# Patient Record
Sex: FEMALE | Race: WHITE | Employment: UNEMPLOYED | URBAN - METROPOLITAN AREA
[De-identification: names, ages, dates, MRNs, and addresses within clinical notes are randomized per-mention and may not be internally consistent; named-entity substitution may affect disease eponyms.]

---

## 2017-01-01 ENCOUNTER — TRANSFERRED RECORDS (OUTPATIENT)
Dept: HEALTH INFORMATION MANAGEMENT | Facility: CLINIC | Age: 42
End: 2017-01-01

## 2017-01-01 ENCOUNTER — APPOINTMENT (OUTPATIENT)
Dept: CT IMAGING | Facility: CLINIC | Age: 42
DRG: 853 | End: 2017-01-01
Attending: EMERGENCY MEDICINE
Payer: COMMERCIAL

## 2017-01-01 ENCOUNTER — TELEPHONE (OUTPATIENT)
Dept: PHARMACY | Facility: OTHER | Age: 42
End: 2017-01-01

## 2017-01-01 ENCOUNTER — ANESTHESIA EVENT (OUTPATIENT)
Dept: SURGERY | Facility: CLINIC | Age: 42
DRG: 166 | End: 2017-01-01
Payer: MEDICARE

## 2017-01-01 ENCOUNTER — OFFICE VISIT (OUTPATIENT)
Dept: PULMONOLOGY | Facility: CLINIC | Age: 42
End: 2017-01-01
Attending: INTERNAL MEDICINE
Payer: MEDICARE

## 2017-01-01 ENCOUNTER — APPOINTMENT (OUTPATIENT)
Dept: GENERAL RADIOLOGY | Facility: CLINIC | Age: 42
DRG: 871 | End: 2017-01-01
Attending: INTERNAL MEDICINE
Payer: MEDICARE

## 2017-01-01 ENCOUNTER — APPOINTMENT (OUTPATIENT)
Dept: OCCUPATIONAL THERAPY | Facility: CLINIC | Age: 42
DRG: 853 | End: 2017-01-01
Attending: INTERNAL MEDICINE
Payer: COMMERCIAL

## 2017-01-01 ENCOUNTER — ANESTHESIA (OUTPATIENT)
Dept: SURGERY | Facility: CLINIC | Age: 42
DRG: 166 | End: 2017-01-01
Payer: MEDICARE

## 2017-01-01 ENCOUNTER — APPOINTMENT (OUTPATIENT)
Dept: GENERAL RADIOLOGY | Facility: CLINIC | Age: 42
DRG: 166 | End: 2017-01-01
Attending: INTERNAL MEDICINE
Payer: MEDICARE

## 2017-01-01 ENCOUNTER — HOSPITAL ENCOUNTER (INPATIENT)
Facility: CLINIC | Age: 42
LOS: 7 days | Discharge: HOME | DRG: 196 | End: 2017-05-09
Attending: EMERGENCY MEDICINE | Admitting: INTERNAL MEDICINE
Payer: COMMERCIAL

## 2017-01-01 ENCOUNTER — SURGERY (OUTPATIENT)
Age: 42
End: 2017-01-01

## 2017-01-01 ENCOUNTER — APPOINTMENT (OUTPATIENT)
Dept: GENERAL RADIOLOGY | Facility: CLINIC | Age: 42
DRG: 166 | End: 2017-01-01
Attending: THORACIC SURGERY (CARDIOTHORACIC VASCULAR SURGERY)
Payer: MEDICARE

## 2017-01-01 ENCOUNTER — HOSPITAL ENCOUNTER (INPATIENT)
Facility: CLINIC | Age: 42
LOS: 6 days | Discharge: HOME | DRG: 196 | End: 2017-02-22
Attending: EMERGENCY MEDICINE | Admitting: INTERNAL MEDICINE
Payer: COMMERCIAL

## 2017-01-01 ENCOUNTER — APPOINTMENT (OUTPATIENT)
Dept: PHYSICAL THERAPY | Facility: CLINIC | Age: 42
DRG: 871 | End: 2017-01-01
Payer: MEDICARE

## 2017-01-01 ENCOUNTER — APPOINTMENT (OUTPATIENT)
Dept: GENERAL RADIOLOGY | Facility: CLINIC | Age: 42
DRG: 166 | End: 2017-01-01
Attending: PHYSICIAN ASSISTANT
Payer: MEDICARE

## 2017-01-01 ENCOUNTER — APPOINTMENT (OUTPATIENT)
Dept: GENERAL RADIOLOGY | Facility: CLINIC | Age: 42
DRG: 871 | End: 2017-01-01
Attending: EMERGENCY MEDICINE
Payer: COMMERCIAL

## 2017-01-01 ENCOUNTER — APPOINTMENT (OUTPATIENT)
Dept: GENERAL RADIOLOGY | Facility: CLINIC | Age: 42
DRG: 853 | End: 2017-01-01
Attending: INTERNAL MEDICINE
Payer: COMMERCIAL

## 2017-01-01 ENCOUNTER — HOSPITAL ENCOUNTER (INPATIENT)
Facility: CLINIC | Age: 42
LOS: 6 days | Discharge: HOME | DRG: 166 | End: 2017-09-20
Attending: HOSPITALIST | Admitting: HOSPITALIST
Payer: MEDICARE

## 2017-01-01 ENCOUNTER — HEALTH MAINTENANCE LETTER (OUTPATIENT)
Age: 42
End: 2017-01-01

## 2017-01-01 ENCOUNTER — ANESTHESIA EVENT (OUTPATIENT)
Dept: INTENSIVE CARE | Facility: CLINIC | Age: 42
DRG: 853 | End: 2017-01-01
Payer: COMMERCIAL

## 2017-01-01 ENCOUNTER — HOSPITAL ENCOUNTER (INPATIENT)
Facility: CLINIC | Age: 42
LOS: 1 days | Discharge: ANOTHER HOSPITAL | DRG: 196 | End: 2017-09-14
Attending: NURSE PRACTITIONER | Admitting: INTERNAL MEDICINE
Payer: MEDICARE

## 2017-01-01 ENCOUNTER — CARE COORDINATION (OUTPATIENT)
Dept: CARE COORDINATION | Facility: CLINIC | Age: 42
End: 2017-01-01

## 2017-01-01 ENCOUNTER — ANESTHESIA (OUTPATIENT)
Dept: INTENSIVE CARE | Facility: CLINIC | Age: 42
DRG: 853 | End: 2017-01-01
Payer: COMMERCIAL

## 2017-01-01 ENCOUNTER — APPOINTMENT (OUTPATIENT)
Dept: CARDIOLOGY | Facility: CLINIC | Age: 42
DRG: 853 | End: 2017-01-01
Attending: ANESTHESIOLOGY
Payer: COMMERCIAL

## 2017-01-01 ENCOUNTER — APPOINTMENT (OUTPATIENT)
Dept: GENERAL RADIOLOGY | Facility: CLINIC | Age: 42
DRG: 871 | End: 2017-01-01
Attending: EMERGENCY MEDICINE
Payer: MEDICARE

## 2017-01-01 ENCOUNTER — DOCUMENTATION ONLY (OUTPATIENT)
Dept: MEDSURG UNIT | Facility: CLINIC | Age: 42
End: 2017-01-01

## 2017-01-01 ENCOUNTER — APPOINTMENT (OUTPATIENT)
Dept: GENERAL RADIOLOGY | Facility: CLINIC | Age: 42
DRG: 196 | End: 2017-01-01
Attending: INTERNAL MEDICINE
Payer: MEDICARE

## 2017-01-01 ENCOUNTER — OFFICE VISIT (OUTPATIENT)
Dept: PULMONOLOGY | Facility: CLINIC | Age: 42
End: 2017-01-01
Attending: INTERNAL MEDICINE
Payer: COMMERCIAL

## 2017-01-01 ENCOUNTER — APPOINTMENT (OUTPATIENT)
Dept: PHYSICAL THERAPY | Facility: CLINIC | Age: 42
DRG: 166 | End: 2017-01-01
Attending: INTERNAL MEDICINE
Payer: MEDICARE

## 2017-01-01 ENCOUNTER — APPOINTMENT (OUTPATIENT)
Dept: PHYSICAL THERAPY | Facility: CLINIC | Age: 42
DRG: 853 | End: 2017-01-01
Attending: INTERNAL MEDICINE
Payer: COMMERCIAL

## 2017-01-01 ENCOUNTER — HOSPITAL ENCOUNTER (INPATIENT)
Facility: CLINIC | Age: 42
LOS: 10 days | Discharge: HOME | DRG: 871 | End: 2017-04-01
Attending: EMERGENCY MEDICINE | Admitting: INTERNAL MEDICINE
Payer: COMMERCIAL

## 2017-01-01 ENCOUNTER — HOSPITAL ENCOUNTER (EMERGENCY)
Facility: CLINIC | Age: 42
Discharge: HOME | End: 2017-11-28
Attending: EMERGENCY MEDICINE | Admitting: EMERGENCY MEDICINE
Payer: MEDICARE

## 2017-01-01 ENCOUNTER — ANESTHESIA EVENT (OUTPATIENT)
Dept: SURGERY | Facility: CLINIC | Age: 42
DRG: 853 | End: 2017-01-01
Payer: COMMERCIAL

## 2017-01-01 ENCOUNTER — APPOINTMENT (OUTPATIENT)
Dept: GENERAL RADIOLOGY | Facility: CLINIC | Age: 42
DRG: 853 | End: 2017-01-01
Attending: NURSE PRACTITIONER
Payer: COMMERCIAL

## 2017-01-01 ENCOUNTER — APPOINTMENT (OUTPATIENT)
Dept: GENERAL RADIOLOGY | Facility: CLINIC | Age: 42
DRG: 196 | End: 2017-01-01
Attending: NURSE PRACTITIONER
Payer: MEDICARE

## 2017-01-01 ENCOUNTER — HOSPITAL ENCOUNTER (EMERGENCY)
Facility: CLINIC | Age: 42
Discharge: OTHER INSTITUTION | End: 2017-03-18
Attending: EMERGENCY MEDICINE | Admitting: EMERGENCY MEDICINE
Payer: COMMERCIAL

## 2017-01-01 ENCOUNTER — TELEPHONE (OUTPATIENT)
Dept: PHARMACY | Facility: CLINIC | Age: 42
End: 2017-01-01

## 2017-01-01 ENCOUNTER — APPOINTMENT (OUTPATIENT)
Dept: ULTRASOUND IMAGING | Facility: CLINIC | Age: 42
DRG: 196 | End: 2017-01-01
Attending: INTERNAL MEDICINE
Payer: MEDICARE

## 2017-01-01 ENCOUNTER — APPOINTMENT (OUTPATIENT)
Dept: GENERAL RADIOLOGY | Facility: CLINIC | Age: 42
DRG: 196 | End: 2017-01-01
Attending: EMERGENCY MEDICINE
Payer: COMMERCIAL

## 2017-01-01 ENCOUNTER — HOSPITAL ENCOUNTER (EMERGENCY)
Facility: CLINIC | Age: 42
Discharge: ANOTHER HOSPITAL | End: 2017-10-18
Attending: EMERGENCY MEDICINE | Admitting: EMERGENCY MEDICINE
Payer: MEDICARE

## 2017-01-01 ENCOUNTER — APPOINTMENT (OUTPATIENT)
Dept: CT IMAGING | Facility: CLINIC | Age: 42
DRG: 871 | End: 2017-01-01
Attending: INTERNAL MEDICINE
Payer: MEDICARE

## 2017-01-01 ENCOUNTER — APPOINTMENT (OUTPATIENT)
Dept: CT IMAGING | Facility: CLINIC | Age: 42
DRG: 196 | End: 2017-01-01
Attending: INTERNAL MEDICINE
Payer: COMMERCIAL

## 2017-01-01 ENCOUNTER — ANESTHESIA (OUTPATIENT)
Dept: SURGERY | Facility: CLINIC | Age: 42
DRG: 853 | End: 2017-01-01
Payer: COMMERCIAL

## 2017-01-01 ENCOUNTER — APPOINTMENT (OUTPATIENT)
Dept: CT IMAGING | Facility: CLINIC | Age: 42
DRG: 196 | End: 2017-01-01
Attending: INTERNAL MEDICINE
Payer: MEDICARE

## 2017-01-01 ENCOUNTER — HOSPITAL ENCOUNTER (INPATIENT)
Facility: CLINIC | Age: 42
LOS: 11 days | Discharge: HOME HEALTH | DRG: 853 | End: 2017-01-18
Attending: EMERGENCY MEDICINE | Admitting: INTERNAL MEDICINE
Payer: COMMERCIAL

## 2017-01-01 ENCOUNTER — HOSPITAL ENCOUNTER (INPATIENT)
Facility: CLINIC | Age: 42
LOS: 19 days | Discharge: HOME | DRG: 871 | End: 2017-11-23
Attending: EMERGENCY MEDICINE | Admitting: INTERNAL MEDICINE
Payer: MEDICARE

## 2017-01-01 ENCOUNTER — APPOINTMENT (OUTPATIENT)
Dept: CARDIOLOGY | Facility: CLINIC | Age: 42
DRG: 166 | End: 2017-01-01
Attending: PHYSICIAN ASSISTANT
Payer: MEDICARE

## 2017-01-01 ENCOUNTER — APPOINTMENT (OUTPATIENT)
Dept: GENERAL RADIOLOGY | Facility: CLINIC | Age: 42
End: 2017-01-01
Attending: EMERGENCY MEDICINE
Payer: MEDICARE

## 2017-01-01 ENCOUNTER — HOSPITAL ENCOUNTER (INPATIENT)
Facility: CLINIC | Age: 42
LOS: 5 days | Discharge: HOME | DRG: 196 | End: 2017-10-23
Attending: INTERNAL MEDICINE | Admitting: INTERNAL MEDICINE
Payer: MEDICARE

## 2017-01-01 ENCOUNTER — TELEPHONE (OUTPATIENT)
Dept: PULMONOLOGY | Facility: CLINIC | Age: 42
End: 2017-01-01

## 2017-01-01 VITALS
OXYGEN SATURATION: 96 % | HEART RATE: 98 BPM | RESPIRATION RATE: 22 BRPM | DIASTOLIC BLOOD PRESSURE: 80 MMHG | HEIGHT: 67 IN | BODY MASS INDEX: 26.89 KG/M2 | WEIGHT: 171.3 LBS | SYSTOLIC BLOOD PRESSURE: 140 MMHG | TEMPERATURE: 100.3 F

## 2017-01-01 VITALS
OXYGEN SATURATION: 94 % | RESPIRATION RATE: 18 BRPM | HEIGHT: 67 IN | DIASTOLIC BLOOD PRESSURE: 63 MMHG | WEIGHT: 177.2 LBS | TEMPERATURE: 96.8 F | BODY MASS INDEX: 27.81 KG/M2 | SYSTOLIC BLOOD PRESSURE: 114 MMHG | HEART RATE: 84 BPM

## 2017-01-01 VITALS
HEART RATE: 85 BPM | TEMPERATURE: 98.8 F | RESPIRATION RATE: 18 BRPM | SYSTOLIC BLOOD PRESSURE: 120 MMHG | OXYGEN SATURATION: 95 % | HEIGHT: 67 IN | DIASTOLIC BLOOD PRESSURE: 82 MMHG | WEIGHT: 188 LBS | BODY MASS INDEX: 29.51 KG/M2

## 2017-01-01 VITALS
BODY MASS INDEX: 27.33 KG/M2 | HEART RATE: 64 BPM | RESPIRATION RATE: 16 BRPM | WEIGHT: 174.5 LBS | SYSTOLIC BLOOD PRESSURE: 107 MMHG | DIASTOLIC BLOOD PRESSURE: 67 MMHG | OXYGEN SATURATION: 91 % | TEMPERATURE: 97.3 F

## 2017-01-01 VITALS
TEMPERATURE: 96.5 F | HEART RATE: 74 BPM | RESPIRATION RATE: 20 BRPM | HEIGHT: 66 IN | SYSTOLIC BLOOD PRESSURE: 111 MMHG | DIASTOLIC BLOOD PRESSURE: 69 MMHG | BODY MASS INDEX: 27.76 KG/M2 | WEIGHT: 172.7 LBS | OXYGEN SATURATION: 96 %

## 2017-01-01 VITALS
BODY MASS INDEX: 29.44 KG/M2 | OXYGEN SATURATION: 88 % | RESPIRATION RATE: 16 BRPM | WEIGHT: 188 LBS | DIASTOLIC BLOOD PRESSURE: 70 MMHG | HEART RATE: 72 BPM | SYSTOLIC BLOOD PRESSURE: 106 MMHG

## 2017-01-01 VITALS
HEIGHT: 67 IN | RESPIRATION RATE: 16 BRPM | BODY MASS INDEX: 29.27 KG/M2 | OXYGEN SATURATION: 97 % | TEMPERATURE: 98.9 F | DIASTOLIC BLOOD PRESSURE: 78 MMHG | HEART RATE: 96 BPM | WEIGHT: 186.51 LBS | SYSTOLIC BLOOD PRESSURE: 116 MMHG

## 2017-01-01 VITALS
SYSTOLIC BLOOD PRESSURE: 100 MMHG | BODY MASS INDEX: 27.86 KG/M2 | HEART RATE: 91 BPM | WEIGHT: 177.5 LBS | OXYGEN SATURATION: 97 % | DIASTOLIC BLOOD PRESSURE: 57 MMHG | HEIGHT: 67 IN | RESPIRATION RATE: 18 BRPM | TEMPERATURE: 97.7 F

## 2017-01-01 VITALS
DIASTOLIC BLOOD PRESSURE: 78 MMHG | HEART RATE: 71 BPM | TEMPERATURE: 99.3 F | SYSTOLIC BLOOD PRESSURE: 107 MMHG | RESPIRATION RATE: 9 BRPM | OXYGEN SATURATION: 86 %

## 2017-01-01 VITALS
DIASTOLIC BLOOD PRESSURE: 68 MMHG | SYSTOLIC BLOOD PRESSURE: 106 MMHG | TEMPERATURE: 99 F | OXYGEN SATURATION: 94 % | BODY MASS INDEX: 26.68 KG/M2 | HEIGHT: 67 IN | WEIGHT: 170 LBS | RESPIRATION RATE: 18 BRPM | HEART RATE: 72 BPM

## 2017-01-01 VITALS
BODY MASS INDEX: 28.79 KG/M2 | TEMPERATURE: 96.3 F | RESPIRATION RATE: 18 BRPM | DIASTOLIC BLOOD PRESSURE: 66 MMHG | WEIGHT: 183.8 LBS | HEART RATE: 71 BPM | OXYGEN SATURATION: 95 % | SYSTOLIC BLOOD PRESSURE: 101 MMHG

## 2017-01-01 VITALS
HEART RATE: 78 BPM | WEIGHT: 197.97 LBS | OXYGEN SATURATION: 93 % | HEIGHT: 64 IN | DIASTOLIC BLOOD PRESSURE: 91 MMHG | BODY MASS INDEX: 33.8 KG/M2 | RESPIRATION RATE: 16 BRPM | TEMPERATURE: 98.8 F | SYSTOLIC BLOOD PRESSURE: 111 MMHG

## 2017-01-01 VITALS
HEART RATE: 55 BPM | BODY MASS INDEX: 26.68 KG/M2 | TEMPERATURE: 97.6 F | WEIGHT: 175.2 LBS | DIASTOLIC BLOOD PRESSURE: 62 MMHG | HEIGHT: 68 IN | HEART RATE: 99 BPM | OXYGEN SATURATION: 95 % | SYSTOLIC BLOOD PRESSURE: 112 MMHG | BODY MASS INDEX: 26.55 KG/M2 | HEIGHT: 67 IN | RESPIRATION RATE: 16 BRPM | DIASTOLIC BLOOD PRESSURE: 74 MMHG | SYSTOLIC BLOOD PRESSURE: 119 MMHG | OXYGEN SATURATION: 96 % | WEIGHT: 170 LBS

## 2017-01-01 VITALS
TEMPERATURE: 99.7 F | OXYGEN SATURATION: 98 % | SYSTOLIC BLOOD PRESSURE: 105 MMHG | RESPIRATION RATE: 10 BRPM | DIASTOLIC BLOOD PRESSURE: 64 MMHG | HEART RATE: 125 BPM

## 2017-01-01 DIAGNOSIS — R09.02 HYPOXIA: ICD-10-CM

## 2017-01-01 DIAGNOSIS — Z79.52 CURRENT CHRONIC USE OF SYSTEMIC STEROIDS: ICD-10-CM

## 2017-01-01 DIAGNOSIS — R33.9 URINARY RETENTION: ICD-10-CM

## 2017-01-01 DIAGNOSIS — F10.930 ALCOHOL WITHDRAWAL, UNCOMPLICATED (H): ICD-10-CM

## 2017-01-01 DIAGNOSIS — J84.89 ORGANIZING PNEUMONIA (H): ICD-10-CM

## 2017-01-01 DIAGNOSIS — A04.72 C. DIFFICILE COLITIS: ICD-10-CM

## 2017-01-01 DIAGNOSIS — G89.29 OTHER CHRONIC PAIN: ICD-10-CM

## 2017-01-01 DIAGNOSIS — E09.9 STEROID-INDUCED DIABETES MELLITUS (H): ICD-10-CM

## 2017-01-01 DIAGNOSIS — J18.9 PNEUMONIA OF BOTH LOWER LOBES DUE TO INFECTIOUS ORGANISM: ICD-10-CM

## 2017-01-01 DIAGNOSIS — J96.01 ACUTE RESPIRATORY FAILURE WITH HYPOXIA (H): Primary | ICD-10-CM

## 2017-01-01 DIAGNOSIS — T38.0X5A STEROID-INDUCED DIABETES MELLITUS (H): Primary | ICD-10-CM

## 2017-01-01 DIAGNOSIS — J84.89 ORGANIZING PNEUMONIA (H): Primary | ICD-10-CM

## 2017-01-01 DIAGNOSIS — R06.02 SOB (SHORTNESS OF BREATH): ICD-10-CM

## 2017-01-01 DIAGNOSIS — J18.9 PNEUMONIA OF BOTH LUNGS DUE TO INFECTIOUS ORGANISM, UNSPECIFIED PART OF LUNG: ICD-10-CM

## 2017-01-01 DIAGNOSIS — R22.0 FACIAL SWELLING: ICD-10-CM

## 2017-01-01 DIAGNOSIS — F17.218 CIGARETTE NICOTINE DEPENDENCE WITH OTHER NICOTINE-INDUCED DISORDER: Primary | ICD-10-CM

## 2017-01-01 DIAGNOSIS — J96.01 ACUTE RESPIRATORY FAILURE WITH HYPOXIA (H): ICD-10-CM

## 2017-01-01 DIAGNOSIS — A41.9 SEVERE SEPSIS (H): ICD-10-CM

## 2017-01-01 DIAGNOSIS — A41.9 SEPSIS, DUE TO UNSPECIFIED ORGANISM: ICD-10-CM

## 2017-01-01 DIAGNOSIS — R65.20 SEVERE SEPSIS (H): ICD-10-CM

## 2017-01-01 DIAGNOSIS — R06.02 SOB (SHORTNESS OF BREATH): Primary | ICD-10-CM

## 2017-01-01 DIAGNOSIS — J18.9 HCAP (HEALTHCARE-ASSOCIATED PNEUMONIA): ICD-10-CM

## 2017-01-01 DIAGNOSIS — I95.9 HYPOTENSION, UNSPECIFIED HYPOTENSION TYPE: ICD-10-CM

## 2017-01-01 DIAGNOSIS — R53.81 PHYSICAL DECONDITIONING: Primary | ICD-10-CM

## 2017-01-01 DIAGNOSIS — J18.9 PNEUMONIA: ICD-10-CM

## 2017-01-01 DIAGNOSIS — R73.9 HYPERGLYCEMIA: Primary | ICD-10-CM

## 2017-01-01 DIAGNOSIS — J21.9 BRONCHIOLITIS: ICD-10-CM

## 2017-01-01 DIAGNOSIS — E09.9 STEROID-INDUCED DIABETES MELLITUS (H): Primary | ICD-10-CM

## 2017-01-01 DIAGNOSIS — F17.200 TOBACCO USE DISORDER: ICD-10-CM

## 2017-01-01 DIAGNOSIS — J18.9 CAP (COMMUNITY ACQUIRED PNEUMONIA): ICD-10-CM

## 2017-01-01 DIAGNOSIS — F17.218 CIGARETTE NICOTINE DEPENDENCE WITH OTHER NICOTINE-INDUCED DISORDER: ICD-10-CM

## 2017-01-01 DIAGNOSIS — T38.0X5A STEROID-INDUCED DIABETES MELLITUS (H): ICD-10-CM

## 2017-01-01 DIAGNOSIS — R73.9 HYPERGLYCEMIA: ICD-10-CM

## 2017-01-01 DIAGNOSIS — R41.82 ALTERED MENTAL STATUS, UNSPECIFIED ALTERED MENTAL STATUS TYPE: ICD-10-CM

## 2017-01-01 LAB
1,3 BETA GLUCAN SER-MCNC: 50 PG/ML
1,3 BETA GLUCAN SER-MCNC: 52 PG/ML
1,3 BETA GLUCAN SER-MCNC: <31 PG/ML
A FLAVUS AB SER QL ID: NORMAL
A FUMIGATUS1 AB SER QL ID: NORMAL
A FUMIGATUS2 AB SER QL: NORMAL
A FUMIGATUS3 AB SER QL ID: NORMAL
A FUMIGATUS6 AB SER QL ID: NORMAL
A PULLULANS AB SER QL ID: NORMAL
ABO + RH BLD: ABNORMAL
ABO + RH BLD: ABNORMAL
ABO + RH BLD: NORMAL
ACETAMINOPHEN QUAL: NEGATIVE
ACID FAST STN SPEC QL: NORMAL
ALBUMIN SERPL-MCNC: 2.2 G/DL (ref 3.4–5)
ALBUMIN SERPL-MCNC: 2.4 G/DL (ref 3.4–5)
ALBUMIN SERPL-MCNC: 2.4 G/DL (ref 3.4–5)
ALBUMIN SERPL-MCNC: 2.5 G/DL (ref 3.4–5)
ALBUMIN SERPL-MCNC: 2.6 G/DL (ref 3.4–5)
ALBUMIN SERPL-MCNC: 2.7 G/DL (ref 3.4–5)
ALBUMIN SERPL-MCNC: 2.7 G/DL (ref 3.4–5)
ALBUMIN SERPL-MCNC: 3.3 G/DL (ref 3.4–5)
ALBUMIN SERPL-MCNC: 3.4 G/DL (ref 3.4–5)
ALBUMIN SERPL-MCNC: 3.4 G/DL (ref 3.4–5)
ALBUMIN SERPL-MCNC: 3.5 G/DL (ref 3.4–5)
ALBUMIN SERPL-MCNC: 3.6 G/DL (ref 3.4–5)
ALBUMIN SERPL-MCNC: 3.6 G/DL (ref 3.4–5)
ALBUMIN SERPL-MCNC: 3.9 G/DL (ref 3.4–5)
ALBUMIN SERPL-MCNC: 3.9 G/DL (ref 3.4–5)
ALBUMIN UR-MCNC: 30 MG/DL
ALBUMIN UR-MCNC: NEGATIVE MG/DL
ALDOLASE SERPL-CCNC: 9
ALP SERPL-CCNC: 101 U/L (ref 40–150)
ALP SERPL-CCNC: 106 U/L (ref 40–150)
ALP SERPL-CCNC: 108 U/L (ref 40–150)
ALP SERPL-CCNC: 109 U/L (ref 40–150)
ALP SERPL-CCNC: 114 U/L (ref 40–150)
ALP SERPL-CCNC: 119 U/L (ref 40–150)
ALP SERPL-CCNC: 120 U/L (ref 40–150)
ALP SERPL-CCNC: 124 U/L (ref 40–150)
ALP SERPL-CCNC: 130 U/L (ref 40–150)
ALP SERPL-CCNC: 132 U/L (ref 40–150)
ALP SERPL-CCNC: 139 U/L (ref 40–150)
ALP SERPL-CCNC: 145 U/L (ref 40–150)
ALP SERPL-CCNC: 62 U/L (ref 40–150)
ALP SERPL-CCNC: 90 U/L (ref 40–150)
ALP SERPL-CCNC: 97 U/L (ref 40–150)
ALP SERPL-CCNC: 99 U/L (ref 40–150)
ALT SERPL W P-5'-P-CCNC: 13 U/L (ref 0–50)
ALT SERPL W P-5'-P-CCNC: 17 U/L (ref 0–50)
ALT SERPL W P-5'-P-CCNC: 19 U/L (ref 0–50)
ALT SERPL W P-5'-P-CCNC: 28 U/L (ref 0–50)
ALT SERPL W P-5'-P-CCNC: 28 U/L (ref 0–50)
ALT SERPL W P-5'-P-CCNC: 33 U/L (ref 0–50)
ALT SERPL W P-5'-P-CCNC: 34 U/L (ref 0–50)
ALT SERPL W P-5'-P-CCNC: 34 U/L (ref 0–50)
ALT SERPL W P-5'-P-CCNC: 36 U/L (ref 0–50)
ALT SERPL W P-5'-P-CCNC: 42 U/L (ref 0–50)
ALT SERPL W P-5'-P-CCNC: 42 U/L (ref 0–50)
ALT SERPL W P-5'-P-CCNC: 45 U/L (ref 0–50)
ALT SERPL W P-5'-P-CCNC: 47 U/L (ref 0–50)
ALT SERPL W P-5'-P-CCNC: 48 U/L (ref 0–50)
ALT SERPL W P-5'-P-CCNC: 72 U/L (ref 0–50)
ALT SERPL W P-5'-P-CCNC: 87 U/L (ref 0–50)
AMMONIA PLAS-SCNC: 26 UMOL/L (ref 10–50)
AMMONIA PLAS-SCNC: 29 UMOL/L (ref 10–50)
AMMONIA PLAS-SCNC: 35 UMOL/L (ref 10–50)
AMMONIA PLAS-SCNC: 39 UMOL/L (ref 10–50)
AMOBARBITAL QUAL: NEGATIVE
AMPHETAMINES UR QL SCN: NEGATIVE
AMPHETAMINES UR QL SCN: NORMAL
AMPHETAMINES UR QL SCN: NORMAL
ANA PAT SER IF-IMP: ABNORMAL
ANA SER QL IA: NORMAL
ANA SER QL IF: ABNORMAL
ANA TITR SER IF: ABNORMAL {TITER}
ANCA IGG TITR SER IF: NORMAL {TITER}
ANION GAP SERPL CALCULATED.3IONS-SCNC: 10 MMOL/L (ref 3–14)
ANION GAP SERPL CALCULATED.3IONS-SCNC: 10 MMOL/L (ref 3–14)
ANION GAP SERPL CALCULATED.3IONS-SCNC: 11 MMOL/L (ref 3–14)
ANION GAP SERPL CALCULATED.3IONS-SCNC: 12 MMOL/L (ref 3–14)
ANION GAP SERPL CALCULATED.3IONS-SCNC: 13 MMOL/L (ref 3–14)
ANION GAP SERPL CALCULATED.3IONS-SCNC: 16 MMOL/L (ref 3–14)
ANION GAP SERPL CALCULATED.3IONS-SCNC: 3 MMOL/L (ref 3–14)
ANION GAP SERPL CALCULATED.3IONS-SCNC: 4 MMOL/L (ref 3–14)
ANION GAP SERPL CALCULATED.3IONS-SCNC: 5 MMOL/L (ref 3–14)
ANION GAP SERPL CALCULATED.3IONS-SCNC: 6 MMOL/L (ref 3–14)
ANION GAP SERPL CALCULATED.3IONS-SCNC: 7 MMOL/L (ref 3–14)
ANION GAP SERPL CALCULATED.3IONS-SCNC: 8 MMOL/L (ref 3–14)
ANION GAP SERPL CALCULATED.3IONS-SCNC: 9 MMOL/L (ref 3–14)
APPEARANCE FLD: NORMAL
APPEARANCE UR: CLEAR
APTT PPP: 30 SEC (ref 22–37)
APTT PPP: 36 SEC (ref 22–37)
APTT PPP: 41 SEC (ref 22–37)
ASPERGILLUS AB TITR SER CF: NORMAL {TITER}
AST SERPL W P-5'-P-CCNC: 124 U/L (ref 0–45)
AST SERPL W P-5'-P-CCNC: 15 U/L (ref 0–45)
AST SERPL W P-5'-P-CCNC: 27 U/L (ref 0–45)
AST SERPL W P-5'-P-CCNC: 33 U/L (ref 0–45)
AST SERPL W P-5'-P-CCNC: 35 U/L (ref 0–45)
AST SERPL W P-5'-P-CCNC: 47 U/L (ref 0–45)
AST SERPL W P-5'-P-CCNC: 51 U/L (ref 0–45)
AST SERPL W P-5'-P-CCNC: 54 U/L (ref 0–45)
AST SERPL W P-5'-P-CCNC: 54 U/L (ref 0–45)
AST SERPL W P-5'-P-CCNC: 55 U/L (ref 0–45)
AST SERPL W P-5'-P-CCNC: 56 U/L (ref 0–45)
AST SERPL W P-5'-P-CCNC: 58 U/L (ref 0–45)
AST SERPL W P-5'-P-CCNC: 62 U/L (ref 0–45)
AST SERPL W P-5'-P-CCNC: 64 U/L (ref 0–45)
AST SERPL W P-5'-P-CCNC: 69 U/L (ref 0–45)
AST SERPL W P-5'-P-CCNC: 98 U/L (ref 0–45)
B DERMAT AB TITR SER CF: NORMAL {TITER}
B-D GLUCAN INTERPRETATION (1,3): NEGATIVE
BACTERIA #/AREA URNS HPF: ABNORMAL /HPF
BACTERIA #/AREA URNS HPF: ABNORMAL /HPF
BACTERIA SPEC CULT: ABNORMAL
BACTERIA SPEC CULT: NO GROWTH
BACTERIA SPEC CULT: NORMAL
BARBITAL QUAL: NEGATIVE
BARBITURATES UR QL: NEGATIVE
BARBITURATES UR QL: NORMAL
BARBITURATES UR QL: NORMAL
BASE DEFICIT BLDA-SCNC: 0.5 MMOL/L
BASE DEFICIT BLDA-SCNC: 2.9 MMOL/L
BASE DEFICIT BLDA-SCNC: 4.9 MMOL/L
BASE DEFICIT BLDA-SCNC: 5.6 MMOL/L
BASE DEFICIT BLDA-SCNC: 6.3 MMOL/L
BASE DEFICIT BLDV-SCNC: 0.9 MMOL/L
BASE DEFICIT BLDV-SCNC: 1.6 MMOL/L
BASE DEFICIT BLDV-SCNC: 4.1 MMOL/L
BASE EXCESS BLDA CALC-SCNC: 11 MMOL/L
BASE EXCESS BLDA CALC-SCNC: 2.1 MMOL/L
BASE EXCESS BLDA CALC-SCNC: 2.4 MMOL/L
BASE EXCESS BLDA CALC-SCNC: 7.2 MMOL/L
BASE EXCESS BLDV CALC-SCNC: 3.1 MMOL/L
BASE EXCESS BLDV CALC-SCNC: 4.1 MMOL/L
BASE EXCESS BLDV CALC-SCNC: 4.7 MMOL/L
BASE EXCESS BLDV CALC-SCNC: 9.1 MMOL/L
BASOPHILS # BLD AUTO: 0 10E9/L (ref 0–0.2)
BASOPHILS # BLD AUTO: 0.1 10E9/L (ref 0–0.2)
BASOPHILS NFR BLD AUTO: 0 %
BASOPHILS NFR BLD AUTO: 0.1 %
BASOPHILS NFR BLD AUTO: 0.2 %
BASOPHILS NFR BLD AUTO: 0.3 %
BASOPHILS NFR BLD AUTO: 0.4 %
BASOPHILS NFR BLD AUTO: 1 %
BASOPHILS NFR BLD AUTO: 1 %
BENZODIAZ UR QL: NEGATIVE
BENZODIAZ UR QL: NORMAL
BENZODIAZ UR QL: NORMAL
BILIRUB DIRECT SERPL-MCNC: 0.2 MG/DL (ref 0–0.2)
BILIRUB SERPL-MCNC: 0.2 MG/DL (ref 0.2–1.3)
BILIRUB SERPL-MCNC: 0.4 MG/DL (ref 0.2–1.3)
BILIRUB SERPL-MCNC: 0.5 MG/DL (ref 0.2–1.3)
BILIRUB SERPL-MCNC: 0.6 MG/DL (ref 0.2–1.3)
BILIRUB SERPL-MCNC: 0.6 MG/DL (ref 0.2–1.3)
BILIRUB SERPL-MCNC: 0.7 MG/DL (ref 0.2–1.3)
BILIRUB SERPL-MCNC: 0.8 MG/DL (ref 0.2–1.3)
BILIRUB SERPL-MCNC: 0.8 MG/DL (ref 0.2–1.3)
BILIRUB SERPL-MCNC: 0.9 MG/DL (ref 0.2–1.3)
BILIRUB UR QL STRIP: NEGATIVE
BLD GP AB INVEST PLASRBC-IMP: ABNORMAL
BLD GP AB SCN SERPL QL: ABNORMAL
BLD GP AB SCN SERPL QL: NORMAL
BLD GP AB SCN SERPL QL: NORMAL
BLD PROD TYP BPU: ABNORMAL
BLD PROD TYP BPU: NORMAL
BLD UNIT ID BPU: 0
BLOOD BANK CMNT PATIENT-IMP: ABNORMAL
BLOOD BANK CMNT PATIENT-IMP: NORMAL
BLOOD PRODUCT CODE: NORMAL
BPU ID: NORMAL
BRONCHOSCOPY: NORMAL
BRONCHOSCOPY: NORMAL
BUN SERPL-MCNC: 10 MG/DL (ref 7–30)
BUN SERPL-MCNC: 11 MG/DL (ref 7–30)
BUN SERPL-MCNC: 12 MG/DL (ref 7–30)
BUN SERPL-MCNC: 13 MG/DL (ref 7–30)
BUN SERPL-MCNC: 14 MG/DL (ref 7–30)
BUN SERPL-MCNC: 15 MG/DL (ref 7–30)
BUN SERPL-MCNC: 15 MG/DL (ref 7–30)
BUN SERPL-MCNC: 16 MG/DL (ref 7–30)
BUN SERPL-MCNC: 17 MG/DL (ref 7–30)
BUN SERPL-MCNC: 18 MG/DL (ref 7–30)
BUN SERPL-MCNC: 19 MG/DL (ref 7–30)
BUN SERPL-MCNC: 20 MG/DL (ref 7–30)
BUN SERPL-MCNC: 21 MG/DL (ref 7–30)
BUN SERPL-MCNC: 22 MG/DL (ref 7–30)
BUN SERPL-MCNC: 23 MG/DL (ref 7–30)
BUN SERPL-MCNC: 24 MG/DL (ref 7–30)
BUN SERPL-MCNC: 24 MG/DL (ref 7–30)
BUN SERPL-MCNC: 25 MG/DL (ref 7–30)
BUN SERPL-MCNC: 26 MG/DL (ref 7–30)
BUN SERPL-MCNC: 28 MG/DL (ref 7–30)
BUN SERPL-MCNC: 29 MG/DL (ref 7–30)
BUN SERPL-MCNC: 30 MG/DL (ref 7–30)
BUN SERPL-MCNC: 32 MG/DL (ref 7–30)
BUN SERPL-MCNC: 39 MG/DL (ref 7–30)
BUN SERPL-MCNC: 39 MG/DL (ref 7–30)
BUN SERPL-MCNC: 41 MG/DL (ref 7–30)
BUN SERPL-MCNC: 6 MG/DL (ref 7–30)
BUN SERPL-MCNC: 7 MG/DL (ref 7–30)
BUN SERPL-MCNC: 7 MG/DL (ref 7–30)
BUN SERPL-MCNC: 8 MG/DL (ref 7–30)
BUN SERPL-MCNC: 8 MG/DL (ref 7–30)
BUTABARBITAL QUAL: NEGATIVE
BUTALBITAL QUAL: NEGATIVE
C BURNET PH1 IGG SER QL IF: NORMAL
C BURNET PH2 IGG SER QL IF: NORMAL
C DIFF TOX B STL QL: ABNORMAL
C DIFF TOX B STL QL: NEGATIVE
C PNEUM IGG TITR SER IF: ABNORMAL {TITER}
C PSITTACI IGG TITR SER IF: ABNORMAL {TITER}
C TRACH IGG TITR SER IF: ABNORMAL {TITER}
CAFFEINE QUAL: NEGATIVE
CALCIUM SERPL-MCNC: 7.2 MG/DL (ref 8.5–10.1)
CALCIUM SERPL-MCNC: 7.4 MG/DL (ref 8.5–10.1)
CALCIUM SERPL-MCNC: 7.8 MG/DL (ref 8.5–10.1)
CALCIUM SERPL-MCNC: 8 MG/DL (ref 8.5–10.1)
CALCIUM SERPL-MCNC: 8.1 MG/DL (ref 8.5–10.1)
CALCIUM SERPL-MCNC: 8.2 MG/DL (ref 8.5–10.1)
CALCIUM SERPL-MCNC: 8.3 MG/DL (ref 8.5–10.1)
CALCIUM SERPL-MCNC: 8.4 MG/DL (ref 8.5–10.1)
CALCIUM SERPL-MCNC: 8.5 MG/DL (ref 8.5–10.1)
CALCIUM SERPL-MCNC: 8.6 MG/DL (ref 8.5–10.1)
CALCIUM SERPL-MCNC: 8.7 MG/DL (ref 8.5–10.1)
CALCIUM SERPL-MCNC: 8.8 MG/DL (ref 8.5–10.1)
CALCIUM SERPL-MCNC: 8.9 MG/DL (ref 8.5–10.1)
CALCIUM SERPL-MCNC: 9 MG/DL (ref 8.5–10.1)
CALCIUM SERPL-MCNC: 9 MG/DL (ref 8.5–10.1)
CALCIUM SERPL-MCNC: 9.1 MG/DL (ref 8.5–10.1)
CALCIUM SERPL-MCNC: 9.3 MG/DL (ref 8.5–10.1)
CALCIUM SERPL-MCNC: 9.4 MG/DL (ref 8.5–10.1)
CALCIUM SERPL-MCNC: 9.5 MG/DL (ref 8.5–10.1)
CALCIUM SERPL-MCNC: 9.6 MG/DL (ref 8.5–10.1)
CALCIUM SERPL-MCNC: 9.9 MG/DL (ref 8.5–10.1)
CANNABINOIDS UR QL SCN: NEGATIVE
CANNABINOIDS UR QL SCN: NORMAL
CANNABINOIDS UR QL SCN: NORMAL
CARBAMAZEPINE QUAL: NEGATIVE
CARISOPRODOL QUAL: NEGATIVE
CCP AB SER IA-ACNC: 1 U/ML
CD19 CELLS NFR BRONCH: 1 %
CD3 CELLS NFR BRONCH: 66 %
CD3+CD4+ CELLS NFR BRONCH: 36 %
CD3+CD4+ CELLS/CD3+CD8+ CLL BRONCH: 1.57 %
CD3+CD8+ CELLS NFR BRONCH: 23 %
CD3-CD16+CD56+ CELLS NFR SPEC: 37 %
CH50 SERPL-ACNC: 152 CAE UNITS (ref 60–144)
CHLORIDE SERPL-SCNC: 100 MMOL/L (ref 94–109)
CHLORIDE SERPL-SCNC: 101 MMOL/L (ref 94–109)
CHLORIDE SERPL-SCNC: 102 MMOL/L (ref 94–109)
CHLORIDE SERPL-SCNC: 103 MMOL/L (ref 94–109)
CHLORIDE SERPL-SCNC: 104 MMOL/L (ref 94–109)
CHLORIDE SERPL-SCNC: 105 MMOL/L (ref 94–109)
CHLORIDE SERPL-SCNC: 106 MMOL/L (ref 94–109)
CHLORIDE SERPL-SCNC: 106 MMOL/L (ref 94–109)
CHLORIDE SERPL-SCNC: 108 MMOL/L (ref 94–109)
CHLORIDE SERPL-SCNC: 109 MMOL/L (ref 94–109)
CHLORIDE SERPL-SCNC: 111 MMOL/L (ref 94–109)
CHLORIDE SERPL-SCNC: 111 MMOL/L (ref 94–109)
CHLORIDE SERPL-SCNC: 112 MMOL/L (ref 94–109)
CHLORIDE SERPL-SCNC: 112 MMOL/L (ref 94–109)
CHLORIDE SERPL-SCNC: 113 MMOL/L (ref 94–109)
CHLORIDE SERPL-SCNC: 115 MMOL/L (ref 94–109)
CHLORIDE SERPL-SCNC: 90 MMOL/L (ref 94–109)
CHLORIDE SERPL-SCNC: 91 MMOL/L (ref 94–109)
CHLORIDE SERPL-SCNC: 91 MMOL/L (ref 94–109)
CHLORIDE SERPL-SCNC: 92 MMOL/L (ref 94–109)
CHLORIDE SERPL-SCNC: 93 MMOL/L (ref 94–109)
CHLORIDE SERPL-SCNC: 94 MMOL/L (ref 94–109)
CHLORIDE SERPL-SCNC: 95 MMOL/L (ref 94–109)
CHLORIDE SERPL-SCNC: 95 MMOL/L (ref 94–109)
CHLORIDE SERPL-SCNC: 97 MMOL/L (ref 94–109)
CHLORIDE SERPL-SCNC: 98 MMOL/L (ref 94–109)
CHLORPROPAMIDE UR-MCNC: NEGATIVE UG/ML
CK SERPL-CCNC: 24 U/L (ref 30–225)
CK SERPL-CCNC: 27 U/L (ref 30–225)
CK SERPL-CCNC: 30 U/L (ref 30–225)
CO2 BLDCOV-SCNC: 23 MMOL/L (ref 21–28)
CO2 BLDCOV-SCNC: 24 MMOL/L (ref 21–28)
CO2 BLDCOV-SCNC: 25 MMOL/L (ref 21–28)
CO2 BLDCOV-SCNC: 26 MMOL/L (ref 21–28)
CO2 SERPL-SCNC: 17 MMOL/L (ref 20–32)
CO2 SERPL-SCNC: 19 MMOL/L (ref 20–32)
CO2 SERPL-SCNC: 23 MMOL/L (ref 20–32)
CO2 SERPL-SCNC: 23 MMOL/L (ref 20–32)
CO2 SERPL-SCNC: 24 MMOL/L (ref 20–32)
CO2 SERPL-SCNC: 25 MMOL/L (ref 20–32)
CO2 SERPL-SCNC: 26 MMOL/L (ref 20–32)
CO2 SERPL-SCNC: 27 MMOL/L (ref 20–32)
CO2 SERPL-SCNC: 28 MMOL/L (ref 20–32)
CO2 SERPL-SCNC: 29 MMOL/L (ref 20–32)
CO2 SERPL-SCNC: 30 MMOL/L (ref 20–32)
CO2 SERPL-SCNC: 31 MMOL/L (ref 20–32)
CO2 SERPL-SCNC: 32 MMOL/L (ref 20–32)
CO2 SERPL-SCNC: 33 MMOL/L (ref 20–32)
CO2 SERPL-SCNC: 34 MMOL/L (ref 20–32)
CO2 SERPL-SCNC: 35 MMOL/L (ref 20–32)
COCAINE UR QL: NEGATIVE
COCAINE UR QL: NORMAL
COCAINE UR QL: NORMAL
COCCIDIOIDES AB TITR SER CF: NORMAL {TITER}
COLOR FLD: COLORLESS
COLOR FLD: NORMAL
COLOR FLD: NORMAL
COLOR UR AUTO: ABNORMAL
COLOR UR AUTO: YELLOW
COPATH REPORT: NORMAL
CREAT SERPL-MCNC: 0.62 MG/DL (ref 0.52–1.04)
CREAT SERPL-MCNC: 0.64 MG/DL (ref 0.52–1.04)
CREAT SERPL-MCNC: 0.66 MG/DL (ref 0.52–1.04)
CREAT SERPL-MCNC: 0.68 MG/DL (ref 0.52–1.04)
CREAT SERPL-MCNC: 0.68 MG/DL (ref 0.52–1.04)
CREAT SERPL-MCNC: 0.73 MG/DL (ref 0.52–1.04)
CREAT SERPL-MCNC: 0.78 MG/DL (ref 0.52–1.04)
CREAT SERPL-MCNC: 0.79 MG/DL (ref 0.52–1.04)
CREAT SERPL-MCNC: 0.8 MG/DL (ref 0.52–1.04)
CREAT SERPL-MCNC: 0.8 MG/DL (ref 0.52–1.04)
CREAT SERPL-MCNC: 0.81 MG/DL (ref 0.52–1.04)
CREAT SERPL-MCNC: 0.82 MG/DL (ref 0.52–1.04)
CREAT SERPL-MCNC: 0.82 MG/DL (ref 0.52–1.04)
CREAT SERPL-MCNC: 0.83 MG/DL (ref 0.52–1.04)
CREAT SERPL-MCNC: 0.83 MG/DL (ref 0.52–1.04)
CREAT SERPL-MCNC: 0.84 MG/DL (ref 0.52–1.04)
CREAT SERPL-MCNC: 0.85 MG/DL (ref 0.52–1.04)
CREAT SERPL-MCNC: 0.86 MG/DL (ref 0.52–1.04)
CREAT SERPL-MCNC: 0.87 MG/DL (ref 0.52–1.04)
CREAT SERPL-MCNC: 0.88 MG/DL (ref 0.52–1.04)
CREAT SERPL-MCNC: 0.89 MG/DL (ref 0.52–1.04)
CREAT SERPL-MCNC: 0.9 MG/DL (ref 0.52–1.04)
CREAT SERPL-MCNC: 0.92 MG/DL (ref 0.52–1.04)
CREAT SERPL-MCNC: 0.92 MG/DL (ref 0.52–1.04)
CREAT SERPL-MCNC: 0.93 MG/DL (ref 0.52–1.04)
CREAT SERPL-MCNC: 0.93 MG/DL (ref 0.52–1.04)
CREAT SERPL-MCNC: 0.94 MG/DL (ref 0.52–1.04)
CREAT SERPL-MCNC: 0.95 MG/DL (ref 0.52–1.04)
CREAT SERPL-MCNC: 0.96 MG/DL (ref 0.52–1.04)
CREAT SERPL-MCNC: 0.97 MG/DL (ref 0.52–1.04)
CREAT SERPL-MCNC: 0.97 MG/DL (ref 0.52–1.04)
CREAT SERPL-MCNC: 0.98 MG/DL (ref 0.52–1.04)
CREAT SERPL-MCNC: 0.99 MG/DL (ref 0.52–1.04)
CREAT SERPL-MCNC: 1 MG/DL (ref 0.52–1.04)
CREAT SERPL-MCNC: 1.01 MG/DL (ref 0.52–1.04)
CREAT SERPL-MCNC: 1.02 MG/DL (ref 0.52–1.04)
CREAT SERPL-MCNC: 1.03 MG/DL (ref 0.52–1.04)
CREAT SERPL-MCNC: 1.04 MG/DL (ref 0.52–1.04)
CREAT SERPL-MCNC: 1.07 MG/DL (ref 0.52–1.04)
CREAT SERPL-MCNC: 1.11 MG/DL (ref 0.52–1.04)
CREAT SERPL-MCNC: 1.12 MG/DL (ref 0.52–1.04)
CREAT SERPL-MCNC: 1.12 MG/DL (ref 0.52–1.04)
CREAT SERPL-MCNC: 1.13 MG/DL (ref 0.52–1.04)
CREAT SERPL-MCNC: 1.22 MG/DL (ref 0.52–1.04)
CREAT SERPL-MCNC: 1.23 MG/DL (ref 0.52–1.04)
CREAT SERPL-MCNC: 1.31 MG/DL (ref 0.52–1.04)
CREAT SERPL-MCNC: 1.31 MG/DL (ref 0.52–1.04)
CREAT SERPL-MCNC: 1.42 MG/DL (ref 0.52–1.04)
CREAT SERPL-MCNC: 1.47 MG/DL (ref 0.52–1.04)
CREAT UR-MCNC: 24 MG/DL
CREAT UR-MCNC: 48 MG/DL
CRP SERPL-MCNC: 110 MG/L (ref 0–8)
CRP SERPL-MCNC: 16.3 MG/L (ref 0–8)
CRP SERPL-MCNC: 217 MG/L (ref 0–8)
CRP SERPL-MCNC: 3.7 MG/L (ref 0–8)
CRP SERPL-MCNC: 69.3 MG/L (ref 0–8)
CRP SERPL-MCNC: 72 MG/L (ref 0–8)
D DIMER PPP FEU-MCNC: 0.8 UG/ML FEU (ref 0–0.5)
D DIMER PPP FEU-MCNC: 1 UG/ML FEU (ref 0–0.5)
D DIMER PPP FEU-MCNC: 1.3 UG/ML FEU (ref 0–0.5)
DAT IGG-SP REAG RBC-IMP: NORMAL
DAT IGG-SP REAG RBC-IMP: NORMAL
DIFFERENTIAL METHOD BLD: ABNORMAL
DLCOCOR-%PRED-PRE: 59 %
DLCOCOR-PRE: 14.74 ML/MIN/MMHG
DLCOUNC-%PRED-PRE: 46 %
DLCOUNC-%PRED-PRE: 57 %
DLCOUNC-PRE: 11.55 ML/MIN/MMHG
DLCOUNC-PRE: 14.31 ML/MIN/MMHG
DLCOUNC-PRED: 24.65 ML/MIN/MMHG
DLCOUNC-PRED: 24.69 ML/MIN/MMHG
DRUGS SERPL SCN: NEGATIVE
ENA JO1 IGG SER-ACNC: NORMAL AI (ref 0–0.9)
ENA SCL70 IGG SER IA-ACNC: NORMAL AI (ref 0–0.9)
ENA SS-A IGG SER IA-ACNC: NORMAL AI (ref 0–0.9)
ENA SS-B IGG SER IA-ACNC: NORMAL AI (ref 0–0.9)
EOSINOPHIL # BLD AUTO: 0 10E9/L (ref 0–0.7)
EOSINOPHIL # BLD AUTO: 0.1 10E9/L (ref 0–0.7)
EOSINOPHIL NFR BLD AUTO: 0 %
EOSINOPHIL NFR BLD AUTO: 0.1 %
EOSINOPHIL NFR BLD AUTO: 0.2 %
EOSINOPHIL NFR BLD AUTO: 0.8 %
EOSINOPHIL NFR BLD AUTO: 1 %
EOSINOPHIL NFR BLD AUTO: 1.7 %
EOSINOPHIL NFR BLD AUTO: 2 %
EOSINOPHIL NFR FLD MANUAL: 1 %
ERV-%PRED-PRE: 75 %
ERV-%PRED-PRE: 84 %
ERV-PRE: 0.69 L
ERV-PRE: 0.87 L
ERV-PRED: 0.92 L
ERV-PRED: 1.04 L
ERYTHROCYTE [DISTWIDTH] IN BLOOD BY AUTOMATED COUNT: 13.1 % (ref 10–15)
ERYTHROCYTE [DISTWIDTH] IN BLOOD BY AUTOMATED COUNT: 13.2 % (ref 10–15)
ERYTHROCYTE [DISTWIDTH] IN BLOOD BY AUTOMATED COUNT: 13.4 % (ref 10–15)
ERYTHROCYTE [DISTWIDTH] IN BLOOD BY AUTOMATED COUNT: 13.7 % (ref 10–15)
ERYTHROCYTE [DISTWIDTH] IN BLOOD BY AUTOMATED COUNT: 13.8 % (ref 10–15)
ERYTHROCYTE [DISTWIDTH] IN BLOOD BY AUTOMATED COUNT: 13.9 % (ref 10–15)
ERYTHROCYTE [DISTWIDTH] IN BLOOD BY AUTOMATED COUNT: 13.9 % (ref 10–15)
ERYTHROCYTE [DISTWIDTH] IN BLOOD BY AUTOMATED COUNT: 14 % (ref 10–15)
ERYTHROCYTE [DISTWIDTH] IN BLOOD BY AUTOMATED COUNT: 14.1 % (ref 10–15)
ERYTHROCYTE [DISTWIDTH] IN BLOOD BY AUTOMATED COUNT: 14.2 % (ref 10–15)
ERYTHROCYTE [DISTWIDTH] IN BLOOD BY AUTOMATED COUNT: 14.3 % (ref 10–15)
ERYTHROCYTE [DISTWIDTH] IN BLOOD BY AUTOMATED COUNT: 14.4 % (ref 10–15)
ERYTHROCYTE [DISTWIDTH] IN BLOOD BY AUTOMATED COUNT: 14.5 % (ref 10–15)
ERYTHROCYTE [DISTWIDTH] IN BLOOD BY AUTOMATED COUNT: 14.6 % (ref 10–15)
ERYTHROCYTE [DISTWIDTH] IN BLOOD BY AUTOMATED COUNT: 14.7 % (ref 10–15)
ERYTHROCYTE [DISTWIDTH] IN BLOOD BY AUTOMATED COUNT: 14.7 % (ref 10–15)
ERYTHROCYTE [DISTWIDTH] IN BLOOD BY AUTOMATED COUNT: 14.8 % (ref 10–15)
ERYTHROCYTE [DISTWIDTH] IN BLOOD BY AUTOMATED COUNT: 14.9 % (ref 10–15)
ERYTHROCYTE [DISTWIDTH] IN BLOOD BY AUTOMATED COUNT: 15 % (ref 10–15)
ERYTHROCYTE [DISTWIDTH] IN BLOOD BY AUTOMATED COUNT: 15 % (ref 10–15)
ERYTHROCYTE [DISTWIDTH] IN BLOOD BY AUTOMATED COUNT: 15.1 % (ref 10–15)
ERYTHROCYTE [DISTWIDTH] IN BLOOD BY AUTOMATED COUNT: 15.1 % (ref 10–15)
ERYTHROCYTE [DISTWIDTH] IN BLOOD BY AUTOMATED COUNT: 15.5 % (ref 10–15)
ERYTHROCYTE [DISTWIDTH] IN BLOOD BY AUTOMATED COUNT: 15.5 % (ref 10–15)
ERYTHROCYTE [DISTWIDTH] IN BLOOD BY AUTOMATED COUNT: 15.7 % (ref 10–15)
ERYTHROCYTE [DISTWIDTH] IN BLOOD BY AUTOMATED COUNT: 15.9 % (ref 10–15)
ERYTHROCYTE [DISTWIDTH] IN BLOOD BY AUTOMATED COUNT: 16.1 % (ref 10–15)
ERYTHROCYTE [DISTWIDTH] IN BLOOD BY AUTOMATED COUNT: 16.6 % (ref 10–15)
ERYTHROCYTE [DISTWIDTH] IN BLOOD BY AUTOMATED COUNT: 17 % (ref 10–15)
ERYTHROCYTE [DISTWIDTH] IN BLOOD BY AUTOMATED COUNT: 17.4 % (ref 10–15)
ERYTHROCYTE [DISTWIDTH] IN BLOOD BY AUTOMATED COUNT: 18.4 % (ref 10–15)
ERYTHROCYTE [DISTWIDTH] IN BLOOD BY AUTOMATED COUNT: 19.2 % (ref 10–15)
ERYTHROCYTE [DISTWIDTH] IN BLOOD BY AUTOMATED COUNT: 19.8 % (ref 10–15)
ERYTHROCYTE [DISTWIDTH] IN BLOOD BY AUTOMATED COUNT: 19.8 % (ref 10–15)
ERYTHROCYTE [SEDIMENTATION RATE] IN BLOOD BY WESTERGREN METHOD: 10 MM/H (ref 0–20)
ERYTHROCYTE [SEDIMENTATION RATE] IN BLOOD BY WESTERGREN METHOD: 16 MM/H (ref 0–20)
ERYTHROCYTE [SEDIMENTATION RATE] IN BLOOD BY WESTERGREN METHOD: 23 MM/H (ref 0–20)
ETHANOL SERPL-MCNC: 0.05 G/DL
ETHANOL SERPL-MCNC: <0.01 G/DL
ETHANOL UR QL SCN: NEGATIVE
ETHCLORVYNOL QUAL: NEGATIVE
ETHINAMATE QUAL: NEGATIVE
ETHOSUXIMIDE QUAL: NEGATIVE
ETHOTOIN QUAL: NEGATIVE
EXPTIME-PRE: 8.63 SEC
EXPTIME-PRE: 9.27 SEC
FEF2575-%PRED-POST: 89 %
FEF2575-%PRED-PRE: 72 %
FEF2575-%PRED-PRE: 81 %
FEF2575-POST: 2.9 L/SEC
FEF2575-PRE: 2.34 L/SEC
FEF2575-PRE: 2.66 L/SEC
FEF2575-PRED: 3.24 L/SEC
FEF2575-PRED: 3.26 L/SEC
FEFMAX-%PRED-PRE: 100 %
FEFMAX-%PRED-PRE: 94 %
FEFMAX-PRE: 6.99 L/SEC
FEFMAX-PRE: 7.39 L/SEC
FEFMAX-PRED: 7.36 L/SEC
FEFMAX-PRED: 7.36 L/SEC
FERRITIN SERPL-MCNC: 524 NG/ML (ref 12–150)
FEV1-%PRED-PRE: 65 %
FEV1-%PRED-PRE: 70 %
FEV1-PRE: 2.1 L
FEV1-PRE: 2.28 L
FEV1FEV6-PRE: 85 %
FEV1FEV6-PRE: 85 %
FEV1FEV6-PRED: 83 %
FEV1FEV6-PRED: 83 %
FEV1FVC-PRE: 84 %
FEV1FVC-PRE: 85 %
FEV1FVC-PRED: 81 %
FEV1FVC-PRED: 81 %
FEV1SVC-PRE: 81 %
FEV1SVC-PRE: 84 %
FEV1SVC-PRED: 81 %
FEV1SVC-PRED: 81 %
FIFMAX-PRE: 4.37 L/SEC
FIFMAX-PRE: 5.36 L/SEC
FLUAV H1 2009 PAND RNA SPEC QL NAA+PROBE: ABNORMAL
FLUAV H1 2009 PAND RNA SPEC QL NAA+PROBE: NEGATIVE
FLUAV H1 2009 PAND RNA SPEC QL NAA+PROBE: NEGATIVE
FLUAV H1 RNA SPEC QL NAA+PROBE: ABNORMAL
FLUAV H1 RNA SPEC QL NAA+PROBE: NEGATIVE
FLUAV H1 RNA SPEC QL NAA+PROBE: NEGATIVE
FLUAV H3 RNA SPEC QL NAA+PROBE: ABNORMAL
FLUAV H3 RNA SPEC QL NAA+PROBE: NEGATIVE
FLUAV H3 RNA SPEC QL NAA+PROBE: NEGATIVE
FLUAV RNA SPEC QL NAA+PROBE: ABNORMAL
FLUAV RNA SPEC QL NAA+PROBE: NEGATIVE
FLUAV RNA SPEC QL NAA+PROBE: NEGATIVE
FLUAV+FLUBV AG SPEC QL: NEGATIVE
FLUAV+FLUBV AG SPEC QL: NORMAL
FLUAV+FLUBV AG SPEC QL: NORMAL
FLUAV+FLUBV RNA SPEC QL NAA+PROBE: NORMAL
FLUAV+FLUBV RNA SPEC QL NAA+PROBE: NORMAL
FLUBV RNA SPEC QL NAA+PROBE: ABNORMAL
FLUBV RNA SPEC QL NAA+PROBE: NEGATIVE
FLUBV RNA SPEC QL NAA+PROBE: NEGATIVE
FOLATE SERPL-MCNC: 14.1 NG/ML
FRACT EXCRET NA UR+SERPL-RTO: 0.5 %
FRACT EXCRET NA UR+SERPL-RTO: 1.2 %
FRCPLETH-%PRED-PRE: 73 %
FRCPLETH-%PRED-PRE: 91 %
FRCPLETH-PRE: 2.07 L
FRCPLETH-PRE: 2.61 L
FRCPLETH-PRED: 2.84 L
FRCPLETH-PRED: 2.84 L
FUNGUS SPEC CULT: NORMAL
FUNGUS SPEC CULT: NORMAL
FVC-%PRED-PRE: 61 %
FVC-%PRED-PRE: 68 %
FVC-PRE: 2.47 L
FVC-PRE: 2.72 L
FVC-PRED: 3.99 L
FVC-PRED: 4 L
GALACTOMANNAN AG SERPL QL IA: NEGATIVE
GALACTOMANNAN AG SERPL-ACNC: 0.03
GBM IGG SER IA-ACNC: NORMAL AI (ref 0–0.9)
GFR SERPL CREATININE-BSD FRML MDRD: 39 ML/MIN/1.7M2
GFR SERPL CREATININE-BSD FRML MDRD: 40 ML/MIN/1.7M2
GFR SERPL CREATININE-BSD FRML MDRD: 44 ML/MIN/1.7M2
GFR SERPL CREATININE-BSD FRML MDRD: 44 ML/MIN/1.7M2
GFR SERPL CREATININE-BSD FRML MDRD: 48 ML/MIN/1.7M2
GFR SERPL CREATININE-BSD FRML MDRD: 48 ML/MIN/1.7M2
GFR SERPL CREATININE-BSD FRML MDRD: 53 ML/MIN/1.7M2
GFR SERPL CREATININE-BSD FRML MDRD: 54 ML/MIN/1.7M2
GFR SERPL CREATININE-BSD FRML MDRD: 56 ML/MIN/1.7M2
GFR SERPL CREATININE-BSD FRML MDRD: 58 ML/MIN/1.7M2
GFR SERPL CREATININE-BSD FRML MDRD: 59 ML/MIN/1.7M2
GFR SERPL CREATININE-BSD FRML MDRD: 59 ML/MIN/1.7M2
GFR SERPL CREATININE-BSD FRML MDRD: 60 ML/MIN/1.7M2
GFR SERPL CREATININE-BSD FRML MDRD: 61 ML/MIN/1.7M2
GFR SERPL CREATININE-BSD FRML MDRD: 62 ML/MIN/1.7M2
GFR SERPL CREATININE-BSD FRML MDRD: 63 ML/MIN/1.7M2
GFR SERPL CREATININE-BSD FRML MDRD: 64 ML/MIN/1.7M2
GFR SERPL CREATININE-BSD FRML MDRD: 66 ML/MIN/1.7M2
GFR SERPL CREATININE-BSD FRML MDRD: 67 ML/MIN/1.7M2
GFR SERPL CREATININE-BSD FRML MDRD: 67 ML/MIN/1.7M2
GFR SERPL CREATININE-BSD FRML MDRD: 68 ML/MIN/1.7M2
GFR SERPL CREATININE-BSD FRML MDRD: 69 ML/MIN/1.7M2
GFR SERPL CREATININE-BSD FRML MDRD: 70 ML/MIN/1.7M2
GFR SERPL CREATININE-BSD FRML MDRD: 70 ML/MIN/1.7M2
GFR SERPL CREATININE-BSD FRML MDRD: 71 ML/MIN/1.7M2
GFR SERPL CREATININE-BSD FRML MDRD: 71 ML/MIN/1.7M2
GFR SERPL CREATININE-BSD FRML MDRD: 72 ML/MIN/1.7M2
GFR SERPL CREATININE-BSD FRML MDRD: 73 ML/MIN/1.7M2
GFR SERPL CREATININE-BSD FRML MDRD: 74 ML/MIN/1.7M2
GFR SERPL CREATININE-BSD FRML MDRD: 75 ML/MIN/1.7M2
GFR SERPL CREATININE-BSD FRML MDRD: 75 ML/MIN/1.7M2
GFR SERPL CREATININE-BSD FRML MDRD: 76 ML/MIN/1.7M2
GFR SERPL CREATININE-BSD FRML MDRD: 76 ML/MIN/1.7M2
GFR SERPL CREATININE-BSD FRML MDRD: 78 ML/MIN/1.7M2
GFR SERPL CREATININE-BSD FRML MDRD: 79 ML/MIN/1.7M2
GFR SERPL CREATININE-BSD FRML MDRD: 79 ML/MIN/1.7M2
GFR SERPL CREATININE-BSD FRML MDRD: 80 ML/MIN/1.7M2
GFR SERPL CREATININE-BSD FRML MDRD: 81 ML/MIN/1.7M2
GFR SERPL CREATININE-BSD FRML MDRD: 81 ML/MIN/1.7M2
GFR SERPL CREATININE-BSD FRML MDRD: 87 ML/MIN/1.7M2
GFR SERPL CREATININE-BSD FRML MDRD: 87 ML/MIN/1.7M2
GFR SERPL CREATININE-BSD FRML MDRD: 88 ML/MIN/1.7M2
GFR SERPL CREATININE-BSD FRML MDRD: >90 ML/MIN/1.7M2
GFR SERPL CREATININE-BSD FRML MDRD: ABNORMAL ML/MIN/1.7M2
GFR SERPL CREATININE-BSD FRML MDRD: ABNORMAL ML/MIN/1.7M2
GFR SERPL CREATININE-BSD FRML MDRD: NORMAL ML/MIN/1.7M2
GFR SERPL CREATININE-BSD FRML MDRD: NORMAL ML/MIN/1.7M2
GLUCOSE BLDC GLUCOMTR-MCNC: 101 MG/DL (ref 70–99)
GLUCOSE BLDC GLUCOMTR-MCNC: 101 MG/DL (ref 70–99)
GLUCOSE BLDC GLUCOMTR-MCNC: 102 MG/DL (ref 70–99)
GLUCOSE BLDC GLUCOMTR-MCNC: 102 MG/DL (ref 70–99)
GLUCOSE BLDC GLUCOMTR-MCNC: 103 MG/DL (ref 70–99)
GLUCOSE BLDC GLUCOMTR-MCNC: 104 MG/DL (ref 70–99)
GLUCOSE BLDC GLUCOMTR-MCNC: 104 MG/DL (ref 70–99)
GLUCOSE BLDC GLUCOMTR-MCNC: 105 MG/DL (ref 70–99)
GLUCOSE BLDC GLUCOMTR-MCNC: 105 MG/DL (ref 70–99)
GLUCOSE BLDC GLUCOMTR-MCNC: 106 MG/DL (ref 70–99)
GLUCOSE BLDC GLUCOMTR-MCNC: 106 MG/DL (ref 70–99)
GLUCOSE BLDC GLUCOMTR-MCNC: 107 MG/DL (ref 70–99)
GLUCOSE BLDC GLUCOMTR-MCNC: 107 MG/DL (ref 70–99)
GLUCOSE BLDC GLUCOMTR-MCNC: 109 MG/DL (ref 70–99)
GLUCOSE BLDC GLUCOMTR-MCNC: 109 MG/DL (ref 70–99)
GLUCOSE BLDC GLUCOMTR-MCNC: 110 MG/DL (ref 70–99)
GLUCOSE BLDC GLUCOMTR-MCNC: 110 MG/DL (ref 70–99)
GLUCOSE BLDC GLUCOMTR-MCNC: 111 MG/DL (ref 70–99)
GLUCOSE BLDC GLUCOMTR-MCNC: 112 MG/DL (ref 70–99)
GLUCOSE BLDC GLUCOMTR-MCNC: 113 MG/DL (ref 70–99)
GLUCOSE BLDC GLUCOMTR-MCNC: 114 MG/DL (ref 70–99)
GLUCOSE BLDC GLUCOMTR-MCNC: 115 MG/DL (ref 70–99)
GLUCOSE BLDC GLUCOMTR-MCNC: 116 MG/DL (ref 70–99)
GLUCOSE BLDC GLUCOMTR-MCNC: 117 MG/DL (ref 70–99)
GLUCOSE BLDC GLUCOMTR-MCNC: 118 MG/DL (ref 70–99)
GLUCOSE BLDC GLUCOMTR-MCNC: 119 MG/DL (ref 70–99)
GLUCOSE BLDC GLUCOMTR-MCNC: 120 MG/DL (ref 70–99)
GLUCOSE BLDC GLUCOMTR-MCNC: 121 MG/DL (ref 70–99)
GLUCOSE BLDC GLUCOMTR-MCNC: 121 MG/DL (ref 70–99)
GLUCOSE BLDC GLUCOMTR-MCNC: 122 MG/DL (ref 70–99)
GLUCOSE BLDC GLUCOMTR-MCNC: 123 MG/DL (ref 70–99)
GLUCOSE BLDC GLUCOMTR-MCNC: 124 MG/DL (ref 70–99)
GLUCOSE BLDC GLUCOMTR-MCNC: 124 MG/DL (ref 70–99)
GLUCOSE BLDC GLUCOMTR-MCNC: 125 MG/DL (ref 70–99)
GLUCOSE BLDC GLUCOMTR-MCNC: 126 MG/DL (ref 70–99)
GLUCOSE BLDC GLUCOMTR-MCNC: 127 MG/DL (ref 70–99)
GLUCOSE BLDC GLUCOMTR-MCNC: 128 MG/DL (ref 70–99)
GLUCOSE BLDC GLUCOMTR-MCNC: 128 MG/DL (ref 70–99)
GLUCOSE BLDC GLUCOMTR-MCNC: 129 MG/DL (ref 70–99)
GLUCOSE BLDC GLUCOMTR-MCNC: 130 MG/DL (ref 70–99)
GLUCOSE BLDC GLUCOMTR-MCNC: 131 MG/DL (ref 70–99)
GLUCOSE BLDC GLUCOMTR-MCNC: 131 MG/DL (ref 70–99)
GLUCOSE BLDC GLUCOMTR-MCNC: 132 MG/DL (ref 70–99)
GLUCOSE BLDC GLUCOMTR-MCNC: 132 MG/DL (ref 70–99)
GLUCOSE BLDC GLUCOMTR-MCNC: 133 MG/DL (ref 70–99)
GLUCOSE BLDC GLUCOMTR-MCNC: 134 MG/DL (ref 70–99)
GLUCOSE BLDC GLUCOMTR-MCNC: 134 MG/DL (ref 70–99)
GLUCOSE BLDC GLUCOMTR-MCNC: 135 MG/DL (ref 70–99)
GLUCOSE BLDC GLUCOMTR-MCNC: 135 MG/DL (ref 70–99)
GLUCOSE BLDC GLUCOMTR-MCNC: 136 MG/DL (ref 70–99)
GLUCOSE BLDC GLUCOMTR-MCNC: 137 MG/DL (ref 70–99)
GLUCOSE BLDC GLUCOMTR-MCNC: 137 MG/DL (ref 70–99)
GLUCOSE BLDC GLUCOMTR-MCNC: 138 MG/DL (ref 70–99)
GLUCOSE BLDC GLUCOMTR-MCNC: 139 MG/DL (ref 70–99)
GLUCOSE BLDC GLUCOMTR-MCNC: 140 MG/DL (ref 70–99)
GLUCOSE BLDC GLUCOMTR-MCNC: 141 MG/DL (ref 70–99)
GLUCOSE BLDC GLUCOMTR-MCNC: 142 MG/DL (ref 70–99)
GLUCOSE BLDC GLUCOMTR-MCNC: 143 MG/DL (ref 70–99)
GLUCOSE BLDC GLUCOMTR-MCNC: 145 MG/DL (ref 70–99)
GLUCOSE BLDC GLUCOMTR-MCNC: 146 MG/DL (ref 70–99)
GLUCOSE BLDC GLUCOMTR-MCNC: 147 MG/DL (ref 70–99)
GLUCOSE BLDC GLUCOMTR-MCNC: 148 MG/DL (ref 70–99)
GLUCOSE BLDC GLUCOMTR-MCNC: 149 MG/DL (ref 70–99)
GLUCOSE BLDC GLUCOMTR-MCNC: 152 MG/DL (ref 70–99)
GLUCOSE BLDC GLUCOMTR-MCNC: 153 MG/DL (ref 70–99)
GLUCOSE BLDC GLUCOMTR-MCNC: 153 MG/DL (ref 70–99)
GLUCOSE BLDC GLUCOMTR-MCNC: 154 MG/DL (ref 70–99)
GLUCOSE BLDC GLUCOMTR-MCNC: 154 MG/DL (ref 70–99)
GLUCOSE BLDC GLUCOMTR-MCNC: 155 MG/DL (ref 70–99)
GLUCOSE BLDC GLUCOMTR-MCNC: 156 MG/DL (ref 70–99)
GLUCOSE BLDC GLUCOMTR-MCNC: 158 MG/DL (ref 70–99)
GLUCOSE BLDC GLUCOMTR-MCNC: 159 MG/DL (ref 70–99)
GLUCOSE BLDC GLUCOMTR-MCNC: 160 MG/DL (ref 70–99)
GLUCOSE BLDC GLUCOMTR-MCNC: 162 MG/DL (ref 70–99)
GLUCOSE BLDC GLUCOMTR-MCNC: 162 MG/DL (ref 70–99)
GLUCOSE BLDC GLUCOMTR-MCNC: 163 MG/DL (ref 70–99)
GLUCOSE BLDC GLUCOMTR-MCNC: 163 MG/DL (ref 70–99)
GLUCOSE BLDC GLUCOMTR-MCNC: 164 MG/DL (ref 70–99)
GLUCOSE BLDC GLUCOMTR-MCNC: 165 MG/DL (ref 70–99)
GLUCOSE BLDC GLUCOMTR-MCNC: 166 MG/DL (ref 70–99)
GLUCOSE BLDC GLUCOMTR-MCNC: 167 MG/DL (ref 70–99)
GLUCOSE BLDC GLUCOMTR-MCNC: 168 MG/DL (ref 70–99)
GLUCOSE BLDC GLUCOMTR-MCNC: 169 MG/DL (ref 70–99)
GLUCOSE BLDC GLUCOMTR-MCNC: 170 MG/DL (ref 70–99)
GLUCOSE BLDC GLUCOMTR-MCNC: 171 MG/DL (ref 70–99)
GLUCOSE BLDC GLUCOMTR-MCNC: 172 MG/DL (ref 70–99)
GLUCOSE BLDC GLUCOMTR-MCNC: 173 MG/DL (ref 70–99)
GLUCOSE BLDC GLUCOMTR-MCNC: 174 MG/DL (ref 70–99)
GLUCOSE BLDC GLUCOMTR-MCNC: 174 MG/DL (ref 70–99)
GLUCOSE BLDC GLUCOMTR-MCNC: 175 MG/DL (ref 70–99)
GLUCOSE BLDC GLUCOMTR-MCNC: 176 MG/DL (ref 70–99)
GLUCOSE BLDC GLUCOMTR-MCNC: 177 MG/DL (ref 70–99)
GLUCOSE BLDC GLUCOMTR-MCNC: 177 MG/DL (ref 70–99)
GLUCOSE BLDC GLUCOMTR-MCNC: 178 MG/DL (ref 70–99)
GLUCOSE BLDC GLUCOMTR-MCNC: 179 MG/DL (ref 70–99)
GLUCOSE BLDC GLUCOMTR-MCNC: 180 MG/DL (ref 70–99)
GLUCOSE BLDC GLUCOMTR-MCNC: 181 MG/DL (ref 70–99)
GLUCOSE BLDC GLUCOMTR-MCNC: 183 MG/DL (ref 70–99)
GLUCOSE BLDC GLUCOMTR-MCNC: 184 MG/DL (ref 70–99)
GLUCOSE BLDC GLUCOMTR-MCNC: 185 MG/DL (ref 70–99)
GLUCOSE BLDC GLUCOMTR-MCNC: 186 MG/DL (ref 70–99)
GLUCOSE BLDC GLUCOMTR-MCNC: 187 MG/DL (ref 70–99)
GLUCOSE BLDC GLUCOMTR-MCNC: 188 MG/DL (ref 70–99)
GLUCOSE BLDC GLUCOMTR-MCNC: 189 MG/DL (ref 70–99)
GLUCOSE BLDC GLUCOMTR-MCNC: 190 MG/DL (ref 70–99)
GLUCOSE BLDC GLUCOMTR-MCNC: 191 MG/DL (ref 70–99)
GLUCOSE BLDC GLUCOMTR-MCNC: 192 MG/DL (ref 70–99)
GLUCOSE BLDC GLUCOMTR-MCNC: 194 MG/DL (ref 70–99)
GLUCOSE BLDC GLUCOMTR-MCNC: 194 MG/DL (ref 70–99)
GLUCOSE BLDC GLUCOMTR-MCNC: 195 MG/DL (ref 70–99)
GLUCOSE BLDC GLUCOMTR-MCNC: 196 MG/DL (ref 70–99)
GLUCOSE BLDC GLUCOMTR-MCNC: 196 MG/DL (ref 70–99)
GLUCOSE BLDC GLUCOMTR-MCNC: 197 MG/DL (ref 70–99)
GLUCOSE BLDC GLUCOMTR-MCNC: 198 MG/DL (ref 70–99)
GLUCOSE BLDC GLUCOMTR-MCNC: 200 MG/DL (ref 70–99)
GLUCOSE BLDC GLUCOMTR-MCNC: 200 MG/DL (ref 70–99)
GLUCOSE BLDC GLUCOMTR-MCNC: 201 MG/DL (ref 70–99)
GLUCOSE BLDC GLUCOMTR-MCNC: 202 MG/DL (ref 70–99)
GLUCOSE BLDC GLUCOMTR-MCNC: 204 MG/DL (ref 70–99)
GLUCOSE BLDC GLUCOMTR-MCNC: 207 MG/DL (ref 70–99)
GLUCOSE BLDC GLUCOMTR-MCNC: 207 MG/DL (ref 70–99)
GLUCOSE BLDC GLUCOMTR-MCNC: 208 MG/DL (ref 70–99)
GLUCOSE BLDC GLUCOMTR-MCNC: 209 MG/DL (ref 70–99)
GLUCOSE BLDC GLUCOMTR-MCNC: 210 MG/DL (ref 70–99)
GLUCOSE BLDC GLUCOMTR-MCNC: 212 MG/DL (ref 70–99)
GLUCOSE BLDC GLUCOMTR-MCNC: 213 MG/DL (ref 70–99)
GLUCOSE BLDC GLUCOMTR-MCNC: 213 MG/DL (ref 70–99)
GLUCOSE BLDC GLUCOMTR-MCNC: 214 MG/DL (ref 70–99)
GLUCOSE BLDC GLUCOMTR-MCNC: 215 MG/DL (ref 70–99)
GLUCOSE BLDC GLUCOMTR-MCNC: 216 MG/DL (ref 70–99)
GLUCOSE BLDC GLUCOMTR-MCNC: 216 MG/DL (ref 70–99)
GLUCOSE BLDC GLUCOMTR-MCNC: 217 MG/DL (ref 70–99)
GLUCOSE BLDC GLUCOMTR-MCNC: 218 MG/DL (ref 70–99)
GLUCOSE BLDC GLUCOMTR-MCNC: 218 MG/DL (ref 70–99)
GLUCOSE BLDC GLUCOMTR-MCNC: 219 MG/DL (ref 70–99)
GLUCOSE BLDC GLUCOMTR-MCNC: 221 MG/DL (ref 70–99)
GLUCOSE BLDC GLUCOMTR-MCNC: 221 MG/DL (ref 70–99)
GLUCOSE BLDC GLUCOMTR-MCNC: 222 MG/DL (ref 70–99)
GLUCOSE BLDC GLUCOMTR-MCNC: 223 MG/DL (ref 70–99)
GLUCOSE BLDC GLUCOMTR-MCNC: 223 MG/DL (ref 70–99)
GLUCOSE BLDC GLUCOMTR-MCNC: 225 MG/DL (ref 70–99)
GLUCOSE BLDC GLUCOMTR-MCNC: 226 MG/DL (ref 70–99)
GLUCOSE BLDC GLUCOMTR-MCNC: 227 MG/DL (ref 70–99)
GLUCOSE BLDC GLUCOMTR-MCNC: 227 MG/DL (ref 70–99)
GLUCOSE BLDC GLUCOMTR-MCNC: 229 MG/DL (ref 70–99)
GLUCOSE BLDC GLUCOMTR-MCNC: 230 MG/DL (ref 70–99)
GLUCOSE BLDC GLUCOMTR-MCNC: 231 MG/DL (ref 70–99)
GLUCOSE BLDC GLUCOMTR-MCNC: 231 MG/DL (ref 70–99)
GLUCOSE BLDC GLUCOMTR-MCNC: 232 MG/DL (ref 70–99)
GLUCOSE BLDC GLUCOMTR-MCNC: 232 MG/DL (ref 70–99)
GLUCOSE BLDC GLUCOMTR-MCNC: 234 MG/DL (ref 70–99)
GLUCOSE BLDC GLUCOMTR-MCNC: 234 MG/DL (ref 70–99)
GLUCOSE BLDC GLUCOMTR-MCNC: 238 MG/DL (ref 70–99)
GLUCOSE BLDC GLUCOMTR-MCNC: 238 MG/DL (ref 70–99)
GLUCOSE BLDC GLUCOMTR-MCNC: 240 MG/DL (ref 70–99)
GLUCOSE BLDC GLUCOMTR-MCNC: 240 MG/DL (ref 70–99)
GLUCOSE BLDC GLUCOMTR-MCNC: 241 MG/DL (ref 70–99)
GLUCOSE BLDC GLUCOMTR-MCNC: 242 MG/DL (ref 70–99)
GLUCOSE BLDC GLUCOMTR-MCNC: 244 MG/DL (ref 70–99)
GLUCOSE BLDC GLUCOMTR-MCNC: 246 MG/DL (ref 70–99)
GLUCOSE BLDC GLUCOMTR-MCNC: 248 MG/DL (ref 70–99)
GLUCOSE BLDC GLUCOMTR-MCNC: 248 MG/DL (ref 70–99)
GLUCOSE BLDC GLUCOMTR-MCNC: 249 MG/DL (ref 70–99)
GLUCOSE BLDC GLUCOMTR-MCNC: 252 MG/DL (ref 70–99)
GLUCOSE BLDC GLUCOMTR-MCNC: 253 MG/DL (ref 70–99)
GLUCOSE BLDC GLUCOMTR-MCNC: 254 MG/DL (ref 70–99)
GLUCOSE BLDC GLUCOMTR-MCNC: 254 MG/DL (ref 70–99)
GLUCOSE BLDC GLUCOMTR-MCNC: 260 MG/DL (ref 70–99)
GLUCOSE BLDC GLUCOMTR-MCNC: 260 MG/DL (ref 70–99)
GLUCOSE BLDC GLUCOMTR-MCNC: 261 MG/DL (ref 70–99)
GLUCOSE BLDC GLUCOMTR-MCNC: 261 MG/DL (ref 70–99)
GLUCOSE BLDC GLUCOMTR-MCNC: 263 MG/DL (ref 70–99)
GLUCOSE BLDC GLUCOMTR-MCNC: 264 MG/DL (ref 70–99)
GLUCOSE BLDC GLUCOMTR-MCNC: 264 MG/DL (ref 70–99)
GLUCOSE BLDC GLUCOMTR-MCNC: 265 MG/DL (ref 70–99)
GLUCOSE BLDC GLUCOMTR-MCNC: 266 MG/DL (ref 70–99)
GLUCOSE BLDC GLUCOMTR-MCNC: 269 MG/DL (ref 70–99)
GLUCOSE BLDC GLUCOMTR-MCNC: 270 MG/DL (ref 70–99)
GLUCOSE BLDC GLUCOMTR-MCNC: 273 MG/DL (ref 70–99)
GLUCOSE BLDC GLUCOMTR-MCNC: 274 MG/DL (ref 70–99)
GLUCOSE BLDC GLUCOMTR-MCNC: 275 MG/DL (ref 70–99)
GLUCOSE BLDC GLUCOMTR-MCNC: 277 MG/DL (ref 70–99)
GLUCOSE BLDC GLUCOMTR-MCNC: 278 MG/DL (ref 70–99)
GLUCOSE BLDC GLUCOMTR-MCNC: 278 MG/DL (ref 70–99)
GLUCOSE BLDC GLUCOMTR-MCNC: 280 MG/DL (ref 70–99)
GLUCOSE BLDC GLUCOMTR-MCNC: 282 MG/DL (ref 70–99)
GLUCOSE BLDC GLUCOMTR-MCNC: 282 MG/DL (ref 70–99)
GLUCOSE BLDC GLUCOMTR-MCNC: 283 MG/DL (ref 70–99)
GLUCOSE BLDC GLUCOMTR-MCNC: 284 MG/DL (ref 70–99)
GLUCOSE BLDC GLUCOMTR-MCNC: 286 MG/DL (ref 70–99)
GLUCOSE BLDC GLUCOMTR-MCNC: 286 MG/DL (ref 70–99)
GLUCOSE BLDC GLUCOMTR-MCNC: 287 MG/DL (ref 70–99)
GLUCOSE BLDC GLUCOMTR-MCNC: 288 MG/DL (ref 70–99)
GLUCOSE BLDC GLUCOMTR-MCNC: 288 MG/DL (ref 70–99)
GLUCOSE BLDC GLUCOMTR-MCNC: 290 MG/DL (ref 70–99)
GLUCOSE BLDC GLUCOMTR-MCNC: 292 MG/DL (ref 70–99)
GLUCOSE BLDC GLUCOMTR-MCNC: 293 MG/DL (ref 70–99)
GLUCOSE BLDC GLUCOMTR-MCNC: 294 MG/DL (ref 70–99)
GLUCOSE BLDC GLUCOMTR-MCNC: 297 MG/DL (ref 70–99)
GLUCOSE BLDC GLUCOMTR-MCNC: 301 MG/DL (ref 70–99)
GLUCOSE BLDC GLUCOMTR-MCNC: 301 MG/DL (ref 70–99)
GLUCOSE BLDC GLUCOMTR-MCNC: 302 MG/DL (ref 70–99)
GLUCOSE BLDC GLUCOMTR-MCNC: 304 MG/DL (ref 70–99)
GLUCOSE BLDC GLUCOMTR-MCNC: 304 MG/DL (ref 70–99)
GLUCOSE BLDC GLUCOMTR-MCNC: 305 MG/DL (ref 70–99)
GLUCOSE BLDC GLUCOMTR-MCNC: 307 MG/DL (ref 70–99)
GLUCOSE BLDC GLUCOMTR-MCNC: 309 MG/DL (ref 70–99)
GLUCOSE BLDC GLUCOMTR-MCNC: 309 MG/DL (ref 70–99)
GLUCOSE BLDC GLUCOMTR-MCNC: 311 MG/DL (ref 70–99)
GLUCOSE BLDC GLUCOMTR-MCNC: 311 MG/DL (ref 70–99)
GLUCOSE BLDC GLUCOMTR-MCNC: 314 MG/DL (ref 70–99)
GLUCOSE BLDC GLUCOMTR-MCNC: 314 MG/DL (ref 70–99)
GLUCOSE BLDC GLUCOMTR-MCNC: 318 MG/DL (ref 70–99)
GLUCOSE BLDC GLUCOMTR-MCNC: 319 MG/DL (ref 70–99)
GLUCOSE BLDC GLUCOMTR-MCNC: 323 MG/DL (ref 70–99)
GLUCOSE BLDC GLUCOMTR-MCNC: 327 MG/DL (ref 70–99)
GLUCOSE BLDC GLUCOMTR-MCNC: 327 MG/DL (ref 70–99)
GLUCOSE BLDC GLUCOMTR-MCNC: 328 MG/DL (ref 70–99)
GLUCOSE BLDC GLUCOMTR-MCNC: 328 MG/DL (ref 70–99)
GLUCOSE BLDC GLUCOMTR-MCNC: 331 MG/DL (ref 70–99)
GLUCOSE BLDC GLUCOMTR-MCNC: 331 MG/DL (ref 70–99)
GLUCOSE BLDC GLUCOMTR-MCNC: 332 MG/DL (ref 70–99)
GLUCOSE BLDC GLUCOMTR-MCNC: 335 MG/DL (ref 70–99)
GLUCOSE BLDC GLUCOMTR-MCNC: 337 MG/DL (ref 70–99)
GLUCOSE BLDC GLUCOMTR-MCNC: 344 MG/DL (ref 70–99)
GLUCOSE BLDC GLUCOMTR-MCNC: 346 MG/DL (ref 70–99)
GLUCOSE BLDC GLUCOMTR-MCNC: 349 MG/DL (ref 70–99)
GLUCOSE BLDC GLUCOMTR-MCNC: 351 MG/DL (ref 70–99)
GLUCOSE BLDC GLUCOMTR-MCNC: 352 MG/DL (ref 70–99)
GLUCOSE BLDC GLUCOMTR-MCNC: 353 MG/DL (ref 70–99)
GLUCOSE BLDC GLUCOMTR-MCNC: 355 MG/DL (ref 70–99)
GLUCOSE BLDC GLUCOMTR-MCNC: 359 MG/DL (ref 70–99)
GLUCOSE BLDC GLUCOMTR-MCNC: 366 MG/DL (ref 70–99)
GLUCOSE BLDC GLUCOMTR-MCNC: 367 MG/DL (ref 70–99)
GLUCOSE BLDC GLUCOMTR-MCNC: 367 MG/DL (ref 70–99)
GLUCOSE BLDC GLUCOMTR-MCNC: 368 MG/DL (ref 70–99)
GLUCOSE BLDC GLUCOMTR-MCNC: 371 MG/DL (ref 70–99)
GLUCOSE BLDC GLUCOMTR-MCNC: 373 MG/DL (ref 70–99)
GLUCOSE BLDC GLUCOMTR-MCNC: 377 MG/DL (ref 70–99)
GLUCOSE BLDC GLUCOMTR-MCNC: 389 MG/DL (ref 70–99)
GLUCOSE BLDC GLUCOMTR-MCNC: 396 MG/DL (ref 70–99)
GLUCOSE BLDC GLUCOMTR-MCNC: 398 MG/DL (ref 70–99)
GLUCOSE BLDC GLUCOMTR-MCNC: 425 MG/DL (ref 70–99)
GLUCOSE BLDC GLUCOMTR-MCNC: 436 MG/DL (ref 70–99)
GLUCOSE BLDC GLUCOMTR-MCNC: 438 MG/DL (ref 70–99)
GLUCOSE BLDC GLUCOMTR-MCNC: 444 MG/DL (ref 70–99)
GLUCOSE BLDC GLUCOMTR-MCNC: 455 MG/DL (ref 70–99)
GLUCOSE BLDC GLUCOMTR-MCNC: 457 MG/DL (ref 70–99)
GLUCOSE BLDC GLUCOMTR-MCNC: 458 MG/DL (ref 70–99)
GLUCOSE BLDC GLUCOMTR-MCNC: 471 MG/DL (ref 70–99)
GLUCOSE BLDC GLUCOMTR-MCNC: 492 MG/DL (ref 70–99)
GLUCOSE BLDC GLUCOMTR-MCNC: 497 MG/DL (ref 70–99)
GLUCOSE BLDC GLUCOMTR-MCNC: 501 MG/DL (ref 70–99)
GLUCOSE BLDC GLUCOMTR-MCNC: 505 MG/DL (ref 70–99)
GLUCOSE BLDC GLUCOMTR-MCNC: 510 MG/DL (ref 70–99)
GLUCOSE BLDC GLUCOMTR-MCNC: 522 MG/DL (ref 70–99)
GLUCOSE BLDC GLUCOMTR-MCNC: 524 MG/DL (ref 70–99)
GLUCOSE BLDC GLUCOMTR-MCNC: 61 MG/DL (ref 70–99)
GLUCOSE BLDC GLUCOMTR-MCNC: 69 MG/DL (ref 70–99)
GLUCOSE BLDC GLUCOMTR-MCNC: 70 MG/DL (ref 70–99)
GLUCOSE BLDC GLUCOMTR-MCNC: 71 MG/DL (ref 70–99)
GLUCOSE BLDC GLUCOMTR-MCNC: 76 MG/DL (ref 70–99)
GLUCOSE BLDC GLUCOMTR-MCNC: 76 MG/DL (ref 70–99)
GLUCOSE BLDC GLUCOMTR-MCNC: 77 MG/DL (ref 70–99)
GLUCOSE BLDC GLUCOMTR-MCNC: 79 MG/DL (ref 70–99)
GLUCOSE BLDC GLUCOMTR-MCNC: 81 MG/DL (ref 70–99)
GLUCOSE BLDC GLUCOMTR-MCNC: 84 MG/DL (ref 70–99)
GLUCOSE BLDC GLUCOMTR-MCNC: 85 MG/DL (ref 70–99)
GLUCOSE BLDC GLUCOMTR-MCNC: 86 MG/DL (ref 70–99)
GLUCOSE BLDC GLUCOMTR-MCNC: 88 MG/DL (ref 70–99)
GLUCOSE BLDC GLUCOMTR-MCNC: 88 MG/DL (ref 70–99)
GLUCOSE BLDC GLUCOMTR-MCNC: 90 MG/DL (ref 70–99)
GLUCOSE BLDC GLUCOMTR-MCNC: 92 MG/DL (ref 70–99)
GLUCOSE BLDC GLUCOMTR-MCNC: 93 MG/DL (ref 70–99)
GLUCOSE BLDC GLUCOMTR-MCNC: 93 MG/DL (ref 70–99)
GLUCOSE BLDC GLUCOMTR-MCNC: 94 MG/DL (ref 70–99)
GLUCOSE BLDC GLUCOMTR-MCNC: 95 MG/DL (ref 70–99)
GLUCOSE BLDC GLUCOMTR-MCNC: 96 MG/DL (ref 70–99)
GLUCOSE BLDC GLUCOMTR-MCNC: 97 MG/DL (ref 70–99)
GLUCOSE BLDC GLUCOMTR-MCNC: 98 MG/DL (ref 70–99)
GLUCOSE BLDC GLUCOMTR-MCNC: 98 MG/DL (ref 70–99)
GLUCOSE SERPL-MCNC: 105 MG/DL (ref 70–99)
GLUCOSE SERPL-MCNC: 106 MG/DL (ref 70–99)
GLUCOSE SERPL-MCNC: 115 MG/DL (ref 70–99)
GLUCOSE SERPL-MCNC: 115 MG/DL (ref 70–99)
GLUCOSE SERPL-MCNC: 116 MG/DL (ref 70–99)
GLUCOSE SERPL-MCNC: 116 MG/DL (ref 70–99)
GLUCOSE SERPL-MCNC: 122 MG/DL (ref 70–99)
GLUCOSE SERPL-MCNC: 124 MG/DL (ref 70–99)
GLUCOSE SERPL-MCNC: 125 MG/DL (ref 70–99)
GLUCOSE SERPL-MCNC: 126 MG/DL (ref 70–99)
GLUCOSE SERPL-MCNC: 128 MG/DL (ref 70–99)
GLUCOSE SERPL-MCNC: 132 MG/DL (ref 70–99)
GLUCOSE SERPL-MCNC: 133 MG/DL (ref 70–99)
GLUCOSE SERPL-MCNC: 134 MG/DL (ref 70–99)
GLUCOSE SERPL-MCNC: 134 MG/DL (ref 70–99)
GLUCOSE SERPL-MCNC: 140 MG/DL (ref 70–99)
GLUCOSE SERPL-MCNC: 143 MG/DL (ref 70–99)
GLUCOSE SERPL-MCNC: 150 MG/DL (ref 70–99)
GLUCOSE SERPL-MCNC: 151 MG/DL (ref 70–99)
GLUCOSE SERPL-MCNC: 153 MG/DL (ref 70–99)
GLUCOSE SERPL-MCNC: 153 MG/DL (ref 70–99)
GLUCOSE SERPL-MCNC: 154 MG/DL (ref 70–99)
GLUCOSE SERPL-MCNC: 157 MG/DL (ref 70–99)
GLUCOSE SERPL-MCNC: 158 MG/DL (ref 70–99)
GLUCOSE SERPL-MCNC: 160 MG/DL (ref 70–99)
GLUCOSE SERPL-MCNC: 161 MG/DL (ref 70–99)
GLUCOSE SERPL-MCNC: 164 MG/DL (ref 70–99)
GLUCOSE SERPL-MCNC: 166 MG/DL (ref 70–99)
GLUCOSE SERPL-MCNC: 171 MG/DL (ref 70–99)
GLUCOSE SERPL-MCNC: 171 MG/DL (ref 70–99)
GLUCOSE SERPL-MCNC: 172 MG/DL (ref 70–99)
GLUCOSE SERPL-MCNC: 174 MG/DL (ref 70–99)
GLUCOSE SERPL-MCNC: 176 MG/DL (ref 70–99)
GLUCOSE SERPL-MCNC: 176 MG/DL (ref 70–99)
GLUCOSE SERPL-MCNC: 177 MG/DL (ref 70–99)
GLUCOSE SERPL-MCNC: 179 MG/DL (ref 70–99)
GLUCOSE SERPL-MCNC: 179 MG/DL (ref 70–99)
GLUCOSE SERPL-MCNC: 181 MG/DL (ref 70–99)
GLUCOSE SERPL-MCNC: 188 MG/DL (ref 70–99)
GLUCOSE SERPL-MCNC: 196 MG/DL (ref 70–99)
GLUCOSE SERPL-MCNC: 197 MG/DL (ref 70–99)
GLUCOSE SERPL-MCNC: 200 MG/DL (ref 70–99)
GLUCOSE SERPL-MCNC: 216 MG/DL (ref 70–99)
GLUCOSE SERPL-MCNC: 218 MG/DL (ref 70–99)
GLUCOSE SERPL-MCNC: 218 MG/DL (ref 70–99)
GLUCOSE SERPL-MCNC: 220 MG/DL (ref 70–99)
GLUCOSE SERPL-MCNC: 221 MG/DL (ref 70–99)
GLUCOSE SERPL-MCNC: 224 MG/DL (ref 70–99)
GLUCOSE SERPL-MCNC: 231 MG/DL (ref 70–99)
GLUCOSE SERPL-MCNC: 232 MG/DL (ref 70–99)
GLUCOSE SERPL-MCNC: 248 MG/DL (ref 70–99)
GLUCOSE SERPL-MCNC: 260 MG/DL (ref 70–99)
GLUCOSE SERPL-MCNC: 288 MG/DL (ref 70–99)
GLUCOSE SERPL-MCNC: 293 MG/DL (ref 70–99)
GLUCOSE SERPL-MCNC: 311 MG/DL (ref 70–99)
GLUCOSE SERPL-MCNC: 435 MG/DL (ref 70–99)
GLUCOSE SERPL-MCNC: 70 MG/DL (ref 70–99)
GLUCOSE SERPL-MCNC: 71 MG/DL (ref 70–99)
GLUCOSE SERPL-MCNC: 74 MG/DL (ref 70–99)
GLUCOSE SERPL-MCNC: 94 MG/DL (ref 70–99)
GLUCOSE SERPL-MCNC: 95 MG/DL (ref 70–99)
GLUCOSE SERPL-MCNC: 98 MG/DL (ref 70–99)
GLUCOSE UR STRIP-MCNC: NEGATIVE MG/DL
GLUTETHIMIDE QUAL: NEGATIVE
GRAM STN SPEC: ABNORMAL
GRAM STN SPEC: NORMAL
H CAPSUL MYC AB TITR SER CF: NORMAL {TITER}
H CAPSUL YST AB TITR SER CF: NORMAL {TITER}
HADV DNA SPEC QL NAA+PROBE: ABNORMAL
HADV DNA SPEC QL NAA+PROBE: ABNORMAL
HADV DNA SPEC QL NAA+PROBE: NEGATIVE
HAPTOGLOB SERPL-MCNC: 35 MG/DL (ref 15–200)
HAPTOGLOB SERPL-MCNC: 6 MG/DL (ref 15–200)
HBA1C MFR BLD: 5.6 % (ref 4.3–6)
HBA1C MFR BLD: 6.1 % (ref 4.3–6)
HBA1C MFR BLD: 6.2 % (ref 4.3–6)
HBA1C MFR BLD: 7 % (ref 4.3–6)
HCG UR QL: NEGATIVE
HCO3 BLD-SCNC: 21 MMOL/L (ref 21–28)
HCO3 BLD-SCNC: 21 MMOL/L (ref 21–28)
HCO3 BLD-SCNC: 22 MMOL/L (ref 21–28)
HCO3 BLD-SCNC: 23 MMOL/L (ref 21–28)
HCO3 BLD-SCNC: 25 MMOL/L (ref 21–28)
HCO3 BLD-SCNC: 29 MMOL/L (ref 21–28)
HCO3 BLD-SCNC: 29 MMOL/L (ref 21–28)
HCO3 BLD-SCNC: 35 MMOL/L (ref 21–28)
HCO3 BLD-SCNC: 37 MMOL/L (ref 21–28)
HCO3 BLDV-SCNC: 22 MMOL/L (ref 21–28)
HCO3 BLDV-SCNC: 24 MMOL/L (ref 21–28)
HCO3 BLDV-SCNC: 25 MMOL/L (ref 21–28)
HCO3 BLDV-SCNC: 28 MMOL/L (ref 21–28)
HCO3 BLDV-SCNC: 29 MMOL/L (ref 21–28)
HCO3 BLDV-SCNC: 31 MMOL/L (ref 21–28)
HCO3 BLDV-SCNC: 35 MMOL/L (ref 21–28)
HCT VFR BLD AUTO: 17.5 % (ref 35–47)
HCT VFR BLD AUTO: 18.6 % (ref 35–47)
HCT VFR BLD AUTO: 19.2 % (ref 35–47)
HCT VFR BLD AUTO: 20.9 % (ref 35–47)
HCT VFR BLD AUTO: 21.6 % (ref 35–47)
HCT VFR BLD AUTO: 24.2 % (ref 35–47)
HCT VFR BLD AUTO: 27 % (ref 35–47)
HCT VFR BLD AUTO: 27.2 % (ref 35–47)
HCT VFR BLD AUTO: 28.3 % (ref 35–47)
HCT VFR BLD AUTO: 28.6 % (ref 35–47)
HCT VFR BLD AUTO: 28.8 % (ref 35–47)
HCT VFR BLD AUTO: 29.5 % (ref 35–47)
HCT VFR BLD AUTO: 30.8 % (ref 35–47)
HCT VFR BLD AUTO: 30.8 % (ref 35–47)
HCT VFR BLD AUTO: 32.7 % (ref 35–47)
HCT VFR BLD AUTO: 33.3 % (ref 35–47)
HCT VFR BLD AUTO: 33.5 % (ref 35–47)
HCT VFR BLD AUTO: 33.7 % (ref 35–47)
HCT VFR BLD AUTO: 34.2 % (ref 35–47)
HCT VFR BLD AUTO: 34.4 % (ref 35–47)
HCT VFR BLD AUTO: 34.5 % (ref 35–47)
HCT VFR BLD AUTO: 35.2 % (ref 35–47)
HCT VFR BLD AUTO: 35.8 % (ref 35–47)
HCT VFR BLD AUTO: 35.9 % (ref 35–47)
HCT VFR BLD AUTO: 36.1 % (ref 35–47)
HCT VFR BLD AUTO: 36.3 % (ref 35–47)
HCT VFR BLD AUTO: 36.4 % (ref 35–47)
HCT VFR BLD AUTO: 36.4 % (ref 35–47)
HCT VFR BLD AUTO: 36.8 % (ref 35–47)
HCT VFR BLD AUTO: 37.2 % (ref 35–47)
HCT VFR BLD AUTO: 37.5 % (ref 35–47)
HCT VFR BLD AUTO: 37.5 % (ref 35–47)
HCT VFR BLD AUTO: 37.6 % (ref 35–47)
HCT VFR BLD AUTO: 37.9 % (ref 35–47)
HCT VFR BLD AUTO: 37.9 % (ref 35–47)
HCT VFR BLD AUTO: 38.1 % (ref 35–47)
HCT VFR BLD AUTO: 38.4 % (ref 35–47)
HCT VFR BLD AUTO: 38.5 % (ref 35–47)
HCT VFR BLD AUTO: 38.9 % (ref 35–47)
HCT VFR BLD AUTO: 39.1 % (ref 35–47)
HCT VFR BLD AUTO: 39.4 % (ref 35–47)
HCT VFR BLD AUTO: 40.1 % (ref 35–47)
HCT VFR BLD AUTO: 40.6 % (ref 35–47)
HCT VFR BLD AUTO: 41 % (ref 35–47)
HCT VFR BLD AUTO: 41.1 % (ref 35–47)
HCT VFR BLD AUTO: 41.2 % (ref 35–47)
HCT VFR BLD AUTO: 41.4 % (ref 35–47)
HCT VFR BLD AUTO: 41.5 % (ref 35–47)
HCT VFR BLD AUTO: 42.5 % (ref 35–47)
HCT VFR BLD AUTO: 43.1 % (ref 35–47)
HCT VFR BLD AUTO: 43.3 % (ref 35–47)
HCT VFR BLD AUTO: 43.7 % (ref 35–47)
HCT VFR BLD AUTO: 43.7 % (ref 35–47)
HCT VFR BLD AUTO: 45.8 % (ref 35–47)
HCT VFR BLD AUTO: 46.5 % (ref 35–47)
HCT VFR BLD AUTO: 46.8 % (ref 35–47)
HGB BLD-MCNC: 10.3 G/DL (ref 11.7–15.7)
HGB BLD-MCNC: 10.4 G/DL (ref 11.7–15.7)
HGB BLD-MCNC: 10.4 G/DL (ref 11.7–15.7)
HGB BLD-MCNC: 10.9 G/DL (ref 11.7–15.7)
HGB BLD-MCNC: 11.2 G/DL (ref 11.7–15.7)
HGB BLD-MCNC: 11.3 G/DL (ref 11.7–15.7)
HGB BLD-MCNC: 11.5 G/DL (ref 11.7–15.7)
HGB BLD-MCNC: 11.6 G/DL (ref 11.7–15.7)
HGB BLD-MCNC: 11.7 G/DL (ref 11.7–15.7)
HGB BLD-MCNC: 11.8 G/DL (ref 11.7–15.7)
HGB BLD-MCNC: 11.9 G/DL (ref 11.7–15.7)
HGB BLD-MCNC: 11.9 G/DL (ref 11.7–15.7)
HGB BLD-MCNC: 12 G/DL (ref 11.7–15.7)
HGB BLD-MCNC: 12.2 G/DL (ref 11.7–15.7)
HGB BLD-MCNC: 12.2 G/DL (ref 11.7–15.7)
HGB BLD-MCNC: 12.3 G/DL (ref 11.7–15.7)
HGB BLD-MCNC: 12.4 G/DL (ref 11.7–15.7)
HGB BLD-MCNC: 12.5 G/DL (ref 11.7–15.7)
HGB BLD-MCNC: 12.5 G/DL (ref 11.7–15.7)
HGB BLD-MCNC: 12.6 G/DL (ref 11.7–15.7)
HGB BLD-MCNC: 12.6 G/DL (ref 11.7–15.7)
HGB BLD-MCNC: 12.7 G/DL (ref 11.7–15.7)
HGB BLD-MCNC: 12.9 G/DL (ref 11.7–15.7)
HGB BLD-MCNC: 13.1 G/DL (ref 11.7–15.7)
HGB BLD-MCNC: 13.3 G/DL (ref 11.7–15.7)
HGB BLD-MCNC: 13.7 G/DL (ref 11.7–15.7)
HGB BLD-MCNC: 13.7 G/DL (ref 11.7–15.7)
HGB BLD-MCNC: 13.8 G/DL (ref 11.7–15.7)
HGB BLD-MCNC: 13.9 G/DL (ref 11.7–15.7)
HGB BLD-MCNC: 14 G/DL (ref 11.7–15.7)
HGB BLD-MCNC: 14.7 G/DL (ref 11.7–15.7)
HGB BLD-MCNC: 15 G/DL (ref 11.7–15.7)
HGB BLD-MCNC: 15.3 G/DL (ref 11.7–15.7)
HGB BLD-MCNC: 15.7 G/DL (ref 11.7–15.7)
HGB BLD-MCNC: 15.7 G/DL (ref 11.7–15.7)
HGB BLD-MCNC: 5.8 G/DL (ref 11.7–15.7)
HGB BLD-MCNC: 6.2 G/DL (ref 11.7–15.7)
HGB BLD-MCNC: 6.3 G/DL (ref 11.7–15.7)
HGB BLD-MCNC: 6.7 G/DL (ref 11.7–15.7)
HGB BLD-MCNC: 6.7 G/DL (ref 11.7–15.7)
HGB BLD-MCNC: 7.2 G/DL (ref 11.7–15.7)
HGB BLD-MCNC: 7.3 G/DL (ref 11.7–15.7)
HGB BLD-MCNC: 7.7 G/DL (ref 11.7–15.7)
HGB BLD-MCNC: 8 G/DL (ref 11.7–15.7)
HGB BLD-MCNC: 8.6 G/DL (ref 11.7–15.7)
HGB BLD-MCNC: 8.7 G/DL (ref 11.7–15.7)
HGB BLD-MCNC: 8.9 G/DL (ref 11.7–15.7)
HGB BLD-MCNC: 9 G/DL (ref 11.7–15.7)
HGB BLD-MCNC: 9.1 G/DL (ref 11.7–15.7)
HGB BLD-MCNC: 9.4 G/DL (ref 11.7–15.7)
HGB BLD-MCNC: 9.4 G/DL (ref 11.7–15.7)
HGB BLD-MCNC: 9.5 G/DL (ref 11.7–15.7)
HGB BLD-MCNC: 9.9 G/DL (ref 11.7–15.7)
HGB UR QL STRIP: ABNORMAL
HGB UR QL STRIP: NEGATIVE
HMPV RNA SPEC QL NAA+PROBE: ABNORMAL
HMPV RNA SPEC QL NAA+PROBE: NEGATIVE
HMPV RNA SPEC QL NAA+PROBE: NEGATIVE
HPIV1 RNA SPEC QL NAA+PROBE: ABNORMAL
HPIV1 RNA SPEC QL NAA+PROBE: NEGATIVE
HPIV1 RNA SPEC QL NAA+PROBE: NEGATIVE
HPIV2 RNA SPEC QL NAA+PROBE: ABNORMAL
HPIV2 RNA SPEC QL NAA+PROBE: NEGATIVE
HPIV2 RNA SPEC QL NAA+PROBE: NEGATIVE
HPIV3 RNA SPEC QL NAA+PROBE: ABNORMAL
HPIV3 RNA SPEC QL NAA+PROBE: NEGATIVE
HPIV3 RNA SPEC QL NAA+PROBE: NEGATIVE
HYPOCHROMIA BLD QL: PRESENT
IBUPROFEN QUAL: NEGATIVE
IC-%PRED-PRE: 59 %
IC-%PRED-PRE: 66 %
IC-PRE: 1.82 L
IC-PRE: 1.95 L
IC-PRED: 2.95 L
IC-PRED: 3.06 L
IFC SPECIMEN: NORMAL
IGG SERPL-MCNC: 955 MG/DL (ref 695–1620)
IGG1 SER-MCNC: NORMAL MG/DL (ref 300–856)
IGG2 SER-MCNC: NORMAL MG/DL (ref 158–761)
IGG3 SER-MCNC: NORMAL MG/DL (ref 24–192)
IGG4 SER-MCNC: NORMAL MG/DL (ref 11–86)
IMM GRANULOCYTES # BLD: 0 10E9/L (ref 0–0.4)
IMM GRANULOCYTES # BLD: 0.1 10E9/L (ref 0–0.4)
IMM GRANULOCYTES # BLD: 0.2 10E9/L (ref 0–0.4)
IMM GRANULOCYTES # BLD: 0.3 10E9/L (ref 0–0.4)
IMM GRANULOCYTES # BLD: 0.4 10E9/L (ref 0–0.4)
IMM GRANULOCYTES # BLD: 0.4 10E9/L (ref 0–0.4)
IMM GRANULOCYTES NFR BLD: 0.4 %
IMM GRANULOCYTES NFR BLD: 0.5 %
IMM GRANULOCYTES NFR BLD: 0.6 %
IMM GRANULOCYTES NFR BLD: 0.6 %
IMM GRANULOCYTES NFR BLD: 0.7 %
IMM GRANULOCYTES NFR BLD: 0.7 %
IMM GRANULOCYTES NFR BLD: 0.8 %
IMM GRANULOCYTES NFR BLD: 0.9 %
IMM GRANULOCYTES NFR BLD: 1 %
IMM GRANULOCYTES NFR BLD: 1.1 %
IMM GRANULOCYTES NFR BLD: 1.1 %
IMM GRANULOCYTES NFR BLD: 1.8 %
IMM GRANULOCYTES NFR BLD: 1.9 %
IMM GRANULOCYTES NFR BLD: 2 %
IMM GRANULOCYTES NFR BLD: 2.1 %
IMM GRANULOCYTES NFR BLD: 2.2 %
IMM GRANULOCYTES NFR BLD: 2.4 %
IMM GRANULOCYTES NFR BLD: 2.8 %
IMM GRANULOCYTES NFR BLD: 3.2 %
IMM GRANULOCYTES NFR BLD: 4 %
IMM GRANULOCYTES NFR BLD: 4.3 %
INR PPP: 1.02 (ref 0.86–1.14)
INR PPP: 1.11 (ref 0.86–1.14)
INR PPP: 1.14 (ref 0.86–1.14)
INR PPP: 1.15 (ref 0.86–1.14)
INR PPP: 1.19 (ref 0.86–1.14)
INR PPP: 1.37 (ref 0.86–1.14)
INTERPRETATION ECG - MUSE: NORMAL
IRON SATN MFR SERPL: 45 % (ref 15–46)
IRON SERPL-MCNC: 139 UG/DL (ref 35–180)
KETONES BLD-SCNC: 0.6 MMOL/L (ref 0–0.6)
KETONES UR STRIP-MCNC: NEGATIVE MG/DL
KOH PREP SPEC: NORMAL
KOH PREP SPEC: NORMAL
L PNEUMO IGG TITR SER IF: NORMAL {TITER}
L PNEUMO1 AG UR QL IA: NORMAL
LAB SCANNED RESULT: NORMAL
LAB SCANNED RESULT: NORMAL
LACEYELLA SACCHARI AB SER QL: NORMAL
LACTATE BLD-SCNC: 0.9 MMOL/L (ref 0.7–2.1)
LACTATE BLD-SCNC: 1.1 MMOL/L (ref 0.7–2)
LACTATE BLD-SCNC: 1.3 MMOL/L (ref 0.7–2.1)
LACTATE BLD-SCNC: 1.4 MMOL/L (ref 0.7–2)
LACTATE BLD-SCNC: 1.4 MMOL/L (ref 0.7–2.1)
LACTATE BLD-SCNC: 1.4 MMOL/L (ref 0.7–2.1)
LACTATE BLD-SCNC: 1.5 MMOL/L (ref 0.7–2)
LACTATE BLD-SCNC: 1.8 MMOL/L (ref 0.7–2.1)
LACTATE BLD-SCNC: 1.9 MMOL/L (ref 0.7–2.1)
LACTATE BLD-SCNC: 2 MMOL/L (ref 0.7–2.1)
LACTATE BLD-SCNC: 2.5 MMOL/L (ref 0.7–2)
LACTATE BLD-SCNC: 2.5 MMOL/L (ref 0.7–2.1)
LACTATE BLD-SCNC: 2.8 MMOL/L (ref 0.7–2.1)
LACTATE BLD-SCNC: 2.9 MMOL/L (ref 0.7–2.1)
LACTATE BLD-SCNC: 3 MMOL/L (ref 0.7–2.1)
LACTATE BLD-SCNC: 3 MMOL/L (ref 0.7–2.1)
LACTATE BLD-SCNC: 6.3 MMOL/L (ref 0.7–2)
LACTATE SERPL-SCNC: 0.9 MMOL/L (ref 0.4–2)
LACTATE SERPL-SCNC: 2.1 MMOL/L (ref 0.4–2)
LACTATE SERPL-SCNC: 2.1 MMOL/L (ref 0.4–2)
LDH SERPL L TO P-CCNC: 1042 U/L (ref 81–234)
LDH SERPL L TO P-CCNC: 398 U/L (ref 81–234)
LDH SERPL L TO P-CCNC: 508 U/L (ref 81–234)
LDH SERPL L TO P-CCNC: 579 U/L (ref 81–234)
LEUKOCYTE ESTERASE UR QL STRIP: NEGATIVE
LIPASE SERPL-CCNC: 104 U/L (ref 73–393)
LYMPHOCYTES # BLD AUTO: 0.3 10E9/L (ref 0.8–5.3)
LYMPHOCYTES # BLD AUTO: 0.4 10E9/L (ref 0.8–5.3)
LYMPHOCYTES # BLD AUTO: 0.5 10E9/L (ref 0.8–5.3)
LYMPHOCYTES # BLD AUTO: 0.6 10E9/L (ref 0.8–5.3)
LYMPHOCYTES # BLD AUTO: 0.7 10E9/L (ref 0.8–5.3)
LYMPHOCYTES # BLD AUTO: 1 10E9/L (ref 0.8–5.3)
LYMPHOCYTES # BLD AUTO: 1.4 10E9/L (ref 0.8–5.3)
LYMPHOCYTES # BLD AUTO: 1.4 10E9/L (ref 0.8–5.3)
LYMPHOCYTES # BLD AUTO: 1.6 10E9/L (ref 0.8–5.3)
LYMPHOCYTES # BLD AUTO: 1.7 10E9/L (ref 0.8–5.3)
LYMPHOCYTES # BLD AUTO: 1.8 10E9/L (ref 0.8–5.3)
LYMPHOCYTES # BLD AUTO: 2 10E9/L (ref 0.8–5.3)
LYMPHOCYTES # BLD AUTO: 2.2 10E9/L (ref 0.8–5.3)
LYMPHOCYTES NFR BLD AUTO: 10.1 %
LYMPHOCYTES NFR BLD AUTO: 10.5 %
LYMPHOCYTES NFR BLD AUTO: 10.8 %
LYMPHOCYTES NFR BLD AUTO: 10.9 %
LYMPHOCYTES NFR BLD AUTO: 11 %
LYMPHOCYTES NFR BLD AUTO: 11.2 %
LYMPHOCYTES NFR BLD AUTO: 11.7 %
LYMPHOCYTES NFR BLD AUTO: 12 %
LYMPHOCYTES NFR BLD AUTO: 13 %
LYMPHOCYTES NFR BLD AUTO: 14.4 %
LYMPHOCYTES NFR BLD AUTO: 15 %
LYMPHOCYTES NFR BLD AUTO: 21 %
LYMPHOCYTES NFR BLD AUTO: 22.2 %
LYMPHOCYTES NFR BLD AUTO: 26 %
LYMPHOCYTES NFR BLD AUTO: 3.1 %
LYMPHOCYTES NFR BLD AUTO: 3.9 %
LYMPHOCYTES NFR BLD AUTO: 34 %
LYMPHOCYTES NFR BLD AUTO: 4.3 %
LYMPHOCYTES NFR BLD AUTO: 4.8 %
LYMPHOCYTES NFR BLD AUTO: 5.2 %
LYMPHOCYTES NFR BLD AUTO: 6 %
LYMPHOCYTES NFR BLD AUTO: 6.2 %
LYMPHOCYTES NFR BLD AUTO: 7.1 %
LYMPHOCYTES NFR BLD AUTO: 7.1 %
LYMPHOCYTES NFR BLD AUTO: 7.7 %
LYMPHOCYTES NFR BLD AUTO: 8.3 %
LYMPHOCYTES NFR BLD AUTO: 8.6 %
LYMPHOCYTES NFR BLD AUTO: 8.8 %
LYMPHOCYTES NFR BLD AUTO: 9 %
LYMPHOCYTES NFR BLD AUTO: 9.3 %
LYMPHOCYTES NFR BLD AUTO: 9.6 %
LYMPHOCYTES NFR FLD MANUAL: 1 %
LYMPHOCYTES NFR FLD MANUAL: 28 %
LYMPHOCYTES NFR FLD MANUAL: 35 %
Lab: ABNORMAL
Lab: NORMAL
M PNEUMO IGG SER IA-ACNC: 0.93
M TB TUBERC IFN-G BLD QL: ABNORMAL
M TB TUBERC IFN-G/MITOGEN IGNF BLD: 0 IU/ML
MAGNESIUM SERPL-MCNC: 1.7 MG/DL (ref 1.6–2.3)
MAGNESIUM SERPL-MCNC: 1.8 MG/DL (ref 1.6–2.3)
MAGNESIUM SERPL-MCNC: 1.9 MG/DL (ref 1.6–2.3)
MAGNESIUM SERPL-MCNC: 2 MG/DL (ref 1.6–2.3)
MAGNESIUM SERPL-MCNC: 2.1 MG/DL (ref 1.6–2.3)
MAGNESIUM SERPL-MCNC: 2.2 MG/DL (ref 1.6–2.3)
MAGNESIUM SERPL-MCNC: 2.2 MG/DL (ref 1.6–2.3)
MAGNESIUM SERPL-MCNC: 2.3 MG/DL (ref 1.6–2.3)
MCH RBC QN AUTO: 29.3 PG (ref 26.5–33)
MCH RBC QN AUTO: 29.4 PG (ref 26.5–33)
MCH RBC QN AUTO: 29.5 PG (ref 26.5–33)
MCH RBC QN AUTO: 29.6 PG (ref 26.5–33)
MCH RBC QN AUTO: 29.6 PG (ref 26.5–33)
MCH RBC QN AUTO: 29.7 PG (ref 26.5–33)
MCH RBC QN AUTO: 29.9 PG (ref 26.5–33)
MCH RBC QN AUTO: 30 PG (ref 26.5–33)
MCH RBC QN AUTO: 30.1 PG (ref 26.5–33)
MCH RBC QN AUTO: 30.2 PG (ref 26.5–33)
MCH RBC QN AUTO: 30.2 PG (ref 26.5–33)
MCH RBC QN AUTO: 30.3 PG (ref 26.5–33)
MCH RBC QN AUTO: 30.4 PG (ref 26.5–33)
MCH RBC QN AUTO: 30.4 PG (ref 26.5–33)
MCH RBC QN AUTO: 30.5 PG (ref 26.5–33)
MCH RBC QN AUTO: 30.5 PG (ref 26.5–33)
MCH RBC QN AUTO: 30.6 PG (ref 26.5–33)
MCH RBC QN AUTO: 30.7 PG (ref 26.5–33)
MCH RBC QN AUTO: 30.9 PG (ref 26.5–33)
MCH RBC QN AUTO: 31 PG (ref 26.5–33)
MCH RBC QN AUTO: 31.1 PG (ref 26.5–33)
MCH RBC QN AUTO: 31.2 PG (ref 26.5–33)
MCH RBC QN AUTO: 31.4 PG (ref 26.5–33)
MCH RBC QN AUTO: 31.4 PG (ref 26.5–33)
MCH RBC QN AUTO: 31.7 PG (ref 26.5–33)
MCH RBC QN AUTO: 31.8 PG (ref 26.5–33)
MCH RBC QN AUTO: 32.1 PG (ref 26.5–33)
MCH RBC QN AUTO: 32.1 PG (ref 26.5–33)
MCH RBC QN AUTO: 32.2 PG (ref 26.5–33)
MCH RBC QN AUTO: 32.2 PG (ref 26.5–33)
MCH RBC QN AUTO: 32.6 PG (ref 26.5–33)
MCH RBC QN AUTO: 32.6 PG (ref 26.5–33)
MCHC RBC AUTO-ENTMCNC: 30.5 G/DL (ref 31.5–36.5)
MCHC RBC AUTO-ENTMCNC: 30.9 G/DL (ref 31.5–36.5)
MCHC RBC AUTO-ENTMCNC: 31 G/DL (ref 31.5–36.5)
MCHC RBC AUTO-ENTMCNC: 31 G/DL (ref 31.5–36.5)
MCHC RBC AUTO-ENTMCNC: 31.2 G/DL (ref 31.5–36.5)
MCHC RBC AUTO-ENTMCNC: 31.4 G/DL (ref 31.5–36.5)
MCHC RBC AUTO-ENTMCNC: 31.5 G/DL (ref 31.5–36.5)
MCHC RBC AUTO-ENTMCNC: 31.5 G/DL (ref 31.5–36.5)
MCHC RBC AUTO-ENTMCNC: 31.6 G/DL (ref 31.5–36.5)
MCHC RBC AUTO-ENTMCNC: 31.7 G/DL (ref 31.5–36.5)
MCHC RBC AUTO-ENTMCNC: 31.8 G/DL (ref 31.5–36.5)
MCHC RBC AUTO-ENTMCNC: 31.8 G/DL (ref 31.5–36.5)
MCHC RBC AUTO-ENTMCNC: 31.9 G/DL (ref 31.5–36.5)
MCHC RBC AUTO-ENTMCNC: 32 G/DL (ref 31.5–36.5)
MCHC RBC AUTO-ENTMCNC: 32.1 G/DL (ref 31.5–36.5)
MCHC RBC AUTO-ENTMCNC: 32.1 G/DL (ref 31.5–36.5)
MCHC RBC AUTO-ENTMCNC: 32.2 G/DL (ref 31.5–36.5)
MCHC RBC AUTO-ENTMCNC: 32.3 G/DL (ref 31.5–36.5)
MCHC RBC AUTO-ENTMCNC: 32.3 G/DL (ref 31.5–36.5)
MCHC RBC AUTO-ENTMCNC: 32.4 G/DL (ref 31.5–36.5)
MCHC RBC AUTO-ENTMCNC: 32.5 G/DL (ref 31.5–36.5)
MCHC RBC AUTO-ENTMCNC: 32.6 G/DL (ref 31.5–36.5)
MCHC RBC AUTO-ENTMCNC: 32.6 G/DL (ref 31.5–36.5)
MCHC RBC AUTO-ENTMCNC: 32.7 G/DL (ref 31.5–36.5)
MCHC RBC AUTO-ENTMCNC: 32.7 G/DL (ref 31.5–36.5)
MCHC RBC AUTO-ENTMCNC: 32.8 G/DL (ref 31.5–36.5)
MCHC RBC AUTO-ENTMCNC: 32.8 G/DL (ref 31.5–36.5)
MCHC RBC AUTO-ENTMCNC: 33 G/DL (ref 31.5–36.5)
MCHC RBC AUTO-ENTMCNC: 33.1 G/DL (ref 31.5–36.5)
MCHC RBC AUTO-ENTMCNC: 33.2 G/DL (ref 31.5–36.5)
MCHC RBC AUTO-ENTMCNC: 33.3 G/DL (ref 31.5–36.5)
MCHC RBC AUTO-ENTMCNC: 33.5 G/DL (ref 31.5–36.5)
MCHC RBC AUTO-ENTMCNC: 33.6 G/DL (ref 31.5–36.5)
MCHC RBC AUTO-ENTMCNC: 33.6 G/DL (ref 31.5–36.5)
MCHC RBC AUTO-ENTMCNC: 33.7 G/DL (ref 31.5–36.5)
MCHC RBC AUTO-ENTMCNC: 33.8 G/DL (ref 31.5–36.5)
MCHC RBC AUTO-ENTMCNC: 34.1 G/DL (ref 31.5–36.5)
MCHC RBC AUTO-ENTMCNC: 34.3 G/DL (ref 31.5–36.5)
MCHC RBC AUTO-ENTMCNC: 34.6 G/DL (ref 31.5–36.5)
MCV RBC AUTO: 101 FL (ref 78–100)
MCV RBC AUTO: 101 FL (ref 78–100)
MCV RBC AUTO: 102 FL (ref 78–100)
MCV RBC AUTO: 104 FL (ref 78–100)
MCV RBC AUTO: 106 FL (ref 78–100)
MCV RBC AUTO: 89 FL (ref 78–100)
MCV RBC AUTO: 90 FL (ref 78–100)
MCV RBC AUTO: 91 FL (ref 78–100)
MCV RBC AUTO: 91 FL (ref 78–100)
MCV RBC AUTO: 92 FL (ref 78–100)
MCV RBC AUTO: 93 FL (ref 78–100)
MCV RBC AUTO: 94 FL (ref 78–100)
MCV RBC AUTO: 95 FL (ref 78–100)
MCV RBC AUTO: 96 FL (ref 78–100)
MCV RBC AUTO: 97 FL (ref 78–100)
MCV RBC AUTO: 98 FL (ref 78–100)
MCV RBC AUTO: 98 FL (ref 78–100)
MCV RBC AUTO: 99 FL (ref 78–100)
MEPHENYTOIN QUAL: NEGATIVE
MEPHOBARBITAL QUAL: NEGATIVE
MEPROBAMATE QUAL: NEGATIVE
METAMYELOCYTES # BLD: 0.1 10E9/L
METAMYELOCYTES # BLD: 0.2 10E9/L
METAMYELOCYTES # BLD: 0.3 10E9/L
METAMYELOCYTES # BLD: 0.3 10E9/L
METAMYELOCYTES # BLD: 0.4 10E9/L
METAMYELOCYTES # BLD: 0.4 10E9/L
METAMYELOCYTES NFR BLD MANUAL: 2 %
METAMYELOCYTES NFR BLD MANUAL: 3 %
METAMYELOCYTES NFR BLD MANUAL: 3 %
METAMYELOCYTES NFR BLD MANUAL: 4 %
METAMYELOCYTES NFR BLD MANUAL: 4 %
METAMYELOCYTES NFR BLD MANUAL: 5 %
METAMYELOCYTES NFR BLD MANUAL: 6 %
METAMYELOCYTES NFR BLD MANUAL: 7 %
METHAQUALONE QUAL: NEGATIVE
METHARBITAL QUAL: NEGATIVE
METHSUXIMIDE QUAL: NEGATIVE
METHYPRYLON QUAL: NEGATIVE
MICRO REPORT STATUS: ABNORMAL
MICRO REPORT STATUS: NORMAL
MICROBIOLOGIST REVIEW: ABNORMAL
MICROBIOLOGIST REVIEW: NORMAL
MICROBIOLOGIST REVIEW: NORMAL
MICROORGANISM SPEC CULT: ABNORMAL
MISCELLANEOUS TEST: NORMAL
MONOCYTES # BLD AUTO: 0 10E9/L (ref 0–1.3)
MONOCYTES # BLD AUTO: 0.1 10E9/L (ref 0–1.3)
MONOCYTES # BLD AUTO: 0.2 10E9/L (ref 0–1.3)
MONOCYTES # BLD AUTO: 0.3 10E9/L (ref 0–1.3)
MONOCYTES # BLD AUTO: 0.4 10E9/L (ref 0–1.3)
MONOCYTES # BLD AUTO: 0.4 10E9/L (ref 0–1.3)
MONOCYTES # BLD AUTO: 0.5 10E9/L (ref 0–1.3)
MONOCYTES # BLD AUTO: 0.5 10E9/L (ref 0–1.3)
MONOCYTES # BLD AUTO: 0.6 10E9/L (ref 0–1.3)
MONOCYTES # BLD AUTO: 0.6 10E9/L (ref 0–1.3)
MONOCYTES # BLD AUTO: 0.7 10E9/L (ref 0–1.3)
MONOCYTES # BLD AUTO: 0.8 10E9/L (ref 0–1.3)
MONOCYTES # BLD AUTO: 0.9 10E9/L (ref 0–1.3)
MONOCYTES NFR BLD AUTO: 0 %
MONOCYTES NFR BLD AUTO: 1 %
MONOCYTES NFR BLD AUTO: 2.1 %
MONOCYTES NFR BLD AUTO: 2.2 %
MONOCYTES NFR BLD AUTO: 2.2 %
MONOCYTES NFR BLD AUTO: 2.3 %
MONOCYTES NFR BLD AUTO: 2.3 %
MONOCYTES NFR BLD AUTO: 2.4 %
MONOCYTES NFR BLD AUTO: 2.4 %
MONOCYTES NFR BLD AUTO: 2.5 %
MONOCYTES NFR BLD AUTO: 2.5 %
MONOCYTES NFR BLD AUTO: 2.8 %
MONOCYTES NFR BLD AUTO: 3.2 %
MONOCYTES NFR BLD AUTO: 3.3 %
MONOCYTES NFR BLD AUTO: 3.4 %
MONOCYTES NFR BLD AUTO: 3.6 %
MONOCYTES NFR BLD AUTO: 3.8 %
MONOCYTES NFR BLD AUTO: 3.9 %
MONOCYTES NFR BLD AUTO: 4 %
MONOCYTES NFR BLD AUTO: 4.2 %
MONOCYTES NFR BLD AUTO: 4.3 %
MONOCYTES NFR BLD AUTO: 4.6 %
MONOCYTES NFR BLD AUTO: 4.6 %
MONOCYTES NFR BLD AUTO: 4.7 %
MONOCYTES NFR BLD AUTO: 5 %
MONOCYTES NFR BLD AUTO: 5.2 %
MONOCYTES NFR BLD AUTO: 6.6 %
MONOCYTES NFR BLD AUTO: 7 %
MONOCYTES NFR BLD AUTO: 9 %
MONOS+MACROS NFR FLD MANUAL: 24 %
MONOS+MACROS NFR FLD MANUAL: 52 %
MONOS+MACROS NFR FLD MANUAL: 8 %
MRSA DNA SPEC QL NAA+PROBE: NEGATIVE
MRSA DNA SPEC QL NAA+PROBE: NORMAL
MUCOUS THREADS #/AREA URNS LPF: PRESENT /LPF
MYCOBACTERIUM SPEC CULT: NORMAL
MYELOCYTES # BLD: 0.1 10E9/L
MYELOCYTES # BLD: 0.1 10E9/L
MYELOCYTES # BLD: 0.2 10E9/L
MYELOCYTES NFR BLD MANUAL: 1 %
MYELOCYTES NFR BLD MANUAL: 2 %
MYELOCYTES NFR BLD MANUAL: 3 %
MYELOPEROXIDASE AB SER-ACNC: <0.2 AI (ref 0–0.9)
MYELOPEROXIDASE AB SER-ACNC: NORMAL AI (ref 0–0.9)
NEUTROPHILS # BLD AUTO: 10 10E9/L (ref 1.6–8.3)
NEUTROPHILS # BLD AUTO: 12.8 10E9/L (ref 1.6–8.3)
NEUTROPHILS # BLD AUTO: 13 10E9/L (ref 1.6–8.3)
NEUTROPHILS # BLD AUTO: 13.2 10E9/L (ref 1.6–8.3)
NEUTROPHILS # BLD AUTO: 14.8 10E9/L (ref 1.6–8.3)
NEUTROPHILS # BLD AUTO: 14.8 10E9/L (ref 1.6–8.3)
NEUTROPHILS # BLD AUTO: 2.9 10E9/L (ref 1.6–8.3)
NEUTROPHILS # BLD AUTO: 3 10E9/L (ref 1.6–8.3)
NEUTROPHILS # BLD AUTO: 3.1 10E9/L (ref 1.6–8.3)
NEUTROPHILS # BLD AUTO: 3.3 10E9/L (ref 1.6–8.3)
NEUTROPHILS # BLD AUTO: 3.6 10E9/L (ref 1.6–8.3)
NEUTROPHILS # BLD AUTO: 3.8 10E9/L (ref 1.6–8.3)
NEUTROPHILS # BLD AUTO: 3.9 10E9/L (ref 1.6–8.3)
NEUTROPHILS # BLD AUTO: 4 10E9/L (ref 1.6–8.3)
NEUTROPHILS # BLD AUTO: 4.3 10E9/L (ref 1.6–8.3)
NEUTROPHILS # BLD AUTO: 4.4 10E9/L (ref 1.6–8.3)
NEUTROPHILS # BLD AUTO: 4.5 10E9/L (ref 1.6–8.3)
NEUTROPHILS # BLD AUTO: 4.6 10E9/L (ref 1.6–8.3)
NEUTROPHILS # BLD AUTO: 4.9 10E9/L (ref 1.6–8.3)
NEUTROPHILS # BLD AUTO: 4.9 10E9/L (ref 1.6–8.3)
NEUTROPHILS # BLD AUTO: 5 10E9/L (ref 1.6–8.3)
NEUTROPHILS # BLD AUTO: 5.4 10E9/L (ref 1.6–8.3)
NEUTROPHILS # BLD AUTO: 5.6 10E9/L (ref 1.6–8.3)
NEUTROPHILS # BLD AUTO: 6.3 10E9/L (ref 1.6–8.3)
NEUTROPHILS # BLD AUTO: 6.4 10E9/L (ref 1.6–8.3)
NEUTROPHILS # BLD AUTO: 6.5 10E9/L (ref 1.6–8.3)
NEUTROPHILS # BLD AUTO: 7.3 10E9/L (ref 1.6–8.3)
NEUTROPHILS # BLD AUTO: 7.4 10E9/L (ref 1.6–8.3)
NEUTROPHILS # BLD AUTO: 8.7 10E9/L (ref 1.6–8.3)
NEUTROPHILS # BLD AUTO: 8.8 10E9/L (ref 1.6–8.3)
NEUTROPHILS # BLD AUTO: 9.3 10E9/L (ref 1.6–8.3)
NEUTROPHILS NFR BLD AUTO: 54 %
NEUTROPHILS NFR BLD AUTO: 59 %
NEUTROPHILS NFR BLD AUTO: 65 %
NEUTROPHILS NFR BLD AUTO: 70.8 %
NEUTROPHILS NFR BLD AUTO: 75 %
NEUTROPHILS NFR BLD AUTO: 76 %
NEUTROPHILS NFR BLD AUTO: 77 %
NEUTROPHILS NFR BLD AUTO: 79.6 %
NEUTROPHILS NFR BLD AUTO: 80 %
NEUTROPHILS NFR BLD AUTO: 81.5 %
NEUTROPHILS NFR BLD AUTO: 82.4 %
NEUTROPHILS NFR BLD AUTO: 82.8 %
NEUTROPHILS NFR BLD AUTO: 83 %
NEUTROPHILS NFR BLD AUTO: 83.1 %
NEUTROPHILS NFR BLD AUTO: 84 %
NEUTROPHILS NFR BLD AUTO: 84.5 %
NEUTROPHILS NFR BLD AUTO: 84.7 %
NEUTROPHILS NFR BLD AUTO: 85.1 %
NEUTROPHILS NFR BLD AUTO: 85.3 %
NEUTROPHILS NFR BLD AUTO: 86.1 %
NEUTROPHILS NFR BLD AUTO: 86.6 %
NEUTROPHILS NFR BLD AUTO: 87.5 %
NEUTROPHILS NFR BLD AUTO: 87.7 %
NEUTROPHILS NFR BLD AUTO: 88.1 %
NEUTROPHILS NFR BLD AUTO: 88.8 %
NEUTROPHILS NFR BLD AUTO: 89.5 %
NEUTROPHILS NFR BLD AUTO: 90.3 %
NEUTROPHILS NFR BLD AUTO: 90.5 %
NEUTROPHILS NFR BLD AUTO: 90.7 %
NEUTROPHILS NFR BLD AUTO: 92.9 %
NEUTROPHILS NFR BLD AUTO: 93.4 %
NEUTS BAND NFR FLD MANUAL: 15 %
NEUTS BAND NFR FLD MANUAL: 32 %
NEUTS BAND NFR FLD MANUAL: 91 %
NITRATE UR QL: NEGATIVE
NRBC # BLD AUTO: 0 10*3/UL
NRBC # BLD AUTO: 0.1 10*3/UL
NRBC BLD AUTO-RTO: 0 /100
NRBC BLD AUTO-RTO: 1 /100
NRBC BLD AUTO-RTO: 1 /100
NRBC BLD AUTO-RTO: 2 /100
NRBC BLD AUTO-RTO: 2 /100
NT-PROBNP SERPL-MCNC: 152 PG/ML (ref 0–450)
NT-PROBNP SERPL-MCNC: 2649 PG/ML (ref 0–450)
NT-PROBNP SERPL-MCNC: 294 PG/ML (ref 0–450)
NT-PROBNP SERPL-MCNC: 674 PG/ML (ref 0–450)
NT-PROBNP SERPL-MCNC: 88 PG/ML (ref 0–450)
NUM BPU REQUESTED: 3
NUM BPU REQUESTED: 4
O2/TOTAL GAS SETTING VFR VENT: 10 %
O2/TOTAL GAS SETTING VFR VENT: 16 %
O2/TOTAL GAS SETTING VFR VENT: 40 %
O2/TOTAL GAS SETTING VFR VENT: 60 %
O2/TOTAL GAS SETTING VFR VENT: ABNORMAL %
O2/TOTAL GAS SETTING VFR VENT: NORMAL %
OPIATES UR QL SCN: NEGATIVE
OPIATES UR QL SCN: NORMAL
OPIATES UR QL SCN: NORMAL
OSMOLALITY UR: 360 MMOL/KG (ref 100–1200)
OTHER CELLS FLD MANUAL: 4 %
OTHER CELLS FLD MANUAL: 9 %
OXYHGB MFR BLD: 88 % (ref 92–100)
OXYHGB MFR BLD: 88 % (ref 92–100)
OXYHGB MFR BLD: 90 % (ref 92–100)
OXYHGB MFR BLD: 91 % (ref 92–100)
OXYHGB MFR BLD: 92 % (ref 92–100)
OXYHGB MFR BLD: 93 % (ref 92–100)
OXYHGB MFR BLD: 95 % (ref 92–100)
OXYHGB MFR BLD: 98 % (ref 92–100)
OXYHGB MFR BLDV: 60 %
OXYHGB MFR BLDV: 82 %
OXYHGB MFR BLDV: 84 %
OXYHGB MFR BLDV: 86 %
OXYHGB MFR BLDV: 90 %
P JIROVECII DNA SPEC QL NAA+PROBE: NORMAL
PCO2 BLD: 44 MM HG (ref 35–45)
PCO2 BLD: 45 MM HG (ref 35–45)
PCO2 BLD: 46 MM HG (ref 35–45)
PCO2 BLD: 46 MM HG (ref 35–45)
PCO2 BLD: 47 MM HG (ref 35–45)
PCO2 BLD: 53 MM HG (ref 35–45)
PCO2 BLD: 54 MM HG (ref 35–45)
PCO2 BLD: 60 MM HG (ref 35–45)
PCO2 BLD: 62 MM HG (ref 35–45)
PCO2 BLDV: 36 MM HG (ref 40–50)
PCO2 BLDV: 39 MM HG (ref 40–50)
PCO2 BLDV: 42 MM HG (ref 40–50)
PCO2 BLDV: 45 MM HG (ref 40–50)
PCO2 BLDV: 45 MM HG (ref 40–50)
PCO2 BLDV: 46 MM HG (ref 40–50)
PCO2 BLDV: 53 MM HG (ref 40–50)
PCO2 BLDV: 53 MM HG (ref 40–50)
PCP UR QL SCN: NORMAL
PCP UR QL SCN: NORMAL
PENTOBARBITAL QUAL: NEGATIVE
PH BLD: 7.26 PH (ref 7.35–7.45)
PH BLD: 7.27 PH (ref 7.35–7.45)
PH BLD: 7.28 PH (ref 7.35–7.45)
PH BLD: 7.33 PH (ref 7.35–7.45)
PH BLD: 7.34 PH (ref 7.35–7.45)
PH BLD: 7.34 PH (ref 7.35–7.45)
PH BLD: 7.35 PH (ref 7.35–7.45)
PH BLD: 7.36 PH (ref 7.35–7.45)
PH BLD: 7.4 PH (ref 7.35–7.45)
PH BLDV: 7.32 PH (ref 7.32–7.43)
PH BLDV: 7.34 PH (ref 7.32–7.43)
PH BLDV: 7.37 PH (ref 7.32–7.43)
PH BLDV: 7.38 PH (ref 7.32–7.43)
PH BLDV: 7.42 PH (ref 7.32–7.43)
PH BLDV: 7.43 PH (ref 7.32–7.43)
PH BLDV: 7.46 PH (ref 7.32–7.43)
PH UR STRIP: 6 PH (ref 5–7)
PH UR STRIP: 6.5 PH (ref 5–7)
PHENACETIN QUAL: NEGATIVE
PHENOBARBITAL QUAL: NEGATIVE
PHENSUXIMIDE QUAL: NEGATIVE
PHENYTOIN QUAL: NEGATIVE
PHOSPHATE SERPL-MCNC: 1.8 MG/DL (ref 2.5–4.5)
PHOSPHATE SERPL-MCNC: 2.5 MG/DL (ref 2.5–4.5)
PHOSPHATE SERPL-MCNC: 2.6 MG/DL (ref 2.5–4.5)
PHOSPHATE SERPL-MCNC: 2.7 MG/DL (ref 2.5–4.5)
PHOSPHATE SERPL-MCNC: 2.8 MG/DL (ref 2.5–4.5)
PHOSPHATE SERPL-MCNC: 3.2 MG/DL (ref 2.5–4.5)
PHOSPHATE SERPL-MCNC: 3.5 MG/DL (ref 2.5–4.5)
PHOSPHATE SERPL-MCNC: 3.6 MG/DL (ref 2.5–4.5)
PHOSPHATE SERPL-MCNC: 3.8 MG/DL (ref 2.5–4.5)
PHOSPHATE SERPL-MCNC: 4 MG/DL (ref 2.5–4.5)
PHOSPHATE SERPL-MCNC: 4 MG/DL (ref 2.5–4.5)
PHOSPHATE SERPL-MCNC: 4.6 MG/DL (ref 2.5–4.5)
PHOSPHATE SERPL-MCNC: 4.7 MG/DL (ref 2.5–4.5)
PHOSPHATE SERPL-MCNC: 5 MG/DL (ref 2.5–4.5)
PIGEON DROP IGE QN: NORMAL
PLATELET # BLD AUTO: 100 10E9/L (ref 150–450)
PLATELET # BLD AUTO: 101 10E9/L (ref 150–450)
PLATELET # BLD AUTO: 102 10E9/L (ref 150–450)
PLATELET # BLD AUTO: 103 10E9/L (ref 150–450)
PLATELET # BLD AUTO: 104 10E9/L (ref 150–450)
PLATELET # BLD AUTO: 106 10E9/L (ref 150–450)
PLATELET # BLD AUTO: 107 10E9/L (ref 150–450)
PLATELET # BLD AUTO: 107 10E9/L (ref 150–450)
PLATELET # BLD AUTO: 109 10E9/L (ref 150–450)
PLATELET # BLD AUTO: 110 10E9/L (ref 150–450)
PLATELET # BLD AUTO: 111 10E9/L (ref 150–450)
PLATELET # BLD AUTO: 111 10E9/L (ref 150–450)
PLATELET # BLD AUTO: 113 10E9/L (ref 150–450)
PLATELET # BLD AUTO: 116 10E9/L (ref 150–450)
PLATELET # BLD AUTO: 118 10E9/L (ref 150–450)
PLATELET # BLD AUTO: 122 10E9/L (ref 150–450)
PLATELET # BLD AUTO: 123 10E9/L (ref 150–450)
PLATELET # BLD AUTO: 125 10E9/L (ref 150–450)
PLATELET # BLD AUTO: 125 10E9/L (ref 150–450)
PLATELET # BLD AUTO: 126 10E9/L (ref 150–450)
PLATELET # BLD AUTO: 126 10E9/L (ref 150–450)
PLATELET # BLD AUTO: 128 10E9/L (ref 150–450)
PLATELET # BLD AUTO: 129 10E9/L (ref 150–450)
PLATELET # BLD AUTO: 130 10E9/L (ref 150–450)
PLATELET # BLD AUTO: 132 10E9/L (ref 150–450)
PLATELET # BLD AUTO: 132 10E9/L (ref 150–450)
PLATELET # BLD AUTO: 134 10E9/L (ref 150–450)
PLATELET # BLD AUTO: 134 10E9/L (ref 150–450)
PLATELET # BLD AUTO: 135 10E9/L (ref 150–450)
PLATELET # BLD AUTO: 136 10E9/L (ref 150–450)
PLATELET # BLD AUTO: 139 10E9/L (ref 150–450)
PLATELET # BLD AUTO: 143 10E9/L (ref 150–450)
PLATELET # BLD AUTO: 146 10E9/L (ref 150–450)
PLATELET # BLD AUTO: 152 10E9/L (ref 150–450)
PLATELET # BLD AUTO: 158 10E9/L (ref 150–450)
PLATELET # BLD AUTO: 177 10E9/L (ref 150–450)
PLATELET # BLD AUTO: 194 10E9/L (ref 150–450)
PLATELET # BLD AUTO: 196 10E9/L (ref 150–450)
PLATELET # BLD AUTO: 81 10E9/L (ref 150–450)
PLATELET # BLD AUTO: 82 10E9/L (ref 150–450)
PLATELET # BLD AUTO: 83 10E9/L (ref 150–450)
PLATELET # BLD AUTO: 85 10E9/L (ref 150–450)
PLATELET # BLD AUTO: 87 10E9/L (ref 150–450)
PLATELET # BLD AUTO: 91 10E9/L (ref 150–450)
PLATELET # BLD AUTO: 92 10E9/L (ref 150–450)
PLATELET # BLD AUTO: 92 10E9/L (ref 150–450)
PLATELET # BLD AUTO: 93 10E9/L (ref 150–450)
PLATELET # BLD AUTO: 94 10E9/L (ref 150–450)
PLATELET # BLD AUTO: 94 10E9/L (ref 150–450)
PLATELET # BLD AUTO: 99 10E9/L (ref 150–450)
PLATELET # BLD AUTO: 99 10E9/L (ref 150–450)
PLATELET # BLD EST: ABNORMAL 10*3/UL
PLATELET # BLD EST: NORMAL 10*3/UL
PLATELET # BLD EST: NORMAL 10*3/UL
PO2 BLD: 102 MM HG (ref 80–105)
PO2 BLD: 105 MM HG (ref 80–105)
PO2 BLD: 110 MM HG (ref 80–105)
PO2 BLD: 128 MM HG (ref 80–105)
PO2 BLD: 62 MM HG (ref 80–105)
PO2 BLD: 66 MM HG (ref 80–105)
PO2 BLD: 72 MM HG (ref 80–105)
PO2 BLD: 81 MM HG (ref 80–105)
PO2 BLD: 95 MM HG (ref 80–105)
PO2 BLDV: 23 MM HG (ref 25–47)
PO2 BLDV: 25 MM HG (ref 25–47)
PO2 BLDV: 30 MM HG (ref 25–47)
PO2 BLDV: 31 MM HG (ref 25–47)
PO2 BLDV: 33 MM HG (ref 25–47)
PO2 BLDV: 45 MM HG (ref 25–47)
PO2 BLDV: 53 MM HG (ref 25–47)
PO2 BLDV: 54 MM HG (ref 25–47)
PO2 BLDV: 60 MM HG (ref 25–47)
PO2 BLDV: 63 MM HG (ref 25–47)
PO2 BLDV: 98 MM HG (ref 25–47)
POLYCHROMASIA BLD QL SMEAR: SLIGHT
POTASSIUM SERPL-SCNC: 3 MMOL/L (ref 3.4–5.3)
POTASSIUM SERPL-SCNC: 3.1 MMOL/L (ref 3.4–5.3)
POTASSIUM SERPL-SCNC: 3.1 MMOL/L (ref 3.4–5.3)
POTASSIUM SERPL-SCNC: 3.2 MMOL/L (ref 3.4–5.3)
POTASSIUM SERPL-SCNC: 3.3 MMOL/L (ref 3.4–5.3)
POTASSIUM SERPL-SCNC: 3.3 MMOL/L (ref 3.4–5.3)
POTASSIUM SERPL-SCNC: 3.4 MMOL/L (ref 3.4–5.3)
POTASSIUM SERPL-SCNC: 3.5 MMOL/L (ref 3.4–5.3)
POTASSIUM SERPL-SCNC: 3.6 MMOL/L (ref 3.4–5.3)
POTASSIUM SERPL-SCNC: 3.7 MMOL/L (ref 3.4–5.3)
POTASSIUM SERPL-SCNC: 3.7 MMOL/L (ref 3.4–5.3)
POTASSIUM SERPL-SCNC: 3.8 MMOL/L (ref 3.4–5.3)
POTASSIUM SERPL-SCNC: 3.9 MMOL/L (ref 3.4–5.3)
POTASSIUM SERPL-SCNC: 4 MMOL/L (ref 3.4–5.3)
POTASSIUM SERPL-SCNC: 4.1 MMOL/L (ref 3.4–5.3)
POTASSIUM SERPL-SCNC: 4.2 MMOL/L (ref 3.4–5.3)
POTASSIUM SERPL-SCNC: 4.3 MMOL/L (ref 3.4–5.3)
POTASSIUM SERPL-SCNC: 4.4 MMOL/L (ref 3.4–5.3)
POTASSIUM SERPL-SCNC: 4.5 MMOL/L (ref 3.4–5.3)
POTASSIUM SERPL-SCNC: 4.6 MMOL/L (ref 3.4–5.3)
POTASSIUM SERPL-SCNC: 4.6 MMOL/L (ref 3.4–5.3)
POTASSIUM SERPL-SCNC: 4.7 MMOL/L (ref 3.4–5.3)
POTASSIUM SERPL-SCNC: 4.8 MMOL/L (ref 3.4–5.3)
POTASSIUM SERPL-SCNC: 4.9 MMOL/L (ref 3.4–5.3)
POTASSIUM SERPL-SCNC: 4.9 MMOL/L (ref 3.4–5.3)
POTASSIUM SERPL-SCNC: 5 MMOL/L (ref 3.4–5.3)
POTASSIUM SERPL-SCNC: 5.1 MMOL/L (ref 3.4–5.3)
POTASSIUM SERPL-SCNC: 5.1 MMOL/L (ref 3.4–5.3)
POTASSIUM SERPL-SCNC: 5.4 MMOL/L (ref 3.4–5.3)
POTASSIUM SERPL-SCNC: 5.4 MMOL/L (ref 3.4–5.3)
PRIMIDONE QUAL: NEGATIVE
PROCALCITONIN SERPL-MCNC: 0.11 NG/ML
PROCALCITONIN SERPL-MCNC: 0.12 NG/ML
PROCALCITONIN SERPL-MCNC: 0.13 NG/ML
PROCALCITONIN SERPL-MCNC: 0.39 NG/ML
PROCALCITONIN SERPL-MCNC: 0.82 NG/ML
PROCALCITONIN SERPL-MCNC: 0.88 NG/ML
PROCALCITONIN SERPL-MCNC: 1.01 NG/ML
PROCALCITONIN SERPL-MCNC: 1.1 NG/ML
PROCALCITONIN SERPL-MCNC: 1.21 NG/ML
PROCALCITONIN SERPL-MCNC: 1.65 NG/ML
PROCALCITONIN SERPL-MCNC: 8.82 NG/ML
PROT SERPL-MCNC: 5.6 G/DL (ref 6.8–8.8)
PROT SERPL-MCNC: 5.8 G/DL (ref 6.8–8.8)
PROT SERPL-MCNC: 6 G/DL (ref 6.8–8.8)
PROT SERPL-MCNC: 6.1 G/DL (ref 6.8–8.8)
PROT SERPL-MCNC: 6.2 G/DL (ref 6.8–8.8)
PROT SERPL-MCNC: 6.2 G/DL (ref 6.8–8.8)
PROT SERPL-MCNC: 6.3 G/DL (ref 6.8–8.8)
PROT SERPL-MCNC: 6.6 G/DL (ref 6.8–8.8)
PROT SERPL-MCNC: 7 G/DL (ref 6.8–8.8)
PROT SERPL-MCNC: 7.1 G/DL (ref 6.8–8.8)
PROT SERPL-MCNC: 7.5 G/DL (ref 6.8–8.8)
PROT SERPL-MCNC: 7.5 G/DL (ref 6.8–8.8)
PROT SERPL-MCNC: 7.6 G/DL (ref 6.8–8.8)
PROT SERPL-MCNC: 7.7 G/DL (ref 6.8–8.8)
PROT SERPL-MCNC: 7.9 G/DL (ref 6.8–8.8)
PROT SERPL-MCNC: 8.1 G/DL (ref 6.8–8.8)
PROTEINASE3 IGG SER-ACNC: <0.2 AI (ref 0–0.9)
PROTEINASE3 IGG SER-ACNC: NORMAL AI (ref 0–0.9)
RBC # BLD AUTO: 1.9 10E12/L (ref 3.8–5.2)
RBC # BLD AUTO: 2.1 10E12/L (ref 3.8–5.2)
RBC # BLD AUTO: 2.1 10E12/L (ref 3.8–5.2)
RBC # BLD AUTO: 2.24 10E12/L (ref 3.8–5.2)
RBC # BLD AUTO: 2.43 10E12/L (ref 3.8–5.2)
RBC # BLD AUTO: 2.63 10E12/L (ref 3.8–5.2)
RBC # BLD AUTO: 2.84 10E12/L (ref 3.8–5.2)
RBC # BLD AUTO: 2.91 10E12/L (ref 3.8–5.2)
RBC # BLD AUTO: 2.93 10E12/L (ref 3.8–5.2)
RBC # BLD AUTO: 3.02 10E12/L (ref 3.8–5.2)
RBC # BLD AUTO: 3.07 10E12/L (ref 3.8–5.2)
RBC # BLD AUTO: 3.14 10E12/L (ref 3.8–5.2)
RBC # BLD AUTO: 3.17 10E12/L (ref 3.8–5.2)
RBC # BLD AUTO: 3.18 10E12/L (ref 3.8–5.2)
RBC # BLD AUTO: 3.25 10E12/L (ref 3.8–5.2)
RBC # BLD AUTO: 3.31 10E12/L (ref 3.8–5.2)
RBC # BLD AUTO: 3.4 10E12/L (ref 3.8–5.2)
RBC # BLD AUTO: 3.5 10E12/L (ref 3.8–5.2)
RBC # BLD AUTO: 3.51 10E12/L (ref 3.8–5.2)
RBC # BLD AUTO: 3.52 10E12/L (ref 3.8–5.2)
RBC # BLD AUTO: 3.68 10E12/L (ref 3.8–5.2)
RBC # BLD AUTO: 3.69 10E12/L (ref 3.8–5.2)
RBC # BLD AUTO: 3.74 10E12/L (ref 3.8–5.2)
RBC # BLD AUTO: 3.8 10E12/L (ref 3.8–5.2)
RBC # BLD AUTO: 3.8 10E12/L (ref 3.8–5.2)
RBC # BLD AUTO: 3.83 10E12/L (ref 3.8–5.2)
RBC # BLD AUTO: 3.86 10E12/L (ref 3.8–5.2)
RBC # BLD AUTO: 3.86 10E12/L (ref 3.8–5.2)
RBC # BLD AUTO: 3.9 10E12/L (ref 3.8–5.2)
RBC # BLD AUTO: 3.95 10E12/L (ref 3.8–5.2)
RBC # BLD AUTO: 3.96 10E12/L (ref 3.8–5.2)
RBC # BLD AUTO: 3.97 10E12/L (ref 3.8–5.2)
RBC # BLD AUTO: 4.02 10E12/L (ref 3.8–5.2)
RBC # BLD AUTO: 4.04 10E12/L (ref 3.8–5.2)
RBC # BLD AUTO: 4.05 10E12/L (ref 3.8–5.2)
RBC # BLD AUTO: 4.06 10E12/L (ref 3.8–5.2)
RBC # BLD AUTO: 4.08 10E12/L (ref 3.8–5.2)
RBC # BLD AUTO: 4.09 10E12/L (ref 3.8–5.2)
RBC # BLD AUTO: 4.17 10E12/L (ref 3.8–5.2)
RBC # BLD AUTO: 4.21 10E12/L (ref 3.8–5.2)
RBC # BLD AUTO: 4.22 10E12/L (ref 3.8–5.2)
RBC # BLD AUTO: 4.24 10E12/L (ref 3.8–5.2)
RBC # BLD AUTO: 4.28 10E12/L (ref 3.8–5.2)
RBC # BLD AUTO: 4.34 10E12/L (ref 3.8–5.2)
RBC # BLD AUTO: 4.4 10E12/L (ref 3.8–5.2)
RBC # BLD AUTO: 4.41 10E12/L (ref 3.8–5.2)
RBC # BLD AUTO: 4.43 10E12/L (ref 3.8–5.2)
RBC # BLD AUTO: 4.5 10E12/L (ref 3.8–5.2)
RBC # BLD AUTO: 4.56 10E12/L (ref 3.8–5.2)
RBC # BLD AUTO: 4.6 10E12/L (ref 3.8–5.2)
RBC # BLD AUTO: 4.6 10E12/L (ref 3.8–5.2)
RBC # BLD AUTO: 4.61 10E12/L (ref 3.8–5.2)
RBC # BLD AUTO: 4.64 10E12/L (ref 3.8–5.2)
RBC # BLD AUTO: 5.07 10E12/L (ref 3.8–5.2)
RBC # BLD AUTO: 5.07 10E12/L (ref 3.8–5.2)
RBC # BLD AUTO: 5.15 10E12/L (ref 3.8–5.2)
RBC # FLD: 3534 /UL
RBC #/AREA URNS AUTO: 0 /HPF (ref 0–2)
RBC #/AREA URNS AUTO: 1 /HPF (ref 0–2)
RBC #/AREA URNS AUTO: 1 /HPF (ref 0–2)
RBC #/AREA URNS AUTO: 2 /HPF (ref 0–2)
RBC #/AREA URNS AUTO: <1 /HPF (ref 0–2)
RBC #/AREA URNS AUTO: <1 /HPF (ref 0–2)
RBC MORPH BLD: ABNORMAL
RESULT: NORMAL
RETICS # AUTO: 19.6 10E9/L (ref 25–95)
RETICS/RBC NFR AUTO: 0.9 % (ref 0.5–2)
RHEUMATOID FACT SER NEPH-ACNC: <20 IU/ML (ref 0–20)
RHINOVIRUS RNA SPEC QL NAA+PROBE: ABNORMAL
RHINOVIRUS RNA SPEC QL NAA+PROBE: NEGATIVE
RHINOVIRUS RNA SPEC QL NAA+PROBE: NEGATIVE
RSV RNA SPEC NAA+PROBE: NORMAL
RSV RNA SPEC QL NAA+PROBE: ABNORMAL
RSV RNA SPEC QL NAA+PROBE: ABNORMAL
RSV RNA SPEC QL NAA+PROBE: NEGATIVE
RVPLETH-%PRED-PRE: 79 %
RVPLETH-%PRED-PRE: 98 %
RVPLETH-PRE: 1.38 L
RVPLETH-PRE: 1.73 L
RVPLETH-PRED: 1.75 L
RVPLETH-PRED: 1.75 L
S PNEUM AG SPEC QL: NORMAL
S RECTIVIRGULA AB SER QL ID: NORMAL
S VIRIDIS AB SER QL: NORMAL
SALICYLATE QUAL: NEGATIVE
SAO2 % BLDV FROM PO2: 44 %
SAO2 % BLDV FROM PO2: 56 %
SAO2 % BLDV FROM PO2: 62 %
SAO2 % BLDV FROM PO2: 82 %
SECOBARBITAL QUAL: NEGATIVE
SEND OUTS MISC TEST CODE: NORMAL
SEND OUTS MISC TEST SPECIMEN: NORMAL
SODIUM SERPL-SCNC: 129 MMOL/L (ref 133–144)
SODIUM SERPL-SCNC: 130 MMOL/L (ref 133–144)
SODIUM SERPL-SCNC: 130 MMOL/L (ref 133–144)
SODIUM SERPL-SCNC: 131 MMOL/L (ref 133–144)
SODIUM SERPL-SCNC: 132 MMOL/L (ref 133–144)
SODIUM SERPL-SCNC: 133 MMOL/L (ref 133–144)
SODIUM SERPL-SCNC: 134 MMOL/L (ref 133–144)
SODIUM SERPL-SCNC: 135 MMOL/L (ref 133–144)
SODIUM SERPL-SCNC: 135 MMOL/L (ref 133–144)
SODIUM SERPL-SCNC: 136 MMOL/L (ref 133–144)
SODIUM SERPL-SCNC: 136 MMOL/L (ref 133–144)
SODIUM SERPL-SCNC: 137 MMOL/L (ref 133–144)
SODIUM SERPL-SCNC: 138 MMOL/L (ref 133–144)
SODIUM SERPL-SCNC: 139 MMOL/L (ref 133–144)
SODIUM SERPL-SCNC: 140 MMOL/L (ref 133–144)
SODIUM SERPL-SCNC: 141 MMOL/L (ref 133–144)
SODIUM SERPL-SCNC: 142 MMOL/L (ref 133–144)
SODIUM SERPL-SCNC: 143 MMOL/L (ref 133–144)
SODIUM SERPL-SCNC: 144 MMOL/L (ref 133–144)
SODIUM SERPL-SCNC: 152 MMOL/L (ref 133–144)
SODIUM UR-SCNC: 34 MMOL/L
SODIUM UR-SCNC: 39 MMOL/L
SOURCE: ABNORMAL
SP GR UR STRIP: 1 (ref 1–1.03)
SP GR UR STRIP: 1.01 (ref 1–1.03)
SPECIMEN EXP DATE BLD: ABNORMAL
SPECIMEN EXP DATE BLD: NORMAL
SPECIMEN EXP DATE BLD: NORMAL
SPECIMEN SOURCE FLD: NORMAL
SPECIMEN SOURCE: ABNORMAL
SPECIMEN SOURCE: NORMAL
SQUAMOUS #/AREA URNS AUTO: 1 /HPF (ref 0–1)
SQUAMOUS #/AREA URNS AUTO: 1 /HPF (ref 0–1)
SQUAMOUS #/AREA URNS AUTO: 2 /HPF (ref 0–1)
SQUAMOUS #/AREA URNS AUTO: <1 /HPF (ref 0–1)
SQUAMOUS #/AREA URNS AUTO: <1 /HPF (ref 0–1)
T CANDIDUS AB SER QL: NORMAL
T VULGARIS AB SER QL ID: NORMAL
T-CELL SUBSET INTERPRETATION: NORMAL
TALBUTAL QUAL: NEGATIVE
TEST NAME: NORMAL
THEOPHYLLINE QUAL: NEGATIVE
THIOPENTAL QUAL: NEGATIVE
TIBC SERPL-MCNC: 307 UG/DL (ref 240–430)
TLCPLETH-%PRED-PRE: 72 %
TLCPLETH-%PRED-PRE: 84 %
TLCPLETH-PRE: 3.89 L
TLCPLETH-PRE: 4.56 L
TLCPLETH-PRED: 5.4 L
TLCPLETH-PRED: 5.4 L
TOXIC GRANULES BLD QL SMEAR: PRESENT
TRANSFUSION STATUS PATIENT QL: NORMAL
TRIGL SERPL-MCNC: 160 MG/DL
TROPONIN I BLD-MCNC: 0.01 UG/L (ref 0–0.1)
TROPONIN I SERPL-MCNC: 0.02 UG/L (ref 0–0.04)
TROPONIN I SERPL-MCNC: <0.015 UG/L (ref 0–0.04)
TROPONIN I SERPL-MCNC: NORMAL UG/L (ref 0–0.04)
TROPONIN I SERPL-MCNC: NORMAL UG/L (ref 0–0.04)
TSH SERPL DL<=0.005 MIU/L-ACNC: 0.5 MU/L (ref 0.4–4)
TSH SERPL DL<=0.005 MIU/L-ACNC: 1.04 MU/L (ref 0.4–4)
TYBAMATE QUAL: NEGATIVE
URN SPEC COLLECT METH UR: ABNORMAL
UROBILINOGEN UR STRIP-MCNC: 0 MG/DL (ref 0–2)
UROBILINOGEN UR STRIP-MCNC: NORMAL MG/DL (ref 0–2)
UROBILINOGEN UR STRIP-MCNC: NORMAL MG/DL (ref 0–2)
VA-%PRED-PRE: 62 %
VA-%PRED-PRE: 70 %
VA-PRE: 3.44 L
VA-PRE: 3.9 L
VALPROIC ACID QUAL: NEGATIVE
VANCOMYCIN SERPL-MCNC: 13.9 MG/L
VANCOMYCIN SERPL-MCNC: 14 MG/L
VANCOMYCIN SERPL-MCNC: 17.6 MG/L
VANCOMYCIN SERPL-MCNC: 18.1 MG/L
VANCOMYCIN SERPL-MCNC: 18.8 MG/L
VANCOMYCIN SERPL-MCNC: 18.9 MG/L
VANCOMYCIN SERPL-MCNC: 20.8 MG/L
VANCOMYCIN SERPL-MCNC: 21.6 MG/L
VANCOMYCIN SERPL-MCNC: 24.1 MG/L
VANCOMYCIN SERPL-MCNC: 27.5 MG/L
VANCOMYCIN SERPL-MCNC: 27.8 MG/L
VARIANT LYMPHS BLD QL SMEAR: PRESENT
VC-%PRED-PRE: 62 %
VC-%PRED-PRE: 70 %
VC-PRE: 2.51 L
VC-PRE: 2.83 L
VC-PRED: 3.98 L
VC-PRED: 3.99 L
VIT B12 SERPL-MCNC: 560 PG/ML (ref 193–986)
WBC # BLD AUTO: 10.6 10E9/L (ref 4–11)
WBC # BLD AUTO: 10.7 10E9/L (ref 4–11)
WBC # BLD AUTO: 11.3 10E9/L (ref 4–11)
WBC # BLD AUTO: 11.5 10E9/L (ref 4–11)
WBC # BLD AUTO: 12.8 10E9/L (ref 4–11)
WBC # BLD AUTO: 15.4 10E9/L (ref 4–11)
WBC # BLD AUTO: 15.5 10E9/L (ref 4–11)
WBC # BLD AUTO: 16.2 10E9/L (ref 4–11)
WBC # BLD AUTO: 17.2 10E9/L (ref 4–11)
WBC # BLD AUTO: 17.8 10E9/L (ref 4–11)
WBC # BLD AUTO: 3.5 10E9/L (ref 4–11)
WBC # BLD AUTO: 3.6 10E9/L (ref 4–11)
WBC # BLD AUTO: 3.7 10E9/L (ref 4–11)
WBC # BLD AUTO: 3.8 10E9/L (ref 4–11)
WBC # BLD AUTO: 3.9 10E9/L (ref 4–11)
WBC # BLD AUTO: 4 10E9/L (ref 4–11)
WBC # BLD AUTO: 4.2 10E9/L (ref 4–11)
WBC # BLD AUTO: 4.3 10E9/L (ref 4–11)
WBC # BLD AUTO: 4.3 10E9/L (ref 4–11)
WBC # BLD AUTO: 4.4 10E9/L (ref 4–11)
WBC # BLD AUTO: 4.5 10E9/L (ref 4–11)
WBC # BLD AUTO: 4.7 10E9/L (ref 4–11)
WBC # BLD AUTO: 4.7 10E9/L (ref 4–11)
WBC # BLD AUTO: 4.9 10E9/L (ref 4–11)
WBC # BLD AUTO: 5 10E9/L (ref 4–11)
WBC # BLD AUTO: 5.2 10E9/L (ref 4–11)
WBC # BLD AUTO: 5.3 10E9/L (ref 4–11)
WBC # BLD AUTO: 5.4 10E9/L (ref 4–11)
WBC # BLD AUTO: 5.5 10E9/L (ref 4–11)
WBC # BLD AUTO: 5.6 10E9/L (ref 4–11)
WBC # BLD AUTO: 5.7 10E9/L (ref 4–11)
WBC # BLD AUTO: 5.8 10E9/L (ref 4–11)
WBC # BLD AUTO: 5.9 10E9/L (ref 4–11)
WBC # BLD AUTO: 6.4 10E9/L (ref 4–11)
WBC # BLD AUTO: 6.4 10E9/L (ref 4–11)
WBC # BLD AUTO: 6.5 10E9/L (ref 4–11)
WBC # BLD AUTO: 6.5 10E9/L (ref 4–11)
WBC # BLD AUTO: 6.6 10E9/L (ref 4–11)
WBC # BLD AUTO: 6.6 10E9/L (ref 4–11)
WBC # BLD AUTO: 6.7 10E9/L (ref 4–11)
WBC # BLD AUTO: 6.8 10E9/L (ref 4–11)
WBC # BLD AUTO: 6.8 10E9/L (ref 4–11)
WBC # BLD AUTO: 6.9 10E9/L (ref 4–11)
WBC # BLD AUTO: 7.1 10E9/L (ref 4–11)
WBC # BLD AUTO: 7.2 10E9/L (ref 4–11)
WBC # BLD AUTO: 7.3 10E9/L (ref 4–11)
WBC # BLD AUTO: 7.4 10E9/L (ref 4–11)
WBC # BLD AUTO: 7.7 10E9/L (ref 4–11)
WBC # BLD AUTO: 8.2 10E9/L (ref 4–11)
WBC # BLD AUTO: 8.4 10E9/L (ref 4–11)
WBC # BLD AUTO: 8.5 10E9/L (ref 4–11)
WBC # BLD AUTO: 8.5 10E9/L (ref 4–11)
WBC # BLD AUTO: 9.4 10E9/L (ref 4–11)
WBC # BLD AUTO: 9.8 10E9/L (ref 4–11)
WBC # FLD AUTO: 1055 /UL
WBC # FLD AUTO: NORMAL /UL
WBC # FLD AUTO: NORMAL /UL
WBC #/AREA URNS AUTO: 0 /HPF (ref 0–2)
WBC #/AREA URNS AUTO: 0 /HPF (ref 0–2)
WBC #/AREA URNS AUTO: 1 /HPF (ref 0–2)
WBC #/AREA URNS AUTO: 1 /HPF (ref 0–2)
WBC #/AREA URNS AUTO: <1 /HPF (ref 0–2)
WBC #/AREA URNS AUTO: <1 /HPF (ref 0–2)

## 2017-01-01 PROCEDURE — 20000003 ZZH R&B ICU

## 2017-01-01 PROCEDURE — 85027 COMPLETE CBC AUTOMATED: CPT | Performed by: EMERGENCY MEDICINE

## 2017-01-01 PROCEDURE — 00000146 ZZHCL STATISTIC GLUCOSE BY METER IP

## 2017-01-01 PROCEDURE — 86901 BLOOD TYPING SEROLOGIC RH(D): CPT | Performed by: INTERNAL MEDICINE

## 2017-01-01 PROCEDURE — 25000128 H RX IP 250 OP 636: Performed by: THORACIC SURGERY (CARDIOTHORACIC VASCULAR SURGERY)

## 2017-01-01 PROCEDURE — 20600001 ZZH R&B ICU INTERMEDIATE UMMC

## 2017-01-01 PROCEDURE — 25000132 ZZH RX MED GY IP 250 OP 250 PS 637: Mod: GY | Performed by: INTERNAL MEDICINE

## 2017-01-01 PROCEDURE — 25000132 ZZH RX MED GY IP 250 OP 250 PS 637: Performed by: SURGERY

## 2017-01-01 PROCEDURE — 99233 SBSQ HOSP IP/OBS HIGH 50: CPT | Performed by: INTERNAL MEDICINE

## 2017-01-01 PROCEDURE — 25000132 ZZH RX MED GY IP 250 OP 250 PS 637: Performed by: INTERNAL MEDICINE

## 2017-01-01 PROCEDURE — 12000000 ZZH R&B MED SURG/OB

## 2017-01-01 PROCEDURE — 80202 ASSAY OF VANCOMYCIN: CPT | Performed by: INTERNAL MEDICINE

## 2017-01-01 PROCEDURE — 40000894 ZZH STATISTIC OT IP EVAL DEFER

## 2017-01-01 PROCEDURE — A9270 NON-COVERED ITEM OR SERVICE: HCPCS | Mod: GY | Performed by: INTERNAL MEDICINE

## 2017-01-01 PROCEDURE — 83605 ASSAY OF LACTIC ACID: CPT | Performed by: NURSE PRACTITIONER

## 2017-01-01 PROCEDURE — 36415 COLL VENOUS BLD VENIPUNCTURE: CPT | Performed by: INTERNAL MEDICINE

## 2017-01-01 PROCEDURE — 40000275 ZZH STATISTIC RCP TIME EA 10 MIN

## 2017-01-01 PROCEDURE — A9270 NON-COVERED ITEM OR SERVICE: HCPCS | Mod: GY | Performed by: NURSE PRACTITIONER

## 2017-01-01 PROCEDURE — 25000128 H RX IP 250 OP 636: Performed by: INTERNAL MEDICINE

## 2017-01-01 PROCEDURE — 36415 COLL VENOUS BLD VENIPUNCTURE: CPT | Performed by: THORACIC SURGERY (CARDIOTHORACIC VASCULAR SURGERY)

## 2017-01-01 PROCEDURE — 27211040 ZZH CONTINUOUS NEBULIZER MICRO PUMP

## 2017-01-01 PROCEDURE — 25000131 ZZH RX MED GY IP 250 OP 636 PS 637: Mod: GY | Performed by: INTERNAL MEDICINE

## 2017-01-01 PROCEDURE — 94640 AIRWAY INHALATION TREATMENT: CPT

## 2017-01-01 PROCEDURE — 25000125 ZZHC RX 250

## 2017-01-01 PROCEDURE — 71010 XR CHEST PORT 1 VW: CPT

## 2017-01-01 PROCEDURE — 82570 ASSAY OF URINE CREATININE: CPT | Performed by: STUDENT IN AN ORGANIZED HEALTH CARE EDUCATION/TRAINING PROGRAM

## 2017-01-01 PROCEDURE — 85027 COMPLETE CBC AUTOMATED: CPT | Performed by: PHYSICIAN ASSISTANT

## 2017-01-01 PROCEDURE — 80048 BASIC METABOLIC PNL TOTAL CA: CPT | Performed by: HOSPITALIST

## 2017-01-01 PROCEDURE — 25000128 H RX IP 250 OP 636: Performed by: EMERGENCY MEDICINE

## 2017-01-01 PROCEDURE — 25000125 ZZHC RX 250: Performed by: INTERNAL MEDICINE

## 2017-01-01 PROCEDURE — 94660 CPAP INITIATION&MGMT: CPT

## 2017-01-01 PROCEDURE — 85027 COMPLETE CBC AUTOMATED: CPT | Performed by: INTERNAL MEDICINE

## 2017-01-01 PROCEDURE — 87205 SMEAR GRAM STAIN: CPT | Performed by: INTERNAL MEDICINE

## 2017-01-01 PROCEDURE — 25000132 ZZH RX MED GY IP 250 OP 250 PS 637: Performed by: CLINICAL NURSE SPECIALIST

## 2017-01-01 PROCEDURE — 25000128 H RX IP 250 OP 636: Performed by: PHYSICIAN ASSISTANT

## 2017-01-01 PROCEDURE — 86200 CCP ANTIBODY: CPT | Performed by: INTERNAL MEDICINE

## 2017-01-01 PROCEDURE — 25000128 H RX IP 250 OP 636: Performed by: NURSE ANESTHETIST, CERTIFIED REGISTERED

## 2017-01-01 PROCEDURE — 81001 URINALYSIS AUTO W/SCOPE: CPT | Performed by: EMERGENCY MEDICINE

## 2017-01-01 PROCEDURE — 82805 BLOOD GASES W/O2 SATURATION: CPT | Performed by: INTERNAL MEDICINE

## 2017-01-01 PROCEDURE — 96366 THER/PROPH/DIAG IV INF ADDON: CPT

## 2017-01-01 PROCEDURE — 88108 CYTOPATH CONCENTRATE TECH: CPT | Performed by: INTERNAL MEDICINE

## 2017-01-01 PROCEDURE — 40000893 ZZH STATISTIC PT IP EVAL DEFER

## 2017-01-01 PROCEDURE — 25000131 ZZH RX MED GY IP 250 OP 636 PS 637: Performed by: INTERNAL MEDICINE

## 2017-01-01 PROCEDURE — 87106 FUNGI IDENTIFICATION YEAST: CPT | Performed by: INTERNAL MEDICINE

## 2017-01-01 PROCEDURE — A9270 NON-COVERED ITEM OR SERVICE: HCPCS | Mod: GY | Performed by: STUDENT IN AN ORGANIZED HEALTH CARE EDUCATION/TRAINING PROGRAM

## 2017-01-01 PROCEDURE — 87086 URINE CULTURE/COLONY COUNT: CPT | Performed by: EMERGENCY MEDICINE

## 2017-01-01 PROCEDURE — 25000128 H RX IP 250 OP 636

## 2017-01-01 PROCEDURE — 25000125 ZZHC RX 250: Performed by: EMERGENCY MEDICINE

## 2017-01-01 PROCEDURE — 82550 ASSAY OF CK (CPK): CPT | Performed by: INTERNAL MEDICINE

## 2017-01-01 PROCEDURE — 94002 VENT MGMT INPAT INIT DAY: CPT

## 2017-01-01 PROCEDURE — 88312 SPECIAL STAINS GROUP 1: CPT | Mod: 26 | Performed by: INTERNAL MEDICINE

## 2017-01-01 PROCEDURE — 84100 ASSAY OF PHOSPHORUS: CPT | Performed by: INTERNAL MEDICINE

## 2017-01-01 PROCEDURE — 36415 COLL VENOUS BLD VENIPUNCTURE: CPT | Performed by: HOSPITALIST

## 2017-01-01 PROCEDURE — 83735 ASSAY OF MAGNESIUM: CPT | Performed by: INTERNAL MEDICINE

## 2017-01-01 PROCEDURE — 94640 AIRWAY INHALATION TREATMENT: CPT | Mod: 76

## 2017-01-01 PROCEDURE — 12000011 ZZH R&B MS OVERFLOW

## 2017-01-01 PROCEDURE — 27210339 ZZH CIRCUIT HUMIDITY W/CPAP BIP

## 2017-01-01 PROCEDURE — 83880 ASSAY OF NATRIURETIC PEPTIDE: CPT | Performed by: EMERGENCY MEDICINE

## 2017-01-01 PROCEDURE — 80076 HEPATIC FUNCTION PANEL: CPT | Performed by: EMERGENCY MEDICINE

## 2017-01-01 PROCEDURE — 25000132 ZZH RX MED GY IP 250 OP 250 PS 637: Mod: GY | Performed by: PHYSICIAN ASSISTANT

## 2017-01-01 PROCEDURE — 99207 ZZC NON-BILLABLE SERV PER CHARTING: CPT | Performed by: INTERNAL MEDICINE

## 2017-01-01 PROCEDURE — 25800025 ZZH RX 258: Performed by: EMERGENCY MEDICINE

## 2017-01-01 PROCEDURE — 25000125 ZZHC RX 250: Performed by: SURGERY

## 2017-01-01 PROCEDURE — 97116 GAIT TRAINING THERAPY: CPT | Mod: GP | Performed by: PHYSICAL THERAPY ASSISTANT

## 2017-01-01 PROCEDURE — 80048 BASIC METABOLIC PNL TOTAL CA: CPT | Performed by: INTERNAL MEDICINE

## 2017-01-01 PROCEDURE — 88331 PATH CONSLTJ SURG 1 BLK 1SPC: CPT | Performed by: THORACIC SURGERY (CARDIOTHORACIC VASCULAR SURGERY)

## 2017-01-01 PROCEDURE — 36000063 ZZH SURGERY LEVEL 4 EA 15 ADDTL MIN: Performed by: THORACIC SURGERY (CARDIOTHORACIC VASCULAR SURGERY)

## 2017-01-01 PROCEDURE — P9041 ALBUMIN (HUMAN),5%, 50ML: HCPCS

## 2017-01-01 PROCEDURE — 25000132 ZZH RX MED GY IP 250 OP 250 PS 637: Performed by: NURSE PRACTITIONER

## 2017-01-01 PROCEDURE — 27210429 ZZH NUTRITION PRODUCT INTERMEDIATE LITER

## 2017-01-01 PROCEDURE — 82565 ASSAY OF CREATININE: CPT | Performed by: INTERNAL MEDICINE

## 2017-01-01 PROCEDURE — 99232 SBSQ HOSP IP/OBS MODERATE 35: CPT | Performed by: INTERNAL MEDICINE

## 2017-01-01 PROCEDURE — 25000125 ZZHC RX 250: Performed by: STUDENT IN AN ORGANIZED HEALTH CARE EDUCATION/TRAINING PROGRAM

## 2017-01-01 PROCEDURE — 93321 DOPPLER ECHO F-UP/LMTD STD: CPT | Mod: 26 | Performed by: INTERNAL MEDICINE

## 2017-01-01 PROCEDURE — 85025 COMPLETE CBC W/AUTO DIFF WBC: CPT | Performed by: EMERGENCY MEDICINE

## 2017-01-01 PROCEDURE — 87633 RESP VIRUS 12-25 TARGETS: CPT | Performed by: INTERNAL MEDICINE

## 2017-01-01 PROCEDURE — 36600 WITHDRAWAL OF ARTERIAL BLOOD: CPT

## 2017-01-01 PROCEDURE — 25000125 ZZHC RX 250: Performed by: ANESTHESIOLOGY

## 2017-01-01 PROCEDURE — 85045 AUTOMATED RETICULOCYTE COUNT: CPT | Performed by: HOSPITALIST

## 2017-01-01 PROCEDURE — 85379 FIBRIN DEGRADATION QUANT: CPT | Performed by: EMERGENCY MEDICINE

## 2017-01-01 PROCEDURE — 25000132 ZZH RX MED GY IP 250 OP 250 PS 637: Mod: GY | Performed by: HOSPITALIST

## 2017-01-01 PROCEDURE — 84145 PROCALCITONIN (PCT): CPT | Performed by: INTERNAL MEDICINE

## 2017-01-01 PROCEDURE — 40000193 ZZH STATISTIC PT WARD VISIT: Performed by: PHYSICAL THERAPY ASSISTANT

## 2017-01-01 PROCEDURE — 87206 SMEAR FLUORESCENT/ACID STAI: CPT | Performed by: THORACIC SURGERY (CARDIOTHORACIC VASCULAR SURGERY)

## 2017-01-01 PROCEDURE — 25000131 ZZH RX MED GY IP 250 OP 636 PS 637: Mod: GY | Performed by: STUDENT IN AN ORGANIZED HEALTH CARE EDUCATION/TRAINING PROGRAM

## 2017-01-01 PROCEDURE — 80320 DRUG SCREEN QUANTALCOHOLS: CPT | Performed by: STUDENT IN AN ORGANIZED HEALTH CARE EDUCATION/TRAINING PROGRAM

## 2017-01-01 PROCEDURE — 25000132 ZZH RX MED GY IP 250 OP 250 PS 637: Performed by: EMERGENCY MEDICINE

## 2017-01-01 PROCEDURE — 25900017 H RX MED GY IP 259 OP 259 PS 637: Performed by: INTERNAL MEDICINE

## 2017-01-01 PROCEDURE — 84145 PROCALCITONIN (PCT): CPT | Performed by: STUDENT IN AN ORGANIZED HEALTH CARE EDUCATION/TRAINING PROGRAM

## 2017-01-01 PROCEDURE — 87186 SC STD MICRODIL/AGAR DIL: CPT | Performed by: INTERNAL MEDICINE

## 2017-01-01 PROCEDURE — 99233 SBSQ HOSP IP/OBS HIGH 50: CPT | Performed by: HOSPITALIST

## 2017-01-01 PROCEDURE — 83605 ASSAY OF LACTIC ACID: CPT

## 2017-01-01 PROCEDURE — 99152 MOD SED SAME PHYS/QHP 5/>YRS: CPT | Performed by: INTERNAL MEDICINE

## 2017-01-01 PROCEDURE — 82805 BLOOD GASES W/O2 SATURATION: CPT | Performed by: EMERGENCY MEDICINE

## 2017-01-01 PROCEDURE — 40000885 ZZH STATISTIC STEP DOWN HRS EVENING

## 2017-01-01 PROCEDURE — 96376 TX/PRO/DX INJ SAME DRUG ADON: CPT

## 2017-01-01 PROCEDURE — 96361 HYDRATE IV INFUSION ADD-ON: CPT

## 2017-01-01 PROCEDURE — 86255 FLUORESCENT ANTIBODY SCREEN: CPT | Performed by: INTERNAL MEDICINE

## 2017-01-01 PROCEDURE — 86880 COOMBS TEST DIRECT: CPT | Performed by: INTERNAL MEDICINE

## 2017-01-01 PROCEDURE — 86140 C-REACTIVE PROTEIN: CPT | Performed by: HOSPITALIST

## 2017-01-01 PROCEDURE — 25000132 ZZH RX MED GY IP 250 OP 250 PS 637: Mod: GY | Performed by: STUDENT IN AN ORGANIZED HEALTH CARE EDUCATION/TRAINING PROGRAM

## 2017-01-01 PROCEDURE — 80320 DRUG SCREEN QUANTALCOHOLS: CPT | Performed by: INTERNAL MEDICINE

## 2017-01-01 PROCEDURE — 36415 COLL VENOUS BLD VENIPUNCTURE: CPT | Performed by: EMERGENCY MEDICINE

## 2017-01-01 PROCEDURE — 80320 DRUG SCREEN QUANTALCOHOLS: CPT | Performed by: EMERGENCY MEDICINE

## 2017-01-01 PROCEDURE — A9270 NON-COVERED ITEM OR SERVICE: HCPCS | Mod: GY | Performed by: HOSPITALIST

## 2017-01-01 PROCEDURE — 40000133 ZZH STATISTIC OT WARD VISIT

## 2017-01-01 PROCEDURE — 99231 SBSQ HOSP IP/OBS SF/LOW 25: CPT | Performed by: NURSE PRACTITIONER

## 2017-01-01 PROCEDURE — 37000008 ZZH ANESTHESIA TECHNICAL FEE, 1ST 30 MIN: Performed by: THORACIC SURGERY (CARDIOTHORACIC VASCULAR SURGERY)

## 2017-01-01 PROCEDURE — 85025 COMPLETE CBC W/AUTO DIFF WBC: CPT | Performed by: INTERNAL MEDICINE

## 2017-01-01 PROCEDURE — 87804 INFLUENZA ASSAY W/OPTIC: CPT | Performed by: NURSE PRACTITIONER

## 2017-01-01 PROCEDURE — 80053 COMPREHEN METABOLIC PANEL: CPT | Performed by: INTERNAL MEDICINE

## 2017-01-01 PROCEDURE — 25000132 ZZH RX MED GY IP 250 OP 250 PS 637: Mod: GY | Performed by: NURSE PRACTITIONER

## 2017-01-01 PROCEDURE — 87899 AGENT NOS ASSAY W/OPTIC: CPT | Performed by: INTERNAL MEDICINE

## 2017-01-01 PROCEDURE — 87040 BLOOD CULTURE FOR BACTERIA: CPT | Performed by: EMERGENCY MEDICINE

## 2017-01-01 PROCEDURE — 86850 RBC ANTIBODY SCREEN: CPT | Performed by: THORACIC SURGERY (CARDIOTHORACIC VASCULAR SURGERY)

## 2017-01-01 PROCEDURE — 93010 ELECTROCARDIOGRAM REPORT: CPT | Performed by: INTERNAL MEDICINE

## 2017-01-01 PROCEDURE — 96360 HYDRATION IV INFUSION INIT: CPT

## 2017-01-01 PROCEDURE — 25000125 ZZHC RX 250: Performed by: NURSE PRACTITIONER

## 2017-01-01 PROCEDURE — 27210995 ZZH RX 272: Performed by: INTERNAL MEDICINE

## 2017-01-01 PROCEDURE — 86900 BLOOD TYPING SEROLOGIC ABO: CPT | Performed by: INTERNAL MEDICINE

## 2017-01-01 PROCEDURE — 40000281 ZZH STATISTIC TRANSPORT TIME EA 15 MIN

## 2017-01-01 PROCEDURE — 82803 BLOOD GASES ANY COMBINATION: CPT

## 2017-01-01 PROCEDURE — 85025 COMPLETE CBC W/AUTO DIFF WBC: CPT | Performed by: HOSPITALIST

## 2017-01-01 PROCEDURE — 99291 CRITICAL CARE FIRST HOUR: CPT | Performed by: INTERNAL MEDICINE

## 2017-01-01 PROCEDURE — 86356 MONONUCLEAR CELL ANTIGEN: CPT | Performed by: INTERNAL MEDICINE

## 2017-01-01 PROCEDURE — 0BBF4ZX EXCISION OF RIGHT LOWER LUNG LOBE, PERCUTANEOUS ENDOSCOPIC APPROACH, DIAGNOSTIC: ICD-10-PCS | Performed by: THORACIC SURGERY (CARDIOTHORACIC VASCULAR SURGERY)

## 2017-01-01 PROCEDURE — 88341 IMHCHEM/IMCYTCHM EA ADD ANTB: CPT | Mod: 26 | Performed by: INTERNAL MEDICINE

## 2017-01-01 PROCEDURE — A9270 NON-COVERED ITEM OR SERVICE: HCPCS | Mod: GY | Performed by: PHYSICIAN ASSISTANT

## 2017-01-01 PROCEDURE — 89051 BODY FLUID CELL COUNT: CPT | Performed by: INTERNAL MEDICINE

## 2017-01-01 PROCEDURE — 86870 RBC ANTIBODY IDENTIFICATION: CPT | Performed by: INTERNAL MEDICINE

## 2017-01-01 PROCEDURE — 85018 HEMOGLOBIN: CPT | Performed by: INTERNAL MEDICINE

## 2017-01-01 PROCEDURE — 40000809 ZZH STATISTIC NO DOCUMENTATION TO SUPPORT CHARGE

## 2017-01-01 PROCEDURE — 86900 BLOOD TYPING SEROLOGIC ABO: CPT | Performed by: THORACIC SURGERY (CARDIOTHORACIC VASCULAR SURGERY)

## 2017-01-01 PROCEDURE — 96365 THER/PROPH/DIAG IV INF INIT: CPT

## 2017-01-01 PROCEDURE — 85049 AUTOMATED PLATELET COUNT: CPT | Performed by: HOSPITALIST

## 2017-01-01 PROCEDURE — 97530 THERAPEUTIC ACTIVITIES: CPT | Mod: GP

## 2017-01-01 PROCEDURE — 94762 N-INVAS EAR/PLS OXIMTRY CONT: CPT

## 2017-01-01 PROCEDURE — 40000983 ZZH STATISTIC HFNC ADULT NON-CPAP

## 2017-01-01 PROCEDURE — 25800025 ZZH RX 258: Performed by: SURGERY

## 2017-01-01 PROCEDURE — 97165 OT EVAL LOW COMPLEX 30 MIN: CPT | Mod: GO

## 2017-01-01 PROCEDURE — 87641 MR-STAPH DNA AMP PROBE: CPT | Performed by: INTERNAL MEDICINE

## 2017-01-01 PROCEDURE — 87210 SMEAR WET MOUNT SALINE/INK: CPT | Performed by: THORACIC SURGERY (CARDIOTHORACIC VASCULAR SURGERY)

## 2017-01-01 PROCEDURE — 25000131 ZZH RX MED GY IP 250 OP 636 PS 637: Mod: GY | Performed by: HOSPITALIST

## 2017-01-01 PROCEDURE — A9270 NON-COVERED ITEM OR SERVICE: HCPCS | Mod: GY | Performed by: THORACIC SURGERY (CARDIOTHORACIC VASCULAR SURGERY)

## 2017-01-01 PROCEDURE — 25000132 ZZH RX MED GY IP 250 OP 250 PS 637: Performed by: HOSPITALIST

## 2017-01-01 PROCEDURE — 80053 COMPREHEN METABOLIC PANEL: CPT | Performed by: NURSE PRACTITIONER

## 2017-01-01 PROCEDURE — 85004 AUTOMATED DIFF WBC COUNT: CPT | Performed by: HOSPITALIST

## 2017-01-01 PROCEDURE — 93005 ELECTROCARDIOGRAM TRACING: CPT

## 2017-01-01 PROCEDURE — 40000193 ZZH STATISTIC PT WARD VISIT: Performed by: PHYSICAL THERAPIST

## 2017-01-01 PROCEDURE — 86860 RBC ANTIBODY ELUTION: CPT | Performed by: INTERNAL MEDICINE

## 2017-01-01 PROCEDURE — 87631 RESP VIRUS 3-5 TARGETS: CPT | Performed by: INTERNAL MEDICINE

## 2017-01-01 PROCEDURE — 80048 BASIC METABOLIC PNL TOTAL CA: CPT | Performed by: PHYSICIAN ASSISTANT

## 2017-01-01 PROCEDURE — 85652 RBC SED RATE AUTOMATED: CPT | Performed by: INTERNAL MEDICINE

## 2017-01-01 PROCEDURE — 97530 THERAPEUTIC ACTIVITIES: CPT | Mod: GP | Performed by: PHYSICAL THERAPIST

## 2017-01-01 PROCEDURE — 96367 TX/PROPH/DG ADDL SEQ IV INF: CPT

## 2017-01-01 PROCEDURE — 96375 TX/PRO/DX INJ NEW DRUG ADDON: CPT

## 2017-01-01 PROCEDURE — 25000128 H RX IP 250 OP 636: Performed by: NURSE PRACTITIONER

## 2017-01-01 PROCEDURE — 96375 TX/PRO/DX INJ NEW DRUG ADDON: CPT | Mod: 59

## 2017-01-01 PROCEDURE — 87305 ASPERGILLUS AG IA: CPT | Performed by: INTERNAL MEDICINE

## 2017-01-01 PROCEDURE — 86635 COCCIDIOIDES ANTIBODY: CPT | Performed by: INTERNAL MEDICINE

## 2017-01-01 PROCEDURE — 97110 THERAPEUTIC EXERCISES: CPT | Mod: GP | Performed by: PHYSICAL THERAPIST

## 2017-01-01 PROCEDURE — 25000131 ZZH RX MED GY IP 250 OP 636 PS 637: Performed by: HOSPITALIST

## 2017-01-01 PROCEDURE — 99207 ZZC NO CHARGE LOS: CPT | Performed by: NURSE PRACTITIONER

## 2017-01-01 PROCEDURE — 87493 C DIFF AMPLIFIED PROBE: CPT | Performed by: INTERNAL MEDICINE

## 2017-01-01 PROCEDURE — 71260 CT THORAX DX C+: CPT

## 2017-01-01 PROCEDURE — 86038 ANTINUCLEAR ANTIBODIES: CPT | Performed by: INTERNAL MEDICINE

## 2017-01-01 PROCEDURE — 99207 ZZC APP CREDIT; MD BILLING SHARED VISIT: CPT | Performed by: INTERNAL MEDICINE

## 2017-01-01 PROCEDURE — 87640 STAPH A DNA AMP PROBE: CPT | Performed by: INTERNAL MEDICINE

## 2017-01-01 PROCEDURE — 82140 ASSAY OF AMMONIA: CPT | Performed by: NURSE PRACTITIONER

## 2017-01-01 PROCEDURE — 27210338 ZZH CIRCUIT HUMID FACE/TRACH MSK

## 2017-01-01 PROCEDURE — 83010 ASSAY OF HAPTOGLOBIN QUANT: CPT | Performed by: HOSPITALIST

## 2017-01-01 PROCEDURE — 86970 RBC PRETX INCUBATJ W/CHEMICL: CPT | Performed by: INTERNAL MEDICINE

## 2017-01-01 PROCEDURE — 86738 MYCOPLASMA ANTIBODY: CPT | Performed by: HOSPITALIST

## 2017-01-01 PROCEDURE — 87070 CULTURE OTHR SPECIMN AEROBIC: CPT | Performed by: INTERNAL MEDICINE

## 2017-01-01 PROCEDURE — 99285 EMERGENCY DEPT VISIT HI MDM: CPT | Mod: 25

## 2017-01-01 PROCEDURE — 86905 BLOOD TYPING RBC ANTIGENS: CPT | Performed by: INTERNAL MEDICINE

## 2017-01-01 PROCEDURE — 25000132 ZZH RX MED GY IP 250 OP 250 PS 637: Mod: GY | Performed by: THORACIC SURGERY (CARDIOTHORACIC VASCULAR SURGERY)

## 2017-01-01 PROCEDURE — 93325 DOPPLER ECHO COLOR FLOW MAPG: CPT | Mod: 26 | Performed by: INTERNAL MEDICINE

## 2017-01-01 PROCEDURE — 81001 URINALYSIS AUTO W/SCOPE: CPT | Performed by: NURSE PRACTITIONER

## 2017-01-01 PROCEDURE — 84484 ASSAY OF TROPONIN QUANT: CPT | Performed by: EMERGENCY MEDICINE

## 2017-01-01 PROCEDURE — P9016 RBC LEUKOCYTES REDUCED: HCPCS | Performed by: INTERNAL MEDICINE

## 2017-01-01 PROCEDURE — 36415 COLL VENOUS BLD VENIPUNCTURE: CPT | Performed by: PHYSICIAN ASSISTANT

## 2017-01-01 PROCEDURE — 0B988ZX DRAINAGE OF LEFT UPPER LOBE BRONCHUS, VIA NATURAL OR ARTIFICIAL OPENING ENDOSCOPIC, DIAGNOSTIC: ICD-10-PCS | Performed by: INTERNAL MEDICINE

## 2017-01-01 PROCEDURE — 85610 PROTHROMBIN TIME: CPT | Performed by: THORACIC SURGERY (CARDIOTHORACIC VASCULAR SURGERY)

## 2017-01-01 PROCEDURE — 87075 CULTR BACTERIA EXCEPT BLOOD: CPT | Performed by: THORACIC SURGERY (CARDIOTHORACIC VASCULAR SURGERY)

## 2017-01-01 PROCEDURE — 80320 DRUG SCREEN QUANTALCOHOLS: CPT | Performed by: NURSE PRACTITIONER

## 2017-01-01 PROCEDURE — 87040 BLOOD CULTURE FOR BACTERIA: CPT | Performed by: NURSE PRACTITIONER

## 2017-01-01 PROCEDURE — 25000125 ZZHC RX 250: Performed by: PHYSICIAN ASSISTANT

## 2017-01-01 PROCEDURE — 83605 ASSAY OF LACTIC ACID: CPT | Performed by: INTERNAL MEDICINE

## 2017-01-01 PROCEDURE — 27210794 ZZH OR GENERAL SUPPLY STERILE: Performed by: INTERNAL MEDICINE

## 2017-01-01 PROCEDURE — 36415 COLL VENOUS BLD VENIPUNCTURE: CPT

## 2017-01-01 PROCEDURE — 36415 COLL VENOUS BLD VENIPUNCTURE: CPT | Performed by: STUDENT IN AN ORGANIZED HEALTH CARE EDUCATION/TRAINING PROGRAM

## 2017-01-01 PROCEDURE — 99238 HOSP IP/OBS DSCHRG MGMT 30/<: CPT | Mod: GC | Performed by: INTERNAL MEDICINE

## 2017-01-01 PROCEDURE — 40000986 XR ABDOMEN PORT F1 VW

## 2017-01-01 PROCEDURE — 83605 ASSAY OF LACTIC ACID: CPT | Mod: 91 | Performed by: EMERGENCY MEDICINE

## 2017-01-01 PROCEDURE — 12000006 ZZH R&B IMCU INTERMEDIATE UMMC

## 2017-01-01 PROCEDURE — 96365 THER/PROPH/DIAG IV INF INIT: CPT | Mod: 59

## 2017-01-01 PROCEDURE — 99233 SBSQ HOSP IP/OBS HIGH 50: CPT | Performed by: NURSE PRACTITIONER

## 2017-01-01 PROCEDURE — 99233 SBSQ HOSP IP/OBS HIGH 50: CPT | Mod: GC | Performed by: INTERNAL MEDICINE

## 2017-01-01 PROCEDURE — 99223 1ST HOSP IP/OBS HIGH 75: CPT | Performed by: NURSE PRACTITIONER

## 2017-01-01 PROCEDURE — 87385 HISTOPLASMA CAPSUL AG IA: CPT | Performed by: INTERNAL MEDICINE

## 2017-01-01 PROCEDURE — 85610 PROTHROMBIN TIME: CPT | Performed by: EMERGENCY MEDICINE

## 2017-01-01 PROCEDURE — 87449 NOS EACH ORGANISM AG IA: CPT | Performed by: INTERNAL MEDICINE

## 2017-01-01 PROCEDURE — 82607 VITAMIN B-12: CPT | Performed by: HOSPITALIST

## 2017-01-01 PROCEDURE — 84484 ASSAY OF TROPONIN QUANT: CPT

## 2017-01-01 PROCEDURE — 00000159 ZZHCL STATISTIC H-SEND OUTS PREP: Performed by: THORACIC SURGERY (CARDIOTHORACIC VASCULAR SURGERY)

## 2017-01-01 PROCEDURE — 83036 HEMOGLOBIN GLYCOSYLATED A1C: CPT | Performed by: INTERNAL MEDICINE

## 2017-01-01 PROCEDURE — 87205 SMEAR GRAM STAIN: CPT | Performed by: STUDENT IN AN ORGANIZED HEALTH CARE EDUCATION/TRAINING PROGRAM

## 2017-01-01 PROCEDURE — 82010 KETONE BODYS QUAN: CPT | Performed by: EMERGENCY MEDICINE

## 2017-01-01 PROCEDURE — 31500 INSERT EMERGENCY AIRWAY: CPT

## 2017-01-01 PROCEDURE — 86850 RBC ANTIBODY SCREEN: CPT | Performed by: INTERNAL MEDICINE

## 2017-01-01 PROCEDURE — 40000342 ZZHCL STATISTIC ABO: Performed by: INTERNAL MEDICINE

## 2017-01-01 PROCEDURE — 93308 TTE F-UP OR LMTD: CPT | Mod: 26 | Performed by: INTERNAL MEDICINE

## 2017-01-01 PROCEDURE — 25000132 ZZH RX MED GY IP 250 OP 250 PS 637

## 2017-01-01 PROCEDURE — 87086 URINE CULTURE/COLONY COUNT: CPT | Performed by: NURSE PRACTITIONER

## 2017-01-01 PROCEDURE — 40000264 ECHO COMPLETE WITH LUMASON

## 2017-01-01 PROCEDURE — 85730 THROMBOPLASTIN TIME PARTIAL: CPT | Performed by: STUDENT IN AN ORGANIZED HEALTH CARE EDUCATION/TRAINING PROGRAM

## 2017-01-01 PROCEDURE — 40000278 XR SURGERY CARM FLUORO LESS THAN 5 MIN: Mod: TC

## 2017-01-01 PROCEDURE — 84132 ASSAY OF SERUM POTASSIUM: CPT | Performed by: SURGERY

## 2017-01-01 PROCEDURE — 85652 RBC SED RATE AUTOMATED: CPT | Performed by: EMERGENCY MEDICINE

## 2017-01-01 PROCEDURE — 84132 ASSAY OF SERUM POTASSIUM: CPT | Performed by: INTERNAL MEDICINE

## 2017-01-01 PROCEDURE — 82085 ASSAY OF ALDOLASE: CPT | Performed by: INTERNAL MEDICINE

## 2017-01-01 PROCEDURE — 0BBD8ZX EXCISION OF RIGHT MIDDLE LUNG LOBE, VIA NATURAL OR ARTIFICIAL OPENING ENDOSCOPIC, DIAGNOSTIC: ICD-10-PCS | Performed by: INTERNAL MEDICINE

## 2017-01-01 PROCEDURE — 88313 SPECIAL STAINS GROUP 2: CPT | Mod: 26 | Performed by: INTERNAL MEDICINE

## 2017-01-01 PROCEDURE — 83690 ASSAY OF LIPASE: CPT | Performed by: EMERGENCY MEDICINE

## 2017-01-01 PROCEDURE — 82805 BLOOD GASES W/O2 SATURATION: CPT | Performed by: PHYSICIAN ASSISTANT

## 2017-01-01 PROCEDURE — 12000001 ZZH R&B MED SURG/OB UMMC

## 2017-01-01 PROCEDURE — 80053 COMPREHEN METABOLIC PANEL: CPT | Performed by: EMERGENCY MEDICINE

## 2017-01-01 PROCEDURE — S0039 INJECTION, SULFAMETHOXAZOLE: HCPCS | Performed by: STUDENT IN AN ORGANIZED HEALTH CARE EDUCATION/TRAINING PROGRAM

## 2017-01-01 PROCEDURE — 80307 DRUG TEST PRSMV CHEM ANLYZR: CPT | Performed by: INTERNAL MEDICINE

## 2017-01-01 PROCEDURE — 02HV33Z INSERTION OF INFUSION DEVICE INTO SUPERIOR VENA CAVA, PERCUTANEOUS APPROACH: ICD-10-PCS | Performed by: EMERGENCY MEDICINE

## 2017-01-01 PROCEDURE — 25000566 ZZH SEVOFLURANE, EA 15 MIN: Performed by: THORACIC SURGERY (CARDIOTHORACIC VASCULAR SURGERY)

## 2017-01-01 PROCEDURE — 36000056 ZZH SURGERY LEVEL 3 1ST 30 MIN: Performed by: INTERNAL MEDICINE

## 2017-01-01 PROCEDURE — 88331 PATH CONSLTJ SURG 1 BLK 1SPC: CPT | Mod: 26 | Performed by: THORACIC SURGERY (CARDIOTHORACIC VASCULAR SURGERY)

## 2017-01-01 PROCEDURE — 88341 IMHCHEM/IMCYTCHM EA ADD ANTB: CPT | Performed by: INTERNAL MEDICINE

## 2017-01-01 PROCEDURE — 86606 ASPERGILLUS ANTIBODY: CPT | Performed by: INTERNAL MEDICINE

## 2017-01-01 PROCEDURE — 86140 C-REACTIVE PROTEIN: CPT | Performed by: INTERNAL MEDICINE

## 2017-01-01 PROCEDURE — 99239 HOSP IP/OBS DSCHRG MGMT >30: CPT | Performed by: INTERNAL MEDICINE

## 2017-01-01 PROCEDURE — 84295 ASSAY OF SERUM SODIUM: CPT | Performed by: INTERNAL MEDICINE

## 2017-01-01 PROCEDURE — 40000343 ZZHCL STATISTIC RH: Performed by: INTERNAL MEDICINE

## 2017-01-01 PROCEDURE — 99223 1ST HOSP IP/OBS HIGH 75: CPT | Mod: AI | Performed by: INTERNAL MEDICINE

## 2017-01-01 PROCEDURE — 80053 COMPREHEN METABOLIC PANEL: CPT | Performed by: HOSPITALIST

## 2017-01-01 PROCEDURE — 88108 CYTOPATH CONCENTRATE TECH: CPT | Mod: 26 | Performed by: INTERNAL MEDICINE

## 2017-01-01 PROCEDURE — 87205 SMEAR GRAM STAIN: CPT | Performed by: HOSPITALIST

## 2017-01-01 PROCEDURE — 84145 PROCALCITONIN (PCT): CPT | Performed by: EMERGENCY MEDICINE

## 2017-01-01 PROCEDURE — 40000847 ZZHCL STATISTIC MORPHOLOGY W/INTERP HISTOLOGY TC 85060: Performed by: HOSPITALIST

## 2017-01-01 PROCEDURE — 87102 FUNGUS ISOLATION CULTURE: CPT | Performed by: INTERNAL MEDICINE

## 2017-01-01 PROCEDURE — 85025 COMPLETE CBC W/AUTO DIFF WBC: CPT | Performed by: NURSE PRACTITIONER

## 2017-01-01 PROCEDURE — 86162 COMPLEMENT TOTAL (CH50): CPT | Performed by: INTERNAL MEDICINE

## 2017-01-01 PROCEDURE — 99212 OFFICE O/P EST SF 10 MIN: CPT | Mod: ZF

## 2017-01-01 PROCEDURE — 86638 Q FEVER ANTIBODY: CPT | Performed by: HOSPITALIST

## 2017-01-01 PROCEDURE — 87081 CULTURE SCREEN ONLY: CPT | Performed by: THORACIC SURGERY (CARDIOTHORACIC VASCULAR SURGERY)

## 2017-01-01 PROCEDURE — 36000093 ZZH SURGERY LEVEL 4 1ST 30 MIN: Performed by: THORACIC SURGERY (CARDIOTHORACIC VASCULAR SURGERY)

## 2017-01-01 PROCEDURE — 88312 SPECIAL STAINS GROUP 1: CPT | Performed by: INTERNAL MEDICINE

## 2017-01-01 PROCEDURE — 87102 FUNGUS ISOLATION CULTURE: CPT | Performed by: THORACIC SURGERY (CARDIOTHORACIC VASCULAR SURGERY)

## 2017-01-01 PROCEDURE — 88307 TISSUE EXAM BY PATHOLOGIST: CPT | Performed by: THORACIC SURGERY (CARDIOTHORACIC VASCULAR SURGERY)

## 2017-01-01 PROCEDURE — 51702 INSERT TEMP BLADDER CATH: CPT

## 2017-01-01 PROCEDURE — 87804 INFLUENZA ASSAY W/OPTIC: CPT | Performed by: EMERGENCY MEDICINE

## 2017-01-01 PROCEDURE — 40000915 ZZH STATISTIC SITTER, EVENING HOURS

## 2017-01-01 PROCEDURE — 85027 COMPLETE CBC AUTOMATED: CPT | Performed by: HOSPITALIST

## 2017-01-01 PROCEDURE — 99223 1ST HOSP IP/OBS HIGH 75: CPT | Mod: AI | Performed by: PHYSICIAN ASSISTANT

## 2017-01-01 PROCEDURE — 25000134 H RX MED IP 250 OP 636 PS 250: Performed by: EMERGENCY MEDICINE

## 2017-01-01 PROCEDURE — 86922 COMPATIBILITY TEST ANTIGLOB: CPT | Performed by: INTERNAL MEDICINE

## 2017-01-01 PROCEDURE — 99291 CRITICAL CARE FIRST HOUR: CPT | Performed by: ANESTHESIOLOGY

## 2017-01-01 PROCEDURE — 88342 IMHCHEM/IMCYTCHM 1ST ANTB: CPT | Performed by: INTERNAL MEDICINE

## 2017-01-01 PROCEDURE — 82805 BLOOD GASES W/O2 SATURATION: CPT | Performed by: NURSE PRACTITIONER

## 2017-01-01 PROCEDURE — 94003 VENT MGMT INPAT SUBQ DAY: CPT

## 2017-01-01 PROCEDURE — 80202 ASSAY OF VANCOMYCIN: CPT

## 2017-01-01 PROCEDURE — 25000128 H RX IP 250 OP 636: Performed by: STUDENT IN AN ORGANIZED HEALTH CARE EDUCATION/TRAINING PROGRAM

## 2017-01-01 PROCEDURE — 82803 BLOOD GASES ANY COMBINATION: CPT | Performed by: EMERGENCY MEDICINE

## 2017-01-01 PROCEDURE — 27210794 ZZH OR GENERAL SUPPLY STERILE: Performed by: THORACIC SURGERY (CARDIOTHORACIC VASCULAR SURGERY)

## 2017-01-01 PROCEDURE — 71000027 ZZH RECOVERY PHASE 2 EACH 15 MINS: Performed by: INTERNAL MEDICINE

## 2017-01-01 PROCEDURE — 83540 ASSAY OF IRON: CPT | Performed by: HOSPITALIST

## 2017-01-01 PROCEDURE — 88312 SPECIAL STAINS GROUP 1: CPT | Mod: 26,76 | Performed by: INTERNAL MEDICINE

## 2017-01-01 PROCEDURE — 40000940 XR ABDOMEN PORT F1 VW

## 2017-01-01 PROCEDURE — 36620 INSERTION CATHETER ARTERY: CPT

## 2017-01-01 PROCEDURE — 25000131 ZZH RX MED GY IP 250 OP 636 PS 637: Mod: GY | Performed by: PHYSICIAN ASSISTANT

## 2017-01-01 PROCEDURE — 0BBD4ZX EXCISION OF RIGHT MIDDLE LUNG LOBE, PERCUTANEOUS ENDOSCOPIC APPROACH, DIAGNOSTIC: ICD-10-PCS | Performed by: THORACIC SURGERY (CARDIOTHORACIC VASCULAR SURGERY)

## 2017-01-01 PROCEDURE — 84443 ASSAY THYROID STIM HORMONE: CPT | Performed by: INTERNAL MEDICINE

## 2017-01-01 PROCEDURE — 80307 DRUG TEST PRSMV CHEM ANLYZR: CPT | Performed by: STUDENT IN AN ORGANIZED HEALTH CARE EDUCATION/TRAINING PROGRAM

## 2017-01-01 PROCEDURE — 88321 CONSLTJ&REPRT SLD PREP ELSWR: CPT | Performed by: INTERNAL MEDICINE

## 2017-01-01 PROCEDURE — 83876 ASSAY MYELOPEROXIDASE: CPT | Performed by: INTERNAL MEDICINE

## 2017-01-01 PROCEDURE — 83605 ASSAY OF LACTIC ACID: CPT | Performed by: EMERGENCY MEDICINE

## 2017-01-01 PROCEDURE — 97161 PT EVAL LOW COMPLEX 20 MIN: CPT | Mod: GP | Performed by: PHYSICAL THERAPIST

## 2017-01-01 PROCEDURE — 96368 THER/DIAG CONCURRENT INF: CPT

## 2017-01-01 PROCEDURE — 83735 ASSAY OF MAGNESIUM: CPT | Performed by: HOSPITALIST

## 2017-01-01 PROCEDURE — 88307 TISSUE EXAM BY PATHOLOGIST: CPT | Mod: 26 | Performed by: THORACIC SURGERY (CARDIOTHORACIC VASCULAR SURGERY)

## 2017-01-01 PROCEDURE — 86235 NUCLEAR ANTIGEN ANTIBODY: CPT | Performed by: INTERNAL MEDICINE

## 2017-01-01 PROCEDURE — 40000193 ZZH STATISTIC PT WARD VISIT

## 2017-01-01 PROCEDURE — 00000346 ZZHCL STATISTIC REVIEW OUTSIDE SLIDES TC 88321: Performed by: INTERNAL MEDICINE

## 2017-01-01 PROCEDURE — 83615 LACTATE (LD) (LDH) ENZYME: CPT | Performed by: HOSPITALIST

## 2017-01-01 PROCEDURE — 82728 ASSAY OF FERRITIN: CPT | Performed by: HOSPITALIST

## 2017-01-01 PROCEDURE — 25000125 ZZHC RX 250: Performed by: HOSPITALIST

## 2017-01-01 PROCEDURE — 82787 IGG 1 2 3 OR 4 EACH: CPT | Performed by: INTERNAL MEDICINE

## 2017-01-01 PROCEDURE — 85049 AUTOMATED PLATELET COUNT: CPT | Performed by: INTERNAL MEDICINE

## 2017-01-01 PROCEDURE — 97530 THERAPEUTIC ACTIVITIES: CPT | Mod: GO

## 2017-01-01 PROCEDURE — 93308 TTE F-UP OR LMTD: CPT

## 2017-01-01 PROCEDURE — 86039 ANTINUCLEAR ANTIBODIES (ANA): CPT | Performed by: INTERNAL MEDICINE

## 2017-01-01 PROCEDURE — 85730 THROMBOPLASTIN TIME PARTIAL: CPT | Performed by: NURSE PRACTITIONER

## 2017-01-01 PROCEDURE — 80053 COMPREHEN METABOLIC PANEL: CPT | Performed by: PHYSICIAN ASSISTANT

## 2017-01-01 PROCEDURE — 25800025 ZZH RX 258: Performed by: ANESTHESIOLOGY

## 2017-01-01 PROCEDURE — 40000916 ZZH STATISTIC SITTER, NIGHT HOURS

## 2017-01-01 PROCEDURE — 87116 MYCOBACTERIA CULTURE: CPT | Performed by: THORACIC SURGERY (CARDIOTHORACIC VASCULAR SURGERY)

## 2017-01-01 PROCEDURE — 40000274 ZZH STATISTIC RCP CONSULT EA 30 MIN

## 2017-01-01 PROCEDURE — 85610 PROTHROMBIN TIME: CPT | Performed by: INTERNAL MEDICINE

## 2017-01-01 PROCEDURE — 88313 SPECIAL STAINS GROUP 2: CPT | Performed by: INTERNAL MEDICINE

## 2017-01-01 PROCEDURE — 40000671 ZZH STATISTIC ANESTHESIA CASE

## 2017-01-01 PROCEDURE — 25000131 ZZH RX MED GY IP 250 OP 636 PS 637: Mod: GY

## 2017-01-01 PROCEDURE — 86480 TB TEST CELL IMMUN MEASURE: CPT | Performed by: INTERNAL MEDICINE

## 2017-01-01 PROCEDURE — 70450 CT HEAD/BRAIN W/O DYE: CPT

## 2017-01-01 PROCEDURE — 90791 PSYCH DIAGNOSTIC EVALUATION: CPT

## 2017-01-01 PROCEDURE — 80202 ASSAY OF VANCOMYCIN: CPT | Performed by: HOSPITALIST

## 2017-01-01 PROCEDURE — 86140 C-REACTIVE PROTEIN: CPT | Performed by: EMERGENCY MEDICINE

## 2017-01-01 PROCEDURE — 71020 XR CHEST 2 VW: CPT

## 2017-01-01 PROCEDURE — 97116 GAIT TRAINING THERAPY: CPT | Mod: GP

## 2017-01-01 PROCEDURE — 12000007 ZZH R&B INTERMEDIATE

## 2017-01-01 PROCEDURE — 99292 CRITICAL CARE ADDL 30 MIN: CPT

## 2017-01-01 PROCEDURE — 97535 SELF CARE MNGMENT TRAINING: CPT | Mod: GO

## 2017-01-01 PROCEDURE — 82140 ASSAY OF AMMONIA: CPT | Performed by: INTERNAL MEDICINE

## 2017-01-01 PROCEDURE — 37000008 ZZH ANESTHESIA TECHNICAL FEE, 1ST 30 MIN: Performed by: INTERNAL MEDICINE

## 2017-01-01 PROCEDURE — 40000884 ZZH STATISTIC STEP DOWN HRS NIGHT

## 2017-01-01 PROCEDURE — 97110 THERAPEUTIC EXERCISES: CPT | Mod: GP | Performed by: PHYSICAL THERAPY ASSISTANT

## 2017-01-01 PROCEDURE — 84300 ASSAY OF URINE SODIUM: CPT | Performed by: STUDENT IN AN ORGANIZED HEALTH CARE EDUCATION/TRAINING PROGRAM

## 2017-01-01 PROCEDURE — 99232 SBSQ HOSP IP/OBS MODERATE 35: CPT | Performed by: NURSE PRACTITIONER

## 2017-01-01 PROCEDURE — 83516 IMMUNOASSAY NONANTIBODY: CPT | Performed by: INTERNAL MEDICINE

## 2017-01-01 PROCEDURE — 99233 SBSQ HOSP IP/OBS HIGH 50: CPT | Mod: 25 | Performed by: INTERNAL MEDICINE

## 2017-01-01 PROCEDURE — 85610 PROTHROMBIN TIME: CPT | Performed by: STUDENT IN AN ORGANIZED HEALTH CARE EDUCATION/TRAINING PROGRAM

## 2017-01-01 PROCEDURE — 87210 SMEAR WET MOUNT SALINE/INK: CPT | Performed by: INTERNAL MEDICINE

## 2017-01-01 PROCEDURE — 84295 ASSAY OF SERUM SODIUM: CPT | Performed by: STUDENT IN AN ORGANIZED HEALTH CARE EDUCATION/TRAINING PROGRAM

## 2017-01-01 PROCEDURE — 88305 TISSUE EXAM BY PATHOLOGIST: CPT | Mod: 26 | Performed by: INTERNAL MEDICINE

## 2017-01-01 PROCEDURE — 84132 ASSAY OF SERUM POTASSIUM: CPT | Performed by: HOSPITALIST

## 2017-01-01 PROCEDURE — 86612 BLASTOMYCES ANTIBODY: CPT | Performed by: INTERNAL MEDICINE

## 2017-01-01 PROCEDURE — 99221 1ST HOSP IP/OBS SF/LOW 40: CPT | Performed by: CLINICAL NURSE SPECIALIST

## 2017-01-01 PROCEDURE — 25500064 ZZH RX 255 OP 636: Performed by: HOSPITALIST

## 2017-01-01 PROCEDURE — HZ2ZZZZ DETOXIFICATION SERVICES FOR SUBSTANCE ABUSE TREATMENT: ICD-10-PCS | Performed by: INTERNAL MEDICINE

## 2017-01-01 PROCEDURE — 97110 THERAPEUTIC EXERCISES: CPT | Mod: GP

## 2017-01-01 PROCEDURE — 5A1945Z RESPIRATORY VENTILATION, 24-96 CONSECUTIVE HOURS: ICD-10-PCS | Performed by: INTERNAL MEDICINE

## 2017-01-01 PROCEDURE — 84300 ASSAY OF URINE SODIUM: CPT | Performed by: INTERNAL MEDICINE

## 2017-01-01 PROCEDURE — 83880 ASSAY OF NATRIURETIC PEPTIDE: CPT | Performed by: NURSE PRACTITIONER

## 2017-01-01 PROCEDURE — 87070 CULTURE OTHR SPECIMN AEROBIC: CPT | Performed by: PHYSICIAN ASSISTANT

## 2017-01-01 PROCEDURE — 25500064 ZZH RX 255 OP 636: Performed by: INTERNAL MEDICINE

## 2017-01-01 PROCEDURE — 80307 DRUG TEST PRSMV CHEM ANLYZR: CPT | Performed by: EMERGENCY MEDICINE

## 2017-01-01 PROCEDURE — 87205 SMEAR GRAM STAIN: CPT | Performed by: PHYSICIAN ASSISTANT

## 2017-01-01 PROCEDURE — 36415 COLL VENOUS BLD VENIPUNCTURE: CPT | Performed by: NURSE PRACTITIONER

## 2017-01-01 PROCEDURE — 40000169 ZZH STATISTIC PRE-PROCEDURE ASSESSMENT I: Performed by: THORACIC SURGERY (CARDIOTHORACIC VASCULAR SURGERY)

## 2017-01-01 PROCEDURE — 84484 ASSAY OF TROPONIN QUANT: CPT | Performed by: NURSE PRACTITIONER

## 2017-01-01 PROCEDURE — 93010 ELECTROCARDIOGRAM REPORT: CPT | Mod: 77 | Performed by: INTERNAL MEDICINE

## 2017-01-01 PROCEDURE — 25000125 ZZHC RX 250: Performed by: NURSE ANESTHETIST, CERTIFIED REGISTERED

## 2017-01-01 PROCEDURE — 99291 CRITICAL CARE FIRST HOUR: CPT | Mod: 25

## 2017-01-01 PROCEDURE — 99239 HOSP IP/OBS DSCHRG MGMT >30: CPT | Performed by: HOSPITALIST

## 2017-01-01 PROCEDURE — 40000886 ZZH STATISTIC STEP DOWN HRS DAY

## 2017-01-01 PROCEDURE — 25000132 ZZH RX MED GY IP 250 OP 250 PS 637: Mod: GY | Performed by: EMERGENCY MEDICINE

## 2017-01-01 PROCEDURE — 25000128 H RX IP 250 OP 636: Performed by: HOSPITALIST

## 2017-01-01 PROCEDURE — 87070 CULTURE OTHR SPECIMN AEROBIC: CPT | Performed by: STUDENT IN AN ORGANIZED HEALTH CARE EDUCATION/TRAINING PROGRAM

## 2017-01-01 PROCEDURE — 85060 BLOOD SMEAR INTERPRETATION: CPT | Performed by: HOSPITALIST

## 2017-01-01 PROCEDURE — 84443 ASSAY THYROID STIM HORMONE: CPT | Performed by: NURSE PRACTITIONER

## 2017-01-01 PROCEDURE — 87804 INFLUENZA ASSAY W/OPTIC: CPT | Mod: 91 | Performed by: EMERGENCY MEDICINE

## 2017-01-01 PROCEDURE — 86906 BLD TYPING SEROLOGIC RH PHNT: CPT | Performed by: INTERNAL MEDICINE

## 2017-01-01 PROCEDURE — 87116 MYCOBACTERIA CULTURE: CPT | Performed by: INTERNAL MEDICINE

## 2017-01-01 PROCEDURE — 87077 CULTURE AEROBIC IDENTIFY: CPT | Performed by: INTERNAL MEDICINE

## 2017-01-01 PROCEDURE — 86631 CHLAMYDIA ANTIBODY: CPT | Performed by: HOSPITALIST

## 2017-01-01 PROCEDURE — 87077 CULTURE AEROBIC IDENTIFY: CPT | Performed by: STUDENT IN AN ORGANIZED HEALTH CARE EDUCATION/TRAINING PROGRAM

## 2017-01-01 PROCEDURE — 37000009 ZZH ANESTHESIA TECHNICAL FEE, EACH ADDTL 15 MIN: Performed by: INTERNAL MEDICINE

## 2017-01-01 PROCEDURE — 83615 LACTATE (LD) (LDH) ENZYME: CPT | Performed by: INTERNAL MEDICINE

## 2017-01-01 PROCEDURE — 82784 ASSAY IGA/IGD/IGG/IGM EACH: CPT | Performed by: INTERNAL MEDICINE

## 2017-01-01 PROCEDURE — 27210995 ZZH RX 272: Performed by: THORACIC SURGERY (CARDIOTHORACIC VASCULAR SURGERY)

## 2017-01-01 PROCEDURE — 86331 IMMUNODIFFUSION OUCHTERLONY: CPT | Performed by: INTERNAL MEDICINE

## 2017-01-01 PROCEDURE — 99284 EMERGENCY DEPT VISIT MOD MDM: CPT | Mod: 25

## 2017-01-01 PROCEDURE — 86713 LEGIONELLA ANTIBODY: CPT | Performed by: HOSPITALIST

## 2017-01-01 PROCEDURE — 83550 IRON BINDING TEST: CPT | Performed by: HOSPITALIST

## 2017-01-01 PROCEDURE — 82565 ASSAY OF CREATININE: CPT | Performed by: HOSPITALIST

## 2017-01-01 PROCEDURE — 99232 SBSQ HOSP IP/OBS MODERATE 35: CPT | Performed by: CLINICAL NURSE SPECIALIST

## 2017-01-01 PROCEDURE — 97162 PT EVAL MOD COMPLEX 30 MIN: CPT | Mod: GP

## 2017-01-01 PROCEDURE — 86901 BLOOD TYPING SEROLOGIC RH(D): CPT | Performed by: THORACIC SURGERY (CARDIOTHORACIC VASCULAR SURGERY)

## 2017-01-01 PROCEDURE — 86904 BLOOD TYPING PATIENT SERUM: CPT | Performed by: INTERNAL MEDICINE

## 2017-01-01 PROCEDURE — 85730 THROMBOPLASTIN TIME PARTIAL: CPT | Performed by: INTERNAL MEDICINE

## 2017-01-01 PROCEDURE — 0B958ZX DRAINAGE OF RIGHT MIDDLE LOBE BRONCHUS, VIA NATURAL OR ARTIFICIAL OPENING ENDOSCOPIC, DIAGNOSTIC: ICD-10-PCS | Performed by: INTERNAL MEDICINE

## 2017-01-01 PROCEDURE — 82570 ASSAY OF URINE CREATININE: CPT | Performed by: INTERNAL MEDICINE

## 2017-01-01 PROCEDURE — 99291 CRITICAL CARE FIRST HOUR: CPT | Performed by: PHYSICIAN ASSISTANT

## 2017-01-01 PROCEDURE — 82803 BLOOD GASES ANY COMBINATION: CPT | Performed by: INTERNAL MEDICINE

## 2017-01-01 PROCEDURE — 87798 DETECT AGENT NOS DNA AMP: CPT | Performed by: INTERNAL MEDICINE

## 2017-01-01 PROCEDURE — 86431 RHEUMATOID FACTOR QUANT: CPT | Performed by: INTERNAL MEDICINE

## 2017-01-01 PROCEDURE — 85025 COMPLETE CBC W/AUTO DIFF WBC: CPT | Performed by: PHYSICIAN ASSISTANT

## 2017-01-01 PROCEDURE — 87206 SMEAR FLUORESCENT/ACID STAI: CPT | Performed by: INTERNAL MEDICINE

## 2017-01-01 PROCEDURE — 87205 SMEAR GRAM STAIN: CPT | Performed by: THORACIC SURGERY (CARDIOTHORACIC VASCULAR SURGERY)

## 2017-01-01 PROCEDURE — 93306 TTE W/DOPPLER COMPLETE: CPT | Mod: 26 | Performed by: INTERNAL MEDICINE

## 2017-01-01 PROCEDURE — 36000058 ZZH SURGERY LEVEL 3 EA 15 ADDTL MIN: Performed by: INTERNAL MEDICINE

## 2017-01-01 PROCEDURE — 96374 THER/PROPH/DIAG INJ IV PUSH: CPT

## 2017-01-01 PROCEDURE — 25500064 ZZH RX 255 OP 636: Performed by: EMERGENCY MEDICINE

## 2017-01-01 PROCEDURE — 36556 INSERT NON-TUNNEL CV CATH: CPT

## 2017-01-01 PROCEDURE — 82746 ASSAY OF FOLIC ACID SERUM: CPT | Performed by: HOSPITALIST

## 2017-01-01 PROCEDURE — 0BBC4ZX EXCISION OF RIGHT UPPER LUNG LOBE, PERCUTANEOUS ENDOSCOPIC APPROACH, DIAGNOSTIC: ICD-10-PCS | Performed by: THORACIC SURGERY (CARDIOTHORACIC VASCULAR SURGERY)

## 2017-01-01 PROCEDURE — 93970 EXTREMITY STUDY: CPT

## 2017-01-01 PROCEDURE — 37000009 ZZH ANESTHESIA TECHNICAL FEE, EACH ADDTL 15 MIN: Performed by: THORACIC SURGERY (CARDIOTHORACIC VASCULAR SURGERY)

## 2017-01-01 PROCEDURE — 80069 RENAL FUNCTION PANEL: CPT | Performed by: HOSPITALIST

## 2017-01-01 PROCEDURE — A9270 NON-COVERED ITEM OR SERVICE: HCPCS | Mod: GY | Performed by: EMERGENCY MEDICINE

## 2017-01-01 PROCEDURE — 84145 PROCALCITONIN (PCT): CPT | Performed by: HOSPITALIST

## 2017-01-01 PROCEDURE — 40000306 ZZH STATISTIC PRE PROC ASSESS II: Performed by: INTERNAL MEDICINE

## 2017-01-01 PROCEDURE — 83935 ASSAY OF URINE OSMOLALITY: CPT | Performed by: INTERNAL MEDICINE

## 2017-01-01 PROCEDURE — 85379 FIBRIN DEGRADATION QUANT: CPT | Performed by: NURSE PRACTITIONER

## 2017-01-01 PROCEDURE — 87070 CULTURE OTHR SPECIMN AEROBIC: CPT | Performed by: THORACIC SURGERY (CARDIOTHORACIC VASCULAR SURGERY)

## 2017-01-01 PROCEDURE — 27210300 ZZH CANNULA HIGH FLOW, ADULT

## 2017-01-01 PROCEDURE — 88342 IMHCHEM/IMCYTCHM 1ST ANTB: CPT | Mod: 26 | Performed by: INTERNAL MEDICINE

## 2017-01-01 PROCEDURE — 81025 URINE PREGNANCY TEST: CPT | Performed by: ANESTHESIOLOGY

## 2017-01-01 PROCEDURE — 82140 ASSAY OF AMMONIA: CPT | Performed by: STUDENT IN AN ORGANIZED HEALTH CARE EDUCATION/TRAINING PROGRAM

## 2017-01-01 PROCEDURE — 88305 TISSUE EXAM BY PATHOLOGIST: CPT | Performed by: INTERNAL MEDICINE

## 2017-01-01 PROCEDURE — 87106 FUNGI IDENTIFICATION YEAST: CPT | Performed by: STUDENT IN AN ORGANIZED HEALTH CARE EDUCATION/TRAINING PROGRAM

## 2017-01-01 PROCEDURE — 84145 PROCALCITONIN (PCT): CPT | Performed by: NURSE PRACTITIONER

## 2017-01-01 PROCEDURE — 84478 ASSAY OF TRIGLYCERIDES: CPT | Performed by: INTERNAL MEDICINE

## 2017-01-01 PROCEDURE — 86698 HISTOPLASMA ANTIBODY: CPT | Performed by: INTERNAL MEDICINE

## 2017-01-01 PROCEDURE — 3E043XZ INTRODUCTION OF VASOPRESSOR INTO CENTRAL VEIN, PERCUTANEOUS APPROACH: ICD-10-PCS | Performed by: EMERGENCY MEDICINE

## 2017-01-01 PROCEDURE — 82565 ASSAY OF CREATININE: CPT | Performed by: STUDENT IN AN ORGANIZED HEALTH CARE EDUCATION/TRAINING PROGRAM

## 2017-01-01 PROCEDURE — 80048 BASIC METABOLIC PNL TOTAL CA: CPT | Performed by: EMERGENCY MEDICINE

## 2017-01-01 PROCEDURE — 31622 DX BRONCHOSCOPE/WASH: CPT | Performed by: INTERNAL MEDICINE

## 2017-01-01 PROCEDURE — 83615 LACTATE (LD) (LDH) ENZYME: CPT | Performed by: EMERGENCY MEDICINE

## 2017-01-01 PROCEDURE — 82140 ASSAY OF AMMONIA: CPT | Performed by: EMERGENCY MEDICINE

## 2017-01-01 PROCEDURE — 85610 PROTHROMBIN TIME: CPT | Performed by: NURSE PRACTITIONER

## 2017-01-01 RX ORDER — ASPIRIN 81 MG/1
81 TABLET, CHEWABLE ORAL DAILY
Status: DISCONTINUED | OUTPATIENT
Start: 2017-01-01 | End: 2017-01-01

## 2017-01-01 RX ORDER — ALBUTEROL SULFATE 0.83 MG/ML
SOLUTION RESPIRATORY (INHALATION)
Status: COMPLETED
Start: 2017-01-01 | End: 2017-01-01

## 2017-01-01 RX ORDER — PREDNISONE 20 MG/1
40 TABLET ORAL DAILY
Qty: 8 TABLET | Refills: 0 | Status: ON HOLD | OUTPATIENT
Start: 2017-01-01 | End: 2017-01-01

## 2017-01-01 RX ORDER — IPRATROPIUM BROMIDE AND ALBUTEROL SULFATE 2.5; .5 MG/3ML; MG/3ML
SOLUTION RESPIRATORY (INHALATION)
Status: COMPLETED
Start: 2017-01-01 | End: 2017-01-01

## 2017-01-01 RX ORDER — METHOCARBAMOL 500 MG/1
1000 TABLET, FILM COATED ORAL 4 TIMES DAILY
Status: DISCONTINUED | OUTPATIENT
Start: 2017-01-01 | End: 2017-01-01

## 2017-01-01 RX ORDER — TRAZODONE HYDROCHLORIDE 100 MG/1
100 TABLET ORAL AT BEDTIME
Status: DISCONTINUED | OUTPATIENT
Start: 2017-01-01 | End: 2017-01-01 | Stop reason: HOSPADM

## 2017-01-01 RX ORDER — FERROUS SULFATE 325(65) MG
325 TABLET ORAL
Status: DISCONTINUED | OUTPATIENT
Start: 2017-01-01 | End: 2017-01-01 | Stop reason: HOSPADM

## 2017-01-01 RX ORDER — ATORVASTATIN CALCIUM 20 MG/1
20 TABLET, FILM COATED ORAL DAILY
Status: DISCONTINUED | OUTPATIENT
Start: 2017-01-01 | End: 2017-01-01 | Stop reason: HOSPADM

## 2017-01-01 RX ORDER — CHLORHEXIDINE GLUCONATE ORAL RINSE 1.2 MG/ML
15 SOLUTION DENTAL EVERY 12 HOURS
Status: DISCONTINUED | OUTPATIENT
Start: 2017-01-01 | End: 2017-01-01

## 2017-01-01 RX ORDER — IOPAMIDOL 755 MG/ML
500 INJECTION, SOLUTION INTRAVASCULAR ONCE
Status: COMPLETED | OUTPATIENT
Start: 2017-01-01 | End: 2017-01-01

## 2017-01-01 RX ORDER — NALOXONE HYDROCHLORIDE 0.4 MG/ML
.1-.4 INJECTION, SOLUTION INTRAMUSCULAR; INTRAVENOUS; SUBCUTANEOUS
Status: DISCONTINUED | OUTPATIENT
Start: 2017-01-01 | End: 2017-01-01

## 2017-01-01 RX ORDER — SODIUM CHLORIDE, SODIUM LACTATE, POTASSIUM CHLORIDE, CALCIUM CHLORIDE 600; 310; 30; 20 MG/100ML; MG/100ML; MG/100ML; MG/100ML
500 INJECTION, SOLUTION INTRAVENOUS CONTINUOUS
Status: DISCONTINUED | OUTPATIENT
Start: 2017-01-01 | End: 2017-01-01 | Stop reason: HOSPADM

## 2017-01-01 RX ORDER — KETOROLAC TROMETHAMINE 15 MG/ML
15 INJECTION, SOLUTION INTRAMUSCULAR; INTRAVENOUS EVERY 8 HOURS PRN
Status: DISCONTINUED | OUTPATIENT
Start: 2017-01-01 | End: 2017-01-01

## 2017-01-01 RX ORDER — FUROSEMIDE 20 MG
20 TABLET ORAL DAILY
Status: DISCONTINUED | OUTPATIENT
Start: 2017-01-01 | End: 2017-01-01 | Stop reason: HOSPADM

## 2017-01-01 RX ORDER — BUPRENORPHINE AND NALOXONE 8; 2 MG/1; MG/1
2 FILM, SOLUBLE BUCCAL; SUBLINGUAL DAILY
Status: DISCONTINUED | OUTPATIENT
Start: 2017-01-01 | End: 2017-01-01

## 2017-01-01 RX ORDER — METHYLPREDNISOLONE SODIUM SUCCINATE 125 MG/2ML
125 INJECTION, POWDER, LYOPHILIZED, FOR SOLUTION INTRAMUSCULAR; INTRAVENOUS ONCE
Status: COMPLETED | OUTPATIENT
Start: 2017-01-01 | End: 2017-01-01

## 2017-01-01 RX ORDER — ACETAMINOPHEN 325 MG/1
650 TABLET ORAL 3 TIMES DAILY PRN
Status: DISCONTINUED | OUTPATIENT
Start: 2017-01-01 | End: 2017-01-01 | Stop reason: HOSPADM

## 2017-01-01 RX ORDER — LACTULOSE 10 G/15ML
20 SOLUTION ORAL 3 TIMES DAILY
Status: ON HOLD | COMMUNITY
End: 2017-01-01

## 2017-01-01 RX ORDER — NICOTINE POLACRILEX 4 MG
15-30 LOZENGE BUCCAL
Status: DISCONTINUED | OUTPATIENT
Start: 2017-01-01 | End: 2017-01-01 | Stop reason: HOSPADM

## 2017-01-01 RX ORDER — METHOCARBAMOL 500 MG/1
1000 TABLET, FILM COATED ORAL 4 TIMES DAILY
COMMUNITY

## 2017-01-01 RX ORDER — BUPRENORPHINE AND NALOXONE 8; 2 MG/1; MG/1
3 FILM, SOLUBLE BUCCAL; SUBLINGUAL DAILY
Status: DISCONTINUED | OUTPATIENT
Start: 2017-01-01 | End: 2017-01-01 | Stop reason: HOSPADM

## 2017-01-01 RX ORDER — PREDNISONE 1 MG/1
60 TABLET ORAL DAILY
Qty: 30 TABLET | Refills: 0 | Status: ON HOLD | OUTPATIENT
Start: 2017-01-01 | End: 2017-01-01

## 2017-01-01 RX ORDER — NICOTINE 21 MG/24HR
1 PATCH, TRANSDERMAL 24 HOURS TRANSDERMAL DAILY
Status: DISCONTINUED | OUTPATIENT
Start: 2017-01-01 | End: 2017-01-01 | Stop reason: HOSPADM

## 2017-01-01 RX ORDER — LIDOCAINE 50 MG/G
1 PATCH TOPICAL
Status: DISCONTINUED | OUTPATIENT
Start: 2017-01-01 | End: 2017-01-01 | Stop reason: HOSPADM

## 2017-01-01 RX ORDER — ALBUTEROL SULFATE 90 UG/1
2 AEROSOL, METERED RESPIRATORY (INHALATION) EVERY 4 HOURS PRN
Status: DISCONTINUED | OUTPATIENT
Start: 2017-01-01 | End: 2017-01-01 | Stop reason: HOSPADM

## 2017-01-01 RX ORDER — PROPOFOL 10 MG/ML
INJECTION, EMULSION INTRAVENOUS PRN
Status: DISCONTINUED | OUTPATIENT
Start: 2017-01-01 | End: 2017-01-01

## 2017-01-01 RX ORDER — MIRTAZAPINE 15 MG/1
30 TABLET, FILM COATED ORAL AT BEDTIME
Status: DISCONTINUED | OUTPATIENT
Start: 2017-01-01 | End: 2017-01-01

## 2017-01-01 RX ORDER — FUROSEMIDE 10 MG/ML
20 INJECTION INTRAMUSCULAR; INTRAVENOUS EVERY 8 HOURS
Status: COMPLETED | OUTPATIENT
Start: 2017-01-01 | End: 2017-01-01

## 2017-01-01 RX ORDER — ANALGESIC BALM 1.74; 4.06 G/29G; G/29G
OINTMENT TOPICAL EVERY 6 HOURS PRN
Status: DISCONTINUED | OUTPATIENT
Start: 2017-01-01 | End: 2017-01-01 | Stop reason: HOSPADM

## 2017-01-01 RX ORDER — IPRATROPIUM BROMIDE AND ALBUTEROL SULFATE 2.5; .5 MG/3ML; MG/3ML
3 SOLUTION RESPIRATORY (INHALATION) EVERY 4 HOURS
Status: DISCONTINUED | OUTPATIENT
Start: 2017-01-01 | End: 2017-01-01

## 2017-01-01 RX ORDER — SODIUM CHLORIDE 9 MG/ML
INJECTION, SOLUTION INTRAVENOUS CONTINUOUS
Status: DISCONTINUED | OUTPATIENT
Start: 2017-01-01 | End: 2017-01-01 | Stop reason: HOSPADM

## 2017-01-01 RX ORDER — LIDOCAINE 40 MG/G
CREAM TOPICAL
Status: DISCONTINUED | OUTPATIENT
Start: 2017-01-01 | End: 2017-01-01 | Stop reason: HOSPADM

## 2017-01-01 RX ORDER — GABAPENTIN 600 MG/1
600 TABLET ORAL 3 TIMES DAILY
Status: DISCONTINUED | OUTPATIENT
Start: 2017-01-01 | End: 2017-01-01 | Stop reason: HOSPADM

## 2017-01-01 RX ORDER — PANTOPRAZOLE SODIUM 40 MG/1
40 TABLET, DELAYED RELEASE ORAL EVERY MORNING
Status: DISCONTINUED | OUTPATIENT
Start: 2017-01-01 | End: 2017-01-01

## 2017-01-01 RX ORDER — SODIUM CHLORIDE, SODIUM LACTATE, POTASSIUM CHLORIDE, CALCIUM CHLORIDE 600; 310; 30; 20 MG/100ML; MG/100ML; MG/100ML; MG/100ML
INJECTION, SOLUTION INTRAVENOUS CONTINUOUS PRN
Status: DISCONTINUED | OUTPATIENT
Start: 2017-01-01 | End: 2017-01-01

## 2017-01-01 RX ORDER — PROPOFOL 10 MG/ML
5-75 INJECTION, EMULSION INTRAVENOUS CONTINUOUS
Status: DISCONTINUED | OUTPATIENT
Start: 2017-01-01 | End: 2017-01-01

## 2017-01-01 RX ORDER — NICOTINE 21 MG/24HR
1 PATCH, TRANSDERMAL 24 HOURS TRANSDERMAL DAILY
Status: DISCONTINUED | OUTPATIENT
Start: 2017-01-01 | End: 2017-01-01

## 2017-01-01 RX ORDER — POTASSIUM CHLORIDE 1.5 G/1.58G
20-40 POWDER, FOR SOLUTION ORAL
Status: DISCONTINUED | OUTPATIENT
Start: 2017-01-01 | End: 2017-01-01 | Stop reason: HOSPADM

## 2017-01-01 RX ORDER — FLUOXETINE 40 MG/1
40 CAPSULE ORAL DAILY
COMMUNITY

## 2017-01-01 RX ORDER — MORPHINE SULFATE 2 MG/ML
2-4 INJECTION, SOLUTION INTRAMUSCULAR; INTRAVENOUS
Status: DISCONTINUED | OUTPATIENT
Start: 2017-01-01 | End: 2017-01-01

## 2017-01-01 RX ORDER — HEPARIN SODIUM 5000 [USP'U]/.5ML
5000 INJECTION, SOLUTION INTRAVENOUS; SUBCUTANEOUS EVERY 8 HOURS
Status: DISCONTINUED | OUTPATIENT
Start: 2017-01-01 | End: 2017-01-01 | Stop reason: HOSPADM

## 2017-01-01 RX ORDER — MIRTAZAPINE 15 MG/1
30 TABLET, FILM COATED ORAL AT BEDTIME
Status: DISCONTINUED | OUTPATIENT
Start: 2017-01-01 | End: 2017-01-01 | Stop reason: HOSPADM

## 2017-01-01 RX ORDER — TRAZODONE HYDROCHLORIDE 50 MG/1
50 TABLET, FILM COATED ORAL ONCE
Status: COMPLETED | OUTPATIENT
Start: 2017-01-01 | End: 2017-01-01

## 2017-01-01 RX ORDER — MAGNESIUM HYDROXIDE 1200 MG/15ML
LIQUID ORAL PRN
Status: DISCONTINUED | OUTPATIENT
Start: 2017-01-01 | End: 2017-01-01 | Stop reason: HOSPADM

## 2017-01-01 RX ORDER — IBUPROFEN 400 MG/1
400 TABLET, FILM COATED ORAL EVERY 6 HOURS PRN
Status: DISCONTINUED | OUTPATIENT
Start: 2017-01-01 | End: 2017-01-01 | Stop reason: HOSPADM

## 2017-01-01 RX ORDER — OXYCODONE HYDROCHLORIDE 5 MG/1
10 TABLET ORAL
Status: DISCONTINUED | OUTPATIENT
Start: 2017-01-01 | End: 2017-01-01 | Stop reason: HOSPADM

## 2017-01-01 RX ORDER — SULFAMETHOXAZOLE/TRIMETHOPRIM 800-160 MG
2 TABLET ORAL 3 TIMES DAILY
Qty: 108 TABLET | Refills: 0 | Status: ON HOLD | OUTPATIENT
Start: 2017-01-01 | End: 2017-01-01

## 2017-01-01 RX ORDER — HYDROMORPHONE HYDROCHLORIDE 1 MG/ML
.3-.5 INJECTION, SOLUTION INTRAMUSCULAR; INTRAVENOUS; SUBCUTANEOUS
Status: DISCONTINUED | OUTPATIENT
Start: 2017-01-01 | End: 2017-01-01 | Stop reason: CLARIF

## 2017-01-01 RX ORDER — SULFAMETHOXAZOLE/TRIMETHOPRIM 800-160 MG
1 TABLET ORAL
Status: DISCONTINUED | OUTPATIENT
Start: 2017-01-01 | End: 2017-01-01 | Stop reason: HOSPADM

## 2017-01-01 RX ORDER — ACETAMINOPHEN 325 MG/1
975 TABLET ORAL ONCE
Status: COMPLETED | OUTPATIENT
Start: 2017-01-01 | End: 2017-01-01

## 2017-01-01 RX ORDER — AZITHROMYCIN 250 MG/1
250 TABLET, FILM COATED ORAL EVERY 24 HOURS
Status: DISCONTINUED | OUTPATIENT
Start: 2017-01-01 | End: 2017-01-01 | Stop reason: HOSPADM

## 2017-01-01 RX ORDER — ATORVASTATIN CALCIUM 20 MG/1
20 TABLET, FILM COATED ORAL AT BEDTIME
Status: DISCONTINUED | OUTPATIENT
Start: 2017-01-01 | End: 2017-01-01 | Stop reason: HOSPADM

## 2017-01-01 RX ORDER — LIDOCAINE HYDROCHLORIDE AND EPINEPHRINE 10; 10 MG/ML; UG/ML
INJECTION, SOLUTION INFILTRATION; PERINEURAL PRN
Status: DISCONTINUED | OUTPATIENT
Start: 2017-01-01 | End: 2017-01-01 | Stop reason: HOSPADM

## 2017-01-01 RX ORDER — NICOTINE 21 MG/24HR
1 PATCH, TRANSDERMAL 24 HOURS TRANSDERMAL EVERY 24 HOURS
Status: DISCONTINUED | OUTPATIENT
Start: 2017-01-01 | End: 2017-01-01 | Stop reason: HOSPADM

## 2017-01-01 RX ORDER — ACETAMINOPHEN 325 MG/1
TABLET ORAL
Status: DISCONTINUED
Start: 2017-01-01 | End: 2017-01-01 | Stop reason: HOSPADM

## 2017-01-01 RX ORDER — PANTOPRAZOLE SODIUM 40 MG/1
40 TABLET, DELAYED RELEASE ORAL
Status: DISCONTINUED | OUTPATIENT
Start: 2017-01-01 | End: 2017-01-01

## 2017-01-01 RX ORDER — FENTANYL CITRATE 50 UG/ML
25-50 INJECTION, SOLUTION INTRAMUSCULAR; INTRAVENOUS
Status: DISCONTINUED | OUTPATIENT
Start: 2017-01-01 | End: 2017-01-01 | Stop reason: HOSPADM

## 2017-01-01 RX ORDER — FUROSEMIDE 10 MG/ML
20 INJECTION INTRAMUSCULAR; INTRAVENOUS EVERY 8 HOURS
Status: DISCONTINUED | OUTPATIENT
Start: 2017-01-01 | End: 2017-01-01

## 2017-01-01 RX ORDER — LIDOCAINE HYDROCHLORIDE 20 MG/ML
INJECTION, SOLUTION INFILTRATION; PERINEURAL PRN
Status: DISCONTINUED | OUTPATIENT
Start: 2017-01-01 | End: 2017-01-01

## 2017-01-01 RX ORDER — BUPRENORPHINE HYDROCHLORIDE AND NALOXONE HYDROCHLORIDE DIHYDRATE 8; 2 MG/1; MG/1
3 TABLET SUBLINGUAL DAILY
Qty: 60 EACH | Refills: 0 | Status: SHIPPED | OUTPATIENT
Start: 2017-01-01

## 2017-01-01 RX ORDER — PANTOPRAZOLE SODIUM 40 MG/1
40 TABLET, DELAYED RELEASE ORAL
Status: DISCONTINUED | OUTPATIENT
Start: 2017-01-01 | End: 2017-01-01 | Stop reason: HOSPADM

## 2017-01-01 RX ORDER — LACTULOSE 10 G/15ML
20 SOLUTION ORAL 3 TIMES DAILY
Status: DISCONTINUED | OUTPATIENT
Start: 2017-01-01 | End: 2017-01-01 | Stop reason: HOSPADM

## 2017-01-01 RX ORDER — PANTOPRAZOLE SODIUM 20 MG/1
40 TABLET, DELAYED RELEASE ORAL DAILY
Status: DISCONTINUED | OUTPATIENT
Start: 2017-01-01 | End: 2017-01-01 | Stop reason: HOSPADM

## 2017-01-01 RX ORDER — KETOROLAC TROMETHAMINE 30 MG/ML
30 INJECTION, SOLUTION INTRAMUSCULAR; INTRAVENOUS EVERY 6 HOURS PRN
Status: DISCONTINUED | OUTPATIENT
Start: 2017-01-01 | End: 2017-01-01

## 2017-01-01 RX ORDER — GINSENG 100 MG
CAPSULE ORAL
Status: COMPLETED
Start: 2017-01-01 | End: 2017-01-01

## 2017-01-01 RX ORDER — FENTANYL CITRATE 50 UG/ML
50 INJECTION, SOLUTION INTRAMUSCULAR; INTRAVENOUS
Status: DISPENSED | OUTPATIENT
Start: 2017-01-01 | End: 2017-01-01

## 2017-01-01 RX ORDER — PREDNISONE 20 MG/1
40 TABLET ORAL DAILY
Status: ON HOLD | COMMUNITY
End: 2017-01-01

## 2017-01-01 RX ORDER — HYDROXYZINE HYDROCHLORIDE 10 MG/1
10 TABLET, FILM COATED ORAL EVERY 8 HOURS PRN
Status: DISCONTINUED | OUTPATIENT
Start: 2017-01-01 | End: 2017-01-01 | Stop reason: HOSPADM

## 2017-01-01 RX ORDER — SODIUM CHLORIDE 9 MG/ML
INJECTION, SOLUTION INTRAVENOUS CONTINUOUS
Status: DISCONTINUED | OUTPATIENT
Start: 2017-01-01 | End: 2017-01-01

## 2017-01-01 RX ORDER — LACTULOSE 10 G/15ML
20 SOLUTION ORAL 3 TIMES DAILY
Qty: 270 ML | Refills: 0 | Status: SHIPPED | OUTPATIENT
Start: 2017-01-01 | End: 2017-01-01

## 2017-01-01 RX ORDER — GABAPENTIN 250 MG/5ML
600 SOLUTION ORAL EVERY 8 HOURS SCHEDULED
Status: DISCONTINUED | OUTPATIENT
Start: 2017-01-01 | End: 2017-01-01

## 2017-01-01 RX ORDER — IPRATROPIUM BROMIDE AND ALBUTEROL SULFATE 2.5; .5 MG/3ML; MG/3ML
3 SOLUTION RESPIRATORY (INHALATION)
Status: DISCONTINUED | OUTPATIENT
Start: 2017-01-01 | End: 2017-01-01

## 2017-01-01 RX ORDER — CEFAZOLIN SODIUM 1 G/50ML
1250 SOLUTION INTRAVENOUS
Status: DISCONTINUED | OUTPATIENT
Start: 2017-01-01 | End: 2017-01-01 | Stop reason: DRUGHIGH

## 2017-01-01 RX ORDER — GUAIFENESIN 600 MG/1
600 TABLET, EXTENDED RELEASE ORAL 2 TIMES DAILY
Status: DISCONTINUED | OUTPATIENT
Start: 2017-01-01 | End: 2017-01-01

## 2017-01-01 RX ORDER — METRONIDAZOLE 500 MG/1
500 TABLET ORAL 3 TIMES DAILY
Qty: 21 TABLET | Refills: 0 | Status: SHIPPED | OUTPATIENT
Start: 2017-01-01 | End: 2017-01-01

## 2017-01-01 RX ORDER — BLOOD-GLUCOSE METER
EACH MISCELLANEOUS
Qty: 1 KIT | Refills: 0 | Status: SHIPPED | OUTPATIENT
Start: 2017-01-01 | End: 2017-01-01

## 2017-01-01 RX ORDER — PREDNISONE 20 MG/1
40 TABLET ORAL DAILY
Qty: 8 TABLET | Refills: 0 | Status: SHIPPED | OUTPATIENT
Start: 2017-01-01 | End: 2017-01-01

## 2017-01-01 RX ORDER — METHYLPREDNISOLONE SODIUM SUCCINATE 40 MG/ML
20 INJECTION, POWDER, LYOPHILIZED, FOR SOLUTION INTRAMUSCULAR; INTRAVENOUS EVERY 6 HOURS
Status: COMPLETED | OUTPATIENT
Start: 2017-01-01 | End: 2017-01-01

## 2017-01-01 RX ORDER — GINSENG 100 MG
CAPSULE ORAL 3 TIMES DAILY
Status: DISCONTINUED | OUTPATIENT
Start: 2017-01-01 | End: 2017-01-01 | Stop reason: HOSPADM

## 2017-01-01 RX ORDER — IPRATROPIUM BROMIDE AND ALBUTEROL SULFATE 2.5; .5 MG/3ML; MG/3ML
1 SOLUTION RESPIRATORY (INHALATION) EVERY 6 HOURS PRN
COMMUNITY

## 2017-01-01 RX ORDER — MULTIPLE VITAMINS W/ MINERALS TAB 9MG-400MCG
1 TAB ORAL DAILY
Status: DISCONTINUED | OUTPATIENT
Start: 2017-01-01 | End: 2017-01-01 | Stop reason: HOSPADM

## 2017-01-01 RX ORDER — METHYLPREDNISOLONE SODIUM SUCCINATE 125 MG/2ML
60 INJECTION, POWDER, LYOPHILIZED, FOR SOLUTION INTRAMUSCULAR; INTRAVENOUS EVERY 24 HOURS
Status: DISCONTINUED | OUTPATIENT
Start: 2017-01-01 | End: 2017-01-01

## 2017-01-01 RX ORDER — LIDOCAINE 40 MG/G
CREAM TOPICAL
Status: DISCONTINUED | OUTPATIENT
Start: 2017-01-01 | End: 2017-01-01

## 2017-01-01 RX ORDER — LEVOFLOXACIN 5 MG/ML
750 INJECTION, SOLUTION INTRAVENOUS ONCE
Status: COMPLETED | OUTPATIENT
Start: 2017-01-01 | End: 2017-01-01

## 2017-01-01 RX ORDER — ASPIRIN 81 MG/1
81 TABLET, CHEWABLE ORAL DAILY
Status: DISCONTINUED | OUTPATIENT
Start: 2017-01-01 | End: 2017-01-01 | Stop reason: HOSPADM

## 2017-01-01 RX ORDER — ONDANSETRON 4 MG/1
4 TABLET, ORALLY DISINTEGRATING ORAL EVERY 6 HOURS PRN
Status: DISCONTINUED | OUTPATIENT
Start: 2017-01-01 | End: 2017-01-01 | Stop reason: HOSPADM

## 2017-01-01 RX ORDER — ALUMINA, MAGNESIA, AND SIMETHICONE 2400; 2400; 240 MG/30ML; MG/30ML; MG/30ML
30 SUSPENSION ORAL EVERY 4 HOURS PRN
Status: DISCONTINUED | OUTPATIENT
Start: 2017-01-01 | End: 2017-01-01 | Stop reason: HOSPADM

## 2017-01-01 RX ORDER — SODIUM CHLORIDE, SODIUM LACTATE, POTASSIUM CHLORIDE, CALCIUM CHLORIDE 600; 310; 30; 20 MG/100ML; MG/100ML; MG/100ML; MG/100ML
INJECTION, SOLUTION INTRAVENOUS CONTINUOUS
Status: DISCONTINUED | OUTPATIENT
Start: 2017-01-01 | End: 2017-01-01

## 2017-01-01 RX ORDER — CEFAZOLIN SODIUM 1 G/50ML
1250 SOLUTION INTRAVENOUS EVERY 8 HOURS
Status: DISCONTINUED | OUTPATIENT
Start: 2017-01-01 | End: 2017-01-01

## 2017-01-01 RX ORDER — NICOTINE POLACRILEX 4 MG
15-30 LOZENGE BUCCAL
Status: DISCONTINUED | OUTPATIENT
Start: 2017-01-01 | End: 2017-01-01

## 2017-01-01 RX ORDER — OXYCODONE HYDROCHLORIDE 5 MG/1
10 TABLET ORAL EVERY 4 HOURS PRN
Status: DISCONTINUED | OUTPATIENT
Start: 2017-01-01 | End: 2017-01-01

## 2017-01-01 RX ORDER — POTASSIUM CHLORIDE 1500 MG/1
20-40 TABLET, EXTENDED RELEASE ORAL
Status: DISCONTINUED | OUTPATIENT
Start: 2017-01-01 | End: 2017-01-01 | Stop reason: HOSPADM

## 2017-01-01 RX ORDER — METHYLPREDNISOLONE SODIUM SUCCINATE 40 MG/ML
40 INJECTION, POWDER, LYOPHILIZED, FOR SOLUTION INTRAMUSCULAR; INTRAVENOUS EVERY 6 HOURS
Status: DISCONTINUED | OUTPATIENT
Start: 2017-01-01 | End: 2017-01-01

## 2017-01-01 RX ORDER — METHOCARBAMOL 500 MG/1
500 TABLET, FILM COATED ORAL 3 TIMES DAILY
Status: DISCONTINUED | OUTPATIENT
Start: 2017-01-01 | End: 2017-01-01

## 2017-01-01 RX ORDER — TIOTROPIUM BROMIDE 18 UG/1
18 CAPSULE ORAL; RESPIRATORY (INHALATION) DAILY
Status: DISCONTINUED | OUTPATIENT
Start: 2017-01-01 | End: 2017-01-01

## 2017-01-01 RX ORDER — ATORVASTATIN CALCIUM 20 MG/1
20 TABLET, FILM COATED ORAL AT BEDTIME
Status: DISCONTINUED | OUTPATIENT
Start: 2017-01-01 | End: 2017-01-01

## 2017-01-01 RX ORDER — SPIRONOLACTONE 25 MG/1
50 TABLET ORAL DAILY
Status: DISCONTINUED | OUTPATIENT
Start: 2017-01-01 | End: 2017-01-01

## 2017-01-01 RX ORDER — IPRATROPIUM BROMIDE AND ALBUTEROL SULFATE 2.5; .5 MG/3ML; MG/3ML
3 SOLUTION RESPIRATORY (INHALATION)
Status: DISCONTINUED | OUTPATIENT
Start: 2017-01-01 | End: 2017-01-01 | Stop reason: HOSPADM

## 2017-01-01 RX ORDER — SODIUM CHLORIDE 9 MG/ML
1000 INJECTION, SOLUTION INTRAVENOUS CONTINUOUS
Status: DISCONTINUED | OUTPATIENT
Start: 2017-01-01 | End: 2017-01-01 | Stop reason: HOSPADM

## 2017-01-01 RX ORDER — FLUOXETINE 20 MG/5ML
40 SOLUTION ORAL DAILY
Status: DISCONTINUED | OUTPATIENT
Start: 2017-01-01 | End: 2017-01-01

## 2017-01-01 RX ORDER — FENTANYL CITRATE 50 UG/ML
25-50 INJECTION, SOLUTION INTRAMUSCULAR; INTRAVENOUS EVERY 5 MIN PRN
Status: DISCONTINUED | OUTPATIENT
Start: 2017-01-01 | End: 2017-01-01

## 2017-01-01 RX ORDER — CEFAZOLIN SODIUM 2 G/100ML
2 INJECTION, SOLUTION INTRAVENOUS
Status: COMPLETED | OUTPATIENT
Start: 2017-01-01 | End: 2017-01-01

## 2017-01-01 RX ORDER — ALBUTEROL SULFATE 0.83 MG/ML
1 SOLUTION RESPIRATORY (INHALATION) EVERY 6 HOURS PRN
Status: DISCONTINUED | OUTPATIENT
Start: 2017-01-01 | End: 2017-01-01

## 2017-01-01 RX ORDER — TIOTROPIUM BROMIDE 18 UG/1
18 CAPSULE ORAL; RESPIRATORY (INHALATION)
Status: DISCONTINUED | OUTPATIENT
Start: 2017-01-01 | End: 2017-01-01 | Stop reason: HOSPADM

## 2017-01-01 RX ORDER — TRAZODONE HYDROCHLORIDE 100 MG/1
100 TABLET ORAL AT BEDTIME
COMMUNITY

## 2017-01-01 RX ORDER — MICONAZOLE NITRATE 20 MG/G
CREAM TOPICAL 2 TIMES DAILY
Status: DISCONTINUED | OUTPATIENT
Start: 2017-01-01 | End: 2017-01-01 | Stop reason: HOSPADM

## 2017-01-01 RX ORDER — FOLIC ACID 1 MG/1
1 TABLET ORAL DAILY
Status: DISCONTINUED | OUTPATIENT
Start: 2017-01-01 | End: 2017-01-01 | Stop reason: HOSPADM

## 2017-01-01 RX ORDER — DEXTROSE MONOHYDRATE 25 G/50ML
25-50 INJECTION, SOLUTION INTRAVENOUS
Status: DISCONTINUED | OUTPATIENT
Start: 2017-01-01 | End: 2017-01-01

## 2017-01-01 RX ORDER — LEVOFLOXACIN 5 MG/ML
750 INJECTION, SOLUTION INTRAVENOUS EVERY 24 HOURS
Status: DISCONTINUED | OUTPATIENT
Start: 2017-01-01 | End: 2017-01-01

## 2017-01-01 RX ORDER — ONDANSETRON 2 MG/ML
4 INJECTION INTRAMUSCULAR; INTRAVENOUS
Status: COMPLETED | OUTPATIENT
Start: 2017-01-01 | End: 2017-01-01

## 2017-01-01 RX ORDER — ASCORBIC ACID 500 MG
500 TABLET ORAL DAILY
Status: DISCONTINUED | OUTPATIENT
Start: 2017-01-01 | End: 2017-01-01 | Stop reason: HOSPADM

## 2017-01-01 RX ORDER — LANOLIN ALCOHOL/MO/W.PET/CERES
100 CREAM (GRAM) TOPICAL DAILY
COMMUNITY

## 2017-01-01 RX ORDER — ACETAMINOPHEN 325 MG/1
650 TABLET ORAL EVERY 6 HOURS PRN
Qty: 100 TABLET | Refills: 0 | Status: ON HOLD | COMMUNITY
Start: 2017-01-01 | End: 2017-01-01

## 2017-01-01 RX ORDER — PREDNISONE 10 MG/1
TABLET ORAL
Qty: 15 TABLET | Refills: 0
Start: 2017-01-01

## 2017-01-01 RX ORDER — BUPRENORPHINE HYDROCHLORIDE AND NALOXONE HYDROCHLORIDE DIHYDRATE 8; 2 MG/1; MG/1
3 TABLET SUBLINGUAL DAILY
Qty: 9 TABLET | Refills: 0 | Status: SHIPPED | OUTPATIENT
Start: 2017-01-01 | End: 2017-01-01

## 2017-01-01 RX ORDER — NALOXONE HYDROCHLORIDE 0.4 MG/ML
INJECTION, SOLUTION INTRAMUSCULAR; INTRAVENOUS; SUBCUTANEOUS
Status: DISCONTINUED
Start: 2017-01-01 | End: 2017-01-01 | Stop reason: HOSPADM

## 2017-01-01 RX ORDER — ALBUTEROL SULFATE 0.83 MG/ML
3 SOLUTION RESPIRATORY (INHALATION)
Status: DISCONTINUED | OUTPATIENT
Start: 2017-01-01 | End: 2017-01-01 | Stop reason: HOSPADM

## 2017-01-01 RX ORDER — LIDOCAINE 50 MG/G
1 PATCH TOPICAL
Status: DISCONTINUED | OUTPATIENT
Start: 2017-01-01 | End: 2017-01-01

## 2017-01-01 RX ORDER — SPIRONOLACTONE 50 MG/1
50 TABLET, FILM COATED ORAL DAILY
Qty: 3 TABLET | Refills: 0 | Status: SHIPPED | OUTPATIENT
Start: 2017-01-01

## 2017-01-01 RX ORDER — ALBUTEROL SULFATE 0.83 MG/ML
2.5 SOLUTION RESPIRATORY (INHALATION)
Status: DISCONTINUED | OUTPATIENT
Start: 2017-01-01 | End: 2017-01-01 | Stop reason: HOSPADM

## 2017-01-01 RX ORDER — METHOCARBAMOL 500 MG/1
500 TABLET, FILM COATED ORAL 4 TIMES DAILY
Status: DISCONTINUED | OUTPATIENT
Start: 2017-01-01 | End: 2017-01-01 | Stop reason: HOSPADM

## 2017-01-01 RX ORDER — MULTIVITAMIN,THERAPEUTIC
1 TABLET ORAL DAILY
Status: DISCONTINUED | OUTPATIENT
Start: 2017-01-01 | End: 2017-01-01 | Stop reason: HOSPADM

## 2017-01-01 RX ORDER — DEXTROSE MONOHYDRATE 25 G/50ML
25-50 INJECTION, SOLUTION INTRAVENOUS
Status: DISCONTINUED | OUTPATIENT
Start: 2017-01-01 | End: 2017-01-01 | Stop reason: HOSPADM

## 2017-01-01 RX ORDER — METHYLPREDNISOLONE SODIUM SUCCINATE 125 MG/2ML
60 INJECTION, POWDER, LYOPHILIZED, FOR SOLUTION INTRAMUSCULAR; INTRAVENOUS EVERY 8 HOURS
Status: DISCONTINUED | OUTPATIENT
Start: 2017-01-01 | End: 2017-01-01

## 2017-01-01 RX ORDER — NALOXONE HYDROCHLORIDE 1 MG/ML
0.4 INJECTION INTRAMUSCULAR; INTRAVENOUS; SUBCUTANEOUS ONCE
Status: COMPLETED | OUTPATIENT
Start: 2017-01-01 | End: 2017-01-01

## 2017-01-01 RX ORDER — ONDANSETRON 2 MG/ML
4 INJECTION INTRAMUSCULAR; INTRAVENOUS EVERY 6 HOURS PRN
Status: DISCONTINUED | OUTPATIENT
Start: 2017-01-01 | End: 2017-01-01 | Stop reason: HOSPADM

## 2017-01-01 RX ORDER — AMINO ACIDS/PROTEIN HYDROLYS 11G-40/45
1 LIQUID IN PACKET (ML) ORAL DAILY
Status: DISCONTINUED | OUTPATIENT
Start: 2017-01-01 | End: 2017-01-01 | Stop reason: HOSPADM

## 2017-01-01 RX ORDER — VANCOMYCIN HYDROCHLORIDE 1 G/200ML
1000 INJECTION, SOLUTION INTRAVENOUS EVERY 8 HOURS
Status: DISCONTINUED | OUTPATIENT
Start: 2017-01-01 | End: 2017-01-01

## 2017-01-01 RX ORDER — ALBUTEROL SULFATE 0.83 MG/ML
1 SOLUTION RESPIRATORY (INHALATION) EVERY 6 HOURS PRN
Status: DISCONTINUED | OUTPATIENT
Start: 2017-01-01 | End: 2017-01-01 | Stop reason: HOSPADM

## 2017-01-01 RX ORDER — CHLORAL HYDRATE 500 MG
1 CAPSULE ORAL DAILY
Status: DISCONTINUED | OUTPATIENT
Start: 2017-01-01 | End: 2017-01-01 | Stop reason: HOSPADM

## 2017-01-01 RX ORDER — ONDANSETRON 2 MG/ML
4 INJECTION INTRAMUSCULAR; INTRAVENOUS ONCE
Status: COMPLETED | OUTPATIENT
Start: 2017-01-01 | End: 2017-01-01

## 2017-01-01 RX ORDER — GABAPENTIN 250 MG/5ML
400 SOLUTION ORAL EVERY 8 HOURS SCHEDULED
Status: DISCONTINUED | OUTPATIENT
Start: 2017-01-01 | End: 2017-01-01

## 2017-01-01 RX ORDER — BISACODYL 10 MG
10 SUPPOSITORY, RECTAL RECTAL DAILY PRN
Status: DISCONTINUED | OUTPATIENT
Start: 2017-01-01 | End: 2017-01-01 | Stop reason: HOSPADM

## 2017-01-01 RX ORDER — CEFAZOLIN SODIUM 1 G/50ML
1250 SOLUTION INTRAVENOUS EVERY 12 HOURS
Status: DISCONTINUED | OUTPATIENT
Start: 2017-01-01 | End: 2017-01-01

## 2017-01-01 RX ORDER — NITROGLYCERIN 0.4 MG/1
0.4 TABLET SUBLINGUAL EVERY 5 MIN PRN
Status: DISCONTINUED | OUTPATIENT
Start: 2017-01-01 | End: 2017-01-01 | Stop reason: HOSPADM

## 2017-01-01 RX ORDER — PREDNISONE 20 MG/1
TABLET ORAL
Qty: 100 TABLET | Refills: 0 | Status: SHIPPED | OUTPATIENT
Start: 2017-01-01 | End: 2017-01-01

## 2017-01-01 RX ORDER — LORAZEPAM 2 MG/ML
1 INJECTION INTRAMUSCULAR ONCE
Status: COMPLETED | OUTPATIENT
Start: 2017-01-01 | End: 2017-01-01

## 2017-01-01 RX ORDER — METRONIDAZOLE 500 MG/1
500 TABLET ORAL EVERY 8 HOURS SCHEDULED
Status: DISCONTINUED | OUTPATIENT
Start: 2017-01-01 | End: 2017-01-01 | Stop reason: HOSPADM

## 2017-01-01 RX ORDER — GABAPENTIN 300 MG/1
300 CAPSULE ORAL 3 TIMES DAILY
Status: DISCONTINUED | OUTPATIENT
Start: 2017-01-01 | End: 2017-01-01

## 2017-01-01 RX ORDER — HYDROMORPHONE HYDROCHLORIDE 1 MG/ML
.3-.5 INJECTION, SOLUTION INTRAMUSCULAR; INTRAVENOUS; SUBCUTANEOUS
Status: DISCONTINUED | OUTPATIENT
Start: 2017-01-01 | End: 2017-01-01

## 2017-01-01 RX ORDER — GABAPENTIN 600 MG/1
600 TABLET ORAL 3 TIMES DAILY
COMMUNITY
End: 2017-01-01

## 2017-01-01 RX ORDER — IPRATROPIUM BROMIDE AND ALBUTEROL SULFATE 2.5; .5 MG/3ML; MG/3ML
3 SOLUTION RESPIRATORY (INHALATION) 4 TIMES DAILY
Status: DISCONTINUED | OUTPATIENT
Start: 2017-01-01 | End: 2017-01-01 | Stop reason: HOSPADM

## 2017-01-01 RX ORDER — LIDOCAINE HYDROCHLORIDE 40 MG/ML
SOLUTION TOPICAL PRN
Status: DISCONTINUED | OUTPATIENT
Start: 2017-01-01 | End: 2017-01-01 | Stop reason: HOSPADM

## 2017-01-01 RX ORDER — IBUPROFEN 200 MG
200 TABLET ORAL EVERY 8 HOURS PRN
Status: DISCONTINUED | OUTPATIENT
Start: 2017-01-01 | End: 2017-01-01

## 2017-01-01 RX ORDER — METHYLPREDNISOLONE SODIUM SUCCINATE 125 MG/2ML
125 INJECTION, POWDER, LYOPHILIZED, FOR SOLUTION INTRAMUSCULAR; INTRAVENOUS EVERY 8 HOURS
Status: DISCONTINUED | OUTPATIENT
Start: 2017-01-01 | End: 2017-01-01

## 2017-01-01 RX ORDER — NYSTATIN 100000/ML
500000 SUSPENSION, ORAL (FINAL DOSE FORM) ORAL 4 TIMES DAILY
Status: DISCONTINUED | OUTPATIENT
Start: 2017-01-01 | End: 2017-01-01 | Stop reason: HOSPADM

## 2017-01-01 RX ORDER — HYDROMORPHONE HYDROCHLORIDE 1 MG/ML
1 SOLUTION ORAL EVERY 4 HOURS PRN
Status: DISCONTINUED | OUTPATIENT
Start: 2017-01-01 | End: 2017-01-01

## 2017-01-01 RX ORDER — HYDROMORPHONE HYDROCHLORIDE 1 MG/ML
1 SOLUTION ORAL
Status: DISCONTINUED | OUTPATIENT
Start: 2017-01-01 | End: 2017-01-01

## 2017-01-01 RX ORDER — LACTULOSE 10 G/15ML
20 SOLUTION ORAL 3 TIMES DAILY
Status: DISCONTINUED | OUTPATIENT
Start: 2017-01-01 | End: 2017-01-01

## 2017-01-01 RX ORDER — AMOXICILLIN 250 MG
1-2 CAPSULE ORAL 2 TIMES DAILY PRN
Status: DISCONTINUED | OUTPATIENT
Start: 2017-01-01 | End: 2017-01-01 | Stop reason: HOSPADM

## 2017-01-01 RX ORDER — LACTULOSE 10 G/15ML
30 SOLUTION ORAL 3 TIMES DAILY
Status: DISCONTINUED | OUTPATIENT
Start: 2017-01-01 | End: 2017-01-01 | Stop reason: HOSPADM

## 2017-01-01 RX ORDER — FUROSEMIDE 20 MG
20 TABLET ORAL DAILY
COMMUNITY
End: 2017-01-01

## 2017-01-01 RX ORDER — NALOXONE HYDROCHLORIDE 0.4 MG/ML
0.4 INJECTION, SOLUTION INTRAMUSCULAR; INTRAVENOUS; SUBCUTANEOUS ONCE
Status: DISCONTINUED | OUTPATIENT
Start: 2017-01-01 | End: 2017-01-01

## 2017-01-01 RX ORDER — CEFAZOLIN SODIUM 1 G/3ML
1 INJECTION, POWDER, FOR SOLUTION INTRAMUSCULAR; INTRAVENOUS SEE ADMIN INSTRUCTIONS
Status: DISCONTINUED | OUTPATIENT
Start: 2017-01-01 | End: 2017-01-01 | Stop reason: HOSPADM

## 2017-01-01 RX ORDER — ACETAMINOPHEN 325 MG/1
650 TABLET ORAL EVERY 4 HOURS PRN
Status: DISCONTINUED | OUTPATIENT
Start: 2017-01-01 | End: 2017-01-01 | Stop reason: HOSPADM

## 2017-01-01 RX ORDER — METHOCARBAMOL 500 MG/1
1000 TABLET, FILM COATED ORAL EVERY 6 HOURS PRN
Status: DISCONTINUED | OUTPATIENT
Start: 2017-01-01 | End: 2017-01-01 | Stop reason: HOSPADM

## 2017-01-01 RX ORDER — HYDROMORPHONE HYDROCHLORIDE 1 MG/ML
0.2 INJECTION, SOLUTION INTRAMUSCULAR; INTRAVENOUS; SUBCUTANEOUS EVERY 4 HOURS PRN
Status: DISCONTINUED | OUTPATIENT
Start: 2017-01-01 | End: 2017-01-01

## 2017-01-01 RX ORDER — METHOCARBAMOL 500 MG/1
1000 TABLET, FILM COATED ORAL 4 TIMES DAILY
Status: DISCONTINUED | OUTPATIENT
Start: 2017-01-01 | End: 2017-01-01 | Stop reason: HOSPADM

## 2017-01-01 RX ORDER — MORPHINE SULFATE 2 MG/ML
2-4 INJECTION, SOLUTION INTRAMUSCULAR; INTRAVENOUS EVERY 4 HOURS PRN
Status: DISCONTINUED | OUTPATIENT
Start: 2017-01-01 | End: 2017-01-01 | Stop reason: HOSPADM

## 2017-01-01 RX ORDER — NICOTINE 21 MG/24HR
1 PATCH, TRANSDERMAL 24 HOURS TRANSDERMAL DAILY
Qty: 30 PATCH | Refills: 1 | Status: SHIPPED | OUTPATIENT
Start: 2017-01-01 | End: 2017-01-01

## 2017-01-01 RX ORDER — INSULIN GLARGINE 100 [IU]/ML
20 INJECTION, SOLUTION SUBCUTANEOUS EVERY MORNING
Status: DISCONTINUED | OUTPATIENT
Start: 2017-01-01 | End: 2017-01-01

## 2017-01-01 RX ORDER — IPRATROPIUM BROMIDE AND ALBUTEROL SULFATE 2.5; .5 MG/3ML; MG/3ML
3 SOLUTION RESPIRATORY (INHALATION) ONCE
Status: COMPLETED | OUTPATIENT
Start: 2017-01-01 | End: 2017-01-01

## 2017-01-01 RX ORDER — BUPRENORPHINE HYDROCHLORIDE AND NALOXONE HYDROCHLORIDE DIHYDRATE 8; 2 MG/1; MG/1
3 TABLET SUBLINGUAL DAILY
Status: DISCONTINUED | OUTPATIENT
Start: 2017-01-01 | End: 2017-01-01 | Stop reason: RX

## 2017-01-01 RX ORDER — FUROSEMIDE 10 MG/ML
20 INJECTION INTRAMUSCULAR; INTRAVENOUS ONCE
Status: DISCONTINUED | OUTPATIENT
Start: 2017-01-01 | End: 2017-01-01

## 2017-01-01 RX ORDER — SULFAMETHOXAZOLE/TRIMETHOPRIM 800-160 MG
2 TABLET ORAL 3 TIMES DAILY
Status: DISCONTINUED | OUTPATIENT
Start: 2017-01-01 | End: 2017-01-01 | Stop reason: DRUGHIGH

## 2017-01-01 RX ORDER — LORATADINE 10 MG/1
10 TABLET ORAL DAILY
COMMUNITY

## 2017-01-01 RX ORDER — PROCHLORPERAZINE MALEATE 5 MG
5-10 TABLET ORAL EVERY 6 HOURS PRN
Status: DISCONTINUED | OUTPATIENT
Start: 2017-01-01 | End: 2017-01-01 | Stop reason: HOSPADM

## 2017-01-01 RX ORDER — POLYETHYLENE GLYCOL 3350 17 G/17G
17 POWDER, FOR SOLUTION ORAL DAILY PRN
Status: DISCONTINUED | OUTPATIENT
Start: 2017-01-01 | End: 2017-01-01 | Stop reason: HOSPADM

## 2017-01-01 RX ORDER — AMINO ACIDS/PROTEIN HYDROLYS 15G-100/30
1 LIQUID (ML) ORAL 2 TIMES DAILY
Status: DISCONTINUED | OUTPATIENT
Start: 2017-01-01 | End: 2017-01-01

## 2017-01-01 RX ORDER — HYDROXYZINE HYDROCHLORIDE 10 MG/1
10 TABLET, FILM COATED ORAL 3 TIMES DAILY PRN
Status: DISCONTINUED | OUTPATIENT
Start: 2017-01-01 | End: 2017-01-01

## 2017-01-01 RX ORDER — ALBUMIN, HUMAN INJ 5% 5 %
250 SOLUTION INTRAVENOUS ONCE
Status: COMPLETED | OUTPATIENT
Start: 2017-01-01 | End: 2017-01-01

## 2017-01-01 RX ORDER — HEPARIN SODIUM,PORCINE 10 UNIT/ML
5-10 VIAL (ML) INTRAVENOUS EVERY 24 HOURS
Status: DISCONTINUED | OUTPATIENT
Start: 2017-01-01 | End: 2017-01-01 | Stop reason: CLARIF

## 2017-01-01 RX ORDER — GABAPENTIN 300 MG/1
600 CAPSULE ORAL EVERY 8 HOURS SCHEDULED
Status: DISCONTINUED | OUTPATIENT
Start: 2017-01-01 | End: 2017-01-01 | Stop reason: HOSPADM

## 2017-01-01 RX ORDER — SPIRONOLACTONE 25 MG/1
50 TABLET ORAL DAILY
Status: DISCONTINUED | OUTPATIENT
Start: 2017-01-01 | End: 2017-01-01 | Stop reason: HOSPADM

## 2017-01-01 RX ORDER — METHOCARBAMOL 750 MG/1
750 TABLET, FILM COATED ORAL 3 TIMES DAILY PRN
Qty: 9 TABLET | Refills: 0 | Status: SHIPPED | OUTPATIENT
Start: 2017-01-01 | End: 2017-01-01

## 2017-01-01 RX ORDER — LEVOFLOXACIN 750 MG/1
750 TABLET, FILM COATED ORAL EVERY 24 HOURS
Status: DISCONTINUED | OUTPATIENT
Start: 2017-01-01 | End: 2017-01-01

## 2017-01-01 RX ORDER — CEFTRIAXONE 2 G/1
2 INJECTION, POWDER, FOR SOLUTION INTRAMUSCULAR; INTRAVENOUS EVERY 24 HOURS
Status: DISCONTINUED | OUTPATIENT
Start: 2017-01-01 | End: 2017-01-01

## 2017-01-01 RX ORDER — ASPIRIN 81 MG/1
81 TABLET ORAL DAILY
Status: DISCONTINUED | OUTPATIENT
Start: 2017-01-01 | End: 2017-01-01 | Stop reason: HOSPADM

## 2017-01-01 RX ORDER — SULFAMETHOXAZOLE AND TRIMETHOPRIM 400; 80 MG/1; MG/1
1 TABLET ORAL 4 TIMES DAILY
Status: DISCONTINUED | OUTPATIENT
Start: 2017-01-01 | End: 2017-01-01

## 2017-01-01 RX ORDER — PREDNISONE 20 MG/1
40 TABLET ORAL DAILY
Status: DISCONTINUED | OUTPATIENT
Start: 2017-01-01 | End: 2017-01-01

## 2017-01-01 RX ORDER — SODIUM CHLORIDE 9 MG/ML
1000 INJECTION, SOLUTION INTRAVENOUS CONTINUOUS
Status: DISCONTINUED | OUTPATIENT
Start: 2017-01-01 | End: 2017-01-01

## 2017-01-01 RX ORDER — LORAZEPAM 2 MG/ML
1-4 INJECTION INTRAMUSCULAR
Status: DISCONTINUED | OUTPATIENT
Start: 2017-01-01 | End: 2017-01-01

## 2017-01-01 RX ORDER — PREDNISONE 10 MG/1
30 TABLET ORAL DAILY
Qty: 15 TABLET | Refills: 0 | Status: SHIPPED | OUTPATIENT
Start: 2017-01-01 | End: 2017-01-01

## 2017-01-01 RX ORDER — KETOROLAC TROMETHAMINE 10 MG/1
10 TABLET, FILM COATED ORAL
Status: COMPLETED | OUTPATIENT
Start: 2017-01-01 | End: 2017-01-01

## 2017-01-01 RX ORDER — TRAZODONE HYDROCHLORIDE 100 MG/1
100 TABLET ORAL
Status: DISCONTINUED | OUTPATIENT
Start: 2017-01-01 | End: 2017-01-01 | Stop reason: HOSPADM

## 2017-01-01 RX ORDER — ATORVASTATIN CALCIUM 20 MG/1
20 TABLET, FILM COATED ORAL DAILY
Status: DISCONTINUED | OUTPATIENT
Start: 2017-01-01 | End: 2017-01-01

## 2017-01-01 RX ORDER — METHOCARBAMOL 500 MG/1
500 TABLET, FILM COATED ORAL 3 TIMES DAILY
Status: DISCONTINUED | OUTPATIENT
Start: 2017-01-01 | End: 2017-01-01 | Stop reason: HOSPADM

## 2017-01-01 RX ORDER — ONDANSETRON 2 MG/ML
4 INJECTION INTRAMUSCULAR; INTRAVENOUS EVERY 30 MIN PRN
Status: DISCONTINUED | OUTPATIENT
Start: 2017-01-01 | End: 2017-01-01 | Stop reason: HOSPADM

## 2017-01-01 RX ORDER — GABAPENTIN 600 MG/1
600 TABLET ORAL 3 TIMES DAILY
Status: DISCONTINUED | OUTPATIENT
Start: 2017-01-01 | End: 2017-01-01

## 2017-01-01 RX ORDER — ALUMINA, MAGNESIA, AND SIMETHICONE 2400; 2400; 240 MG/30ML; MG/30ML; MG/30ML
30 SUSPENSION ORAL EVERY 4 HOURS PRN
Status: DISCONTINUED | OUTPATIENT
Start: 2017-01-01 | End: 2017-01-01

## 2017-01-01 RX ORDER — MIRTAZAPINE 15 MG/1
15 TABLET, FILM COATED ORAL AT BEDTIME
Status: DISCONTINUED | OUTPATIENT
Start: 2017-01-01 | End: 2017-01-01

## 2017-01-01 RX ORDER — ASPIRIN 81 MG/1
81 TABLET ORAL DAILY
Status: DISCONTINUED | OUTPATIENT
Start: 2017-01-01 | End: 2017-01-01

## 2017-01-01 RX ORDER — METHYLPREDNISOLONE SODIUM SUCCINATE 40 MG/ML
20 INJECTION, POWDER, LYOPHILIZED, FOR SOLUTION INTRAMUSCULAR; INTRAVENOUS EVERY 6 HOURS
Status: DISCONTINUED | OUTPATIENT
Start: 2017-01-01 | End: 2017-01-01

## 2017-01-01 RX ORDER — POTASSIUM CHLORIDE 7.45 MG/ML
10 INJECTION INTRAVENOUS
Status: DISCONTINUED | OUTPATIENT
Start: 2017-01-01 | End: 2017-01-01 | Stop reason: HOSPADM

## 2017-01-01 RX ORDER — NALOXONE HYDROCHLORIDE 0.4 MG/ML
.1-.4 INJECTION, SOLUTION INTRAMUSCULAR; INTRAVENOUS; SUBCUTANEOUS
Status: DISCONTINUED | OUTPATIENT
Start: 2017-01-01 | End: 2017-01-01 | Stop reason: HOSPADM

## 2017-01-01 RX ORDER — PROCHLORPERAZINE MALEATE 5 MG
10 TABLET ORAL EVERY 6 HOURS PRN
Status: DISCONTINUED | OUTPATIENT
Start: 2017-01-01 | End: 2017-01-01 | Stop reason: HOSPADM

## 2017-01-01 RX ORDER — LANOLIN ALCOHOL/MO/W.PET/CERES
100 CREAM (GRAM) TOPICAL DAILY
Status: DISCONTINUED | OUTPATIENT
Start: 2017-01-01 | End: 2017-01-01 | Stop reason: CLARIF

## 2017-01-01 RX ORDER — LANOLIN ALCOHOL/MO/W.PET/CERES
100 CREAM (GRAM) TOPICAL DAILY
Status: DISCONTINUED | OUTPATIENT
Start: 2017-01-01 | End: 2017-01-01 | Stop reason: HOSPADM

## 2017-01-01 RX ORDER — NALOXONE HYDROCHLORIDE 0.4 MG/ML
0.2 INJECTION, SOLUTION INTRAMUSCULAR; INTRAVENOUS; SUBCUTANEOUS ONCE
Status: DISCONTINUED | OUTPATIENT
Start: 2017-01-01 | End: 2017-01-01

## 2017-01-01 RX ORDER — PREDNISONE 10 MG/1
30 TABLET ORAL DAILY
Qty: 15 TABLET | Refills: 0 | Status: ON HOLD | OUTPATIENT
Start: 2017-01-01 | End: 2017-01-01

## 2017-01-01 RX ORDER — POTASSIUM CHLORIDE 29.8 MG/ML
20 INJECTION INTRAVENOUS
Status: DISCONTINUED | OUTPATIENT
Start: 2017-01-01 | End: 2017-01-01 | Stop reason: HOSPADM

## 2017-01-01 RX ORDER — DIPHENHYDRAMINE HCL 25 MG
25 CAPSULE ORAL EVERY 6 HOURS PRN
Status: DISCONTINUED | OUTPATIENT
Start: 2017-01-01 | End: 2017-01-01 | Stop reason: HOSPADM

## 2017-01-01 RX ORDER — MORPHINE SULFATE 2 MG/ML
2 INJECTION, SOLUTION INTRAMUSCULAR; INTRAVENOUS
Status: DISCONTINUED | OUTPATIENT
Start: 2017-01-01 | End: 2017-01-01

## 2017-01-01 RX ORDER — HYDROMORPHONE HYDROCHLORIDE 1 MG/ML
.3-.5 INJECTION, SOLUTION INTRAMUSCULAR; INTRAVENOUS; SUBCUTANEOUS EVERY 5 MIN PRN
Status: DISCONTINUED | OUTPATIENT
Start: 2017-01-01 | End: 2017-01-01 | Stop reason: HOSPADM

## 2017-01-01 RX ORDER — POTASSIUM CL/LIDO/0.9 % NACL 10MEQ/0.1L
10 INTRAVENOUS SOLUTION, PIGGYBACK (ML) INTRAVENOUS
Status: DISCONTINUED | OUTPATIENT
Start: 2017-01-01 | End: 2017-01-01 | Stop reason: HOSPADM

## 2017-01-01 RX ORDER — SODIUM CHLORIDE, SODIUM LACTATE, POTASSIUM CHLORIDE, CALCIUM CHLORIDE 600; 310; 30; 20 MG/100ML; MG/100ML; MG/100ML; MG/100ML
INJECTION, SOLUTION INTRAVENOUS CONTINUOUS
Status: DISCONTINUED | OUTPATIENT
Start: 2017-01-01 | End: 2017-01-01 | Stop reason: HOSPADM

## 2017-01-01 RX ORDER — FENTANYL CITRATE 50 UG/ML
INJECTION, SOLUTION INTRAMUSCULAR; INTRAVENOUS PRN
Status: DISCONTINUED | OUTPATIENT
Start: 2017-01-01 | End: 2017-01-01

## 2017-01-01 RX ORDER — SULFAMETHOXAZOLE AND TRIMETHOPRIM 400; 80 MG/1; MG/1
TABLET ORAL
Qty: 4 TABLET | Refills: 0
Start: 2017-01-01 | End: 2017-01-01

## 2017-01-01 RX ORDER — LORAZEPAM 2 MG/ML
.5-1 INJECTION INTRAMUSCULAR EVERY 4 HOURS PRN
Status: DISCONTINUED | OUTPATIENT
Start: 2017-01-01 | End: 2017-01-01 | Stop reason: HOSPADM

## 2017-01-01 RX ORDER — FOLIC ACID 1 MG/1
1 TABLET ORAL DAILY
Status: DISCONTINUED | OUTPATIENT
Start: 2017-01-01 | End: 2017-01-01

## 2017-01-01 RX ORDER — SULFAMETHOXAZOLE/TRIMETHOPRIM 800-160 MG
1 TABLET ORAL 2 TIMES DAILY
Status: DISCONTINUED | OUTPATIENT
Start: 2017-01-01 | End: 2017-01-01 | Stop reason: HOSPADM

## 2017-01-01 RX ORDER — TRAZODONE HYDROCHLORIDE 50 MG/1
50 TABLET, FILM COATED ORAL
Status: DISCONTINUED | OUTPATIENT
Start: 2017-01-01 | End: 2017-01-01 | Stop reason: HOSPADM

## 2017-01-01 RX ORDER — LORATADINE 10 MG/1
10 TABLET ORAL DAILY
Status: DISCONTINUED | OUTPATIENT
Start: 2017-01-01 | End: 2017-01-01

## 2017-01-01 RX ORDER — GABAPENTIN 250 MG/5ML
600 SOLUTION ORAL 3 TIMES DAILY
Status: DISCONTINUED | OUTPATIENT
Start: 2017-01-01 | End: 2017-01-01

## 2017-01-01 RX ORDER — PROCHLORPERAZINE 25 MG
25 SUPPOSITORY, RECTAL RECTAL EVERY 12 HOURS PRN
Status: DISCONTINUED | OUTPATIENT
Start: 2017-01-01 | End: 2017-01-01 | Stop reason: HOSPADM

## 2017-01-01 RX ORDER — SULFAMETHOXAZOLE/TRIMETHOPRIM 800-160 MG
1 TABLET ORAL
Qty: 30 TABLET | Refills: 0 | Status: SHIPPED | OUTPATIENT
Start: 2017-01-01 | End: 2017-01-01

## 2017-01-01 RX ORDER — FLUOXETINE 20 MG/5ML
40 SOLUTION ORAL EVERY MORNING
Status: DISCONTINUED | OUTPATIENT
Start: 2017-01-01 | End: 2017-01-01

## 2017-01-01 RX ORDER — HEPARIN SODIUM,PORCINE 10 UNIT/ML
5-10 VIAL (ML) INTRAVENOUS
Status: DISCONTINUED | OUTPATIENT
Start: 2017-01-01 | End: 2017-01-01 | Stop reason: HOSPADM

## 2017-01-01 RX ORDER — PREDNISONE 50 MG/1
50 TABLET ORAL DAILY
Status: DISCONTINUED | OUTPATIENT
Start: 2017-01-01 | End: 2017-01-01 | Stop reason: HOSPADM

## 2017-01-01 RX ORDER — KETOROLAC TROMETHAMINE 15 MG/ML
15 INJECTION, SOLUTION INTRAMUSCULAR; INTRAVENOUS EVERY 6 HOURS PRN
Status: DISPENSED | OUTPATIENT
Start: 2017-01-01 | End: 2017-01-01

## 2017-01-01 RX ORDER — PANTOPRAZOLE SODIUM 40 MG/1
40 TABLET, DELAYED RELEASE ORAL DAILY
Status: DISCONTINUED | OUTPATIENT
Start: 2017-01-01 | End: 2017-01-01 | Stop reason: HOSPADM

## 2017-01-01 RX ORDER — PREDNISONE 5 MG/1
5 TABLET ORAL DAILY
Qty: 90 TABLET | Refills: 3 | Status: SHIPPED | OUTPATIENT
Start: 2017-01-01 | End: 2017-01-01

## 2017-01-01 RX ORDER — SULFAMETHOXAZOLE/TRIMETHOPRIM 800-160 MG
2 TABLET ORAL 3 TIMES DAILY
Qty: 108 TABLET | Refills: 0 | Status: SHIPPED | OUTPATIENT
Start: 2017-01-01 | End: 2017-01-01

## 2017-01-01 RX ORDER — IOPAMIDOL 755 MG/ML
64 INJECTION, SOLUTION INTRAVASCULAR ONCE
Status: COMPLETED | OUTPATIENT
Start: 2017-01-01 | End: 2017-01-01

## 2017-01-01 RX ORDER — EPHEDRINE SULFATE 50 MG/ML
INJECTION, SOLUTION INTRAMUSCULAR; INTRAVENOUS; SUBCUTANEOUS PRN
Status: DISCONTINUED | OUTPATIENT
Start: 2017-01-01 | End: 2017-01-01

## 2017-01-01 RX ORDER — PIPERACILLIN SODIUM, TAZOBACTAM SODIUM 4; .5 G/20ML; G/20ML
4.5 INJECTION, POWDER, LYOPHILIZED, FOR SOLUTION INTRAVENOUS ONCE
Status: COMPLETED | OUTPATIENT
Start: 2017-01-01 | End: 2017-01-01

## 2017-01-01 RX ORDER — IBUPROFEN 600 MG/1
600 TABLET, FILM COATED ORAL
Status: COMPLETED | OUTPATIENT
Start: 2017-01-01 | End: 2017-01-01

## 2017-01-01 RX ORDER — LACTULOSE 10 G/15ML
20 SOLUTION ORAL DAILY
Status: DISCONTINUED | OUTPATIENT
Start: 2017-01-01 | End: 2017-01-01 | Stop reason: HOSPADM

## 2017-01-01 RX ORDER — ACETAMINOPHEN 10 MG/ML
1000 INJECTION, SOLUTION INTRAVENOUS EVERY 8 HOURS PRN
Status: DISCONTINUED | OUTPATIENT
Start: 2017-01-01 | End: 2017-01-01

## 2017-01-01 RX ORDER — INSULIN GLARGINE 100 [IU]/ML
20 INJECTION, SOLUTION SUBCUTANEOUS EVERY MORNING
Status: ON HOLD | COMMUNITY
End: 2017-01-01

## 2017-01-01 RX ORDER — ONDANSETRON 2 MG/ML
INJECTION INTRAMUSCULAR; INTRAVENOUS
Status: DISCONTINUED
Start: 2017-01-01 | End: 2017-01-01 | Stop reason: HOSPADM

## 2017-01-01 RX ORDER — IPRATROPIUM BROMIDE AND ALBUTEROL SULFATE 2.5; .5 MG/3ML; MG/3ML
3 SOLUTION RESPIRATORY (INHALATION) EVERY 4 HOURS PRN
Status: DISCONTINUED | OUTPATIENT
Start: 2017-01-01 | End: 2017-01-01 | Stop reason: HOSPADM

## 2017-01-01 RX ORDER — FENTANYL CITRATE 50 UG/ML
50-100 INJECTION, SOLUTION INTRAMUSCULAR; INTRAVENOUS
Status: DISCONTINUED | OUTPATIENT
Start: 2017-01-01 | End: 2017-01-01

## 2017-01-01 RX ORDER — ONDANSETRON 4 MG/1
4 TABLET, ORALLY DISINTEGRATING ORAL EVERY 30 MIN PRN
Status: DISCONTINUED | OUTPATIENT
Start: 2017-01-01 | End: 2017-01-01 | Stop reason: HOSPADM

## 2017-01-01 RX ORDER — IPRATROPIUM BROMIDE AND ALBUTEROL SULFATE 2.5; .5 MG/3ML; MG/3ML
6 SOLUTION RESPIRATORY (INHALATION) ONCE
Status: COMPLETED | OUTPATIENT
Start: 2017-01-01 | End: 2017-01-01

## 2017-01-01 RX ORDER — SPIRONOLACTONE 25 MG/1
50 TABLET ORAL DAILY
Status: CANCELLED | OUTPATIENT
Start: 2017-01-01

## 2017-01-01 RX ORDER — HEPARIN SODIUM 5000 [USP'U]/.5ML
5000 INJECTION, SOLUTION INTRAVENOUS; SUBCUTANEOUS EVERY 12 HOURS
Status: DISCONTINUED | OUTPATIENT
Start: 2017-01-01 | End: 2017-01-01 | Stop reason: HOSPADM

## 2017-01-01 RX ORDER — SULFAMETHOXAZOLE/TRIMETHOPRIM 800-160 MG
1 TABLET ORAL
Status: ON HOLD | COMMUNITY
End: 2017-01-01

## 2017-01-01 RX ORDER — NICOTINE 21 MG/24HR
1 PATCH, TRANSDERMAL 24 HOURS TRANSDERMAL DAILY
Qty: 30 PATCH | Refills: 1 | Status: SHIPPED | OUTPATIENT
Start: 2017-01-01

## 2017-01-01 RX ORDER — GABAPENTIN 600 MG/1
600 TABLET ORAL 3 TIMES DAILY
Qty: 9 TABLET | Refills: 0 | Status: SHIPPED | OUTPATIENT
Start: 2017-01-01

## 2017-01-01 RX ORDER — SODIUM CHLORIDE 9 MG/ML
INJECTION, SOLUTION INTRAVENOUS CONTINUOUS
Status: ACTIVE | OUTPATIENT
Start: 2017-01-01 | End: 2017-01-01

## 2017-01-01 RX ORDER — LORATADINE 10 MG/1
10 TABLET ORAL DAILY
Status: DISCONTINUED | OUTPATIENT
Start: 2017-01-01 | End: 2017-01-01 | Stop reason: HOSPADM

## 2017-01-01 RX ORDER — IPRATROPIUM BROMIDE AND ALBUTEROL SULFATE 2.5; .5 MG/3ML; MG/3ML
3 SOLUTION RESPIRATORY (INHALATION) EVERY 4 HOURS
Status: DISCONTINUED | OUTPATIENT
Start: 2017-01-01 | End: 2017-01-01 | Stop reason: HOSPADM

## 2017-01-01 RX ORDER — METHYLPREDNISOLONE SODIUM SUCCINATE 125 MG/2ML
85 INJECTION, POWDER, LYOPHILIZED, FOR SOLUTION INTRAMUSCULAR; INTRAVENOUS EVERY 24 HOURS
Status: DISCONTINUED | OUTPATIENT
Start: 2017-01-01 | End: 2017-01-01

## 2017-01-01 RX ORDER — FUROSEMIDE 10 MG/ML
20 INJECTION INTRAMUSCULAR; INTRAVENOUS DAILY
Status: DISCONTINUED | OUTPATIENT
Start: 2017-01-01 | End: 2017-01-01 | Stop reason: HOSPADM

## 2017-01-01 RX ORDER — METHYLPREDNISOLONE SODIUM SUCCINATE 40 MG/ML
40 INJECTION, POWDER, LYOPHILIZED, FOR SOLUTION INTRAMUSCULAR; INTRAVENOUS EVERY 12 HOURS
Status: DISCONTINUED | OUTPATIENT
Start: 2017-01-01 | End: 2017-01-01

## 2017-01-01 RX ORDER — PREDNISONE 50 MG/1
50 TABLET ORAL DAILY
Qty: 30 TABLET | Refills: 0 | Status: ON HOLD | OUTPATIENT
Start: 2017-01-01 | End: 2017-01-01

## 2017-01-01 RX ORDER — TRAZODONE HYDROCHLORIDE 100 MG/1
100 TABLET ORAL
Status: DISCONTINUED | OUTPATIENT
Start: 2017-01-01 | End: 2017-01-01

## 2017-01-01 RX ORDER — ATORVASTATIN CALCIUM 10 MG/1
20 TABLET, FILM COATED ORAL DAILY
Status: DISCONTINUED | OUTPATIENT
Start: 2017-01-01 | End: 2017-01-01 | Stop reason: HOSPADM

## 2017-01-01 RX ORDER — LACTULOSE 10 G/15ML
30 SOLUTION ORAL 3 TIMES DAILY
Status: DISCONTINUED | OUTPATIENT
Start: 2017-01-01 | End: 2017-01-01

## 2017-01-01 RX ORDER — PREDNISONE 5 MG/1
5 TABLET ORAL DAILY
Qty: 90 TABLET | Refills: 3 | Status: ON HOLD | OUTPATIENT
Start: 2017-01-01 | End: 2017-01-01

## 2017-01-01 RX ORDER — HYDROMORPHONE HYDROCHLORIDE 1 MG/ML
0.5 INJECTION, SOLUTION INTRAMUSCULAR; INTRAVENOUS; SUBCUTANEOUS
Status: COMPLETED | OUTPATIENT
Start: 2017-01-01 | End: 2017-01-01

## 2017-01-01 RX ORDER — FUROSEMIDE 10 MG/ML
40 INJECTION INTRAMUSCULAR; INTRAVENOUS
Status: DISCONTINUED | OUTPATIENT
Start: 2017-01-01 | End: 2017-01-01

## 2017-01-01 RX ORDER — LEVOFLOXACIN 5 MG/ML
750 INJECTION, SOLUTION INTRAVENOUS EVERY 24 HOURS
Status: COMPLETED | OUTPATIENT
Start: 2017-01-01 | End: 2017-01-01

## 2017-01-01 RX ORDER — FUROSEMIDE 10 MG/ML
40 INJECTION INTRAMUSCULAR; INTRAVENOUS ONCE
Status: COMPLETED | OUTPATIENT
Start: 2017-01-01 | End: 2017-01-01

## 2017-01-01 RX ORDER — PIPERACILLIN SODIUM, TAZOBACTAM SODIUM 4; .5 G/20ML; G/20ML
4.5 INJECTION, POWDER, LYOPHILIZED, FOR SOLUTION INTRAVENOUS EVERY 6 HOURS
Status: DISCONTINUED | OUTPATIENT
Start: 2017-01-01 | End: 2017-01-01

## 2017-01-01 RX ORDER — PROPOFOL 10 MG/ML
INJECTION, EMULSION INTRAVENOUS
Status: DISCONTINUED
Start: 2017-01-01 | End: 2017-01-01 | Stop reason: HOSPADM

## 2017-01-01 RX ORDER — AMOXICILLIN 500 MG
1 CAPSULE ORAL DAILY
Status: ON HOLD | COMMUNITY
End: 2017-01-01

## 2017-01-01 RX ORDER — MIRTAZAPINE 30 MG/1
30 TABLET, FILM COATED ORAL AT BEDTIME
Status: DISCONTINUED | OUTPATIENT
Start: 2017-01-01 | End: 2017-01-01 | Stop reason: HOSPADM

## 2017-01-01 RX ORDER — GUAIFENESIN 600 MG/1
1200 TABLET, EXTENDED RELEASE ORAL 2 TIMES DAILY
Status: DISCONTINUED | OUTPATIENT
Start: 2017-01-01 | End: 2017-01-01 | Stop reason: HOSPADM

## 2017-01-01 RX ORDER — SULFAMETHOXAZOLE AND TRIMETHOPRIM 200; 40 MG/5ML; MG/5ML
50 SUSPENSION ORAL 4 TIMES DAILY
Status: DISCONTINUED | OUTPATIENT
Start: 2017-01-01 | End: 2017-01-01

## 2017-01-01 RX ORDER — METHYLPREDNISOLONE SODIUM SUCCINATE 40 MG/ML
40 INJECTION, POWDER, LYOPHILIZED, FOR SOLUTION INTRAMUSCULAR; INTRAVENOUS EVERY 8 HOURS
Status: DISCONTINUED | OUTPATIENT
Start: 2017-01-01 | End: 2017-01-01

## 2017-01-01 RX ORDER — SPIRONOLACTONE 50 MG/1
50 TABLET, FILM COATED ORAL DAILY
COMMUNITY
End: 2017-01-01

## 2017-01-01 RX ORDER — SPIRONOLACTONE 50 MG/1
50 TABLET, FILM COATED ORAL DAILY
Status: DISCONTINUED | OUTPATIENT
Start: 2017-01-01 | End: 2017-01-01 | Stop reason: HOSPADM

## 2017-01-01 RX ORDER — PREDNISONE 20 MG/1
40 TABLET ORAL DAILY
Status: DISCONTINUED | OUTPATIENT
Start: 2017-01-01 | End: 2017-01-01 | Stop reason: HOSPADM

## 2017-01-01 RX ORDER — LACTULOSE 10 G/15ML
20 SOLUTION ORAL DAILY
Status: DISCONTINUED | OUTPATIENT
Start: 2017-01-01 | End: 2017-01-01

## 2017-01-01 RX ORDER — FUROSEMIDE 20 MG
20 TABLET ORAL DAILY
Qty: 3 TABLET | Refills: 0 | Status: SHIPPED | OUTPATIENT
Start: 2017-01-01 | End: 2017-01-01

## 2017-01-01 RX ORDER — TRAZODONE HYDROCHLORIDE 50 MG/1
50 TABLET, FILM COATED ORAL
Status: DISCONTINUED | OUTPATIENT
Start: 2017-01-01 | End: 2017-01-01

## 2017-01-01 RX ORDER — DIPHENHYDRAMINE HYDROCHLORIDE 50 MG/ML
25 INJECTION INTRAMUSCULAR; INTRAVENOUS EVERY 6 HOURS PRN
Status: DISCONTINUED | OUTPATIENT
Start: 2017-01-01 | End: 2017-01-01 | Stop reason: HOSPADM

## 2017-01-01 RX ORDER — PREDNISONE 20 MG/1
20 TABLET ORAL DAILY
Qty: 30 TABLET | Refills: 1 | Status: SHIPPED | OUTPATIENT
Start: 2017-01-01 | End: 2017-01-01

## 2017-01-01 RX ORDER — PREDNISONE 20 MG/1
20 TABLET ORAL DAILY
Status: DISCONTINUED | OUTPATIENT
Start: 2017-01-01 | End: 2017-01-01 | Stop reason: HOSPADM

## 2017-01-01 RX ORDER — ACETAMINOPHEN 325 MG/1
975 TABLET ORAL 2 TIMES DAILY
Status: COMPLETED | OUTPATIENT
Start: 2017-01-01 | End: 2017-01-01

## 2017-01-01 RX ORDER — FUROSEMIDE 20 MG
20 TABLET ORAL DAILY
Status: DISCONTINUED | OUTPATIENT
Start: 2017-01-01 | End: 2017-01-01

## 2017-01-01 RX ORDER — FLUTICASONE PROPIONATE 50 MCG
2 SPRAY, SUSPENSION (ML) NASAL DAILY PRN
COMMUNITY

## 2017-01-01 RX ORDER — AMOXICILLIN 250 MG
1-2 CAPSULE ORAL 2 TIMES DAILY
Status: DISCONTINUED | OUTPATIENT
Start: 2017-01-01 | End: 2017-01-01 | Stop reason: HOSPADM

## 2017-01-01 RX ORDER — OXYCODONE HYDROCHLORIDE 10 MG/1
10 TABLET ORAL EVERY 4 HOURS PRN
Qty: 30 TABLET | Refills: 0 | Status: SHIPPED | OUTPATIENT
Start: 2017-01-01 | End: 2017-01-01

## 2017-01-01 RX ORDER — FUROSEMIDE 20 MG
20 TABLET ORAL DAILY
Qty: 30 TABLET | Refills: 3 | Status: SHIPPED | OUTPATIENT
Start: 2017-01-01

## 2017-01-01 RX ORDER — MAGNESIUM SULFATE HEPTAHYDRATE 40 MG/ML
4 INJECTION, SOLUTION INTRAVENOUS EVERY 4 HOURS PRN
Status: DISCONTINUED | OUTPATIENT
Start: 2017-01-01 | End: 2017-01-01 | Stop reason: HOSPADM

## 2017-01-01 RX ORDER — HYDROMORPHONE HYDROCHLORIDE 1 MG/ML
0.2 INJECTION, SOLUTION INTRAMUSCULAR; INTRAVENOUS; SUBCUTANEOUS
Status: DISCONTINUED | OUTPATIENT
Start: 2017-01-01 | End: 2017-01-01

## 2017-01-01 RX ORDER — HYDROCODONE BITARTRATE AND ACETAMINOPHEN 5; 325 MG/1; MG/1
1-2 TABLET ORAL EVERY 4 HOURS PRN
Status: DISCONTINUED | OUTPATIENT
Start: 2017-01-01 | End: 2017-01-01 | Stop reason: HOSPADM

## 2017-01-01 RX ORDER — TIOTROPIUM BROMIDE 18 UG/1
18 CAPSULE ORAL; RESPIRATORY (INHALATION) DAILY
COMMUNITY
End: 2017-01-01

## 2017-01-01 RX ORDER — PROPOFOL 10 MG/ML
INJECTION, EMULSION INTRAVENOUS
Status: COMPLETED
Start: 2017-01-01 | End: 2017-01-01

## 2017-01-01 RX ORDER — HYDROXYZINE HYDROCHLORIDE 10 MG/1
10 TABLET, FILM COATED ORAL EVERY 8 HOURS PRN
COMMUNITY

## 2017-01-01 RX ORDER — ACETAMINOPHEN 10 MG/ML
1000 INJECTION, SOLUTION INTRAVENOUS EVERY 6 HOURS PRN
Status: DISCONTINUED | OUTPATIENT
Start: 2017-01-01 | End: 2017-01-01

## 2017-01-01 RX ORDER — LANOLIN ALCOHOL/MO/W.PET/CERES
100 CREAM (GRAM) TOPICAL DAILY
Status: ON HOLD | COMMUNITY
End: 2017-01-01

## 2017-01-01 RX ORDER — SULFAMETHOXAZOLE/TRIMETHOPRIM 800-160 MG
2 TABLET ORAL 4 TIMES DAILY
Status: DISCONTINUED | OUTPATIENT
Start: 2017-01-01 | End: 2017-01-01

## 2017-01-01 RX ORDER — NICOTINE 21 MG/24HR
1 PATCH, TRANSDERMAL 24 HOURS TRANSDERMAL DAILY
Qty: 30 PATCH | Refills: 0 | Status: SHIPPED | OUTPATIENT
Start: 2017-01-01 | End: 2017-01-01

## 2017-01-01 RX ORDER — INSULIN GLARGINE 100 [IU]/ML
20 INJECTION, SOLUTION SUBCUTANEOUS AT BEDTIME
Start: 2017-01-01

## 2017-01-01 RX ORDER — FUROSEMIDE 10 MG/ML
40 INJECTION INTRAMUSCULAR; INTRAVENOUS ONCE
Status: DISCONTINUED | OUTPATIENT
Start: 2017-01-01 | End: 2017-01-01

## 2017-01-01 RX ORDER — PREDNISONE 20 MG/1
20 TABLET ORAL DAILY
Qty: 30 TABLET | Refills: 1 | Status: ON HOLD | OUTPATIENT
Start: 2017-01-01 | End: 2017-01-01

## 2017-01-01 RX ORDER — LANOLIN ALCOHOL/MO/W.PET/CERES
100 CREAM (GRAM) TOPICAL DAILY
Status: DISCONTINUED | OUTPATIENT
Start: 2017-01-01 | End: 2017-01-01

## 2017-01-01 RX ORDER — ALBUTEROL SULFATE 0.83 MG/ML
2.5 SOLUTION RESPIRATORY (INHALATION)
Status: DISCONTINUED | OUTPATIENT
Start: 2017-01-01 | End: 2017-01-01

## 2017-01-01 RX ORDER — BUPRENORPHINE AND NALOXONE 8; 2 MG/1; MG/1
3 FILM, SOLUBLE BUCCAL; SUBLINGUAL DAILY
Status: DISCONTINUED | OUTPATIENT
Start: 2017-01-01 | End: 2017-01-01

## 2017-01-01 RX ORDER — SULFAMETHOXAZOLE/TRIMETHOPRIM 800-160 MG
1 TABLET ORAL 2 TIMES DAILY
Qty: 28 TABLET | Refills: 0 | Status: SHIPPED | OUTPATIENT
Start: 2017-01-01 | End: 2017-01-01

## 2017-01-01 RX ORDER — PREDNISONE 20 MG/1
40 TABLET ORAL DAILY
Qty: 6 TABLET | Refills: 0 | Status: SHIPPED | OUTPATIENT
Start: 2017-01-01 | End: 2017-01-01

## 2017-01-01 RX ORDER — KETOROLAC TROMETHAMINE 15 MG/ML
30 INJECTION, SOLUTION INTRAMUSCULAR; INTRAVENOUS EVERY 6 HOURS PRN
Status: DISPENSED | OUTPATIENT
Start: 2017-01-01 | End: 2017-01-01

## 2017-01-01 RX ORDER — METHOCARBAMOL 750 MG/1
750 TABLET, FILM COATED ORAL ONCE
Status: COMPLETED | OUTPATIENT
Start: 2017-01-01 | End: 2017-01-01

## 2017-01-01 RX ORDER — TRAZODONE HYDROCHLORIDE 150 MG/1
150 TABLET ORAL AT BEDTIME
Status: DISCONTINUED | OUTPATIENT
Start: 2017-01-01 | End: 2017-01-01 | Stop reason: HOSPADM

## 2017-01-01 RX ORDER — HYDROXYZINE HYDROCHLORIDE 10 MG/1
10 TABLET, FILM COATED ORAL 3 TIMES DAILY PRN
Status: DISCONTINUED | OUTPATIENT
Start: 2017-01-01 | End: 2017-01-01 | Stop reason: HOSPADM

## 2017-01-01 RX ORDER — HYDROMORPHONE HYDROCHLORIDE 1 MG/ML
0.5 INJECTION, SOLUTION INTRAMUSCULAR; INTRAVENOUS; SUBCUTANEOUS ONCE
Status: COMPLETED | OUTPATIENT
Start: 2017-01-01 | End: 2017-01-01

## 2017-01-01 RX ORDER — NALOXONE HYDROCHLORIDE 0.4 MG/ML
0.4 INJECTION, SOLUTION INTRAMUSCULAR; INTRAVENOUS; SUBCUTANEOUS ONCE
Status: COMPLETED | OUTPATIENT
Start: 2017-01-01 | End: 2017-01-01

## 2017-01-01 RX ORDER — CEFAZOLIN SODIUM 1 G/50ML
1250 SOLUTION INTRAVENOUS EVERY 8 HOURS
Status: DISCONTINUED | OUTPATIENT
Start: 2017-01-01 | End: 2017-01-01 | Stop reason: DRUGHIGH

## 2017-01-01 RX ORDER — IBUPROFEN 100 MG/5ML
200-400 SUSPENSION, ORAL (FINAL DOSE FORM) ORAL EVERY 6 HOURS PRN
Status: DISCONTINUED | OUTPATIENT
Start: 2017-01-01 | End: 2017-01-01

## 2017-01-01 RX ORDER — MIRTAZAPINE 15 MG/1
15 TABLET, FILM COATED ORAL AT BEDTIME
Status: DISCONTINUED | OUTPATIENT
Start: 2017-01-01 | End: 2017-01-01 | Stop reason: HOSPADM

## 2017-01-01 RX ORDER — FENTANYL CITRATE 50 UG/ML
25-50 INJECTION, SOLUTION INTRAMUSCULAR; INTRAVENOUS EVERY 5 MIN PRN
Status: DISPENSED | OUTPATIENT
Start: 2017-01-01 | End: 2017-01-01

## 2017-01-01 RX ORDER — SULFAMETHOXAZOLE/TRIMETHOPRIM 800-160 MG
2 TABLET ORAL 4 TIMES DAILY
Status: DISCONTINUED | OUTPATIENT
Start: 2017-01-01 | End: 2017-01-01 | Stop reason: HOSPADM

## 2017-01-01 RX ORDER — METHYLPREDNISOLONE SODIUM SUCCINATE 125 MG/2ML
60 INJECTION, POWDER, LYOPHILIZED, FOR SOLUTION INTRAMUSCULAR; INTRAVENOUS EVERY 6 HOURS
Status: DISCONTINUED | OUTPATIENT
Start: 2017-01-01 | End: 2017-01-01

## 2017-01-01 RX ORDER — HYDROXYZINE HYDROCHLORIDE 10 MG/1
10 TABLET, FILM COATED ORAL EVERY 8 HOURS PRN
Status: DISCONTINUED | OUTPATIENT
Start: 2017-01-01 | End: 2017-01-01

## 2017-01-01 RX ORDER — MORPHINE SULFATE 2 MG/ML
2-4 INJECTION, SOLUTION INTRAMUSCULAR; INTRAVENOUS EVERY 4 HOURS PRN
Status: DISCONTINUED | OUTPATIENT
Start: 2017-01-01 | End: 2017-01-01

## 2017-01-01 RX ORDER — FENTANYL CITRATE 50 UG/ML
50 INJECTION, SOLUTION INTRAMUSCULAR; INTRAVENOUS
Status: DISCONTINUED | OUTPATIENT
Start: 2017-01-01 | End: 2017-01-01

## 2017-01-01 RX ORDER — FLUOXETINE 40 MG/1
40 CAPSULE ORAL DAILY
Qty: 3 CAPSULE | Refills: 0 | Status: SHIPPED | OUTPATIENT
Start: 2017-01-01 | End: 2017-01-01

## 2017-01-01 RX ORDER — PREDNISONE 50 MG/1
50 TABLET ORAL DAILY
Status: COMPLETED | OUTPATIENT
Start: 2017-01-01 | End: 2017-01-01

## 2017-01-01 RX ORDER — SALIVA STIMULANT COMB. NO.3
1-2 SPRAY, NON-AEROSOL (ML) MUCOUS MEMBRANE
Status: DISCONTINUED | OUTPATIENT
Start: 2017-01-01 | End: 2017-01-01 | Stop reason: HOSPADM

## 2017-01-01 RX ORDER — PREDNISONE 50 MG/1
50 TABLET ORAL DAILY
Status: DISCONTINUED | OUTPATIENT
Start: 2017-01-01 | End: 2017-01-01

## 2017-01-01 RX ORDER — IPRATROPIUM BROMIDE AND ALBUTEROL SULFATE 2.5; .5 MG/3ML; MG/3ML
SOLUTION RESPIRATORY (INHALATION)
Status: DISCONTINUED
Start: 2017-01-01 | End: 2017-01-01 | Stop reason: HOSPADM

## 2017-01-01 RX ORDER — FENTANYL CITRATE 50 UG/ML
INJECTION, SOLUTION INTRAMUSCULAR; INTRAVENOUS
Status: COMPLETED
Start: 2017-01-01 | End: 2017-01-01

## 2017-01-01 RX ORDER — SULFAMETHOXAZOLE/TRIMETHOPRIM 800-160 MG
2 TABLET ORAL 3 TIMES DAILY
Status: DISCONTINUED | OUTPATIENT
Start: 2017-01-01 | End: 2017-01-01 | Stop reason: HOSPADM

## 2017-01-01 RX ORDER — FUROSEMIDE 20 MG
20 TABLET ORAL ONCE
Status: DISCONTINUED | OUTPATIENT
Start: 2017-01-01 | End: 2017-01-01

## 2017-01-01 RX ORDER — BUPRENORPHINE HYDROCHLORIDE AND NALOXONE HYDROCHLORIDE DIHYDRATE 8; 2 MG/1; MG/1
3 TABLET SUBLINGUAL DAILY
Status: DISCONTINUED | OUTPATIENT
Start: 2017-01-01 | End: 2017-01-01

## 2017-01-01 RX ORDER — SULFAMETHOXAZOLE AND TRIMETHOPRIM 400; 80 MG/1; MG/1
TABLET ORAL
Qty: 60 TABLET | Refills: 0 | Status: SHIPPED | OUTPATIENT
Start: 2017-01-01

## 2017-01-01 RX ORDER — MIRTAZAPINE 30 MG/1
30 TABLET, FILM COATED ORAL AT BEDTIME
Qty: 3 TABLET | Refills: 0 | Status: SHIPPED | OUTPATIENT
Start: 2017-01-01 | End: 2017-01-01

## 2017-01-01 RX ORDER — LACTULOSE 20 G/30ML
20 SOLUTION ORAL DAILY
COMMUNITY

## 2017-01-01 RX ORDER — SULFAMETHOXAZOLE AND TRIMETHOPRIM 400; 80 MG/1; MG/1
1 TABLET ORAL 4 TIMES DAILY
Status: DISCONTINUED | OUTPATIENT
Start: 2017-01-01 | End: 2017-01-01 | Stop reason: HOSPADM

## 2017-01-01 RX ORDER — TRAZODONE HYDROCHLORIDE 100 MG/1
100 TABLET ORAL
Qty: 3 TABLET | Refills: 0 | Status: SHIPPED | OUTPATIENT
Start: 2017-01-01 | End: 2017-01-01

## 2017-01-01 RX ORDER — FUROSEMIDE 20 MG
20 TABLET ORAL DAILY
Status: CANCELLED | OUTPATIENT
Start: 2017-01-01

## 2017-01-01 RX ORDER — METHOCARBAMOL 500 MG/1
1000 TABLET, FILM COATED ORAL 4 TIMES DAILY PRN
Status: DISCONTINUED | OUTPATIENT
Start: 2017-01-01 | End: 2017-01-01

## 2017-01-01 RX ORDER — CEFTRIAXONE 2 G/1
2 INJECTION, POWDER, FOR SOLUTION INTRAMUSCULAR; INTRAVENOUS EVERY 24 HOURS
Status: DISCONTINUED | OUTPATIENT
Start: 2017-01-01 | End: 2017-01-01 | Stop reason: HOSPADM

## 2017-01-01 RX ORDER — ASCORBIC ACID 500 MG
500 TABLET ORAL DAILY
COMMUNITY

## 2017-01-01 RX ORDER — TRAZODONE HYDROCHLORIDE 100 MG/1
100 TABLET ORAL AT BEDTIME
Status: DISCONTINUED | OUTPATIENT
Start: 2017-01-01 | End: 2017-01-01

## 2017-01-01 RX ORDER — ACETAMINOPHEN 500 MG
1000 TABLET ORAL ONCE
Status: COMPLETED | OUTPATIENT
Start: 2017-01-01 | End: 2017-01-01

## 2017-01-01 RX ADMIN — RIFAXIMIN 550 MG: 550 TABLET ORAL at 08:15

## 2017-01-01 RX ADMIN — PROPOFOL 20 MG: 10 INJECTION, EMULSION INTRAVENOUS at 13:12

## 2017-01-01 RX ADMIN — INSULIN ASPART 4 UNITS: 100 INJECTION, SOLUTION INTRAVENOUS; SUBCUTANEOUS at 11:45

## 2017-01-01 RX ADMIN — METHOCARBAMOL 1000 MG: 500 TABLET ORAL at 12:50

## 2017-01-01 RX ADMIN — POTASSIUM CHLORIDE 20 MEQ: 1.5 POWDER, FOR SOLUTION ORAL at 19:31

## 2017-01-01 RX ADMIN — MIDAZOLAM HYDROCHLORIDE 2 MG: 1 INJECTION, SOLUTION INTRAMUSCULAR; INTRAVENOUS at 08:30

## 2017-01-01 RX ADMIN — INSULIN GLARGINE 20 UNITS: 100 INJECTION, SOLUTION SUBCUTANEOUS at 08:05

## 2017-01-01 RX ADMIN — SULFAMETHOXAZOLE AND TRIMETHOPRIM 400 MG: 80; 16 INJECTION, SOLUTION, CONCENTRATE INTRAVENOUS at 15:03

## 2017-01-01 RX ADMIN — RIFAXIMIN 550 MG: 550 TABLET ORAL at 21:13

## 2017-01-01 RX ADMIN — BUPRENORPHINE HYDROCHLORIDE, NALOXONE HYDROCHLORIDE 3 FILM: 8; 2 FILM, SOLUBLE BUCCAL; SUBLINGUAL at 08:17

## 2017-01-01 RX ADMIN — RIFAXIMIN 550 MG: 550 TABLET ORAL at 20:33

## 2017-01-01 RX ADMIN — TAZOBACTAM SODIUM AND PIPERACILLIN SODIUM 4.5 G: 500; 4 INJECTION, SOLUTION INTRAVENOUS at 22:28

## 2017-01-01 RX ADMIN — VANCOMYCIN HYDROCHLORIDE 1500 MG: 5 INJECTION, POWDER, LYOPHILIZED, FOR SOLUTION INTRAVENOUS at 02:46

## 2017-01-01 RX ADMIN — MIRTAZAPINE 30 MG: 15 TABLET, FILM COATED ORAL at 20:54

## 2017-01-01 RX ADMIN — METHOCARBAMOL 500 MG: 500 TABLET ORAL at 08:21

## 2017-01-01 RX ADMIN — LORATADINE 10 MG: 10 TABLET ORAL at 09:06

## 2017-01-01 RX ADMIN — OMEPRAZOLE 20 MG: 20 CAPSULE, DELAYED RELEASE ORAL at 08:19

## 2017-01-01 RX ADMIN — SODIUM CHLORIDE: 9 INJECTION, SOLUTION INTRAVENOUS at 00:43

## 2017-01-01 RX ADMIN — METHYLPREDNISOLONE SODIUM SUCCINATE 20 MG: 40 INJECTION, POWDER, LYOPHILIZED, FOR SOLUTION INTRAMUSCULAR; INTRAVENOUS at 00:12

## 2017-01-01 RX ADMIN — LACTULOSE 20 G: 20 SOLUTION ORAL at 15:52

## 2017-01-01 RX ADMIN — KETOROLAC TROMETHAMINE 15 MG: 15 INJECTION, SOLUTION INTRAMUSCULAR; INTRAVENOUS at 18:41

## 2017-01-01 RX ADMIN — SULFAMETHOXAZOLE AND TRIMETHOPRIM 400 MG: 200; 40 SUSPENSION ORAL at 09:19

## 2017-01-01 RX ADMIN — GABAPENTIN 600 MG: 600 TABLET, FILM COATED ORAL at 10:40

## 2017-01-01 RX ADMIN — HEPARIN SODIUM 5000 UNITS: 10000 INJECTION, SOLUTION INTRAVENOUS; SUBCUTANEOUS at 09:39

## 2017-01-01 RX ADMIN — Medication: at 16:57

## 2017-01-01 RX ADMIN — INSULIN HUMAN 10 UNITS: 100 INJECTION, SUSPENSION SUBCUTANEOUS at 08:56

## 2017-01-01 RX ADMIN — MORPHINE SULFATE 4 MG: 2 INJECTION, SOLUTION INTRAMUSCULAR; INTRAVENOUS at 14:36

## 2017-01-01 RX ADMIN — FERROUS SULFATE TAB 325 MG (65 MG ELEMENTAL FE) 325 MG: 325 (65 FE) TAB at 07:48

## 2017-01-01 RX ADMIN — GABAPENTIN 600 MG: 600 TABLET, FILM COATED ORAL at 10:41

## 2017-01-01 RX ADMIN — Medication 50 MG: at 09:26

## 2017-01-01 RX ADMIN — ASPIRIN 81 MG: 81 TABLET, COATED ORAL at 09:33

## 2017-01-01 RX ADMIN — BUPRENORPHINE HYDROCHLORIDE, NALOXONE HYDROCHLORIDE 3 FILM: 8; 2 FILM, SOLUBLE BUCCAL; SUBLINGUAL at 08:21

## 2017-01-01 RX ADMIN — INSULIN GLARGINE 10 UNITS: 100 INJECTION, SOLUTION SUBCUTANEOUS at 21:53

## 2017-01-01 RX ADMIN — METHYLPREDNISOLONE SODIUM SUCCINATE 40 MG: 40 INJECTION, POWDER, FOR SOLUTION INTRAMUSCULAR; INTRAVENOUS at 02:43

## 2017-01-01 RX ADMIN — PANTOPRAZOLE SODIUM 40 MG: 40 TABLET, DELAYED RELEASE ORAL at 17:17

## 2017-01-01 RX ADMIN — MIRTAZAPINE 30 MG: 30 TABLET, FILM COATED ORAL at 22:08

## 2017-01-01 RX ADMIN — SULFAMETHOXAZOLE AND TRIMETHOPRIM 1 TABLET: 800; 160 TABLET ORAL at 08:00

## 2017-01-01 RX ADMIN — Medication 2 PACKET: at 08:43

## 2017-01-01 RX ADMIN — POTASSIUM CHLORIDE 40 MEQ: 1.5 POWDER, FOR SOLUTION ORAL at 08:14

## 2017-01-01 RX ADMIN — FOLIC ACID 1 MG: 1 TABLET ORAL at 09:50

## 2017-01-01 RX ADMIN — LACTULOSE 20 G: 10 SOLUTION ORAL at 17:20

## 2017-01-01 RX ADMIN — HEPARIN SODIUM 5000 UNITS: 10000 INJECTION, SOLUTION INTRAVENOUS; SUBCUTANEOUS at 13:21

## 2017-01-01 RX ADMIN — RIFAXIMIN 550 MG: 550 TABLET ORAL at 20:58

## 2017-01-01 RX ADMIN — PANTOPRAZOLE SODIUM 40 MG: 40 TABLET, DELAYED RELEASE ORAL at 08:41

## 2017-01-01 RX ADMIN — IPRATROPIUM BROMIDE AND ALBUTEROL SULFATE 3 ML: .5; 3 SOLUTION RESPIRATORY (INHALATION) at 07:14

## 2017-01-01 RX ADMIN — METHYLPREDNISOLONE SODIUM SUCCINATE 20 MG: 40 INJECTION, POWDER, LYOPHILIZED, FOR SOLUTION INTRAMUSCULAR; INTRAVENOUS at 05:29

## 2017-01-01 RX ADMIN — IPRATROPIUM BROMIDE AND ALBUTEROL SULFATE 3 ML: .5; 3 SOLUTION RESPIRATORY (INHALATION) at 03:18

## 2017-01-01 RX ADMIN — IPRATROPIUM BROMIDE AND ALBUTEROL SULFATE 3 ML: .5; 3 SOLUTION RESPIRATORY (INHALATION) at 11:21

## 2017-01-01 RX ADMIN — IPRATROPIUM BROMIDE AND ALBUTEROL SULFATE 3 ML: .5; 3 SOLUTION RESPIRATORY (INHALATION) at 15:48

## 2017-01-01 RX ADMIN — IPRATROPIUM BROMIDE AND ALBUTEROL SULFATE 3 ML: .5; 3 SOLUTION RESPIRATORY (INHALATION) at 19:46

## 2017-01-01 RX ADMIN — PREDNISONE 40 MG: 20 TABLET ORAL at 11:49

## 2017-01-01 RX ADMIN — NICOTINE POLACRILEX 2 MG: 2 GUM, CHEWING ORAL at 21:52

## 2017-01-01 RX ADMIN — VANCOMYCIN HYDROCHLORIDE 1500 MG: 10 INJECTION, POWDER, LYOPHILIZED, FOR SOLUTION INTRAVENOUS at 21:19

## 2017-01-01 RX ADMIN — RIFAXIMIN 550 MG: 550 TABLET ORAL at 09:11

## 2017-01-01 RX ADMIN — INSULIN ASPART 9 UNITS: 100 INJECTION, SOLUTION INTRAVENOUS; SUBCUTANEOUS at 23:47

## 2017-01-01 RX ADMIN — INSULIN ASPART 4 UNITS: 100 INJECTION, SOLUTION INTRAVENOUS; SUBCUTANEOUS at 13:41

## 2017-01-01 RX ADMIN — MORPHINE SULFATE 2 MG: 2 INJECTION, SOLUTION INTRAMUSCULAR; INTRAVENOUS at 17:35

## 2017-01-01 RX ADMIN — GUAIFENESIN 600 MG: 600 TABLET, EXTENDED RELEASE ORAL at 20:44

## 2017-01-01 RX ADMIN — IPRATROPIUM BROMIDE AND ALBUTEROL SULFATE 3 ML: .5; 3 SOLUTION RESPIRATORY (INHALATION) at 20:24

## 2017-01-01 RX ADMIN — INSULIN ASPART 6 UNITS: 100 INJECTION, SOLUTION INTRAVENOUS; SUBCUTANEOUS at 18:32

## 2017-01-01 RX ADMIN — LACTULOSE 20 G: 20 SOLUTION ORAL at 08:12

## 2017-01-01 RX ADMIN — PANTOPRAZOLE SODIUM 40 MG: 40 TABLET, DELAYED RELEASE ORAL at 09:04

## 2017-01-01 RX ADMIN — RIFAXIMIN 550 MG: 550 TABLET ORAL at 20:48

## 2017-01-01 RX ADMIN — IPRATROPIUM BROMIDE AND ALBUTEROL SULFATE 3 ML: .5; 3 SOLUTION RESPIRATORY (INHALATION) at 11:44

## 2017-01-01 RX ADMIN — NICOTINE 1 PATCH: 14 PATCH, EXTENDED RELEASE TRANSDERMAL at 08:29

## 2017-01-01 RX ADMIN — IPRATROPIUM BROMIDE AND ALBUTEROL SULFATE 6 ML: 2.5; .5 SOLUTION RESPIRATORY (INHALATION) at 18:53

## 2017-01-01 RX ADMIN — SODIUM CHLORIDE, POTASSIUM CHLORIDE, SODIUM LACTATE AND CALCIUM CHLORIDE 2559 ML: 600; 310; 30; 20 INJECTION, SOLUTION INTRAVENOUS at 01:04

## 2017-01-01 RX ADMIN — PREDNISONE 60 MG: 50 TABLET ORAL at 09:59

## 2017-01-01 RX ADMIN — FOLIC ACID 1 MG: 1 TABLET ORAL at 08:35

## 2017-01-01 RX ADMIN — RIFAXIMIN 550 MG: 550 TABLET ORAL at 20:51

## 2017-01-01 RX ADMIN — SULFUR HEXAFLUORIDE 5 ML: KIT at 12:58

## 2017-01-01 RX ADMIN — FUROSEMIDE 20 MG: 10 INJECTION, SOLUTION INTRAVENOUS at 23:40

## 2017-01-01 RX ADMIN — ATORVASTATIN CALCIUM 20 MG: 20 TABLET, FILM COATED ORAL at 20:40

## 2017-01-01 RX ADMIN — GUAIFENESIN 1200 MG: 600 TABLET, EXTENDED RELEASE ORAL at 12:50

## 2017-01-01 RX ADMIN — FLUOXETINE 40 MG: 20 CAPSULE ORAL at 08:40

## 2017-01-01 RX ADMIN — DEXTROSE MONOHYDRATE: 100 INJECTION, SOLUTION INTRAVENOUS at 05:56

## 2017-01-01 RX ADMIN — FUROSEMIDE 20 MG: 20 TABLET ORAL at 10:40

## 2017-01-01 RX ADMIN — SODIUM CHLORIDE: 9 INJECTION, SOLUTION INTRAVENOUS at 01:07

## 2017-01-01 RX ADMIN — IRON 325 MG: 65 TABLET ORAL at 09:58

## 2017-01-01 RX ADMIN — MIRTAZAPINE 15 MG: 15 TABLET, FILM COATED ORAL at 21:47

## 2017-01-01 RX ADMIN — METHYLPREDNISOLONE SODIUM SUCCINATE 125 MG: 125 INJECTION, POWDER, FOR SOLUTION INTRAMUSCULAR; INTRAVENOUS at 09:57

## 2017-01-01 RX ADMIN — GABAPENTIN 600 MG: 600 TABLET, FILM COATED ORAL at 08:32

## 2017-01-01 RX ADMIN — MIRTAZAPINE 30 MG: 15 TABLET, FILM COATED ORAL at 21:43

## 2017-01-01 RX ADMIN — OMEPRAZOLE 20 MG: 20 CAPSULE, DELAYED RELEASE ORAL at 09:33

## 2017-01-01 RX ADMIN — IPRATROPIUM BROMIDE AND ALBUTEROL SULFATE 3 ML: .5; 3 SOLUTION RESPIRATORY (INHALATION) at 08:29

## 2017-01-01 RX ADMIN — FUROSEMIDE 20 MG: 20 TABLET ORAL at 08:04

## 2017-01-01 RX ADMIN — GABAPENTIN 600 MG: 250 SUSPENSION ORAL at 21:50

## 2017-01-01 RX ADMIN — MIDAZOLAM 1 MG: 1 INJECTION INTRAMUSCULAR; INTRAVENOUS at 09:45

## 2017-01-01 RX ADMIN — ENOXAPARIN SODIUM 40 MG: 40 INJECTION SUBCUTANEOUS at 13:30

## 2017-01-01 RX ADMIN — TAZOBACTAM SODIUM AND PIPERACILLIN SODIUM 4.5 G: 500; 4 INJECTION, SOLUTION INTRAVENOUS at 10:31

## 2017-01-01 RX ADMIN — ATORVASTATIN CALCIUM 20 MG: 20 TABLET, FILM COATED ORAL at 09:46

## 2017-01-01 RX ADMIN — LACTULOSE 20 G: 10 SOLUTION ORAL at 22:10

## 2017-01-01 RX ADMIN — IPRATROPIUM BROMIDE AND ALBUTEROL SULFATE 3 ML: .5; 3 SOLUTION RESPIRATORY (INHALATION) at 19:27

## 2017-01-01 RX ADMIN — METHYLPREDNISOLONE SODIUM SUCCINATE 20 MG: 40 INJECTION, POWDER, LYOPHILIZED, FOR SOLUTION INTRAMUSCULAR; INTRAVENOUS at 11:27

## 2017-01-01 RX ADMIN — GABAPENTIN 600 MG: 600 TABLET, FILM COATED ORAL at 08:12

## 2017-01-01 RX ADMIN — PANTOPRAZOLE SODIUM 40 MG: 40 INJECTION, POWDER, FOR SOLUTION INTRAVENOUS at 06:58

## 2017-01-01 RX ADMIN — GABAPENTIN 600 MG: 600 TABLET, FILM COATED ORAL at 16:26

## 2017-01-01 RX ADMIN — SODIUM CHLORIDE 1000 ML: 9 INJECTION, SOLUTION INTRAVENOUS at 00:42

## 2017-01-01 RX ADMIN — MORPHINE SULFATE 2 MG: 2 INJECTION, SOLUTION INTRAMUSCULAR; INTRAVENOUS at 09:19

## 2017-01-01 RX ADMIN — LACTULOSE 30 ML: 10 SOLUTION ORAL at 11:53

## 2017-01-01 RX ADMIN — NICOTINE 1 PATCH: 21 PATCH, EXTENDED RELEASE TRANSDERMAL at 10:46

## 2017-01-01 RX ADMIN — IPRATROPIUM BROMIDE AND ALBUTEROL SULFATE 3 ML: .5; 3 SOLUTION RESPIRATORY (INHALATION) at 11:05

## 2017-01-01 RX ADMIN — METHOCARBAMOL 1000 MG: 500 TABLET ORAL at 19:49

## 2017-01-01 RX ADMIN — HYDROXYZINE HYDROCHLORIDE 10 MG: 10 TABLET ORAL at 03:10

## 2017-01-01 RX ADMIN — ATORVASTATIN CALCIUM 20 MG: 20 TABLET, FILM COATED ORAL at 21:02

## 2017-01-01 RX ADMIN — TAZOBACTAM SODIUM AND PIPERACILLIN SODIUM 4.5 G: 500; 4 INJECTION, SOLUTION INTRAVENOUS at 11:22

## 2017-01-01 RX ADMIN — NICOTINE POLACRILEX 2 MG: 2 GUM, CHEWING ORAL at 13:21

## 2017-01-01 RX ADMIN — TAZOBACTAM SODIUM AND PIPERACILLIN SODIUM 4.5 G: 500; 4 INJECTION, SOLUTION INTRAVENOUS at 11:09

## 2017-01-01 RX ADMIN — INSULIN ASPART 5 UNITS: 100 INJECTION, SOLUTION INTRAVENOUS; SUBCUTANEOUS at 03:49

## 2017-01-01 RX ADMIN — FOLIC ACID 1 MG: 1 TABLET ORAL at 08:25

## 2017-01-01 RX ADMIN — LORAZEPAM 1 MG: 2 INJECTION, SOLUTION INTRAMUSCULAR; INTRAVENOUS at 21:37

## 2017-01-01 RX ADMIN — SPIRONOLACTONE 50 MG: 25 TABLET ORAL at 10:39

## 2017-01-01 RX ADMIN — GABAPENTIN 600 MG: 300 CAPSULE ORAL at 14:44

## 2017-01-01 RX ADMIN — KETOROLAC TROMETHAMINE 30 MG: 15 INJECTION, SOLUTION INTRAMUSCULAR; INTRAVENOUS at 03:12

## 2017-01-01 RX ADMIN — TAZOBACTAM SODIUM AND PIPERACILLIN SODIUM 4.5 G: 500; 4 INJECTION, SOLUTION INTRAVENOUS at 22:03

## 2017-01-01 RX ADMIN — TAZOBACTAM SODIUM AND PIPERACILLIN SODIUM 4.5 G: 500; 4 INJECTION, SOLUTION INTRAVENOUS at 16:07

## 2017-01-01 RX ADMIN — TAZOBACTAM SODIUM AND PIPERACILLIN SODIUM 4.5 G: 500; 4 INJECTION, SOLUTION INTRAVENOUS at 23:11

## 2017-01-01 RX ADMIN — ALBUTEROL SULFATE 2.5 MG: 2.5 SOLUTION RESPIRATORY (INHALATION) at 17:23

## 2017-01-01 RX ADMIN — NICOTINE POLACRILEX 2 MG: 2 GUM, CHEWING ORAL at 13:07

## 2017-01-01 RX ADMIN — PROPOFOL 40 MCG/KG/MIN: 10 INJECTION, EMULSION INTRAVENOUS at 05:33

## 2017-01-01 RX ADMIN — HYDROCODONE BITARTRATE AND ACETAMINOPHEN 1 TABLET: 5; 325 TABLET ORAL at 16:20

## 2017-01-01 RX ADMIN — LACTULOSE 20 G: 20 SOLUTION ORAL at 22:12

## 2017-01-01 RX ADMIN — TAZOBACTAM SODIUM AND PIPERACILLIN SODIUM 4.5 G: 500; 4 INJECTION, SOLUTION INTRAVENOUS at 09:42

## 2017-01-01 RX ADMIN — RIFAXIMIN 550 MG: 550 TABLET ORAL at 21:37

## 2017-01-01 RX ADMIN — HYDROMORPHONE HYDROCHLORIDE 0.2 MG: 1 INJECTION, SOLUTION INTRAMUSCULAR; INTRAVENOUS; SUBCUTANEOUS at 20:52

## 2017-01-01 RX ADMIN — LORAZEPAM 1 MG: 2 INJECTION INTRAMUSCULAR; INTRAVENOUS at 10:54

## 2017-01-01 RX ADMIN — FOLIC ACID 1 MG: 1 TABLET ORAL at 11:50

## 2017-01-01 RX ADMIN — GUAIFENESIN 1200 MG: 600 TABLET, EXTENDED RELEASE ORAL at 09:28

## 2017-01-01 RX ADMIN — GABAPENTIN 600 MG: 600 TABLET, FILM COATED ORAL at 21:47

## 2017-01-01 RX ADMIN — PROPOFOL 40 MCG/KG/MIN: 10 INJECTION, EMULSION INTRAVENOUS at 10:28

## 2017-01-01 RX ADMIN — METHYLPREDNISOLONE SODIUM SUCCINATE 40 MG: 40 INJECTION, POWDER, FOR SOLUTION INTRAMUSCULAR; INTRAVENOUS at 17:55

## 2017-01-01 RX ADMIN — THIAMINE HCL (VITAMIN B1) 50 MG TABLET 50 MG: at 09:05

## 2017-01-01 RX ADMIN — VANCOMYCIN HYDROCHLORIDE 1750 MG: 10 INJECTION, POWDER, LYOPHILIZED, FOR SOLUTION INTRAVENOUS at 21:22

## 2017-01-01 RX ADMIN — LACTULOSE 30 ML: 10 SOLUTION ORAL at 16:12

## 2017-01-01 RX ADMIN — LORAZEPAM 2 MG: 2 INJECTION INTRAMUSCULAR; INTRAVENOUS at 23:55

## 2017-01-01 RX ADMIN — IPRATROPIUM BROMIDE AND ALBUTEROL SULFATE 3 ML: .5; 3 SOLUTION RESPIRATORY (INHALATION) at 07:29

## 2017-01-01 RX ADMIN — GABAPENTIN 600 MG: 600 TABLET, FILM COATED ORAL at 16:12

## 2017-01-01 RX ADMIN — LACTULOSE 30 ML: 10 SOLUTION ORAL at 08:22

## 2017-01-01 RX ADMIN — PANTOPRAZOLE SODIUM 40 MG: 40 TABLET, DELAYED RELEASE ORAL at 08:32

## 2017-01-01 RX ADMIN — FLUOXETINE 40 MG: 20 CAPSULE ORAL at 08:52

## 2017-01-01 RX ADMIN — LACTULOSE 30 ML: 10 SOLUTION ORAL at 16:47

## 2017-01-01 RX ADMIN — HYDROCODONE BITARTRATE AND ACETAMINOPHEN 1 TABLET: 5; 325 TABLET ORAL at 08:07

## 2017-01-01 RX ADMIN — GABAPENTIN 600 MG: 600 TABLET, FILM COATED ORAL at 09:32

## 2017-01-01 RX ADMIN — INSULIN GLARGINE 20 UNITS: 100 INJECTION, SOLUTION SUBCUTANEOUS at 13:14

## 2017-01-01 RX ADMIN — PROPOFOL 40 MCG/KG/MIN: 10 INJECTION, EMULSION INTRAVENOUS at 23:55

## 2017-01-01 RX ADMIN — KETOROLAC TROMETHAMINE 30 MG: 30 INJECTION, SOLUTION INTRAMUSCULAR at 23:49

## 2017-01-01 RX ADMIN — GABAPENTIN 600 MG: 600 TABLET, FILM COATED ORAL at 19:38

## 2017-01-01 RX ADMIN — PREDNISONE 60 MG: 10 TABLET ORAL at 08:50

## 2017-01-01 RX ADMIN — METHOCARBAMOL 500 MG: 500 TABLET ORAL at 15:40

## 2017-01-01 RX ADMIN — SODIUM CHLORIDE 500 ML: 9 INJECTION, SOLUTION INTRAVENOUS at 22:04

## 2017-01-01 RX ADMIN — VANCOMYCIN HYDROCHLORIDE 1000 MG: 1 INJECTION, SOLUTION INTRAVENOUS at 09:36

## 2017-01-01 RX ADMIN — GABAPENTIN 600 MG: 600 TABLET, FILM COATED ORAL at 16:27

## 2017-01-01 RX ADMIN — GABAPENTIN 400 MG: 250 SUSPENSION ORAL at 16:12

## 2017-01-01 RX ADMIN — GABAPENTIN 600 MG: 600 TABLET, FILM COATED ORAL at 08:40

## 2017-01-01 RX ADMIN — IPRATROPIUM BROMIDE AND ALBUTEROL SULFATE 6 ML: .5; 3 SOLUTION RESPIRATORY (INHALATION) at 21:54

## 2017-01-01 RX ADMIN — OMEPRAZOLE 20 MG: 20 CAPSULE, DELAYED RELEASE ORAL at 08:42

## 2017-01-01 RX ADMIN — GABAPENTIN 600 MG: 250 SUSPENSION ORAL at 20:41

## 2017-01-01 RX ADMIN — Medication: at 08:17

## 2017-01-01 RX ADMIN — IPRATROPIUM BROMIDE AND ALBUTEROL SULFATE 3 ML: .5; 3 SOLUTION RESPIRATORY (INHALATION) at 16:04

## 2017-01-01 RX ADMIN — LACTULOSE 20 G: 20 SOLUTION ORAL at 20:59

## 2017-01-01 RX ADMIN — OMEPRAZOLE 20 MG: 20 CAPSULE, DELAYED RELEASE ORAL at 13:44

## 2017-01-01 RX ADMIN — GABAPENTIN 600 MG: 300 CAPSULE ORAL at 20:48

## 2017-01-01 RX ADMIN — LACTULOSE 20 G: 10 SOLUTION ORAL at 21:39

## 2017-01-01 RX ADMIN — INSULIN GLARGINE 20 UNITS: 100 INJECTION, SOLUTION SUBCUTANEOUS at 11:54

## 2017-01-01 RX ADMIN — VANCOMYCIN HYDROCHLORIDE 1250 MG: 10 INJECTION, POWDER, LYOPHILIZED, FOR SOLUTION INTRAVENOUS at 13:37

## 2017-01-01 RX ADMIN — RIFAXIMIN 550 MG: 550 TABLET ORAL at 08:35

## 2017-01-01 RX ADMIN — HYDROCODONE BITARTRATE AND ACETAMINOPHEN 1 TABLET: 5; 325 TABLET ORAL at 15:47

## 2017-01-01 RX ADMIN — Medication 40 MG: at 08:04

## 2017-01-01 RX ADMIN — VANCOMYCIN HYDROCHLORIDE 1250 MG: 10 INJECTION, POWDER, LYOPHILIZED, FOR SOLUTION INTRAVENOUS at 02:01

## 2017-01-01 RX ADMIN — IPRATROPIUM BROMIDE AND ALBUTEROL SULFATE 3 ML: .5; 3 SOLUTION RESPIRATORY (INHALATION) at 08:33

## 2017-01-01 RX ADMIN — PROPOFOL 40 MG: 10 INJECTION, EMULSION INTRAVENOUS at 12:58

## 2017-01-01 RX ADMIN — DEXMEDETOMIDINE 0.4 MCG/KG/HR: 100 INJECTION, SOLUTION, CONCENTRATE INTRAVENOUS at 02:34

## 2017-01-01 RX ADMIN — METHOCARBAMOL 500 MG: 500 TABLET ORAL at 08:15

## 2017-01-01 RX ADMIN — FUROSEMIDE 20 MG: 10 INJECTION, SOLUTION INTRAVENOUS at 15:40

## 2017-01-01 RX ADMIN — IPRATROPIUM BROMIDE AND ALBUTEROL SULFATE 3 ML: .5; 3 SOLUTION RESPIRATORY (INHALATION) at 11:27

## 2017-01-01 RX ADMIN — MIRTAZAPINE 30 MG: 15 TABLET, FILM COATED ORAL at 21:16

## 2017-01-01 RX ADMIN — FLUOXETINE HYDROCHLORIDE 40 MG: 20 LIQUID ORAL at 08:24

## 2017-01-01 RX ADMIN — LEVOFLOXACIN 750 MG: 5 INJECTION, SOLUTION INTRAVENOUS at 16:04

## 2017-01-01 RX ADMIN — METHYLPREDNISOLONE SODIUM SUCCINATE 40 MG: 40 INJECTION, POWDER, FOR SOLUTION INTRAMUSCULAR; INTRAVENOUS at 11:27

## 2017-01-01 RX ADMIN — PANTOPRAZOLE SODIUM 40 MG: 40 TABLET, DELAYED RELEASE ORAL at 08:25

## 2017-01-01 RX ADMIN — LACTULOSE 20 G: 20 SOLUTION ORAL at 08:04

## 2017-01-01 RX ADMIN — Medication 40 MG: at 08:43

## 2017-01-01 RX ADMIN — IPRATROPIUM BROMIDE AND ALBUTEROL SULFATE 3 ML: .5; 3 SOLUTION RESPIRATORY (INHALATION) at 00:56

## 2017-01-01 RX ADMIN — SULFAMETHOXAZOLE AND TRIMETHOPRIM 1 TABLET: 400; 80 TABLET ORAL at 21:47

## 2017-01-01 RX ADMIN — FLUOXETINE HYDROCHLORIDE 40 MG: 20 LIQUID ORAL at 09:08

## 2017-01-01 RX ADMIN — PANTOPRAZOLE SODIUM 40 MG: 20 TABLET, DELAYED RELEASE ORAL at 08:31

## 2017-01-01 RX ADMIN — INSULIN GLARGINE 20 UNITS: 100 INJECTION, SOLUTION SUBCUTANEOUS at 08:15

## 2017-01-01 RX ADMIN — MORPHINE SULFATE 4 MG: 2 INJECTION, SOLUTION INTRAMUSCULAR; INTRAVENOUS at 12:25

## 2017-01-01 RX ADMIN — MORPHINE SULFATE 2 MG: 2 INJECTION, SOLUTION INTRAMUSCULAR; INTRAVENOUS at 12:44

## 2017-01-01 RX ADMIN — IPRATROPIUM BROMIDE AND ALBUTEROL SULFATE 3 ML: .5; 3 SOLUTION RESPIRATORY (INHALATION) at 00:22

## 2017-01-01 RX ADMIN — GABAPENTIN 600 MG: 600 TABLET, FILM COATED ORAL at 16:03

## 2017-01-01 RX ADMIN — SULFAMETHOXAZOLE AND TRIMETHOPRIM 400 MG: 200; 40 SUSPENSION ORAL at 08:18

## 2017-01-01 RX ADMIN — IPRATROPIUM BROMIDE AND ALBUTEROL SULFATE 3 ML: .5; 3 SOLUTION RESPIRATORY (INHALATION) at 08:34

## 2017-01-01 RX ADMIN — RIFAXIMIN 550 MG: 550 TABLET ORAL at 22:12

## 2017-01-01 RX ADMIN — SODIUM CHLORIDE: 9 INJECTION, SOLUTION INTRAVENOUS at 22:27

## 2017-01-01 RX ADMIN — MICONAZOLE NITRATE: 20 CREAM TOPICAL at 20:54

## 2017-01-01 RX ADMIN — IPRATROPIUM BROMIDE AND ALBUTEROL SULFATE 3 ML: .5; 3 SOLUTION RESPIRATORY (INHALATION) at 19:32

## 2017-01-01 RX ADMIN — Medication 5 MG: at 21:02

## 2017-01-01 RX ADMIN — HYDROMORPHONE HYDROCHLORIDE 1 MG: 1 SOLUTION ORAL at 23:44

## 2017-01-01 RX ADMIN — PROPOFOL 20 MG: 10 INJECTION, EMULSION INTRAVENOUS at 13:03

## 2017-01-01 RX ADMIN — ASPIRIN 81 MG 81 MG: 81 TABLET ORAL at 08:53

## 2017-01-01 RX ADMIN — PROPOFOL 10 MCG/KG/MIN: 10 INJECTION, EMULSION INTRAVENOUS at 06:07

## 2017-01-01 RX ADMIN — ENOXAPARIN SODIUM 40 MG: 40 INJECTION SUBCUTANEOUS at 16:26

## 2017-01-01 RX ADMIN — GABAPENTIN 600 MG: 600 TABLET, FILM COATED ORAL at 21:02

## 2017-01-01 RX ADMIN — FOLIC ACID 1 MG: 1 TABLET ORAL at 10:41

## 2017-01-01 RX ADMIN — FLUOXETINE 40 MG: 20 CAPSULE ORAL at 10:00

## 2017-01-01 RX ADMIN — TAZOBACTAM SODIUM AND PIPERACILLIN SODIUM 4.5 G: 500; 4 INJECTION, SOLUTION INTRAVENOUS at 10:37

## 2017-01-01 RX ADMIN — LACTULOSE 20 G: 10 SOLUTION ORAL at 08:24

## 2017-01-01 RX ADMIN — LORATADINE 10 MG: 10 TABLET ORAL at 10:41

## 2017-01-01 RX ADMIN — FUROSEMIDE 20 MG: 20 TABLET ORAL at 08:52

## 2017-01-01 RX ADMIN — ONDANSETRON 4 MG: 2 INJECTION INTRAMUSCULAR; INTRAVENOUS at 16:47

## 2017-01-01 RX ADMIN — LACTULOSE 20 G: 10 SOLUTION ORAL at 17:00

## 2017-01-01 RX ADMIN — PREDNISONE 40 MG: 20 TABLET ORAL at 07:47

## 2017-01-01 RX ADMIN — METHOCARBAMOL 1000 MG: 500 TABLET ORAL at 18:39

## 2017-01-01 RX ADMIN — NICOTINE POLACRILEX 2 MG: 2 GUM, CHEWING ORAL at 12:22

## 2017-01-01 RX ADMIN — IPRATROPIUM BROMIDE AND ALBUTEROL SULFATE 3 ML: .5; 3 SOLUTION RESPIRATORY (INHALATION) at 23:34

## 2017-01-01 RX ADMIN — LEVOFLOXACIN 750 MG: 750 TABLET, FILM COATED ORAL at 20:36

## 2017-01-01 RX ADMIN — FOLIC ACID 1 MG: 1 TABLET ORAL at 08:16

## 2017-01-01 RX ADMIN — INSULIN ASPART 3 UNITS: 100 INJECTION, SOLUTION INTRAVENOUS; SUBCUTANEOUS at 03:57

## 2017-01-01 RX ADMIN — SODIUM CHLORIDE: 9 INJECTION, SOLUTION INTRAVENOUS at 12:07

## 2017-01-01 RX ADMIN — FLUOXETINE 40 MG: 20 CAPSULE ORAL at 09:07

## 2017-01-01 RX ADMIN — ATORVASTATIN CALCIUM 20 MG: 20 TABLET, FILM COATED ORAL at 08:25

## 2017-01-01 RX ADMIN — METHOCARBAMOL 1000 MG: 500 TABLET ORAL at 21:40

## 2017-01-01 RX ADMIN — GABAPENTIN 600 MG: 600 TABLET, FILM COATED ORAL at 17:43

## 2017-01-01 RX ADMIN — FLUOXETINE HYDROCHLORIDE 40 MG: 20 LIQUID ORAL at 12:17

## 2017-01-01 RX ADMIN — ENOXAPARIN SODIUM 40 MG: 40 INJECTION SUBCUTANEOUS at 12:27

## 2017-01-01 RX ADMIN — LACTULOSE 20 G: 10 SOLUTION ORAL at 16:45

## 2017-01-01 RX ADMIN — VANCOMYCIN HYDROCHLORIDE 1750 MG: 1 INJECTION, POWDER, LYOPHILIZED, FOR SOLUTION INTRAVENOUS at 00:03

## 2017-01-01 RX ADMIN — SULFAMETHOXAZOLE AND TRIMETHOPRIM 2 TABLET: 800; 160 TABLET ORAL at 13:56

## 2017-01-01 RX ADMIN — METHOCARBAMOL 1000 MG: 500 TABLET ORAL at 13:52

## 2017-01-01 RX ADMIN — PANTOPRAZOLE SODIUM 40 MG: 40 TABLET, DELAYED RELEASE ORAL at 07:05

## 2017-01-01 RX ADMIN — HYDROMORPHONE HYDROCHLORIDE 1 MG: 1 SOLUTION ORAL at 16:05

## 2017-01-01 RX ADMIN — METHOCARBAMOL 500 MG: 500 TABLET ORAL at 22:03

## 2017-01-01 RX ADMIN — Medication: at 16:58

## 2017-01-01 RX ADMIN — IPRATROPIUM BROMIDE AND ALBUTEROL SULFATE 3 ML: .5; 3 SOLUTION RESPIRATORY (INHALATION) at 11:48

## 2017-01-01 RX ADMIN — NICOTINE POLACRILEX 2 MG: 2 GUM, CHEWING ORAL at 14:45

## 2017-01-01 RX ADMIN — LACTULOSE 20 G: 10 SOLUTION ORAL at 10:38

## 2017-01-01 RX ADMIN — MORPHINE SULFATE 2 MG: 2 INJECTION, SOLUTION INTRAMUSCULAR; INTRAVENOUS at 08:26

## 2017-01-01 RX ADMIN — SULFAMETHOXAZOLE AND TRIMETHOPRIM 2 TABLET: 800; 160 TABLET ORAL at 21:41

## 2017-01-01 RX ADMIN — SODIUM CHLORIDE: 9 INJECTION, SOLUTION INTRAVENOUS at 12:37

## 2017-01-01 RX ADMIN — VANCOMYCIN HYDROCHLORIDE 1250 MG: 5 INJECTION, POWDER, LYOPHILIZED, FOR SOLUTION INTRAVENOUS at 05:12

## 2017-01-01 RX ADMIN — Medication: at 05:15

## 2017-01-01 RX ADMIN — SPIRONOLACTONE 50 MG: 25 TABLET ORAL at 13:44

## 2017-01-01 RX ADMIN — METHYLPREDNISOLONE SODIUM SUCCINATE 20 MG: 40 INJECTION, POWDER, LYOPHILIZED, FOR SOLUTION INTRAMUSCULAR; INTRAVENOUS at 18:19

## 2017-01-01 RX ADMIN — LORATADINE 10 MG: 10 TABLET ORAL at 08:25

## 2017-01-01 RX ADMIN — TRAZODONE HYDROCHLORIDE 100 MG: 100 TABLET ORAL at 21:40

## 2017-01-01 RX ADMIN — METHYLPREDNISOLONE SODIUM SUCCINATE 40 MG: 40 INJECTION, POWDER, FOR SOLUTION INTRAMUSCULAR; INTRAVENOUS at 12:41

## 2017-01-01 RX ADMIN — INSULIN ASPART 4 UNITS: 100 INJECTION, SOLUTION INTRAVENOUS; SUBCUTANEOUS at 13:00

## 2017-01-01 RX ADMIN — MORPHINE SULFATE 2 MG: 2 INJECTION, SOLUTION INTRAMUSCULAR; INTRAVENOUS at 21:51

## 2017-01-01 RX ADMIN — LACTULOSE 20 G: 20 SOLUTION ORAL at 09:09

## 2017-01-01 RX ADMIN — LACTULOSE 20 G: 20 SOLUTION ORAL at 16:10

## 2017-01-01 RX ADMIN — IPRATROPIUM BROMIDE AND ALBUTEROL SULFATE 3 ML: .5; 3 SOLUTION RESPIRATORY (INHALATION) at 11:35

## 2017-01-01 RX ADMIN — OXYCODONE HYDROCHLORIDE AND ACETAMINOPHEN 500 MG: 500 TABLET ORAL at 08:58

## 2017-01-01 RX ADMIN — MICONAZOLE NITRATE: 20 CREAM TOPICAL at 09:05

## 2017-01-01 RX ADMIN — HEPARIN SODIUM 5000 UNITS: 10000 INJECTION, SOLUTION INTRAVENOUS; SUBCUTANEOUS at 22:17

## 2017-01-01 RX ADMIN — ONDANSETRON 4 MG: 2 INJECTION INTRAMUSCULAR; INTRAVENOUS at 19:16

## 2017-01-01 RX ADMIN — NICOTINE POLACRILEX 2 MG: 2 GUM, CHEWING ORAL at 10:49

## 2017-01-01 RX ADMIN — METHOCARBAMOL 1000 MG: 500 TABLET ORAL at 12:40

## 2017-01-01 RX ADMIN — MIRTAZAPINE 30 MG: 15 TABLET, FILM COATED ORAL at 21:29

## 2017-01-01 RX ADMIN — IPRATROPIUM BROMIDE AND ALBUTEROL SULFATE 3 ML: .5; 3 SOLUTION RESPIRATORY (INHALATION) at 16:03

## 2017-01-01 RX ADMIN — RIFAXIMIN 550 MG: 550 TABLET ORAL at 08:33

## 2017-01-01 RX ADMIN — IPRATROPIUM BROMIDE AND ALBUTEROL SULFATE 3 ML: .5; 3 SOLUTION RESPIRATORY (INHALATION) at 07:44

## 2017-01-01 RX ADMIN — LACTULOSE 20 G: 10 SOLUTION ORAL at 16:04

## 2017-01-01 RX ADMIN — INSULIN GLARGINE 20 UNITS: 100 INJECTION, SOLUTION SUBCUTANEOUS at 21:44

## 2017-01-01 RX ADMIN — IPRATROPIUM BROMIDE AND ALBUTEROL SULFATE 3 ML: .5; 3 SOLUTION RESPIRATORY (INHALATION) at 15:34

## 2017-01-01 RX ADMIN — RIFAXIMIN 550 MG: 550 TABLET ORAL at 20:39

## 2017-01-01 RX ADMIN — GABAPENTIN 600 MG: 600 TABLET, FILM COATED ORAL at 15:41

## 2017-01-01 RX ADMIN — Medication 5 MG: at 22:11

## 2017-01-01 RX ADMIN — HYDROMORPHONE HYDROCHLORIDE 1 MG: 1 SOLUTION ORAL at 07:16

## 2017-01-01 RX ADMIN — LACTULOSE 20 G: 20 SOLUTION ORAL at 13:55

## 2017-01-01 RX ADMIN — KETOROLAC TROMETHAMINE 15 MG: 15 INJECTION, SOLUTION INTRAMUSCULAR; INTRAVENOUS at 19:42

## 2017-01-01 RX ADMIN — IPRATROPIUM BROMIDE AND ALBUTEROL SULFATE 3 ML: .5; 3 SOLUTION RESPIRATORY (INHALATION) at 12:32

## 2017-01-01 RX ADMIN — SODIUM CHLORIDE 1000 ML: 9 INJECTION, SOLUTION INTRAVENOUS at 21:13

## 2017-01-01 RX ADMIN — SODIUM CHLORIDE, PRESERVATIVE FREE 5 ML: 5 INJECTION INTRAVENOUS at 18:20

## 2017-01-01 RX ADMIN — IPRATROPIUM BROMIDE AND ALBUTEROL SULFATE 3 ML: .5; 3 SOLUTION RESPIRATORY (INHALATION) at 07:21

## 2017-01-01 RX ADMIN — HYDROXYZINE HYDROCHLORIDE 10 MG: 10 TABLET ORAL at 16:24

## 2017-01-01 RX ADMIN — PANTOPRAZOLE SODIUM 40 MG: 40 TABLET, DELAYED RELEASE ORAL at 07:48

## 2017-01-01 RX ADMIN — METHYLPREDNISOLONE SODIUM SUCCINATE 40 MG: 40 INJECTION, POWDER, FOR SOLUTION INTRAMUSCULAR; INTRAVENOUS at 04:11

## 2017-01-01 RX ADMIN — PANTOPRAZOLE SODIUM 40 MG: 40 TABLET, DELAYED RELEASE ORAL at 10:15

## 2017-01-01 RX ADMIN — PROPOFOL 25 MCG/KG/MIN: 10 INJECTION, EMULSION INTRAVENOUS at 04:17

## 2017-01-01 RX ADMIN — SULFAMETHOXAZOLE AND TRIMETHOPRIM 400 MG: 200; 40 SUSPENSION ORAL at 16:13

## 2017-01-01 RX ADMIN — MICONAZOLE NITRATE: 20 CREAM TOPICAL at 17:11

## 2017-01-01 RX ADMIN — MORPHINE SULFATE 2 MG: 2 INJECTION, SOLUTION INTRAMUSCULAR; INTRAVENOUS at 20:31

## 2017-01-01 RX ADMIN — RIFAXIMIN 550 MG: 550 TABLET ORAL at 22:14

## 2017-01-01 RX ADMIN — RIFAXIMIN 550 MG: 550 TABLET ORAL at 10:13

## 2017-01-01 RX ADMIN — TAZOBACTAM SODIUM AND PIPERACILLIN SODIUM 4.5 G: 500; 4 INJECTION, SOLUTION INTRAVENOUS at 11:26

## 2017-01-01 RX ADMIN — PREDNISONE 40 MG: 20 TABLET ORAL at 08:09

## 2017-01-01 RX ADMIN — METHYLPREDNISOLONE SODIUM SUCCINATE 40 MG: 40 INJECTION, POWDER, FOR SOLUTION INTRAMUSCULAR; INTRAVENOUS at 05:57

## 2017-01-01 RX ADMIN — METHOCARBAMOL 1000 MG: 500 TABLET ORAL at 21:30

## 2017-01-01 RX ADMIN — SULFAMETHOXAZOLE AND TRIMETHOPRIM 400 MG: 200; 40 SUSPENSION ORAL at 21:01

## 2017-01-01 RX ADMIN — GABAPENTIN 600 MG: 600 TABLET, FILM COATED ORAL at 21:48

## 2017-01-01 RX ADMIN — LORAZEPAM 1 MG: 2 INJECTION INTRAMUSCULAR; INTRAVENOUS at 04:16

## 2017-01-01 RX ADMIN — GABAPENTIN 600 MG: 300 CAPSULE ORAL at 06:42

## 2017-01-01 RX ADMIN — GABAPENTIN 600 MG: 300 CAPSULE ORAL at 21:02

## 2017-01-01 RX ADMIN — METHOCARBAMOL 500 MG: 500 TABLET ORAL at 21:49

## 2017-01-01 RX ADMIN — IPRATROPIUM BROMIDE AND ALBUTEROL SULFATE 3 ML: .5; 3 SOLUTION RESPIRATORY (INHALATION) at 20:21

## 2017-01-01 RX ADMIN — INSULIN GLARGINE 20 UNITS: 100 INJECTION, SOLUTION SUBCUTANEOUS at 08:44

## 2017-01-01 RX ADMIN — FUROSEMIDE 20 MG: 20 TABLET ORAL at 08:32

## 2017-01-01 RX ADMIN — IPRATROPIUM BROMIDE AND ALBUTEROL SULFATE 3 ML: .5; 3 SOLUTION RESPIRATORY (INHALATION) at 07:43

## 2017-01-01 RX ADMIN — RIFAXIMIN 550 MG: 550 TABLET ORAL at 08:32

## 2017-01-01 RX ADMIN — TAZOBACTAM SODIUM AND PIPERACILLIN SODIUM 4.5 G: 500; 4 INJECTION, SOLUTION INTRAVENOUS at 04:36

## 2017-01-01 RX ADMIN — OXYCODONE HYDROCHLORIDE 10 MG: 5 TABLET ORAL at 13:52

## 2017-01-01 RX ADMIN — METHOCARBAMOL 1000 MG: 500 TABLET ORAL at 08:23

## 2017-01-01 RX ADMIN — TAZOBACTAM SODIUM AND PIPERACILLIN SODIUM 4.5 G: 500; 4 INJECTION, SOLUTION INTRAVENOUS at 04:59

## 2017-01-01 RX ADMIN — GABAPENTIN 600 MG: 600 TABLET, FILM COATED ORAL at 17:00

## 2017-01-01 RX ADMIN — FOLIC ACID 1 MG: 1 TABLET ORAL at 08:21

## 2017-01-01 RX ADMIN — GABAPENTIN 300 MG: 300 CAPSULE ORAL at 08:10

## 2017-01-01 RX ADMIN — Medication 1 UNITS/HR: at 02:36

## 2017-01-01 RX ADMIN — FLUOXETINE HYDROCHLORIDE 40 MG: 40 CAPSULE ORAL at 08:23

## 2017-01-01 RX ADMIN — Medication 2 PACKET: at 20:52

## 2017-01-01 RX ADMIN — HYDROMORPHONE HYDROCHLORIDE 0.2 MG: 1 INJECTION, SOLUTION INTRAMUSCULAR; INTRAVENOUS; SUBCUTANEOUS at 18:32

## 2017-01-01 RX ADMIN — IPRATROPIUM BROMIDE AND ALBUTEROL SULFATE 3 ML: .5; 3 SOLUTION RESPIRATORY (INHALATION) at 12:25

## 2017-01-01 RX ADMIN — AZITHROMYCIN MONOHYDRATE 500 MG: 500 INJECTION, POWDER, LYOPHILIZED, FOR SOLUTION INTRAVENOUS at 08:15

## 2017-01-01 RX ADMIN — SULFAMETHOXAZOLE AND TRIMETHOPRIM 400 MG: 200; 40 SUSPENSION ORAL at 17:55

## 2017-01-01 RX ADMIN — RIFAXIMIN 550 MG: 550 TABLET ORAL at 08:20

## 2017-01-01 RX ADMIN — IPRATROPIUM BROMIDE AND ALBUTEROL SULFATE 3 ML: .5; 3 SOLUTION RESPIRATORY (INHALATION) at 19:42

## 2017-01-01 RX ADMIN — FERROUS SULFATE TAB 325 MG (65 MG ELEMENTAL FE) 325 MG: 325 (65 FE) TAB at 08:28

## 2017-01-01 RX ADMIN — BUPRENORPHINE HYDROCHLORIDE, NALOXONE HYDROCHLORIDE 3 FILM: 8; 2 FILM, SOLUBLE BUCCAL; SUBLINGUAL at 08:12

## 2017-01-01 RX ADMIN — INSULIN ASPART 6 UNITS: 100 INJECTION, SOLUTION INTRAVENOUS; SUBCUTANEOUS at 19:55

## 2017-01-01 RX ADMIN — AZITHROMYCIN MONOHYDRATE 500 MG: 500 INJECTION, POWDER, LYOPHILIZED, FOR SOLUTION INTRAVENOUS at 14:26

## 2017-01-01 RX ADMIN — METHOCARBAMOL 500 MG: 500 TABLET ORAL at 16:33

## 2017-01-01 RX ADMIN — NICOTINE 1 PATCH: 21 PATCH, EXTENDED RELEASE TRANSDERMAL at 08:21

## 2017-01-01 RX ADMIN — PANTOPRAZOLE SODIUM 40 MG: 40 INJECTION, POWDER, FOR SOLUTION INTRAVENOUS at 06:53

## 2017-01-01 RX ADMIN — TRAZODONE HYDROCHLORIDE 100 MG: 100 TABLET ORAL at 20:52

## 2017-01-01 RX ADMIN — LACTULOSE 20 G: 10 SOLUTION ORAL at 10:13

## 2017-01-01 RX ADMIN — TAZOBACTAM SODIUM AND PIPERACILLIN SODIUM 4.5 G: 500; 4 INJECTION, SOLUTION INTRAVENOUS at 16:01

## 2017-01-01 RX ADMIN — CISATRACURIUM BESYLATE 2 MG: 2 INJECTION INTRAVENOUS at 09:30

## 2017-01-01 RX ADMIN — LORATADINE 10 MG: 5 SOLUTION ORAL at 08:07

## 2017-01-01 RX ADMIN — RIFAXIMIN 550 MG: 550 TABLET ORAL at 08:00

## 2017-01-01 RX ADMIN — RIFAXIMIN 550 MG: 550 TABLET ORAL at 08:44

## 2017-01-01 RX ADMIN — ASPIRIN 81 MG: 81 TABLET, COATED ORAL at 08:43

## 2017-01-01 RX ADMIN — SULFAMETHOXAZOLE AND TRIMETHOPRIM 400 MG: 200; 40 SUSPENSION ORAL at 09:10

## 2017-01-01 RX ADMIN — PREDNISONE 60 MG: 10 TABLET ORAL at 08:33

## 2017-01-01 RX ADMIN — NICOTINE 1 PATCH: 21 PATCH, EXTENDED RELEASE TRANSDERMAL at 11:49

## 2017-01-01 RX ADMIN — INSULIN GLARGINE 20 UNITS: 100 INJECTION, SOLUTION SUBCUTANEOUS at 07:51

## 2017-01-01 RX ADMIN — Medication 40 MG: at 08:15

## 2017-01-01 RX ADMIN — NICOTINE POLACRILEX 2 MG: 2 GUM, CHEWING ORAL at 08:13

## 2017-01-01 RX ADMIN — RIFAXIMIN 550 MG: 550 TABLET ORAL at 21:51

## 2017-01-01 RX ADMIN — FLUOXETINE 40 MG: 20 CAPSULE ORAL at 10:41

## 2017-01-01 RX ADMIN — INSULIN GLARGINE 20 UNITS: 100 INJECTION, SOLUTION SUBCUTANEOUS at 14:51

## 2017-01-01 RX ADMIN — RIFAXIMIN 550 MG: 550 TABLET ORAL at 20:10

## 2017-01-01 RX ADMIN — NICOTINE 1 PATCH: 21 PATCH, EXTENDED RELEASE TRANSDERMAL at 08:22

## 2017-01-01 RX ADMIN — FOLIC ACID 1 MG: 1 TABLET ORAL at 09:05

## 2017-01-01 RX ADMIN — OXYCODONE HYDROCHLORIDE AND ACETAMINOPHEN 500 MG: 500 TABLET ORAL at 07:53

## 2017-01-01 RX ADMIN — GABAPENTIN 600 MG: 600 TABLET, FILM COATED ORAL at 15:52

## 2017-01-01 RX ADMIN — OXYCODONE HYDROCHLORIDE AND ACETAMINOPHEN 500 MG: 500 TABLET ORAL at 08:28

## 2017-01-01 RX ADMIN — NICOTINE POLACRILEX 2 MG: 2 GUM, CHEWING ORAL at 21:13

## 2017-01-01 RX ADMIN — HYDROCODONE BITARTRATE AND ACETAMINOPHEN 2 TABLET: 5; 325 TABLET ORAL at 16:55

## 2017-01-01 RX ADMIN — INSULIN ASPART 4 UNITS: 100 INJECTION, SOLUTION INTRAVENOUS; SUBCUTANEOUS at 14:26

## 2017-01-01 RX ADMIN — CISATRACURIUM BESYLATE 2 MG: 2 INJECTION INTRAVENOUS at 10:00

## 2017-01-01 RX ADMIN — FOLIC ACID 1 MG: 1 TABLET ORAL at 08:51

## 2017-01-01 RX ADMIN — IPRATROPIUM BROMIDE AND ALBUTEROL SULFATE 3 ML: .5; 3 SOLUTION RESPIRATORY (INHALATION) at 15:59

## 2017-01-01 RX ADMIN — LORAZEPAM 2 MG: 2 INJECTION INTRAMUSCULAR; INTRAVENOUS at 19:50

## 2017-01-01 RX ADMIN — GABAPENTIN 600 MG: 600 TABLET, FILM COATED ORAL at 20:08

## 2017-01-01 RX ADMIN — TRAZODONE HYDROCHLORIDE 50 MG: 50 TABLET ORAL at 21:24

## 2017-01-01 RX ADMIN — SODIUM CHLORIDE 1000 ML: 9 INJECTION, SOLUTION INTRAVENOUS at 16:05

## 2017-01-01 RX ADMIN — INSULIN ASPART 7 UNITS: 100 INJECTION, SOLUTION INTRAVENOUS; SUBCUTANEOUS at 17:14

## 2017-01-01 RX ADMIN — METHOCARBAMOL 500 MG: 500 TABLET ORAL at 08:00

## 2017-01-01 RX ADMIN — IPRATROPIUM BROMIDE AND ALBUTEROL SULFATE 3 ML: .5; 3 SOLUTION RESPIRATORY (INHALATION) at 11:30

## 2017-01-01 RX ADMIN — HYDROCODONE BITARTRATE AND ACETAMINOPHEN 2 TABLET: 5; 325 TABLET ORAL at 09:11

## 2017-01-01 RX ADMIN — IPRATROPIUM BROMIDE AND ALBUTEROL SULFATE 3 ML: .5; 3 SOLUTION RESPIRATORY (INHALATION) at 19:38

## 2017-01-01 RX ADMIN — TAZOBACTAM SODIUM AND PIPERACILLIN SODIUM 4.5 G: 500; 4 INJECTION, SOLUTION INTRAVENOUS at 16:26

## 2017-01-01 RX ADMIN — METHYLPREDNISOLONE SODIUM SUCCINATE 20 MG: 40 INJECTION, POWDER, LYOPHILIZED, FOR SOLUTION INTRAMUSCULAR; INTRAVENOUS at 06:25

## 2017-01-01 RX ADMIN — INSULIN GLARGINE 20 UNITS: 100 INJECTION, SOLUTION SUBCUTANEOUS at 21:48

## 2017-01-01 RX ADMIN — TRAZODONE HYDROCHLORIDE 100 MG: 100 TABLET ORAL at 21:02

## 2017-01-01 RX ADMIN — NICOTINE POLACRILEX 2 MG: 2 GUM, CHEWING ORAL at 12:35

## 2017-01-01 RX ADMIN — SULFAMETHOXAZOLE AND TRIMETHOPRIM 400 MG: 80; 16 INJECTION, SOLUTION, CONCENTRATE INTRAVENOUS at 03:09

## 2017-01-01 RX ADMIN — METHYLPREDNISOLONE SODIUM SUCCINATE 40 MG: 40 INJECTION, POWDER, FOR SOLUTION INTRAMUSCULAR; INTRAVENOUS at 05:30

## 2017-01-01 RX ADMIN — TRAZODONE HYDROCHLORIDE 50 MG: 50 TABLET ORAL at 21:51

## 2017-01-01 RX ADMIN — LACTULOSE 20 G: 20 SOLUTION ORAL at 19:38

## 2017-01-01 RX ADMIN — FOLIC ACID: 5 INJECTION, SOLUTION INTRAMUSCULAR; INTRAVENOUS; SUBCUTANEOUS at 00:57

## 2017-01-01 RX ADMIN — LACTULOSE 30 ML: 10 SOLUTION ORAL at 21:37

## 2017-01-01 RX ADMIN — LACTULOSE 20 G: 10 SOLUTION ORAL at 21:30

## 2017-01-01 RX ADMIN — MIRTAZAPINE 15 MG: 15 TABLET, FILM COATED ORAL at 21:51

## 2017-01-01 RX ADMIN — RIFAXIMIN 550 MG: 550 TABLET ORAL at 08:22

## 2017-01-01 RX ADMIN — METHYLPREDNISOLONE SODIUM SUCCINATE 20 MG: 40 INJECTION, POWDER, LYOPHILIZED, FOR SOLUTION INTRAMUSCULAR; INTRAVENOUS at 05:56

## 2017-01-01 RX ADMIN — BUPRENORPHINE HYDROCHLORIDE, NALOXONE HYDROCHLORIDE 3 FILM: 8; 2 FILM, SOLUBLE BUCCAL; SUBLINGUAL at 08:42

## 2017-01-01 RX ADMIN — INSULIN ASPART 3 UNITS: 100 INJECTION, SOLUTION INTRAVENOUS; SUBCUTANEOUS at 04:16

## 2017-01-01 RX ADMIN — IPRATROPIUM BROMIDE AND ALBUTEROL SULFATE 3 ML: .5; 3 SOLUTION RESPIRATORY (INHALATION) at 15:42

## 2017-01-01 RX ADMIN — RIFAXIMIN 550 MG: 550 TABLET ORAL at 20:28

## 2017-01-01 RX ADMIN — SODIUM CHLORIDE 1000 ML: 9 INJECTION, SOLUTION INTRAVENOUS at 20:30

## 2017-01-01 RX ADMIN — NICOTINE 1 PATCH: 21 PATCH, EXTENDED RELEASE TRANSDERMAL at 08:14

## 2017-01-01 RX ADMIN — METHYLPREDNISOLONE SODIUM SUCCINATE 20 MG: 40 INJECTION, POWDER, LYOPHILIZED, FOR SOLUTION INTRAMUSCULAR; INTRAVENOUS at 23:44

## 2017-01-01 RX ADMIN — FLUOXETINE 40 MG: 20 CAPSULE ORAL at 08:13

## 2017-01-01 RX ADMIN — SODIUM CHLORIDE, POTASSIUM CHLORIDE, SODIUM LACTATE AND CALCIUM CHLORIDE 2559 ML: 600; 310; 30; 20 INJECTION, SOLUTION INTRAVENOUS at 23:06

## 2017-01-01 RX ADMIN — GABAPENTIN 600 MG: 600 TABLET, FILM COATED ORAL at 21:01

## 2017-01-01 RX ADMIN — HEPARIN SODIUM 5000 UNITS: 10000 INJECTION, SOLUTION INTRAVENOUS; SUBCUTANEOUS at 09:04

## 2017-01-01 RX ADMIN — SULFAMETHOXAZOLE AND TRIMETHOPRIM 2 TABLET: 800; 160 TABLET ORAL at 08:31

## 2017-01-01 RX ADMIN — BUPRENORPHINE HYDROCHLORIDE, NALOXONE HYDROCHLORIDE 3 FILM: 8; 2 FILM, SOLUBLE BUCCAL; SUBLINGUAL at 09:06

## 2017-01-01 RX ADMIN — KETOROLAC TROMETHAMINE 15 MG: 15 INJECTION, SOLUTION INTRAMUSCULAR; INTRAVENOUS at 00:17

## 2017-01-01 RX ADMIN — SODIUM CHLORIDE 250 MG: 9 INJECTION, SOLUTION INTRAVENOUS at 06:55

## 2017-01-01 RX ADMIN — TAZOBACTAM SODIUM AND PIPERACILLIN SODIUM 4.5 G: 500; 4 INJECTION, SOLUTION INTRAVENOUS at 22:10

## 2017-01-01 RX ADMIN — ACETAMINOPHEN 1000 MG: 500 TABLET, FILM COATED ORAL at 18:58

## 2017-01-01 RX ADMIN — RIFAXIMIN 550 MG: 550 TABLET ORAL at 08:31

## 2017-01-01 RX ADMIN — VANCOMYCIN HYDROCHLORIDE 1750 MG: 10 INJECTION, POWDER, LYOPHILIZED, FOR SOLUTION INTRAVENOUS at 09:46

## 2017-01-01 RX ADMIN — PROPOFOL 40 MG: 10 INJECTION, EMULSION INTRAVENOUS at 13:00

## 2017-01-01 RX ADMIN — FENTANYL CITRATE 100 MCG: 50 INJECTION INTRAMUSCULAR; INTRAVENOUS at 10:15

## 2017-01-01 RX ADMIN — POTASSIUM & SODIUM PHOSPHATES POWDER PACK 280-160-250 MG 1 PACKET: 280-160-250 PACK at 12:26

## 2017-01-01 RX ADMIN — METHYLPREDNISOLONE SODIUM SUCCINATE 40 MG: 40 INJECTION, POWDER, FOR SOLUTION INTRAMUSCULAR; INTRAVENOUS at 19:49

## 2017-01-01 RX ADMIN — MULTIPLE VITAMINS W/ MINERALS TAB 1 TABLET: TAB at 09:46

## 2017-01-01 RX ADMIN — METHYLPREDNISOLONE SODIUM SUCCINATE 20 MG: 40 INJECTION, POWDER, LYOPHILIZED, FOR SOLUTION INTRAMUSCULAR; INTRAVENOUS at 05:26

## 2017-01-01 RX ADMIN — GABAPENTIN 600 MG: 250 SUSPENSION ORAL at 16:19

## 2017-01-01 RX ADMIN — TAZOBACTAM SODIUM AND PIPERACILLIN SODIUM 4.5 G: 500; 4 INJECTION, SOLUTION INTRAVENOUS at 10:22

## 2017-01-01 RX ADMIN — NICOTINE POLACRILEX 2 MG: 2 GUM, CHEWING ORAL at 16:12

## 2017-01-01 RX ADMIN — LORATADINE 10 MG: 5 SOLUTION ORAL at 09:10

## 2017-01-01 RX ADMIN — GUAIFENESIN 1200 MG: 600 TABLET, EXTENDED RELEASE ORAL at 08:13

## 2017-01-01 RX ADMIN — GABAPENTIN 600 MG: 600 TABLET, FILM COATED ORAL at 16:14

## 2017-01-01 RX ADMIN — MIRTAZAPINE 30 MG: 15 TABLET, FILM COATED ORAL at 21:41

## 2017-01-01 RX ADMIN — TAZOBACTAM SODIUM AND PIPERACILLIN SODIUM 4.5 G: 500; 4 INJECTION, SOLUTION INTRAVENOUS at 06:53

## 2017-01-01 RX ADMIN — TAZOBACTAM SODIUM AND PIPERACILLIN SODIUM 4.5 G: 500; 4 INJECTION, SOLUTION INTRAVENOUS at 16:16

## 2017-01-01 RX ADMIN — PROPOFOL 20 MG: 10 INJECTION, EMULSION INTRAVENOUS at 13:10

## 2017-01-01 RX ADMIN — PROPOFOL 45 MCG/KG/MIN: 10 INJECTION, EMULSION INTRAVENOUS at 12:51

## 2017-01-01 RX ADMIN — FLUOXETINE 40 MG: 20 CAPSULE ORAL at 16:59

## 2017-01-01 RX ADMIN — METHYLPREDNISOLONE SODIUM SUCCINATE 87.5 MG: 125 INJECTION, POWDER, FOR SOLUTION INTRAMUSCULAR; INTRAVENOUS at 08:01

## 2017-01-01 RX ADMIN — POTASSIUM & SODIUM PHOSPHATES POWDER PACK 280-160-250 MG 1 PACKET: 280-160-250 PACK at 22:19

## 2017-01-01 RX ADMIN — NICOTINE POLACRILEX 2 MG: 2 GUM, CHEWING ORAL at 18:30

## 2017-01-01 RX ADMIN — OMEPRAZOLE 20 MG: 20 CAPSULE, DELAYED RELEASE ORAL at 08:51

## 2017-01-01 RX ADMIN — IPRATROPIUM BROMIDE AND ALBUTEROL SULFATE 3 ML: .5; 3 SOLUTION RESPIRATORY (INHALATION) at 07:35

## 2017-01-01 RX ADMIN — NICOTINE POLACRILEX 2 MG: 2 GUM, CHEWING ORAL at 19:27

## 2017-01-01 RX ADMIN — GABAPENTIN 600 MG: 250 SUSPENSION ORAL at 08:04

## 2017-01-01 RX ADMIN — VANCOMYCIN HYDROCHLORIDE 1250 MG: 10 INJECTION, POWDER, LYOPHILIZED, FOR SOLUTION INTRAVENOUS at 13:24

## 2017-01-01 RX ADMIN — Medication 4 UNITS/HR: at 13:20

## 2017-01-01 RX ADMIN — LORAZEPAM 2 MG: 2 INJECTION INTRAMUSCULAR; INTRAVENOUS at 20:41

## 2017-01-01 RX ADMIN — GABAPENTIN 600 MG: 600 TABLET, FILM COATED ORAL at 08:20

## 2017-01-01 RX ADMIN — HYDROCODONE BITARTRATE AND ACETAMINOPHEN 2 TABLET: 5; 325 TABLET ORAL at 05:45

## 2017-01-01 RX ADMIN — ASPIRIN 81 MG 81 MG: 81 TABLET ORAL at 08:23

## 2017-01-01 RX ADMIN — IPRATROPIUM BROMIDE AND ALBUTEROL SULFATE 3 ML: .5; 3 SOLUTION RESPIRATORY (INHALATION) at 11:31

## 2017-01-01 RX ADMIN — TAZOBACTAM SODIUM AND PIPERACILLIN SODIUM 4.5 G: 500; 4 INJECTION, SOLUTION INTRAVENOUS at 04:40

## 2017-01-01 RX ADMIN — Medication 40 MG: at 09:15

## 2017-01-01 RX ADMIN — TRAZODONE HYDROCHLORIDE 50 MG: 50 TABLET ORAL at 20:44

## 2017-01-01 RX ADMIN — RIFAXIMIN 550 MG: 550 TABLET ORAL at 08:13

## 2017-01-01 RX ADMIN — NICOTINE POLACRILEX 2 MG: 2 GUM, CHEWING ORAL at 00:39

## 2017-01-01 RX ADMIN — INSULIN ASPART 8 UNITS: 100 INJECTION, SOLUTION INTRAVENOUS; SUBCUTANEOUS at 18:01

## 2017-01-01 RX ADMIN — IPRATROPIUM BROMIDE AND ALBUTEROL SULFATE 3 ML: .5; 3 SOLUTION RESPIRATORY (INHALATION) at 07:52

## 2017-01-01 RX ADMIN — MORPHINE SULFATE 4 MG: 2 INJECTION, SOLUTION INTRAMUSCULAR; INTRAVENOUS at 08:08

## 2017-01-01 RX ADMIN — TRAZODONE HYDROCHLORIDE 150 MG: 150 TABLET ORAL at 21:10

## 2017-01-01 RX ADMIN — BUPRENORPHINE HYDROCHLORIDE, NALOXONE HYDROCHLORIDE 3 FILM: 8; 2 FILM, SOLUBLE BUCCAL; SUBLINGUAL at 09:10

## 2017-01-01 RX ADMIN — BUPRENORPHINE HYDROCHLORIDE, NALOXONE HYDROCHLORIDE 3 FILM: 8; 2 FILM, SOLUBLE BUCCAL; SUBLINGUAL at 07:56

## 2017-01-01 RX ADMIN — NICOTINE 1 PATCH: 21 PATCH, EXTENDED RELEASE TRANSDERMAL at 21:30

## 2017-01-01 RX ADMIN — NICOTINE 1 PATCH: 21 PATCH, EXTENDED RELEASE TRANSDERMAL at 09:12

## 2017-01-01 RX ADMIN — NICOTINE POLACRILEX 2 MG: 2 GUM, CHEWING ORAL at 22:12

## 2017-01-01 RX ADMIN — MIRTAZAPINE 30 MG: 30 TABLET, FILM COATED ORAL at 21:02

## 2017-01-01 RX ADMIN — MORPHINE SULFATE 2 MG: 2 INJECTION, SOLUTION INTRAMUSCULAR; INTRAVENOUS at 00:19

## 2017-01-01 RX ADMIN — GABAPENTIN 300 MG: 300 CAPSULE ORAL at 08:52

## 2017-01-01 RX ADMIN — IPRATROPIUM BROMIDE AND ALBUTEROL SULFATE 3 ML: .5; 3 SOLUTION RESPIRATORY (INHALATION) at 15:03

## 2017-01-01 RX ADMIN — OMEPRAZOLE 20 MG: 20 CAPSULE, DELAYED RELEASE ORAL at 07:54

## 2017-01-01 RX ADMIN — GABAPENTIN 400 MG: 250 SUSPENSION ORAL at 21:27

## 2017-01-01 RX ADMIN — VANCOMYCIN HYDROCHLORIDE 1750 MG: 10 INJECTION, POWDER, LYOPHILIZED, FOR SOLUTION INTRAVENOUS at 21:55

## 2017-01-01 RX ADMIN — SULFAMETHOXAZOLE AND TRIMETHOPRIM 1 TABLET: 400; 80 TABLET ORAL at 09:08

## 2017-01-01 RX ADMIN — IPRATROPIUM BROMIDE AND ALBUTEROL SULFATE 3 ML: .5; 3 SOLUTION RESPIRATORY (INHALATION) at 09:22

## 2017-01-01 RX ADMIN — BUPRENORPHINE HYDROCHLORIDE, NALOXONE HYDROCHLORIDE 3 FILM: 8; 2 FILM, SOLUBLE BUCCAL; SUBLINGUAL at 09:09

## 2017-01-01 RX ADMIN — TRAZODONE HYDROCHLORIDE 100 MG: 100 TABLET ORAL at 22:07

## 2017-01-01 RX ADMIN — LACTULOSE 20 G: 10 SOLUTION ORAL at 21:16

## 2017-01-01 RX ADMIN — SULFAMETHOXAZOLE AND TRIMETHOPRIM 400 MG: 200; 40 SUSPENSION ORAL at 10:00

## 2017-01-01 RX ADMIN — VANCOMYCIN HYDROCHLORIDE 1250 MG: 10 INJECTION, POWDER, LYOPHILIZED, FOR SOLUTION INTRAVENOUS at 13:44

## 2017-01-01 RX ADMIN — GABAPENTIN 600 MG: 600 TABLET, FILM COATED ORAL at 13:56

## 2017-01-01 RX ADMIN — ATORVASTATIN CALCIUM 20 MG: 20 TABLET, FILM COATED ORAL at 08:51

## 2017-01-01 RX ADMIN — IPRATROPIUM BROMIDE AND ALBUTEROL SULFATE 3 ML: .5; 3 SOLUTION RESPIRATORY (INHALATION) at 15:49

## 2017-01-01 RX ADMIN — GABAPENTIN 600 MG: 250 SUSPENSION ORAL at 09:08

## 2017-01-01 RX ADMIN — MORPHINE SULFATE 2 MG: 2 INJECTION, SOLUTION INTRAMUSCULAR; INTRAVENOUS at 08:25

## 2017-01-01 RX ADMIN — RIFAXIMIN 550 MG: 550 TABLET ORAL at 21:27

## 2017-01-01 RX ADMIN — GABAPENTIN 600 MG: 600 TABLET, FILM COATED ORAL at 17:37

## 2017-01-01 RX ADMIN — IOPAMIDOL 100 ML: 755 INJECTION, SOLUTION INTRAVENOUS at 19:36

## 2017-01-01 RX ADMIN — ATORVASTATIN CALCIUM 20 MG: 20 TABLET, FILM COATED ORAL at 09:32

## 2017-01-01 RX ADMIN — HEPARIN SODIUM 5000 UNITS: 10000 INJECTION, SOLUTION INTRAVENOUS; SUBCUTANEOUS at 12:55

## 2017-01-01 RX ADMIN — IPRATROPIUM BROMIDE AND ALBUTEROL SULFATE 3 ML: .5; 3 SOLUTION RESPIRATORY (INHALATION) at 11:17

## 2017-01-01 RX ADMIN — GABAPENTIN 600 MG: 300 CAPSULE ORAL at 06:31

## 2017-01-01 RX ADMIN — MORPHINE SULFATE 2 MG: 2 INJECTION, SOLUTION INTRAMUSCULAR; INTRAVENOUS at 04:30

## 2017-01-01 RX ADMIN — FOLIC ACID 1 MG: 1 TABLET ORAL at 08:00

## 2017-01-01 RX ADMIN — OMEPRAZOLE 20 MG: 20 CAPSULE, DELAYED RELEASE ORAL at 08:32

## 2017-01-01 RX ADMIN — METHOCARBAMOL 500 MG: 500 TABLET ORAL at 16:00

## 2017-01-01 RX ADMIN — LEVOFLOXACIN 750 MG: 5 INJECTION, SOLUTION INTRAVENOUS at 16:29

## 2017-01-01 RX ADMIN — HYDROMORPHONE HYDROCHLORIDE 1 MG: 1 SOLUTION ORAL at 04:05

## 2017-01-01 RX ADMIN — FLUOXETINE 40 MG: 20 CAPSULE ORAL at 11:38

## 2017-01-01 RX ADMIN — TAZOBACTAM SODIUM AND PIPERACILLIN SODIUM 4.5 G: 500; 4 INJECTION, SOLUTION INTRAVENOUS at 10:46

## 2017-01-01 RX ADMIN — MICONAZOLE NITRATE: 20 CREAM TOPICAL at 20:51

## 2017-01-01 RX ADMIN — SULFAMETHOXAZOLE AND TRIMETHOPRIM 2 TABLET: 800; 160 TABLET ORAL at 08:25

## 2017-01-01 RX ADMIN — HEPARIN SODIUM 5000 UNITS: 10000 INJECTION, SOLUTION INTRAVENOUS; SUBCUTANEOUS at 12:06

## 2017-01-01 RX ADMIN — FUROSEMIDE 20 MG: 10 INJECTION, SOLUTION INTRAVENOUS at 16:28

## 2017-01-01 RX ADMIN — GABAPENTIN 600 MG: 600 TABLET, FILM COATED ORAL at 15:18

## 2017-01-01 RX ADMIN — VANCOMYCIN HYDROCHLORIDE 1750 MG: 1 INJECTION, POWDER, LYOPHILIZED, FOR SOLUTION INTRAVENOUS at 11:20

## 2017-01-01 RX ADMIN — RIFAXIMIN 550 MG: 550 TABLET ORAL at 09:08

## 2017-01-01 RX ADMIN — OXYCODONE HYDROCHLORIDE AND ACETAMINOPHEN 500 MG: 500 TABLET ORAL at 09:51

## 2017-01-01 RX ADMIN — OXYCODONE HYDROCHLORIDE 10 MG: 5 TABLET ORAL at 17:57

## 2017-01-01 RX ADMIN — PANTOPRAZOLE SODIUM 40 MG: 40 INJECTION, POWDER, FOR SOLUTION INTRAVENOUS at 15:02

## 2017-01-01 RX ADMIN — Medication 100 MG: at 17:43

## 2017-01-01 RX ADMIN — POTASSIUM & SODIUM PHOSPHATES POWDER PACK 280-160-250 MG 1 PACKET: 280-160-250 PACK at 15:19

## 2017-01-01 RX ADMIN — METHYLPREDNISOLONE SODIUM SUCCINATE 40 MG: 40 INJECTION, POWDER, FOR SOLUTION INTRAMUSCULAR; INTRAVENOUS at 12:19

## 2017-01-01 RX ADMIN — TAZOBACTAM SODIUM AND PIPERACILLIN SODIUM 4.5 G: 500; 4 INJECTION, SOLUTION INTRAVENOUS at 11:32

## 2017-01-01 RX ADMIN — ACETAMINOPHEN 975 MG: 325 TABLET, FILM COATED ORAL at 19:39

## 2017-01-01 RX ADMIN — BUPRENORPHINE HYDROCHLORIDE, NALOXONE HYDROCHLORIDE 3 FILM: 8; 2 FILM, SOLUBLE BUCCAL; SUBLINGUAL at 09:12

## 2017-01-01 RX ADMIN — VANCOMYCIN HYDROCHLORIDE 1250 MG: 10 INJECTION, POWDER, LYOPHILIZED, FOR SOLUTION INTRAVENOUS at 13:39

## 2017-01-01 RX ADMIN — METHOCARBAMOL 1000 MG: 500 TABLET ORAL at 11:37

## 2017-01-01 RX ADMIN — METHOCARBAMOL 500 MG: 500 TABLET ORAL at 08:32

## 2017-01-01 RX ADMIN — OXYCODONE HYDROCHLORIDE AND ACETAMINOPHEN 500 MG: 500 TABLET ORAL at 09:59

## 2017-01-01 RX ADMIN — GABAPENTIN 600 MG: 600 TABLET, FILM COATED ORAL at 15:14

## 2017-01-01 RX ADMIN — INSULIN ASPART 4 UNITS: 100 INJECTION, SOLUTION INTRAVENOUS; SUBCUTANEOUS at 04:18

## 2017-01-01 RX ADMIN — LEVOFLOXACIN 750 MG: 5 INJECTION, SOLUTION INTRAVENOUS at 00:20

## 2017-01-01 RX ADMIN — INSULIN ASPART 6 UNITS: 100 INJECTION, SOLUTION INTRAVENOUS; SUBCUTANEOUS at 08:42

## 2017-01-01 RX ADMIN — IPRATROPIUM BROMIDE AND ALBUTEROL SULFATE 3 ML: .5; 3 SOLUTION RESPIRATORY (INHALATION) at 19:53

## 2017-01-01 RX ADMIN — IPRATROPIUM BROMIDE AND ALBUTEROL SULFATE 3 ML: .5; 3 SOLUTION RESPIRATORY (INHALATION) at 20:22

## 2017-01-01 RX ADMIN — FLUOXETINE HYDROCHLORIDE 40 MG: 20 LIQUID ORAL at 08:21

## 2017-01-01 RX ADMIN — NICOTINE POLACRILEX 2 MG: 2 GUM, CHEWING ORAL at 16:34

## 2017-01-01 RX ADMIN — IPRATROPIUM BROMIDE AND ALBUTEROL SULFATE 3 ML: .5; 3 SOLUTION RESPIRATORY (INHALATION) at 16:10

## 2017-01-01 RX ADMIN — GABAPENTIN 600 MG: 600 TABLET, FILM COATED ORAL at 16:08

## 2017-01-01 RX ADMIN — BUPRENORPHINE HYDROCHLORIDE, NALOXONE HYDROCHLORIDE 3 FILM: 8; 2 FILM, SOLUBLE BUCCAL; SUBLINGUAL at 08:31

## 2017-01-01 RX ADMIN — HYDROMORPHONE HYDROCHLORIDE 1 MG: 1 SOLUTION ORAL at 11:09

## 2017-01-01 RX ADMIN — SODIUM CHLORIDE: 9 INJECTION, SOLUTION INTRAVENOUS at 14:47

## 2017-01-01 RX ADMIN — ENOXAPARIN SODIUM 40 MG: 40 INJECTION SUBCUTANEOUS at 13:34

## 2017-01-01 RX ADMIN — TAZOBACTAM SODIUM AND PIPERACILLIN SODIUM 4.5 G: 500; 4 INJECTION, SOLUTION INTRAVENOUS at 04:21

## 2017-01-01 RX ADMIN — MORPHINE SULFATE 2 MG: 2 INJECTION, SOLUTION INTRAMUSCULAR; INTRAVENOUS at 22:10

## 2017-01-01 RX ADMIN — NICOTINE 1 PATCH: 7 PATCH, EXTENDED RELEASE TRANSDERMAL at 23:52

## 2017-01-01 RX ADMIN — NICOTINE POLACRILEX 2 MG: 2 GUM, CHEWING ORAL at 21:48

## 2017-01-01 RX ADMIN — ACETAMINOPHEN 650 MG: 325 TABLET, FILM COATED ORAL at 10:36

## 2017-01-01 RX ADMIN — METHYLPREDNISOLONE SODIUM SUCCINATE 20 MG: 40 INJECTION, POWDER, LYOPHILIZED, FOR SOLUTION INTRAMUSCULAR; INTRAVENOUS at 12:48

## 2017-01-01 RX ADMIN — BUPRENORPHINE HYDROCHLORIDE, NALOXONE HYDROCHLORIDE 3 FILM: 8; 2 FILM, SOLUBLE BUCCAL; SUBLINGUAL at 08:35

## 2017-01-01 RX ADMIN — TAZOBACTAM SODIUM AND PIPERACILLIN SODIUM 4.5 G: 500; 4 INJECTION, SOLUTION INTRAVENOUS at 04:49

## 2017-01-01 RX ADMIN — MIRTAZAPINE 30 MG: 30 TABLET, FILM COATED ORAL at 21:48

## 2017-01-01 RX ADMIN — METHOCARBAMOL 1000 MG: 500 TABLET ORAL at 13:28

## 2017-01-01 RX ADMIN — INSULIN GLARGINE 20 UNITS: 100 INJECTION, SOLUTION SUBCUTANEOUS at 20:13

## 2017-01-01 RX ADMIN — FENTANYL CITRATE 50 MCG: 50 INJECTION INTRAMUSCULAR; INTRAVENOUS at 17:37

## 2017-01-01 RX ADMIN — NICOTINE 1 PATCH: 21 PATCH, EXTENDED RELEASE TRANSDERMAL at 09:08

## 2017-01-01 RX ADMIN — HYDROMORPHONE HYDROCHLORIDE 1 MG: 1 SOLUTION ORAL at 13:09

## 2017-01-01 RX ADMIN — INSULIN ASPART 8 UNITS: 100 INJECTION, SOLUTION INTRAVENOUS; SUBCUTANEOUS at 12:10

## 2017-01-01 RX ADMIN — LACTULOSE 20 G: 10 SOLUTION ORAL at 16:11

## 2017-01-01 RX ADMIN — Medication 100 MG: at 08:19

## 2017-01-01 RX ADMIN — PREDNISONE 50 MG: 10 TABLET ORAL at 08:58

## 2017-01-01 RX ADMIN — ATORVASTATIN CALCIUM 20 MG: 20 TABLET, FILM COATED ORAL at 21:48

## 2017-01-01 RX ADMIN — FENTANYL CITRATE 50 MCG: 50 INJECTION INTRAMUSCULAR; INTRAVENOUS at 20:20

## 2017-01-01 RX ADMIN — OXYCODONE HYDROCHLORIDE 10 MG: 5 TABLET ORAL at 09:13

## 2017-01-01 RX ADMIN — ATORVASTATIN CALCIUM 20 MG: 20 TABLET, FILM COATED ORAL at 13:44

## 2017-01-01 RX ADMIN — ASPIRIN 81 MG: 81 TABLET, COATED ORAL at 08:22

## 2017-01-01 RX ADMIN — SPIRONOLACTONE 50 MG: 25 TABLET ORAL at 07:58

## 2017-01-01 RX ADMIN — SULFAMETHOXAZOLE AND TRIMETHOPRIM 400 MG: 200; 40 SUSPENSION ORAL at 21:38

## 2017-01-01 RX ADMIN — METHYLPREDNISOLONE SODIUM SUCCINATE 20 MG: 40 INJECTION, POWDER, LYOPHILIZED, FOR SOLUTION INTRAMUSCULAR; INTRAVENOUS at 13:45

## 2017-01-01 RX ADMIN — SODIUM CHLORIDE 1000 ML: 9 INJECTION, SOLUTION INTRAVENOUS at 18:26

## 2017-01-01 RX ADMIN — GABAPENTIN 600 MG: 300 CAPSULE ORAL at 14:41

## 2017-01-01 RX ADMIN — FENTANYL CITRATE 50 MCG: 50 INJECTION INTRAMUSCULAR; INTRAVENOUS at 09:55

## 2017-01-01 RX ADMIN — Medication 100 MG: at 08:41

## 2017-01-01 RX ADMIN — LIDOCAINE HYDROCHLORIDE 6 ML: 10 INJECTION, SOLUTION EPIDURAL; INFILTRATION; INTRACAUDAL; PERINEURAL at 09:53

## 2017-01-01 RX ADMIN — GABAPENTIN 600 MG: 600 TABLET, FILM COATED ORAL at 20:36

## 2017-01-01 RX ADMIN — FLUOXETINE HYDROCHLORIDE 40 MG: 40 CAPSULE ORAL at 08:58

## 2017-01-01 RX ADMIN — SULFAMETHOXAZOLE AND TRIMETHOPRIM 1 TABLET: 800; 160 TABLET ORAL at 12:39

## 2017-01-01 RX ADMIN — HYDROMORPHONE HYDROCHLORIDE 1 MG: 1 SOLUTION ORAL at 08:33

## 2017-01-01 RX ADMIN — FOLIC ACID 1 MG: 1 TABLET ORAL at 16:03

## 2017-01-01 RX ADMIN — METHOCARBAMOL 1000 MG: 500 TABLET ORAL at 08:00

## 2017-01-01 RX ADMIN — RIFAXIMIN 550 MG: 550 TABLET ORAL at 22:11

## 2017-01-01 RX ADMIN — METHYLPREDNISOLONE SODIUM SUCCINATE 20 MG: 40 INJECTION, POWDER, LYOPHILIZED, FOR SOLUTION INTRAMUSCULAR; INTRAVENOUS at 05:59

## 2017-01-01 RX ADMIN — MIDAZOLAM 1 MG: 1 INJECTION INTRAMUSCULAR; INTRAVENOUS at 09:55

## 2017-01-01 RX ADMIN — IBUPROFEN 200 MG: 200 TABLET, FILM COATED ORAL at 16:35

## 2017-01-01 RX ADMIN — MORPHINE SULFATE 2 MG: 2 INJECTION, SOLUTION INTRAMUSCULAR; INTRAVENOUS at 20:04

## 2017-01-01 RX ADMIN — LORATADINE 10 MG: 10 TABLET ORAL at 08:32

## 2017-01-01 RX ADMIN — FOLIC ACID 1 MG: 1 TABLET ORAL at 08:07

## 2017-01-01 RX ADMIN — PREDNISONE 60 MG: 50 TABLET ORAL at 07:59

## 2017-01-01 RX ADMIN — Medication 1 PACKET: at 10:11

## 2017-01-01 RX ADMIN — FLUOXETINE 40 MG: 20 CAPSULE ORAL at 08:10

## 2017-01-01 RX ADMIN — INSULIN ASPART 4 UNITS: 100 INJECTION, SOLUTION INTRAVENOUS; SUBCUTANEOUS at 08:57

## 2017-01-01 RX ADMIN — ACETAMINOPHEN 975 MG: 325 TABLET, FILM COATED ORAL at 11:47

## 2017-01-01 RX ADMIN — GABAPENTIN 600 MG: 600 TABLET, FILM COATED ORAL at 21:29

## 2017-01-01 RX ADMIN — GABAPENTIN 600 MG: 600 TABLET, FILM COATED ORAL at 21:45

## 2017-01-01 RX ADMIN — LACTULOSE 20 G: 10 SOLUTION ORAL at 16:29

## 2017-01-01 RX ADMIN — SULFAMETHOXAZOLE AND TRIMETHOPRIM 2 TABLET: 800; 160 TABLET ORAL at 12:12

## 2017-01-01 RX ADMIN — SULFAMETHOXAZOLE AND TRIMETHOPRIM 400 MG: 80; 16 INJECTION, SOLUTION, CONCENTRATE INTRAVENOUS at 21:00

## 2017-01-01 RX ADMIN — INSULIN GLARGINE 20 UNITS: 100 INJECTION, SOLUTION SUBCUTANEOUS at 10:13

## 2017-01-01 RX ADMIN — SODIUM CHLORIDE, POTASSIUM CHLORIDE, SODIUM LACTATE AND CALCIUM CHLORIDE 150 ML/HR: 600; 310; 30; 20 INJECTION, SOLUTION INTRAVENOUS at 01:36

## 2017-01-01 RX ADMIN — HEPARIN SODIUM 5000 UNITS: 10000 INJECTION, SOLUTION INTRAVENOUS; SUBCUTANEOUS at 04:04

## 2017-01-01 RX ADMIN — Medication: at 22:23

## 2017-01-01 RX ADMIN — HEPARIN SODIUM 5000 UNITS: 10000 INJECTION, SOLUTION INTRAVENOUS; SUBCUTANEOUS at 11:24

## 2017-01-01 RX ADMIN — LACTULOSE 20 G: 20 SOLUTION ORAL at 08:22

## 2017-01-01 RX ADMIN — Medication 100 MG: at 10:28

## 2017-01-01 RX ADMIN — RIFAXIMIN 550 MG: 550 TABLET ORAL at 20:05

## 2017-01-01 RX ADMIN — TAZOBACTAM SODIUM AND PIPERACILLIN SODIUM 4.5 G: 500; 4 INJECTION, SOLUTION INTRAVENOUS at 01:09

## 2017-01-01 RX ADMIN — FUROSEMIDE 20 MG: 10 INJECTION, SOLUTION INTRAVENOUS at 08:00

## 2017-01-01 RX ADMIN — METHOCARBAMOL 1000 MG: 500 TABLET ORAL at 07:56

## 2017-01-01 RX ADMIN — SODIUM CHLORIDE 250 MG: 9 INJECTION, SOLUTION INTRAVENOUS at 05:23

## 2017-01-01 RX ADMIN — FUROSEMIDE 20 MG: 10 INJECTION, SOLUTION INTRAVENOUS at 23:41

## 2017-01-01 RX ADMIN — SULFAMETHOXAZOLE AND TRIMETHOPRIM 400 MG: 200; 40 SUSPENSION ORAL at 04:01

## 2017-01-01 RX ADMIN — ANALGESIC BALM: 1.74; 4.06 OINTMENT TOPICAL at 08:32

## 2017-01-01 RX ADMIN — BUPRENORPHINE HYDROCHLORIDE, NALOXONE HYDROCHLORIDE 3 FILM: 8; 2 FILM, SOLUBLE BUCCAL; SUBLINGUAL at 09:23

## 2017-01-01 RX ADMIN — GABAPENTIN 600 MG: 600 TABLET, FILM COATED ORAL at 08:02

## 2017-01-01 RX ADMIN — BUPRENORPHINE HYDROCHLORIDE, NALOXONE HYDROCHLORIDE 3 FILM: 8; 2 FILM, SOLUBLE BUCCAL; SUBLINGUAL at 08:23

## 2017-01-01 RX ADMIN — METHOCARBAMOL 500 MG: 500 TABLET ORAL at 12:00

## 2017-01-01 RX ADMIN — PROPOFOL 40 MCG/KG/MIN: 10 INJECTION, EMULSION INTRAVENOUS at 19:12

## 2017-01-01 RX ADMIN — MULTIPLE VITAMINS W/ MINERALS TAB 1 TABLET: TAB at 13:48

## 2017-01-01 RX ADMIN — RIFAXIMIN 550 MG: 550 TABLET ORAL at 08:41

## 2017-01-01 RX ADMIN — BUPRENORPHINE HYDROCHLORIDE, NALOXONE HYDROCHLORIDE 3 FILM: 8; 2 FILM, SOLUBLE BUCCAL; SUBLINGUAL at 08:49

## 2017-01-01 RX ADMIN — INSULIN ASPART 4 UNITS: 100 INJECTION, SOLUTION INTRAVENOUS; SUBCUTANEOUS at 03:57

## 2017-01-01 RX ADMIN — KETOROLAC TROMETHAMINE 15 MG: 15 INJECTION, SOLUTION INTRAMUSCULAR; INTRAVENOUS at 17:50

## 2017-01-01 RX ADMIN — SPIRONOLACTONE 50 MG: 25 TABLET ORAL at 14:36

## 2017-01-01 RX ADMIN — IPRATROPIUM BROMIDE AND ALBUTEROL SULFATE 3 ML: .5; 3 SOLUTION RESPIRATORY (INHALATION) at 12:53

## 2017-01-01 RX ADMIN — ATORVASTATIN CALCIUM 20 MG: 20 TABLET, FILM COATED ORAL at 21:47

## 2017-01-01 RX ADMIN — ASPIRIN 81 MG 81 MG: 81 TABLET ORAL at 08:42

## 2017-01-01 RX ADMIN — LACTULOSE 30 ML: 10 SOLUTION ORAL at 15:19

## 2017-01-01 RX ADMIN — TAZOBACTAM SODIUM AND PIPERACILLIN SODIUM 4.5 G: 500; 4 INJECTION, SOLUTION INTRAVENOUS at 16:44

## 2017-01-01 RX ADMIN — METHOCARBAMOL 1000 MG: 500 TABLET ORAL at 09:58

## 2017-01-01 RX ADMIN — Medication 15 ML: at 21:09

## 2017-01-01 RX ADMIN — BACITRACIN: 500 OINTMENT TOPICAL at 10:42

## 2017-01-01 RX ADMIN — OMEPRAZOLE 20 MG: 20 CAPSULE, DELAYED RELEASE ORAL at 08:36

## 2017-01-01 RX ADMIN — METHOCARBAMOL 500 MG: 500 TABLET ORAL at 20:50

## 2017-01-01 RX ADMIN — RIFAXIMIN 550 MG: 550 TABLET ORAL at 20:36

## 2017-01-01 RX ADMIN — IPRATROPIUM BROMIDE AND ALBUTEROL SULFATE 3 ML: .5; 3 SOLUTION RESPIRATORY (INHALATION) at 12:28

## 2017-01-01 RX ADMIN — LORAZEPAM 2 MG: 2 INJECTION INTRAMUSCULAR; INTRAVENOUS at 22:15

## 2017-01-01 RX ADMIN — LEVOFLOXACIN 750 MG: 5 INJECTION, SOLUTION INTRAVENOUS at 01:50

## 2017-01-01 RX ADMIN — PANTOPRAZOLE SODIUM 40 MG: 40 INJECTION, POWDER, FOR SOLUTION INTRAVENOUS at 08:25

## 2017-01-01 RX ADMIN — METHYLPREDNISOLONE SODIUM SUCCINATE 40 MG: 40 INJECTION, POWDER, FOR SOLUTION INTRAMUSCULAR; INTRAVENOUS at 02:09

## 2017-01-01 RX ADMIN — LORAZEPAM 2 MG: 2 INJECTION INTRAMUSCULAR; INTRAVENOUS at 12:16

## 2017-01-01 RX ADMIN — PANTOPRAZOLE SODIUM 40 MG: 40 TABLET, DELAYED RELEASE ORAL at 08:21

## 2017-01-01 RX ADMIN — ATORVASTATIN CALCIUM 20 MG: 20 TABLET, FILM COATED ORAL at 07:58

## 2017-01-01 RX ADMIN — MORPHINE SULFATE 2 MG: 2 INJECTION, SOLUTION INTRAMUSCULAR; INTRAVENOUS at 15:59

## 2017-01-01 RX ADMIN — SULFAMETHOXAZOLE AND TRIMETHOPRIM 400 MG: 200; 40 SUSPENSION ORAL at 04:12

## 2017-01-01 RX ADMIN — IPRATROPIUM BROMIDE AND ALBUTEROL SULFATE 3 ML: .5; 3 SOLUTION RESPIRATORY (INHALATION) at 11:54

## 2017-01-01 RX ADMIN — IPRATROPIUM BROMIDE AND ALBUTEROL SULFATE 3 ML: 2.5; .5 SOLUTION RESPIRATORY (INHALATION) at 00:56

## 2017-01-01 RX ADMIN — SODIUM CHLORIDE 500 MG: 9 INJECTION, SOLUTION INTRAVENOUS at 21:47

## 2017-01-01 RX ADMIN — IPRATROPIUM BROMIDE AND ALBUTEROL SULFATE 6 ML: .5; 3 SOLUTION RESPIRATORY (INHALATION) at 21:03

## 2017-01-01 RX ADMIN — FOLIC ACID 1 MG: 1 TABLET ORAL at 09:33

## 2017-01-01 RX ADMIN — HYDROCODONE BITARTRATE AND ACETAMINOPHEN 1 TABLET: 5; 325 TABLET ORAL at 22:37

## 2017-01-01 RX ADMIN — ATORVASTATIN CALCIUM 20 MG: 10 TABLET, FILM COATED ORAL at 20:51

## 2017-01-01 RX ADMIN — METHOCARBAMOL 1000 MG: 500 TABLET ORAL at 13:30

## 2017-01-01 RX ADMIN — METHOCARBAMOL 500 MG: 500 TABLET ORAL at 08:25

## 2017-01-01 RX ADMIN — IPRATROPIUM BROMIDE AND ALBUTEROL SULFATE 3 ML: .5; 3 SOLUTION RESPIRATORY (INHALATION) at 11:40

## 2017-01-01 RX ADMIN — GABAPENTIN 600 MG: 250 SUSPENSION ORAL at 22:03

## 2017-01-01 RX ADMIN — IPRATROPIUM BROMIDE AND ALBUTEROL SULFATE 3 ML: .5; 3 SOLUTION RESPIRATORY (INHALATION) at 15:43

## 2017-01-01 RX ADMIN — IPRATROPIUM BROMIDE AND ALBUTEROL SULFATE 3 ML: .5; 3 SOLUTION RESPIRATORY (INHALATION) at 07:50

## 2017-01-01 RX ADMIN — INSULIN GLARGINE 30 UNITS: 100 INJECTION, SOLUTION SUBCUTANEOUS at 19:08

## 2017-01-01 RX ADMIN — SULFAMETHOXAZOLE AND TRIMETHOPRIM 2 TABLET: 800; 160 TABLET ORAL at 17:27

## 2017-01-01 RX ADMIN — LACTULOSE 20 G: 20 SOLUTION ORAL at 09:24

## 2017-01-01 RX ADMIN — SULFAMETHOXAZOLE AND TRIMETHOPRIM 1 TABLET: 400; 80 TABLET ORAL at 08:21

## 2017-01-01 RX ADMIN — Medication: at 16:45

## 2017-01-01 RX ADMIN — METHOCARBAMOL 1000 MG: 500 TABLET ORAL at 18:03

## 2017-01-01 RX ADMIN — TAZOBACTAM SODIUM AND PIPERACILLIN SODIUM 4.5 G: 500; 4 INJECTION, SOLUTION INTRAVENOUS at 15:10

## 2017-01-01 RX ADMIN — GABAPENTIN 600 MG: 250 SUSPENSION ORAL at 08:11

## 2017-01-01 RX ADMIN — MORPHINE SULFATE 2 MG: 2 INJECTION, SOLUTION INTRAMUSCULAR; INTRAVENOUS at 14:18

## 2017-01-01 RX ADMIN — VANCOMYCIN HYDROCHLORIDE 1000 MG: 1 INJECTION, SOLUTION INTRAVENOUS at 11:51

## 2017-01-01 RX ADMIN — INSULIN GLARGINE 10 UNITS: 100 INJECTION, SOLUTION SUBCUTANEOUS at 09:47

## 2017-01-01 RX ADMIN — LACTULOSE 20 G: 10 SOLUTION ORAL at 10:00

## 2017-01-01 RX ADMIN — METHOCARBAMOL 1000 MG: 500 TABLET ORAL at 18:24

## 2017-01-01 RX ADMIN — ASPIRIN 81 MG: 81 TABLET, COATED ORAL at 08:20

## 2017-01-01 RX ADMIN — TAZOBACTAM SODIUM AND PIPERACILLIN SODIUM 4.5 G: 500; 4 INJECTION, SOLUTION INTRAVENOUS at 22:38

## 2017-01-01 RX ADMIN — METHYLPREDNISOLONE SODIUM SUCCINATE 20 MG: 40 INJECTION, POWDER, FOR SOLUTION INTRAMUSCULAR; INTRAVENOUS at 18:04

## 2017-01-01 RX ADMIN — Medication 100 MG: at 09:33

## 2017-01-01 RX ADMIN — FLUOXETINE 40 MG: 20 CAPSULE ORAL at 08:24

## 2017-01-01 RX ADMIN — NICOTINE 1 PATCH: 21 PATCH, EXTENDED RELEASE TRANSDERMAL at 09:48

## 2017-01-01 RX ADMIN — FENTANYL CITRATE 50 MCG: 50 INJECTION INTRAMUSCULAR; INTRAVENOUS at 09:45

## 2017-01-01 RX ADMIN — IPRATROPIUM BROMIDE AND ALBUTEROL SULFATE 3 ML: .5; 3 SOLUTION RESPIRATORY (INHALATION) at 07:25

## 2017-01-01 RX ADMIN — MIRTAZAPINE 15 MG: 15 TABLET, FILM COATED ORAL at 21:41

## 2017-01-01 RX ADMIN — MORPHINE SULFATE 2 MG: 2 INJECTION, SOLUTION INTRAMUSCULAR; INTRAVENOUS at 09:40

## 2017-01-01 RX ADMIN — IPRATROPIUM BROMIDE AND ALBUTEROL SULFATE 3 ML: .5; 3 SOLUTION RESPIRATORY (INHALATION) at 12:50

## 2017-01-01 RX ADMIN — RIFAXIMIN 550 MG: 550 TABLET ORAL at 09:07

## 2017-01-01 RX ADMIN — MIRTAZAPINE 15 MG: 15 TABLET, FILM COATED ORAL at 20:40

## 2017-01-01 RX ADMIN — INSULIN ASPART 6 UNITS: 100 INJECTION, SOLUTION INTRAVENOUS; SUBCUTANEOUS at 11:19

## 2017-01-01 RX ADMIN — Medication 1.5 UNITS/HR: at 03:03

## 2017-01-01 RX ADMIN — TAZOBACTAM SODIUM AND PIPERACILLIN SODIUM 4.5 G: 500; 4 INJECTION, SOLUTION INTRAVENOUS at 10:34

## 2017-01-01 RX ADMIN — METHOCARBAMOL 500 MG: 500 TABLET ORAL at 16:32

## 2017-01-01 RX ADMIN — METHOCARBAMOL 1000 MG: 500 TABLET ORAL at 12:25

## 2017-01-01 RX ADMIN — NICOTINE 1 PATCH: 21 PATCH, EXTENDED RELEASE TRANSDERMAL at 08:35

## 2017-01-01 RX ADMIN — IPRATROPIUM BROMIDE AND ALBUTEROL SULFATE 3 ML: .5; 3 SOLUTION RESPIRATORY (INHALATION) at 07:30

## 2017-01-01 RX ADMIN — RIFAXIMIN 550 MG: 550 TABLET ORAL at 20:59

## 2017-01-01 RX ADMIN — IPRATROPIUM BROMIDE AND ALBUTEROL SULFATE 3 ML: .5; 3 SOLUTION RESPIRATORY (INHALATION) at 11:42

## 2017-01-01 RX ADMIN — BUPRENORPHINE HYDROCHLORIDE, NALOXONE HYDROCHLORIDE 3 FILM: 8; 2 FILM, SOLUBLE BUCCAL; SUBLINGUAL at 09:47

## 2017-01-01 RX ADMIN — NICOTINE 1 PATCH: 21 PATCH, EXTENDED RELEASE TRANSDERMAL at 17:12

## 2017-01-01 RX ADMIN — FLUOXETINE 40 MG: 20 CAPSULE ORAL at 08:37

## 2017-01-01 RX ADMIN — RIFAXIMIN 550 MG: 550 TABLET ORAL at 08:28

## 2017-01-01 RX ADMIN — RIFAXIMIN 550 MG: 550 TABLET ORAL at 21:22

## 2017-01-01 RX ADMIN — SULFAMETHOXAZOLE AND TRIMETHOPRIM 2 TABLET: 800; 160 TABLET ORAL at 11:53

## 2017-01-01 RX ADMIN — SULFAMETHOXAZOLE AND TRIMETHOPRIM 1 TABLET: 800; 160 TABLET ORAL at 10:39

## 2017-01-01 RX ADMIN — GABAPENTIN 600 MG: 600 TABLET, FILM COATED ORAL at 16:59

## 2017-01-01 RX ADMIN — BUPRENORPHINE HYDROCHLORIDE, NALOXONE HYDROCHLORIDE 3 FILM: 8; 2 FILM, SOLUBLE BUCCAL; SUBLINGUAL at 08:41

## 2017-01-01 RX ADMIN — INSULIN ASPART 4 UNITS: 100 INJECTION, SOLUTION INTRAVENOUS; SUBCUTANEOUS at 04:07

## 2017-01-01 RX ADMIN — HYDROMORPHONE HYDROCHLORIDE 1 MG: 1 SOLUTION ORAL at 23:14

## 2017-01-01 RX ADMIN — Medication 1 CAPSULE: at 13:48

## 2017-01-01 RX ADMIN — GABAPENTIN 300 MG: 300 CAPSULE ORAL at 16:44

## 2017-01-01 RX ADMIN — SULFAMETHOXAZOLE AND TRIMETHOPRIM 2 TABLET: 800; 160 TABLET ORAL at 14:03

## 2017-01-01 RX ADMIN — IPRATROPIUM BROMIDE AND ALBUTEROL SULFATE 3 ML: .5; 3 SOLUTION RESPIRATORY (INHALATION) at 19:49

## 2017-01-01 RX ADMIN — NICOTINE POLACRILEX 2 MG: 2 GUM, CHEWING ORAL at 08:01

## 2017-01-01 RX ADMIN — SODIUM CHLORIDE: 9 INJECTION, SOLUTION INTRAVENOUS at 00:00

## 2017-01-01 RX ADMIN — SODIUM CHLORIDE 250 MG: 9 INJECTION, SOLUTION INTRAVENOUS at 06:07

## 2017-01-01 RX ADMIN — LIDOCAINE HYDROCHLORIDE 100 MG: 20 INJECTION, SOLUTION INFILTRATION; PERINEURAL at 08:45

## 2017-01-01 RX ADMIN — LACTULOSE 20 G: 20 SOLUTION ORAL at 13:34

## 2017-01-01 RX ADMIN — HYDROCODONE BITARTRATE AND ACETAMINOPHEN 2 TABLET: 5; 325 TABLET ORAL at 09:53

## 2017-01-01 RX ADMIN — METHOCARBAMOL 500 MG: 500 TABLET ORAL at 09:10

## 2017-01-01 RX ADMIN — GABAPENTIN 600 MG: 250 SUSPENSION ORAL at 08:20

## 2017-01-01 RX ADMIN — KETOROLAC TROMETHAMINE 15 MG: 15 INJECTION, SOLUTION INTRAMUSCULAR; INTRAVENOUS at 10:22

## 2017-01-01 RX ADMIN — HYDROCODONE BITARTRATE AND ACETAMINOPHEN 2 TABLET: 5; 325 TABLET ORAL at 14:10

## 2017-01-01 RX ADMIN — LACTULOSE 20 G: 10 SOLUTION ORAL at 21:47

## 2017-01-01 RX ADMIN — NICOTINE 1 CARTRIDGE: 4 INHALANT RESPIRATORY (INHALATION) at 17:06

## 2017-01-01 RX ADMIN — NICOTINE POLACRILEX 2 MG: 2 GUM, CHEWING ORAL at 16:19

## 2017-01-01 RX ADMIN — ASPIRIN 81 MG 81 MG: 81 TABLET ORAL at 08:11

## 2017-01-01 RX ADMIN — NICOTINE POLACRILEX 2 MG: 2 GUM, CHEWING ORAL at 14:18

## 2017-01-01 RX ADMIN — METHOCARBAMOL 500 MG: 500 TABLET ORAL at 17:39

## 2017-01-01 RX ADMIN — RIFAXIMIN 550 MG: 550 TABLET ORAL at 08:21

## 2017-01-01 RX ADMIN — MORPHINE SULFATE 2 MG: 2 INJECTION, SOLUTION INTRAMUSCULAR; INTRAVENOUS at 08:24

## 2017-01-01 RX ADMIN — IPRATROPIUM BROMIDE AND ALBUTEROL SULFATE 3 ML: .5; 3 SOLUTION RESPIRATORY (INHALATION) at 20:15

## 2017-01-01 RX ADMIN — Medication 1 CAPSULE: at 08:25

## 2017-01-01 RX ADMIN — MIRTAZAPINE 30 MG: 30 TABLET, FILM COATED ORAL at 20:53

## 2017-01-01 RX ADMIN — ATORVASTATIN CALCIUM 20 MG: 20 TABLET, FILM COATED ORAL at 08:11

## 2017-01-01 RX ADMIN — INSULIN ASPART 5 UNITS: 100 INJECTION, SOLUTION INTRAVENOUS; SUBCUTANEOUS at 18:26

## 2017-01-01 RX ADMIN — LACTULOSE 20 G: 10 SOLUTION ORAL at 09:28

## 2017-01-01 RX ADMIN — ATORVASTATIN CALCIUM 20 MG: 20 TABLET, FILM COATED ORAL at 10:15

## 2017-01-01 RX ADMIN — IPRATROPIUM BROMIDE AND ALBUTEROL SULFATE 3 ML: .5; 3 SOLUTION RESPIRATORY (INHALATION) at 03:37

## 2017-01-01 RX ADMIN — MORPHINE SULFATE 2 MG: 2 INJECTION, SOLUTION INTRAMUSCULAR; INTRAVENOUS at 18:53

## 2017-01-01 RX ADMIN — NICOTINE POLACRILEX 2 MG: 2 GUM, CHEWING ORAL at 21:47

## 2017-01-01 RX ADMIN — LACTULOSE 30 ML: 10 SOLUTION ORAL at 08:39

## 2017-01-01 RX ADMIN — PROPOFOL 60 MCG/KG/MIN: 10 INJECTION, EMULSION INTRAVENOUS at 13:12

## 2017-01-01 RX ADMIN — METHYLPREDNISOLONE SODIUM SUCCINATE 62.5 MG: 125 INJECTION, POWDER, FOR SOLUTION INTRAMUSCULAR; INTRAVENOUS at 07:33

## 2017-01-01 RX ADMIN — THIAMINE HCL (VITAMIN B1) 50 MG TABLET 50 MG: at 08:39

## 2017-01-01 RX ADMIN — UMECLIDINIUM 1 PUFF: 62.5 AEROSOL, POWDER ORAL at 08:37

## 2017-01-01 RX ADMIN — FOLIC ACID 1 MG: 1 TABLET ORAL at 10:28

## 2017-01-01 RX ADMIN — TAZOBACTAM SODIUM AND PIPERACILLIN SODIUM 4.5 G: 500; 4 INJECTION, SOLUTION INTRAVENOUS at 10:15

## 2017-01-01 RX ADMIN — ATORVASTATIN CALCIUM 20 MG: 20 TABLET, FILM COATED ORAL at 21:29

## 2017-01-01 RX ADMIN — Medication 50 MG: at 08:41

## 2017-01-01 RX ADMIN — NICOTINE 1 PATCH: 21 PATCH, EXTENDED RELEASE TRANSDERMAL at 08:30

## 2017-01-01 RX ADMIN — MULTIPLE VITAMINS W/ MINERALS TAB 1 TABLET: TAB at 08:25

## 2017-01-01 RX ADMIN — TAZOBACTAM SODIUM AND PIPERACILLIN SODIUM 4.5 G: 500; 4 INJECTION, SOLUTION INTRAVENOUS at 21:53

## 2017-01-01 RX ADMIN — SULFAMETHOXAZOLE AND TRIMETHOPRIM 400 MG: 200; 40 SUSPENSION ORAL at 03:44

## 2017-01-01 RX ADMIN — HYDROMORPHONE HYDROCHLORIDE 0.2 MG: 1 INJECTION, SOLUTION INTRAMUSCULAR; INTRAVENOUS; SUBCUTANEOUS at 21:30

## 2017-01-01 RX ADMIN — PIPERACILLIN AND TAZOBACTAM 4.5 G: 4; .5 INJECTION, POWDER, FOR SOLUTION INTRAVENOUS at 23:29

## 2017-01-01 RX ADMIN — Medication 50 MG: at 12:24

## 2017-01-01 RX ADMIN — NICOTINE 1 PATCH: 14 PATCH, EXTENDED RELEASE TRANSDERMAL at 09:34

## 2017-01-01 RX ADMIN — NICOTINE POLACRILEX 2 MG: 2 GUM, CHEWING ORAL at 14:32

## 2017-01-01 RX ADMIN — IPRATROPIUM BROMIDE AND ALBUTEROL SULFATE 3 ML: .5; 3 SOLUTION RESPIRATORY (INHALATION) at 04:05

## 2017-01-01 RX ADMIN — BUPRENORPHINE HYDROCHLORIDE, NALOXONE HYDROCHLORIDE 3 FILM: 8; 2 FILM, SOLUBLE BUCCAL; SUBLINGUAL at 08:01

## 2017-01-01 RX ADMIN — RIFAXIMIN 550 MG: 550 TABLET ORAL at 20:23

## 2017-01-01 RX ADMIN — RIFAXIMIN 550 MG: 550 TABLET ORAL at 09:51

## 2017-01-01 RX ADMIN — IBUPROFEN 200 MG: 200 TABLET, FILM COATED ORAL at 11:56

## 2017-01-01 RX ADMIN — NICOTINE 1 PATCH: 14 PATCH, EXTENDED RELEASE TRANSDERMAL at 20:04

## 2017-01-01 RX ADMIN — FOLIC ACID 1 MG: 1 TABLET ORAL at 17:08

## 2017-01-01 RX ADMIN — OXYCODONE HYDROCHLORIDE AND ACETAMINOPHEN 500 MG: 500 TABLET ORAL at 08:22

## 2017-01-01 RX ADMIN — HYDROMORPHONE HYDROCHLORIDE 1 MG: 1 SOLUTION ORAL at 08:48

## 2017-01-01 RX ADMIN — VANCOMYCIN HYDROCHLORIDE 1750 MG: 10 INJECTION, POWDER, LYOPHILIZED, FOR SOLUTION INTRAVENOUS at 22:07

## 2017-01-01 RX ADMIN — SENNOSIDES AND DOCUSATE SODIUM 1 TABLET: 8.6; 5 TABLET ORAL at 20:51

## 2017-01-01 RX ADMIN — IPRATROPIUM BROMIDE AND ALBUTEROL SULFATE 3 ML: .5; 3 SOLUTION RESPIRATORY (INHALATION) at 12:39

## 2017-01-01 RX ADMIN — VANCOMYCIN HYDROCHLORIDE 1750 MG: 1 INJECTION, POWDER, LYOPHILIZED, FOR SOLUTION INTRAVENOUS at 23:59

## 2017-01-01 RX ADMIN — HEPARIN SODIUM 5000 UNITS: 10000 INJECTION, SOLUTION INTRAVENOUS; SUBCUTANEOUS at 11:47

## 2017-01-01 RX ADMIN — IPRATROPIUM BROMIDE AND ALBUTEROL SULFATE 3 ML: .5; 3 SOLUTION RESPIRATORY (INHALATION) at 11:52

## 2017-01-01 RX ADMIN — FOLIC ACID 1 MG: 1 TABLET ORAL at 09:46

## 2017-01-01 RX ADMIN — LACTULOSE 20 G: 20 SOLUTION ORAL at 21:51

## 2017-01-01 RX ADMIN — IPRATROPIUM BROMIDE AND ALBUTEROL SULFATE 3 ML: .5; 3 SOLUTION RESPIRATORY (INHALATION) at 20:09

## 2017-01-01 RX ADMIN — PREDNISONE 60 MG: 10 TABLET ORAL at 09:46

## 2017-01-01 RX ADMIN — ALBUMIN HUMAN 250 ML: 0.05 INJECTION, SOLUTION INTRAVENOUS at 04:38

## 2017-01-01 RX ADMIN — ENOXAPARIN SODIUM 40 MG: 40 INJECTION SUBCUTANEOUS at 16:28

## 2017-01-01 RX ADMIN — LACTULOSE 20 G: 20 SOLUTION ORAL at 14:16

## 2017-01-01 RX ADMIN — FLUOXETINE 40 MG: 20 CAPSULE ORAL at 17:43

## 2017-01-01 RX ADMIN — SULFAMETHOXAZOLE AND TRIMETHOPRIM 400 MG: 200; 40 SUSPENSION ORAL at 08:17

## 2017-01-01 RX ADMIN — HYDROMORPHONE HYDROCHLORIDE 1 MG: 1 SOLUTION ORAL at 00:27

## 2017-01-01 RX ADMIN — NICOTINE POLACRILEX 2 MG: 2 GUM, CHEWING ORAL at 18:27

## 2017-01-01 RX ADMIN — METHYLPREDNISOLONE SODIUM SUCCINATE 40 MG: 40 INJECTION, POWDER, FOR SOLUTION INTRAMUSCULAR; INTRAVENOUS at 06:25

## 2017-01-01 RX ADMIN — ONDANSETRON 4 MG: 2 INJECTION INTRAMUSCULAR; INTRAVENOUS at 00:57

## 2017-01-01 RX ADMIN — PIPERACILLIN AND TAZOBACTAM 4.5 G: 4; .5 INJECTION, POWDER, LYOPHILIZED, FOR SOLUTION INTRAVENOUS; PARENTERAL at 18:17

## 2017-01-01 RX ADMIN — IPRATROPIUM BROMIDE AND ALBUTEROL SULFATE 3 ML: .5; 3 SOLUTION RESPIRATORY (INHALATION) at 07:41

## 2017-01-01 RX ADMIN — NICOTINE POLACRILEX 2 MG: 2 GUM, CHEWING ORAL at 12:00

## 2017-01-01 RX ADMIN — MIRTAZAPINE 30 MG: 30 TABLET, FILM COATED ORAL at 21:30

## 2017-01-01 RX ADMIN — RIFAXIMIN 550 MG: 550 TABLET ORAL at 21:38

## 2017-01-01 RX ADMIN — FLUOXETINE 40 MG: 20 CAPSULE ORAL at 10:40

## 2017-01-01 RX ADMIN — LEVOFLOXACIN 750 MG: 500 TABLET, FILM COATED ORAL at 20:09

## 2017-01-01 RX ADMIN — GABAPENTIN 600 MG: 600 TABLET, FILM COATED ORAL at 09:11

## 2017-01-01 RX ADMIN — METHYLPREDNISOLONE SODIUM SUCCINATE 20 MG: 40 INJECTION, POWDER, LYOPHILIZED, FOR SOLUTION INTRAMUSCULAR; INTRAVENOUS at 11:46

## 2017-01-01 RX ADMIN — LACTULOSE 20 G: 10 SOLUTION ORAL at 15:41

## 2017-01-01 RX ADMIN — LACTULOSE 20 G: 10 SOLUTION ORAL at 21:07

## 2017-01-01 RX ADMIN — SODIUM CHLORIDE 500 MG: 9 INJECTION, SOLUTION INTRAVENOUS at 20:29

## 2017-01-01 RX ADMIN — IPRATROPIUM BROMIDE AND ALBUTEROL SULFATE 3 ML: .5; 3 SOLUTION RESPIRATORY (INHALATION) at 15:38

## 2017-01-01 RX ADMIN — IPRATROPIUM BROMIDE AND ALBUTEROL SULFATE 3 ML: .5; 3 SOLUTION RESPIRATORY (INHALATION) at 15:37

## 2017-01-01 RX ADMIN — FOLIC ACID 1 MG: 1 TABLET ORAL at 08:42

## 2017-01-01 RX ADMIN — OXYCODONE HYDROCHLORIDE 10 MG: 5 TABLET ORAL at 01:29

## 2017-01-01 RX ADMIN — INSULIN ASPART 8 UNITS: 100 INJECTION, SOLUTION INTRAVENOUS; SUBCUTANEOUS at 13:20

## 2017-01-01 RX ADMIN — INSULIN GLARGINE 15 UNITS: 100 INJECTION, SOLUTION SUBCUTANEOUS at 09:18

## 2017-01-01 RX ADMIN — FLUOXETINE HYDROCHLORIDE 40 MG: 20 LIQUID ORAL at 08:16

## 2017-01-01 RX ADMIN — RIFAXIMIN 550 MG: 550 TABLET ORAL at 21:48

## 2017-01-01 RX ADMIN — IPRATROPIUM BROMIDE AND ALBUTEROL SULFATE 3 ML: .5; 3 SOLUTION RESPIRATORY (INHALATION) at 19:10

## 2017-01-01 RX ADMIN — ENOXAPARIN SODIUM 40 MG: 40 INJECTION SUBCUTANEOUS at 17:44

## 2017-01-01 RX ADMIN — NICOTINE POLACRILEX 2 MG: 2 GUM, CHEWING ORAL at 09:04

## 2017-01-01 RX ADMIN — VANCOMYCIN HYDROCHLORIDE 1750 MG: 1 INJECTION, POWDER, LYOPHILIZED, FOR SOLUTION INTRAVENOUS at 00:31

## 2017-01-01 RX ADMIN — TRAZODONE HYDROCHLORIDE 100 MG: 100 TABLET ORAL at 22:56

## 2017-01-01 RX ADMIN — GABAPENTIN 600 MG: 600 TABLET, FILM COATED ORAL at 22:12

## 2017-01-01 RX ADMIN — ASPIRIN 81 MG: 81 TABLET, COATED ORAL at 07:52

## 2017-01-01 RX ADMIN — GABAPENTIN 600 MG: 600 TABLET, FILM COATED ORAL at 22:25

## 2017-01-01 RX ADMIN — METHYLPREDNISOLONE SODIUM SUCCINATE 125 MG: 125 INJECTION, POWDER, FOR SOLUTION INTRAMUSCULAR; INTRAVENOUS at 00:59

## 2017-01-01 RX ADMIN — ASPIRIN 81 MG 81 MG: 81 TABLET ORAL at 08:21

## 2017-01-01 RX ADMIN — METHYLPREDNISOLONE SODIUM SUCCINATE 40 MG: 40 INJECTION, POWDER, FOR SOLUTION INTRAMUSCULAR; INTRAVENOUS at 13:48

## 2017-01-01 RX ADMIN — MORPHINE SULFATE 4 MG: 2 INJECTION, SOLUTION INTRAMUSCULAR; INTRAVENOUS at 10:33

## 2017-01-01 RX ADMIN — LACTULOSE 20 G: 20 SOLUTION ORAL at 08:17

## 2017-01-01 RX ADMIN — SULFAMETHOXAZOLE AND TRIMETHOPRIM 1 TABLET: 400; 80 TABLET ORAL at 08:32

## 2017-01-01 RX ADMIN — FLUOXETINE 40 MG: 20 CAPSULE ORAL at 08:31

## 2017-01-01 RX ADMIN — LACTULOSE 30 ML: 10 SOLUTION ORAL at 08:13

## 2017-01-01 RX ADMIN — TRAZODONE HYDROCHLORIDE 100 MG: 100 TABLET ORAL at 21:16

## 2017-01-01 RX ADMIN — METHOCARBAMOL 1000 MG: 500 TABLET ORAL at 09:05

## 2017-01-01 RX ADMIN — BUPRENORPHINE HYDROCHLORIDE, NALOXONE HYDROCHLORIDE 3 FILM: 8; 2 FILM, SOLUBLE BUCCAL; SUBLINGUAL at 09:34

## 2017-01-01 RX ADMIN — RIFAXIMIN 550 MG: 550 TABLET ORAL at 07:57

## 2017-01-01 RX ADMIN — ATORVASTATIN CALCIUM 20 MG: 20 TABLET, FILM COATED ORAL at 21:45

## 2017-01-01 RX ADMIN — TRAZODONE HYDROCHLORIDE 100 MG: 100 TABLET ORAL at 21:11

## 2017-01-01 RX ADMIN — ACETAMINOPHEN 650 MG: 325 TABLET, FILM COATED ORAL at 13:46

## 2017-01-01 RX ADMIN — FOLIC ACID 1 MG: 1 TABLET ORAL at 08:12

## 2017-01-01 RX ADMIN — NICOTINE 1 PATCH: 14 PATCH, EXTENDED RELEASE TRANSDERMAL at 08:08

## 2017-01-01 RX ADMIN — HUMAN INSULIN 5.5 UNITS/HR: 100 INJECTION, SOLUTION SUBCUTANEOUS at 14:41

## 2017-01-01 RX ADMIN — GABAPENTIN 600 MG: 600 TABLET, FILM COATED ORAL at 16:41

## 2017-01-01 RX ADMIN — INSULIN ASPART 6 UNITS: 100 INJECTION, SOLUTION INTRAVENOUS; SUBCUTANEOUS at 16:28

## 2017-01-01 RX ADMIN — LACTULOSE 20 G: 20 SOLUTION ORAL at 08:24

## 2017-01-01 RX ADMIN — METRONIDAZOLE 500 MG: 500 TABLET ORAL at 14:25

## 2017-01-01 RX ADMIN — Medication 1250 MG: at 04:10

## 2017-01-01 RX ADMIN — TAZOBACTAM SODIUM AND PIPERACILLIN SODIUM 3.38 G: 375; 3 INJECTION, SOLUTION INTRAVENOUS at 11:08

## 2017-01-01 RX ADMIN — LORATADINE 10 MG: 5 SOLUTION ORAL at 08:12

## 2017-01-01 RX ADMIN — SODIUM CHLORIDE: 9 INJECTION, SOLUTION INTRAVENOUS at 07:55

## 2017-01-01 RX ADMIN — SULFAMETHOXAZOLE AND TRIMETHOPRIM 1 TABLET: 400; 80 TABLET ORAL at 12:12

## 2017-01-01 RX ADMIN — NICOTINE POLACRILEX 2 MG: 2 GUM, CHEWING ORAL at 12:32

## 2017-01-01 RX ADMIN — GABAPENTIN 600 MG: 300 CAPSULE ORAL at 21:22

## 2017-01-01 RX ADMIN — MORPHINE SULFATE 2 MG: 2 INJECTION, SOLUTION INTRAMUSCULAR; INTRAVENOUS at 23:53

## 2017-01-01 RX ADMIN — METHYLPREDNISOLONE SODIUM SUCCINATE 125 MG: 125 INJECTION, POWDER, FOR SOLUTION INTRAMUSCULAR; INTRAVENOUS at 04:56

## 2017-01-01 RX ADMIN — GABAPENTIN 600 MG: 600 TABLET, FILM COATED ORAL at 08:37

## 2017-01-01 RX ADMIN — IPRATROPIUM BROMIDE AND ALBUTEROL SULFATE 3 ML: .5; 3 SOLUTION RESPIRATORY (INHALATION) at 03:29

## 2017-01-01 RX ADMIN — VANCOMYCIN HYDROCHLORIDE 1750 MG: 10 INJECTION, POWDER, LYOPHILIZED, FOR SOLUTION INTRAVENOUS at 07:49

## 2017-01-01 RX ADMIN — ACETAMINOPHEN 650 MG: 325 TABLET, FILM COATED ORAL at 04:04

## 2017-01-01 RX ADMIN — HYDROCODONE BITARTRATE AND ACETAMINOPHEN 1 TABLET: 5; 325 TABLET ORAL at 16:28

## 2017-01-01 RX ADMIN — INSULIN HUMAN 15 UNITS: 100 INJECTION, SUSPENSION SUBCUTANEOUS at 16:44

## 2017-01-01 RX ADMIN — UMECLIDINIUM 1 PUFF: 62.5 AEROSOL, POWDER ORAL at 07:49

## 2017-01-01 RX ADMIN — PREDNISONE 60 MG: 50 TABLET ORAL at 08:24

## 2017-01-01 RX ADMIN — TAZOBACTAM SODIUM AND PIPERACILLIN SODIUM 4.5 G: 500; 4 INJECTION, SOLUTION INTRAVENOUS at 10:10

## 2017-01-01 RX ADMIN — HYDROMORPHONE HYDROCHLORIDE 1 MG: 1 SOLUTION ORAL at 00:49

## 2017-01-01 RX ADMIN — CISATRACURIUM BESYLATE 2 MG: 2 INJECTION INTRAVENOUS at 09:15

## 2017-01-01 RX ADMIN — ASPIRIN 81 MG 81 MG: 81 TABLET ORAL at 08:26

## 2017-01-01 RX ADMIN — GABAPENTIN 600 MG: 250 SUSPENSION ORAL at 09:15

## 2017-01-01 RX ADMIN — LACTULOSE 20 G: 20 SOLUTION ORAL at 08:26

## 2017-01-01 RX ADMIN — PROPOFOL 40 MCG/KG/MIN: 10 INJECTION, EMULSION INTRAVENOUS at 03:54

## 2017-01-01 RX ADMIN — IPRATROPIUM BROMIDE AND ALBUTEROL SULFATE 3 ML: .5; 3 SOLUTION RESPIRATORY (INHALATION) at 03:33

## 2017-01-01 RX ADMIN — METHOCARBAMOL 1000 MG: 500 TABLET ORAL at 13:51

## 2017-01-01 RX ADMIN — METHYLPREDNISOLONE SODIUM SUCCINATE 20 MG: 40 INJECTION, POWDER, LYOPHILIZED, FOR SOLUTION INTRAMUSCULAR; INTRAVENOUS at 17:37

## 2017-01-01 RX ADMIN — LACTULOSE 20 G: 20 SOLUTION ORAL at 09:47

## 2017-01-01 RX ADMIN — SULFAMETHOXAZOLE AND TRIMETHOPRIM 400 MG: 200; 40 SUSPENSION ORAL at 10:46

## 2017-01-01 RX ADMIN — LACTULOSE 30 ML: 10 SOLUTION ORAL at 10:34

## 2017-01-01 RX ADMIN — IPRATROPIUM BROMIDE AND ALBUTEROL SULFATE 6 ML: .5; 3 SOLUTION RESPIRATORY (INHALATION) at 02:57

## 2017-01-01 RX ADMIN — PROPOFOL 45 MCG/KG/MIN: 10 INJECTION, EMULSION INTRAVENOUS at 01:34

## 2017-01-01 RX ADMIN — NICOTINE POLACRILEX 2 MG: 2 GUM, CHEWING ORAL at 15:07

## 2017-01-01 RX ADMIN — LACTULOSE 20 G: 10 SOLUTION ORAL at 22:36

## 2017-01-01 RX ADMIN — GABAPENTIN 600 MG: 250 SUSPENSION ORAL at 15:23

## 2017-01-01 RX ADMIN — NICOTINE 1 PATCH: 21 PATCH, EXTENDED RELEASE TRANSDERMAL at 07:58

## 2017-01-01 RX ADMIN — SODIUM CHLORIDE, POTASSIUM CHLORIDE, SODIUM LACTATE AND CALCIUM CHLORIDE: 600; 310; 30; 20 INJECTION, SOLUTION INTRAVENOUS at 08:32

## 2017-01-01 RX ADMIN — Medication 10 ML: at 13:36

## 2017-01-01 RX ADMIN — VANCOMYCIN HYDROCHLORIDE 1000 MG: 1 INJECTION, SOLUTION INTRAVENOUS at 04:45

## 2017-01-01 RX ADMIN — TRAZODONE HYDROCHLORIDE 100 MG: 100 TABLET ORAL at 20:56

## 2017-01-01 RX ADMIN — PIPERACILLIN AND TAZOBACTAM 4.5 G: 4; .5 INJECTION, POWDER, FOR SOLUTION INTRAVENOUS at 21:48

## 2017-01-01 RX ADMIN — IPRATROPIUM BROMIDE AND ALBUTEROL SULFATE 3 ML: .5; 3 SOLUTION RESPIRATORY (INHALATION) at 15:46

## 2017-01-01 RX ADMIN — METHYLPREDNISOLONE SODIUM SUCCINATE 40 MG: 40 INJECTION, POWDER, FOR SOLUTION INTRAMUSCULAR; INTRAVENOUS at 11:26

## 2017-01-01 RX ADMIN — METHYLPREDNISOLONE SODIUM SUCCINATE 125 MG: 125 INJECTION, POWDER, FOR SOLUTION INTRAMUSCULAR; INTRAVENOUS at 20:28

## 2017-01-01 RX ADMIN — MIRTAZAPINE 15 MG: 15 TABLET, FILM COATED ORAL at 21:37

## 2017-01-01 RX ADMIN — IPRATROPIUM BROMIDE AND ALBUTEROL SULFATE 3 ML: .5; 3 SOLUTION RESPIRATORY (INHALATION) at 19:41

## 2017-01-01 RX ADMIN — PROPOFOL 40 MCG/KG/MIN: 10 INJECTION, EMULSION INTRAVENOUS at 08:37

## 2017-01-01 RX ADMIN — INSULIN ASPART 3 UNITS: 100 INJECTION, SOLUTION INTRAVENOUS; SUBCUTANEOUS at 16:25

## 2017-01-01 RX ADMIN — KETOROLAC TROMETHAMINE 15 MG: 15 INJECTION, SOLUTION INTRAMUSCULAR; INTRAVENOUS at 12:22

## 2017-01-01 RX ADMIN — FOLIC ACID 1 MG: 1 TABLET ORAL at 08:20

## 2017-01-01 RX ADMIN — GABAPENTIN 600 MG: 600 TABLET, FILM COATED ORAL at 08:26

## 2017-01-01 RX ADMIN — MULTIPLE VITAMINS W/ MINERALS TAB 1 TABLET: TAB at 09:04

## 2017-01-01 RX ADMIN — FLUOXETINE 40 MG: 20 CAPSULE ORAL at 09:24

## 2017-01-01 RX ADMIN — RIFAXIMIN 550 MG: 550 TABLET ORAL at 08:43

## 2017-01-01 RX ADMIN — METHOCARBAMOL 1000 MG: 500 TABLET ORAL at 08:51

## 2017-01-01 RX ADMIN — ASPIRIN 81 MG: 81 TABLET, COATED ORAL at 09:07

## 2017-01-01 RX ADMIN — SULFAMETHOXAZOLE AND TRIMETHOPRIM 1 TABLET: 400; 80 TABLET ORAL at 14:56

## 2017-01-01 RX ADMIN — NICOTINE POLACRILEX 2 MG: 2 GUM, CHEWING ORAL at 13:15

## 2017-01-01 RX ADMIN — METHYLPREDNISOLONE SODIUM SUCCINATE 20 MG: 40 INJECTION, POWDER, LYOPHILIZED, FOR SOLUTION INTRAMUSCULAR; INTRAVENOUS at 23:49

## 2017-01-01 RX ADMIN — TAZOBACTAM SODIUM AND PIPERACILLIN SODIUM 4.5 G: 500; 4 INJECTION, SOLUTION INTRAVENOUS at 16:37

## 2017-01-01 RX ADMIN — INSULIN GLARGINE 15 UNITS: 100 INJECTION, SOLUTION SUBCUTANEOUS at 09:06

## 2017-01-01 RX ADMIN — IPRATROPIUM BROMIDE AND ALBUTEROL SULFATE 3 ML: .5; 3 SOLUTION RESPIRATORY (INHALATION) at 09:14

## 2017-01-01 RX ADMIN — HYDROMORPHONE HYDROCHLORIDE 0.2 MG: 1 INJECTION, SOLUTION INTRAMUSCULAR; INTRAVENOUS; SUBCUTANEOUS at 00:50

## 2017-01-01 RX ADMIN — ENOXAPARIN SODIUM 40 MG: 40 INJECTION SUBCUTANEOUS at 13:02

## 2017-01-01 RX ADMIN — INSULIN ASPART 6 UNITS: 100 INJECTION, SOLUTION INTRAVENOUS; SUBCUTANEOUS at 23:54

## 2017-01-01 RX ADMIN — NICOTINE POLACRILEX 2 MG: 2 GUM, CHEWING ORAL at 12:39

## 2017-01-01 RX ADMIN — CEFTRIAXONE 2 G: 2 INJECTION, POWDER, FOR SOLUTION INTRAMUSCULAR; INTRAVENOUS at 05:01

## 2017-01-01 RX ADMIN — Medication 100 MG: at 17:08

## 2017-01-01 RX ADMIN — INSULIN ASPART 1 UNITS: 100 INJECTION, SOLUTION INTRAVENOUS; SUBCUTANEOUS at 11:49

## 2017-01-01 RX ADMIN — NICOTINE 1 PATCH: 21 PATCH, EXTENDED RELEASE TRANSDERMAL at 11:50

## 2017-01-01 RX ADMIN — METHOCARBAMOL 500 MG: 500 TABLET ORAL at 19:44

## 2017-01-01 RX ADMIN — Medication 100 MG: at 08:45

## 2017-01-01 RX ADMIN — SODIUM CHLORIDE: 9 INJECTION, SOLUTION INTRAVENOUS at 17:58

## 2017-01-01 RX ADMIN — MIRTAZAPINE 30 MG: 30 TABLET, FILM COATED ORAL at 21:27

## 2017-01-01 RX ADMIN — VANCOMYCIN HYDROCHLORIDE 1500 MG: 10 INJECTION, POWDER, LYOPHILIZED, FOR SOLUTION INTRAVENOUS at 22:15

## 2017-01-01 RX ADMIN — MIRTAZAPINE 15 MG: 15 TABLET, FILM COATED ORAL at 21:02

## 2017-01-01 RX ADMIN — OXYCODONE HYDROCHLORIDE AND ACETAMINOPHEN 500 MG: 500 TABLET ORAL at 08:43

## 2017-01-01 RX ADMIN — METHOCARBAMOL 1000 MG: 500 TABLET ORAL at 08:02

## 2017-01-01 RX ADMIN — MORPHINE SULFATE 2 MG: 2 INJECTION, SOLUTION INTRAMUSCULAR; INTRAVENOUS at 05:36

## 2017-01-01 RX ADMIN — FENTANYL CITRATE 100 MCG: 50 INJECTION, SOLUTION INTRAMUSCULAR; INTRAVENOUS at 08:45

## 2017-01-01 RX ADMIN — IPRATROPIUM BROMIDE AND ALBUTEROL SULFATE 3 ML: .5; 3 SOLUTION RESPIRATORY (INHALATION) at 07:15

## 2017-01-01 RX ADMIN — ENOXAPARIN SODIUM 40 MG: 40 INJECTION SUBCUTANEOUS at 12:26

## 2017-01-01 RX ADMIN — IPRATROPIUM BROMIDE AND ALBUTEROL SULFATE 3 ML: .5; 3 SOLUTION RESPIRATORY (INHALATION) at 11:28

## 2017-01-01 RX ADMIN — SULFAMETHOXAZOLE AND TRIMETHOPRIM 400 MG: 200; 40 SUSPENSION ORAL at 21:40

## 2017-01-01 RX ADMIN — METHOCARBAMOL 1000 MG: 500 TABLET ORAL at 10:42

## 2017-01-01 RX ADMIN — GABAPENTIN 600 MG: 250 SUSPENSION ORAL at 21:40

## 2017-01-01 RX ADMIN — ONDANSETRON 4 MG: 2 INJECTION INTRAMUSCULAR; INTRAVENOUS at 23:09

## 2017-01-01 RX ADMIN — HUMAN INSULIN 5.5 UNITS/HR: 100 INJECTION, SOLUTION SUBCUTANEOUS at 02:01

## 2017-01-01 RX ADMIN — VANCOMYCIN HYDROCHLORIDE 1250 MG: 5 INJECTION, POWDER, LYOPHILIZED, FOR SOLUTION INTRAVENOUS at 20:14

## 2017-01-01 RX ADMIN — TAZOBACTAM SODIUM AND PIPERACILLIN SODIUM 4.5 G: 500; 4 INJECTION, SOLUTION INTRAVENOUS at 21:37

## 2017-01-01 RX ADMIN — LORAZEPAM 2 MG: 2 INJECTION INTRAMUSCULAR; INTRAVENOUS at 12:57

## 2017-01-01 RX ADMIN — TAZOBACTAM SODIUM AND PIPERACILLIN SODIUM 4.5 G: 500; 4 INJECTION, SOLUTION INTRAVENOUS at 17:49

## 2017-01-01 RX ADMIN — LORAZEPAM 1 MG: 2 INJECTION INTRAMUSCULAR; INTRAVENOUS at 00:25

## 2017-01-01 RX ADMIN — THIAMINE HCL (VITAMIN B1) 50 MG TABLET 50 MG: at 10:42

## 2017-01-01 RX ADMIN — GABAPENTIN 600 MG: 600 TABLET, FILM COATED ORAL at 08:52

## 2017-01-01 RX ADMIN — NICOTINE POLACRILEX 2 MG: 2 GUM, CHEWING ORAL at 17:39

## 2017-01-01 RX ADMIN — RIFAXIMIN 550 MG: 550 TABLET ORAL at 08:07

## 2017-01-01 RX ADMIN — FLUOXETINE 40 MG: 20 CAPSULE ORAL at 08:19

## 2017-01-01 RX ADMIN — METHOCARBAMOL 500 MG: 500 TABLET ORAL at 21:47

## 2017-01-01 RX ADMIN — NICOTINE 1 PATCH: 21 PATCH, EXTENDED RELEASE TRANSDERMAL at 08:44

## 2017-01-01 RX ADMIN — SPIRONOLACTONE 50 MG: 25 TABLET ORAL at 08:21

## 2017-01-01 RX ADMIN — PIPERACILLIN AND TAZOBACTAM 4.5 G: 4; .5 INJECTION, POWDER, FOR SOLUTION INTRAVENOUS at 17:47

## 2017-01-01 RX ADMIN — INSULIN ASPART 9 UNITS: 100 INJECTION, SOLUTION INTRAVENOUS; SUBCUTANEOUS at 12:27

## 2017-01-01 RX ADMIN — POTASSIUM & SODIUM PHOSPHATES POWDER PACK 280-160-250 MG 1 PACKET: 280-160-250 PACK at 16:12

## 2017-01-01 RX ADMIN — LEVOFLOXACIN 750 MG: 5 INJECTION, SOLUTION INTRAVENOUS at 00:26

## 2017-01-01 RX ADMIN — MENTHOL 1 PATCH: 205.5 PATCH TOPICAL at 11:57

## 2017-01-01 RX ADMIN — MORPHINE SULFATE 2 MG: 2 INJECTION, SOLUTION INTRAMUSCULAR; INTRAVENOUS at 15:41

## 2017-01-01 RX ADMIN — RIFAXIMIN 550 MG: 550 TABLET ORAL at 09:27

## 2017-01-01 RX ADMIN — RIFAXIMIN 550 MG: 550 TABLET ORAL at 20:32

## 2017-01-01 RX ADMIN — INSULIN ASPART 3 UNITS: 100 INJECTION, SOLUTION INTRAVENOUS; SUBCUTANEOUS at 20:10

## 2017-01-01 RX ADMIN — PANTOPRAZOLE SODIUM 40 MG: 40 TABLET, DELAYED RELEASE ORAL at 08:10

## 2017-01-01 RX ADMIN — IPRATROPIUM BROMIDE AND ALBUTEROL SULFATE 3 ML: .5; 3 SOLUTION RESPIRATORY (INHALATION) at 19:25

## 2017-01-01 RX ADMIN — INSULIN ASPART 4 UNITS: 100 INJECTION, SOLUTION INTRAVENOUS; SUBCUTANEOUS at 08:16

## 2017-01-01 RX ADMIN — Medication 1 PACKET: at 16:32

## 2017-01-01 RX ADMIN — SULFAMETHOXAZOLE AND TRIMETHOPRIM 2 TABLET: 800; 160 TABLET ORAL at 08:36

## 2017-01-01 RX ADMIN — TAZOBACTAM SODIUM AND PIPERACILLIN SODIUM 4.5 G: 500; 4 INJECTION, SOLUTION INTRAVENOUS at 10:42

## 2017-01-01 RX ADMIN — Medication: at 16:10

## 2017-01-01 RX ADMIN — NICOTINE POLACRILEX 2 MG: 2 GUM, CHEWING ORAL at 22:35

## 2017-01-01 RX ADMIN — ACETAMINOPHEN 650 MG: 325 TABLET, FILM COATED ORAL at 01:29

## 2017-01-01 RX ADMIN — INSULIN ASPART 1 UNITS: 100 INJECTION, SOLUTION INTRAVENOUS; SUBCUTANEOUS at 07:46

## 2017-01-01 RX ADMIN — METHOCARBAMOL 500 MG: 500 TABLET ORAL at 21:13

## 2017-01-01 RX ADMIN — GABAPENTIN 600 MG: 600 TABLET, FILM COATED ORAL at 08:21

## 2017-01-01 RX ADMIN — LACTULOSE 20 G: 20 SOLUTION ORAL at 08:35

## 2017-01-01 RX ADMIN — TRAZODONE HYDROCHLORIDE 50 MG: 50 TABLET ORAL at 22:03

## 2017-01-01 RX ADMIN — SPIRONOLACTONE 50 MG: 25 TABLET ORAL at 09:58

## 2017-01-01 RX ADMIN — LACTULOSE 20 G: 20 SOLUTION ORAL at 08:53

## 2017-01-01 RX ADMIN — PREDNISONE 50 MG: 10 TABLET ORAL at 08:28

## 2017-01-01 RX ADMIN — METHYLPREDNISOLONE SODIUM SUCCINATE 20 MG: 40 INJECTION, POWDER, LYOPHILIZED, FOR SOLUTION INTRAMUSCULAR; INTRAVENOUS at 23:48

## 2017-01-01 RX ADMIN — PANTOPRAZOLE SODIUM 40 MG: 40 TABLET, DELAYED RELEASE ORAL at 08:43

## 2017-01-01 RX ADMIN — Medication: at 22:12

## 2017-01-01 RX ADMIN — METHOCARBAMOL 500 MG: 500 TABLET ORAL at 16:12

## 2017-01-01 RX ADMIN — TAZOBACTAM SODIUM AND PIPERACILLIN SODIUM 4.5 G: 500; 4 INJECTION, SOLUTION INTRAVENOUS at 15:40

## 2017-01-01 RX ADMIN — INSULIN GLARGINE 30 UNITS: 100 INJECTION, SOLUTION SUBCUTANEOUS at 20:08

## 2017-01-01 RX ADMIN — HYDROMORPHONE HYDROCHLORIDE 1 MG: 1 SOLUTION ORAL at 11:41

## 2017-01-01 RX ADMIN — SPIRONOLACTONE 50 MG: 25 TABLET ORAL at 08:07

## 2017-01-01 RX ADMIN — ATORVASTATIN CALCIUM 20 MG: 20 TABLET, FILM COATED ORAL at 22:20

## 2017-01-01 RX ADMIN — NICOTINE 1 PATCH: 21 PATCH, EXTENDED RELEASE TRANSDERMAL at 08:49

## 2017-01-01 RX ADMIN — LACTULOSE 20 G: 20 SOLUTION ORAL at 11:26

## 2017-01-01 RX ADMIN — SULFAMETHOXAZOLE AND TRIMETHOPRIM 400 MG: 200; 40 SUSPENSION ORAL at 08:24

## 2017-01-01 RX ADMIN — INSULIN GLARGINE 30 UNITS: 100 INJECTION, SOLUTION SUBCUTANEOUS at 19:58

## 2017-01-01 RX ADMIN — SODIUM CHLORIDE: 9 INJECTION, SOLUTION INTRAVENOUS at 07:11

## 2017-01-01 RX ADMIN — ASPIRIN 81 MG: 81 TABLET, COATED ORAL at 08:06

## 2017-01-01 RX ADMIN — TAZOBACTAM SODIUM AND PIPERACILLIN SODIUM 4.5 G: 500; 4 INJECTION, SOLUTION INTRAVENOUS at 21:51

## 2017-01-01 RX ADMIN — INSULIN GLARGINE 15 UNITS: 100 INJECTION, SOLUTION SUBCUTANEOUS at 08:44

## 2017-01-01 RX ADMIN — ALBUTEROL SULFATE 2.5 MG: 2.5 SOLUTION RESPIRATORY (INHALATION) at 19:24

## 2017-01-01 RX ADMIN — METHYLPREDNISOLONE SODIUM SUCCINATE 40 MG: 40 INJECTION, POWDER, FOR SOLUTION INTRAMUSCULAR; INTRAVENOUS at 23:15

## 2017-01-01 RX ADMIN — HYDROCODONE BITARTRATE AND ACETAMINOPHEN 1 TABLET: 5; 325 TABLET ORAL at 04:45

## 2017-01-01 RX ADMIN — METHOCARBAMOL 1000 MG: 500 TABLET ORAL at 08:25

## 2017-01-01 RX ADMIN — FLUOXETINE 40 MG: 20 CAPSULE ORAL at 12:20

## 2017-01-01 RX ADMIN — SODIUM CHLORIDE 250 MG: 9 INJECTION, SOLUTION INTRAVENOUS at 11:50

## 2017-01-01 RX ADMIN — ENOXAPARIN SODIUM 40 MG: 40 INJECTION SUBCUTANEOUS at 16:27

## 2017-01-01 RX ADMIN — GABAPENTIN 600 MG: 600 TABLET, FILM COATED ORAL at 21:09

## 2017-01-01 RX ADMIN — LACTULOSE 20 G: 20 SOLUTION ORAL at 08:44

## 2017-01-01 RX ADMIN — NICOTINE 1 PATCH: 21 PATCH, EXTENDED RELEASE TRANSDERMAL at 11:26

## 2017-01-01 RX ADMIN — FOLIC ACID 1 MG: 1 TABLET ORAL at 09:01

## 2017-01-01 RX ADMIN — METHYLPREDNISOLONE SODIUM SUCCINATE 62.5 MG: 125 INJECTION, POWDER, FOR SOLUTION INTRAMUSCULAR; INTRAVENOUS at 17:39

## 2017-01-01 RX ADMIN — OXYCODONE HYDROCHLORIDE 10 MG: 5 TABLET ORAL at 12:25

## 2017-01-01 RX ADMIN — ALUMINUM HYDROXIDE, MAGNESIUM HYDROXIDE, AND DIMETHICONE 30 ML: 400; 400; 40 SUSPENSION ORAL at 11:08

## 2017-01-01 RX ADMIN — GABAPENTIN 400 MG: 250 SUSPENSION ORAL at 05:22

## 2017-01-01 RX ADMIN — METHOCARBAMOL 500 MG: 500 TABLET ORAL at 16:05

## 2017-01-01 RX ADMIN — METHYLPREDNISOLONE SODIUM SUCCINATE 40 MG: 40 INJECTION, POWDER, FOR SOLUTION INTRAMUSCULAR; INTRAVENOUS at 06:08

## 2017-01-01 RX ADMIN — SULFAMETHOXAZOLE AND TRIMETHOPRIM 2 TABLET: 800; 160 TABLET ORAL at 22:15

## 2017-01-01 RX ADMIN — METHOCARBAMOL 500 MG: 500 TABLET ORAL at 21:40

## 2017-01-01 RX ADMIN — LEVOFLOXACIN 750 MG: 5 INJECTION, SOLUTION INTRAVENOUS at 19:34

## 2017-01-01 RX ADMIN — KETOROLAC TROMETHAMINE 15 MG: 15 INJECTION, SOLUTION INTRAMUSCULAR; INTRAVENOUS at 23:59

## 2017-01-01 RX ADMIN — SPIRONOLACTONE 50 MG: 25 TABLET ORAL at 11:38

## 2017-01-01 RX ADMIN — FLUOXETINE 40 MG: 20 CAPSULE ORAL at 08:16

## 2017-01-01 RX ADMIN — SULFAMETHOXAZOLE AND TRIMETHOPRIM 2 TABLET: 800; 160 TABLET ORAL at 12:39

## 2017-01-01 RX ADMIN — RIFAXIMIN 550 MG: 550 TABLET ORAL at 21:30

## 2017-01-01 RX ADMIN — Medication: at 16:38

## 2017-01-01 RX ADMIN — POTASSIUM & SODIUM PHOSPHATES POWDER PACK 280-160-250 MG 1 PACKET: 280-160-250 PACK at 08:42

## 2017-01-01 RX ADMIN — SODIUM CHLORIDE 500 MG: 9 INJECTION, SOLUTION INTRAVENOUS at 10:07

## 2017-01-01 RX ADMIN — GABAPENTIN 600 MG: 250 SUSPENSION ORAL at 08:33

## 2017-01-01 RX ADMIN — METHYLPREDNISOLONE SODIUM SUCCINATE 40 MG: 40 INJECTION, POWDER, FOR SOLUTION INTRAMUSCULAR; INTRAVENOUS at 11:32

## 2017-01-01 RX ADMIN — IPRATROPIUM BROMIDE AND ALBUTEROL SULFATE 3 ML: .5; 3 SOLUTION RESPIRATORY (INHALATION) at 11:13

## 2017-01-01 RX ADMIN — SODIUM CHLORIDE: 9 INJECTION, SOLUTION INTRAVENOUS at 03:31

## 2017-01-01 RX ADMIN — FOLIC ACID: 5 INJECTION, SOLUTION INTRAMUSCULAR; INTRAVENOUS; SUBCUTANEOUS at 23:41

## 2017-01-01 RX ADMIN — IPRATROPIUM BROMIDE AND ALBUTEROL SULFATE 3 ML: .5; 3 SOLUTION RESPIRATORY (INHALATION) at 07:46

## 2017-01-01 RX ADMIN — MIRTAZAPINE 30 MG: 15 TABLET, FILM COATED ORAL at 22:37

## 2017-01-01 RX ADMIN — NICOTINE POLACRILEX 2 MG: 2 GUM, CHEWING ORAL at 12:03

## 2017-01-01 RX ADMIN — SULFAMETHOXAZOLE AND TRIMETHOPRIM 2 TABLET: 800; 160 TABLET ORAL at 12:26

## 2017-01-01 RX ADMIN — METHOCARBAMOL 500 MG: 500 TABLET ORAL at 15:59

## 2017-01-01 RX ADMIN — INSULIN HUMAN 15 UNITS: 100 INJECTION, SUSPENSION SUBCUTANEOUS at 09:01

## 2017-01-01 RX ADMIN — FUROSEMIDE 20 MG: 20 TABLET ORAL at 08:24

## 2017-01-01 RX ADMIN — INSULIN ASPART 6 UNITS: 100 INJECTION, SOLUTION INTRAVENOUS; SUBCUTANEOUS at 23:55

## 2017-01-01 RX ADMIN — INSULIN ASPART 6 UNITS: 100 INJECTION, SOLUTION INTRAVENOUS; SUBCUTANEOUS at 08:31

## 2017-01-01 RX ADMIN — IRON 325 MG: 65 TABLET ORAL at 08:51

## 2017-01-01 RX ADMIN — INSULIN GLARGINE 10 UNITS: 100 INJECTION, SOLUTION SUBCUTANEOUS at 08:29

## 2017-01-01 RX ADMIN — INSULIN GLARGINE 8 UNITS: 100 INJECTION, SOLUTION SUBCUTANEOUS at 02:05

## 2017-01-01 RX ADMIN — TAZOBACTAM SODIUM AND PIPERACILLIN SODIUM 4.5 G: 500; 4 INJECTION, SOLUTION INTRAVENOUS at 04:55

## 2017-01-01 RX ADMIN — IBUPROFEN 200 MG: 200 TABLET, FILM COATED ORAL at 08:02

## 2017-01-01 RX ADMIN — KETOROLAC TROMETHAMINE 15 MG: 15 INJECTION, SOLUTION INTRAMUSCULAR; INTRAVENOUS at 12:36

## 2017-01-01 RX ADMIN — FOLIC ACID 1 MG: 1 TABLET ORAL at 08:32

## 2017-01-01 RX ADMIN — ASPIRIN 81 MG: 81 TABLET, COATED ORAL at 08:39

## 2017-01-01 RX ADMIN — FLUOXETINE HYDROCHLORIDE 40 MG: 40 CAPSULE ORAL at 07:48

## 2017-01-01 RX ADMIN — ATORVASTATIN CALCIUM 20 MG: 20 TABLET, FILM COATED ORAL at 10:39

## 2017-01-01 RX ADMIN — ASPIRIN 81 MG 81 MG: 81 TABLET ORAL at 10:15

## 2017-01-01 RX ADMIN — LACTULOSE 20 G: 20 SOLUTION ORAL at 08:21

## 2017-01-01 RX ADMIN — HYDROMORPHONE HYDROCHLORIDE 0.2 MG: 1 INJECTION, SOLUTION INTRAMUSCULAR; INTRAVENOUS; SUBCUTANEOUS at 13:08

## 2017-01-01 RX ADMIN — LEVOFLOXACIN 750 MG: 5 INJECTION, SOLUTION INTRAVENOUS at 21:00

## 2017-01-01 RX ADMIN — IPRATROPIUM BROMIDE AND ALBUTEROL SULFATE 3 ML: .5; 3 SOLUTION RESPIRATORY (INHALATION) at 15:27

## 2017-01-01 RX ADMIN — LACTULOSE 20 G: 10 SOLUTION ORAL at 08:09

## 2017-01-01 RX ADMIN — LACTULOSE 30 ML: 10 SOLUTION ORAL at 09:03

## 2017-01-01 RX ADMIN — ASPIRIN 81 MG: 81 TABLET, COATED ORAL at 10:40

## 2017-01-01 RX ADMIN — SODIUM CHLORIDE, POTASSIUM CHLORIDE, SODIUM LACTATE AND CALCIUM CHLORIDE: 600; 310; 30; 20 INJECTION, SOLUTION INTRAVENOUS at 23:24

## 2017-01-01 RX ADMIN — METHYLPREDNISOLONE SODIUM SUCCINATE 20 MG: 40 INJECTION, POWDER, LYOPHILIZED, FOR SOLUTION INTRAMUSCULAR; INTRAVENOUS at 23:43

## 2017-01-01 RX ADMIN — IPRATROPIUM BROMIDE AND ALBUTEROL SULFATE 3 ML: .5; 3 SOLUTION RESPIRATORY (INHALATION) at 20:16

## 2017-01-01 RX ADMIN — IPRATROPIUM BROMIDE AND ALBUTEROL SULFATE 3 ML: .5; 3 SOLUTION RESPIRATORY (INHALATION) at 20:26

## 2017-01-01 RX ADMIN — THIAMINE HCL (VITAMIN B1) 50 MG TABLET 50 MG: at 08:25

## 2017-01-01 RX ADMIN — TAZOBACTAM SODIUM AND PIPERACILLIN SODIUM 4.5 G: 500; 4 INJECTION, SOLUTION INTRAVENOUS at 20:09

## 2017-01-01 RX ADMIN — GABAPENTIN 600 MG: 600 TABLET, FILM COATED ORAL at 21:40

## 2017-01-01 RX ADMIN — ASPIRIN 81 MG 81 MG: 81 TABLET ORAL at 09:27

## 2017-01-01 RX ADMIN — INSULIN ASPART 7 UNITS: 100 INJECTION, SOLUTION INTRAVENOUS; SUBCUTANEOUS at 17:01

## 2017-01-01 RX ADMIN — IPRATROPIUM BROMIDE AND ALBUTEROL SULFATE 3 ML: .5; 3 SOLUTION RESPIRATORY (INHALATION) at 00:11

## 2017-01-01 RX ADMIN — GABAPENTIN 600 MG: 600 TABLET, FILM COATED ORAL at 21:08

## 2017-01-01 RX ADMIN — HEPARIN SODIUM 5000 UNITS: 10000 INJECTION, SOLUTION INTRAVENOUS; SUBCUTANEOUS at 04:34

## 2017-01-01 RX ADMIN — NICOTINE POLACRILEX 2 MG: 2 GUM, CHEWING ORAL at 18:04

## 2017-01-01 RX ADMIN — IPRATROPIUM BROMIDE AND ALBUTEROL SULFATE 3 ML: .5; 3 SOLUTION RESPIRATORY (INHALATION) at 16:30

## 2017-01-01 RX ADMIN — LORAZEPAM 2 MG: 2 INJECTION INTRAMUSCULAR; INTRAVENOUS at 03:20

## 2017-01-01 RX ADMIN — HYDROMORPHONE HYDROCHLORIDE 1 MG: 1 SOLUTION ORAL at 18:57

## 2017-01-01 RX ADMIN — PIPERACILLIN AND TAZOBACTAM 4.5 G: 4; .5 INJECTION, POWDER, LYOPHILIZED, FOR SOLUTION INTRAVENOUS; PARENTERAL at 06:24

## 2017-01-01 RX ADMIN — LACTULOSE 20 G: 20 SOLUTION ORAL at 08:31

## 2017-01-01 RX ADMIN — LACTULOSE 20 G: 10 SOLUTION ORAL at 09:32

## 2017-01-01 RX ADMIN — GABAPENTIN 300 MG: 300 CAPSULE ORAL at 22:41

## 2017-01-01 RX ADMIN — METHOCARBAMOL 500 MG: 500 TABLET ORAL at 21:42

## 2017-01-01 RX ADMIN — TAZOBACTAM SODIUM AND PIPERACILLIN SODIUM 4.5 G: 500; 4 INJECTION, SOLUTION INTRAVENOUS at 16:58

## 2017-01-01 RX ADMIN — LORATADINE 10 MG: 10 TABLET ORAL at 08:20

## 2017-01-01 RX ADMIN — METHOCARBAMOL 500 MG: 500 TABLET ORAL at 15:24

## 2017-01-01 RX ADMIN — GABAPENTIN 600 MG: 600 TABLET, FILM COATED ORAL at 16:33

## 2017-01-01 RX ADMIN — GABAPENTIN 600 MG: 300 CAPSULE ORAL at 21:36

## 2017-01-01 RX ADMIN — Medication 100 MG: at 08:07

## 2017-01-01 RX ADMIN — Medication 50 MG: at 10:36

## 2017-01-01 RX ADMIN — LORATADINE 10 MG: 10 TABLET ORAL at 08:13

## 2017-01-01 RX ADMIN — Medication 100 MG: at 08:22

## 2017-01-01 RX ADMIN — INSULIN ASPART 3 UNITS: 100 INJECTION, SOLUTION INTRAVENOUS; SUBCUTANEOUS at 09:22

## 2017-01-01 RX ADMIN — FUROSEMIDE 20 MG: 20 TABLET ORAL at 13:44

## 2017-01-01 RX ADMIN — IPRATROPIUM BROMIDE AND ALBUTEROL SULFATE 3 ML: .5; 3 SOLUTION RESPIRATORY (INHALATION) at 11:57

## 2017-01-01 RX ADMIN — FLUOXETINE 40 MG: 20 CAPSULE ORAL at 08:02

## 2017-01-01 RX ADMIN — PROPOFOL 20 MCG/KG/MIN: 10 INJECTION, EMULSION INTRAVENOUS at 09:53

## 2017-01-01 RX ADMIN — LACTULOSE 20 G: 10 SOLUTION ORAL at 08:02

## 2017-01-01 RX ADMIN — GABAPENTIN 300 MG: 300 CAPSULE ORAL at 08:44

## 2017-01-01 RX ADMIN — SODIUM CHLORIDE 250 MG: 9 INJECTION, SOLUTION INTRAVENOUS at 00:19

## 2017-01-01 RX ADMIN — HEPARIN SODIUM 5000 UNITS: 10000 INJECTION, SOLUTION INTRAVENOUS; SUBCUTANEOUS at 00:09

## 2017-01-01 RX ADMIN — SULFAMETHOXAZOLE AND TRIMETHOPRIM 2 TABLET: 800; 160 TABLET ORAL at 20:16

## 2017-01-01 RX ADMIN — BUPRENORPHINE HYDROCHLORIDE, NALOXONE HYDROCHLORIDE 3 FILM: 8; 2 FILM, SOLUBLE BUCCAL; SUBLINGUAL at 08:36

## 2017-01-01 RX ADMIN — INSULIN ASPART 9 UNITS: 100 INJECTION, SOLUTION INTRAVENOUS; SUBCUTANEOUS at 11:48

## 2017-01-01 RX ADMIN — OXYCODONE HYDROCHLORIDE AND ACETAMINOPHEN 500 MG: 500 TABLET ORAL at 09:11

## 2017-01-01 RX ADMIN — ACETAMINOPHEN 975 MG: 325 TABLET, FILM COATED ORAL at 22:20

## 2017-01-01 RX ADMIN — IRON 325 MG: 65 TABLET ORAL at 08:02

## 2017-01-01 RX ADMIN — FENTANYL CITRATE 50 MCG: 50 INJECTION INTRAMUSCULAR; INTRAVENOUS at 22:21

## 2017-01-01 RX ADMIN — INSULIN GLARGINE 15 UNITS: 100 INJECTION, SOLUTION SUBCUTANEOUS at 09:22

## 2017-01-01 RX ADMIN — MORPHINE SULFATE 2 MG: 2 INJECTION, SOLUTION INTRAMUSCULAR; INTRAVENOUS at 19:35

## 2017-01-01 RX ADMIN — SPIRONOLACTONE 50 MG: 25 TABLET ORAL at 08:53

## 2017-01-01 RX ADMIN — TAZOBACTAM SODIUM AND PIPERACILLIN SODIUM 4.5 G: 500; 4 INJECTION, SOLUTION INTRAVENOUS at 03:58

## 2017-01-01 RX ADMIN — IPRATROPIUM BROMIDE AND ALBUTEROL SULFATE 3 ML: .5; 3 SOLUTION RESPIRATORY (INHALATION) at 20:14

## 2017-01-01 RX ADMIN — FENTANYL CITRATE 50 MCG: 50 INJECTION INTRAMUSCULAR; INTRAVENOUS at 10:10

## 2017-01-01 RX ADMIN — FOLIC ACID 1 MG: 1 TABLET ORAL at 08:40

## 2017-01-01 RX ADMIN — LEVOFLOXACIN 750 MG: 5 INJECTION, SOLUTION INTRAVENOUS at 01:07

## 2017-01-01 RX ADMIN — METHOCARBAMOL 1000 MG: 500 TABLET ORAL at 21:12

## 2017-01-01 RX ADMIN — RIFAXIMIN 550 MG: 550 TABLET ORAL at 16:02

## 2017-01-01 RX ADMIN — METHOCARBAMOL 750 MG: 750 TABLET ORAL at 18:11

## 2017-01-01 RX ADMIN — SODIUM CHLORIDE 250 MG: 9 INJECTION, SOLUTION INTRAVENOUS at 12:17

## 2017-01-01 RX ADMIN — AZITHROMYCIN MONOHYDRATE 500 MG: 500 INJECTION, POWDER, LYOPHILIZED, FOR SOLUTION INTRAVENOUS at 12:59

## 2017-01-01 RX ADMIN — PIPERACILLIN AND TAZOBACTAM 4.5 G: 4; .5 INJECTION, POWDER, LYOPHILIZED, FOR SOLUTION INTRAVENOUS; PARENTERAL at 12:10

## 2017-01-01 RX ADMIN — FUROSEMIDE 40 MG: 10 INJECTION, SOLUTION INTRAVENOUS at 15:17

## 2017-01-01 RX ADMIN — SULFAMETHOXAZOLE AND TRIMETHOPRIM 1 TABLET: 400; 80 TABLET ORAL at 21:07

## 2017-01-01 RX ADMIN — IPRATROPIUM BROMIDE AND ALBUTEROL SULFATE 3 ML: .5; 3 SOLUTION RESPIRATORY (INHALATION) at 16:09

## 2017-01-01 RX ADMIN — SULFAMETHOXAZOLE AND TRIMETHOPRIM 400 MG: 200; 40 SUSPENSION ORAL at 11:26

## 2017-01-01 RX ADMIN — ENOXAPARIN SODIUM 40 MG: 40 INJECTION SUBCUTANEOUS at 08:23

## 2017-01-01 RX ADMIN — RIFAXIMIN 550 MG: 550 TABLET ORAL at 21:16

## 2017-01-01 RX ADMIN — METHOCARBAMOL 1000 MG: 500 TABLET ORAL at 11:35

## 2017-01-01 RX ADMIN — METHYLPREDNISOLONE SODIUM SUCCINATE 40 MG: 40 INJECTION, POWDER, FOR SOLUTION INTRAMUSCULAR; INTRAVENOUS at 12:03

## 2017-01-01 RX ADMIN — METHOCARBAMOL 1000 MG: 500 TABLET ORAL at 20:54

## 2017-01-01 RX ADMIN — METHOCARBAMOL 500 MG: 500 TABLET ORAL at 08:44

## 2017-01-01 RX ADMIN — SULFAMETHOXAZOLE AND TRIMETHOPRIM 2 TABLET: 800; 160 TABLET ORAL at 13:07

## 2017-01-01 RX ADMIN — MORPHINE SULFATE 2 MG: 2 INJECTION, SOLUTION INTRAMUSCULAR; INTRAVENOUS at 04:41

## 2017-01-01 RX ADMIN — DEXMEDETOMIDINE 0.2 MCG/KG/HR: 100 INJECTION, SOLUTION, CONCENTRATE INTRAVENOUS at 13:28

## 2017-01-01 RX ADMIN — INSULIN ASPART 1 UNITS: 100 INJECTION, SOLUTION INTRAVENOUS; SUBCUTANEOUS at 08:48

## 2017-01-01 RX ADMIN — IOPAMIDOL 76 ML: 755 INJECTION, SOLUTION INTRAVENOUS at 11:04

## 2017-01-01 RX ADMIN — IPRATROPIUM BROMIDE AND ALBUTEROL SULFATE 3 ML: .5; 3 SOLUTION RESPIRATORY (INHALATION) at 11:33

## 2017-01-01 RX ADMIN — METHYLPREDNISOLONE SODIUM SUCCINATE 125 MG: 125 INJECTION, POWDER, FOR SOLUTION INTRAMUSCULAR; INTRAVENOUS at 19:11

## 2017-01-01 RX ADMIN — SODIUM CHLORIDE: 9 INJECTION, SOLUTION INTRAVENOUS at 04:17

## 2017-01-01 RX ADMIN — THIAMINE HCL (VITAMIN B1) 50 MG TABLET 50 MG: at 09:26

## 2017-01-01 RX ADMIN — ATORVASTATIN CALCIUM 20 MG: 20 TABLET, FILM COATED ORAL at 21:42

## 2017-01-01 RX ADMIN — CEFTRIAXONE 2 G: 2 INJECTION, POWDER, FOR SOLUTION INTRAMUSCULAR; INTRAVENOUS at 05:46

## 2017-01-01 RX ADMIN — BUPRENORPHINE HYDROCHLORIDE, NALOXONE HYDROCHLORIDE 3 FILM: 8; 2 FILM, SOLUBLE BUCCAL; SUBLINGUAL at 08:43

## 2017-01-01 RX ADMIN — Medication 15 ML: at 12:41

## 2017-01-01 RX ADMIN — MIRTAZAPINE 30 MG: 30 TABLET, FILM COATED ORAL at 21:09

## 2017-01-01 RX ADMIN — OXYCODONE HYDROCHLORIDE 10 MG: 5 TABLET ORAL at 21:28

## 2017-01-01 RX ADMIN — TAZOBACTAM SODIUM AND PIPERACILLIN SODIUM 4.5 G: 500; 4 INJECTION, SOLUTION INTRAVENOUS at 05:50

## 2017-01-01 RX ADMIN — SULFAMETHOXAZOLE AND TRIMETHOPRIM 1 TABLET: 400; 80 TABLET ORAL at 21:02

## 2017-01-01 RX ADMIN — GABAPENTIN 600 MG: 600 TABLET, FILM COATED ORAL at 21:42

## 2017-01-01 RX ADMIN — IPRATROPIUM BROMIDE AND ALBUTEROL SULFATE 3 ML: .5; 3 SOLUTION RESPIRATORY (INHALATION) at 12:19

## 2017-01-01 RX ADMIN — HYDROCODONE BITARTRATE AND ACETAMINOPHEN 1 TABLET: 5; 325 TABLET ORAL at 11:33

## 2017-01-01 RX ADMIN — FENTANYL CITRATE 50 MCG: 50 INJECTION INTRAMUSCULAR; INTRAVENOUS at 06:19

## 2017-01-01 RX ADMIN — FLUOXETINE HYDROCHLORIDE 40 MG: 20 LIQUID ORAL at 08:34

## 2017-01-01 RX ADMIN — IPRATROPIUM BROMIDE AND ALBUTEROL SULFATE 3 ML: .5; 3 SOLUTION RESPIRATORY (INHALATION) at 21:09

## 2017-01-01 RX ADMIN — SODIUM CHLORIDE 500 MG: 9 INJECTION, SOLUTION INTRAVENOUS at 08:18

## 2017-01-01 RX ADMIN — ENOXAPARIN SODIUM 40 MG: 40 INJECTION SUBCUTANEOUS at 09:33

## 2017-01-01 RX ADMIN — Medication 100 MG: at 08:11

## 2017-01-01 RX ADMIN — GABAPENTIN 600 MG: 600 TABLET, FILM COATED ORAL at 09:59

## 2017-01-01 RX ADMIN — INSULIN ASPART 3 UNITS: 100 INJECTION, SOLUTION INTRAVENOUS; SUBCUTANEOUS at 11:10

## 2017-01-01 RX ADMIN — FUROSEMIDE 20 MG: 10 INJECTION, SOLUTION INTRAVENOUS at 08:45

## 2017-01-01 RX ADMIN — ASPIRIN 81 MG: 81 TABLET, COATED ORAL at 08:37

## 2017-01-01 RX ADMIN — TAZOBACTAM SODIUM AND PIPERACILLIN SODIUM 4.5 G: 500; 4 INJECTION, SOLUTION INTRAVENOUS at 20:30

## 2017-01-01 RX ADMIN — RIFAXIMIN 550 MG: 550 TABLET ORAL at 20:02

## 2017-01-01 RX ADMIN — HYDROMORPHONE HYDROCHLORIDE 1 MG: 1 SOLUTION ORAL at 11:25

## 2017-01-01 RX ADMIN — FUROSEMIDE 20 MG: 20 TABLET ORAL at 09:05

## 2017-01-01 RX ADMIN — FOLIC ACID 1 MG: 1 TABLET ORAL at 09:07

## 2017-01-01 RX ADMIN — SODIUM CHLORIDE: 9 INJECTION, SOLUTION INTRAVENOUS at 11:31

## 2017-01-01 RX ADMIN — NICOTINE 1 PATCH: 21 PATCH, EXTENDED RELEASE TRANSDERMAL at 08:08

## 2017-01-01 RX ADMIN — CEFTRIAXONE 2 G: 2 INJECTION, POWDER, FOR SOLUTION INTRAMUSCULAR; INTRAVENOUS at 05:06

## 2017-01-01 RX ADMIN — METHYLPREDNISOLONE SODIUM SUCCINATE 20 MG: 40 INJECTION, POWDER, LYOPHILIZED, FOR SOLUTION INTRAMUSCULAR; INTRAVENOUS at 19:35

## 2017-01-01 RX ADMIN — NICOTINE POLACRILEX 2 MG: 2 GUM, CHEWING ORAL at 15:11

## 2017-01-01 RX ADMIN — IPRATROPIUM BROMIDE AND ALBUTEROL SULFATE 3 ML: .5; 3 SOLUTION RESPIRATORY (INHALATION) at 20:07

## 2017-01-01 RX ADMIN — ASPIRIN 81 MG: 81 TABLET, COATED ORAL at 08:32

## 2017-01-01 RX ADMIN — TAZOBACTAM SODIUM AND PIPERACILLIN SODIUM 4.5 G: 500; 4 INJECTION, SOLUTION INTRAVENOUS at 18:40

## 2017-01-01 RX ADMIN — MIRTAZAPINE 15 MG: 15 TABLET, FILM COATED ORAL at 20:52

## 2017-01-01 RX ADMIN — SODIUM CHLORIDE 500 MG: 9 INJECTION, SOLUTION INTRAVENOUS at 20:32

## 2017-01-01 RX ADMIN — LEVOFLOXACIN 750 MG: 5 INJECTION, SOLUTION INTRAVENOUS at 20:33

## 2017-01-01 RX ADMIN — HEPARIN SODIUM 5000 UNITS: 10000 INJECTION, SOLUTION INTRAVENOUS; SUBCUTANEOUS at 20:10

## 2017-01-01 RX ADMIN — Medication 1 PACKET: at 08:36

## 2017-01-01 RX ADMIN — FOLIC ACID: 5 INJECTION, SOLUTION INTRAMUSCULAR; INTRAVENOUS; SUBCUTANEOUS at 01:31

## 2017-01-01 RX ADMIN — METHOCARBAMOL 500 MG: 500 TABLET ORAL at 21:36

## 2017-01-01 RX ADMIN — IPRATROPIUM BROMIDE AND ALBUTEROL SULFATE 3 ML: .5; 3 SOLUTION RESPIRATORY (INHALATION) at 07:49

## 2017-01-01 RX ADMIN — METHOCARBAMOL 500 MG: 500 TABLET ORAL at 21:02

## 2017-01-01 RX ADMIN — MIRTAZAPINE 15 MG: 15 TABLET, FILM COATED ORAL at 21:40

## 2017-01-01 RX ADMIN — TAZOBACTAM SODIUM AND PIPERACILLIN SODIUM 4.5 G: 500; 4 INJECTION, SOLUTION INTRAVENOUS at 17:06

## 2017-01-01 RX ADMIN — LACTULOSE 20 G: 20 SOLUTION ORAL at 21:01

## 2017-01-01 RX ADMIN — FOLIC ACID 1 MG: 1 TABLET ORAL at 08:46

## 2017-01-01 RX ADMIN — GABAPENTIN 300 MG: 300 CAPSULE ORAL at 22:12

## 2017-01-01 RX ADMIN — HYDROCODONE BITARTRATE AND ACETAMINOPHEN 1 TABLET: 5; 325 TABLET ORAL at 04:48

## 2017-01-01 RX ADMIN — ATORVASTATIN CALCIUM 20 MG: 20 TABLET, FILM COATED ORAL at 22:37

## 2017-01-01 RX ADMIN — NICOTINE POLACRILEX 2 MG: 2 GUM, CHEWING ORAL at 14:00

## 2017-01-01 RX ADMIN — LACTULOSE 30 ML: 10 SOLUTION ORAL at 21:33

## 2017-01-01 RX ADMIN — IPRATROPIUM BROMIDE AND ALBUTEROL SULFATE 3 ML: .5; 3 SOLUTION RESPIRATORY (INHALATION) at 23:41

## 2017-01-01 RX ADMIN — METHOCARBAMOL 1000 MG: 500 TABLET ORAL at 20:45

## 2017-01-01 RX ADMIN — NICOTINE POLACRILEX 2 MG: 2 GUM, CHEWING ORAL at 16:03

## 2017-01-01 RX ADMIN — SODIUM CHLORIDE 500 MG: 9 INJECTION, SOLUTION INTRAVENOUS at 20:52

## 2017-01-01 RX ADMIN — LACTULOSE 20 G: 10 SOLUTION ORAL at 22:42

## 2017-01-01 RX ADMIN — INSULIN ASPART 4 UNITS: 100 INJECTION, SOLUTION INTRAVENOUS; SUBCUTANEOUS at 12:40

## 2017-01-01 RX ADMIN — SENNOSIDES AND DOCUSATE SODIUM 1 TABLET: 8.6; 5 TABLET ORAL at 21:27

## 2017-01-01 RX ADMIN — ENOXAPARIN SODIUM 40 MG: 40 INJECTION SUBCUTANEOUS at 16:59

## 2017-01-01 RX ADMIN — ASPIRIN 81 MG 81 MG: 81 TABLET ORAL at 08:32

## 2017-01-01 RX ADMIN — IPRATROPIUM BROMIDE AND ALBUTEROL SULFATE 3 ML: .5; 3 SOLUTION RESPIRATORY (INHALATION) at 16:13

## 2017-01-01 RX ADMIN — GABAPENTIN 600 MG: 600 TABLET, FILM COATED ORAL at 20:34

## 2017-01-01 RX ADMIN — ASPIRIN 81 MG: 81 TABLET, COATED ORAL at 09:17

## 2017-01-01 RX ADMIN — ATORVASTATIN CALCIUM 20 MG: 20 TABLET, FILM COATED ORAL at 21:40

## 2017-01-01 RX ADMIN — KETOROLAC TROMETHAMINE 15 MG: 15 INJECTION, SOLUTION INTRAMUSCULAR; INTRAVENOUS at 03:20

## 2017-01-01 RX ADMIN — NICOTINE 1 CARTRIDGE: 4 INHALANT RESPIRATORY (INHALATION) at 11:50

## 2017-01-01 RX ADMIN — SULFAMETHOXAZOLE AND TRIMETHOPRIM 400 MG: 200; 40 SUSPENSION ORAL at 10:11

## 2017-01-01 RX ADMIN — SULFAMETHOXAZOLE AND TRIMETHOPRIM 1 TABLET: 400; 80 TABLET ORAL at 08:13

## 2017-01-01 RX ADMIN — BUPRENORPHINE HYDROCHLORIDE, NALOXONE HYDROCHLORIDE 3 FILM: 8; 2 FILM, SOLUBLE BUCCAL; SUBLINGUAL at 10:14

## 2017-01-01 RX ADMIN — HYDROMORPHONE HYDROCHLORIDE 1 MG: 1 SOLUTION ORAL at 21:29

## 2017-01-01 RX ADMIN — INSULIN GLARGINE 5 UNITS: 100 INJECTION, SOLUTION SUBCUTANEOUS at 11:32

## 2017-01-01 RX ADMIN — IPRATROPIUM BROMIDE AND ALBUTEROL SULFATE 3 ML: .5; 3 SOLUTION RESPIRATORY (INHALATION) at 08:44

## 2017-01-01 RX ADMIN — HYDROMORPHONE HYDROCHLORIDE 1 MG: 1 SOLUTION ORAL at 10:11

## 2017-01-01 RX ADMIN — METHYLPREDNISOLONE SODIUM SUCCINATE 20 MG: 40 INJECTION, POWDER, LYOPHILIZED, FOR SOLUTION INTRAMUSCULAR; INTRAVENOUS at 17:38

## 2017-01-01 RX ADMIN — LACTULOSE 20 G: 10 SOLUTION ORAL at 08:20

## 2017-01-01 RX ADMIN — METHYLPREDNISOLONE SODIUM SUCCINATE 40 MG: 40 INJECTION, POWDER, FOR SOLUTION INTRAMUSCULAR; INTRAVENOUS at 02:49

## 2017-01-01 RX ADMIN — Medication 5 MG: at 21:03

## 2017-01-01 RX ADMIN — OMEPRAZOLE 20 MG: 20 CAPSULE, DELAYED RELEASE ORAL at 08:20

## 2017-01-01 RX ADMIN — FENTANYL CITRATE 50 MCG: 50 INJECTION INTRAMUSCULAR; INTRAVENOUS at 10:05

## 2017-01-01 RX ADMIN — SULFAMETHOXAZOLE AND TRIMETHOPRIM 2 TABLET: 800; 160 TABLET ORAL at 12:10

## 2017-01-01 RX ADMIN — IPRATROPIUM BROMIDE AND ALBUTEROL SULFATE 3 ML: .5; 3 SOLUTION RESPIRATORY (INHALATION) at 11:03

## 2017-01-01 RX ADMIN — TAZOBACTAM SODIUM AND PIPERACILLIN SODIUM 4.5 G: 500; 4 INJECTION, SOLUTION INTRAVENOUS at 12:45

## 2017-01-01 RX ADMIN — INSULIN ASPART 8 UNITS: 100 INJECTION, SOLUTION INTRAVENOUS; SUBCUTANEOUS at 08:33

## 2017-01-01 RX ADMIN — NICOTINE POLACRILEX 2 MG: 2 GUM, CHEWING ORAL at 08:31

## 2017-01-01 RX ADMIN — NICOTINE 1 PATCH: 14 PATCH, EXTENDED RELEASE TRANSDERMAL at 23:18

## 2017-01-01 RX ADMIN — SULFAMETHOXAZOLE AND TRIMETHOPRIM 2 TABLET: 800; 160 TABLET ORAL at 15:25

## 2017-01-01 RX ADMIN — GABAPENTIN 600 MG: 250 SUSPENSION ORAL at 16:32

## 2017-01-01 RX ADMIN — HEPARIN SODIUM 5000 UNITS: 10000 INJECTION, SOLUTION INTRAVENOUS; SUBCUTANEOUS at 21:02

## 2017-01-01 RX ADMIN — SODIUM CHLORIDE, SODIUM LACTATE, POTASSIUM CHLORIDE, CALCIUM CHLORIDE: 600; 310; 30; 20 INJECTION, SOLUTION INTRAVENOUS at 12:56

## 2017-01-01 RX ADMIN — INSULIN GLARGINE 35 UNITS: 100 INJECTION, SOLUTION SUBCUTANEOUS at 08:37

## 2017-01-01 RX ADMIN — NICOTINE POLACRILEX 2 MG: 2 GUM, CHEWING ORAL at 11:03

## 2017-01-01 RX ADMIN — IBUPROFEN 400 MG: 400 TABLET ORAL at 10:39

## 2017-01-01 RX ADMIN — RIFAXIMIN 550 MG: 550 TABLET ORAL at 20:18

## 2017-01-01 RX ADMIN — HYDROMORPHONE HYDROCHLORIDE 0.2 MG: 1 INJECTION, SOLUTION INTRAMUSCULAR; INTRAVENOUS; SUBCUTANEOUS at 03:39

## 2017-01-01 RX ADMIN — GABAPENTIN 400 MG: 250 SUSPENSION ORAL at 22:32

## 2017-01-01 RX ADMIN — RIFAXIMIN 550 MG: 550 TABLET ORAL at 21:28

## 2017-01-01 RX ADMIN — METHYLPREDNISOLONE SODIUM SUCCINATE 87.5 MG: 125 INJECTION, POWDER, FOR SOLUTION INTRAMUSCULAR; INTRAVENOUS at 09:18

## 2017-01-01 RX ADMIN — METHYLPREDNISOLONE SODIUM SUCCINATE 20 MG: 40 INJECTION, POWDER, LYOPHILIZED, FOR SOLUTION INTRAMUSCULAR; INTRAVENOUS at 23:25

## 2017-01-01 RX ADMIN — SODIUM CHLORIDE: 9 INJECTION, SOLUTION INTRAVENOUS at 13:24

## 2017-01-01 RX ADMIN — GABAPENTIN 600 MG: 600 TABLET, FILM COATED ORAL at 09:34

## 2017-01-01 RX ADMIN — HEPARIN SODIUM 5000 UNITS: 10000 INJECTION, SOLUTION INTRAVENOUS; SUBCUTANEOUS at 08:04

## 2017-01-01 RX ADMIN — SPIRONOLACTONE 50 MG: 25 TABLET ORAL at 07:51

## 2017-01-01 RX ADMIN — HEPARIN SODIUM 5000 UNITS: 10000 INJECTION, SOLUTION INTRAVENOUS; SUBCUTANEOUS at 04:40

## 2017-01-01 RX ADMIN — VANCOMYCIN HYDROCHLORIDE 1250 MG: 100 INJECTION, POWDER, LYOPHILIZED, FOR SOLUTION INTRAVENOUS at 17:35

## 2017-01-01 RX ADMIN — LORAZEPAM 2 MG: 2 INJECTION INTRAMUSCULAR; INTRAVENOUS at 00:18

## 2017-01-01 RX ADMIN — INSULIN GLARGINE 20 UNITS: 100 INJECTION, SOLUTION SUBCUTANEOUS at 10:22

## 2017-01-01 RX ADMIN — PIPERACILLIN AND TAZOBACTAM 4.5 G: 4; .5 INJECTION, POWDER, LYOPHILIZED, FOR SOLUTION INTRAVENOUS; PARENTERAL at 12:08

## 2017-01-01 RX ADMIN — FOLIC ACID 1 MG: 1 TABLET ORAL at 10:13

## 2017-01-01 RX ADMIN — FUROSEMIDE 20 MG: 10 INJECTION, SOLUTION INTRAVENOUS at 09:59

## 2017-01-01 RX ADMIN — FUROSEMIDE 20 MG: 20 TABLET ORAL at 09:46

## 2017-01-01 RX ADMIN — Medication 4.5 G: at 23:43

## 2017-01-01 RX ADMIN — TAZOBACTAM SODIUM AND PIPERACILLIN SODIUM 4.5 G: 500; 4 INJECTION, SOLUTION INTRAVENOUS at 15:37

## 2017-01-01 RX ADMIN — TRAZODONE HYDROCHLORIDE 100 MG: 100 TABLET ORAL at 21:27

## 2017-01-01 RX ADMIN — INSULIN ASPART 2 UNITS: 100 INJECTION, SOLUTION INTRAVENOUS; SUBCUTANEOUS at 12:13

## 2017-01-01 RX ADMIN — METHYLPREDNISOLONE SODIUM SUCCINATE 20 MG: 40 INJECTION, POWDER, LYOPHILIZED, FOR SOLUTION INTRAMUSCULAR; INTRAVENOUS at 06:17

## 2017-01-01 RX ADMIN — RIFAXIMIN 550 MG: 550 TABLET ORAL at 08:25

## 2017-01-01 RX ADMIN — NICOTINE 1 PATCH: 21 PATCH, EXTENDED RELEASE TRANSDERMAL at 10:13

## 2017-01-01 RX ADMIN — RIFAXIMIN 550 MG: 550 TABLET ORAL at 08:40

## 2017-01-01 RX ADMIN — OXYCODONE HYDROCHLORIDE 10 MG: 5 TABLET ORAL at 13:30

## 2017-01-01 RX ADMIN — TRAZODONE HYDROCHLORIDE 50 MG: 50 TABLET ORAL at 21:08

## 2017-01-01 RX ADMIN — RIFAXIMIN 550 MG: 550 TABLET ORAL at 08:42

## 2017-01-01 RX ADMIN — SULFAMETHOXAZOLE AND TRIMETHOPRIM 400 MG: 200; 40 SUSPENSION ORAL at 04:05

## 2017-01-01 RX ADMIN — GABAPENTIN 600 MG: 600 TABLET, FILM COATED ORAL at 15:15

## 2017-01-01 RX ADMIN — RIFAXIMIN 550 MG: 550 TABLET ORAL at 07:48

## 2017-01-01 RX ADMIN — BUPRENORPHINE HYDROCHLORIDE, NALOXONE HYDROCHLORIDE 3 FILM: 8; 2 FILM, SOLUBLE BUCCAL; SUBLINGUAL at 11:42

## 2017-01-01 RX ADMIN — LORATADINE 10 MG: 5 SOLUTION ORAL at 08:47

## 2017-01-01 RX ADMIN — FLUOXETINE 40 MG: 20 CAPSULE ORAL at 08:20

## 2017-01-01 RX ADMIN — ASPIRIN 81 MG 81 MG: 81 TABLET ORAL at 08:45

## 2017-01-01 RX ADMIN — RIFAXIMIN 550 MG: 550 TABLET ORAL at 20:12

## 2017-01-01 RX ADMIN — ENOXAPARIN SODIUM 40 MG: 40 INJECTION SUBCUTANEOUS at 16:11

## 2017-01-01 RX ADMIN — Medication 40 MG: at 12:17

## 2017-01-01 RX ADMIN — SULFAMETHOXAZOLE AND TRIMETHOPRIM 1 TABLET: 400; 80 TABLET ORAL at 12:30

## 2017-01-01 RX ADMIN — ASPIRIN 81 MG: 81 TABLET, COATED ORAL at 08:21

## 2017-01-01 RX ADMIN — FOLIC ACID 1 MG: 1 TABLET ORAL at 10:15

## 2017-01-01 RX ADMIN — GABAPENTIN 600 MG: 250 SUSPENSION ORAL at 21:14

## 2017-01-01 RX ADMIN — MIRTAZAPINE 30 MG: 15 TABLET, FILM COATED ORAL at 21:07

## 2017-01-01 RX ADMIN — SULFAMETHOXAZOLE AND TRIMETHOPRIM 400 MG: 200; 40 SUSPENSION ORAL at 16:01

## 2017-01-01 RX ADMIN — LACTULOSE 20 G: 10 SOLUTION ORAL at 09:12

## 2017-01-01 RX ADMIN — METHOCARBAMOL 500 MG: 500 TABLET ORAL at 21:07

## 2017-01-01 RX ADMIN — ENOXAPARIN SODIUM 40 MG: 40 INJECTION SUBCUTANEOUS at 08:44

## 2017-01-01 RX ADMIN — Medication 100 MG: at 09:04

## 2017-01-01 RX ADMIN — ENOXAPARIN SODIUM 40 MG: 40 INJECTION SUBCUTANEOUS at 10:12

## 2017-01-01 RX ADMIN — FLUOXETINE 40 MG: 20 CAPSULE ORAL at 09:11

## 2017-01-01 RX ADMIN — RIFAXIMIN 550 MG: 550 TABLET ORAL at 20:46

## 2017-01-01 RX ADMIN — SULFAMETHOXAZOLE AND TRIMETHOPRIM 400 MG: 200; 40 SUSPENSION ORAL at 21:51

## 2017-01-01 RX ADMIN — KETOROLAC TROMETHAMINE 15 MG: 15 INJECTION, SOLUTION INTRAMUSCULAR; INTRAVENOUS at 12:02

## 2017-01-01 RX ADMIN — METHOCARBAMOL 500 MG: 500 TABLET ORAL at 20:41

## 2017-01-01 RX ADMIN — INSULIN GLARGINE 20 UNITS: 100 INJECTION, SOLUTION SUBCUTANEOUS at 08:45

## 2017-01-01 RX ADMIN — THIAMINE HCL (VITAMIN B1) 50 MG TABLET 50 MG: at 08:12

## 2017-01-01 RX ADMIN — TRAZODONE HYDROCHLORIDE 50 MG: 50 TABLET ORAL at 21:40

## 2017-01-01 RX ADMIN — GABAPENTIN 600 MG: 300 CAPSULE ORAL at 20:53

## 2017-01-01 RX ADMIN — FLUOXETINE 40 MG: 20 CAPSULE ORAL at 08:21

## 2017-01-01 RX ADMIN — MORPHINE SULFATE 2 MG: 2 INJECTION, SOLUTION INTRAMUSCULAR; INTRAVENOUS at 02:24

## 2017-01-01 RX ADMIN — INSULIN ASPART 1 UNITS: 100 INJECTION, SOLUTION INTRAVENOUS; SUBCUTANEOUS at 14:25

## 2017-01-01 RX ADMIN — IPRATROPIUM BROMIDE AND ALBUTEROL SULFATE 3 ML: .5; 3 SOLUTION RESPIRATORY (INHALATION) at 19:59

## 2017-01-01 RX ADMIN — RIFAXIMIN 550 MG: 550 TABLET ORAL at 10:40

## 2017-01-01 RX ADMIN — INSULIN GLARGINE 10 UNITS: 100 INJECTION, SOLUTION SUBCUTANEOUS at 08:26

## 2017-01-01 RX ADMIN — TAZOBACTAM SODIUM AND PIPERACILLIN SODIUM 4.5 G: 500; 4 INJECTION, SOLUTION INTRAVENOUS at 21:42

## 2017-01-01 RX ADMIN — SULFAMETHOXAZOLE AND TRIMETHOPRIM 1 TABLET: 800; 160 TABLET ORAL at 09:27

## 2017-01-01 RX ADMIN — METHOCARBAMOL 1000 MG: 500 TABLET ORAL at 22:07

## 2017-01-01 RX ADMIN — LEVOFLOXACIN 750 MG: 500 TABLET, FILM COATED ORAL at 21:16

## 2017-01-01 RX ADMIN — METHOCARBAMOL 500 MG: 500 TABLET ORAL at 08:53

## 2017-01-01 RX ADMIN — METHOCARBAMOL 1000 MG: 500 TABLET ORAL at 22:11

## 2017-01-01 RX ADMIN — ATORVASTATIN CALCIUM 20 MG: 20 TABLET, FILM COATED ORAL at 10:28

## 2017-01-01 RX ADMIN — HYDROCODONE BITARTRATE AND ACETAMINOPHEN 1 TABLET: 5; 325 TABLET ORAL at 18:40

## 2017-01-01 RX ADMIN — INSULIN HUMAN 10 UNITS: 100 INJECTION, SUSPENSION SUBCUTANEOUS at 07:46

## 2017-01-01 RX ADMIN — ALBUTEROL SULFATE 2.5 MG: 2.5 SOLUTION RESPIRATORY (INHALATION) at 19:31

## 2017-01-01 RX ADMIN — MORPHINE SULFATE 4 MG: 2 INJECTION, SOLUTION INTRAMUSCULAR; INTRAVENOUS at 13:30

## 2017-01-01 RX ADMIN — SULFAMETHOXAZOLE AND TRIMETHOPRIM 2 TABLET: 800; 160 TABLET ORAL at 21:43

## 2017-01-01 RX ADMIN — IPRATROPIUM BROMIDE AND ALBUTEROL SULFATE 3 ML: .5; 3 SOLUTION RESPIRATORY (INHALATION) at 19:31

## 2017-01-01 RX ADMIN — HYDROCODONE BITARTRATE AND ACETAMINOPHEN 1 TABLET: 5; 325 TABLET ORAL at 20:45

## 2017-01-01 RX ADMIN — IBUPROFEN 400 MG: 400 TABLET ORAL at 10:38

## 2017-01-01 RX ADMIN — FUROSEMIDE 20 MG: 20 TABLET ORAL at 08:20

## 2017-01-01 RX ADMIN — NICOTINE POLACRILEX 2 MG: 2 GUM, CHEWING ORAL at 08:00

## 2017-01-01 RX ADMIN — LORAZEPAM 2 MG: 2 INJECTION INTRAMUSCULAR; INTRAVENOUS at 22:55

## 2017-01-01 RX ADMIN — METHYLPREDNISOLONE SODIUM SUCCINATE 87.5 MG: 125 INJECTION, POWDER, FOR SOLUTION INTRAMUSCULAR; INTRAVENOUS at 09:29

## 2017-01-01 RX ADMIN — MORPHINE SULFATE 4 MG: 2 INJECTION, SOLUTION INTRAMUSCULAR; INTRAVENOUS at 22:17

## 2017-01-01 RX ADMIN — HUMAN INSULIN 5.5 UNITS/HR: 100 INJECTION, SOLUTION SUBCUTANEOUS at 19:18

## 2017-01-01 RX ADMIN — INSULIN ASPART 2 UNITS: 100 INJECTION, SOLUTION INTRAVENOUS; SUBCUTANEOUS at 00:04

## 2017-01-01 RX ADMIN — SULFAMETHOXAZOLE AND TRIMETHOPRIM 2 TABLET: 800; 160 TABLET ORAL at 18:04

## 2017-01-01 RX ADMIN — FUROSEMIDE 20 MG: 20 TABLET ORAL at 08:21

## 2017-01-01 RX ADMIN — Medication 100 MG: at 10:15

## 2017-01-01 RX ADMIN — SULFAMETHOXAZOLE AND TRIMETHOPRIM 2 TABLET: 800; 160 TABLET ORAL at 21:02

## 2017-01-01 RX ADMIN — ASPIRIN 81 MG 81 MG: 81 TABLET ORAL at 12:18

## 2017-01-01 RX ADMIN — OMEPRAZOLE 20 MG: 20 CAPSULE, DELAYED RELEASE ORAL at 08:09

## 2017-01-01 RX ADMIN — RIFAXIMIN 550 MG: 550 TABLET ORAL at 20:08

## 2017-01-01 RX ADMIN — GABAPENTIN 600 MG: 250 SUSPENSION ORAL at 15:41

## 2017-01-01 RX ADMIN — FERROUS SULFATE TAB 325 MG (65 MG ELEMENTAL FE) 325 MG: 325 (65 FE) TAB at 10:40

## 2017-01-01 RX ADMIN — HYDROMORPHONE HYDROCHLORIDE 1 MG: 1 SOLUTION ORAL at 08:20

## 2017-01-01 RX ADMIN — MULTIPLE VITAMINS W/ MINERALS TAB 1 TABLET: TAB at 08:21

## 2017-01-01 RX ADMIN — IPRATROPIUM BROMIDE AND ALBUTEROL SULFATE 3 ML: .5; 3 SOLUTION RESPIRATORY (INHALATION) at 08:01

## 2017-01-01 RX ADMIN — METHOCARBAMOL 1000 MG: 500 TABLET ORAL at 07:50

## 2017-01-01 RX ADMIN — VANCOMYCIN HYDROCHLORIDE 1250 MG: 10 INJECTION, POWDER, LYOPHILIZED, FOR SOLUTION INTRAVENOUS at 01:57

## 2017-01-01 RX ADMIN — VANCOMYCIN HYDROCHLORIDE 1500 MG: 100 INJECTION, POWDER, LYOPHILIZED, FOR SOLUTION INTRAVENOUS at 07:03

## 2017-01-01 RX ADMIN — FLUOXETINE 40 MG: 20 CAPSULE ORAL at 07:57

## 2017-01-01 RX ADMIN — METHYLPREDNISOLONE SODIUM SUCCINATE 20 MG: 40 INJECTION, POWDER, LYOPHILIZED, FOR SOLUTION INTRAMUSCULAR; INTRAVENOUS at 11:44

## 2017-01-01 RX ADMIN — SULFAMETHOXAZOLE AND TRIMETHOPRIM 1 TABLET: 400; 80 TABLET ORAL at 13:07

## 2017-01-01 RX ADMIN — ASPIRIN 81 MG: 81 TABLET, COATED ORAL at 09:59

## 2017-01-01 RX ADMIN — GABAPENTIN 600 MG: 600 TABLET, FILM COATED ORAL at 15:51

## 2017-01-01 RX ADMIN — IPRATROPIUM BROMIDE AND ALBUTEROL SULFATE 3 ML: .5; 3 SOLUTION RESPIRATORY (INHALATION) at 07:10

## 2017-01-01 RX ADMIN — ENOXAPARIN SODIUM 40 MG: 40 INJECTION SUBCUTANEOUS at 13:44

## 2017-01-01 RX ADMIN — TAZOBACTAM SODIUM AND PIPERACILLIN SODIUM 4.5 G: 500; 4 INJECTION, SOLUTION INTRAVENOUS at 16:47

## 2017-01-01 RX ADMIN — SULFAMETHOXAZOLE AND TRIMETHOPRIM 2 TABLET: 800; 160 TABLET ORAL at 22:19

## 2017-01-01 RX ADMIN — INSULIN ASPART 9 UNITS: 100 INJECTION, SOLUTION INTRAVENOUS; SUBCUTANEOUS at 20:02

## 2017-01-01 RX ADMIN — LACTULOSE 20 G: 20 SOLUTION ORAL at 20:34

## 2017-01-01 RX ADMIN — SODIUM CHLORIDE: 9 INJECTION, SOLUTION INTRAVENOUS at 03:33

## 2017-01-01 RX ADMIN — RIFAXIMIN 550 MG: 550 TABLET ORAL at 20:44

## 2017-01-01 RX ADMIN — MORPHINE SULFATE 2 MG: 2 INJECTION, SOLUTION INTRAMUSCULAR; INTRAVENOUS at 01:02

## 2017-01-01 RX ADMIN — SODIUM CHLORIDE, POTASSIUM CHLORIDE, SODIUM LACTATE AND CALCIUM CHLORIDE: 600; 310; 30; 20 INJECTION, SOLUTION INTRAVENOUS at 09:06

## 2017-01-01 RX ADMIN — MIRTAZAPINE 30 MG: 15 TABLET, FILM COATED ORAL at 21:53

## 2017-01-01 RX ADMIN — HYDROXYZINE HYDROCHLORIDE 10 MG: 10 TABLET ORAL at 21:30

## 2017-01-01 RX ADMIN — GABAPENTIN 600 MG: 600 TABLET, FILM COATED ORAL at 08:07

## 2017-01-01 RX ADMIN — NICOTINE 1 PATCH: 21 PATCH, EXTENDED RELEASE TRANSDERMAL at 08:40

## 2017-01-01 RX ADMIN — HEPARIN SODIUM 5000 UNITS: 10000 INJECTION, SOLUTION INTRAVENOUS; SUBCUTANEOUS at 21:07

## 2017-01-01 RX ADMIN — IPRATROPIUM BROMIDE AND ALBUTEROL SULFATE 3 ML: .5; 3 SOLUTION RESPIRATORY (INHALATION) at 19:29

## 2017-01-01 RX ADMIN — FUROSEMIDE 40 MG: 10 INJECTION, SOLUTION INTRAVENOUS at 10:47

## 2017-01-01 RX ADMIN — METHOCARBAMOL 1000 MG: 500 TABLET ORAL at 20:40

## 2017-01-01 RX ADMIN — MICONAZOLE NITRATE: 20 CREAM TOPICAL at 21:39

## 2017-01-01 RX ADMIN — NOREPINEPHRINE BITARTRATE 0.03 MCG/KG/MIN: 1 INJECTION INTRAVENOUS at 22:32

## 2017-01-01 RX ADMIN — INSULIN ASPART 10 UNITS: 100 INJECTION, SOLUTION INTRAVENOUS; SUBCUTANEOUS at 20:43

## 2017-01-01 RX ADMIN — SUCCINYLCHOLINE CHLORIDE 120 MG: 20 INJECTION, SOLUTION INTRAMUSCULAR; INTRAVENOUS at 08:45

## 2017-01-01 RX ADMIN — PIPERACILLIN AND TAZOBACTAM 4.5 G: 4; .5 INJECTION, POWDER, LYOPHILIZED, FOR SOLUTION INTRAVENOUS; PARENTERAL at 05:21

## 2017-01-01 RX ADMIN — GABAPENTIN 600 MG: 600 TABLET, FILM COATED ORAL at 21:38

## 2017-01-01 RX ADMIN — METHOCARBAMOL 500 MG: 500 TABLET ORAL at 21:39

## 2017-01-01 RX ADMIN — HYDROMORPHONE HYDROCHLORIDE 1 MG: 1 SOLUTION ORAL at 20:32

## 2017-01-01 RX ADMIN — GABAPENTIN 600 MG: 600 TABLET, FILM COATED ORAL at 21:30

## 2017-01-01 RX ADMIN — VANCOMYCIN HYDROCHLORIDE 1250 MG: 10 INJECTION, POWDER, LYOPHILIZED, FOR SOLUTION INTRAVENOUS at 14:05

## 2017-01-01 RX ADMIN — GABAPENTIN 600 MG: 300 CAPSULE ORAL at 13:17

## 2017-01-01 RX ADMIN — NICOTINE 1 PATCH: 21 PATCH, EXTENDED RELEASE TRANSDERMAL at 09:28

## 2017-01-01 RX ADMIN — TAZOBACTAM SODIUM AND PIPERACILLIN SODIUM 4.5 G: 500; 4 INJECTION, SOLUTION INTRAVENOUS at 04:34

## 2017-01-01 RX ADMIN — LACTULOSE 20 G: 20 SOLUTION ORAL at 16:38

## 2017-01-01 RX ADMIN — TAZOBACTAM SODIUM AND PIPERACILLIN SODIUM 4.5 G: 500; 4 INJECTION, SOLUTION INTRAVENOUS at 10:33

## 2017-01-01 RX ADMIN — RIFAXIMIN 550 MG: 550 TABLET ORAL at 08:11

## 2017-01-01 RX ADMIN — HYDROMORPHONE HYDROCHLORIDE 0.2 MG: 1 INJECTION, SOLUTION INTRAMUSCULAR; INTRAVENOUS; SUBCUTANEOUS at 08:33

## 2017-01-01 RX ADMIN — METHOCARBAMOL 1000 MG: 500 TABLET ORAL at 21:41

## 2017-01-01 RX ADMIN — SULFAMETHOXAZOLE AND TRIMETHOPRIM 400 MG: 80; 16 INJECTION, SOLUTION, CONCENTRATE INTRAVENOUS at 09:00

## 2017-01-01 RX ADMIN — INSULIN GLARGINE 20 UNITS: 100 INJECTION, SOLUTION SUBCUTANEOUS at 08:43

## 2017-01-01 RX ADMIN — SULFAMETHOXAZOLE AND TRIMETHOPRIM 2 TABLET: 800; 160 TABLET ORAL at 07:48

## 2017-01-01 RX ADMIN — CEFTRIAXONE 2 G: 2 INJECTION, POWDER, FOR SOLUTION INTRAMUSCULAR; INTRAVENOUS at 05:36

## 2017-01-01 RX ADMIN — SULFAMETHOXAZOLE AND TRIMETHOPRIM 2 TABLET: 800; 160 TABLET ORAL at 08:22

## 2017-01-01 RX ADMIN — TRAZODONE HYDROCHLORIDE 100 MG: 100 TABLET ORAL at 21:38

## 2017-01-01 RX ADMIN — IPRATROPIUM BROMIDE AND ALBUTEROL SULFATE 3 ML: .5; 3 SOLUTION RESPIRATORY (INHALATION) at 11:43

## 2017-01-01 RX ADMIN — HEPARIN SODIUM 5000 UNITS: 10000 INJECTION, SOLUTION INTRAVENOUS; SUBCUTANEOUS at 16:26

## 2017-01-01 RX ADMIN — HYDROMORPHONE HYDROCHLORIDE 1 MG: 1 SOLUTION ORAL at 07:39

## 2017-01-01 RX ADMIN — SULFAMETHOXAZOLE AND TRIMETHOPRIM 400 MG: 200; 40 SUSPENSION ORAL at 04:20

## 2017-01-01 RX ADMIN — POTASSIUM & SODIUM PHOSPHATES POWDER PACK 280-160-250 MG 1 PACKET: 280-160-250 PACK at 21:20

## 2017-01-01 RX ADMIN — GABAPENTIN 600 MG: 300 CAPSULE ORAL at 06:39

## 2017-01-01 RX ADMIN — ATORVASTATIN CALCIUM 20 MG: 20 TABLET, FILM COATED ORAL at 16:02

## 2017-01-01 RX ADMIN — LACTULOSE 30 ML: 10 SOLUTION ORAL at 17:09

## 2017-01-01 RX ADMIN — GABAPENTIN 600 MG: 600 TABLET, FILM COATED ORAL at 08:43

## 2017-01-01 RX ADMIN — IPRATROPIUM BROMIDE AND ALBUTEROL SULFATE 3 ML: .5; 3 SOLUTION RESPIRATORY (INHALATION) at 08:48

## 2017-01-01 RX ADMIN — GABAPENTIN 600 MG: 600 TABLET, FILM COATED ORAL at 21:11

## 2017-01-01 RX ADMIN — NICOTINE 1 PATCH: 21 PATCH, EXTENDED RELEASE TRANSDERMAL at 08:37

## 2017-01-01 RX ADMIN — TAZOBACTAM SODIUM AND PIPERACILLIN SODIUM 4.5 G: 500; 4 INJECTION, SOLUTION INTRAVENOUS at 03:48

## 2017-01-01 RX ADMIN — MIRTAZAPINE 30 MG: 15 TABLET, FILM COATED ORAL at 21:12

## 2017-01-01 RX ADMIN — OMEPRAZOLE 20 MG: 20 CAPSULE, DELAYED RELEASE ORAL at 09:58

## 2017-01-01 RX ADMIN — LIDOCAINE 1 PATCH: 50 PATCH CUTANEOUS at 14:00

## 2017-01-01 RX ADMIN — IPRATROPIUM BROMIDE AND ALBUTEROL SULFATE 3 ML: .5; 3 SOLUTION RESPIRATORY (INHALATION) at 19:55

## 2017-01-01 RX ADMIN — IPRATROPIUM BROMIDE AND ALBUTEROL SULFATE 3 ML: .5; 3 SOLUTION RESPIRATORY (INHALATION) at 12:06

## 2017-01-01 RX ADMIN — PREDNISONE 50 MG: 50 TABLET ORAL at 08:22

## 2017-01-01 RX ADMIN — NICOTINE 1 PATCH: 7 PATCH, EXTENDED RELEASE TRANSDERMAL at 23:55

## 2017-01-01 RX ADMIN — HUMAN INSULIN 3 UNITS/HR: 100 INJECTION, SOLUTION SUBCUTANEOUS at 09:04

## 2017-01-01 RX ADMIN — Medication: at 21:17

## 2017-01-01 RX ADMIN — NICOTINE 1 CARTRIDGE: 4 INHALANT RESPIRATORY (INHALATION) at 15:27

## 2017-01-01 RX ADMIN — SODIUM CHLORIDE 500 MG: 9 INJECTION, SOLUTION INTRAVENOUS at 11:07

## 2017-01-01 RX ADMIN — PROPOFOL 20 MG: 10 INJECTION, EMULSION INTRAVENOUS at 13:02

## 2017-01-01 RX ADMIN — HYDROMORPHONE HYDROCHLORIDE 1 MG: 1 SOLUTION ORAL at 03:58

## 2017-01-01 RX ADMIN — PREDNISONE 80 MG: 10 TABLET ORAL at 08:20

## 2017-01-01 RX ADMIN — SODIUM CHLORIDE 1 UNITS/HR: 9 INJECTION, SOLUTION INTRAVENOUS at 12:43

## 2017-01-01 RX ADMIN — IPRATROPIUM BROMIDE AND ALBUTEROL SULFATE 3 ML: .5; 3 SOLUTION RESPIRATORY (INHALATION) at 23:53

## 2017-01-01 RX ADMIN — PANTOPRAZOLE SODIUM 40 MG: 40 TABLET, DELAYED RELEASE ORAL at 08:13

## 2017-01-01 RX ADMIN — PROPOFOL 50 MCG/KG/MIN: 10 INJECTION, EMULSION INTRAVENOUS at 16:29

## 2017-01-01 RX ADMIN — IPRATROPIUM BROMIDE AND ALBUTEROL SULFATE 3 ML: .5; 3 SOLUTION RESPIRATORY (INHALATION) at 07:59

## 2017-01-01 RX ADMIN — RIFAXIMIN 550 MG: 550 TABLET ORAL at 09:10

## 2017-01-01 RX ADMIN — POTASSIUM CHLORIDE 20 MEQ: 1500 TABLET, EXTENDED RELEASE ORAL at 21:23

## 2017-01-01 RX ADMIN — IPRATROPIUM BROMIDE AND ALBUTEROL SULFATE 3 ML: .5; 3 SOLUTION RESPIRATORY (INHALATION) at 19:22

## 2017-01-01 RX ADMIN — HYDROCODONE BITARTRATE AND ACETAMINOPHEN 1 TABLET: 5; 325 TABLET ORAL at 13:49

## 2017-01-01 RX ADMIN — HEPARIN SODIUM 5000 UNITS: 10000 INJECTION, SOLUTION INTRAVENOUS; SUBCUTANEOUS at 12:00

## 2017-01-01 RX ADMIN — SULFAMETHOXAZOLE AND TRIMETHOPRIM 2 TABLET: 800; 160 TABLET ORAL at 08:44

## 2017-01-01 RX ADMIN — IPRATROPIUM BROMIDE AND ALBUTEROL SULFATE 3 ML: .5; 3 SOLUTION RESPIRATORY (INHALATION) at 19:34

## 2017-01-01 RX ADMIN — FUROSEMIDE 40 MG: 10 INJECTION, SOLUTION INTRAVENOUS at 17:38

## 2017-01-01 RX ADMIN — FOLIC ACID: 5 INJECTION, SOLUTION INTRAMUSCULAR; INTRAVENOUS; SUBCUTANEOUS at 01:38

## 2017-01-01 RX ADMIN — HYDROMORPHONE HYDROCHLORIDE 1 MG: 1 SOLUTION ORAL at 17:50

## 2017-01-01 RX ADMIN — PIPERACILLIN AND TAZOBACTAM 4.5 G: 4; .5 INJECTION, POWDER, FOR SOLUTION INTRAVENOUS at 11:31

## 2017-01-01 RX ADMIN — GUAIFENESIN 1200 MG: 600 TABLET, EXTENDED RELEASE ORAL at 21:27

## 2017-01-01 RX ADMIN — TAZOBACTAM SODIUM AND PIPERACILLIN SODIUM 4.5 G: 500; 4 INJECTION, SOLUTION INTRAVENOUS at 16:57

## 2017-01-01 RX ADMIN — NICOTINE 1 PATCH: 21 PATCH, EXTENDED RELEASE TRANSDERMAL at 10:00

## 2017-01-01 RX ADMIN — NICOTINE 1 PATCH: 7 PATCH, EXTENDED RELEASE TRANSDERMAL at 01:30

## 2017-01-01 RX ADMIN — SULFAMETHOXAZOLE AND TRIMETHOPRIM 400 MG: 80; 16 INJECTION, SOLUTION, CONCENTRATE INTRAVENOUS at 08:56

## 2017-01-01 RX ADMIN — MORPHINE SULFATE 2 MG: 2 INJECTION, SOLUTION INTRAMUSCULAR; INTRAVENOUS at 22:28

## 2017-01-01 RX ADMIN — TRAZODONE HYDROCHLORIDE 50 MG: 50 TABLET ORAL at 23:42

## 2017-01-01 RX ADMIN — HYDROMORPHONE HYDROCHLORIDE 1 MG: 1 SOLUTION ORAL at 10:38

## 2017-01-01 RX ADMIN — NICOTINE POLACRILEX 2 MG: 2 GUM, CHEWING ORAL at 08:30

## 2017-01-01 RX ADMIN — ATORVASTATIN CALCIUM 20 MG: 20 TABLET, FILM COATED ORAL at 21:49

## 2017-01-01 RX ADMIN — GABAPENTIN 600 MG: 600 TABLET, FILM COATED ORAL at 14:16

## 2017-01-01 RX ADMIN — HEPARIN SODIUM 5000 UNITS: 10000 INJECTION, SOLUTION INTRAVENOUS; SUBCUTANEOUS at 04:33

## 2017-01-01 RX ADMIN — FUROSEMIDE 20 MG: 10 INJECTION, SOLUTION INTRAVENOUS at 00:50

## 2017-01-01 RX ADMIN — GABAPENTIN 600 MG: 600 TABLET, FILM COATED ORAL at 08:19

## 2017-01-01 RX ADMIN — POTASSIUM & SODIUM PHOSPHATES POWDER PACK 280-160-250 MG 1 PACKET: 280-160-250 PACK at 22:33

## 2017-01-01 RX ADMIN — BUPRENORPHINE HYDROCHLORIDE, NALOXONE HYDROCHLORIDE 3 FILM: 8; 2 FILM, SOLUBLE BUCCAL; SUBLINGUAL at 12:01

## 2017-01-01 RX ADMIN — METHOCARBAMOL 1000 MG: 500 TABLET ORAL at 08:39

## 2017-01-01 RX ADMIN — RIFAXIMIN 550 MG: 550 TABLET ORAL at 11:10

## 2017-01-01 RX ADMIN — GABAPENTIN 600 MG: 250 SUSPENSION ORAL at 08:25

## 2017-01-01 RX ADMIN — VANCOMYCIN HYDROCHLORIDE 1000 MG: 1 INJECTION, SOLUTION INTRAVENOUS at 17:45

## 2017-01-01 RX ADMIN — FLUOXETINE 40 MG: 20 CAPSULE ORAL at 08:22

## 2017-01-01 RX ADMIN — Medication 100 MG: at 08:32

## 2017-01-01 RX ADMIN — POTASSIUM CHLORIDE 20 MEQ: 29.8 INJECTION, SOLUTION INTRAVENOUS at 06:54

## 2017-01-01 RX ADMIN — INSULIN ASPART 2 UNITS: 100 INJECTION, SOLUTION INTRAVENOUS; SUBCUTANEOUS at 09:41

## 2017-01-01 RX ADMIN — ACETAMINOPHEN 650 MG: 325 TABLET, FILM COATED ORAL at 09:29

## 2017-01-01 RX ADMIN — PROPOFOL 45 MCG/KG/MIN: 10 INJECTION, EMULSION INTRAVENOUS at 16:50

## 2017-01-01 RX ADMIN — HYDROXYZINE HYDROCHLORIDE 10 MG: 10 TABLET ORAL at 13:34

## 2017-01-01 RX ADMIN — METHYLPREDNISOLONE SODIUM SUCCINATE 40 MG: 40 INJECTION, POWDER, FOR SOLUTION INTRAMUSCULAR; INTRAVENOUS at 18:28

## 2017-01-01 RX ADMIN — BUPRENORPHINE HYDROCHLORIDE, NALOXONE HYDROCHLORIDE 3 FILM: 8; 2 FILM, SOLUBLE BUCCAL; SUBLINGUAL at 08:08

## 2017-01-01 RX ADMIN — HUMAN INSULIN 3 UNITS/HR: 100 INJECTION, SOLUTION SUBCUTANEOUS at 23:02

## 2017-01-01 RX ADMIN — TRAZODONE HYDROCHLORIDE 100 MG: 100 TABLET ORAL at 21:45

## 2017-01-01 RX ADMIN — NICOTINE POLACRILEX 2 MG: 2 GUM, CHEWING ORAL at 11:46

## 2017-01-01 RX ADMIN — IPRATROPIUM BROMIDE AND ALBUTEROL SULFATE 6 ML: .5; 3 SOLUTION RESPIRATORY (INHALATION) at 19:20

## 2017-01-01 RX ADMIN — INSULIN GLARGINE 15 UNITS: 100 INJECTION, SOLUTION SUBCUTANEOUS at 20:47

## 2017-01-01 RX ADMIN — Medication 2 G: at 01:11

## 2017-01-01 RX ADMIN — GABAPENTIN 600 MG: 600 TABLET, FILM COATED ORAL at 08:22

## 2017-01-01 RX ADMIN — METHYLPREDNISOLONE SODIUM SUCCINATE 20 MG: 40 INJECTION, POWDER, LYOPHILIZED, FOR SOLUTION INTRAMUSCULAR; INTRAVENOUS at 17:51

## 2017-01-01 RX ADMIN — IPRATROPIUM BROMIDE AND ALBUTEROL SULFATE 3 ML: .5; 3 SOLUTION RESPIRATORY (INHALATION) at 15:31

## 2017-01-01 RX ADMIN — GABAPENTIN 600 MG: 600 TABLET, FILM COATED ORAL at 21:17

## 2017-01-01 RX ADMIN — HYDROMORPHONE HYDROCHLORIDE 0.5 MG: 1 INJECTION, SOLUTION INTRAMUSCULAR; INTRAVENOUS; SUBCUTANEOUS at 03:25

## 2017-01-01 RX ADMIN — HYDROMORPHONE HYDROCHLORIDE 1 MG: 1 SOLUTION ORAL at 13:59

## 2017-01-01 RX ADMIN — SULFAMETHOXAZOLE AND TRIMETHOPRIM 2 TABLET: 800; 160 TABLET ORAL at 20:34

## 2017-01-01 RX ADMIN — PROPOFOL 30 MCG/KG/MIN: 10 INJECTION, EMULSION INTRAVENOUS at 21:56

## 2017-01-01 RX ADMIN — FOLIC ACID 1 MG: 1 TABLET ORAL at 08:02

## 2017-01-01 RX ADMIN — BUPRENORPHINE HYDROCHLORIDE, NALOXONE HYDROCHLORIDE 3 FILM: 8; 2 FILM, SOLUBLE BUCCAL; SUBLINGUAL at 08:51

## 2017-01-01 RX ADMIN — HEPARIN SODIUM 5000 UNITS: 10000 INJECTION, SOLUTION INTRAVENOUS; SUBCUTANEOUS at 21:42

## 2017-01-01 RX ADMIN — FENTANYL CITRATE 50 MCG: 50 INJECTION INTRAMUSCULAR; INTRAVENOUS at 09:50

## 2017-01-01 RX ADMIN — METHYLPREDNISOLONE SODIUM SUCCINATE 40 MG: 40 INJECTION, POWDER, FOR SOLUTION INTRAMUSCULAR; INTRAVENOUS at 11:21

## 2017-01-01 RX ADMIN — LACTULOSE 20 G: 10 SOLUTION ORAL at 08:18

## 2017-01-01 RX ADMIN — FENTANYL CITRATE 100 MCG: 50 INJECTION INTRAMUSCULAR; INTRAVENOUS at 13:21

## 2017-01-01 RX ADMIN — SODIUM CHLORIDE 1000 ML: 0.9 IRRIGANT IRRIGATION at 09:00

## 2017-01-01 RX ADMIN — IPRATROPIUM BROMIDE AND ALBUTEROL SULFATE 3 ML: .5; 3 SOLUTION RESPIRATORY (INHALATION) at 23:31

## 2017-01-01 RX ADMIN — METHOCARBAMOL 500 MG: 500 TABLET ORAL at 16:13

## 2017-01-01 RX ADMIN — ASPIRIN 81 MG: 81 TABLET, COATED ORAL at 08:13

## 2017-01-01 RX ADMIN — IPRATROPIUM BROMIDE AND ALBUTEROL SULFATE 3 ML: .5; 3 SOLUTION RESPIRATORY (INHALATION) at 07:45

## 2017-01-01 RX ADMIN — Medication: at 08:57

## 2017-01-01 RX ADMIN — HEPARIN SODIUM 5000 UNITS: 10000 INJECTION, SOLUTION INTRAVENOUS; SUBCUTANEOUS at 14:40

## 2017-01-01 RX ADMIN — PROPOFOL 30 MG: 10 INJECTION, EMULSION INTRAVENOUS at 13:04

## 2017-01-01 RX ADMIN — SULFAMETHOXAZOLE AND TRIMETHOPRIM 1 TABLET: 400; 80 TABLET ORAL at 17:32

## 2017-01-01 RX ADMIN — PANTOPRAZOLE SODIUM 40 MG: 40 TABLET, DELAYED RELEASE ORAL at 08:28

## 2017-01-01 RX ADMIN — GABAPENTIN 600 MG: 250 SUSPENSION ORAL at 21:52

## 2017-01-01 RX ADMIN — TRAZODONE HYDROCHLORIDE 50 MG: 50 TABLET ORAL at 21:37

## 2017-01-01 RX ADMIN — TAZOBACTAM SODIUM AND PIPERACILLIN SODIUM 4.5 G: 500; 4 INJECTION, SOLUTION INTRAVENOUS at 08:46

## 2017-01-01 RX ADMIN — SULFAMETHOXAZOLE AND TRIMETHOPRIM 2 TABLET: 800; 160 TABLET ORAL at 13:42

## 2017-01-01 RX ADMIN — SODIUM CHLORIDE: 9 INJECTION, SOLUTION INTRAVENOUS at 04:14

## 2017-01-01 RX ADMIN — SULFAMETHOXAZOLE AND TRIMETHOPRIM 1 TABLET: 400; 80 TABLET ORAL at 22:19

## 2017-01-01 RX ADMIN — MIDAZOLAM HYDROCHLORIDE 2 MG: 1 INJECTION, SOLUTION INTRAMUSCULAR; INTRAVENOUS at 10:15

## 2017-01-01 RX ADMIN — SPIRONOLACTONE 50 MG: 25 TABLET ORAL at 08:32

## 2017-01-01 RX ADMIN — ATORVASTATIN CALCIUM 20 MG: 20 TABLET, FILM COATED ORAL at 22:26

## 2017-01-01 RX ADMIN — SODIUM CHLORIDE 1000 ML: 9 INJECTION, SOLUTION INTRAVENOUS at 17:44

## 2017-01-01 RX ADMIN — Medication 5 MG: at 20:36

## 2017-01-01 RX ADMIN — LACTULOSE 20 G: 20 SOLUTION ORAL at 16:14

## 2017-01-01 RX ADMIN — LIDOCAINE 1 PATCH: 50 PATCH CUTANEOUS at 09:28

## 2017-01-01 RX ADMIN — PANTOPRAZOLE SODIUM 40 MG: 40 TABLET, DELAYED RELEASE ORAL at 10:39

## 2017-01-01 RX ADMIN — SODIUM CHLORIDE 1000 ML: 9 INJECTION, SOLUTION INTRAVENOUS at 19:16

## 2017-01-01 RX ADMIN — RIFAXIMIN 550 MG: 550 TABLET ORAL at 10:28

## 2017-01-01 RX ADMIN — FUROSEMIDE 20 MG: 20 TABLET ORAL at 08:07

## 2017-01-01 RX ADMIN — IPRATROPIUM BROMIDE AND ALBUTEROL SULFATE 3 ML: .5; 3 SOLUTION RESPIRATORY (INHALATION) at 19:23

## 2017-01-01 RX ADMIN — FUROSEMIDE 20 MG: 10 INJECTION, SOLUTION INTRAVENOUS at 08:16

## 2017-01-01 RX ADMIN — RIFAXIMIN 550 MG: 550 TABLET ORAL at 21:45

## 2017-01-01 RX ADMIN — GABAPENTIN 600 MG: 250 SUSPENSION ORAL at 08:07

## 2017-01-01 RX ADMIN — IPRATROPIUM BROMIDE AND ALBUTEROL SULFATE 3 ML: .5; 3 SOLUTION RESPIRATORY (INHALATION) at 03:14

## 2017-01-01 RX ADMIN — SPIRONOLACTONE 50 MG: 25 TABLET ORAL at 08:25

## 2017-01-01 RX ADMIN — IPRATROPIUM BROMIDE AND ALBUTEROL SULFATE 3 ML: .5; 3 SOLUTION RESPIRATORY (INHALATION) at 15:56

## 2017-01-01 RX ADMIN — HYDROCODONE BITARTRATE AND ACETAMINOPHEN 1 TABLET: 5; 325 TABLET ORAL at 11:39

## 2017-01-01 RX ADMIN — GABAPENTIN 600 MG: 250 SUSPENSION ORAL at 16:00

## 2017-01-01 RX ADMIN — SULFAMETHOXAZOLE AND TRIMETHOPRIM 400 MG: 200; 40 SUSPENSION ORAL at 21:50

## 2017-01-01 RX ADMIN — LACTULOSE 20 G: 20 SOLUTION ORAL at 08:15

## 2017-01-01 RX ADMIN — INSULIN ASPART 9 UNITS: 100 INJECTION, SOLUTION INTRAVENOUS; SUBCUTANEOUS at 18:39

## 2017-01-01 RX ADMIN — NICOTINE POLACRILEX 2 MG: 2 GUM, CHEWING ORAL at 10:45

## 2017-01-01 RX ADMIN — NICOTINE POLACRILEX 2 MG: 2 GUM, CHEWING ORAL at 09:35

## 2017-01-01 RX ADMIN — IPRATROPIUM BROMIDE AND ALBUTEROL SULFATE 3 ML: .5; 3 SOLUTION RESPIRATORY (INHALATION) at 21:28

## 2017-01-01 RX ADMIN — MORPHINE SULFATE 4 MG: 2 INJECTION, SOLUTION INTRAMUSCULAR; INTRAVENOUS at 08:15

## 2017-01-01 RX ADMIN — IPRATROPIUM BROMIDE AND ALBUTEROL SULFATE 3 ML: .5; 3 SOLUTION RESPIRATORY (INHALATION) at 01:30

## 2017-01-01 RX ADMIN — OXYCODONE HYDROCHLORIDE AND ACETAMINOPHEN 500 MG: 500 TABLET ORAL at 08:37

## 2017-01-01 RX ADMIN — MORPHINE SULFATE 2 MG: 2 INJECTION, SOLUTION INTRAMUSCULAR; INTRAVENOUS at 03:52

## 2017-01-01 RX ADMIN — CISATRACURIUM BESYLATE 8 MG: 2 INJECTION INTRAVENOUS at 08:47

## 2017-01-01 RX ADMIN — MIRTAZAPINE 15 MG: 15 TABLET, FILM COATED ORAL at 21:49

## 2017-01-01 RX ADMIN — KETOROLAC TROMETHAMINE 30 MG: 15 INJECTION, SOLUTION INTRAMUSCULAR; INTRAVENOUS at 18:56

## 2017-01-01 RX ADMIN — GABAPENTIN 300 MG: 300 CAPSULE ORAL at 08:16

## 2017-01-01 RX ADMIN — MORPHINE SULFATE 2 MG: 2 INJECTION, SOLUTION INTRAMUSCULAR; INTRAVENOUS at 17:28

## 2017-01-01 RX ADMIN — SULFAMETHOXAZOLE AND TRIMETHOPRIM 400 MG: 200; 40 SUSPENSION ORAL at 16:27

## 2017-01-01 RX ADMIN — TRAZODONE HYDROCHLORIDE 50 MG: 50 TABLET ORAL at 20:10

## 2017-01-01 RX ADMIN — VANCOMYCIN HYDROCHLORIDE 1250 MG: 10 INJECTION, POWDER, LYOPHILIZED, FOR SOLUTION INTRAVENOUS at 04:15

## 2017-01-01 RX ADMIN — ACETAMINOPHEN 650 MG: 325 TABLET, FILM COATED ORAL at 17:57

## 2017-01-01 RX ADMIN — RIFAXIMIN 550 MG: 550 TABLET ORAL at 19:38

## 2017-01-01 RX ADMIN — MORPHINE SULFATE 2 MG: 2 INJECTION, SOLUTION INTRAMUSCULAR; INTRAVENOUS at 02:36

## 2017-01-01 RX ADMIN — TAZOBACTAM SODIUM AND PIPERACILLIN SODIUM 4.5 G: 500; 4 INJECTION, SOLUTION INTRAVENOUS at 23:17

## 2017-01-01 RX ADMIN — SODIUM CHLORIDE: 9 INJECTION, SOLUTION INTRAVENOUS at 16:54

## 2017-01-01 RX ADMIN — TAZOBACTAM SODIUM AND PIPERACILLIN SODIUM 4.5 G: 500; 4 INJECTION, SOLUTION INTRAVENOUS at 05:58

## 2017-01-01 RX ADMIN — ASPIRIN 81 MG: 81 TABLET, COATED ORAL at 09:47

## 2017-01-01 RX ADMIN — FOLIC ACID: 5 INJECTION, SOLUTION INTRAMUSCULAR; INTRAVENOUS; SUBCUTANEOUS at 00:54

## 2017-01-01 RX ADMIN — METHOCARBAMOL 1000 MG: 500 TABLET ORAL at 13:53

## 2017-01-01 RX ADMIN — OXYCODONE HYDROCHLORIDE 10 MG: 5 TABLET ORAL at 17:24

## 2017-01-01 RX ADMIN — FLUOXETINE 40 MG: 20 CAPSULE ORAL at 09:27

## 2017-01-01 RX ADMIN — NICOTINE POLACRILEX 2 MG: 2 GUM, CHEWING ORAL at 08:27

## 2017-01-01 RX ADMIN — METHOCARBAMOL 1000 MG: 500 TABLET ORAL at 21:27

## 2017-01-01 RX ADMIN — GABAPENTIN 600 MG: 250 SUSPENSION ORAL at 16:27

## 2017-01-01 RX ADMIN — RIFAXIMIN 550 MG: 550 TABLET ORAL at 09:32

## 2017-01-01 RX ADMIN — IPRATROPIUM BROMIDE AND ALBUTEROL SULFATE 3 ML: .5; 3 SOLUTION RESPIRATORY (INHALATION) at 16:00

## 2017-01-01 RX ADMIN — METHOCARBAMOL 1000 MG: 500 TABLET ORAL at 18:28

## 2017-01-01 RX ADMIN — ATORVASTATIN CALCIUM 20 MG: 20 TABLET, FILM COATED ORAL at 22:42

## 2017-01-01 RX ADMIN — PREDNISONE 60 MG: 10 TABLET ORAL at 08:32

## 2017-01-01 RX ADMIN — TAZOBACTAM SODIUM AND PIPERACILLIN SODIUM 4.5 G: 500; 4 INJECTION, SOLUTION INTRAVENOUS at 21:00

## 2017-01-01 RX ADMIN — METHYLPREDNISOLONE SODIUM SUCCINATE 62.5 MG: 125 INJECTION, POWDER, FOR SOLUTION INTRAMUSCULAR; INTRAVENOUS at 12:03

## 2017-01-01 RX ADMIN — FOLIC ACID 1 MG: 1 TABLET ORAL at 11:38

## 2017-01-01 RX ADMIN — GABAPENTIN 600 MG: 250 SUSPENSION ORAL at 21:37

## 2017-01-01 RX ADMIN — VANCOMYCIN HYDROCHLORIDE 1750 MG: 1 INJECTION, POWDER, LYOPHILIZED, FOR SOLUTION INTRAVENOUS at 13:41

## 2017-01-01 RX ADMIN — Medication 100 MG: at 08:20

## 2017-01-01 RX ADMIN — GABAPENTIN 400 MG: 250 SUSPENSION ORAL at 15:19

## 2017-01-01 RX ADMIN — FENTANYL CITRATE 50 MCG: 50 INJECTION INTRAMUSCULAR; INTRAVENOUS at 10:00

## 2017-01-01 RX ADMIN — SULFAMETHOXAZOLE AND TRIMETHOPRIM 1 TABLET: 400; 80 TABLET ORAL at 12:26

## 2017-01-01 RX ADMIN — GABAPENTIN 600 MG: 250 SUSPENSION ORAL at 21:42

## 2017-01-01 RX ADMIN — VANCOMYCIN HYDROCHLORIDE 1250 MG: 10 INJECTION, POWDER, LYOPHILIZED, FOR SOLUTION INTRAVENOUS at 02:44

## 2017-01-01 RX ADMIN — INSULIN GLARGINE 10 UNITS: 100 INJECTION, SOLUTION SUBCUTANEOUS at 08:41

## 2017-01-01 RX ADMIN — LORAZEPAM 1 MG: 2 INJECTION INTRAMUSCULAR; INTRAVENOUS at 22:16

## 2017-01-01 RX ADMIN — NICOTINE POLACRILEX 2 MG: 2 GUM, CHEWING ORAL at 13:36

## 2017-01-01 RX ADMIN — TRAZODONE HYDROCHLORIDE 50 MG: 50 TABLET ORAL at 20:32

## 2017-01-01 RX ADMIN — SODIUM CHLORIDE 250 MG: 9 INJECTION, SOLUTION INTRAVENOUS at 18:09

## 2017-01-01 RX ADMIN — HYDROMORPHONE HYDROCHLORIDE 1 MG: 1 SOLUTION ORAL at 04:15

## 2017-01-01 RX ADMIN — ENOXAPARIN SODIUM 40 MG: 40 INJECTION SUBCUTANEOUS at 13:53

## 2017-01-01 RX ADMIN — FOLIC ACID 1 MG: 1 TABLET ORAL at 08:44

## 2017-01-01 RX ADMIN — FUROSEMIDE 20 MG: 20 TABLET ORAL at 10:22

## 2017-01-01 RX ADMIN — METHOCARBAMOL 500 MG: 500 TABLET ORAL at 08:04

## 2017-01-01 RX ADMIN — Medication 2 PACKET: at 13:16

## 2017-01-01 RX ADMIN — TRAZODONE HYDROCHLORIDE 50 MG: 50 TABLET ORAL at 22:35

## 2017-01-01 RX ADMIN — INSULIN ASPART 1 UNITS: 100 INJECTION, SOLUTION INTRAVENOUS; SUBCUTANEOUS at 03:55

## 2017-01-01 RX ADMIN — RIFAXIMIN 550 MG: 550 TABLET ORAL at 20:57

## 2017-01-01 RX ADMIN — RIFAXIMIN 550 MG: 550 TABLET ORAL at 22:19

## 2017-01-01 RX ADMIN — FLUOXETINE 40 MG: 20 CAPSULE ORAL at 08:44

## 2017-01-01 RX ADMIN — IPRATROPIUM BROMIDE AND ALBUTEROL SULFATE 3 ML: .5; 3 SOLUTION RESPIRATORY (INHALATION) at 16:19

## 2017-01-01 RX ADMIN — FLUOXETINE HYDROCHLORIDE 40 MG: 20 LIQUID ORAL at 08:07

## 2017-01-01 RX ADMIN — FERROUS SULFATE TAB 325 MG (65 MG ELEMENTAL FE) 325 MG: 325 (65 FE) TAB at 08:58

## 2017-01-01 RX ADMIN — ASPIRIN 81 MG 81 MG: 81 TABLET ORAL at 08:12

## 2017-01-01 RX ADMIN — ATORVASTATIN CALCIUM 20 MG: 10 TABLET, FILM COATED ORAL at 21:23

## 2017-01-01 RX ADMIN — VANCOMYCIN HYDROCHLORIDE 1000 MG: 1 INJECTION, SOLUTION INTRAVENOUS at 04:11

## 2017-01-01 RX ADMIN — SODIUM CHLORIDE: 9 INJECTION, SOLUTION INTRAVENOUS at 21:51

## 2017-01-01 RX ADMIN — SULFAMETHOXAZOLE AND TRIMETHOPRIM 2 TABLET: 800; 160 TABLET ORAL at 08:21

## 2017-01-01 RX ADMIN — SODIUM CHLORIDE: 9 INJECTION, SOLUTION INTRAVENOUS at 04:00

## 2017-01-01 RX ADMIN — VANCOMYCIN HYDROCHLORIDE 1250 MG: 10 INJECTION, POWDER, LYOPHILIZED, FOR SOLUTION INTRAVENOUS at 04:55

## 2017-01-01 RX ADMIN — TAZOBACTAM SODIUM AND PIPERACILLIN SODIUM 4.5 G: 500; 4 INJECTION, SOLUTION INTRAVENOUS at 04:48

## 2017-01-01 RX ADMIN — MORPHINE SULFATE 2 MG: 2 INJECTION, SOLUTION INTRAMUSCULAR; INTRAVENOUS at 01:18

## 2017-01-01 RX ADMIN — BUPRENORPHINE HYDROCHLORIDE, NALOXONE HYDROCHLORIDE 3 FILM: 8; 2 FILM, SOLUBLE BUCCAL; SUBLINGUAL at 09:32

## 2017-01-01 RX ADMIN — FOLIC ACID 1 MG: 1 TABLET ORAL at 08:10

## 2017-01-01 RX ADMIN — IPRATROPIUM BROMIDE AND ALBUTEROL SULFATE 3 ML: .5; 3 SOLUTION RESPIRATORY (INHALATION) at 07:36

## 2017-01-01 RX ADMIN — SODIUM CHLORIDE 1.5 UNITS/HR: 9 INJECTION, SOLUTION INTRAVENOUS at 04:17

## 2017-01-01 RX ADMIN — SODIUM CHLORIDE 1000 ML: 9 INJECTION, SOLUTION INTRAVENOUS at 21:12

## 2017-01-01 RX ADMIN — LEVOFLOXACIN 750 MG: 5 INJECTION, SOLUTION INTRAVENOUS at 17:46

## 2017-01-01 RX ADMIN — NICOTINE 1 PATCH: 7 PATCH, EXTENDED RELEASE TRANSDERMAL at 10:57

## 2017-01-01 RX ADMIN — METHYLPREDNISOLONE SODIUM SUCCINATE 87.5 MG: 125 INJECTION, POWDER, FOR SOLUTION INTRAMUSCULAR; INTRAVENOUS at 08:36

## 2017-01-01 RX ADMIN — HUMAN INSULIN 3 UNITS/HR: 100 INJECTION, SOLUTION SUBCUTANEOUS at 05:06

## 2017-01-01 RX ADMIN — HYDROMORPHONE HYDROCHLORIDE 1 MG: 1 SOLUTION ORAL at 07:43

## 2017-01-01 RX ADMIN — SODIUM CHLORIDE 1000 ML: 9 INJECTION, SOLUTION INTRAVENOUS at 21:09

## 2017-01-01 RX ADMIN — ATORVASTATIN CALCIUM 20 MG: 20 TABLET, FILM COATED ORAL at 20:36

## 2017-01-01 RX ADMIN — UMECLIDINIUM 1 PUFF: 62.5 AEROSOL, POWDER ORAL at 07:46

## 2017-01-01 RX ADMIN — OMEPRAZOLE 20 MG: 20 CAPSULE, DELAYED RELEASE ORAL at 11:37

## 2017-01-01 RX ADMIN — FOLIC ACID: 5 INJECTION, SOLUTION INTRAMUSCULAR; INTRAVENOUS; SUBCUTANEOUS at 23:57

## 2017-01-01 RX ADMIN — SODIUM CHLORIDE, POTASSIUM CHLORIDE, SODIUM LACTATE AND CALCIUM CHLORIDE 2559 ML: 600; 310; 30; 20 INJECTION, SOLUTION INTRAVENOUS at 20:28

## 2017-01-01 RX ADMIN — IPRATROPIUM BROMIDE AND ALBUTEROL SULFATE 3 ML: .5; 3 SOLUTION RESPIRATORY (INHALATION) at 03:43

## 2017-01-01 RX ADMIN — VANCOMYCIN HYDROCHLORIDE 1000 MG: 1 INJECTION, SOLUTION INTRAVENOUS at 12:05

## 2017-01-01 RX ADMIN — METHOCARBAMOL 1000 MG: 500 TABLET ORAL at 09:26

## 2017-01-01 RX ADMIN — NICOTINE 1 PATCH: 14 PATCH, EXTENDED RELEASE TRANSDERMAL at 10:46

## 2017-01-01 RX ADMIN — GUAIFENESIN 600 MG: 600 TABLET, EXTENDED RELEASE ORAL at 08:45

## 2017-01-01 RX ADMIN — IPRATROPIUM BROMIDE AND ALBUTEROL SULFATE 3 ML: .5; 3 SOLUTION RESPIRATORY (INHALATION) at 20:03

## 2017-01-01 RX ADMIN — MIDAZOLAM 1 MG: 1 INJECTION INTRAMUSCULAR; INTRAVENOUS at 09:50

## 2017-01-01 RX ADMIN — ASPIRIN 81 MG: 81 TABLET, COATED ORAL at 07:47

## 2017-01-01 RX ADMIN — NICOTINE POLACRILEX 2 MG: 2 GUM, CHEWING ORAL at 12:12

## 2017-01-01 RX ADMIN — TAZOBACTAM SODIUM AND PIPERACILLIN SODIUM 4.5 G: 500; 4 INJECTION, SOLUTION INTRAVENOUS at 02:34

## 2017-01-01 RX ADMIN — Medication 2 PACKET: at 22:18

## 2017-01-01 RX ADMIN — HYDROMORPHONE HYDROCHLORIDE 1 MG: 1 SOLUTION ORAL at 04:01

## 2017-01-01 RX ADMIN — GABAPENTIN 600 MG: 600 TABLET, FILM COATED ORAL at 20:59

## 2017-01-01 RX ADMIN — ALUMINUM HYDROXIDE, MAGNESIUM HYDROXIDE, AND DIMETHICONE 30 ML: 400; 400; 40 SUSPENSION ORAL at 01:46

## 2017-01-01 RX ADMIN — GABAPENTIN 600 MG: 600 TABLET, FILM COATED ORAL at 20:58

## 2017-01-01 RX ADMIN — SULFAMETHOXAZOLE AND TRIMETHOPRIM 400 MG: 200; 40 SUSPENSION ORAL at 17:40

## 2017-01-01 RX ADMIN — NICOTINE 1 PATCH: 21 PATCH, EXTENDED RELEASE TRANSDERMAL at 11:47

## 2017-01-01 RX ADMIN — ATORVASTATIN CALCIUM 20 MG: 10 TABLET, FILM COATED ORAL at 21:09

## 2017-01-01 RX ADMIN — GABAPENTIN 600 MG: 600 TABLET, FILM COATED ORAL at 09:28

## 2017-01-01 RX ADMIN — GABAPENTIN 300 MG: 300 CAPSULE ORAL at 16:38

## 2017-01-01 RX ADMIN — FLUOXETINE 40 MG: 20 CAPSULE ORAL at 10:15

## 2017-01-01 RX ADMIN — RIFAXIMIN 550 MG: 550 TABLET ORAL at 20:09

## 2017-01-01 RX ADMIN — INSULIN ASPART 5 UNITS: 100 INJECTION, SOLUTION INTRAVENOUS; SUBCUTANEOUS at 04:33

## 2017-01-01 RX ADMIN — BUPRENORPHINE HYDROCHLORIDE, NALOXONE HYDROCHLORIDE 3 FILM: 8; 2 FILM, SOLUBLE BUCCAL; SUBLINGUAL at 12:09

## 2017-01-01 RX ADMIN — BUPRENORPHINE HYDROCHLORIDE, NALOXONE HYDROCHLORIDE 3 FILM: 8; 2 FILM, SOLUBLE BUCCAL; SUBLINGUAL at 08:28

## 2017-01-01 RX ADMIN — NICOTINE 1 PATCH: 14 PATCH, EXTENDED RELEASE TRANSDERMAL at 08:20

## 2017-01-01 RX ADMIN — NICOTINE 1 PATCH: 21 PATCH, EXTENDED RELEASE TRANSDERMAL at 08:52

## 2017-01-01 RX ADMIN — INSULIN ASPART 2 UNITS: 100 INJECTION, SOLUTION INTRAVENOUS; SUBCUTANEOUS at 13:56

## 2017-01-01 RX ADMIN — INSULIN GLARGINE 25 UNITS: 100 INJECTION, SOLUTION SUBCUTANEOUS at 21:37

## 2017-01-01 RX ADMIN — GABAPENTIN 300 MG: 300 CAPSULE ORAL at 21:51

## 2017-01-01 RX ADMIN — FOLIC ACID 1 MG: 1 TABLET ORAL at 08:43

## 2017-01-01 RX ADMIN — BUPRENORPHINE HYDROCHLORIDE, NALOXONE HYDROCHLORIDE 3 FILM: 8; 2 FILM, SOLUBLE BUCCAL; SUBLINGUAL at 13:34

## 2017-01-01 RX ADMIN — LACTULOSE 20 G: 20 SOLUTION ORAL at 08:52

## 2017-01-01 RX ADMIN — TRAZODONE HYDROCHLORIDE 100 MG: 100 TABLET ORAL at 21:07

## 2017-01-01 RX ADMIN — ASPIRIN 81 MG: 81 TABLET, COATED ORAL at 08:44

## 2017-01-01 RX ADMIN — SULFAMETHOXAZOLE AND TRIMETHOPRIM 1 TABLET: 400; 80 TABLET ORAL at 22:15

## 2017-01-01 RX ADMIN — LACTULOSE 20 G: 10 SOLUTION ORAL at 21:15

## 2017-01-01 RX ADMIN — INSULIN ASPART 5 UNITS: 100 INJECTION, SOLUTION INTRAVENOUS; SUBCUTANEOUS at 23:27

## 2017-01-01 RX ADMIN — SODIUM CHLORIDE 1000 ML: 9 INJECTION, SOLUTION INTRAVENOUS at 15:01

## 2017-01-01 RX ADMIN — ATORVASTATIN CALCIUM 20 MG: 20 TABLET, FILM COATED ORAL at 08:37

## 2017-01-01 RX ADMIN — VANCOMYCIN HYDROCHLORIDE 1000 MG: 1 INJECTION, SOLUTION INTRAVENOUS at 01:03

## 2017-01-01 RX ADMIN — RIFAXIMIN 550 MG: 550 TABLET ORAL at 00:57

## 2017-01-01 RX ADMIN — PREDNISONE 50 MG: 10 TABLET ORAL at 09:50

## 2017-01-01 RX ADMIN — LEVOFLOXACIN 750 MG: 5 INJECTION, SOLUTION INTRAVENOUS at 00:54

## 2017-01-01 RX ADMIN — IPRATROPIUM BROMIDE AND ALBUTEROL SULFATE 3 ML: .5; 3 SOLUTION RESPIRATORY (INHALATION) at 11:56

## 2017-01-01 RX ADMIN — LACTULOSE 20 G: 10 SOLUTION ORAL at 08:35

## 2017-01-01 RX ADMIN — PANTOPRAZOLE SODIUM 40 MG: 20 TABLET, DELAYED RELEASE ORAL at 08:32

## 2017-01-01 RX ADMIN — SUCCINYLCHOLINE CHLORIDE 80 MG: 20 INJECTION, SOLUTION INTRAMUSCULAR; INTRAVENOUS at 10:21

## 2017-01-01 RX ADMIN — TAZOBACTAM SODIUM AND PIPERACILLIN SODIUM 4.5 G: 500; 4 INJECTION, SOLUTION INTRAVENOUS at 01:33

## 2017-01-01 RX ADMIN — RIFAXIMIN 550 MG: 550 TABLET ORAL at 20:52

## 2017-01-01 RX ADMIN — RIFAXIMIN 550 MG: 550 TABLET ORAL at 21:47

## 2017-01-01 RX ADMIN — GABAPENTIN 600 MG: 600 TABLET, FILM COATED ORAL at 22:37

## 2017-01-01 RX ADMIN — IPRATROPIUM BROMIDE AND ALBUTEROL SULFATE 3 ML: .5; 3 SOLUTION RESPIRATORY (INHALATION) at 08:06

## 2017-01-01 RX ADMIN — BUPRENORPHINE HYDROCHLORIDE, NALOXONE HYDROCHLORIDE 3 FILM: 8; 2 FILM, SOLUBLE BUCCAL; SUBLINGUAL at 10:38

## 2017-01-01 RX ADMIN — NICOTINE POLACRILEX 2 MG: 2 GUM, CHEWING ORAL at 13:44

## 2017-01-01 RX ADMIN — IPRATROPIUM BROMIDE AND ALBUTEROL SULFATE 3 ML: .5; 3 SOLUTION RESPIRATORY (INHALATION) at 03:48

## 2017-01-01 RX ADMIN — IPRATROPIUM BROMIDE AND ALBUTEROL SULFATE 3 ML: .5; 3 SOLUTION RESPIRATORY (INHALATION) at 00:39

## 2017-01-01 RX ADMIN — TAZOBACTAM SODIUM AND PIPERACILLIN SODIUM 4.5 G: 500; 4 INJECTION, SOLUTION INTRAVENOUS at 20:39

## 2017-01-01 RX ADMIN — METHOCARBAMOL 1000 MG: 500 TABLET ORAL at 12:49

## 2017-01-01 RX ADMIN — MORPHINE SULFATE 4 MG: 2 INJECTION, SOLUTION INTRAMUSCULAR; INTRAVENOUS at 22:03

## 2017-01-01 RX ADMIN — METHYLPREDNISOLONE SODIUM SUCCINATE 40 MG: 40 INJECTION, POWDER, FOR SOLUTION INTRAMUSCULAR; INTRAVENOUS at 00:36

## 2017-01-01 RX ADMIN — TAZOBACTAM SODIUM AND PIPERACILLIN SODIUM 4.5 G: 500; 4 INJECTION, SOLUTION INTRAVENOUS at 03:54

## 2017-01-01 RX ADMIN — ASPIRIN 81 MG: 81 TABLET, COATED ORAL at 11:39

## 2017-01-01 RX ADMIN — ASPIRIN 81 MG: 81 TABLET, COATED ORAL at 10:13

## 2017-01-01 RX ADMIN — PREDNISONE 50 MG: 50 TABLET ORAL at 10:09

## 2017-01-01 RX ADMIN — METHYLPREDNISOLONE SODIUM SUCCINATE 20 MG: 40 INJECTION, POWDER, LYOPHILIZED, FOR SOLUTION INTRAMUSCULAR; INTRAVENOUS at 18:01

## 2017-01-01 RX ADMIN — OXYCODONE HYDROCHLORIDE 10 MG: 5 TABLET ORAL at 04:04

## 2017-01-01 RX ADMIN — NICOTINE POLACRILEX 2 MG: 2 GUM, CHEWING ORAL at 18:46

## 2017-01-01 RX ADMIN — SULFAMETHOXAZOLE AND TRIMETHOPRIM 2 TABLET: 800; 160 TABLET ORAL at 17:31

## 2017-01-01 RX ADMIN — TRAZODONE HYDROCHLORIDE 150 MG: 150 TABLET ORAL at 21:32

## 2017-01-01 RX ADMIN — TRAZODONE HYDROCHLORIDE 100 MG: 100 TABLET ORAL at 21:54

## 2017-01-01 RX ADMIN — FOLIC ACID 1 MG: 1 TABLET ORAL at 08:23

## 2017-01-01 RX ADMIN — IRON 325 MG: 65 TABLET ORAL at 09:11

## 2017-01-01 RX ADMIN — IPRATROPIUM BROMIDE AND ALBUTEROL SULFATE 3 ML: .5; 3 SOLUTION RESPIRATORY (INHALATION) at 08:11

## 2017-01-01 RX ADMIN — IPRATROPIUM BROMIDE AND ALBUTEROL SULFATE 3 ML: .5; 3 SOLUTION RESPIRATORY (INHALATION) at 16:21

## 2017-01-01 RX ADMIN — INSULIN GLARGINE 20 UNITS: 100 INJECTION, SOLUTION SUBCUTANEOUS at 21:41

## 2017-01-01 RX ADMIN — MORPHINE SULFATE 4 MG: 2 INJECTION, SOLUTION INTRAMUSCULAR; INTRAVENOUS at 10:50

## 2017-01-01 RX ADMIN — GABAPENTIN 600 MG: 600 TABLET, FILM COATED ORAL at 21:27

## 2017-01-01 RX ADMIN — CEFAZOLIN SODIUM 2 G: 2 INJECTION, SOLUTION INTRAVENOUS at 09:01

## 2017-01-01 RX ADMIN — SODIUM CHLORIDE: 9 INJECTION, SOLUTION INTRAVENOUS at 19:01

## 2017-01-01 RX ADMIN — GUAIFENESIN 1200 MG: 600 TABLET, EXTENDED RELEASE ORAL at 08:40

## 2017-01-01 RX ADMIN — NICOTINE 1 PATCH: 21 PATCH, EXTENDED RELEASE TRANSDERMAL at 09:05

## 2017-01-01 RX ADMIN — TRAZODONE HYDROCHLORIDE 100 MG: 100 TABLET ORAL at 22:42

## 2017-01-01 RX ADMIN — PROPOFOL 5 MCG/KG/MIN: 10 INJECTION, EMULSION INTRAVENOUS at 10:46

## 2017-01-01 RX ADMIN — ASPIRIN 81 MG: 81 TABLET, COATED ORAL at 08:02

## 2017-01-01 RX ADMIN — LEVOFLOXACIN 750 MG: 5 INJECTION, SOLUTION INTRAVENOUS at 20:16

## 2017-01-01 RX ADMIN — FOLIC ACID: 5 INJECTION, SOLUTION INTRAMUSCULAR; INTRAVENOUS; SUBCUTANEOUS at 23:43

## 2017-01-01 RX ADMIN — IPRATROPIUM BROMIDE AND ALBUTEROL SULFATE 3 ML: .5; 3 SOLUTION RESPIRATORY (INHALATION) at 15:32

## 2017-01-01 RX ADMIN — SULFAMETHOXAZOLE AND TRIMETHOPRIM 1 TABLET: 800; 160 TABLET ORAL at 16:59

## 2017-01-01 RX ADMIN — VANCOMYCIN HYDROCHLORIDE 1000 MG: 1 INJECTION, SOLUTION INTRAVENOUS at 21:28

## 2017-01-01 RX ADMIN — SULFAMETHOXAZOLE AND TRIMETHOPRIM 400 MG: 80; 16 INJECTION, SOLUTION, CONCENTRATE INTRAVENOUS at 11:00

## 2017-01-01 RX ADMIN — RIFAXIMIN 550 MG: 550 TABLET ORAL at 08:16

## 2017-01-01 RX ADMIN — NICOTINE 1 PATCH: 21 PATCH, EXTENDED RELEASE TRANSDERMAL at 09:36

## 2017-01-01 RX ADMIN — INSULIN ASPART 1 UNITS: 100 INJECTION, SOLUTION INTRAVENOUS; SUBCUTANEOUS at 08:59

## 2017-01-01 RX ADMIN — IPRATROPIUM BROMIDE AND ALBUTEROL SULFATE 3 ML: .5; 3 SOLUTION RESPIRATORY (INHALATION) at 08:30

## 2017-01-01 RX ADMIN — TAZOBACTAM SODIUM AND PIPERACILLIN SODIUM 4.5 G: 500; 4 INJECTION, SOLUTION INTRAVENOUS at 04:05

## 2017-01-01 RX ADMIN — NICOTINE 1 PATCH: 21 PATCH, EXTENDED RELEASE TRANSDERMAL at 08:59

## 2017-01-01 RX ADMIN — NICOTINE POLACRILEX 2 MG: 2 GUM, CHEWING ORAL at 18:17

## 2017-01-01 RX ADMIN — GABAPENTIN 600 MG: 600 TABLET, FILM COATED ORAL at 21:16

## 2017-01-01 RX ADMIN — LEVOFLOXACIN 750 MG: 5 INJECTION, SOLUTION INTRAVENOUS at 19:06

## 2017-01-01 RX ADMIN — PROPOFOL 45 MCG/KG/MIN: 10 INJECTION, EMULSION INTRAVENOUS at 21:42

## 2017-01-01 RX ADMIN — NICOTINE POLACRILEX 2 MG: 2 GUM, CHEWING ORAL at 17:42

## 2017-01-01 RX ADMIN — AZITHROMYCIN MONOHYDRATE 500 MG: 500 INJECTION, POWDER, LYOPHILIZED, FOR SOLUTION INTRAVENOUS at 14:06

## 2017-01-01 RX ADMIN — SPIRONOLACTONE 50 MG: 25 TABLET ORAL at 08:50

## 2017-01-01 RX ADMIN — SULFAMETHOXAZOLE AND TRIMETHOPRIM 2 TABLET: 800; 160 TABLET ORAL at 14:56

## 2017-01-01 RX ADMIN — GABAPENTIN 600 MG: 600 TABLET, FILM COATED ORAL at 08:50

## 2017-01-01 RX ADMIN — LACTULOSE 20 G: 20 SOLUTION ORAL at 08:58

## 2017-01-01 RX ADMIN — ATORVASTATIN CALCIUM 20 MG: 20 TABLET, FILM COATED ORAL at 21:41

## 2017-01-01 RX ADMIN — METHOCARBAMOL 500 MG: 500 TABLET ORAL at 21:41

## 2017-01-01 RX ADMIN — ASPIRIN 81 MG 81 MG: 81 TABLET ORAL at 08:04

## 2017-01-01 RX ADMIN — SODIUM CHLORIDE 83 ML: 9 INJECTION, SOLUTION INTRAVENOUS at 23:09

## 2017-01-01 RX ADMIN — IRON 325 MG: 65 TABLET ORAL at 09:32

## 2017-01-01 RX ADMIN — HYDROMORPHONE HYDROCHLORIDE 0.2 MG: 1 INJECTION, SOLUTION INTRAMUSCULAR; INTRAVENOUS; SUBCUTANEOUS at 01:17

## 2017-01-01 RX ADMIN — MORPHINE SULFATE 2 MG: 2 INJECTION, SOLUTION INTRAMUSCULAR; INTRAVENOUS at 14:27

## 2017-01-01 RX ADMIN — RIFAXIMIN 550 MG: 550 TABLET ORAL at 08:58

## 2017-01-01 RX ADMIN — NICOTINE 1 PATCH: 21 PATCH, EXTENDED RELEASE TRANSDERMAL at 08:32

## 2017-01-01 RX ADMIN — LEVOFLOXACIN 750 MG: 5 INJECTION, SOLUTION INTRAVENOUS at 00:42

## 2017-01-01 RX ADMIN — POTASSIUM CHLORIDE 20 MEQ: 1500 TABLET, EXTENDED RELEASE ORAL at 10:57

## 2017-01-01 RX ADMIN — LORAZEPAM 2 MG: 2 INJECTION INTRAMUSCULAR; INTRAVENOUS at 08:56

## 2017-01-01 RX ADMIN — FLUOXETINE HYDROCHLORIDE 40 MG: 40 CAPSULE ORAL at 08:43

## 2017-01-01 RX ADMIN — VANCOMYCIN HYDROCHLORIDE 1750 MG: 10 INJECTION, POWDER, LYOPHILIZED, FOR SOLUTION INTRAVENOUS at 09:24

## 2017-01-01 RX ADMIN — INSULIN GLARGINE 20 UNITS: 100 INJECTION, SOLUTION SUBCUTANEOUS at 11:52

## 2017-01-01 RX ADMIN — INSULIN GLARGINE 45 UNITS: 100 INJECTION, SOLUTION SUBCUTANEOUS at 08:12

## 2017-01-01 RX ADMIN — SULFAMETHOXAZOLE AND TRIMETHOPRIM 1 TABLET: 800; 160 TABLET ORAL at 11:38

## 2017-01-01 RX ADMIN — INSULIN GLARGINE 20 UNITS: 100 INJECTION, SOLUTION SUBCUTANEOUS at 09:50

## 2017-01-01 RX ADMIN — SODIUM CHLORIDE 1000 ML: 9 INJECTION, SOLUTION INTRAVENOUS at 13:38

## 2017-01-01 RX ADMIN — INSULIN ASPART 5 UNITS: 100 INJECTION, SOLUTION INTRAVENOUS; SUBCUTANEOUS at 08:44

## 2017-01-01 RX ADMIN — NICOTINE POLACRILEX 2 MG: 2 GUM, CHEWING ORAL at 14:47

## 2017-01-01 RX ADMIN — ACETAMINOPHEN 650 MG: 325 TABLET, FILM COATED ORAL at 21:28

## 2017-01-01 RX ADMIN — HYDROMORPHONE HYDROCHLORIDE 0.5 MG: 1 INJECTION, SOLUTION INTRAMUSCULAR; INTRAVENOUS; SUBCUTANEOUS at 01:36

## 2017-01-01 RX ADMIN — FLUOXETINE 40 MG: 20 CAPSULE ORAL at 08:12

## 2017-01-01 RX ADMIN — PREDNISONE 40 MG: 20 TABLET ORAL at 12:01

## 2017-01-01 RX ADMIN — IPRATROPIUM BROMIDE AND ALBUTEROL SULFATE 3 ML: .5; 3 SOLUTION RESPIRATORY (INHALATION) at 08:15

## 2017-01-01 RX ADMIN — MORPHINE SULFATE 4 MG: 2 INJECTION, SOLUTION INTRAMUSCULAR; INTRAVENOUS at 03:23

## 2017-01-01 RX ADMIN — IPRATROPIUM BROMIDE AND ALBUTEROL SULFATE 3 ML: .5; 3 SOLUTION RESPIRATORY (INHALATION) at 18:37

## 2017-01-01 RX ADMIN — RIFAXIMIN 550 MG: 550 TABLET ORAL at 11:25

## 2017-01-01 RX ADMIN — ASPIRIN 81 MG: 81 TABLET, COATED ORAL at 08:28

## 2017-01-01 RX ADMIN — NICOTINE 1 PATCH: 7 PATCH, EXTENDED RELEASE TRANSDERMAL at 01:47

## 2017-01-01 RX ADMIN — ENOXAPARIN SODIUM 40 MG: 40 INJECTION SUBCUTANEOUS at 13:08

## 2017-01-01 RX ADMIN — TRAZODONE HYDROCHLORIDE 50 MG: 50 TABLET ORAL at 23:35

## 2017-01-01 RX ADMIN — HUMAN INSULIN 2 UNITS/HR: 100 INJECTION, SOLUTION SUBCUTANEOUS at 21:20

## 2017-01-01 RX ADMIN — LACTULOSE 20 G: 20 SOLUTION ORAL at 22:16

## 2017-01-01 RX ADMIN — AZITHROMYCIN MONOHYDRATE 500 MG: 500 INJECTION, POWDER, LYOPHILIZED, FOR SOLUTION INTRAVENOUS at 06:34

## 2017-01-01 RX ADMIN — IPRATROPIUM BROMIDE AND ALBUTEROL SULFATE 3 ML: .5; 3 SOLUTION RESPIRATORY (INHALATION) at 12:00

## 2017-01-01 RX ADMIN — PREDNISONE 40 MG: 20 TABLET ORAL at 16:47

## 2017-01-01 RX ADMIN — METHOCARBAMOL 1000 MG: 500 TABLET ORAL at 20:32

## 2017-01-01 RX ADMIN — LORAZEPAM 2 MG: 2 INJECTION INTRAMUSCULAR; INTRAVENOUS at 06:07

## 2017-01-01 RX ADMIN — HYDROMORPHONE HYDROCHLORIDE 1 MG: 1 SOLUTION ORAL at 14:37

## 2017-01-01 RX ADMIN — METHYLPREDNISOLONE SODIUM SUCCINATE 40 MG: 40 INJECTION, POWDER, FOR SOLUTION INTRAMUSCULAR; INTRAVENOUS at 04:35

## 2017-01-01 RX ADMIN — GABAPENTIN 600 MG: 600 TABLET, FILM COATED ORAL at 09:51

## 2017-01-01 RX ADMIN — SODIUM CHLORIDE 1000 ML: 9 INJECTION, SOLUTION INTRAVENOUS at 20:50

## 2017-01-01 RX ADMIN — GABAPENTIN 600 MG: 600 TABLET, FILM COATED ORAL at 17:20

## 2017-01-01 RX ADMIN — IPRATROPIUM BROMIDE AND ALBUTEROL SULFATE 6 ML: .5; 3 SOLUTION RESPIRATORY (INHALATION) at 09:51

## 2017-01-01 RX ADMIN — SULFAMETHOXAZOLE AND TRIMETHOPRIM 2 TABLET: 800; 160 TABLET ORAL at 17:18

## 2017-01-01 RX ADMIN — MORPHINE SULFATE 2 MG: 2 INJECTION, SOLUTION INTRAMUSCULAR; INTRAVENOUS at 05:57

## 2017-01-01 RX ADMIN — OXYCODONE HYDROCHLORIDE 10 MG: 5 TABLET ORAL at 22:05

## 2017-01-01 RX ADMIN — LACTULOSE 20 G: 10 SOLUTION ORAL at 16:59

## 2017-01-01 RX ADMIN — LACTULOSE 20 G: 20 SOLUTION ORAL at 08:00

## 2017-01-01 RX ADMIN — PROPOFOL 20 MG: 10 INJECTION, EMULSION INTRAVENOUS at 13:14

## 2017-01-01 RX ADMIN — ATORVASTATIN CALCIUM 20 MG: 20 TABLET, FILM COATED ORAL at 08:45

## 2017-01-01 RX ADMIN — NYSTATIN 500000 UNITS: 100000 SUSPENSION ORAL at 17:11

## 2017-01-01 RX ADMIN — FOLIC ACID 1 MG: 1 TABLET ORAL at 08:41

## 2017-01-01 RX ADMIN — MORPHINE SULFATE 2 MG: 2 INJECTION, SOLUTION INTRAMUSCULAR; INTRAVENOUS at 02:53

## 2017-01-01 RX ADMIN — SULFAMETHOXAZOLE AND TRIMETHOPRIM 1 TABLET: 400; 80 TABLET ORAL at 21:42

## 2017-01-01 RX ADMIN — TRAZODONE HYDROCHLORIDE 50 MG: 50 TABLET ORAL at 20:50

## 2017-01-01 RX ADMIN — INSULIN ASPART 5 UNITS: 100 INJECTION, SOLUTION INTRAVENOUS; SUBCUTANEOUS at 17:22

## 2017-01-01 RX ADMIN — LACTULOSE 20 G: 10 SOLUTION ORAL at 20:52

## 2017-01-01 RX ADMIN — Medication 40 MG: at 08:07

## 2017-01-01 RX ADMIN — NICOTINE POLACRILEX 2 MG: 2 GUM, CHEWING ORAL at 13:31

## 2017-01-01 RX ADMIN — FENTANYL CITRATE 100 MCG: 50 INJECTION INTRAMUSCULAR; INTRAVENOUS at 10:04

## 2017-01-01 RX ADMIN — BUPRENORPHINE HYDROCHLORIDE, NALOXONE HYDROCHLORIDE 3 FILM: 8; 2 FILM, SOLUBLE BUCCAL; SUBLINGUAL at 11:33

## 2017-01-01 RX ADMIN — BUPRENORPHINE HYDROCHLORIDE, NALOXONE HYDROCHLORIDE 3 FILM: 8; 2 FILM, SOLUBLE BUCCAL; SUBLINGUAL at 08:44

## 2017-01-01 RX ADMIN — VANCOMYCIN HYDROCHLORIDE 1750 MG: 1 INJECTION, POWDER, LYOPHILIZED, FOR SOLUTION INTRAVENOUS at 00:36

## 2017-01-01 RX ADMIN — HYDROCODONE BITARTRATE AND ACETAMINOPHEN 1 TABLET: 5; 325 TABLET ORAL at 09:46

## 2017-01-01 RX ADMIN — BUPRENORPHINE HYDROCHLORIDE, NALOXONE HYDROCHLORIDE 3 FILM: 8; 2 FILM, SOLUBLE BUCCAL; SUBLINGUAL at 10:02

## 2017-01-01 RX ADMIN — Medication 5 MG: at 21:48

## 2017-01-01 RX ADMIN — THERA TABS 1 TABLET: TAB at 08:57

## 2017-01-01 RX ADMIN — PREDNISONE 80 MG: 10 TABLET ORAL at 08:06

## 2017-01-01 RX ADMIN — NICOTINE 1 PATCH: 14 PATCH, EXTENDED RELEASE TRANSDERMAL at 08:19

## 2017-01-01 RX ADMIN — METHYLPREDNISOLONE SODIUM SUCCINATE 40 MG: 40 INJECTION, POWDER, FOR SOLUTION INTRAMUSCULAR; INTRAVENOUS at 13:02

## 2017-01-01 RX ADMIN — HUMAN INSULIN 4 UNITS/HR: 100 INJECTION, SOLUTION SUBCUTANEOUS at 13:12

## 2017-01-01 RX ADMIN — PANTOPRAZOLE SODIUM 40 MG: 20 TABLET, DELAYED RELEASE ORAL at 08:51

## 2017-01-01 RX ADMIN — RIFAXIMIN 550 MG: 550 TABLET ORAL at 08:36

## 2017-01-01 RX ADMIN — TRAZODONE HYDROCHLORIDE 50 MG: 50 TABLET ORAL at 21:42

## 2017-01-01 RX ADMIN — PIPERACILLIN AND TAZOBACTAM 4.5 G: 4; .5 INJECTION, POWDER, FOR SOLUTION INTRAVENOUS at 04:41

## 2017-01-01 RX ADMIN — GABAPENTIN 300 MG: 300 CAPSULE ORAL at 09:24

## 2017-01-01 RX ADMIN — MORPHINE SULFATE 2 MG: 2 INJECTION, SOLUTION INTRAMUSCULAR; INTRAVENOUS at 22:26

## 2017-01-01 RX ADMIN — KETOROLAC TROMETHAMINE 30 MG: 30 INJECTION, SOLUTION INTRAMUSCULAR at 18:44

## 2017-01-01 RX ADMIN — Medication 40 MG: at 08:24

## 2017-01-01 RX ADMIN — METRONIDAZOLE 500 MG: 500 TABLET ORAL at 05:45

## 2017-01-01 RX ADMIN — NICOTINE POLACRILEX 2 MG: 2 GUM, CHEWING ORAL at 20:34

## 2017-01-01 RX ADMIN — INSULIN ASPART 12 UNITS: 100 INJECTION, SOLUTION INTRAVENOUS; SUBCUTANEOUS at 19:06

## 2017-01-01 RX ADMIN — FLUOXETINE HYDROCHLORIDE 40 MG: 20 LIQUID ORAL at 08:55

## 2017-01-01 RX ADMIN — LACTULOSE 20 G: 10 SOLUTION ORAL at 16:13

## 2017-01-01 RX ADMIN — NICOTINE POLACRILEX 2 MG: 2 GUM, CHEWING ORAL at 07:07

## 2017-01-01 RX ADMIN — INSULIN ASPART 9 UNITS: 100 INJECTION, SOLUTION INTRAVENOUS; SUBCUTANEOUS at 12:17

## 2017-01-01 RX ADMIN — LORATADINE 10 MG: 5 SOLUTION ORAL at 08:15

## 2017-01-01 RX ADMIN — IPRATROPIUM BROMIDE AND ALBUTEROL SULFATE 3 ML: .5; 3 SOLUTION RESPIRATORY (INHALATION) at 23:09

## 2017-01-01 RX ADMIN — GABAPENTIN 600 MG: 250 SUSPENSION ORAL at 16:10

## 2017-01-01 RX ADMIN — TRAZODONE HYDROCHLORIDE 100 MG: 100 TABLET ORAL at 22:12

## 2017-01-01 RX ADMIN — FUROSEMIDE 20 MG: 10 INJECTION, SOLUTION INTRAVENOUS at 23:59

## 2017-01-01 RX ADMIN — TRAZODONE HYDROCHLORIDE 100 MG: 100 TABLET ORAL at 22:37

## 2017-01-01 RX ADMIN — FLUOXETINE HYDROCHLORIDE 40 MG: 20 LIQUID ORAL at 08:15

## 2017-01-01 RX ADMIN — TAZOBACTAM SODIUM AND PIPERACILLIN SODIUM 4.5 G: 500; 4 INJECTION, SOLUTION INTRAVENOUS at 11:28

## 2017-01-01 RX ADMIN — HYDROCODONE BITARTRATE AND ACETAMINOPHEN 1 TABLET: 5; 325 TABLET ORAL at 20:28

## 2017-01-01 RX ADMIN — GABAPENTIN 600 MG: 600 TABLET, FILM COATED ORAL at 09:46

## 2017-01-01 RX ADMIN — FOLIC ACID 1 MG: 1 TABLET ORAL at 09:58

## 2017-01-01 RX ADMIN — ENOXAPARIN SODIUM 40 MG: 40 INJECTION SUBCUTANEOUS at 16:37

## 2017-01-01 RX ADMIN — NICOTINE POLACRILEX 2 MG: 2 GUM, CHEWING ORAL at 18:57

## 2017-01-01 RX ADMIN — MIDAZOLAM 2 MG: 1 INJECTION INTRAMUSCULAR; INTRAVENOUS at 10:10

## 2017-01-01 RX ADMIN — TIOTROPIUM BROMIDE 18 MCG: 18 CAPSULE ORAL; RESPIRATORY (INHALATION) at 07:16

## 2017-01-01 RX ADMIN — METHOCARBAMOL 1000 MG: 500 TABLET ORAL at 21:43

## 2017-01-01 RX ADMIN — GABAPENTIN 600 MG: 250 SUSPENSION ORAL at 08:43

## 2017-01-01 RX ADMIN — METHYLPREDNISOLONE SODIUM SUCCINATE 20 MG: 40 INJECTION, POWDER, FOR SOLUTION INTRAMUSCULAR; INTRAVENOUS at 23:47

## 2017-01-01 RX ADMIN — SULFAMETHOXAZOLE AND TRIMETHOPRIM 1 TABLET: 400; 80 TABLET ORAL at 12:10

## 2017-01-01 RX ADMIN — RIFAXIMIN 550 MG: 550 TABLET ORAL at 20:07

## 2017-01-01 RX ADMIN — Medication 10 MG: at 08:45

## 2017-01-01 RX ADMIN — INSULIN HUMAN 15 UNITS: 100 INJECTION, SUSPENSION SUBCUTANEOUS at 17:41

## 2017-01-01 RX ADMIN — SPIRONOLACTONE 50 MG: 25 TABLET ORAL at 08:26

## 2017-01-01 RX ADMIN — FUROSEMIDE 20 MG: 10 INJECTION, SOLUTION INTRAVENOUS at 09:00

## 2017-01-01 RX ADMIN — METHYLPREDNISOLONE SODIUM SUCCINATE 20 MG: 40 INJECTION, POWDER, LYOPHILIZED, FOR SOLUTION INTRAMUSCULAR; INTRAVENOUS at 00:26

## 2017-01-01 RX ADMIN — IPRATROPIUM BROMIDE AND ALBUTEROL SULFATE 3 ML: .5; 3 SOLUTION RESPIRATORY (INHALATION) at 11:50

## 2017-01-01 RX ADMIN — ASPIRIN 81 MG 81 MG: 81 TABLET ORAL at 08:08

## 2017-01-01 RX ADMIN — TAZOBACTAM SODIUM AND PIPERACILLIN SODIUM 4.5 G: 500; 4 INJECTION, SOLUTION INTRAVENOUS at 16:17

## 2017-01-01 RX ADMIN — HYDROXYZINE HYDROCHLORIDE 10 MG: 10 TABLET ORAL at 17:00

## 2017-01-01 RX ADMIN — FLUOXETINE 40 MG: 20 CAPSULE ORAL at 10:28

## 2017-01-01 RX ADMIN — LIDOCAINE HYDROCHLORIDE 10 ML: 40 SOLUTION TOPICAL at 13:35

## 2017-01-01 RX ADMIN — GABAPENTIN 600 MG: 250 SUSPENSION ORAL at 08:15

## 2017-01-01 RX ADMIN — TAZOBACTAM SODIUM AND PIPERACILLIN SODIUM 4.5 G: 500; 4 INJECTION, SOLUTION INTRAVENOUS at 03:40

## 2017-01-01 RX ADMIN — BUPRENORPHINE HYDROCHLORIDE, NALOXONE HYDROCHLORIDE 3 FILM: 8; 2 FILM, SOLUBLE BUCCAL; SUBLINGUAL at 08:13

## 2017-01-01 RX ADMIN — ASPIRIN 81 MG: 81 TABLET, COATED ORAL at 08:25

## 2017-01-01 RX ADMIN — METHYLPREDNISOLONE SODIUM SUCCINATE 20 MG: 40 INJECTION, POWDER, LYOPHILIZED, FOR SOLUTION INTRAMUSCULAR; INTRAVENOUS at 23:40

## 2017-01-01 RX ADMIN — FLUOXETINE 40 MG: 20 CAPSULE ORAL at 10:14

## 2017-01-01 RX ADMIN — LEVOFLOXACIN 750 MG: 5 INJECTION, SOLUTION INTRAVENOUS at 20:26

## 2017-01-01 RX ADMIN — FOLIC ACID 1 MG: 1 TABLET ORAL at 07:47

## 2017-01-01 RX ADMIN — METHOCARBAMOL 500 MG: 500 TABLET ORAL at 16:19

## 2017-01-01 RX ADMIN — BUPRENORPHINE HYDROCHLORIDE, NALOXONE HYDROCHLORIDE 3 FILM: 8; 2 FILM, SOLUBLE BUCCAL; SUBLINGUAL at 08:19

## 2017-01-01 RX ADMIN — ASPIRIN 81 MG: 81 TABLET, COATED ORAL at 09:50

## 2017-01-01 RX ADMIN — FUROSEMIDE 20 MG: 20 TABLET ORAL at 10:06

## 2017-01-01 RX ADMIN — RIFAXIMIN 550 MG: 550 TABLET ORAL at 09:58

## 2017-01-01 RX ADMIN — SULFAMETHOXAZOLE AND TRIMETHOPRIM 1 TABLET: 800; 160 TABLET ORAL at 09:09

## 2017-01-01 RX ADMIN — NICOTINE POLACRILEX 2 MG: 2 GUM, CHEWING ORAL at 12:46

## 2017-01-01 RX ADMIN — MORPHINE SULFATE 2 MG: 2 INJECTION, SOLUTION INTRAMUSCULAR; INTRAVENOUS at 11:26

## 2017-01-01 RX ADMIN — ATORVASTATIN CALCIUM 20 MG: 10 TABLET, FILM COATED ORAL at 21:29

## 2017-01-01 RX ADMIN — LACTULOSE 30 ML: 10 SOLUTION ORAL at 16:01

## 2017-01-01 RX ADMIN — ASPIRIN 81 MG 81 MG: 81 TABLET ORAL at 08:25

## 2017-01-01 RX ADMIN — IPRATROPIUM BROMIDE AND ALBUTEROL SULFATE 3 ML: .5; 3 SOLUTION RESPIRATORY (INHALATION) at 15:08

## 2017-01-01 RX ADMIN — RIFAXIMIN 550 MG: 550 TABLET ORAL at 10:15

## 2017-01-01 RX ADMIN — ATORVASTATIN CALCIUM 20 MG: 10 TABLET, FILM COATED ORAL at 22:08

## 2017-01-01 RX ADMIN — GABAPENTIN 600 MG: 600 TABLET, FILM COATED ORAL at 16:05

## 2017-01-01 RX ADMIN — NICOTINE 1 PATCH: 21 PATCH, EXTENDED RELEASE TRANSDERMAL at 10:14

## 2017-01-01 RX ADMIN — LACTULOSE 20 G: 10 SOLUTION ORAL at 21:12

## 2017-01-01 RX ADMIN — GABAPENTIN 600 MG: 600 TABLET, FILM COATED ORAL at 16:11

## 2017-01-01 RX ADMIN — VANCOMYCIN HYDROCHLORIDE 1750 MG: 1 INJECTION, POWDER, LYOPHILIZED, FOR SOLUTION INTRAVENOUS at 11:39

## 2017-01-01 RX ADMIN — MORPHINE SULFATE 2 MG: 2 INJECTION, SOLUTION INTRAMUSCULAR; INTRAVENOUS at 18:00

## 2017-01-01 RX ADMIN — INSULIN GLARGINE 15 UNITS: 100 INJECTION, SOLUTION SUBCUTANEOUS at 22:09

## 2017-01-01 RX ADMIN — METHYLPREDNISOLONE SODIUM SUCCINATE 87.5 MG: 125 INJECTION, POWDER, FOR SOLUTION INTRAMUSCULAR; INTRAVENOUS at 11:44

## 2017-01-01 RX ADMIN — FOLIC ACID 1 MG: 1 TABLET ORAL at 09:32

## 2017-01-01 RX ADMIN — TAZOBACTAM SODIUM AND PIPERACILLIN SODIUM 4.5 G: 500; 4 INJECTION, SOLUTION INTRAVENOUS at 21:38

## 2017-01-01 RX ADMIN — IPRATROPIUM BROMIDE AND ALBUTEROL SULFATE 3 ML: .5; 3 SOLUTION RESPIRATORY (INHALATION) at 15:54

## 2017-01-01 RX ADMIN — SULFAMETHOXAZOLE AND TRIMETHOPRIM 2 TABLET: 800; 160 TABLET ORAL at 18:24

## 2017-01-01 RX ADMIN — Medication 40 MG: at 15:59

## 2017-01-01 RX ADMIN — IPRATROPIUM BROMIDE AND ALBUTEROL SULFATE 3 ML: .5; 3 SOLUTION RESPIRATORY (INHALATION) at 11:41

## 2017-01-01 RX ADMIN — INSULIN ASPART 4 UNITS: 100 INJECTION, SOLUTION INTRAVENOUS; SUBCUTANEOUS at 11:51

## 2017-01-01 RX ADMIN — NICOTINE POLACRILEX 2 MG: 2 GUM, CHEWING ORAL at 17:35

## 2017-01-01 RX ADMIN — HUMAN INSULIN 12 UNITS/HR: 100 INJECTION, SOLUTION SUBCUTANEOUS at 12:11

## 2017-01-01 RX ADMIN — METHOCARBAMOL 500 MG: 500 TABLET ORAL at 20:17

## 2017-01-01 RX ADMIN — INSULIN GLARGINE 20 UNITS: 100 INJECTION, SOLUTION SUBCUTANEOUS at 08:17

## 2017-01-01 RX ADMIN — IRON 325 MG: 65 TABLET ORAL at 11:37

## 2017-01-01 RX ADMIN — FOLIC ACID 1 MG: 1 TABLET ORAL at 08:19

## 2017-01-01 RX ADMIN — OXYCODONE HYDROCHLORIDE AND ACETAMINOPHEN 500 MG: 500 TABLET ORAL at 09:32

## 2017-01-01 RX ADMIN — SULFAMETHOXAZOLE AND TRIMETHOPRIM 2 TABLET: 800; 160 TABLET ORAL at 21:47

## 2017-01-01 RX ADMIN — NICOTINE 1 CARTRIDGE: 4 INHALANT RESPIRATORY (INHALATION) at 14:29

## 2017-01-01 RX ADMIN — SULFAMETHOXAZOLE AND TRIMETHOPRIM 2 TABLET: 800; 160 TABLET ORAL at 09:07

## 2017-01-01 RX ADMIN — PREDNISONE 50 MG: 50 TABLET ORAL at 09:27

## 2017-01-01 RX ADMIN — FOLIC ACID: 5 INJECTION, SOLUTION INTRAMUSCULAR; INTRAVENOUS; SUBCUTANEOUS at 00:28

## 2017-01-01 RX ADMIN — ATORVASTATIN CALCIUM 20 MG: 20 TABLET, FILM COATED ORAL at 21:08

## 2017-01-01 RX ADMIN — METHOCARBAMOL 500 MG: 500 TABLET ORAL at 08:35

## 2017-01-01 RX ADMIN — POTASSIUM CHLORIDE 20 MEQ: 1.5 POWDER, FOR SOLUTION ORAL at 16:03

## 2017-01-01 RX ADMIN — LEVOFLOXACIN 750 MG: 5 INJECTION, SOLUTION INTRAVENOUS at 01:34

## 2017-01-01 RX ADMIN — RIFAXIMIN 550 MG: 550 TABLET ORAL at 09:17

## 2017-01-01 RX ADMIN — ACETAMINOPHEN 650 MG: 325 TABLET, FILM COATED ORAL at 19:50

## 2017-01-01 RX ADMIN — BUPRENORPHINE HYDROCHLORIDE, NALOXONE HYDROCHLORIDE 3 FILM: 8; 2 FILM, SOLUBLE BUCCAL; SUBLINGUAL at 17:41

## 2017-01-01 RX ADMIN — NICOTINE POLACRILEX 2 MG: 2 GUM, CHEWING ORAL at 09:18

## 2017-01-01 RX ADMIN — IPRATROPIUM BROMIDE AND ALBUTEROL SULFATE 3 ML: .5; 3 SOLUTION RESPIRATORY (INHALATION) at 15:07

## 2017-01-01 RX ADMIN — INSULIN ASPART 7 UNITS: 100 INJECTION, SOLUTION INTRAVENOUS; SUBCUTANEOUS at 03:58

## 2017-01-01 RX ADMIN — IPRATROPIUM BROMIDE AND ALBUTEROL SULFATE 3 ML: .5; 3 SOLUTION RESPIRATORY (INHALATION) at 11:23

## 2017-01-01 RX ADMIN — LORATADINE 10 MG: 5 SOLUTION ORAL at 08:16

## 2017-01-01 RX ADMIN — TAZOBACTAM SODIUM AND PIPERACILLIN SODIUM 4.5 G: 500; 4 INJECTION, SOLUTION INTRAVENOUS at 12:01

## 2017-01-01 RX ADMIN — Medication 40 MG: at 08:17

## 2017-01-01 RX ADMIN — RIFAXIMIN 550 MG: 550 TABLET ORAL at 09:05

## 2017-01-01 RX ADMIN — HYDROMORPHONE HYDROCHLORIDE 0.2 MG: 1 INJECTION, SOLUTION INTRAMUSCULAR; INTRAVENOUS; SUBCUTANEOUS at 10:20

## 2017-01-01 RX ADMIN — NICOTINE 1 PATCH: 21 PATCH, EXTENDED RELEASE TRANSDERMAL at 10:39

## 2017-01-01 RX ADMIN — SPIRONOLACTONE 50 MG: 25 TABLET ORAL at 09:16

## 2017-01-01 RX ADMIN — METRONIDAZOLE 500 MG: 500 TABLET ORAL at 14:07

## 2017-01-01 RX ADMIN — SULFAMETHOXAZOLE AND TRIMETHOPRIM 400 MG: 200; 40 SUSPENSION ORAL at 22:03

## 2017-01-01 RX ADMIN — MIRTAZAPINE 30 MG: 15 TABLET, FILM COATED ORAL at 22:26

## 2017-01-01 RX ADMIN — POTASSIUM CHLORIDE 40 MEQ: 1.5 POWDER, FOR SOLUTION ORAL at 16:12

## 2017-01-01 RX ADMIN — ATORVASTATIN CALCIUM 20 MG: 20 TABLET, FILM COATED ORAL at 08:12

## 2017-01-01 RX ADMIN — LACTULOSE 20 G: 20 SOLUTION ORAL at 08:08

## 2017-01-01 RX ADMIN — PANTOPRAZOLE SODIUM 40 MG: 40 INJECTION, POWDER, FOR SOLUTION INTRAVENOUS at 08:44

## 2017-01-01 RX ADMIN — METHOCARBAMOL 500 MG: 500 TABLET ORAL at 08:26

## 2017-01-01 RX ADMIN — HYDROMORPHONE HYDROCHLORIDE 1 MG: 1 SOLUTION ORAL at 13:41

## 2017-01-01 RX ADMIN — MORPHINE SULFATE 2 MG: 2 INJECTION, SOLUTION INTRAMUSCULAR; INTRAVENOUS at 22:11

## 2017-01-01 RX ADMIN — BUPRENORPHINE HYDROCHLORIDE, NALOXONE HYDROCHLORIDE 3 FILM: 8; 2 FILM, SOLUBLE BUCCAL; SUBLINGUAL at 08:27

## 2017-01-01 RX ADMIN — INSULIN HUMAN 20 UNITS: 100 INJECTION, SUSPENSION SUBCUTANEOUS at 08:35

## 2017-01-01 RX ADMIN — RIFAXIMIN 550 MG: 550 TABLET ORAL at 08:52

## 2017-01-01 RX ADMIN — GABAPENTIN 300 MG: 300 CAPSULE ORAL at 16:55

## 2017-01-01 RX ADMIN — FUROSEMIDE 40 MG: 10 INJECTION, SOLUTION INTRAVENOUS at 08:41

## 2017-01-01 RX ADMIN — RIFAXIMIN 550 MG: 550 TABLET ORAL at 21:00

## 2017-01-01 RX ADMIN — LORAZEPAM 2 MG: 2 INJECTION INTRAMUSCULAR; INTRAVENOUS at 06:49

## 2017-01-01 RX ADMIN — IPRATROPIUM BROMIDE AND ALBUTEROL SULFATE 3 ML: .5; 3 SOLUTION RESPIRATORY (INHALATION) at 15:52

## 2017-01-01 RX ADMIN — HYDROCODONE BITARTRATE AND ACETAMINOPHEN 2 TABLET: 5; 325 TABLET ORAL at 12:49

## 2017-01-01 RX ADMIN — IPRATROPIUM BROMIDE AND ALBUTEROL SULFATE 3 ML: .5; 3 SOLUTION RESPIRATORY (INHALATION) at 16:29

## 2017-01-01 RX ADMIN — MIRTAZAPINE 15 MG: 15 TABLET, FILM COATED ORAL at 22:03

## 2017-01-01 RX ADMIN — SODIUM CHLORIDE: 9 INJECTION, SOLUTION INTRAVENOUS at 18:21

## 2017-01-01 RX ADMIN — INSULIN GLARGINE 30 UNITS: 100 INJECTION, SOLUTION SUBCUTANEOUS at 21:48

## 2017-01-01 RX ADMIN — SULFAMETHOXAZOLE AND TRIMETHOPRIM 1 TABLET: 400; 80 TABLET ORAL at 21:41

## 2017-01-01 RX ADMIN — ALBUTEROL SULFATE: 2.5 SOLUTION RESPIRATORY (INHALATION) at 19:35

## 2017-01-01 RX ADMIN — RIFAXIMIN 550 MG: 550 TABLET ORAL at 21:39

## 2017-01-01 RX ADMIN — HEPARIN SODIUM 5000 UNITS: 10000 INJECTION, SOLUTION INTRAVENOUS; SUBCUTANEOUS at 11:26

## 2017-01-01 RX ADMIN — GABAPENTIN 600 MG: 600 TABLET, FILM COATED ORAL at 22:06

## 2017-01-01 RX ADMIN — GABAPENTIN 600 MG: 250 SUSPENSION ORAL at 16:05

## 2017-01-01 RX ADMIN — FOLIC ACID: 5 INJECTION, SOLUTION INTRAMUSCULAR; INTRAVENOUS; SUBCUTANEOUS at 00:24

## 2017-01-01 RX ADMIN — SULFAMETHOXAZOLE AND TRIMETHOPRIM 400 MG: 200; 40 SUSPENSION ORAL at 15:42

## 2017-01-01 RX ADMIN — FOLIC ACID 1 MG: 1 TABLET ORAL at 08:31

## 2017-01-01 RX ADMIN — IPRATROPIUM BROMIDE AND ALBUTEROL SULFATE 3 ML: .5; 3 SOLUTION RESPIRATORY (INHALATION) at 12:13

## 2017-01-01 RX ADMIN — HYDROCODONE BITARTRATE AND ACETAMINOPHEN 1 TABLET: 5; 325 TABLET ORAL at 00:35

## 2017-01-01 RX ADMIN — INSULIN ASPART 7 UNITS: 100 INJECTION, SOLUTION INTRAVENOUS; SUBCUTANEOUS at 20:13

## 2017-01-01 RX ADMIN — METHYLPREDNISOLONE SODIUM SUCCINATE 20 MG: 40 INJECTION, POWDER, LYOPHILIZED, FOR SOLUTION INTRAMUSCULAR; INTRAVENOUS at 23:41

## 2017-01-01 RX ADMIN — VANCOMYCIN HYDROCHLORIDE 1250 MG: 5 INJECTION, POWDER, LYOPHILIZED, FOR SOLUTION INTRAVENOUS at 12:26

## 2017-01-01 RX ADMIN — IPRATROPIUM BROMIDE AND ALBUTEROL SULFATE 3 ML: .5; 3 SOLUTION RESPIRATORY (INHALATION) at 15:30

## 2017-01-01 RX ADMIN — METHOCARBAMOL 500 MG: 500 TABLET ORAL at 08:31

## 2017-01-01 RX ADMIN — TAZOBACTAM SODIUM AND PIPERACILLIN SODIUM 4.5 G: 500; 4 INJECTION, SOLUTION INTRAVENOUS at 09:17

## 2017-01-01 RX ADMIN — PANTOPRAZOLE SODIUM 40 MG: 40 TABLET, DELAYED RELEASE ORAL at 09:01

## 2017-01-01 RX ADMIN — TAZOBACTAM SODIUM AND PIPERACILLIN SODIUM 3.38 G: 375; 3 INJECTION, SOLUTION INTRAVENOUS at 03:25

## 2017-01-01 RX ADMIN — IPRATROPIUM BROMIDE AND ALBUTEROL SULFATE 3 ML: .5; 3 SOLUTION RESPIRATORY (INHALATION) at 12:02

## 2017-01-01 RX ADMIN — IPRATROPIUM BROMIDE AND ALBUTEROL SULFATE 3 ML: .5; 3 SOLUTION RESPIRATORY (INHALATION) at 23:50

## 2017-01-01 RX ADMIN — ACETAMINOPHEN 975 MG: 325 TABLET, FILM COATED ORAL at 21:09

## 2017-01-01 RX ADMIN — METHOCARBAMOL 500 MG: 500 TABLET ORAL at 09:07

## 2017-01-01 RX ADMIN — NICOTINE POLACRILEX 2 MG: 2 GUM, CHEWING ORAL at 19:44

## 2017-01-01 RX ADMIN — NICOTINE 1 PATCH: 21 PATCH, EXTENDED RELEASE TRANSDERMAL at 20:35

## 2017-01-01 RX ADMIN — RIFAXIMIN 550 MG: 550 TABLET ORAL at 21:41

## 2017-01-01 RX ADMIN — KETOROLAC TROMETHAMINE 15 MG: 15 INJECTION, SOLUTION INTRAMUSCULAR; INTRAVENOUS at 05:15

## 2017-01-01 RX ADMIN — LORATADINE 10 MG: 5 SOLUTION ORAL at 08:24

## 2017-01-01 RX ADMIN — NICOTINE POLACRILEX 2 MG: 2 GUM, CHEWING ORAL at 10:17

## 2017-01-01 RX ADMIN — METRONIDAZOLE 500 MG: 500 TABLET ORAL at 17:18

## 2017-01-01 RX ADMIN — FLUOXETINE 40 MG: 20 CAPSULE ORAL at 08:41

## 2017-01-01 RX ADMIN — FUROSEMIDE 20 MG: 10 INJECTION, SOLUTION INTRAVENOUS at 08:24

## 2017-01-01 RX ADMIN — HYDROMORPHONE HYDROCHLORIDE 1 MG: 1 SOLUTION ORAL at 04:50

## 2017-01-01 RX ADMIN — TAZOBACTAM SODIUM AND PIPERACILLIN SODIUM 4.5 G: 500; 4 INJECTION, SOLUTION INTRAVENOUS at 21:40

## 2017-01-01 RX ADMIN — THERA TABS 1 TABLET: TAB at 08:22

## 2017-01-01 RX ADMIN — INSULIN ASPART 5 UNITS: 100 INJECTION, SOLUTION INTRAVENOUS; SUBCUTANEOUS at 14:18

## 2017-01-01 RX ADMIN — PROPOFOL 25 MCG/KG/MIN: 10 INJECTION, EMULSION INTRAVENOUS at 18:18

## 2017-01-01 RX ADMIN — ACETAMINOPHEN 650 MG: 325 TABLET, FILM COATED ORAL at 18:24

## 2017-01-01 RX ADMIN — BUPRENORPHINE HYDROCHLORIDE, NALOXONE HYDROCHLORIDE 3 FILM: 8; 2 FILM, SOLUBLE BUCCAL; SUBLINGUAL at 08:52

## 2017-01-01 RX ADMIN — FLUOXETINE HYDROCHLORIDE 40 MG: 20 LIQUID ORAL at 08:47

## 2017-01-01 RX ADMIN — SODIUM CHLORIDE, POTASSIUM CHLORIDE, SODIUM LACTATE AND CALCIUM CHLORIDE: 600; 310; 30; 20 INJECTION, SOLUTION INTRAVENOUS at 03:48

## 2017-01-01 RX ADMIN — SULFAMETHOXAZOLE AND TRIMETHOPRIM 1 TABLET: 400; 80 TABLET ORAL at 18:42

## 2017-01-01 RX ADMIN — BUPRENORPHINE HYDROCHLORIDE, NALOXONE HYDROCHLORIDE 3 FILM: 8; 2 FILM, SOLUBLE BUCCAL; SUBLINGUAL at 11:37

## 2017-01-01 RX ADMIN — HYDROMORPHONE HYDROCHLORIDE 1 MG: 1 SOLUTION ORAL at 17:30

## 2017-01-01 RX ADMIN — PREDNISONE 50 MG: 50 TABLET ORAL at 08:21

## 2017-01-01 RX ADMIN — LORATADINE 10 MG: 5 SOLUTION ORAL at 08:34

## 2017-01-01 RX ADMIN — SULFAMETHOXAZOLE AND TRIMETHOPRIM 2 TABLET: 800; 160 TABLET ORAL at 16:14

## 2017-01-01 RX ADMIN — LEVOFLOXACIN 750 MG: 5 INJECTION, SOLUTION INTRAVENOUS at 16:11

## 2017-01-01 RX ADMIN — INSULIN ASPART 2 UNITS: 100 INJECTION, SOLUTION INTRAVENOUS; SUBCUTANEOUS at 16:46

## 2017-01-01 RX ADMIN — VANCOMYCIN HYDROCHLORIDE 1750 MG: 1 INJECTION, POWDER, LYOPHILIZED, FOR SOLUTION INTRAVENOUS at 11:27

## 2017-01-01 RX ADMIN — MIRTAZAPINE 15 MG: 15 TABLET, FILM COATED ORAL at 21:08

## 2017-01-01 RX ADMIN — METHOCARBAMOL 1000 MG: 500 TABLET ORAL at 18:33

## 2017-01-01 RX ADMIN — NICOTINE POLACRILEX 2 MG: 2 GUM, CHEWING ORAL at 11:11

## 2017-01-01 RX ADMIN — PROPOFOL 100 MG: 10 INJECTION, EMULSION INTRAVENOUS at 10:20

## 2017-01-01 RX ADMIN — FOLIC ACID 1 MG: 1 TABLET ORAL at 09:04

## 2017-01-01 RX ADMIN — NICOTINE POLACRILEX 2 MG: 2 GUM, CHEWING ORAL at 13:54

## 2017-01-01 RX ADMIN — IPRATROPIUM BROMIDE AND ALBUTEROL SULFATE 3 ML: .5; 3 SOLUTION RESPIRATORY (INHALATION) at 19:30

## 2017-01-01 RX ADMIN — RIFAXIMIN 550 MG: 550 TABLET ORAL at 20:34

## 2017-01-01 RX ADMIN — Medication: at 21:13

## 2017-01-01 RX ADMIN — INSULIN GLARGINE 10 UNITS: 100 INJECTION, SOLUTION SUBCUTANEOUS at 23:29

## 2017-01-01 RX ADMIN — RIFAXIMIN 550 MG: 550 TABLET ORAL at 08:06

## 2017-01-01 RX ADMIN — SODIUM CHLORIDE 250 MG: 9 INJECTION, SOLUTION INTRAVENOUS at 11:43

## 2017-01-01 RX ADMIN — IPRATROPIUM BROMIDE AND ALBUTEROL SULFATE 3 ML: .5; 3 SOLUTION RESPIRATORY (INHALATION) at 17:27

## 2017-01-01 RX ADMIN — LACTULOSE 20 G: 10 SOLUTION ORAL at 22:07

## 2017-01-01 RX ADMIN — METHOCARBAMOL 1000 MG: 500 TABLET ORAL at 10:40

## 2017-01-01 RX ADMIN — MIDAZOLAM 2 MG: 1 INJECTION INTRAMUSCULAR; INTRAVENOUS at 10:05

## 2017-01-01 RX ADMIN — MORPHINE SULFATE 2 MG: 2 INJECTION, SOLUTION INTRAMUSCULAR; INTRAVENOUS at 11:33

## 2017-01-01 RX ADMIN — PREDNISONE 40 MG: 20 TABLET ORAL at 08:43

## 2017-01-01 RX ADMIN — SPIRONOLACTONE 50 MG: 25 TABLET ORAL at 08:31

## 2017-01-01 RX ADMIN — GABAPENTIN 600 MG: 600 TABLET, FILM COATED ORAL at 16:38

## 2017-01-01 RX ADMIN — INSULIN GLARGINE 15 UNITS: 100 INJECTION, SOLUTION SUBCUTANEOUS at 09:45

## 2017-01-01 RX ADMIN — PIPERACILLIN SODIUM,TAZOBACTAM SODIUM 4.5 G: 4; .5 INJECTION, POWDER, FOR SOLUTION INTRAVENOUS at 19:45

## 2017-01-01 RX ADMIN — SULFAMETHOXAZOLE AND TRIMETHOPRIM 400 MG: 200; 40 SUSPENSION ORAL at 03:17

## 2017-01-01 RX ADMIN — ATORVASTATIN CALCIUM 20 MG: 20 TABLET, FILM COATED ORAL at 21:01

## 2017-01-01 RX ADMIN — HEPARIN SODIUM 5000 UNITS: 10000 INJECTION, SOLUTION INTRAVENOUS; SUBCUTANEOUS at 20:52

## 2017-01-01 RX ADMIN — LACTULOSE 20 G: 10 SOLUTION ORAL at 07:56

## 2017-01-01 RX ADMIN — SODIUM CHLORIDE 250 MG: 9 INJECTION, SOLUTION INTRAVENOUS at 17:54

## 2017-01-01 RX ADMIN — SULFAMETHOXAZOLE AND TRIMETHOPRIM 2 TABLET: 800; 160 TABLET ORAL at 17:32

## 2017-01-01 RX ADMIN — SULFAMETHOXAZOLE AND TRIMETHOPRIM 1 TABLET: 800; 160 TABLET ORAL at 08:16

## 2017-01-01 RX ADMIN — LEVOFLOXACIN 750 MG: 5 INJECTION, SOLUTION INTRAVENOUS at 17:05

## 2017-01-01 RX ADMIN — GABAPENTIN 600 MG: 600 TABLET, FILM COATED ORAL at 16:37

## 2017-01-01 RX ADMIN — IPRATROPIUM BROMIDE AND ALBUTEROL SULFATE 3 ML: .5; 3 SOLUTION RESPIRATORY (INHALATION) at 03:26

## 2017-01-01 RX ADMIN — LACTULOSE 20 G: 20 SOLUTION ORAL at 08:18

## 2017-01-01 RX ADMIN — IPRATROPIUM BROMIDE AND ALBUTEROL SULFATE 6 ML: .5; 3 SOLUTION RESPIRATORY (INHALATION) at 20:32

## 2017-01-01 RX ADMIN — POTASSIUM & SODIUM PHOSPHATES POWDER PACK 280-160-250 MG 1 PACKET: 280-160-250 PACK at 16:07

## 2017-01-01 RX ADMIN — ACETAMINOPHEN 650 MG: 325 TABLET, FILM COATED ORAL at 17:25

## 2017-01-01 RX ADMIN — VANCOMYCIN HYDROCHLORIDE 1750 MG: 1 INJECTION, POWDER, LYOPHILIZED, FOR SOLUTION INTRAVENOUS at 23:47

## 2017-01-01 RX ADMIN — PREDNISONE 40 MG: 20 TABLET ORAL at 10:40

## 2017-01-01 RX ADMIN — LEVOFLOXACIN 750 MG: 5 INJECTION, SOLUTION INTRAVENOUS at 20:02

## 2017-01-01 RX ADMIN — ASPIRIN 81 MG: 81 TABLET, COATED ORAL at 09:32

## 2017-01-01 RX ADMIN — BUPRENORPHINE HYDROCHLORIDE, NALOXONE HYDROCHLORIDE 3 FILM: 8; 2 FILM, SOLUBLE BUCCAL; SUBLINGUAL at 08:20

## 2017-01-01 RX ADMIN — ATORVASTATIN CALCIUM 20 MG: 20 TABLET, FILM COATED ORAL at 09:11

## 2017-01-01 RX ADMIN — LEVOFLOXACIN 750 MG: 5 INJECTION, SOLUTION INTRAVENOUS at 00:09

## 2017-01-01 RX ADMIN — ATORVASTATIN CALCIUM 20 MG: 20 TABLET, FILM COATED ORAL at 22:03

## 2017-01-01 RX ADMIN — SULFAMETHOXAZOLE AND TRIMETHOPRIM 400 MG: 200; 40 SUSPENSION ORAL at 20:52

## 2017-01-01 RX ADMIN — MORPHINE SULFATE 2 MG: 2 INJECTION, SOLUTION INTRAMUSCULAR; INTRAVENOUS at 13:21

## 2017-01-01 RX ADMIN — LORATADINE 10 MG: 10 TABLET ORAL at 08:31

## 2017-01-01 RX ADMIN — ATORVASTATIN CALCIUM 20 MG: 20 TABLET, FILM COATED ORAL at 20:50

## 2017-01-01 RX ADMIN — METHOCARBAMOL 1000 MG: 500 TABLET ORAL at 21:16

## 2017-01-01 RX ADMIN — METHOCARBAMOL 1000 MG: 500 TABLET ORAL at 01:26

## 2017-01-01 RX ADMIN — IPRATROPIUM BROMIDE AND ALBUTEROL SULFATE 3 ML: .5; 3 SOLUTION RESPIRATORY (INHALATION) at 07:54

## 2017-01-01 RX ADMIN — HEPARIN SODIUM 5000 UNITS: 10000 INJECTION, SOLUTION INTRAVENOUS; SUBCUTANEOUS at 20:45

## 2017-01-01 RX ADMIN — MORPHINE SULFATE 2 MG: 2 INJECTION, SOLUTION INTRAMUSCULAR; INTRAVENOUS at 17:50

## 2017-01-01 RX ADMIN — SULFAMETHOXAZOLE AND TRIMETHOPRIM 400 MG: 200; 40 SUSPENSION ORAL at 20:41

## 2017-01-01 RX ADMIN — ASPIRIN 81 MG 81 MG: 81 TABLET ORAL at 09:04

## 2017-01-01 RX ADMIN — TAZOBACTAM SODIUM AND PIPERACILLIN SODIUM 4.5 G: 500; 4 INJECTION, SOLUTION INTRAVENOUS at 15:59

## 2017-01-01 RX ADMIN — LACTULOSE 20 G: 10 SOLUTION ORAL at 08:41

## 2017-01-01 RX ADMIN — TRAZODONE HYDROCHLORIDE 100 MG: 100 TABLET ORAL at 20:48

## 2017-01-01 RX ADMIN — TRAZODONE HYDROCHLORIDE 50 MG: 50 TABLET ORAL at 21:02

## 2017-01-01 RX ADMIN — FLUOXETINE 40 MG: 20 CAPSULE ORAL at 08:06

## 2017-01-01 RX ADMIN — IPRATROPIUM BROMIDE AND ALBUTEROL SULFATE 3 ML: .5; 3 SOLUTION RESPIRATORY (INHALATION) at 23:25

## 2017-01-01 RX ADMIN — Medication: at 08:45

## 2017-01-01 RX ADMIN — LACTULOSE 20 G: 10 SOLUTION ORAL at 15:14

## 2017-01-01 RX ADMIN — IPRATROPIUM BROMIDE AND ALBUTEROL SULFATE 3 ML: .5; 3 SOLUTION RESPIRATORY (INHALATION) at 15:21

## 2017-01-01 RX ADMIN — Medication 500 MG: at 08:08

## 2017-01-01 RX ADMIN — FERROUS SULFATE TAB 325 MG (65 MG ELEMENTAL FE) 325 MG: 325 (65 FE) TAB at 08:43

## 2017-01-01 RX ADMIN — RIFAXIMIN 550 MG: 550 TABLET ORAL at 08:04

## 2017-01-01 RX ADMIN — UMECLIDINIUM 1 PUFF: 62.5 AEROSOL, POWDER ORAL at 08:15

## 2017-01-01 RX ADMIN — PREDNISONE 40 MG: 20 TABLET ORAL at 08:21

## 2017-01-01 RX ADMIN — FLUOXETINE 40 MG: 20 CAPSULE ORAL at 09:32

## 2017-01-01 RX ADMIN — METHYLPREDNISOLONE SODIUM SUCCINATE 62.5 MG: 125 INJECTION, POWDER, FOR SOLUTION INTRAMUSCULAR; INTRAVENOUS at 01:09

## 2017-01-01 RX ADMIN — Medication 1 MG: at 22:29

## 2017-01-01 RX ADMIN — SPIRONOLACTONE 50 MG: 25 TABLET ORAL at 08:19

## 2017-01-01 RX ADMIN — LACTULOSE 20 G: 10 SOLUTION ORAL at 09:21

## 2017-01-01 RX ADMIN — BUPRENORPHINE HYDROCHLORIDE, NALOXONE HYDROCHLORIDE 3 FILM: 8; 2 FILM, SOLUBLE BUCCAL; SUBLINGUAL at 10:29

## 2017-01-01 RX ADMIN — NICOTINE POLACRILEX 2 MG: 2 GUM, CHEWING ORAL at 17:58

## 2017-01-01 RX ADMIN — MIRTAZAPINE 30 MG: 30 TABLET, FILM COATED ORAL at 21:03

## 2017-01-01 RX ADMIN — NICOTINE 1 PATCH: 21 PATCH, EXTENDED RELEASE TRANSDERMAL at 08:17

## 2017-01-01 RX ADMIN — FLUOXETINE HYDROCHLORIDE 40 MG: 40 CAPSULE ORAL at 09:50

## 2017-01-01 RX ADMIN — IPRATROPIUM BROMIDE AND ALBUTEROL SULFATE 3 ML: .5; 3 SOLUTION RESPIRATORY (INHALATION) at 20:25

## 2017-01-01 RX ADMIN — ATORVASTATIN CALCIUM 20 MG: 20 TABLET, FILM COATED ORAL at 21:13

## 2017-01-01 RX ADMIN — OMEPRAZOLE 20 MG: 20 CAPSULE, DELAYED RELEASE ORAL at 08:07

## 2017-01-01 RX ADMIN — MIDAZOLAM 1 MG: 1 INJECTION INTRAMUSCULAR; INTRAVENOUS at 10:00

## 2017-01-01 RX ADMIN — FOLIC ACID: 5 INJECTION, SOLUTION INTRAMUSCULAR; INTRAVENOUS; SUBCUTANEOUS at 00:19

## 2017-01-01 RX ADMIN — TAZOBACTAM SODIUM AND PIPERACILLIN SODIUM 4.5 G: 500; 4 INJECTION, SOLUTION INTRAVENOUS at 18:38

## 2017-01-01 RX ADMIN — SULFAMETHOXAZOLE AND TRIMETHOPRIM 1 TABLET: 400; 80 TABLET ORAL at 20:16

## 2017-01-01 RX ADMIN — SODIUM CHLORIDE: 9 INJECTION, SOLUTION INTRAVENOUS at 17:44

## 2017-01-01 RX ADMIN — HEPARIN SODIUM 5000 UNITS: 10000 INJECTION, SOLUTION INTRAVENOUS; SUBCUTANEOUS at 13:48

## 2017-01-01 RX ADMIN — HUMAN ALBUMIN MICROSPHERES AND PERFLUTREN 3 ML: 10; .22 INJECTION, SOLUTION INTRAVENOUS at 10:30

## 2017-01-01 RX ADMIN — MIRTAZAPINE 30 MG: 15 TABLET, FILM COATED ORAL at 22:07

## 2017-01-01 RX ADMIN — ATORVASTATIN CALCIUM 20 MG: 20 TABLET, FILM COATED ORAL at 21:16

## 2017-01-01 RX ADMIN — MORPHINE SULFATE 4 MG: 2 INJECTION, SOLUTION INTRAMUSCULAR; INTRAVENOUS at 18:07

## 2017-01-01 RX ADMIN — SULFAMETHOXAZOLE AND TRIMETHOPRIM 1 TABLET: 400; 80 TABLET ORAL at 12:39

## 2017-01-01 RX ADMIN — HYDROCODONE BITARTRATE AND ACETAMINOPHEN 1 TABLET: 5; 325 TABLET ORAL at 20:33

## 2017-01-01 RX ADMIN — ASPIRIN 81 MG 81 MG: 81 TABLET ORAL at 08:35

## 2017-01-01 RX ADMIN — LORATADINE 10 MG: 5 SOLUTION ORAL at 09:08

## 2017-01-01 RX ADMIN — ENOXAPARIN SODIUM 40 MG: 40 INJECTION SUBCUTANEOUS at 08:46

## 2017-01-01 RX ADMIN — FOLIC ACID 1 MG: 1 TABLET ORAL at 08:11

## 2017-01-01 RX ADMIN — IPRATROPIUM BROMIDE AND ALBUTEROL SULFATE 3 ML: .5; 3 SOLUTION RESPIRATORY (INHALATION) at 07:27

## 2017-01-01 RX ADMIN — HEPARIN SODIUM 5000 UNITS: 10000 INJECTION, SOLUTION INTRAVENOUS; SUBCUTANEOUS at 04:35

## 2017-01-01 RX ADMIN — IPRATROPIUM BROMIDE AND ALBUTEROL SULFATE 3 ML: .5; 3 SOLUTION RESPIRATORY (INHALATION) at 16:08

## 2017-01-01 RX ADMIN — FLUOXETINE HYDROCHLORIDE 40 MG: 20 LIQUID ORAL at 08:12

## 2017-01-01 RX ADMIN — SODIUM CHLORIDE 1000 ML: 9 INJECTION, SOLUTION INTRAVENOUS at 22:15

## 2017-01-01 RX ADMIN — GABAPENTIN 600 MG: 600 TABLET, FILM COATED ORAL at 21:39

## 2017-01-01 RX ADMIN — IRON SUPPLEMENT 325 MG: 325 TABLET ORAL at 08:21

## 2017-01-01 RX ADMIN — LACTULOSE 20 G: 10 SOLUTION ORAL at 11:41

## 2017-01-01 RX ADMIN — ENOXAPARIN SODIUM 40 MG: 40 INJECTION SUBCUTANEOUS at 09:03

## 2017-01-01 RX ADMIN — HYDROCODONE BITARTRATE AND ACETAMINOPHEN 2 TABLET: 5; 325 TABLET ORAL at 01:12

## 2017-01-01 RX ADMIN — MIRTAZAPINE 15 MG: 15 TABLET, FILM COATED ORAL at 21:13

## 2017-01-01 RX ADMIN — HEPARIN SODIUM 5000 UNITS: 10000 INJECTION, SOLUTION INTRAVENOUS; SUBCUTANEOUS at 10:18

## 2017-01-01 RX ADMIN — ASPIRIN 81 MG: 81 TABLET, COATED ORAL at 09:11

## 2017-01-01 RX ADMIN — RIFAXIMIN 550 MG: 550 TABLET ORAL at 09:46

## 2017-01-01 RX ADMIN — OXYCODONE HYDROCHLORIDE AND ACETAMINOPHEN 500 MG: 500 TABLET ORAL at 08:51

## 2017-01-01 RX ADMIN — GABAPENTIN 600 MG: 600 TABLET, FILM COATED ORAL at 13:34

## 2017-01-01 RX ADMIN — SULFAMETHOXAZOLE AND TRIMETHOPRIM 400 MG: 80; 16 INJECTION, SOLUTION, CONCENTRATE INTRAVENOUS at 20:37

## 2017-01-01 RX ADMIN — SULFAMETHOXAZOLE AND TRIMETHOPRIM 400 MG: 200; 40 SUSPENSION ORAL at 03:58

## 2017-01-01 RX ADMIN — GABAPENTIN 300 MG: 300 CAPSULE ORAL at 16:10

## 2017-01-01 RX ADMIN — INSULIN ASPART 2 UNITS: 100 INJECTION, SOLUTION INTRAVENOUS; SUBCUTANEOUS at 07:45

## 2017-01-01 RX ADMIN — HYDROMORPHONE HYDROCHLORIDE 0.2 MG: 1 INJECTION, SOLUTION INTRAMUSCULAR; INTRAVENOUS; SUBCUTANEOUS at 05:54

## 2017-01-01 RX ADMIN — THERA TABS 1 TABLET: TAB at 09:50

## 2017-01-01 RX ADMIN — METHYLPREDNISOLONE SODIUM SUCCINATE 40 MG: 40 INJECTION, POWDER, FOR SOLUTION INTRAMUSCULAR; INTRAVENOUS at 19:52

## 2017-01-01 RX ADMIN — METHYLPREDNISOLONE SODIUM SUCCINATE 62.5 MG: 125 INJECTION, POWDER, FOR SOLUTION INTRAMUSCULAR; INTRAVENOUS at 12:51

## 2017-01-01 RX ADMIN — GABAPENTIN 600 MG: 600 TABLET, FILM COATED ORAL at 21:31

## 2017-01-01 RX ADMIN — INSULIN ASPART 10 UNITS: 100 INJECTION, SOLUTION INTRAVENOUS; SUBCUTANEOUS at 17:56

## 2017-01-01 RX ADMIN — TRAZODONE HYDROCHLORIDE 100 MG: 100 TABLET ORAL at 21:29

## 2017-01-01 RX ADMIN — MIRTAZAPINE 30 MG: 15 TABLET, FILM COATED ORAL at 21:47

## 2017-01-01 RX ADMIN — TRAZODONE HYDROCHLORIDE 100 MG: 100 TABLET ORAL at 21:41

## 2017-01-01 RX ADMIN — GABAPENTIN 600 MG: 600 TABLET, FILM COATED ORAL at 15:56

## 2017-01-01 RX ADMIN — NICOTINE 1 CARTRIDGE: 4 INHALANT RESPIRATORY (INHALATION) at 13:33

## 2017-01-01 RX ADMIN — PANTOPRAZOLE SODIUM 40 MG: 20 TABLET, DELAYED RELEASE ORAL at 08:25

## 2017-01-01 RX ADMIN — NYSTATIN 500000 UNITS: 100000 SUSPENSION ORAL at 19:38

## 2017-01-01 RX ADMIN — NICOTINE 1 PATCH: 21 PATCH, EXTENDED RELEASE TRANSDERMAL at 08:04

## 2017-01-01 RX ADMIN — RIFAXIMIN 550 MG: 550 TABLET ORAL at 09:04

## 2017-01-01 RX ADMIN — MICONAZOLE NITRATE: 20 CREAM TOPICAL at 13:18

## 2017-01-01 RX ADMIN — TRAZODONE HYDROCHLORIDE 150 MG: 150 TABLET ORAL at 21:30

## 2017-01-01 RX ADMIN — SODIUM CHLORIDE 250 MG: 9 INJECTION, SOLUTION INTRAVENOUS at 00:34

## 2017-01-01 RX ADMIN — TRAZODONE HYDROCHLORIDE 100 MG: 100 TABLET ORAL at 21:47

## 2017-01-01 RX ADMIN — OMEPRAZOLE 20 MG: 20 CAPSULE, DELAYED RELEASE ORAL at 09:32

## 2017-01-01 RX ADMIN — GUAIFENESIN 1200 MG: 600 TABLET, EXTENDED RELEASE ORAL at 21:29

## 2017-01-01 RX ADMIN — PANTOPRAZOLE SODIUM 40 MG: 40 TABLET, DELAYED RELEASE ORAL at 08:57

## 2017-01-01 RX ADMIN — IRON 325 MG: 65 TABLET ORAL at 07:49

## 2017-01-01 RX ADMIN — IPRATROPIUM BROMIDE AND ALBUTEROL SULFATE 3 ML: .5; 3 SOLUTION RESPIRATORY (INHALATION) at 19:58

## 2017-01-01 RX ADMIN — GABAPENTIN 600 MG: 600 TABLET, FILM COATED ORAL at 08:57

## 2017-01-01 RX ADMIN — TAZOBACTAM SODIUM AND PIPERACILLIN SODIUM 4.5 G: 500; 4 INJECTION, SOLUTION INTRAVENOUS at 04:56

## 2017-01-01 RX ADMIN — TAZOBACTAM SODIUM AND PIPERACILLIN SODIUM 4.5 G: 500; 4 INJECTION, SOLUTION INTRAVENOUS at 05:35

## 2017-01-01 RX ADMIN — FUROSEMIDE 20 MG: 10 INJECTION, SOLUTION INTRAVENOUS at 15:42

## 2017-01-01 RX ADMIN — FENTANYL CITRATE 50 MCG: 50 INJECTION INTRAMUSCULAR; INTRAVENOUS at 15:19

## 2017-01-01 RX ADMIN — PREDNISONE 80 MG: 10 TABLET ORAL at 08:31

## 2017-01-01 RX ADMIN — METHYLPREDNISOLONE SODIUM SUCCINATE 40 MG: 40 INJECTION, POWDER, FOR SOLUTION INTRAMUSCULAR; INTRAVENOUS at 18:24

## 2017-01-01 RX ADMIN — TAZOBACTAM SODIUM AND PIPERACILLIN SODIUM 4.5 G: 500; 4 INJECTION, SOLUTION INTRAVENOUS at 03:25

## 2017-01-01 RX ADMIN — TRAZODONE HYDROCHLORIDE 100 MG: 100 TABLET ORAL at 21:48

## 2017-01-01 RX ADMIN — TRAZODONE HYDROCHLORIDE 100 MG: 100 TABLET ORAL at 22:26

## 2017-01-01 RX ADMIN — LEVOFLOXACIN 750 MG: 5 INJECTION, SOLUTION INTRAVENOUS at 20:45

## 2017-01-01 RX ADMIN — MIRTAZAPINE 30 MG: 15 TABLET, FILM COATED ORAL at 21:38

## 2017-01-01 RX ADMIN — SULFAMETHOXAZOLE AND TRIMETHOPRIM 2 TABLET: 800; 160 TABLET ORAL at 08:12

## 2017-01-01 RX ADMIN — INSULIN GLARGINE 20 UNITS: 100 INJECTION, SOLUTION SUBCUTANEOUS at 22:36

## 2017-01-01 RX ADMIN — IRON 325 MG: 65 TABLET ORAL at 07:58

## 2017-01-01 RX ADMIN — TAZOBACTAM SODIUM AND PIPERACILLIN SODIUM 4.5 G: 500; 4 INJECTION, SOLUTION INTRAVENOUS at 19:44

## 2017-01-01 RX ADMIN — IPRATROPIUM BROMIDE AND ALBUTEROL SULFATE 3 ML: .5; 3 SOLUTION RESPIRATORY (INHALATION) at 19:54

## 2017-01-01 RX ADMIN — HYDROCODONE BITARTRATE AND ACETAMINOPHEN 2 TABLET: 5; 325 TABLET ORAL at 21:29

## 2017-01-01 RX ADMIN — LORATADINE 10 MG: 10 TABLET ORAL at 13:48

## 2017-01-01 RX ADMIN — LACTULOSE 30 ML: 10 SOLUTION ORAL at 20:52

## 2017-01-01 RX ADMIN — LACTULOSE 20 G: 10 SOLUTION ORAL at 16:08

## 2017-01-01 RX ADMIN — VANCOMYCIN HYDROCHLORIDE 1000 MG: 1 INJECTION, SOLUTION INTRAVENOUS at 19:51

## 2017-01-01 RX ADMIN — LACTULOSE 20 G: 10 SOLUTION ORAL at 15:56

## 2017-01-01 RX ADMIN — NICOTINE 1 CARTRIDGE: 4 INHALANT RESPIRATORY (INHALATION) at 22:47

## 2017-01-01 RX ADMIN — BACITRACIN: 500 OINTMENT TOPICAL at 21:33

## 2017-01-01 RX ADMIN — SULFAMETHOXAZOLE AND TRIMETHOPRIM 1 TABLET: 800; 160 TABLET ORAL at 10:13

## 2017-01-01 RX ADMIN — MIDAZOLAM HYDROCHLORIDE 2 MG: 1 INJECTION, SOLUTION INTRAMUSCULAR; INTRAVENOUS at 12:56

## 2017-01-01 RX ADMIN — GABAPENTIN 600 MG: 600 TABLET, FILM COATED ORAL at 20:54

## 2017-01-01 RX ADMIN — LACTULOSE 20 G: 10 SOLUTION ORAL at 08:53

## 2017-01-01 RX ADMIN — Medication 40 MG: at 08:20

## 2017-01-01 RX ADMIN — LORAZEPAM 2 MG: 2 INJECTION INTRAMUSCULAR; INTRAVENOUS at 11:34

## 2017-01-01 RX ADMIN — LACTULOSE 30 ML: 10 SOLUTION ORAL at 16:24

## 2017-01-01 RX ADMIN — NICOTINE POLACRILEX 2 MG: 2 GUM, CHEWING ORAL at 23:30

## 2017-01-01 RX ADMIN — VANCOMYCIN HYDROCHLORIDE 1750 MG: 10 INJECTION, POWDER, LYOPHILIZED, FOR SOLUTION INTRAVENOUS at 09:42

## 2017-01-01 RX ADMIN — SPIRONOLACTONE 50 MG: 25 TABLET ORAL at 10:40

## 2017-01-01 RX ADMIN — PROPOFOL 25 MCG/KG/MIN: 10 INJECTION, EMULSION INTRAVENOUS at 08:14

## 2017-01-01 RX ADMIN — MORPHINE SULFATE 2 MG: 2 INJECTION, SOLUTION INTRAMUSCULAR; INTRAVENOUS at 06:44

## 2017-01-01 RX ADMIN — FERROUS SULFATE TAB 325 MG (65 MG ELEMENTAL FE) 325 MG: 325 (65 FE) TAB at 08:22

## 2017-01-01 RX ADMIN — METHYLPREDNISOLONE SODIUM SUCCINATE 40 MG: 40 INJECTION, POWDER, FOR SOLUTION INTRAMUSCULAR; INTRAVENOUS at 18:15

## 2017-01-01 RX ADMIN — LACTULOSE 20 G: 20 SOLUTION ORAL at 16:44

## 2017-01-01 RX ADMIN — INSULIN ASPART 2 UNITS: 100 INJECTION, SOLUTION INTRAVENOUS; SUBCUTANEOUS at 12:21

## 2017-01-01 RX ADMIN — BUPRENORPHINE HYDROCHLORIDE, NALOXONE HYDROCHLORIDE 3 FILM: 8; 2 FILM, SOLUBLE BUCCAL; SUBLINGUAL at 08:00

## 2017-01-01 RX ADMIN — SODIUM CHLORIDE, PRESERVATIVE FREE 94 ML: 5 INJECTION INTRAVENOUS at 12:29

## 2017-01-01 RX ADMIN — FLUOXETINE HYDROCHLORIDE 40 MG: 20 LIQUID ORAL at 09:10

## 2017-01-01 RX ADMIN — NICOTINE POLACRILEX 2 MG: 2 GUM, CHEWING ORAL at 14:29

## 2017-01-01 RX ADMIN — IPRATROPIUM BROMIDE AND ALBUTEROL SULFATE 3 ML: .5; 3 SOLUTION RESPIRATORY (INHALATION) at 19:43

## 2017-01-01 RX ADMIN — VANCOMYCIN HYDROCHLORIDE 1750 MG: 10 INJECTION, POWDER, LYOPHILIZED, FOR SOLUTION INTRAVENOUS at 09:00

## 2017-01-01 RX ADMIN — ACETAMINOPHEN 650 MG: 325 TABLET, FILM COATED ORAL at 06:16

## 2017-01-01 RX ADMIN — NICOTINE POLACRILEX 2 MG: 2 GUM, CHEWING ORAL at 10:38

## 2017-01-01 RX ADMIN — TAZOBACTAM SODIUM AND PIPERACILLIN SODIUM 4.5 G: 500; 4 INJECTION, SOLUTION INTRAVENOUS at 22:33

## 2017-01-01 RX ADMIN — MIDAZOLAM 2 MG: 1 INJECTION INTRAMUSCULAR; INTRAVENOUS at 10:15

## 2017-01-01 RX ADMIN — SULFAMETHOXAZOLE AND TRIMETHOPRIM 400 MG: 80; 16 INJECTION, SOLUTION, CONCENTRATE INTRAVENOUS at 02:05

## 2017-01-01 RX ADMIN — HYDROMORPHONE HYDROCHLORIDE 1 MG: 1 SOLUTION ORAL at 17:38

## 2017-01-01 RX ADMIN — VANCOMYCIN HYDROCHLORIDE 1250 MG: 10 INJECTION, POWDER, LYOPHILIZED, FOR SOLUTION INTRAVENOUS at 21:49

## 2017-01-01 RX ADMIN — PANTOPRAZOLE SODIUM 40 MG: 20 TABLET, DELAYED RELEASE ORAL at 08:21

## 2017-01-01 RX ADMIN — INSULIN ASPART 4 UNITS: 100 INJECTION, SOLUTION INTRAVENOUS; SUBCUTANEOUS at 17:37

## 2017-01-01 RX ADMIN — ASPIRIN 81 MG: 81 TABLET, COATED ORAL at 13:44

## 2017-01-01 RX ADMIN — METHOCARBAMOL 1000 MG: 500 TABLET ORAL at 08:13

## 2017-01-01 RX ADMIN — MORPHINE SULFATE 4 MG: 2 INJECTION, SOLUTION INTRAMUSCULAR; INTRAVENOUS at 18:28

## 2017-01-01 RX ADMIN — POTASSIUM CHLORIDE 40 MEQ: 1500 TABLET, EXTENDED RELEASE ORAL at 03:27

## 2017-01-01 RX ADMIN — ASPIRIN 81 MG 81 MG: 81 TABLET ORAL at 16:03

## 2017-01-01 RX ADMIN — SULFAMETHOXAZOLE AND TRIMETHOPRIM 1 TABLET: 400; 80 TABLET ORAL at 13:44

## 2017-01-01 RX ADMIN — OXYCODONE HYDROCHLORIDE 10 MG: 5 TABLET ORAL at 09:26

## 2017-01-01 RX ADMIN — POTASSIUM CHLORIDE 20 MEQ: 1.5 POWDER, FOR SOLUTION ORAL at 23:40

## 2017-01-01 RX ADMIN — NICOTINE 1 PATCH: 21 PATCH, EXTENDED RELEASE TRANSDERMAL at 08:24

## 2017-01-01 RX ADMIN — SULFAMETHOXAZOLE AND TRIMETHOPRIM 1 TABLET: 400; 80 TABLET ORAL at 08:25

## 2017-01-01 RX ADMIN — SULFAMETHOXAZOLE AND TRIMETHOPRIM 1 TABLET: 400; 80 TABLET ORAL at 11:53

## 2017-01-01 RX ADMIN — METHOCARBAMOL 1000 MG: 500 TABLET ORAL at 18:45

## 2017-01-01 RX ADMIN — SULFAMETHOXAZOLE AND TRIMETHOPRIM 400 MG: 200; 40 SUSPENSION ORAL at 16:19

## 2017-01-01 RX ADMIN — ATORVASTATIN CALCIUM 20 MG: 20 TABLET, FILM COATED ORAL at 08:22

## 2017-01-01 RX ADMIN — INSULIN ASPART 3 UNITS: 100 INJECTION, SOLUTION INTRAVENOUS; SUBCUTANEOUS at 16:07

## 2017-01-01 RX ADMIN — ALUMINUM HYDROXIDE, MAGNESIUM HYDROXIDE, AND DIMETHICONE 30 ML: 400; 400; 40 SUSPENSION ORAL at 07:51

## 2017-01-01 RX ADMIN — TAZOBACTAM SODIUM AND PIPERACILLIN SODIUM 4.5 G: 500; 4 INJECTION, SOLUTION INTRAVENOUS at 09:15

## 2017-01-01 RX ADMIN — HYDROCODONE BITARTRATE AND ACETAMINOPHEN 1 TABLET: 5; 325 TABLET ORAL at 02:48

## 2017-01-01 RX ADMIN — ACETAMINOPHEN 650 MG: 325 TABLET, FILM COATED ORAL at 03:40

## 2017-01-01 RX ADMIN — GUAIFENESIN 1200 MG: 600 TABLET, EXTENDED RELEASE ORAL at 22:08

## 2017-01-01 RX ADMIN — BACITRACIN: 500 OINTMENT TOPICAL at 16:24

## 2017-01-01 RX ADMIN — OXYCODONE HYDROCHLORIDE AND ACETAMINOPHEN 500 MG: 500 TABLET ORAL at 11:38

## 2017-01-01 RX ADMIN — METHOCARBAMOL 500 MG: 500 TABLET ORAL at 15:41

## 2017-01-01 RX ADMIN — SODIUM CHLORIDE 250 MG: 9 INJECTION, SOLUTION INTRAVENOUS at 17:37

## 2017-01-01 RX ADMIN — METHOCARBAMOL 1000 MG: 500 TABLET ORAL at 08:12

## 2017-01-01 RX ADMIN — BACITRACIN: 500 OINTMENT TOPICAL at 09:29

## 2017-01-01 RX ADMIN — GABAPENTIN 600 MG: 600 TABLET, FILM COATED ORAL at 08:28

## 2017-01-01 RX ADMIN — BUPRENORPHINE HYDROCHLORIDE, NALOXONE HYDROCHLORIDE 3 FILM: 8; 2 FILM, SOLUBLE BUCCAL; SUBLINGUAL at 08:04

## 2017-01-01 RX ADMIN — INSULIN ASPART 10 UNITS: 100 INJECTION, SOLUTION INTRAVENOUS; SUBCUTANEOUS at 18:53

## 2017-01-01 RX ADMIN — OXYCODONE HYDROCHLORIDE 10 MG: 5 TABLET ORAL at 06:16

## 2017-01-01 RX ADMIN — INSULIN ASPART 4 UNITS: 100 INJECTION, SOLUTION INTRAVENOUS; SUBCUTANEOUS at 16:01

## 2017-01-01 RX ADMIN — METHYLPREDNISOLONE SODIUM SUCCINATE 40 MG: 40 INJECTION, POWDER, FOR SOLUTION INTRAMUSCULAR; INTRAVENOUS at 06:34

## 2017-01-01 RX ADMIN — TAZOBACTAM SODIUM AND PIPERACILLIN SODIUM 4.5 G: 500; 4 INJECTION, SOLUTION INTRAVENOUS at 02:54

## 2017-01-01 RX ADMIN — PANTOPRAZOLE SODIUM 40 MG: 40 INJECTION, POWDER, FOR SOLUTION INTRAVENOUS at 07:00

## 2017-01-01 RX ADMIN — GABAPENTIN 300 MG: 300 CAPSULE ORAL at 22:16

## 2017-01-01 RX ADMIN — FUROSEMIDE 20 MG: 20 TABLET ORAL at 08:31

## 2017-01-01 RX ADMIN — METRONIDAZOLE 500 MG: 500 TABLET ORAL at 21:29

## 2017-01-01 RX ADMIN — GABAPENTIN 600 MG: 600 TABLET, FILM COATED ORAL at 16:10

## 2017-01-01 RX ADMIN — IPRATROPIUM BROMIDE AND ALBUTEROL SULFATE 3 ML: .5; 3 SOLUTION RESPIRATORY (INHALATION) at 07:37

## 2017-01-01 RX ADMIN — TAZOBACTAM SODIUM AND PIPERACILLIN SODIUM 4.5 G: 500; 4 INJECTION, SOLUTION INTRAVENOUS at 16:03

## 2017-01-01 RX ADMIN — NICOTINE 1 PATCH: 21 PATCH, EXTENDED RELEASE TRANSDERMAL at 09:10

## 2017-01-01 RX ADMIN — SULFAMETHOXAZOLE AND TRIMETHOPRIM 1 TABLET: 800; 160 TABLET ORAL at 21:27

## 2017-01-01 RX ADMIN — TAZOBACTAM SODIUM AND PIPERACILLIN SODIUM 4.5 G: 500; 4 INJECTION, SOLUTION INTRAVENOUS at 13:38

## 2017-01-01 RX ADMIN — HUMAN INSULIN 12 UNITS/HR: 100 INJECTION, SOLUTION SUBCUTANEOUS at 14:31

## 2017-01-01 RX ADMIN — NICOTINE POLACRILEX 2 MG: 2 GUM, CHEWING ORAL at 11:23

## 2017-01-01 RX ADMIN — LACTULOSE 20 G: 20 SOLUTION ORAL at 16:55

## 2017-01-01 RX ADMIN — TAZOBACTAM SODIUM AND PIPERACILLIN SODIUM 4.5 G: 500; 4 INJECTION, SOLUTION INTRAVENOUS at 23:35

## 2017-01-01 RX ADMIN — NICOTINE POLACRILEX 2 MG: 2 GUM, CHEWING ORAL at 20:24

## 2017-01-01 RX ADMIN — SODIUM CHLORIDE: 9 INJECTION, SOLUTION INTRAVENOUS at 09:34

## 2017-01-01 RX ADMIN — INSULIN ASPART 5 UNITS: 100 INJECTION, SOLUTION INTRAVENOUS; SUBCUTANEOUS at 08:23

## 2017-01-01 RX ADMIN — VANCOMYCIN HYDROCHLORIDE 1750 MG: 1 INJECTION, POWDER, LYOPHILIZED, FOR SOLUTION INTRAVENOUS at 11:32

## 2017-01-01 RX ADMIN — LEVOFLOXACIN 750 MG: 5 INJECTION, SOLUTION INTRAVENOUS at 00:12

## 2017-01-01 RX ADMIN — INSULIN HUMAN 20 UNITS: 100 INJECTION, SUSPENSION SUBCUTANEOUS at 23:01

## 2017-01-01 RX ADMIN — MIRTAZAPINE 15 MG: 15 TABLET, FILM COATED ORAL at 21:01

## 2017-01-01 RX ADMIN — Medication: at 21:53

## 2017-01-01 RX ADMIN — LACTULOSE 30 ML: 10 SOLUTION ORAL at 08:42

## 2017-01-01 RX ADMIN — NALOXONE HYDROCHLORIDE 0.4 MG: 1 INJECTION PARENTERAL at 20:01

## 2017-01-01 RX ADMIN — MORPHINE SULFATE 2 MG: 2 INJECTION, SOLUTION INTRAMUSCULAR; INTRAVENOUS at 04:21

## 2017-01-01 RX ADMIN — LACTULOSE 20 G: 10 SOLUTION ORAL at 16:38

## 2017-01-01 RX ADMIN — KETOROLAC TROMETHAMINE 30 MG: 30 INJECTION, SOLUTION INTRAMUSCULAR at 13:55

## 2017-01-01 RX ADMIN — NICOTINE 1 CARTRIDGE: 4 INHALANT RESPIRATORY (INHALATION) at 21:03

## 2017-01-01 RX ADMIN — THERA TABS 1 TABLET: TAB at 08:44

## 2017-01-01 RX ADMIN — NICOTINE POLACRILEX 2 MG: 2 GUM, CHEWING ORAL at 14:03

## 2017-01-01 RX ADMIN — LACTULOSE 20 G: 10 SOLUTION ORAL at 17:44

## 2017-01-01 RX ADMIN — ACETAMINOPHEN 975 MG: 325 TABLET, FILM COATED ORAL at 01:07

## 2017-01-01 RX ADMIN — LACTULOSE 20 G: 10 SOLUTION ORAL at 12:20

## 2017-01-01 RX ADMIN — SULFAMETHOXAZOLE AND TRIMETHOPRIM 1 TABLET: 400; 80 TABLET ORAL at 17:18

## 2017-01-01 RX ADMIN — SULFAMETHOXAZOLE AND TRIMETHOPRIM 400 MG: 200; 40 SUSPENSION ORAL at 21:14

## 2017-01-01 RX ADMIN — KETOROLAC TROMETHAMINE 10 MG: 10 TABLET, FILM COATED ORAL at 20:36

## 2017-01-01 RX ADMIN — LACTULOSE 20 G: 20 SOLUTION ORAL at 07:48

## 2017-01-01 RX ADMIN — CEFTRIAXONE 2 G: 2 INJECTION, POWDER, FOR SOLUTION INTRAMUSCULAR; INTRAVENOUS at 06:34

## 2017-01-01 RX ADMIN — SULFAMETHOXAZOLE AND TRIMETHOPRIM 2 TABLET: 800; 160 TABLET ORAL at 17:34

## 2017-01-01 RX ADMIN — SODIUM CHLORIDE, POTASSIUM CHLORIDE, SODIUM LACTATE AND CALCIUM CHLORIDE 2313 ML: 600; 310; 30; 20 INJECTION, SOLUTION INTRAVENOUS at 19:40

## 2017-01-01 RX ADMIN — TAZOBACTAM SODIUM AND PIPERACILLIN SODIUM 4.5 G: 500; 4 INJECTION, SOLUTION INTRAVENOUS at 16:33

## 2017-01-01 RX ADMIN — FUROSEMIDE 20 MG: 20 TABLET ORAL at 09:27

## 2017-01-01 RX ADMIN — BUPRENORPHINE HYDROCHLORIDE, NALOXONE HYDROCHLORIDE 3 FILM: 8; 2 FILM, SOLUBLE BUCCAL; SUBLINGUAL at 08:14

## 2017-01-01 RX ADMIN — TAZOBACTAM SODIUM AND PIPERACILLIN SODIUM 4.5 G: 500; 4 INJECTION, SOLUTION INTRAVENOUS at 21:56

## 2017-01-01 RX ADMIN — BUPRENORPHINE HYDROCHLORIDE, NALOXONE HYDROCHLORIDE 3 FILM: 8; 2 FILM, SOLUBLE BUCCAL; SUBLINGUAL at 08:54

## 2017-01-01 RX ADMIN — PROPOFOL 30 MG: 10 INJECTION, EMULSION INTRAVENOUS at 13:07

## 2017-01-01 RX ADMIN — IPRATROPIUM BROMIDE AND ALBUTEROL SULFATE 3 ML: .5; 3 SOLUTION RESPIRATORY (INHALATION) at 23:40

## 2017-01-01 RX ADMIN — METHYLPREDNISOLONE SODIUM SUCCINATE 62.5 MG: 125 INJECTION, POWDER, FOR SOLUTION INTRAMUSCULAR; INTRAVENOUS at 00:57

## 2017-01-01 RX ADMIN — LACTULOSE 20 G: 20 SOLUTION ORAL at 20:58

## 2017-01-01 RX ADMIN — HEPARIN SODIUM 5000 UNITS: 10000 INJECTION, SOLUTION INTRAVENOUS; SUBCUTANEOUS at 21:10

## 2017-01-01 RX ADMIN — RIFAXIMIN 550 MG: 550 TABLET ORAL at 21:01

## 2017-01-01 RX ADMIN — Medication 40 MG: at 08:34

## 2017-01-01 RX ADMIN — TAZOBACTAM SODIUM AND PIPERACILLIN SODIUM 4.5 G: 500; 4 INJECTION, SOLUTION INTRAVENOUS at 11:27

## 2017-01-01 RX ADMIN — IRON 325 MG: 65 TABLET ORAL at 09:16

## 2017-01-01 RX ADMIN — MICONAZOLE NITRATE: 20 CREAM TOPICAL at 17:53

## 2017-01-01 RX ADMIN — FOLIC ACID 1 MG: 1 TABLET ORAL at 08:28

## 2017-01-01 RX ADMIN — Medication 100 MG: at 10:13

## 2017-01-01 RX ADMIN — METHOCARBAMOL 1000 MG: 500 TABLET ORAL at 21:09

## 2017-01-01 RX ADMIN — ATORVASTATIN CALCIUM 20 MG: 20 TABLET, FILM COATED ORAL at 09:04

## 2017-01-01 RX ADMIN — HYDROXYZINE HYDROCHLORIDE 10 MG: 10 TABLET ORAL at 02:54

## 2017-01-01 RX ADMIN — NICOTINE POLACRILEX 2 MG: 2 GUM, CHEWING ORAL at 17:13

## 2017-01-01 RX ADMIN — Medication: at 22:42

## 2017-01-01 RX ADMIN — SODIUM CHLORIDE: 9 INJECTION, SOLUTION INTRAVENOUS at 06:51

## 2017-01-01 RX ADMIN — LACTULOSE 20 G: 10 SOLUTION ORAL at 16:26

## 2017-01-01 RX ADMIN — GABAPENTIN 600 MG: 600 TABLET, FILM COATED ORAL at 09:09

## 2017-01-01 RX ADMIN — SULFAMETHOXAZOLE AND TRIMETHOPRIM 1 TABLET: 400; 80 TABLET ORAL at 08:51

## 2017-01-01 RX ADMIN — ASPIRIN 81 MG: 81 TABLET, COATED ORAL at 10:28

## 2017-01-01 RX ADMIN — HYDROXYZINE HYDROCHLORIDE 10 MG: 10 TABLET ORAL at 01:14

## 2017-01-01 RX ADMIN — IPRATROPIUM BROMIDE AND ALBUTEROL SULFATE 3 ML: .5; 3 SOLUTION RESPIRATORY (INHALATION) at 19:50

## 2017-01-01 RX ADMIN — Medication 1 PACKET: at 08:35

## 2017-01-01 RX ADMIN — NICOTINE POLACRILEX 2 MG: 2 GUM, CHEWING ORAL at 17:18

## 2017-01-01 RX ADMIN — INSULIN HUMAN 10 UNITS: 100 INJECTION, SUSPENSION SUBCUTANEOUS at 08:59

## 2017-01-01 RX ADMIN — UMECLIDINIUM 1 PUFF: 62.5 AEROSOL, POWDER ORAL at 09:24

## 2017-01-01 RX ADMIN — LEVOFLOXACIN 750 MG: 5 INJECTION, SOLUTION INTRAVENOUS at 20:47

## 2017-01-01 RX ADMIN — GABAPENTIN 600 MG: 600 TABLET, FILM COATED ORAL at 22:19

## 2017-01-01 RX ADMIN — SULFAMETHOXAZOLE AND TRIMETHOPRIM 2 TABLET: 800; 160 TABLET ORAL at 20:58

## 2017-01-01 RX ADMIN — SULFAMETHOXAZOLE AND TRIMETHOPRIM 2 TABLET: 800; 160 TABLET ORAL at 21:07

## 2017-01-01 RX ADMIN — INSULIN GLARGINE 20 UNITS: 100 INJECTION, SOLUTION SUBCUTANEOUS at 10:24

## 2017-01-01 RX ADMIN — Medication 50 MG: at 08:12

## 2017-01-01 RX ADMIN — NICOTINE POLACRILEX 2 MG: 2 GUM, CHEWING ORAL at 21:06

## 2017-01-01 RX ADMIN — GABAPENTIN 600 MG: 600 TABLET, FILM COATED ORAL at 22:07

## 2017-01-01 RX ADMIN — RIFAXIMIN 550 MG: 550 TABLET ORAL at 20:21

## 2017-01-01 RX ADMIN — NICOTINE POLACRILEX 2 MG: 2 GUM, CHEWING ORAL at 07:00

## 2017-01-01 RX ADMIN — NICOTINE 1 PATCH: 21 PATCH, EXTENDED RELEASE TRANSDERMAL at 08:00

## 2017-01-01 RX ADMIN — TRAZODONE HYDROCHLORIDE 150 MG: 150 TABLET ORAL at 20:52

## 2017-01-01 RX ADMIN — SULFAMETHOXAZOLE AND TRIMETHOPRIM 1 TABLET: 400; 80 TABLET ORAL at 21:38

## 2017-01-01 RX ADMIN — METHOCARBAMOL 1000 MG: 500 TABLET ORAL at 12:20

## 2017-01-01 RX ADMIN — FERROUS SULFATE TAB 325 MG (65 MG ELEMENTAL FE) 325 MG: 325 (65 FE) TAB at 09:51

## 2017-01-01 RX ADMIN — GABAPENTIN 600 MG: 600 TABLET, FILM COATED ORAL at 10:13

## 2017-01-01 RX ADMIN — NYSTATIN 500000 UNITS: 100000 SUSPENSION ORAL at 07:48

## 2017-01-01 RX ADMIN — HYDROMORPHONE HYDROCHLORIDE 1 MG: 1 SOLUTION ORAL at 14:53

## 2017-01-01 RX ADMIN — INSULIN ASPART 8 UNITS: 100 INJECTION, SOLUTION INTRAVENOUS; SUBCUTANEOUS at 16:07

## 2017-01-01 RX ADMIN — MORPHINE SULFATE 2 MG: 2 INJECTION, SOLUTION INTRAMUSCULAR; INTRAVENOUS at 12:36

## 2017-01-01 RX ADMIN — GUAIFENESIN 600 MG: 600 TABLET, EXTENDED RELEASE ORAL at 21:37

## 2017-01-01 RX ADMIN — OXYCODONE HYDROCHLORIDE AND ACETAMINOPHEN 500 MG: 500 TABLET ORAL at 07:59

## 2017-01-01 RX ADMIN — LACTULOSE 20 G: 10 SOLUTION ORAL at 07:54

## 2017-01-01 RX ADMIN — FLUOXETINE 40 MG: 20 CAPSULE ORAL at 07:49

## 2017-01-01 RX ADMIN — GABAPENTIN 600 MG: 250 SUSPENSION ORAL at 17:40

## 2017-01-01 RX ADMIN — INSULIN ASPART 3 UNITS: 100 INJECTION, SOLUTION INTRAVENOUS; SUBCUTANEOUS at 20:23

## 2017-01-01 RX ADMIN — FLUOXETINE 40 MG: 20 CAPSULE ORAL at 08:51

## 2017-01-01 RX ADMIN — INSULIN HUMAN 20 UNITS: 100 INJECTION, SUSPENSION SUBCUTANEOUS at 07:49

## 2017-01-01 RX ADMIN — METHOCARBAMOL 1000 MG: 500 TABLET ORAL at 14:03

## 2017-01-01 RX ADMIN — IPRATROPIUM BROMIDE AND ALBUTEROL SULFATE 3 ML: .5; 3 SOLUTION RESPIRATORY (INHALATION) at 12:09

## 2017-01-01 RX ADMIN — SULFAMETHOXAZOLE AND TRIMETHOPRIM 1 TABLET: 400; 80 TABLET ORAL at 18:24

## 2017-01-01 RX ADMIN — VANCOMYCIN HYDROCHLORIDE 1750 MG: 1 INJECTION, POWDER, LYOPHILIZED, FOR SOLUTION INTRAVENOUS at 13:03

## 2017-01-01 RX ADMIN — FOLIC ACID 1 MG: 1 TABLET ORAL at 10:39

## 2017-01-01 RX ADMIN — RIFAXIMIN 550 MG: 550 TABLET ORAL at 21:40

## 2017-01-01 RX ADMIN — ATORVASTATIN CALCIUM 20 MG: 20 TABLET, FILM COATED ORAL at 11:38

## 2017-01-01 RX ADMIN — RIFAXIMIN 550 MG: 550 TABLET ORAL at 11:38

## 2017-01-01 RX ADMIN — HYDROMORPHONE HYDROCHLORIDE 1 MG: 1 SOLUTION ORAL at 20:50

## 2017-01-01 RX ADMIN — MORPHINE SULFATE 2 MG: 2 INJECTION, SOLUTION INTRAMUSCULAR; INTRAVENOUS at 09:44

## 2017-01-01 RX ADMIN — HEPARIN SODIUM 5000 UNITS: 10000 INJECTION, SOLUTION INTRAVENOUS; SUBCUTANEOUS at 20:18

## 2017-01-01 RX ADMIN — TAZOBACTAM SODIUM AND PIPERACILLIN SODIUM 4.5 G: 500; 4 INJECTION, SOLUTION INTRAVENOUS at 22:35

## 2017-01-01 RX ADMIN — NICOTINE POLACRILEX 2 MG: 2 GUM, CHEWING ORAL at 10:21

## 2017-01-01 RX ADMIN — SODIUM CHLORIDE 250 MG: 9 INJECTION, SOLUTION INTRAVENOUS at 23:42

## 2017-01-01 RX ADMIN — NICOTINE 1 PATCH: 21 PATCH, EXTENDED RELEASE TRANSDERMAL at 11:31

## 2017-01-01 RX ADMIN — METHYLPREDNISOLONE SODIUM SUCCINATE 40 MG: 40 INJECTION, POWDER, FOR SOLUTION INTRAMUSCULAR; INTRAVENOUS at 19:50

## 2017-01-01 RX ADMIN — PROPOFOL 30 MG: 10 INJECTION, EMULSION INTRAVENOUS at 13:06

## 2017-01-01 RX ADMIN — FUROSEMIDE 20 MG: 20 TABLET ORAL at 08:19

## 2017-01-01 RX ADMIN — FLUOXETINE 40 MG: 20 CAPSULE ORAL at 09:46

## 2017-01-01 RX ADMIN — GABAPENTIN 600 MG: 600 TABLET, FILM COATED ORAL at 07:48

## 2017-01-01 RX ADMIN — SULFAMETHOXAZOLE AND TRIMETHOPRIM 400 MG: 200; 40 SUSPENSION ORAL at 16:48

## 2017-01-01 RX ADMIN — RIFAXIMIN 550 MG: 550 TABLET ORAL at 10:42

## 2017-01-01 RX ADMIN — SULFAMETHOXAZOLE AND TRIMETHOPRIM 1 TABLET: 400; 80 TABLET ORAL at 08:31

## 2017-01-01 RX ADMIN — TAZOBACTAM SODIUM AND PIPERACILLIN SODIUM 4.5 G: 500; 4 INJECTION, SOLUTION INTRAVENOUS at 12:17

## 2017-01-01 RX ADMIN — IPRATROPIUM BROMIDE AND ALBUTEROL SULFATE 6 ML: .5; 3 SOLUTION RESPIRATORY (INHALATION) at 18:53

## 2017-01-01 RX ADMIN — ASPIRIN 81 MG: 81 TABLET, COATED ORAL at 07:58

## 2017-01-01 RX ADMIN — OXYCODONE HYDROCHLORIDE AND ACETAMINOPHEN 500 MG: 500 TABLET ORAL at 08:02

## 2017-01-01 RX ADMIN — SULFAMETHOXAZOLE AND TRIMETHOPRIM 400 MG: 200; 40 SUSPENSION ORAL at 02:48

## 2017-01-01 RX ADMIN — MULTIPLE VITAMINS W/ MINERALS TAB 1 TABLET: TAB at 08:11

## 2017-01-01 RX ADMIN — KETOROLAC TROMETHAMINE 30 MG: 30 INJECTION, SOLUTION INTRAMUSCULAR at 00:45

## 2017-01-01 RX ADMIN — METHYLPREDNISOLONE SODIUM SUCCINATE 40 MG: 40 INJECTION, POWDER, FOR SOLUTION INTRAMUSCULAR; INTRAVENOUS at 20:28

## 2017-01-01 RX ADMIN — METHOCARBAMOL 1000 MG: 500 TABLET ORAL at 18:57

## 2017-01-01 RX ADMIN — FLUOXETINE 40 MG: 20 CAPSULE ORAL at 17:07

## 2017-01-01 RX ADMIN — LACTULOSE 30 ML: 10 SOLUTION ORAL at 16:37

## 2017-01-01 RX ADMIN — VANCOMYCIN HYDROCHLORIDE 1750 MG: 10 INJECTION, POWDER, LYOPHILIZED, FOR SOLUTION INTRAVENOUS at 21:42

## 2017-01-01 RX ADMIN — INSULIN GLARGINE 20 UNITS: 100 INJECTION, SOLUTION SUBCUTANEOUS at 10:21

## 2017-01-01 RX ADMIN — INSULIN GLARGINE 20 UNITS: 100 INJECTION, SOLUTION SUBCUTANEOUS at 08:26

## 2017-01-01 RX ADMIN — NICOTINE POLACRILEX 2 MG: 2 GUM, CHEWING ORAL at 15:33

## 2017-01-01 RX ADMIN — SULFAMETHOXAZOLE AND TRIMETHOPRIM 1 TABLET: 800; 160 TABLET ORAL at 09:05

## 2017-01-01 RX ADMIN — ATORVASTATIN CALCIUM 20 MG: 20 TABLET, FILM COATED ORAL at 21:36

## 2017-01-01 RX ADMIN — FLUOXETINE HYDROCHLORIDE 40 MG: 20 LIQUID ORAL at 08:17

## 2017-01-01 RX ADMIN — OMEPRAZOLE 20 MG: 20 CAPSULE, DELAYED RELEASE ORAL at 17:43

## 2017-01-01 RX ADMIN — ANALGESIC BALM: 1.74; 4.06 OINTMENT TOPICAL at 12:10

## 2017-01-01 RX ADMIN — Medication 100 MG: at 16:02

## 2017-01-01 RX ADMIN — INSULIN ASPART 3 UNITS: 100 INJECTION, SOLUTION INTRAVENOUS; SUBCUTANEOUS at 20:54

## 2017-01-01 RX ADMIN — METHOCARBAMOL 500 MG: 500 TABLET ORAL at 16:10

## 2017-01-01 RX ADMIN — MORPHINE SULFATE 4 MG: 2 INJECTION, SOLUTION INTRAMUSCULAR; INTRAVENOUS at 02:16

## 2017-01-01 RX ADMIN — Medication 40 MG: at 08:42

## 2017-01-01 RX ADMIN — LACTULOSE 20 G: 20 SOLUTION ORAL at 17:00

## 2017-01-01 RX ADMIN — LACTULOSE 20 G: 20 SOLUTION ORAL at 08:28

## 2017-01-01 RX ADMIN — VANCOMYCIN HYDROCHLORIDE 1000 MG: 1 INJECTION, SOLUTION INTRAVENOUS at 19:58

## 2017-01-01 RX ADMIN — NICOTINE 1 CARTRIDGE: 4 INHALANT RESPIRATORY (INHALATION) at 11:11

## 2017-01-01 RX ADMIN — GABAPENTIN 600 MG: 600 TABLET, FILM COATED ORAL at 16:22

## 2017-01-01 RX ADMIN — ATORVASTATIN CALCIUM 20 MG: 20 TABLET, FILM COATED ORAL at 21:51

## 2017-01-01 RX ADMIN — MORPHINE SULFATE 2 MG: 2 INJECTION, SOLUTION INTRAMUSCULAR; INTRAVENOUS at 23:47

## 2017-01-01 RX ADMIN — SODIUM CHLORIDE: 9 INJECTION, SOLUTION INTRAVENOUS at 16:04

## 2017-01-01 RX ADMIN — SPIRONOLACTONE 50 MG: 25 TABLET ORAL at 08:20

## 2017-01-01 RX ADMIN — METHYLPREDNISOLONE SODIUM SUCCINATE 62.5 MG: 125 INJECTION, POWDER, FOR SOLUTION INTRAMUSCULAR; INTRAVENOUS at 08:52

## 2017-01-01 RX ADMIN — NICOTINE POLACRILEX 2 MG: 2 GUM, CHEWING ORAL at 14:53

## 2017-01-01 RX ADMIN — TRAZODONE HYDROCHLORIDE 50 MG: 50 TABLET ORAL at 03:10

## 2017-01-01 RX ADMIN — HYDROMORPHONE HYDROCHLORIDE 1 MG: 1 SOLUTION ORAL at 04:12

## 2017-01-01 RX ADMIN — METHOCARBAMOL 500 MG: 500 TABLET ORAL at 16:03

## 2017-01-01 RX ADMIN — LACTULOSE 20 G: 10 SOLUTION ORAL at 22:26

## 2017-01-01 RX ADMIN — ACETAMINOPHEN 650 MG: 325 TABLET, FILM COATED ORAL at 13:30

## 2017-01-01 RX ADMIN — IPRATROPIUM BROMIDE AND ALBUTEROL SULFATE 3 ML: .5; 3 SOLUTION RESPIRATORY (INHALATION) at 20:04

## 2017-01-01 RX ADMIN — LACTULOSE 20 G: 10 SOLUTION ORAL at 00:57

## 2017-01-01 RX ADMIN — OMEPRAZOLE 20 MG: 20 CAPSULE, DELAYED RELEASE ORAL at 09:10

## 2017-01-01 RX ADMIN — VANCOMYCIN HYDROCHLORIDE 1750 MG: 1 INJECTION, POWDER, LYOPHILIZED, FOR SOLUTION INTRAVENOUS at 13:35

## 2017-01-01 RX ADMIN — RIFAXIMIN 550 MG: 550 TABLET ORAL at 12:20

## 2017-01-01 RX ADMIN — NICOTINE POLACRILEX 2 MG: 2 GUM, CHEWING ORAL at 13:06

## 2017-01-01 RX ADMIN — METRONIDAZOLE 500 MG: 500 TABLET ORAL at 22:42

## 2017-01-01 RX ADMIN — LIDOCAINE HYDROCHLORIDE 9 ML: 40 SOLUTION TOPICAL at 09:51

## 2017-01-01 RX ADMIN — IPRATROPIUM BROMIDE AND ALBUTEROL SULFATE 3 ML: .5; 3 SOLUTION RESPIRATORY (INHALATION) at 00:19

## 2017-01-01 RX ADMIN — LACTULOSE 20 G: 10 SOLUTION ORAL at 16:27

## 2017-01-01 RX ADMIN — IPRATROPIUM BROMIDE AND ALBUTEROL SULFATE 3 ML: .5; 3 SOLUTION RESPIRATORY (INHALATION) at 08:40

## 2017-01-01 RX ADMIN — THERA TABS 1 TABLET: TAB at 07:48

## 2017-01-01 RX ADMIN — TAZOBACTAM SODIUM AND PIPERACILLIN SODIUM 4.5 G: 500; 4 INJECTION, SOLUTION INTRAVENOUS at 09:05

## 2017-01-01 RX ADMIN — PANTOPRAZOLE SODIUM 40 MG: 40 INJECTION, POWDER, FOR SOLUTION INTRAVENOUS at 06:39

## 2017-01-01 RX ADMIN — INSULIN GLARGINE 20 UNITS: 100 INJECTION, SOLUTION SUBCUTANEOUS at 10:11

## 2017-01-01 RX ADMIN — GABAPENTIN 600 MG: 600 TABLET, FILM COATED ORAL at 20:53

## 2017-01-01 RX ADMIN — IPRATROPIUM BROMIDE AND ALBUTEROL SULFATE 3 ML: .5; 3 SOLUTION RESPIRATORY (INHALATION) at 03:28

## 2017-01-01 RX ADMIN — MORPHINE SULFATE 2 MG: 2 INJECTION, SOLUTION INTRAMUSCULAR; INTRAVENOUS at 02:10

## 2017-01-01 RX ADMIN — METHYLPREDNISOLONE SODIUM SUCCINATE 20 MG: 40 INJECTION, POWDER, LYOPHILIZED, FOR SOLUTION INTRAMUSCULAR; INTRAVENOUS at 11:09

## 2017-01-01 RX ADMIN — POTASSIUM CHLORIDE 40 MEQ: 1500 TABLET, EXTENDED RELEASE ORAL at 09:48

## 2017-01-01 RX ADMIN — BUPRENORPHINE HYDROCHLORIDE, NALOXONE HYDROCHLORIDE 3 FILM: 8; 2 FILM, SOLUBLE BUCCAL; SUBLINGUAL at 08:09

## 2017-01-01 RX ADMIN — HYDROMORPHONE HYDROCHLORIDE 1 MG: 1 SOLUTION ORAL at 23:42

## 2017-01-01 RX ADMIN — GABAPENTIN 600 MG: 600 TABLET, FILM COATED ORAL at 12:06

## 2017-01-01 RX ADMIN — IPRATROPIUM BROMIDE AND ALBUTEROL SULFATE 3 ML: .5; 3 SOLUTION RESPIRATORY (INHALATION) at 15:40

## 2017-01-01 RX ADMIN — INSULIN ASPART 4 UNITS: 100 INJECTION, SOLUTION INTRAVENOUS; SUBCUTANEOUS at 18:19

## 2017-01-01 RX ADMIN — ATORVASTATIN CALCIUM 20 MG: 20 TABLET, FILM COATED ORAL at 09:16

## 2017-01-01 RX ADMIN — INSULIN GLARGINE 20 UNITS: 100 INJECTION, SOLUTION SUBCUTANEOUS at 22:18

## 2017-01-01 RX ADMIN — NICOTINE POLACRILEX 2 MG: 2 GUM, CHEWING ORAL at 11:53

## 2017-01-01 RX ADMIN — OXYCODONE HYDROCHLORIDE AND ACETAMINOPHEN 500 MG: 500 TABLET ORAL at 09:17

## 2017-01-01 RX ADMIN — AZITHROMYCIN MONOHYDRATE 500 MG: 500 INJECTION, POWDER, LYOPHILIZED, FOR SOLUTION INTRAVENOUS at 12:41

## 2017-01-01 RX ADMIN — IPRATROPIUM BROMIDE AND ALBUTEROL SULFATE 3 ML: .5; 3 SOLUTION RESPIRATORY (INHALATION) at 19:51

## 2017-01-01 RX ADMIN — Medication: at 09:29

## 2017-01-01 RX ADMIN — SODIUM CHLORIDE 4 UNITS/HR: 9 INJECTION, SOLUTION INTRAVENOUS at 12:02

## 2017-01-01 RX ADMIN — IPRATROPIUM BROMIDE AND ALBUTEROL SULFATE 3 ML: .5; 3 SOLUTION RESPIRATORY (INHALATION) at 11:16

## 2017-01-01 RX ADMIN — NICOTINE 1 CARTRIDGE: 4 INHALANT RESPIRATORY (INHALATION) at 08:45

## 2017-01-01 RX ADMIN — TRAZODONE HYDROCHLORIDE 50 MG: 50 TABLET ORAL at 20:57

## 2017-01-01 RX ADMIN — TAZOBACTAM SODIUM AND PIPERACILLIN SODIUM 4.5 G: 500; 4 INJECTION, SOLUTION INTRAVENOUS at 04:47

## 2017-01-01 RX ADMIN — METHYLPREDNISOLONE SODIUM SUCCINATE 20 MG: 40 INJECTION, POWDER, LYOPHILIZED, FOR SOLUTION INTRAMUSCULAR; INTRAVENOUS at 11:59

## 2017-01-01 RX ADMIN — TAZOBACTAM SODIUM AND PIPERACILLIN SODIUM 4.5 G: 500; 4 INJECTION, SOLUTION INTRAVENOUS at 16:34

## 2017-01-01 RX ADMIN — NICOTINE POLACRILEX 2 MG: 2 GUM, CHEWING ORAL at 11:58

## 2017-01-01 RX ADMIN — METHOCARBAMOL 500 MG: 500 TABLET ORAL at 16:11

## 2017-01-01 RX ADMIN — INSULIN GLARGINE 15 UNITS: 100 INJECTION, SOLUTION SUBCUTANEOUS at 08:31

## 2017-01-01 RX ADMIN — IPRATROPIUM BROMIDE AND ALBUTEROL SULFATE 3 ML: .5; 3 SOLUTION RESPIRATORY (INHALATION) at 20:01

## 2017-01-01 RX ADMIN — GABAPENTIN 600 MG: 600 TABLET, FILM COATED ORAL at 07:58

## 2017-01-01 RX ADMIN — RIFAXIMIN 550 MG: 550 TABLET ORAL at 23:14

## 2017-01-01 RX ADMIN — ASPIRIN 81 MG: 81 TABLET, COATED ORAL at 10:39

## 2017-01-01 RX ADMIN — IPRATROPIUM BROMIDE AND ALBUTEROL SULFATE 3 ML: .5; 3 SOLUTION RESPIRATORY (INHALATION) at 22:58

## 2017-01-01 RX ADMIN — LIDOCAINE HYDROCHLORIDE,EPINEPHRINE BITARTRATE 5 ML: 10; .01 INJECTION, SOLUTION INFILTRATION; PERINEURAL at 13:37

## 2017-01-01 RX ADMIN — NICOTINE POLACRILEX 2 MG: 2 GUM, CHEWING ORAL at 19:19

## 2017-01-01 RX ADMIN — MORPHINE SULFATE 4 MG: 2 INJECTION, SOLUTION INTRAMUSCULAR; INTRAVENOUS at 03:53

## 2017-01-01 RX ADMIN — METHOCARBAMOL 500 MG: 500 TABLET ORAL at 21:51

## 2017-01-01 RX ADMIN — METHOCARBAMOL 1000 MG: 500 TABLET ORAL at 17:20

## 2017-01-01 RX ADMIN — LACTULOSE 30 ML: 10 SOLUTION ORAL at 20:49

## 2017-01-01 RX ADMIN — VANCOMYCIN HYDROCHLORIDE 1000 MG: 1 INJECTION, SOLUTION INTRAVENOUS at 11:44

## 2017-01-01 RX ADMIN — PREDNISONE 50 MG: 50 TABLET ORAL at 09:05

## 2017-01-01 RX ADMIN — Medication 15 ML: at 08:23

## 2017-01-01 RX ADMIN — SODIUM CHLORIDE: 9 INJECTION, SOLUTION INTRAVENOUS at 17:12

## 2017-01-01 RX ADMIN — OMEPRAZOLE 20 MG: 20 CAPSULE, DELAYED RELEASE ORAL at 08:02

## 2017-01-01 RX ADMIN — PROPOFOL 200 MG: 10 INJECTION, EMULSION INTRAVENOUS at 08:45

## 2017-01-01 RX ADMIN — METHOCARBAMOL 1000 MG: 500 TABLET ORAL at 12:51

## 2017-01-01 RX ADMIN — NICOTINE POLACRILEX 2 MG: 2 GUM, CHEWING ORAL at 19:35

## 2017-01-01 RX ADMIN — TAZOBACTAM SODIUM AND PIPERACILLIN SODIUM 4.5 G: 500; 4 INJECTION, SOLUTION INTRAVENOUS at 09:21

## 2017-01-01 RX ADMIN — SULFAMETHOXAZOLE AND TRIMETHOPRIM 400 MG: 80; 16 INJECTION, SOLUTION, CONCENTRATE INTRAVENOUS at 16:38

## 2017-01-01 RX ADMIN — IPRATROPIUM BROMIDE AND ALBUTEROL SULFATE 3 ML: .5; 3 SOLUTION RESPIRATORY (INHALATION) at 21:01

## 2017-01-01 RX ADMIN — NICOTINE POLACRILEX 2 MG: 2 GUM, CHEWING ORAL at 17:31

## 2017-01-01 RX ADMIN — FUROSEMIDE 20 MG: 10 INJECTION, SOLUTION INTRAVENOUS at 15:23

## 2017-01-01 RX ADMIN — NICOTINE POLACRILEX 2 MG: 2 GUM, CHEWING ORAL at 17:32

## 2017-01-01 RX ADMIN — HYDROXYZINE HYDROCHLORIDE 10 MG: 10 TABLET ORAL at 06:31

## 2017-01-01 RX ADMIN — METHOCARBAMOL 1000 MG: 500 TABLET ORAL at 13:46

## 2017-01-01 RX ADMIN — IPRATROPIUM BROMIDE AND ALBUTEROL SULFATE 3 ML: .5; 3 SOLUTION RESPIRATORY (INHALATION) at 07:33

## 2017-01-01 RX ADMIN — OMEPRAZOLE 20 MG: 20 CAPSULE, DELAYED RELEASE ORAL at 09:46

## 2017-01-01 RX ADMIN — NICOTINE POLACRILEX 2 MG: 2 GUM, CHEWING ORAL at 13:12

## 2017-01-01 RX ADMIN — NICOTINE POLACRILEX 2 MG: 2 GUM, CHEWING ORAL at 22:20

## 2017-01-01 RX ADMIN — INSULIN ASPART 4 UNITS: 100 INJECTION, SOLUTION INTRAVENOUS; SUBCUTANEOUS at 00:21

## 2017-01-01 RX ADMIN — ENOXAPARIN SODIUM 40 MG: 40 INJECTION SUBCUTANEOUS at 12:46

## 2017-01-01 RX ADMIN — CEFTRIAXONE 2 G: 2 INJECTION, POWDER, FOR SOLUTION INTRAMUSCULAR; INTRAVENOUS at 04:53

## 2017-01-01 RX ADMIN — SODIUM CHLORIDE: 9 INJECTION, SOLUTION INTRAVENOUS at 01:21

## 2017-01-01 RX ADMIN — METHYLPREDNISOLONE SODIUM SUCCINATE 40 MG: 40 INJECTION, POWDER, FOR SOLUTION INTRAMUSCULAR; INTRAVENOUS at 00:50

## 2017-01-01 RX ADMIN — ASPIRIN 81 MG: 81 TABLET, COATED ORAL at 08:57

## 2017-01-01 RX ADMIN — SENNOSIDES AND DOCUSATE SODIUM 1 TABLET: 8.6; 5 TABLET ORAL at 22:08

## 2017-01-01 RX ADMIN — INSULIN ASPART 4 UNITS: 100 INJECTION, SOLUTION INTRAVENOUS; SUBCUTANEOUS at 20:37

## 2017-01-01 RX ADMIN — NICOTINE 1 PATCH: 21 PATCH, EXTENDED RELEASE TRANSDERMAL at 08:33

## 2017-01-01 RX ADMIN — BUPRENORPHINE HYDROCHLORIDE, NALOXONE HYDROCHLORIDE 2 FILM: 8; 2 FILM, SOLUBLE BUCCAL; SUBLINGUAL at 17:01

## 2017-01-01 RX ADMIN — TAZOBACTAM SODIUM AND PIPERACILLIN SODIUM 4.5 G: 500; 4 INJECTION, SOLUTION INTRAVENOUS at 09:49

## 2017-01-01 RX ADMIN — THERA TABS 1 TABLET: TAB at 08:28

## 2017-01-01 RX ADMIN — BUPRENORPHINE HYDROCHLORIDE, NALOXONE HYDROCHLORIDE 3 FILM: 8; 2 FILM, SOLUBLE BUCCAL; SUBLINGUAL at 10:54

## 2017-01-01 RX ADMIN — VANCOMYCIN HYDROCHLORIDE 1000 MG: 1 INJECTION, SOLUTION INTRAVENOUS at 05:35

## 2017-01-01 RX ADMIN — SULFAMETHOXAZOLE AND TRIMETHOPRIM 400 MG: 200; 40 SUSPENSION ORAL at 21:42

## 2017-01-01 RX ADMIN — SULFAMETHOXAZOLE AND TRIMETHOPRIM 2 TABLET: 800; 160 TABLET ORAL at 21:38

## 2017-01-01 RX ADMIN — HEPARIN SODIUM 5000 UNITS: 10000 INJECTION, SOLUTION INTRAVENOUS; SUBCUTANEOUS at 22:41

## 2017-01-01 RX ADMIN — IPRATROPIUM BROMIDE AND ALBUTEROL SULFATE 3 ML: .5; 3 SOLUTION RESPIRATORY (INHALATION) at 23:47

## 2017-01-01 RX ADMIN — MORPHINE SULFATE 2 MG: 2 INJECTION, SOLUTION INTRAMUSCULAR; INTRAVENOUS at 20:45

## 2017-01-01 RX ADMIN — IPRATROPIUM BROMIDE AND ALBUTEROL SULFATE 3 ML: .5; 3 SOLUTION RESPIRATORY (INHALATION) at 11:51

## 2017-01-01 RX ADMIN — NICOTINE 1 PATCH: 21 PATCH, EXTENDED RELEASE TRANSDERMAL at 08:48

## 2017-01-01 RX ADMIN — SODIUM CHLORIDE 86 ML: 9 INJECTION, SOLUTION INTRAVENOUS at 19:36

## 2017-01-01 RX ADMIN — PANTOPRAZOLE SODIUM 40 MG: 40 TABLET, DELAYED RELEASE ORAL at 10:41

## 2017-01-01 RX ADMIN — PANTOPRAZOLE SODIUM 40 MG: 40 TABLET, DELAYED RELEASE ORAL at 09:28

## 2017-01-01 RX ADMIN — LACTULOSE 30 ML: 10 SOLUTION ORAL at 21:21

## 2017-01-01 RX ADMIN — TAZOBACTAM SODIUM AND PIPERACILLIN SODIUM 4.5 G: 500; 4 INJECTION, SOLUTION INTRAVENOUS at 11:31

## 2017-01-01 RX ADMIN — ATORVASTATIN CALCIUM 20 MG: 20 TABLET, FILM COATED ORAL at 08:02

## 2017-01-01 RX ADMIN — IPRATROPIUM BROMIDE AND ALBUTEROL SULFATE 3 ML: .5; 3 SOLUTION RESPIRATORY (INHALATION) at 23:44

## 2017-01-01 RX ADMIN — ALBUTEROL SULFATE 2.5 MG: 2.5 SOLUTION RESPIRATORY (INHALATION) at 11:31

## 2017-01-01 RX ADMIN — TAZOBACTAM SODIUM AND PIPERACILLIN SODIUM 4.5 G: 500; 4 INJECTION, SOLUTION INTRAVENOUS at 22:37

## 2017-01-01 RX ADMIN — GABAPENTIN 600 MG: 600 TABLET, FILM COATED ORAL at 15:25

## 2017-01-01 RX ADMIN — METHOCARBAMOL 500 MG: 500 TABLET ORAL at 16:34

## 2017-01-01 RX ADMIN — LORAZEPAM 4 MG: 2 INJECTION INTRAMUSCULAR; INTRAVENOUS at 13:10

## 2017-01-01 RX ADMIN — HYDROMORPHONE HYDROCHLORIDE 1 MG: 1 SOLUTION ORAL at 11:27

## 2017-01-01 RX ADMIN — NICOTINE 1 PATCH: 21 PATCH, EXTENDED RELEASE TRANSDERMAL at 09:23

## 2017-01-01 RX ADMIN — ASPIRIN 81 MG 81 MG: 81 TABLET ORAL at 08:36

## 2017-01-01 RX ADMIN — TAZOBACTAM SODIUM AND PIPERACILLIN SODIUM 4.5 G: 500; 4 INJECTION, SOLUTION INTRAVENOUS at 08:34

## 2017-01-01 RX ADMIN — FOLIC ACID 1 MG: 1 TABLET ORAL at 08:58

## 2017-01-01 RX ADMIN — INSULIN ASPART 7 UNITS: 100 INJECTION, SOLUTION INTRAVENOUS; SUBCUTANEOUS at 00:05

## 2017-01-01 RX ADMIN — GUAIFENESIN 1200 MG: 600 TABLET, EXTENDED RELEASE ORAL at 09:05

## 2017-01-01 RX ADMIN — BUPRENORPHINE HYDROCHLORIDE, NALOXONE HYDROCHLORIDE 3 FILM: 8; 2 FILM, SOLUBLE BUCCAL; SUBLINGUAL at 09:04

## 2017-01-01 RX ADMIN — IRON SUPPLEMENT 325 MG: 325 TABLET ORAL at 09:04

## 2017-01-01 RX ADMIN — ATORVASTATIN CALCIUM 20 MG: 20 TABLET, FILM COATED ORAL at 09:59

## 2017-01-01 RX ADMIN — SULFAMETHOXAZOLE AND TRIMETHOPRIM 400 MG: 200; 40 SUSPENSION ORAL at 15:24

## 2017-01-01 RX ADMIN — TAZOBACTAM SODIUM AND PIPERACILLIN SODIUM 4.5 G: 500; 4 INJECTION, SOLUTION INTRAVENOUS at 22:01

## 2017-01-01 RX ADMIN — FOLIC ACID 1 MG: 1 TABLET ORAL at 09:10

## 2017-01-01 RX ADMIN — LACTULOSE 20 G: 20 SOLUTION ORAL at 09:07

## 2017-01-01 RX ADMIN — SULFAMETHOXAZOLE AND TRIMETHOPRIM 2 TABLET: 800; 160 TABLET ORAL at 08:51

## 2017-01-01 RX ADMIN — HYDROMORPHONE HYDROCHLORIDE 0.2 MG: 1 INJECTION, SOLUTION INTRAMUSCULAR; INTRAVENOUS; SUBCUTANEOUS at 12:03

## 2017-01-01 RX ADMIN — MORPHINE SULFATE 4 MG: 2 INJECTION, SOLUTION INTRAMUSCULAR; INTRAVENOUS at 21:42

## 2017-01-01 RX ADMIN — ATORVASTATIN CALCIUM 20 MG: 20 TABLET, FILM COATED ORAL at 17:08

## 2017-01-01 RX ADMIN — NICOTINE POLACRILEX 2 MG: 2 GUM, CHEWING ORAL at 07:44

## 2017-01-01 RX ADMIN — IPRATROPIUM BROMIDE AND ALBUTEROL SULFATE 3 ML: .5; 3 SOLUTION RESPIRATORY (INHALATION) at 16:53

## 2017-01-01 RX ADMIN — IPRATROPIUM BROMIDE AND ALBUTEROL SULFATE 3 ML: .5; 3 SOLUTION RESPIRATORY (INHALATION) at 11:07

## 2017-01-01 RX ADMIN — LACTULOSE 20 G: 20 SOLUTION ORAL at 22:41

## 2017-01-01 RX ADMIN — ACETAMINOPHEN 650 MG: 325 TABLET, FILM COATED ORAL at 22:05

## 2017-01-01 RX ADMIN — GABAPENTIN 600 MG: 600 TABLET, FILM COATED ORAL at 11:37

## 2017-01-01 RX ADMIN — MORPHINE SULFATE 4 MG: 2 INJECTION, SOLUTION INTRAMUSCULAR; INTRAVENOUS at 17:41

## 2017-01-01 RX ADMIN — IPRATROPIUM BROMIDE AND ALBUTEROL SULFATE 3 ML: .5; 3 SOLUTION RESPIRATORY (INHALATION) at 15:28

## 2017-01-01 RX ADMIN — LACTULOSE 20 G: 10 SOLUTION ORAL at 09:05

## 2017-01-01 RX ADMIN — NICOTINE POLACRILEX 2 MG: 2 GUM, CHEWING ORAL at 13:32

## 2017-01-01 RX ADMIN — MIRTAZAPINE 15 MG: 15 TABLET, FILM COATED ORAL at 20:08

## 2017-01-01 RX ADMIN — INSULIN ASPART 6 UNITS: 100 INJECTION, SOLUTION INTRAVENOUS; SUBCUTANEOUS at 00:18

## 2017-01-01 RX ADMIN — VANCOMYCIN HYDROCHLORIDE 1500 MG: 10 INJECTION, POWDER, LYOPHILIZED, FOR SOLUTION INTRAVENOUS at 20:28

## 2017-01-01 RX ADMIN — TRAZODONE HYDROCHLORIDE 100 MG: 100 TABLET ORAL at 21:01

## 2017-01-01 RX ADMIN — INSULIN ASPART 5 UNITS: 100 INJECTION, SOLUTION INTRAVENOUS; SUBCUTANEOUS at 18:49

## 2017-01-01 RX ADMIN — NICOTINE 1 PATCH: 21 PATCH, EXTENDED RELEASE TRANSDERMAL at 08:05

## 2017-01-01 RX ADMIN — SULFAMETHOXAZOLE AND TRIMETHOPRIM 1 TABLET: 800; 160 TABLET ORAL at 10:41

## 2017-01-01 RX ADMIN — TAZOBACTAM SODIUM AND PIPERACILLIN SODIUM 4.5 G: 500; 4 INJECTION, SOLUTION INTRAVENOUS at 06:05

## 2017-01-01 RX ADMIN — RIFAXIMIN 550 MG: 550 TABLET ORAL at 08:02

## 2017-01-01 RX ADMIN — METHYLPREDNISOLONE SODIUM SUCCINATE 40 MG: 40 INJECTION, POWDER, FOR SOLUTION INTRAMUSCULAR; INTRAVENOUS at 17:32

## 2017-01-01 RX ADMIN — IPRATROPIUM BROMIDE AND ALBUTEROL SULFATE 3 ML: .5; 3 SOLUTION RESPIRATORY (INHALATION) at 08:05

## 2017-01-01 RX ADMIN — Medication 30 ML: at 09:46

## 2017-01-01 RX ADMIN — MIRTAZAPINE 15 MG: 15 TABLET, FILM COATED ORAL at 22:20

## 2017-01-01 RX ADMIN — IPRATROPIUM BROMIDE AND ALBUTEROL SULFATE 3 ML: .5; 3 SOLUTION RESPIRATORY (INHALATION) at 07:58

## 2017-01-01 RX ADMIN — Medication 100 MG: at 08:42

## 2017-01-01 RX ADMIN — ATORVASTATIN CALCIUM 20 MG: 20 TABLET, FILM COATED ORAL at 08:42

## 2017-01-01 RX ADMIN — SODIUM CHLORIDE: 9 INJECTION, SOLUTION INTRAVENOUS at 21:53

## 2017-01-01 RX ADMIN — METHOCARBAMOL 500 MG: 500 TABLET ORAL at 16:37

## 2017-01-01 RX ADMIN — IPRATROPIUM BROMIDE AND ALBUTEROL SULFATE 3 ML: .5; 3 SOLUTION RESPIRATORY (INHALATION) at 11:18

## 2017-01-01 RX ADMIN — AZITHROMYCIN 250 MG: 250 TABLET, FILM COATED ORAL at 08:25

## 2017-01-01 RX ADMIN — IPRATROPIUM BROMIDE AND ALBUTEROL SULFATE 3 ML: .5; 3 SOLUTION RESPIRATORY (INHALATION) at 16:05

## 2017-01-01 RX ADMIN — LORATADINE 10 MG: 10 TABLET ORAL at 08:53

## 2017-01-01 RX ADMIN — INSULIN ASPART 1 UNITS: 100 INJECTION, SOLUTION INTRAVENOUS; SUBCUTANEOUS at 09:16

## 2017-01-01 RX ADMIN — FLUOXETINE 40 MG: 20 CAPSULE ORAL at 09:33

## 2017-01-01 RX ADMIN — IPRATROPIUM BROMIDE AND ALBUTEROL SULFATE 3 ML: .5; 3 SOLUTION RESPIRATORY (INHALATION) at 15:55

## 2017-01-01 RX ADMIN — HYDROMORPHONE HYDROCHLORIDE 1 MG: 1 SOLUTION ORAL at 20:46

## 2017-01-01 RX ADMIN — RIFAXIMIN 550 MG: 550 TABLET ORAL at 08:24

## 2017-01-01 RX ADMIN — TAZOBACTAM SODIUM AND PIPERACILLIN SODIUM 4.5 G: 500; 4 INJECTION, SOLUTION INTRAVENOUS at 21:50

## 2017-01-01 RX ADMIN — DEXTROSE MONOHYDRATE: 100 INJECTION, SOLUTION INTRAVENOUS at 08:19

## 2017-01-01 RX ADMIN — MORPHINE SULFATE 4 MG: 2 INJECTION, SOLUTION INTRAMUSCULAR; INTRAVENOUS at 08:54

## 2017-01-01 RX ADMIN — IPRATROPIUM BROMIDE AND ALBUTEROL SULFATE 3 ML: .5; 3 SOLUTION RESPIRATORY (INHALATION) at 07:20

## 2017-01-01 RX ADMIN — BUPRENORPHINE HYDROCHLORIDE, NALOXONE HYDROCHLORIDE 3 FILM: 8; 2 FILM, SOLUBLE BUCCAL; SUBLINGUAL at 08:05

## 2017-01-01 RX ADMIN — SPIRONOLACTONE 50 MG: 25 TABLET ORAL at 10:09

## 2017-01-01 RX ADMIN — LACTULOSE 20 G: 10 SOLUTION ORAL at 08:14

## 2017-01-01 RX ADMIN — METHYLPREDNISOLONE SODIUM SUCCINATE 20 MG: 40 INJECTION, POWDER, LYOPHILIZED, FOR SOLUTION INTRAMUSCULAR; INTRAVENOUS at 12:05

## 2017-01-01 RX ADMIN — HYDROMORPHONE HYDROCHLORIDE 1 MG: 1 SOLUTION ORAL at 17:39

## 2017-01-01 RX ADMIN — NICOTINE POLACRILEX 2 MG: 2 GUM, CHEWING ORAL at 16:11

## 2017-01-01 RX ADMIN — HYDROMORPHONE HYDROCHLORIDE 1 MG: 1 SOLUTION ORAL at 14:32

## 2017-01-01 RX ADMIN — VANCOMYCIN HYDROCHLORIDE 1750 MG: 1 INJECTION, POWDER, LYOPHILIZED, FOR SOLUTION INTRAVENOUS at 00:50

## 2017-01-01 RX ADMIN — IPRATROPIUM BROMIDE AND ALBUTEROL SULFATE 3 ML: .5; 3 SOLUTION RESPIRATORY (INHALATION) at 19:40

## 2017-01-01 RX ADMIN — GABAPENTIN 600 MG: 300 CAPSULE ORAL at 05:24

## 2017-01-01 RX ADMIN — HEPARIN SODIUM 5000 UNITS: 10000 INJECTION, SOLUTION INTRAVENOUS; SUBCUTANEOUS at 21:51

## 2017-01-01 RX ADMIN — MENTHOL 2 PATCH: 205.5 PATCH TOPICAL at 14:03

## 2017-01-01 RX ADMIN — IBUPROFEN 600 MG: 600 TABLET ORAL at 17:24

## 2017-01-01 RX ADMIN — Medication 40 MG: at 08:11

## 2017-01-01 RX ADMIN — CEFTRIAXONE 2 G: 2 INJECTION, POWDER, FOR SOLUTION INTRAMUSCULAR; INTRAVENOUS at 05:40

## 2017-01-01 RX ADMIN — INSULIN ASPART 4 UNITS: 100 INJECTION, SOLUTION INTRAVENOUS; SUBCUTANEOUS at 16:32

## 2017-01-01 RX ADMIN — HYDROMORPHONE HYDROCHLORIDE 1 MG: 1 SOLUTION ORAL at 21:13

## 2017-01-01 RX ADMIN — IPRATROPIUM BROMIDE AND ALBUTEROL SULFATE 3 ML: .5; 3 SOLUTION RESPIRATORY (INHALATION) at 08:37

## 2017-01-01 RX ADMIN — METHYLPREDNISOLONE SODIUM SUCCINATE 20 MG: 40 INJECTION, POWDER, LYOPHILIZED, FOR SOLUTION INTRAMUSCULAR; INTRAVENOUS at 17:31

## 2017-01-01 RX ADMIN — PREDNISONE 40 MG: 20 TABLET ORAL at 08:12

## 2017-01-01 RX ADMIN — RIFAXIMIN 550 MG: 550 TABLET ORAL at 08:12

## 2017-01-01 RX ADMIN — BUPRENORPHINE HYDROCHLORIDE, NALOXONE HYDROCHLORIDE 3 FILM: 8; 2 FILM, SOLUBLE BUCCAL; SUBLINGUAL at 08:37

## 2017-01-01 RX ADMIN — INSULIN ASPART 4 UNITS: 100 INJECTION, SOLUTION INTRAVENOUS; SUBCUTANEOUS at 19:54

## 2017-01-01 RX ADMIN — SULFAMETHOXAZOLE AND TRIMETHOPRIM 1 TABLET: 400; 80 TABLET ORAL at 17:27

## 2017-01-01 RX ADMIN — ATORVASTATIN CALCIUM 20 MG: 20 TABLET, FILM COATED ORAL at 20:08

## 2017-01-01 RX ADMIN — LORATADINE 10 MG: 5 SOLUTION ORAL at 08:21

## 2017-01-01 RX ADMIN — TAZOBACTAM SODIUM AND PIPERACILLIN SODIUM 4.5 G: 500; 4 INJECTION, SOLUTION INTRAVENOUS at 01:45

## 2017-01-01 RX ADMIN — METRONIDAZOLE 500 MG: 500 TABLET ORAL at 06:33

## 2017-01-01 RX ADMIN — IPRATROPIUM BROMIDE AND ALBUTEROL SULFATE 3 ML: .5; 3 SOLUTION RESPIRATORY (INHALATION) at 16:02

## 2017-01-01 RX ADMIN — BUPRENORPHINE HYDROCHLORIDE, NALOXONE HYDROCHLORIDE 3 FILM: 8; 2 FILM, SOLUBLE BUCCAL; SUBLINGUAL at 11:00

## 2017-01-01 RX ADMIN — INSULIN GLARGINE 40 UNITS: 100 INJECTION, SOLUTION SUBCUTANEOUS at 08:49

## 2017-01-01 RX ADMIN — NICOTINE POLACRILEX 2 MG: 2 GUM, CHEWING ORAL at 19:36

## 2017-01-01 RX ADMIN — METHOCARBAMOL 1000 MG: 500 TABLET ORAL at 17:49

## 2017-01-01 RX ADMIN — SPIRONOLACTONE 50 MG: 25 TABLET ORAL at 08:04

## 2017-01-01 RX ADMIN — LACTULOSE 30 ML: 10 SOLUTION ORAL at 10:15

## 2017-01-01 RX ADMIN — FUROSEMIDE 40 MG: 10 INJECTION, SOLUTION INTRAVENOUS at 12:51

## 2017-01-01 RX ADMIN — METHYLPREDNISOLONE SODIUM SUCCINATE 62.5 MG: 125 INJECTION, POWDER, FOR SOLUTION INTRAMUSCULAR; INTRAVENOUS at 09:09

## 2017-01-01 RX ADMIN — INSULIN ASPART 5 UNITS: 100 INJECTION, SOLUTION INTRAVENOUS; SUBCUTANEOUS at 15:18

## 2017-01-01 RX ADMIN — INSULIN ASPART 1 UNITS: 100 INJECTION, SOLUTION INTRAVENOUS; SUBCUTANEOUS at 07:56

## 2017-01-01 RX ADMIN — SULFAMETHOXAZOLE AND TRIMETHOPRIM 400 MG: 200; 40 SUSPENSION ORAL at 09:08

## 2017-01-01 RX ADMIN — OMEPRAZOLE 20 MG: 20 CAPSULE, DELAYED RELEASE ORAL at 10:27

## 2017-01-01 RX ADMIN — ATORVASTATIN CALCIUM 20 MG: 20 TABLET, FILM COATED ORAL at 21:03

## 2017-01-01 RX ADMIN — FLUOXETINE 40 MG: 20 CAPSULE ORAL at 09:00

## 2017-01-01 RX ADMIN — TAZOBACTAM SODIUM AND PIPERACILLIN SODIUM 4.5 G: 500; 4 INJECTION, SOLUTION INTRAVENOUS at 17:35

## 2017-01-01 RX ADMIN — HYDROMORPHONE HYDROCHLORIDE 0.5 MG: 1 INJECTION, SOLUTION INTRAMUSCULAR; INTRAVENOUS; SUBCUTANEOUS at 00:07

## 2017-01-01 RX ADMIN — ASPIRIN 81 MG 81 MG: 81 TABLET ORAL at 09:10

## 2017-01-01 RX ADMIN — SPIRONOLACTONE 50 MG: 25 TABLET ORAL at 10:22

## 2017-01-01 RX ADMIN — ASPIRIN 81 MG: 81 TABLET, COATED ORAL at 08:19

## 2017-01-01 RX ADMIN — GABAPENTIN 600 MG: 250 SUSPENSION ORAL at 15:40

## 2017-01-01 RX ADMIN — RIFAXIMIN 550 MG: 550 TABLET ORAL at 11:50

## 2017-01-01 RX ADMIN — FOLIC ACID 1 MG: 1 TABLET ORAL at 09:27

## 2017-01-01 RX ADMIN — LEVOFLOXACIN 750 MG: 5 INJECTION, SOLUTION INTRAVENOUS at 19:51

## 2017-01-01 RX ADMIN — MORPHINE SULFATE 4 MG: 2 INJECTION, SOLUTION INTRAMUSCULAR; INTRAVENOUS at 02:48

## 2017-01-01 RX ADMIN — GUAIFENESIN 1200 MG: 600 TABLET, EXTENDED RELEASE ORAL at 20:51

## 2017-01-01 RX ADMIN — FERROUS SULFATE TAB 325 MG (65 MG ELEMENTAL FE) 325 MG: 325 (65 FE) TAB at 08:25

## 2017-01-01 RX ADMIN — IOPAMIDOL 70 ML: 755 INJECTION, SOLUTION INTRAVENOUS at 23:03

## 2017-01-01 RX ADMIN — NICOTINE POLACRILEX 2 MG: 2 GUM, CHEWING ORAL at 11:39

## 2017-01-01 RX ADMIN — PROPOFOL 40 MCG/KG/MIN: 10 INJECTION, EMULSION INTRAVENOUS at 15:10

## 2017-01-01 RX ADMIN — PREDNISONE 50 MG: 50 TABLET ORAL at 08:44

## 2017-01-01 RX ADMIN — HYDROMORPHONE HYDROCHLORIDE 0.5 MG: 1 INJECTION, SOLUTION INTRAMUSCULAR; INTRAVENOUS; SUBCUTANEOUS at 23:11

## 2017-01-01 RX ADMIN — SULFAMETHOXAZOLE AND TRIMETHOPRIM 1 TABLET: 400; 80 TABLET ORAL at 17:31

## 2017-01-01 RX ADMIN — NICOTINE 1 PATCH: 14 PATCH, EXTENDED RELEASE TRANSDERMAL at 10:14

## 2017-01-01 RX ADMIN — BUPRENORPHINE HYDROCHLORIDE, NALOXONE HYDROCHLORIDE 3 FILM: 8; 2 FILM, SOLUBLE BUCCAL; SUBLINGUAL at 09:22

## 2017-01-01 RX ADMIN — POTASSIUM CHLORIDE 40 MEQ: 1500 TABLET, EXTENDED RELEASE ORAL at 19:34

## 2017-01-01 RX ADMIN — LACTULOSE 30 ML: 10 SOLUTION ORAL at 21:56

## 2017-01-01 RX ADMIN — IPRATROPIUM BROMIDE AND ALBUTEROL SULFATE 3 ML: .5; 3 SOLUTION RESPIRATORY (INHALATION) at 15:35

## 2017-01-01 RX ADMIN — INSULIN ASPART 5 UNITS: 100 INJECTION, SOLUTION INTRAVENOUS; SUBCUTANEOUS at 12:27

## 2017-01-01 RX ADMIN — METHYLPREDNISOLONE SODIUM SUCCINATE 62.5 MG: 125 INJECTION, POWDER, FOR SOLUTION INTRAMUSCULAR; INTRAVENOUS at 18:33

## 2017-01-01 RX ADMIN — NICOTINE POLACRILEX 2 MG: 2 GUM, CHEWING ORAL at 14:46

## 2017-01-01 RX ADMIN — IPRATROPIUM BROMIDE AND ALBUTEROL SULFATE 3 ML: .5; 3 SOLUTION RESPIRATORY (INHALATION) at 12:03

## 2017-01-01 RX ADMIN — METHYLPREDNISOLONE SODIUM SUCCINATE 40 MG: 40 INJECTION, POWDER, FOR SOLUTION INTRAMUSCULAR; INTRAVENOUS at 02:33

## 2017-01-01 RX ADMIN — RIFAXIMIN 550 MG: 550 TABLET ORAL at 09:24

## 2017-01-01 RX ADMIN — POTASSIUM CHLORIDE 20 MEQ: 1500 TABLET, EXTENDED RELEASE ORAL at 06:49

## 2017-01-01 RX ADMIN — MIRTAZAPINE 30 MG: 15 TABLET, FILM COATED ORAL at 22:11

## 2017-01-01 RX ADMIN — TAZOBACTAM SODIUM AND PIPERACILLIN SODIUM 4.5 G: 500; 4 INJECTION, SOLUTION INTRAVENOUS at 00:27

## 2017-01-01 RX ADMIN — METHOCARBAMOL 500 MG: 500 TABLET ORAL at 09:06

## 2017-01-01 RX ADMIN — MORPHINE SULFATE 2 MG: 2 INJECTION, SOLUTION INTRAMUSCULAR; INTRAVENOUS at 13:49

## 2017-01-01 RX ADMIN — PANTOPRAZOLE SODIUM 40 MG: 40 INJECTION, POWDER, FOR SOLUTION INTRAVENOUS at 17:00

## 2017-01-01 RX ADMIN — VANCOMYCIN HYDROCHLORIDE 1250 MG: 100 INJECTION, POWDER, LYOPHILIZED, FOR SOLUTION INTRAVENOUS at 04:01

## 2017-01-01 RX ADMIN — LORAZEPAM 1 MG: 2 INJECTION, SOLUTION INTRAMUSCULAR; INTRAVENOUS at 19:47

## 2017-01-01 RX ADMIN — Medication 500 MG: at 00:51

## 2017-01-01 RX ADMIN — LORAZEPAM 1 MG: 2 INJECTION INTRAMUSCULAR; INTRAVENOUS at 17:50

## 2017-01-01 RX ADMIN — SULFAMETHOXAZOLE AND TRIMETHOPRIM 400 MG: 200; 40 SUSPENSION ORAL at 14:08

## 2017-01-01 RX ADMIN — SULFAMETHOXAZOLE AND TRIMETHOPRIM 1 TABLET: 400; 80 TABLET ORAL at 18:04

## 2017-01-01 RX ADMIN — FOLIC ACID 1 MG: 1 TABLET ORAL at 08:26

## 2017-01-01 RX ADMIN — PANTOPRAZOLE SODIUM 40 MG: 40 TABLET, DELAYED RELEASE ORAL at 08:22

## 2017-01-01 RX ADMIN — LACTULOSE 20 G: 20 SOLUTION ORAL at 15:25

## 2017-01-01 RX ADMIN — INSULIN ASPART 3 UNITS: 100 INJECTION, SOLUTION INTRAVENOUS; SUBCUTANEOUS at 17:58

## 2017-01-01 RX ADMIN — LACTULOSE 30 ML: 10 SOLUTION ORAL at 16:03

## 2017-01-01 RX ADMIN — INSULIN ASPART 7 UNITS: 100 INJECTION, SOLUTION INTRAVENOUS; SUBCUTANEOUS at 09:59

## 2017-01-01 RX ADMIN — NICOTINE POLACRILEX 2 MG: 2 GUM, CHEWING ORAL at 19:29

## 2017-01-01 RX ADMIN — MORPHINE SULFATE 2 MG: 2 INJECTION, SOLUTION INTRAMUSCULAR; INTRAVENOUS at 12:49

## 2017-01-01 RX ADMIN — NALOXONE HYDROCHLORIDE 0.4 MG: 0.4 INJECTION, SOLUTION INTRAMUSCULAR; INTRAVENOUS; SUBCUTANEOUS at 19:57

## 2017-01-01 RX ADMIN — VANCOMYCIN HYDROCHLORIDE 1250 MG: 10 INJECTION, POWDER, LYOPHILIZED, FOR SOLUTION INTRAVENOUS at 00:45

## 2017-01-01 RX ADMIN — AZITHROMYCIN MONOHYDRATE 500 MG: 500 INJECTION, POWDER, LYOPHILIZED, FOR SOLUTION INTRAVENOUS at 11:51

## 2017-01-01 RX ADMIN — RIFAXIMIN 550 MG: 550 TABLET ORAL at 21:08

## 2017-01-01 RX ADMIN — OXYCODONE HYDROCHLORIDE AND ACETAMINOPHEN 500 MG: 500 TABLET ORAL at 07:48

## 2017-01-01 RX ADMIN — VANCOMYCIN HYDROCHLORIDE 1000 MG: 1 INJECTION, SOLUTION INTRAVENOUS at 03:25

## 2017-01-01 RX ADMIN — KETOROLAC TROMETHAMINE 30 MG: 15 INJECTION, SOLUTION INTRAMUSCULAR; INTRAVENOUS at 03:22

## 2017-01-01 RX ADMIN — GABAPENTIN 600 MG: 600 TABLET, FILM COATED ORAL at 22:42

## 2017-01-01 RX ADMIN — NICOTINE 1 PATCH: 21 PATCH, EXTENDED RELEASE TRANSDERMAL at 08:38

## 2017-01-01 RX ADMIN — ONDANSETRON 4 MG: 2 INJECTION INTRAMUSCULAR; INTRAVENOUS at 21:36

## 2017-01-01 RX ADMIN — SODIUM CHLORIDE 120 ML: 900 IRRIGANT IRRIGATION at 10:22

## 2017-01-01 RX ADMIN — METHOCARBAMOL 500 MG: 500 TABLET ORAL at 22:19

## 2017-01-01 RX ADMIN — ASPIRIN 81 MG 81 MG: 81 TABLET ORAL at 08:00

## 2017-01-01 RX ADMIN — METHOCARBAMOL 500 MG: 500 TABLET ORAL at 07:47

## 2017-01-01 RX ADMIN — FUROSEMIDE 20 MG: 10 INJECTION, SOLUTION INTRAVENOUS at 16:00

## 2017-01-01 RX ADMIN — METHOCARBAMOL 500 MG: 500 TABLET ORAL at 08:08

## 2017-01-01 RX ADMIN — NICOTINE POLACRILEX 2 MG: 2 GUM, CHEWING ORAL at 20:43

## 2017-01-01 RX ADMIN — BUPRENORPHINE HYDROCHLORIDE, NALOXONE HYDROCHLORIDE 3 FILM: 8; 2 FILM, SOLUBLE BUCCAL; SUBLINGUAL at 10:47

## 2017-01-01 RX ADMIN — LACTULOSE 20 G: 20 SOLUTION ORAL at 20:36

## 2017-01-01 RX ADMIN — IPRATROPIUM BROMIDE AND ALBUTEROL SULFATE 3 ML: .5; 3 SOLUTION RESPIRATORY (INHALATION) at 15:14

## 2017-01-01 RX ADMIN — NICOTINE POLACRILEX 2 MG: 2 GUM, CHEWING ORAL at 20:54

## 2017-01-01 RX ADMIN — SULFAMETHOXAZOLE AND TRIMETHOPRIM 400 MG: 200; 40 SUSPENSION ORAL at 16:05

## 2017-01-01 RX ADMIN — PREDNISONE 40 MG: 20 TABLET ORAL at 09:07

## 2017-01-01 RX ADMIN — SULFAMETHOXAZOLE AND TRIMETHOPRIM 400 MG: 200; 40 SUSPENSION ORAL at 04:30

## 2017-01-01 RX ADMIN — GABAPENTIN 300 MG: 300 CAPSULE ORAL at 21:00

## 2017-01-01 RX ADMIN — INSULIN ASPART 5 UNITS: 100 INJECTION, SOLUTION INTRAVENOUS; SUBCUTANEOUS at 20:40

## 2017-01-01 RX ADMIN — ASPIRIN 81 MG: 81 TABLET, COATED ORAL at 08:51

## 2017-01-01 RX ADMIN — SULFAMETHOXAZOLE AND TRIMETHOPRIM 2 TABLET: 800; 160 TABLET ORAL at 12:30

## 2017-01-01 RX ADMIN — HEPARIN SODIUM 5000 UNITS: 10000 INJECTION, SOLUTION INTRAVENOUS; SUBCUTANEOUS at 22:12

## 2017-01-01 RX ADMIN — UMECLIDINIUM 1 PUFF: 62.5 AEROSOL, POWDER ORAL at 07:43

## 2017-01-01 RX ADMIN — GABAPENTIN 600 MG: 600 TABLET, FILM COATED ORAL at 07:52

## 2017-01-01 RX ADMIN — MORPHINE SULFATE 4 MG: 2 INJECTION, SOLUTION INTRAMUSCULAR; INTRAVENOUS at 06:32

## 2017-01-01 RX ADMIN — ALUMINUM HYDROXIDE, MAGNESIUM HYDROXIDE, AND DIMETHICONE 30 ML: 400; 400; 40 SUSPENSION ORAL at 00:56

## 2017-01-01 RX ADMIN — SPIRONOLACTONE 50 MG: 25 TABLET ORAL at 08:36

## 2017-01-01 RX ADMIN — INSULIN ASPART 2 UNITS: 100 INJECTION, SOLUTION INTRAVENOUS; SUBCUTANEOUS at 09:03

## 2017-01-01 RX ADMIN — METHYLPREDNISOLONE SODIUM SUCCINATE 20 MG: 40 INJECTION, POWDER, LYOPHILIZED, FOR SOLUTION INTRAMUSCULAR; INTRAVENOUS at 17:40

## 2017-01-01 RX ADMIN — MORPHINE SULFATE 2 MG: 2 INJECTION, SOLUTION INTRAMUSCULAR; INTRAVENOUS at 18:35

## 2017-01-01 RX ADMIN — IPRATROPIUM BROMIDE AND ALBUTEROL SULFATE 3 ML: .5; 3 SOLUTION RESPIRATORY (INHALATION) at 13:54

## 2017-01-01 RX ADMIN — ENOXAPARIN SODIUM 40 MG: 40 INJECTION SUBCUTANEOUS at 08:39

## 2017-01-01 RX ADMIN — IPRATROPIUM BROMIDE AND ALBUTEROL SULFATE 3 ML: .5; 3 SOLUTION RESPIRATORY (INHALATION) at 16:07

## 2017-01-01 RX ADMIN — NICOTINE POLACRILEX 2 MG: 2 GUM, CHEWING ORAL at 19:34

## 2017-01-01 RX ADMIN — NICOTINE 1 PATCH: 21 PATCH, EXTENDED RELEASE TRANSDERMAL at 08:12

## 2017-01-01 RX ADMIN — SENNOSIDES AND DOCUSATE SODIUM 1 TABLET: 8.6; 5 TABLET ORAL at 08:12

## 2017-01-01 RX ADMIN — METHYLPREDNISOLONE SODIUM SUCCINATE 40 MG: 40 INJECTION, POWDER, FOR SOLUTION INTRAMUSCULAR; INTRAVENOUS at 23:59

## 2017-01-01 RX ADMIN — FUROSEMIDE 20 MG: 20 TABLET ORAL at 14:36

## 2017-01-01 RX ADMIN — NICOTINE 1 PATCH: 21 PATCH, EXTENDED RELEASE TRANSDERMAL at 08:28

## 2017-01-01 RX ADMIN — POTASSIUM CHLORIDE 20 MEQ: 1500 TABLET, EXTENDED RELEASE ORAL at 11:53

## 2017-01-01 RX ADMIN — MORPHINE SULFATE 4 MG: 2 INJECTION, SOLUTION INTRAMUSCULAR; INTRAVENOUS at 17:32

## 2017-01-01 RX ADMIN — SODIUM CHLORIDE 94 ML: 9 INJECTION, SOLUTION INTRAVENOUS at 11:04

## 2017-01-01 RX ADMIN — Medication 100 MG: at 11:45

## 2017-01-01 RX ADMIN — NICOTINE POLACRILEX 2 MG: 2 GUM, CHEWING ORAL at 01:33

## 2017-01-01 RX ADMIN — IRON 325 MG: 65 TABLET ORAL at 08:37

## 2017-01-01 RX ADMIN — INSULIN ASPART 4 UNITS: 100 INJECTION, SOLUTION INTRAVENOUS; SUBCUTANEOUS at 08:36

## 2017-01-01 RX ADMIN — OMEPRAZOLE 20 MG: 20 CAPSULE, DELAYED RELEASE ORAL at 10:13

## 2017-01-01 RX ADMIN — IOPAMIDOL 64 ML: 755 INJECTION, SOLUTION INTRAVENOUS at 12:28

## 2017-01-01 RX ADMIN — NICOTINE POLACRILEX 2 MG: 2 GUM, CHEWING ORAL at 17:43

## 2017-01-01 RX ADMIN — METHYLPREDNISOLONE SODIUM SUCCINATE 20 MG: 40 INJECTION, POWDER, LYOPHILIZED, FOR SOLUTION INTRAMUSCULAR; INTRAVENOUS at 11:26

## 2017-01-01 RX ADMIN — HEPARIN SODIUM 5000 UNITS: 10000 INJECTION, SOLUTION INTRAVENOUS; SUBCUTANEOUS at 21:16

## 2017-01-01 RX ADMIN — FLUOXETINE 40 MG: 20 CAPSULE ORAL at 09:04

## 2017-01-01 RX ADMIN — FLUOXETINE 40 MG: 20 CAPSULE ORAL at 09:05

## 2017-01-01 RX ADMIN — HYDROCODONE BITARTRATE AND ACETAMINOPHEN 1 TABLET: 5; 325 TABLET ORAL at 00:46

## 2017-01-01 RX ADMIN — LACTULOSE 20 G: 10 SOLUTION ORAL at 09:29

## 2017-01-01 RX ADMIN — MORPHINE SULFATE 2 MG: 2 INJECTION, SOLUTION INTRAMUSCULAR; INTRAVENOUS at 09:11

## 2017-01-01 RX ADMIN — NICOTINE POLACRILEX 2 MG: 2 GUM, CHEWING ORAL at 21:02

## 2017-01-01 RX ADMIN — MORPHINE SULFATE 4 MG: 2 INJECTION, SOLUTION INTRAMUSCULAR; INTRAVENOUS at 14:14

## 2017-01-01 RX ADMIN — IPRATROPIUM BROMIDE AND ALBUTEROL SULFATE 3 ML: .5; 3 SOLUTION RESPIRATORY (INHALATION) at 07:12

## 2017-01-01 RX ADMIN — FUROSEMIDE 20 MG: 10 INJECTION, SOLUTION INTRAVENOUS at 23:49

## 2017-01-01 RX ADMIN — BUPRENORPHINE HYDROCHLORIDE, NALOXONE HYDROCHLORIDE 3 FILM: 8; 2 FILM, SOLUBLE BUCCAL; SUBLINGUAL at 08:16

## 2017-01-01 RX ADMIN — PREDNISONE 60 MG: 50 TABLET ORAL at 07:53

## 2017-01-01 RX ADMIN — SODIUM CHLORIDE: 9 INJECTION, SOLUTION INTRAVENOUS at 13:11

## 2017-01-01 RX ADMIN — LACTULOSE 30 ML: 10 SOLUTION ORAL at 22:33

## 2017-01-01 RX ADMIN — ENOXAPARIN SODIUM 40 MG: 40 INJECTION SUBCUTANEOUS at 10:27

## 2017-01-01 RX ADMIN — SULFAMETHOXAZOLE AND TRIMETHOPRIM 2 TABLET: 800; 160 TABLET ORAL at 18:41

## 2017-01-01 RX ADMIN — SULFAMETHOXAZOLE AND TRIMETHOPRIM 1 TABLET: 400; 80 TABLET ORAL at 14:03

## 2017-01-01 RX ADMIN — TRAZODONE HYDROCHLORIDE 150 MG: 150 TABLET ORAL at 22:07

## 2017-01-01 RX ADMIN — FENTANYL CITRATE 25 MCG/HR: 50 INJECTION, SOLUTION INTRAMUSCULAR; INTRAVENOUS at 10:56

## 2017-01-01 RX ADMIN — METHOCARBAMOL 500 MG: 500 TABLET ORAL at 21:01

## 2017-01-01 RX ADMIN — MIRTAZAPINE 30 MG: 15 TABLET, FILM COATED ORAL at 22:42

## 2017-01-01 RX ADMIN — RIFAXIMIN 550 MG: 550 TABLET ORAL at 20:40

## 2017-01-01 RX ADMIN — RIFAXIMIN 550 MG: 550 TABLET ORAL at 09:33

## 2017-01-01 RX ADMIN — NICOTINE POLACRILEX 2 MG: 2 GUM, CHEWING ORAL at 05:29

## 2017-01-01 RX ADMIN — GABAPENTIN 600 MG: 600 TABLET, FILM COATED ORAL at 09:05

## 2017-01-01 RX ADMIN — FOLIC ACID 1 MG: 1 TABLET ORAL at 07:54

## 2017-01-01 RX ADMIN — FOLIC ACID 1 MG: 1 TABLET ORAL at 07:58

## 2017-01-01 RX ADMIN — SPIRONOLACTONE 50 MG: 25 TABLET ORAL at 08:02

## 2017-01-01 RX ADMIN — FLUOXETINE HYDROCHLORIDE 40 MG: 40 CAPSULE ORAL at 08:28

## 2017-01-01 RX ADMIN — FUROSEMIDE 20 MG: 20 TABLET ORAL at 08:25

## 2017-01-01 RX ADMIN — GABAPENTIN 600 MG: 300 CAPSULE ORAL at 06:49

## 2017-01-01 RX ADMIN — PIPERACILLIN AND TAZOBACTAM 4.5 G: 4; .5 INJECTION, POWDER, LYOPHILIZED, FOR SOLUTION INTRAVENOUS; PARENTERAL at 23:55

## 2017-01-01 RX ADMIN — KETOROLAC TROMETHAMINE 15 MG: 15 INJECTION, SOLUTION INTRAMUSCULAR; INTRAVENOUS at 06:08

## 2017-01-01 RX ADMIN — SULFAMETHOXAZOLE AND TRIMETHOPRIM 2 TABLET: 800; 160 TABLET ORAL at 19:38

## 2017-01-01 RX ADMIN — IPRATROPIUM BROMIDE AND ALBUTEROL SULFATE 3 ML: .5; 3 SOLUTION RESPIRATORY (INHALATION) at 03:35

## 2017-01-01 RX ADMIN — RIFAXIMIN 550 MG: 550 TABLET ORAL at 22:03

## 2017-01-01 RX ADMIN — FOLIC ACID 1 MG: 1 TABLET ORAL at 08:04

## 2017-01-01 RX ADMIN — POTASSIUM CHLORIDE 40 MEQ: 1500 TABLET, EXTENDED RELEASE ORAL at 08:32

## 2017-01-01 RX ADMIN — GABAPENTIN 600 MG: 250 SUSPENSION ORAL at 20:52

## 2017-01-01 RX ADMIN — IPRATROPIUM BROMIDE AND ALBUTEROL SULFATE 3 ML: .5; 3 SOLUTION RESPIRATORY (INHALATION) at 23:16

## 2017-01-01 RX ADMIN — IPRATROPIUM BROMIDE AND ALBUTEROL SULFATE 3 ML: .5; 3 SOLUTION RESPIRATORY (INHALATION) at 19:48

## 2017-01-01 RX ADMIN — PIPERACILLIN SODIUM,TAZOBACTAM SODIUM 4.5 G: 4; .5 INJECTION, POWDER, FOR SOLUTION INTRAVENOUS at 17:07

## 2017-01-01 RX ADMIN — INSULIN ASPART 1 UNITS: 100 INJECTION, SOLUTION INTRAVENOUS; SUBCUTANEOUS at 18:32

## 2017-01-01 RX ADMIN — SODIUM CHLORIDE: 9 INJECTION, SOLUTION INTRAVENOUS at 15:03

## 2017-01-01 RX ADMIN — METHYLPREDNISOLONE SODIUM SUCCINATE 20 MG: 40 INJECTION, POWDER, LYOPHILIZED, FOR SOLUTION INTRAMUSCULAR; INTRAVENOUS at 05:38

## 2017-01-01 RX ADMIN — RIFAXIMIN 550 MG: 550 TABLET ORAL at 07:50

## 2017-01-01 RX ADMIN — GABAPENTIN 600 MG: 600 TABLET, FILM COATED ORAL at 08:24

## 2017-01-01 RX ADMIN — NYSTATIN 500000 UNITS: 100000 SUSPENSION ORAL at 12:19

## 2017-01-01 RX ADMIN — METHOCARBAMOL 1000 MG: 500 TABLET ORAL at 17:57

## 2017-01-01 RX ADMIN — Medication 30 MG: at 15:14

## 2017-01-01 RX ADMIN — GABAPENTIN 300 MG: 300 CAPSULE ORAL at 17:00

## 2017-01-01 RX ADMIN — SULFAMETHOXAZOLE AND TRIMETHOPRIM 1 TABLET: 400; 80 TABLET ORAL at 17:34

## 2017-01-01 RX ADMIN — LACTULOSE 30 ML: 10 SOLUTION ORAL at 15:40

## 2017-01-01 RX ADMIN — SENNOSIDES AND DOCUSATE SODIUM 2 TABLET: 8.6; 5 TABLET ORAL at 09:27

## 2017-01-01 RX ADMIN — MORPHINE SULFATE 4 MG: 2 INJECTION, SOLUTION INTRAMUSCULAR; INTRAVENOUS at 13:21

## 2017-01-01 RX ADMIN — LACTULOSE 20 G: 10 SOLUTION ORAL at 21:41

## 2017-01-01 RX ADMIN — LIDOCAINE HYDROCHLORIDE 2 ML: 20 SOLUTION ORAL; TOPICAL at 13:38

## 2017-01-01 RX ADMIN — ATORVASTATIN CALCIUM 20 MG: 20 TABLET, FILM COATED ORAL at 08:41

## 2017-01-01 RX ADMIN — GABAPENTIN 600 MG: 250 SUSPENSION ORAL at 21:01

## 2017-01-01 RX ADMIN — ATORVASTATIN CALCIUM 20 MG: 20 TABLET, FILM COATED ORAL at 11:50

## 2017-01-01 RX ADMIN — INSULIN ASPART 5 UNITS: 100 INJECTION, SOLUTION INTRAVENOUS; SUBCUTANEOUS at 20:24

## 2017-01-01 RX ADMIN — METHOCARBAMOL 1000 MG: 500 TABLET ORAL at 18:15

## 2017-01-01 RX ADMIN — MORPHINE SULFATE 2 MG: 2 INJECTION, SOLUTION INTRAMUSCULAR; INTRAVENOUS at 19:08

## 2017-01-01 RX ADMIN — TAZOBACTAM SODIUM AND PIPERACILLIN SODIUM 4.5 G: 500; 4 INJECTION, SOLUTION INTRAVENOUS at 22:08

## 2017-01-01 RX ADMIN — MORPHINE SULFATE 2 MG: 2 INJECTION, SOLUTION INTRAMUSCULAR; INTRAVENOUS at 18:34

## 2017-01-01 RX ADMIN — HYDROMORPHONE HYDROCHLORIDE 0.2 MG: 1 INJECTION, SOLUTION INTRAMUSCULAR; INTRAVENOUS; SUBCUTANEOUS at 15:17

## 2017-01-01 ASSESSMENT — PAIN DESCRIPTION - DESCRIPTORS
DESCRIPTORS: CONSTANT
DESCRIPTORS: CONSTANT;DISCOMFORT
DESCRIPTORS: ACHING;CONSTANT
DESCRIPTORS: SHARP
DESCRIPTORS: CONSTANT;SHARP
DESCRIPTORS: CONSTANT
DESCRIPTORS: DULL;ACHING
DESCRIPTORS: ACHING;DISCOMFORT
DESCRIPTORS: ACHING
DESCRIPTORS: ACHING
DESCRIPTORS: CONSTANT
DESCRIPTORS: ACHING
DESCRIPTORS: CONSTANT
DESCRIPTORS: ACHING
DESCRIPTORS: SHARP
DESCRIPTORS: CONSTANT
DESCRIPTORS: CONSTANT
DESCRIPTORS: SHARP
DESCRIPTORS: CONSTANT
DESCRIPTORS: CONSTANT
DESCRIPTORS: OTHER (COMMENT)
DESCRIPTORS: CONSTANT
DESCRIPTORS: CONSTANT;DISCOMFORT
DESCRIPTORS: DULL;ACHING
DESCRIPTORS: ACHING;CONSTANT
DESCRIPTORS: ACHING;DISCOMFORT
DESCRIPTORS: ACHING
DESCRIPTORS: SHARP;SHOOTING
DESCRIPTORS: ACHING;CONSTANT;DISCOMFORT
DESCRIPTORS: ACHING
DESCRIPTORS: CONSTANT
DESCRIPTORS: STABBING
DESCRIPTORS: CONSTANT
DESCRIPTORS: ACHING
DESCRIPTORS: CONSTANT
DESCRIPTORS: ACHING
DESCRIPTORS: CONSTANT
DESCRIPTORS: ACHING
DESCRIPTORS: CONSTANT
DESCRIPTORS: DISCOMFORT
DESCRIPTORS: CONSTANT
DESCRIPTORS: DULL;ACHING;CRAMPING
DESCRIPTORS: CONSTANT
DESCRIPTORS: ACHING
DESCRIPTORS: CONSTANT
DESCRIPTORS: ACHING
DESCRIPTORS: CONSTANT
DESCRIPTORS: CONSTANT
DESCRIPTORS: CONSTANT;DISCOMFORT
DESCRIPTORS: SHARP
DESCRIPTORS: STABBING
DESCRIPTORS: ACHING
DESCRIPTORS: CONSTANT;DISCOMFORT
DESCRIPTORS: SHARP
DESCRIPTORS: STABBING
DESCRIPTORS: CONSTANT;ACHING
DESCRIPTORS: ACHING
DESCRIPTORS: ACHING
DESCRIPTORS: ACHING;STABBING
DESCRIPTORS: CONSTANT
DESCRIPTORS: CONSTANT
DESCRIPTORS: CONSTANT;DISCOMFORT
DESCRIPTORS: STABBING
DESCRIPTORS: SHARP
DESCRIPTORS: CONSTANT
DESCRIPTORS: ACHING
DESCRIPTORS: CONSTANT
DESCRIPTORS: CONSTANT
DESCRIPTORS: CONSTANT;DISCOMFORT
DESCRIPTORS: CONSTANT;DISCOMFORT
DESCRIPTORS: CONSTANT
DESCRIPTORS: ACHING
DESCRIPTORS: ACHING
DESCRIPTORS: SHARP
DESCRIPTORS: CONSTANT
DESCRIPTORS: STABBING
DESCRIPTORS: SHARP
DESCRIPTORS: SPASM
DESCRIPTORS: CONSTANT
DESCRIPTORS: STABBING
DESCRIPTORS: CONSTANT
DESCRIPTORS: CONSTANT
DESCRIPTORS: ACHING;CONSTANT
DESCRIPTORS: ACHING
DESCRIPTORS: CONSTANT
DESCRIPTORS: ACHING;CONSTANT
DESCRIPTORS: CONSTANT;SHARP
DESCRIPTORS: CONSTANT
DESCRIPTORS: CRAMPING
DESCRIPTORS: ACHING
DESCRIPTORS: CONSTANT;SHARP
DESCRIPTORS: ACHING;CONSTANT
DESCRIPTORS: ACHING;CONSTANT;DISCOMFORT;SHARP
DESCRIPTORS: DISCOMFORT
DESCRIPTORS: CONSTANT
DESCRIPTORS: CONSTANT
DESCRIPTORS: CONSTANT;DISCOMFORT
DESCRIPTORS: ACHING
DESCRIPTORS: CONSTANT
DESCRIPTORS: ACHING
DESCRIPTORS: SHARP;SHOOTING
DESCRIPTORS: CONSTANT
DESCRIPTORS: ACHING;CONSTANT
DESCRIPTORS: ACHING
DESCRIPTORS: CONSTANT
DESCRIPTORS: CONSTANT
DESCRIPTORS: DISCOMFORT
DESCRIPTORS: CONSTANT;SHARP
DESCRIPTORS: ACHING
DESCRIPTORS: DISCOMFORT
DESCRIPTORS: CONSTANT
DESCRIPTORS: CONSTANT;DISCOMFORT
DESCRIPTORS: CONSTANT;SHARP
DESCRIPTORS: ACHING
DESCRIPTORS: CONSTANT
DESCRIPTORS: ACHING;CONSTANT
DESCRIPTORS: CONSTANT
DESCRIPTORS: ACHING
DESCRIPTORS: CONSTANT
DESCRIPTORS: ACHING
DESCRIPTORS: CONSTANT
DESCRIPTORS: SHARP
DESCRIPTORS: CONSTANT;SHARP
DESCRIPTORS: CONSTANT
DESCRIPTORS: SHARP;STABBING;ACHING
DESCRIPTORS: STABBING
DESCRIPTORS: CONSTANT
DESCRIPTORS: ACHING;CONSTANT;DISCOMFORT;SHARP
DESCRIPTORS: ACHING;STABBING
DESCRIPTORS: ACHING
DESCRIPTORS: ACHING
DESCRIPTORS: CONSTANT
DESCRIPTORS: CONSTANT
DESCRIPTORS: ACHING
DESCRIPTORS: ACHING
DESCRIPTORS: SHARP
DESCRIPTORS: SHARP;SHOOTING
DESCRIPTORS: ACHING
DESCRIPTORS: ACHING
DESCRIPTORS: CONSTANT
DESCRIPTORS: CONSTANT
DESCRIPTORS: ACHING
DESCRIPTORS: CONSTANT
DESCRIPTORS: CONSTANT
DESCRIPTORS: ACHING;SORE
DESCRIPTORS: CONSTANT;SHARP
DESCRIPTORS: DISCOMFORT
DESCRIPTORS: CONSTANT
DESCRIPTORS: CONSTANT
DESCRIPTORS: ACHING
DESCRIPTORS: CONSTANT
DESCRIPTORS: ACHING
DESCRIPTORS: CONSTANT
DESCRIPTORS: SHARP
DESCRIPTORS: ACHING;CONSTANT
DESCRIPTORS: SHARP
DESCRIPTORS: CONSTANT;SHARP
DESCRIPTORS: CONSTANT
DESCRIPTORS: ACHING;CONSTANT
DESCRIPTORS: ACHING;CONSTANT
DESCRIPTORS: STABBING
DESCRIPTORS: ACHING
DESCRIPTORS: ACHING
DESCRIPTORS: ACHING;SPASM
DESCRIPTORS: CONSTANT
DESCRIPTORS: CONSTANT
DESCRIPTORS: DISCOMFORT
DESCRIPTORS: CONSTANT
DESCRIPTORS: SHARP
DESCRIPTORS: ACHING;CONSTANT
DESCRIPTORS: SHARP;STABBING
DESCRIPTORS: ACHING;CONSTANT
DESCRIPTORS: ACHING
DESCRIPTORS: DISCOMFORT
DESCRIPTORS: ACHING
DESCRIPTORS: CONSTANT;SHARP
DESCRIPTORS: CONSTANT;DISCOMFORT
DESCRIPTORS: SPASM
DESCRIPTORS: CONSTANT
DESCRIPTORS: SHARP;STABBING
DESCRIPTORS: CONSTANT
DESCRIPTORS: CONSTANT;DISCOMFORT
DESCRIPTORS: CONSTANT;DISCOMFORT
DESCRIPTORS: ACHING
DESCRIPTORS: CONSTANT
DESCRIPTORS: ACHING;CONSTANT
DESCRIPTORS: ACHING
DESCRIPTORS: STABBING
DESCRIPTORS: ACHING
DESCRIPTORS: CONSTANT
DESCRIPTORS: CONSTANT
DESCRIPTORS: ACHING
DESCRIPTORS: ACHING;CONSTANT
DESCRIPTORS: CONSTANT;SHARP

## 2017-01-01 ASSESSMENT — ACTIVITIES OF DAILY LIVING (ADL)
DRESS: 0-->INDEPENDENT
ADLS_ACUITY_SCORE: 12
RETIRED_EATING: 0-->INDEPENDENT
AMBULATION: 0-->INDEPENDENT
AMBULATION: 0-->INDEPENDENT
TRANSFERRING: 0-->INDEPENDENT
ADLS_ACUITY_SCORE: 11
EATING: 2-->ASSISTIVE PERSON
AMBULATION: 0-->INDEPENDENT
COGNITION: 0 - NO COGNITION ISSUES REPORTED
BATHING: 0-->INDEPENDENT
CHANGE_IN_FUNCTIONAL_STATUS_SINCE_ONSET_OF_CURRENT_ILLNESS/INJURY: YES
DRESS: 2 - ASSISTIVE PERSON
DRESS: 0-->INDEPENDENT
ADLS_ACUITY_SCORE: 11
BATHING: 0-->INDEPENDENT
TOILETING: 0-->INDEPENDENT
DRESS: 0-->INDEPENDENT
TOILETING: 0-->INDEPENDENT
COGNITION: 0 - NO COGNITION ISSUES REPORTED
TRANSFERRING: 0-->INDEPENDENT
BATHING: 0-->INDEPENDENT
DRESS: 0-->INDEPENDENT
COGNITION: 0 - NO COGNITION ISSUES REPORTED
BATHING: 0-->INDEPENDENT
RETIRED_EATING: 0-->INDEPENDENT
BATHING: 0-->INDEPENDENT
RETIRED_EATING: 0-->INDEPENDENT
TRANSFERRING: 0-->INDEPENDENT
SWALLOWING: 0-->SWALLOWS FOODS/LIQUIDS WITHOUT DIFFICULTY
TOILETING: 0-->INDEPENDENT
RETIRED_EATING: 0-->INDEPENDENT
RETIRED_COMMUNICATION: 0-->UNDERSTANDS/COMMUNICATES WITHOUT DIFFICULTY
TRANSFERRING: 0-->INDEPENDENT
TRANSFERRING: 0-->INDEPENDENT
ADLS_ACUITY_SCORE: 11
TRANSFERRING: 2 - ASSISTIVE PERSON
DRESS: 0-->INDEPENDENT
BATHING: 0-->INDEPENDENT
ADLS_ACUITY_SCORE: 11
COGNITION: 0 - NO COGNITION ISSUES REPORTED
AMBULATION: 3-->ASSISTIVE EQUIPMENT AND PERSON
EATING: 0 - INDEPENDENT
CHANGE_IN_FUNCTIONAL_STATUS_SINCE_ONSET_OF_CURRENT_ILLNESS/INJURY: NO
ADLS_ACUITY_SCORE: 10
RETIRED_COMMUNICATION: 0-->UNDERSTANDS/COMMUNICATES WITHOUT DIFFICULTY
TOILETING: 0-->INDEPENDENT
SWALLOWING: 0-->SWALLOWS FOODS/LIQUIDS WITHOUT DIFFICULTY
FALL_HISTORY_WITHIN_LAST_SIX_MONTHS: NO
AMBULATION: 0-->INDEPENDENT
RETIRED_COMMUNICATION: 0-->UNDERSTANDS/COMMUNICATES WITHOUT DIFFICULTY
COMMUNICATION: 0-->UNDERSTANDS/COMMUNICATES WITHOUT DIFFICULTY
ADLS_ACUITY_SCORE: 11
TOILETING: 0-->INDEPENDENT
ADLS_ACUITY_SCORE: 11
RETIRED_COMMUNICATION: 0-->UNDERSTANDS/COMMUNICATES WITHOUT DIFFICULTY
COGNITION: 0 - NO COGNITION ISSUES REPORTED
BATHING: 3-->ASSISTIVE EQUIPMENT AND PERSON
FALL_HISTORY_WITHIN_LAST_SIX_MONTHS: NO
ADLS_ACUITY_SCORE: 11
ADLS_ACUITY_SCORE: 11
TRANSFERRING: 0-->INDEPENDENT
AMBULATION: 0-->INDEPENDENT
COGNITION: 0 - NO COGNITION ISSUES REPORTED
TRANSFERRING: 0-->INDEPENDENT
SWALLOWING: 0 - SWALLOWS FOODS/LIQUIDS WITHOUT DIFFICULTY
COMMUNICATION: 0-->UNDERSTANDS/COMMUNICATES WITHOUT DIFFICULTY
ADLS_ACUITY_SCORE: 11
DRESS: 0-->INDEPENDENT
ADLS_ACUITY_SCORE: 11
ADLS_ACUITY_SCORE: 11
SWALLOWING: 0-->SWALLOWS FOODS/LIQUIDS WITHOUT DIFFICULTY
DRESS: 0-->INDEPENDENT
FALL_HISTORY_WITHIN_LAST_SIX_MONTHS: NO
ADLS_ACUITY_SCORE: 11
TRANSFERRING: 0-->INDEPENDENT
RETIRED_COMMUNICATION: 0-->UNDERSTANDS/COMMUNICATES WITHOUT DIFFICULTY
FALL_HISTORY_WITHIN_LAST_SIX_MONTHS: NO
AMBULATION: 0-->INDEPENDENT
ADLS_ACUITY_SCORE: 11
AMBULATION: 0-->INDEPENDENT
TOILETING: 0-->INDEPENDENT
BATHING: 2 - ASSISTIVE PERSON
RETIRED_COMMUNICATION: 0-->UNDERSTANDS/COMMUNICATES WITHOUT DIFFICULTY
ADLS_ACUITY_SCORE: 11
SWALLOWING: 0-->SWALLOWS FOODS/LIQUIDS WITHOUT DIFFICULTY
TOILETING: 0-->INDEPENDENT
TRANSFERRING: 0-->INDEPENDENT
DRESS: 2-->ASSISTIVE PERSON
ADLS_ACUITY_SCORE: 11
DRESS: 0-->INDEPENDENT
AMBULATION: 2 - ASSISTIVE PERSON
ADLS_ACUITY_SCORE: 11
TOILETING: 0-->INDEPENDENT
SWALLOWING: 0-->SWALLOWS FOODS/LIQUIDS WITHOUT DIFFICULTY
BATHING: 0-->INDEPENDENT
RETIRED_EATING: 0-->INDEPENDENT
SWALLOWING: 0-->SWALLOWS FOODS/LIQUIDS WITHOUT DIFFICULTY
SWALLOWING: 0-->SWALLOWS FOODS/LIQUIDS WITHOUT DIFFICULTY
RETIRED_EATING: 0-->INDEPENDENT
ADLS_ACUITY_SCORE: 11
BATHING: 0-->INDEPENDENT
COGNITION: 0 - NO COGNITION ISSUES REPORTED
DRESS: 0-->INDEPENDENT
SWALLOWING: 0-->SWALLOWS FOODS/LIQUIDS WITHOUT DIFFICULTY
FALL_HISTORY_WITHIN_LAST_SIX_MONTHS: NO
EATING: 0-->INDEPENDENT
ADLS_ACUITY_SCORE: 11
TRANSFERRING: 3-->ASSISTIVE EQUIPMENT AND PERSON
AMBULATION: 0-->INDEPENDENT
ADLS_ACUITY_SCORE: 11
RETIRED_COMMUNICATION: 0-->UNDERSTANDS/COMMUNICATES WITHOUT DIFFICULTY
SWALLOWING: 0-->SWALLOWS FOODS/LIQUIDS WITHOUT DIFFICULTY
RETIRED_EATING: 0-->INDEPENDENT
RETIRED_COMMUNICATION: 0-->UNDERSTANDS/COMMUNICATES WITHOUT DIFFICULTY
AMBULATION: 0-->INDEPENDENT
SWALLOWING: 0-->SWALLOWS FOODS/LIQUIDS WITHOUT DIFFICULTY
FALL_HISTORY_WITHIN_LAST_SIX_MONTHS: NO
COGNITION: 0 - NO COGNITION ISSUES REPORTED
TOILETING: 2 - ASSISTIVE PERSON
TOILETING: 3-->ASSISTIVE EQUIPMENT AND PERSON
FALL_HISTORY_WITHIN_LAST_SIX_MONTHS: NO
COMMUNICATION: 0 - UNDERSTANDS/COMMUNICATES WITHOUT DIFFICULTY
RETIRED_EATING: 0-->INDEPENDENT
BATHING: 0-->INDEPENDENT
ADLS_ACUITY_SCORE: 11
TOILETING: 0-->INDEPENDENT
ADLS_ACUITY_SCORE: 11
FALL_HISTORY_WITHIN_LAST_SIX_MONTHS: NO
ADLS_ACUITY_SCORE: 12

## 2017-01-01 ASSESSMENT — ENCOUNTER SYMPTOMS
DIARRHEA: 0
COUGH: 1
CONSTITUTIONAL NEGATIVE: 1
CHOKING: 0
FATIGUE: 1
MUSCULOSKELETAL NEGATIVE: 1
BACK PAIN: 1
CHEST TIGHTNESS: 1
SEIZURES: 1
WHEEZING: 0
FEVER: 0
RHINORRHEA: 0
DYSRHYTHMIAS: 0
NECK PAIN: 0
DIARRHEA: 0
LIGHT-HEADEDNESS: 1
NAUSEA: 1
WHEEZING: 0
WHEEZING: 1
COUGH: 1
SHORTNESS OF BREATH: 1
HEADACHES: 1
SHORTNESS OF BREATH: 1
STRIDOR: 0
ORTHOPNEA: 0
FACIAL SWELLING: 1
FEVER: 0
HEADACHES: 1
COLOR CHANGE: 1
SHORTNESS OF BREATH: 1
GASTROINTESTINAL NEGATIVE: 1
FEVER: 1
SORE THROAT: 0
PALPITATIONS: 0
COUGH: 1
DYSRHYTHMIAS: 0
CHILLS: 1
SHORTNESS OF BREATH: 1
SHORTNESS OF BREATH: 1
FEVER: 0
SHORTNESS OF BREATH: 1
VOMITING: 0
ABDOMINAL PAIN: 0
COUGH: 1
SHORTNESS OF BREATH: 1
CHEST TIGHTNESS: 1
FEVER: 1
NAUSEA: 0
VOMITING: 0

## 2017-01-01 ASSESSMENT — PAIN SCALES - GENERAL
PAINLEVEL: NO PAIN (0)

## 2017-01-01 ASSESSMENT — COPD QUESTIONNAIRES
COPD: 1
CAT_SEVERITY: MODERATE

## 2017-01-01 ASSESSMENT — LIFESTYLE VARIABLES: TOBACCO_USE: 1

## 2017-01-07 NOTE — LETTER
Transition Communication Hand-off for Care Transitions to Next Level of Care Provider    Name: Sabina Figueroa  MRN #: [id number]  Primary Care Provider: Aidee Hemphill  Primary Care MD Name: Aidee Hemphill  Primary Clinic: [facility] [address]  [place] [address]  Primary Care Clinic Name: [facility]  Reason for Hospitalization:  Acute respiratory failure with hypoxia (H) [J96.01]  Hypotension, unspecified hypotension type [I95.9]  Altered mental status, unspecified altered mental status type [R41.82]  Pneumonia of both lungs due to infectious organism, unspecified part of lung [J18.9]  Admit Date/Time: 1/7/2017  5:57 PM  Discharge Date: 1/18/17  Payor Source: Payor: MEDICA / Plan: MEDICA [place] / Product Type: HMO /         Plan of Care Goals/Milestone Events:   Patient Concerns: She still feels dizzy at time of dc, but decline Home care PT            Reason for Communication Hand-off Referral: Admission diagnoses: PN  Admission diagnoses: COPD  Difficulty understanding plan of care  No support or lacking a support system  Non-adherence to plan of care related to:  Other declines home care support PT/OT    Discharge Plan:  Discharge Plan:       Most Recent Value    Concerns To Be Addressed compliance issue concerns, medication concerns           Concern for non-adherence with plan of care:   YES Mental Health, Substance abuse-on suboxone  Discharge Needs Assessment:  Needs       Most Recent Value    Equipment Currently Used at Home none    Transportation Available family or friend will provide, public transportation    # of Referrals Placed by [facility] External Care Coordination, Homecare          Already enrolled in Tele-monitoring program and name of program:  NO  Follow-up specialty is recommended: Yes    Follow-up plan:  Future Appointments  Date Time Provider Department Center   2/23/2017 1:30 PM  PFL B [facility]   2/23/2017 4:00 PM Jennifer Mane MD [facility]       Any outstanding tests  or procedures:        Referrals     Future Labs/Procedures    Home care nursing referral     Comments:    RN skilled nursing visit. RN to assess respiratory and cardiac status, patients ability to take and record daily blood pressure, temp and weight, hydration, nutrition and bowel status and home safety.  RN to teach medication management.    Your provider has ordered home care nursing services. If you have not been contacted within 2 days of your discharge please call the inpatient department phone number at [phone] .    Home Care OT Referral for Hospital Discharge     Comments:    OT to eval and treat    Your provider has ordered home care - occupational therapy. If you have not been contacted within 2 days of your discharge please call the department phone number listed on the top of this document.    Home Care PT Referral for Hospital Discharge     Comments:    PT to eval and treat    Your provider has ordered home care - physical therapy. If you have not been contacted within 2 days of your discharge please call the department phone number listed on the top of this document.            Key Recommendations:  PNA/COPD dx, needs specialty f/u see appts, hx of ETOH/mental health, substance abuse. Declined support at time of dc.     Sandra Walls [phone]  Sent via [email or web address] to Dr Hemphill's RN CTS     AVS/Discharge Summary is the source of truth; this is a helpful guide for improved communication of patient story

## 2017-01-07 NOTE — Clinical Note
Admitting Physician: JEOVANNY DO [[id number]]   [facility] [[id number][facility]   Bed Type: Adult ICU [6]   Special needs: Fall Risk [8]

## 2017-01-07 NOTE — IP AVS SNAPSHOT
[place] [address] [facility]    [address]    [facility] [address]    Phone:  [phone]    Fax:  [phone]                                       After Visit Summary   1/7/2017    Sabina Figueroa    MRN: [id number]           After Visit Summary Signature Page     I have received my discharge instructions, and my questions have been answered. I have discussed any challenges I see with this plan with the nurse or doctor.    ..........................................................................................................................................  Patient/Patient Representative Signature      ..........................................................................................................................................  Patient Representative Print Name and Relationship to Patient    ..................................................               ................................................  Date                                            Time    ..........................................................................................................................................  Reviewed by Signature/Title    ...................................................              ..............................................  Date                                                            Time

## 2017-01-07 NOTE — IP AVS SNAPSHOT
MRN:[id number]                      After Visit Summary   1/7/2017    Sabina Figueroa    MRN: [id number]           Thank you!     Thank you for choosing [facility] for your care. Our goal is always to provide you with excellent care. Hearing back from our patients is one way we can continue to improve our services. Please take a few minutes to complete the written survey that you may receive in the mail after you visit. If you would like to speak to someone directly about your visit please contact Patient Relations at [phone]. Thank you!          Patient Information     Date Of Birth          1975        About your hospital stay     You were admitted on:  January 7, 2017 You last received care in the:  [address] [facility]    You were discharged on:  January 18, 2017        Reason for your hospital stay       Hypoxic respiratory failure.                  Who to Call     For medical emergencies, please call 911.  For non-urgent questions about your medical care, please call your primary care provider or clinic, [phone]  For questions related to your surgery, please call your surgery clinic        Attending Provider     Provider    Nieves Bowen MD Loecken, Benedict Bean MD       Primary Care Provider Office Phone # Fax #    Aidee Hemphill [phone]       [facility] [address] [facility] [address]        After Care Instructions     Activity       Your activity upon discharge: activity as tolerated            Diet       Follow this diet upon discharge: -2 gm Na diet.                  Follow-up Appointments     Follow-up and recommended labs and tests        Follow up with primary care provider, Aidee Hemphill, within 7 days for hospital follow- up.                  Your next 10 appointments already scheduled     Feb 23, 2017  1:30 PM   FULL PULMONARY FUNCTION with  PFL B   [facility] Pulmonary Function Testing ([facility]  [facility] [place])    [address]  [address] [facility] [id number] [phone]            Feb 23, 2017  4:00 PM   (Arrive by 3:45 PM)   Return Visit with Jennifer Mane MD   [facility] ([facility] [place])    [address]  [address] [facility] [id number] [phone]              Additional Services     Home Care OT Referral for Hospital Discharge       OT to eval and treat    Your provider has ordered home care - occupational therapy. If you have not been contacted within 2 days of your discharge please call the department phone number listed on the top of this document.            Home Care PT Referral for Hospital Discharge       PT to eval and treat    Your provider has ordered home care - physical therapy. If you have not been contacted within 2 days of your discharge please call the department phone number listed on the top of this document.            Home care nursing referral       RN skilled nursing visit. RN to assess respiratory and cardiac status, patients ability to take and record daily blood pressure, temp and weight, hydration, nutrition and bowel status and home safety.  RN to teach medication management.    Your provider has ordered home care nursing services. If you have not been contacted within 2 days of your discharge please call the inpatient department phone number at [phone] .            Med Therapy Manage Referral       Your provider has referred you to: **[facility] Scheduling (numerous locations) [phone]   [email or web address]    Reason for Referral: multiple home medications    The [facility] department will contact you to schedule an appointment.  You may also schedule the appointment by calling [phone].  For [facility] [place] patients, please call [phone] to  "confirm/schedule your appointment on the next business day.    This service is designed to help you get the most from your medications.  A specially trained Pharmacist will work closely with you and your providers to solve any questions, concerns, issues or problems related to your medications.    Please bring all of your prescription and non-prescription medications (such as vitamins, over-the-counter medications, and herbals) or a detailed medication list to your appointment.    If you have a glucose meter or other home monitoring information, please also bring this to your appointment (i.e. blood glucose log, blood pressure log, pain log, etc.).                  Pending Results     Date and Time Order Name Status Description    1/16/2017 1332 Surgical pathology exam In process     1/10/2017 0944 Fungus Culture, non-blood - BAL Site 1 Preliminary     1/10/2017 0944 AFB Culture Non Blood -- BAL Site 1 In process             Statement of Approval     Ordered          01/18/17 1424  I have reviewed and agree with all the recommendations and orders detailed in this document.   EFFECTIVE NOW     Approved and electronically signed by:  Jonathan Black MD             Admission Information        Provider Department Dept Phone    1/7/2017 Benedict Da Silva MD  5 Medical Surgical [phone]      Your Vitals Were     Blood Pressure Pulse Temperature    112/62 mmHg 55 97.6  F (36.4  C) (Oral)    Respirations Height Weight    16 1.715 m (5' 7.5\") 79.47 kg (175 lb 3.2 oz)    BMI (Body Mass Index) Pulse Oximetry       27.02 kg/m2 96%       MyChart Information     [employer] lets you send messages to your doctor, view your test results, renew your prescriptions, schedule appointments and more. To sign up, go to [email or web address] . Click on \"Log in\" on the left side of the screen, which will take you to the Welcome page. Then click on \"Sign up Now\" on the right side of the page.     You will be asked to enter " the access code listed below, as well as some personal information. Please follow the directions to create your username and password.     Your access code is: [email or web address]  Expires: 3/7/2017  4:37 PM     Your access code will  in 90 days. If you need help or a new code, please call your [place] [facility] clinic or [phone].        Care EveryWhere ID     This is your Care EveryWhere ID. This could be used by other organizations to access your [facility] medical records  [id number]           Review of your medicines      START taking        Dose / Directions    nicotine 21 MG/24HR 24 hr patch   Commonly known as:  NICODERM CQ   Used for:  Tobacco use disorder        Dose:  1 patch   Place 1 patch onto the skin daily   Quantity:  30 patch   Refills:  0         CONTINUE these medicines which have NOT CHANGED        Dose / Directions    * albuterol 108 (90 BASE) MCG/ACT Inhaler   Commonly known as:  PROAIR HFA/PROVENTIL HFA/VENTOLIN HFA        Dose:  2 puff   Inhale 2 puffs into the lungs every 4 hours as needed for shortness of breath / dyspnea or wheezing   Refills:  0       * albuterol (2.5 MG/3ML) 0.083% neb solution        Dose:  1 vial   Take 1 vial by nebulization every 6 hours as needed for shortness of breath / dyspnea or wheezing   Refills:  0       alum & mag hydroxide-simethicone 400-400-40 MG/5ML Susp suspension   Commonly known as:  MYLANTA ES/MAALOX  ES        Dose:  30 mL   Take 30 mLs by mouth every 4 hours as needed for indigestion   Refills:  0       ascorbic acid 500 MG tablet   Commonly known as:  VITAMIN C        Dose:  500 mg   Take 500 mg by mouth daily   Refills:  0       aspirin 81 MG EC tablet   Used for:  Elevated troponin I level        Dose:  81 mg   Take 1 tablet (81 mg) by mouth daily   Quantity:  30 tablet   Refills:  1       atorvastatin 20 MG tablet   Commonly known as:  LIPITOR   Used for:  Elevated troponin I level        Dose:  20 mg   Take 1 tablet (20 mg) by mouth daily    Quantity:  30 tablet   Refills:  1       buprenorphine-naloxone 8-2 MG Subl sublingual tablet   Commonly known as:  SUBOXONE        Dose:  3 tablet   Place 3 tablets under the tongue daily   Refills:  0       ferrous sulfate 325 (65 FE) MG tablet   Commonly known as:  IRON        Dose:  325 mg   Take 325 mg by mouth daily (with breakfast)   Refills:  0       Fish Oil 1200 MG Caps        Dose:  1 capsule   Take 1 capsule by mouth daily   Refills:  0       FLUoxetine 40 MG capsule   Commonly known as:  PROzac        Dose:  40 mg   Take 40 mg by mouth daily   Refills:  0       folic acid 1 MG tablet   Commonly known as:  FOLVITE   Used for:  Alcoholic hepatitis, Alcohol dependence (H)        Dose:  1 mg   Take 1 tablet (1 mg) by mouth daily   Quantity:  30 tablet   Refills:  0       furosemide 20 MG tablet   Commonly known as:  LASIX        Dose:  20 mg   Take 20 mg by mouth daily   Refills:  0       gabapentin 600 MG tablet   Commonly known as:  NEURONTIN        Dose:  600 mg   Take 600 mg by mouth 3 times daily   Refills:  0       hydrOXYzine 10 MG tablet   Commonly known as:  ATARAX        Dose:  10 mg   Take 10 mg by mouth every 8 hours as needed for itching   Refills:  0       lactulose 10 GM/15ML solution   Commonly known as:  CHRONULAC        Dose:  20 g   Take 20 g by mouth 3 times daily   Refills:  0       Lidocaine 0.5 % Gel        Externally apply topically 3 times daily as needed Apply dime size amount to painful joints   Refills:  0       mirtazapine 30 MG tablet   Commonly known as:  REMERON   Used for:  Alcohol withdrawal, uncomplicated (H)        Dose:  30 mg   Take 1 tablet (30 mg) by mouth At Bedtime   Quantity:  30 tablet   Refills:  0       MULTIPLE VITAMINS PO   Used for:  Alcoholic hepatitis        Dose:  1 tablet   Take 1 tablet by mouth daily   Refills:  0       omeprazole 20 MG CR capsule   Commonly known as:  priLOSEC        Dose:  20 mg   Take 20 mg by mouth daily   Refills:  0        rifaximin 550 MG Tabs tablet   Commonly known as:  XIFAXAN   Used for:  Other ascites        Dose:  550 mg   Take 1 tablet (550 mg) by mouth 2 times daily   Quantity:  60 tablet   Refills:  0       ROBAXIN 500 MG tablet   Generic drug:  methocarbamol        Dose:  1000 mg   Take 1,000 mg by mouth 4 times daily   Refills:  0       spironolactone 50 MG tablet   Commonly known as:  ALDACTONE        Dose:  50 mg   Take 50 mg by mouth daily   Refills:  0       thiamine 100 MG tablet        Dose:  100 mg   Take 100 mg by mouth daily   Refills:  0       tiotropium 18 MCG capsule   Commonly known as:  SPIRIVA   Used for:  Obstructive chronic bronchitis with exacerbation (H)        Dose:  18 mcg   Inhale 1 capsule (18 mcg) into the lungs daily   Quantity:  30 capsule   Refills:  11       traZODone 100 MG tablet   Commonly known as:  DESYREL        Dose:  100 mg   Take 100 mg by mouth At Bedtime   Refills:  0       * Notice:  This list has 2 medication(s) that are the same as other medications prescribed for you. Read the directions carefully, and ask your doctor or other care provider to review them with you.         Where to get your medicines      These medications were sent to [facility] [address] [address] [address]     Phone:  [phone]    - nicotine 21 MG/24HR 24 hr patch             Protect others around you: Learn how to safely use, store and throw away your medicines at www.disposemymeds.org.             Medication List: This is a list of all your medications and when to take them. Check marks below indicate your daily home schedule. Keep this list as a reference.      Medications           Morning Afternoon Evening Bedtime As Needed    * albuterol 108 (90 BASE) MCG/ACT Inhaler   Commonly known as:  PROAIR HFA/PROVENTIL HFA/VENTOLIN HFA   Inhale 2 puffs into the lungs every 4 hours as needed for shortness of breath / dyspnea or wheezing                                    * albuterol (2.5 MG/3ML) 0.083% neb solution   Take 1 vial by nebulization every 6 hours as needed for shortness of breath / dyspnea or wheezing                                   alum & mag hydroxide-simethicone 400-400-40 MG/5ML Susp suspension   Commonly known as:  MYLANTA ES/MAALOX  ES   Take 30 mLs by mouth every 4 hours as needed for indigestion                                   ascorbic acid 500 MG tablet   Commonly known as:  VITAMIN C   Take 500 mg by mouth daily   Next Dose Due:  Not taken during hospitalization. May take as directed                                aspirin 81 MG EC tablet   Take 1 tablet (81 mg) by mouth daily   Last time this was given:  1/18 in the morning   Next Dose Due:  Tomorrow 1/19 in the morning                                   atorvastatin 20 MG tablet   Commonly known as:  LIPITOR   Take 1 tablet (20 mg) by mouth daily   Last time this was given:  20 mg on 1/18/2017  9:04 AM   Next Dose Due:  Tomorrow 1/19 in the morning                                   buprenorphine-naloxone 8-2 MG Subl sublingual tablet   Commonly known as:  SUBOXONE   Place 3 tablets under the tongue daily   Last time this was given:  Today 1/18 in the morning.   Next Dose Due:  Tomorrow 1/19 in the morning.                                   ferrous sulfate 325 (65 FE) MG tablet   Commonly known as:  IRON   Take 325 mg by mouth daily (with breakfast)   Last time this was given:  325 mg on 1/18/2017  9:04 AM   Next Dose Due:  Tomorrow 1/19 in the morning                                   Fish Oil 1200 MG Caps   Take 1 capsule by mouth daily   Next Dose Due:  Not taken during hospitalization. May take as directed                                FLUoxetine 40 MG capsule   Commonly known as:  PROzac   Take 40 mg by mouth daily   Last time this was given:  40 mg on 1/18/2017  9:04 AM   Next Dose Due:  Tomorrow 1/9 in the morning.                                   folic acid 1 MG  tablet   Commonly known as:  FOLVITE   Take 1 tablet (1 mg) by mouth daily   Last time this was given:  1 mg on 1/18/2017  9:04 AM   Next Dose Due:  Tomorrow 1/19 in the morning                                   furosemide 20 MG tablet   Commonly known as:  LASIX   Take 20 mg by mouth daily   Next Dose Due:  Not taken during hospitalization. May take as directed                                gabapentin 600 MG tablet   Commonly known as:  NEURONTIN   Take 600 mg by mouth 3 times daily   Next Dose Due:  Tonight 1/18 at 10:00PM                                         hydrOXYzine 10 MG tablet   Commonly known as:  ATARAX   Take 10 mg by mouth every 8 hours as needed for itching   Last time this was given:  10 mg on 1/17/2017  4:24 PM   Next Dose Due:  May take as directed                                   lactulose 10 GM/15ML solution   Commonly known as:  CHRONULAC   Take 20 g by mouth 3 times daily   Last time this was given:  30 mLs on 1/18/2017  9:03 AM   Next Dose Due:  Tonight 1/18 at 10:00PM                                         Lidocaine 0.5 % Gel   Externally apply topically 3 times daily as needed Apply dime size amount to painful joints                                   mirtazapine 30 MG tablet   Commonly known as:  REMERON   Take 1 tablet (30 mg) by mouth At Bedtime   Next Dose Due:  Not taken during hospitalization. May take as directed                                MULTIPLE VITAMINS PO   Take 1 tablet by mouth daily   Next Dose Due:  Not taken during hospitalization; may take as directed                                nicotine 21 MG/24HR 24 hr patch   Commonly known as:  NICODERM CQ   Place 1 patch onto the skin daily   Last time this was given:  1 patch on 1/18/2017  9:05 AM   Next Dose Due:  Remove old patch prior to placing new patch on. Place new patch on tomorrow 1/19 in the AM.                                   omeprazole 20 MG CR capsule   Commonly known as:  priLOSEC   Take 20 mg by mouth daily    Last time this was given:  20 mg on 1/9/2017  8:42 AM   Next Dose Due:  Have not taken since 1/9. May take as directed                                rifaximin 550 MG Tabs tablet   Commonly known as:  XIFAXAN   Take 1 tablet (550 mg) by mouth 2 times daily   Last time this was given:  550 mg on 1/18/2017  9:04 AM   Next Dose Due:  Tonight 1/18 at 9:00PM                                      ROBAXIN 500 MG tablet   Take 1,000 mg by mouth 4 times daily   Generic drug:  methocarbamol   Next Dose Due:  Not taken during hospitalization. May take as directed                                spironolactone 50 MG tablet   Commonly known as:  ALDACTONE   Take 50 mg by mouth daily   Next Dose Due:  Not taken during hospitalization. May take as directed.                                thiamine 100 MG tablet   Take 100 mg by mouth daily   Last time this was given:  100 mg on 1/18/2017  9:04 AM   Next Dose Due:  Tomorrow 1/19 in the morning.                                    tiotropium 18 MCG capsule   Commonly known as:  SPIRIVA   Inhale 1 capsule (18 mcg) into the lungs daily   Last time this was given:  18 mcg on 1/8/2017  7:16 AM   Next Dose Due:  Have not taken since 1/8. May take as directed                                traZODone 100 MG tablet   Commonly known as:  DESYREL   Take 100 mg by mouth At Bedtime   Last time this was given:  100 mg on 1/17/2017  8:52 PM   Next Dose Due:  Last take on 1/17. May take as directed                                * Notice:  This list has 2 medication(s) that are the same as other medications prescribed for you. Read the directions carefully, and ask your doctor or other care provider to review them with you.

## 2017-01-08 PROBLEM — J96.90 RESPIRATORY FAILURE (H): Status: ACTIVE | Noted: 2017-01-01

## 2017-01-08 NOTE — PROGRESS NOTES
Respiratory Therapy    Patient transferred from ED to ICU on BiPAP with no complications. Will continue to assess and monitor.    Antoinette Rodriguez RT  1/8/2017

## 2017-01-08 NOTE — PROGRESS NOTES
[facility]  Hospitalist Progress Note  Alexandre Corea MD, MD 01/08/2017  (Text Page)  Reason for Stay (Diagnosis): respiratory failure, HCAP,  shock         Assessment and Plan:      Summary of Stay: Sabina Figueroa is a 41 year old female with hx of ETOH abuse presumably sober, chronic pain on suboxone, previous hospitalization last 12/16 for CAP, hepatic encephalopathy, Mitral regurgitation, COPD and active smoker admitted on 1/7/2017 with increasing SOB found with hypoxia, fever and hypotension.    Problem List:   1. Shock requiring vasopressors suspect septic. Presenting symptoms of fever, SOB. Normal pro-BNP and previous echo showed normal EF. Hx of severe MR though  2. HCAP with bilateral infiltrates, procalcitonin of >8  3. Acute hypoxic respiratory failure on NIPPV  4. Chronic pain syndrome on suboxone  5. Hx of COPD  6. Active smoker  7. Hx of chronic liver disease with hepatic encephalopathy on rifaximin  8. Hx of etoh abuse  9. Moderate to severe mitral regurgitation    Continue inpatient ICU care. On NIPPV. On broad spectrum antibiotics with Zosyn, levaquin and vancomycin.  Cultures sent earlier and will continue to follow.  On low dose levophed for now.   Breathing treatments on board. Prednisone initiated today.  Resume previous suboxone to prevent withdrawals  Lactulose and home rifaximin was resumed earlier.    DVT Prophylaxis: Enoxaprain (Lovenox) SQ  Code Status: Full Code  Discharge Dispo: unclear yet  Estimated Disch Date / # of Days until Disch: > 3 days        Interval History (Subjective):      Assumed hospitalist care today.  Sabina presented last night with complaints of shortness of breath, possible suboxone overdose.  Found with hypoxia, hypotension, fever and suspected pneumonia  Overnight she remained on NIPPV. Currently easily arouse and follow some simple commands.  No nausea/vomiting, abdominal pain.                  Physical Exam:      Last Vital Signs:  BP 97/65  mmHg  Pulse 108  Temp(Src) 98.8  F (37.1  C) (Oral)  Resp 18  Wt 77.111 kg (170 lb)  SpO2 98%    I/O last 3 completed shifts:  In: 710.42 [I.V.:710.42]  Out: 2925 [Urine:2925]  Wt Readings from Last 1 Encounters:   01/07/17 77.111 kg (170 lb)     Filed Vitals:    01/07/17 1810   Weight: 77.111 kg (170 lb)       Constitutional: Awake, alert, cooperative, no apparent distress, on a BIPAP   Respiratory: Fair air entry, bilateral basal crackles, no obvious wheeze   Cardiovascular: tachycardic rate and rhythm, normal S1 and S2, and no murmur noted   Abdomen: Normal bowel sounds, soft, non-distended, non-tender   Skin: No rashes, no cyanosis, dry to touch   Neuro: Alert and oriented x2, no weakness, spontaneous but garbled speech with Bipap mask   Extremities: Non pitting edema on both LE, normal range of motion, femoral line on the R groin, arterial line on the R UE   Other(s): Euthymic mood, not agitated, not combative       All other systems: Negative          Medications:      All current medications were reviewed with changes reflected in problem list.         Data:      All new lab and imaging data was reviewed.   Labs:    Recent Labs  Lab 01/07/17 2020 01/07/17  1823 01/07/17  1814   CULT Culture negative < 24 hours, reincubate No growth after 12 hours No growth after 12 hours       Recent Labs  Lab 01/08/17  0610 01/07/17  1814   WBC 11.5* 8.5   HGB 12.5 12.9   HCT 37.5 38.4   MCV 92 90   * 134*       Recent Labs  Lab 01/08/17  0610 01/07/17  1814    134   POTASSIUM 4.5 3.5   CHLORIDE 113* 102   CO2 24 23   ANIONGAP 5 9   * 115*   BUN 7 6*   CR 0.95 0.99   GFRESTIMATED 64 61   GFRESTBLACK 78 74   VISHNU 7.2* 8.4*   PROTTOTAL  --  7.1   ALBUMIN  --  3.6   BILITOTAL  --  0.6   ALKPHOS  --  101   AST  --  33   ALT  --  17       Recent Labs  Lab 01/08/17  0610 01/07/17  1814   * 115*       Recent Labs  Lab 01/07/17  1814   INR 1.14       Recent Labs  Lab 01/07/17 1814   TROPI <0.015The  99th percentile for upper reference range is 0.045 ug/L.  Troponin values in the range of 0.045 - 0.120 ug/L may be associated with risks of adverse clinical events.       Recent Labs  Lab 01/07/17 2020   COLOR Light Yellow   APPEARANCE Clear   URINEGLC Negative   URINEBILI Negative   URINEKETONE Negative   SG 1.015   UBLD Negative   URINEPH 6.0   PROTEIN Negative   NITRITE Negative   LEUKEST Negative   RBCU 0   WBCU 0      Imaging:   Results for orders placed or performed during the hospital encounter of 01/07/17   XR Chest 2 Views    Narrative    CHEST TWO VIEW   1/7/2017 7:33 PM     HISTORY: Shortness of breath.    COMPARISON: 12/5/2016      Impression    IMPRESSION: Bibasilar infiltrates are again noted slightly worse than  that seen on the prior exam. Upper lung zones are clear. Heart size is  normal. No pleural effusions.    DAVID PULIDO MD   CT Head w/o Contrast    Narrative    CT SCAN OF THE HEAD WITHOUT CONTRAST   1/7/2017 7:50 PM     HISTORY: Altered mental status.    TECHNIQUE:  Axial images of the head and coronal reformations without  IV contrast material.  Radiation dose for this scan was reduced using  automated exposure control, adjustment of the mA and/or kV according  to patient size, or iterative reconstruction technique.    COMPARISON: 11/30/2013    FINDINGS:  The ventricles are normal in size, shape and configuration.   The brain parenchyma and subarachnoid spaces are normal. There is no  evidence of intracranial hemorrhage, mass, acute infarct or anomaly.     The visualized portions of the sinuses and mastoids appear normal.  There is no evidence of trauma.      Impression    IMPRESSION: Normal CT scan of the head.      DAVID PULIDO MD   Chest CT, IV contrast only - PE protocol    Narrative    CT CHEST PULMONARY EMBOLISM WITH CONTRAST  1/7/2017 7:52 PM    HISTORY:  Dyspnea, hypoxia.    TECHNIQUE: Scans obtained from the apices through the diaphragm with  IV contrast. 100 mL Isovue-370  injected.  Radiation dose for this scan was reduced using automated exposure  control, adjustment of the mA and/or kV according to patient size, or  iterative reconstruction technique.    COMPARISON:  Chest x-rays dated 1/7/2017 and CT chest dated 6/23/2016.    FINDINGS:  Consolidations in the bilateral lower lobes also involve  the inferior lingula, right middle lobe and inferior right upper lobe.  There are patchy opacities in the upper lobes bilaterally superiorly  as well. Mild bullous disease is seen in the apices. These findings  are concerning for infectious infiltrates. Pulmonary edema is also  possible in the areas of the infiltrates.    Lymphadenopathy is seen in the chest with the AP window lymph nodes  measuring up to 1.3 cm in short axis. Subcarinal lymph node measures  up to 0.9 cm in short axis and is still within normal limits.  Pretracheal lymph node measures up to 1.2 cm in short axis. No  definite hilar lymphadenopathy is seen. The heart is normal in size.  There are coronary artery atherosclerotic calcifications.    Visualized portions of the upper abdominal contents demonstrate a  prominent spleen measuring 14.8 x 9.7 cm cross-sectionally and more  than 10.7 cm in craniocaudal dimension. This is not completely  included and therefore the complete craniocaudal dimension cannot be  calculated. Visualized portions of the upper abdominal contents are  otherwise unremarkable. No aggressive osseous lesions are seen.    There are no filling defects in the central to third order pulmonary  arteries to suggest pulmonary artery embolism.      Impression    IMPRESSION:  1. No evidence for pulmonary artery embolism.  2. Bilateral mid and lower lung infiltrates (ground glass with  thickened interlobular septae) with subtle patchy infiltrates in the  upper lungs as well are concerning for atypical infectious or  inflammatory infiltrative processes versus pulmonary edema. Other  etiologies such as alveolar  proteinosis are considered less likely.  These infiltrates have worsened in appearance since the prior CT dated  6/23/2016.  3. Lymphadenopathy in the AP window and in the pretracheal regions are  noted in the mediastinum. This is likely reactive and is slightly more  prominent than on the prior study dated 6/23/2016.  4. Spleen appears enlarged but is not completely included.    SHARAD ANDREWS MD

## 2017-01-08 NOTE — ED NOTES
Patient reports SOB, chest pain, coughing x4 days. Reports fevers and chills at home. Hx of pneumonia. Patient Oxygen sats found to be 82% on room air. Applied 10L of oxygen via oxymask. O2 sats now 94%. Alert, awake, anxious. Skin flushed, red, warm. Np at bedside.

## 2017-01-08 NOTE — H&P
"CHIEF COMPLAINT:  \"I took too many medications.\"  The patient arrived here via taxi cab for shortness of breath.  She thinks her symptoms are related to taking a double dose of her usual Suboxone.      HISTORY OF PRESENT ILLNESS:  Ms. Sabina Figueroa is a 41-year-old female who is a bit of a challenging historian this evening.  She has a history of COPD and is an active smoker, history of mitral regurgitation, history of chronic pain for which she takes Suboxone, who presented to the hospital today for the above concerns.  The patient was recently hospitalized here at [facility] from 12/05/2016 through 12/10/2016 with acute hypoxic respiratory failure felt related to pneumonia, along with acute exacerbation of COPD.  Initially, it was difficult to determine if her hypoxia was related to pneumonia or CHF based on chart review, but by day of discharge her hypoxia had improved considerably and she was discharged with no need for home oxygen.  Echocardiogram done that admission showed preserved ejection fraction and normal IVC, and she received antibiotic therapy with Rocephin and azithromycin, and discharged on oral Ceftin.      The patient returns to the hospital this evening with increasing shortness of breath and altered mental status.  Per ER physician, she initially was quite sleepy and difficult to arouse, but when finally awakened she was able to have a conversation.  On presentation, she was hypoxic with room air saturation of 84%.  She has also had a temperature of 102.0 degrees and a heart rate of 110 beats per minute.  Saturations improved to 94% on 10 liters of oxygen, and eventually she was started on BiPAP therapy.  I saw the patient while she was receiving BiPAP.      Her workup included a CBC which was unremarkable including a normal white blood cell count, and lactic acid was normal.  VBG showing a pH of 7.34, pCO2 of 45, and pO2 of 54; this was on a 12 liter mask.  Ammonia level was normal at 35.  " Influenza titer was negative.  Alcohol level was negative.  Liver function tests were all normal.  Creatinine normal, unremarkable complete metabolic panel.  ABG showed a pH of 7.3, pCO2 of 44, pO2 of 72, and saturations 88%.  TSH, INR and PTT all normal.  D-dimer high at 0.8.  BNP is 294.  Troponin was negative.  Urinalysis unremarkable.      The patient received vancomycin, Zosyn, and Levaquin in the ER for concerns about possible healthcare-associated pneumonia, and is being admitted to the Intensive Care Unit on BiPAP.  She was hypotensive in the ER, but blood pressures are now improving after several liters of normal saline.  I spoke with Dr. Bowen of the ER who is requesting admission to the hospital.      PAST MEDICAL HISTORY:   1.  Recent admission to [facility], 12/05/2016 to 12/10/2016, for acute hypoxic respiratory failure, felt related to likely community-acquired pneumonia versus pulmonary edema, infection favored.  By day of discharge, she was titrated off oxygen and breathing well on room air.  She was treated with Rocephin and azithromycin, and discharged on Ceftin to complete therapy.   2.  History of chronic obstructive pulmonary disease.   3.  Active smoker.   4.  Alcoholic liver disease, complicated by hepatic encephalopathy for which she takes lactulose and rifampin.   5.  Chronic pain syndrome, for which she takes Suboxone.   6.  2 to 3+ mitral regurgitation noted on echocardiogram during recent admission to the hospital, with preserved ejection fraction and normal left ventricular function.      CURRENT MEDICATIONS:   1.  Albuterol.   2.  Aspirin.   3.  Atorvastatin.   4.  Prozac.   5.  Folate.   6.  Lactulose.   7.  Omeprazole.   8.  Rifaximin.   9.  Thiamine.   10.  Spiriva.   11.  Mylanta.   12.  Vitamin C.   13.  Suboxone.   14.  Ferrous sulfate.   15.  Lasix.   16.  Gabapentin.   17.  Hydroxyzine.   18.  Lidoderm gel as needed.   19.  Robaxin.   20.  Remeron.   21.   Multivitamin.   22.  Omega-3 fatty acid.   23.  Spironolactone.   24.  Trazodone.      ALLERGIES:  None.      FAMILY HISTORY:  Reviewed.  Nothing contributory to this admission.      SOCIAL HISTORY:  The patient states she has been sober for several months and is not drinking currently.  She has a known history of alcoholic liver disease.  Active smoker.  Denies street drugs.      REVIEW OF SYSTEMS:  Please see HPI for details.  Comprehensive greater than 10-point review of systems otherwise negative besides that detailed above.      PHYSICAL EXAM:   VITAL SIGNS:  Blood pressure is currently 90/60.  Heart rate is 85.  Temperature currently 99.2 degrees.  Saturation 95% on BiPAP.   GENERAL:  The patient seemed quite lethargic when I first entered the room.  I had difficulty waking her up.  She was on BiPAP.  I had to use pain stimuli to get her awake, but in somewhat unusual fashion, as soon as she woke up, she started talking with me, she maintained wakefulness easily, and was able to converse and answer questions appropriately.  Speech is a bit tangential.   HEENT:  Head is atraumatic.  Sclerae white.  Eyelids normal.  Conjunctivae normal.  Extraocular movements intact.   NECK:  Supple.  No cervical or supraclavicular lymphadenopathy.   HEART:  Regular rate and rhythm.  No significant murmurs.  No lower extremity edema.   LUNGS:  Notable for crackles.  No intercostal retractions.  No conversational dyspnea.   ABDOMEN:  Nontender, nondistended.  Soft.  No masses.  No organomegaly.   EXTREMITIES:  No edema.   SKIN:  No rash.  No jaundice.  Skin is dry to touch.   NEUROLOGIC:  Cranial nerves II-XII appear to be intact.  Moves all extremities appropriately.  Sensation intact to light touch in the upper and lower extremities bilaterally.   strength is 5/5 bilaterally, and dorsi and plantar flexion 5/5 bilaterally and symmetric.   PSYCHIATRIC:  As detailed above.      LABORATORY AND IMAGING DATA:  Reviewed above in  HPI.  EKG was done, which I personally reviewed, showing sinus tachycardia with heart rate of 110 beats per minute.  No ST segment changes and no pathologic Q-waves.      IMPRESSION:  Ms. Figueroa is a 41-year-old female with history of chronic pain.  She follows with a pain clinic and takes Suboxone.  History also notable for COPD and tobacco use.  More recent history is notable for admission to the hospital one month ago for acute hypoxic respiratory failure felt to be infectious in etiology.  She responded well to treatment for community-acquired pneumonia and was discharged without oxygen.  She presents to the hospital today for increasing work of breathing and altered mental status.  On presentation to the ER, she was noted to have a fever, tachycardia, hypotension, and hypoxia, ultimately requiring BiPAP therapy in the Emergency Department.  Workup in the ER included fairly unremarkable labs including normal white blood cell count and normal lactic acid level.  Chest CT scan, however, shows bilateral infiltrates, worsened since the previous CT one month ago when she was treated for pneumonia.  She was given vancomycin, Levaquin, and Zosyn in the ER, and is being admitted to the Intensive Care Unit on BiPAP.      1.  Recurrent hypoxic respiratory failure:  Given fever and infiltrates, one would naturally consider infection.  However, this is her second episode of acute hypoxic respiratory failure in one month, making one consider alternative etiologies or perhaps noninfectious etiologies.  For now, given her septic presentation, we will cover with broad-spectrum antibiotics, as she is at risk for healthcare-associated pneumonia, given her recent hospitalization.  This will be covered with vancomycin, Levaquin and Zosyn.  We will check a procalcitonin level.  If low, need to consider alternative etiologies here beyond infection.  Bronchoscopy might provide useful information, but given her current respiratory  status, she might need to be intubated to make this happen.  She is currently requiring continuous BiPAP in the Emergency Department.  I do not think she will require intubation, but will be closely monitored in the ICU.  I do not think this is pulmonary edema in the setting of her normal BNP, and recent normal ejection fraction on echocardiogram.  She does have significant mitral regurgitation, but I do not think that is the cause for her presentation here.   2.  History of hepatic encephalopathy:  Ammonia level is near normal, and she is chronically on lactulose and rifaximin, and we will continue.  I do not think she is encephalopathic currently.   3.  Chronic pain syndrome, on chronic Suboxone:  She reports that she took a double dose of Suboxone today which she blames as the cause for her presentation.  She is followed at a pain clinic, but was unable to provide the name of the clinic for me.  We will check a urine toxicology screen for completeness.  She did receive Narcan in the Emergency Department twice without effect.      PLAN:   1.  Admit to inpatient status, Intensive Care Unit.  She will require more than two nights in the hospital.   2.  Continue BiPAP therapy, breaks as tolerated.   3.  IV fluid hydration.   4.  Continue vancomycin, Zosyn, and Levaquin for sepsis.   5.  Check procalcitonin level   6.  Blood cultures have been ordered and are pending.   7.  Differential diagnosis for presentation includes noninfectious etiologies.  Given her persistent infiltrates, bronchoscopy would be useful, but at this point she would need to be intubated to make this happen.   8.  FULL CODE.   9.  Enoxaparin for DVT prophylaxis.   10.  Repeat lactic acid on admission to the Intensive Care Unit.   11.  I will call the intensivist this evening to provide sign-out on this patient for overnight coverage.         JEOVANNY DO MD             D: 01/07/2017 22:08   T: 01/07/2017 23:58   MT: [id number]      Name:      SYDNI GLEZ   MRN:      [id number]        Account:      [id number]   :      1975           Admitted:     [id number]      Document: [id number]       cc: DALE RUSSELL MD

## 2017-01-08 NOTE — ED PROVIDER NOTES
History     Chief Complaint:  Difficulty breathing.      HPI   Sabina Figueroa is a 41 year old female with a history of COPD and asthma, who presents with difficulty breathing.  Pt reports 4 day history of difficulty breathing and fever.  Pt denies any chest pain.  She states she has headache only with coughing.   No nausea, diarrhea, or other GI complaints.  She has had decreased PO intake over the last couple days.  Pt currently is a smoker.  She states she had pneumonia 1 month ago and had a stress test 1 week ago,but hasn't followed up with the cardiologist yet for the results.    Of note; pt poor historian due to sleepiness, slight altered mental status. Unable to obtain further history.    Allergies:  No Known Allergies     Medications:      Omega-3 Fatty Acids (FISH OIL) 1200 MG CAPS   methocarbamol (ROBAXIN) 500 MG tablet   aspirin EC 81 MG EC tablet   atorvastatin (LIPITOR) 20 MG tablet   rifaximin (XIFAXAN) 550 MG TABS tablet   HYDROXYZINE HCL PO   Ascorbic Acid (VITAMIN C PO)   ferrous sulfate (IRON) 325 (65 FE) MG tablet   TRAZODONE HCL PO   FUROSEMIDE PO   GABAPENTIN PO   SPIRONOLACTONE PO   THIAMINE HCL PO   mirtazapine (REMERON) 30 MG tablet   FLUOXETINE HCL PO   OMEPRAZOLE PO   LACTULOSE PO   MULTIPLE VITAMINS PO   folic acid (FOLVITE) 1 MG tablet   albuterol (2.5 MG/3ML) 0.083% neb solution   buprenorphine-naloxone (SUBOXONE) 8-2 MG SUBL sublingual tablet   tiotropium (SPIRIVA) 18 MCG inhalation capsule   Lidocaine 0.5 % GEL   alum & mag hydroxide-simethicone (MYLANTA ES/MAALOX  ES) 400-400-40 MG/5ML SUSP   albuterol (PROAIR HFA, PROVENTIL HFA, VENTOLIN HFA) 108 (90 BASE) MCG/ACT inhaler       Problem List:    Patient Active Problem List    Diagnosis Date Noted     Acute respiratory failure (H) 12/05/2016     Priority: Medium     Respiratory failure with hypoxia (H) 06/23/2016     Priority: Medium     Alcohol withdrawal (H) 03/25/2016     Priority: Medium     Adjustment disorder with depressed  mood 2016     Priority: Medium     Bilateral low back pain with left-sided sciatica 2015     Priority: Medium     Bronchospasm 2015     Priority: Medium     Iron deficiency anemia 2015     Priority: Medium     Alcohol dependence with withdrawal (H) 2013     Priority: Medium     Peripheral neuropathy (H) 2015     Abdominal pain, generalized 2015     Edema 2015     Anemia in chronic illness 2015     History of GI bleed 2015     Physical deconditioning 2015     Ascites 2015     Insomnia 2015     Alcoholic peripheral neuropathy (H) 2015     Smoking addiction 2015     Jaundice 2015     Alcoholic hepatitis 2015     Chemical dependency (H) 2014     GI bleeding 2014     Anxiety 2013     Hyponatremia 2013     Hypertension goal BP (blood pressure) < 140/90 2012     CARDIOVASCULAR SCREENING; LDL GOAL LESS THAN 160 2012     DDD (degenerative disc disease), lumbar 2012        Past Medical History:    Past Medical History   Diagnosis Date     Arthritis      Bunion, left foot      Ovarian cyst, left      Anxiety      Hypertension      Alcohol abuse      Asthma      Depression      ALC (alcoholic liver cirrhosis) (H)      Ascites      COPD (chronic obstructive pulmonary disease) (H)      Chronic low back pain        Past Surgical History:    Past Surgical History   Procedure Laterality Date     Knee surgery       x3      section       Esophagoscopy, gastroscopy, duodenoscopy (egd), combined  2014     Procedure: COMBINED ESOPHAGOSCOPY, GASTROSCOPY, DUODENOSCOPY (EGD);  Surgeon: Brittany Lowe MD;  Location:  GI     Esophagoscopy, gastroscopy, duodenoscopy (egd), combined N/A 2015     Procedure: COMBINED ESOPHAGOSCOPY, GASTROSCOPY, DUODENOSCOPY (EGD);  Surgeon: London Lenz MD;  Location:  GI     Esophagoscopy, gastroscopy, duodenoscopy (egd), combined N/A  11/11/2015     Procedure: COMBINED ESOPHAGOSCOPY, GASTROSCOPY, DUODENOSCOPY (EGD);  Surgeon: Barry Chavez MD;  Location:  GI       Family History:    Family History   Problem Relation Age of Onset     Depression Mother      Anxiety Disorder Mother      MENTAL ILLNESS Mother      Substance Abuse Mother      Depression Father      Anxiety Disorder Father      Substance Abuse Father      MENTAL ILLNESS Father      Depression Daughter      Depression Brother      Schizophrenia Brother      Depression Brother      Anxiety Disorder Brother      Substance Abuse Brother        Social History:  Marital Status:  Single [1]  Social History   Substance Use Topics     Smoking status: Current Every Day Smoker -- 1.00 packs/day for 18 years     Types: Cigarettes     Smokeless tobacco: Former User     Quit date: 05/11/2016     Alcohol Use: 0.0 oz/week     0 Standard drinks or equivalent per week      Comment: 8 drinks or more each day, abstinent since 4/2/15. History of CD treatment in past x1        Review of Systems   Constitutional: Negative for fever.   HENT: Negative for rhinorrhea and sore throat.    Respiratory: Positive for cough and shortness of breath. Negative for wheezing.    Cardiovascular: Negative for chest pain.   Gastrointestinal: Negative for nausea, vomiting, abdominal pain and diarrhea.   Musculoskeletal: Negative for neck pain.   Neurological: Positive for headaches.        Headache with coughing   All other systems reviewed and are negative.      Physical Exam     Patient Vitals for the past 24 hrs:   BP Temp Temp src Pulse Heart Rate Resp SpO2 Weight   01/07/17 2215 (!) 84/48 mmHg - - - 74 - 95 % -   01/07/17 2205 (!) 82/48 mmHg - - - 72 - 95 % -   01/07/17 2200 90/49 mmHg - - - 79 - 91 % -   01/07/17 2145 91/58 mmHg - - - 86 - 95 % -   01/07/17 2130 101/69 mmHg - - - 95 - 96 % -   01/07/17 2125 97/63 mmHg - - - 89 - 100 % -   01/07/17 2120 92/61 mmHg - - - 79 - 97 % -   01/07/17 2115 (!) 77/45 mmHg - - -  71 - 96 % -   01/07/17 2110 (!) 81/49 mmHg - - - 78 - 98 % -   01/07/17 2105 (!) 76/44 mmHg - - - 76 - 97 % -   01/07/17 2100 (!) 78/44 mmHg - - - 77 - 98 % -   01/07/17 2050 (!) 79/45 mmHg - - - 78 - 98 % -   01/07/17 2045 (!) 76/44 mmHg - - - 79 - 98 % -   01/07/17 2040 (!) 77/48 mmHg - - - 82 - 98 % -   01/07/17 2035 (!) 81/44 mmHg - - - 82 - 99 % -   01/07/17 2032 - 99.2  F (37.3  C) Oral - - - - -   01/07/17 2030 (!) 86/49 mmHg - - - 80 - 98 % -   01/07/17 2027 (!) 80/46 mmHg - - - 86 - 99 % -   01/07/17 2025 (!) 77/45 mmHg - - - 81 - 98 % -   01/07/17 2020 (!) 89/47 mmHg - - - 82 - - -   01/07/17 2015 (!) 84/46 mmHg - - - 82 - 100 % -   01/07/17 2010 (!) 84/48 mmHg - - - 87 - 97 % -   01/07/17 2005 (!) 87/63 mmHg - - - 91 - 97 % -   01/07/17 2000 (!) 84/52 mmHg - - - 96 - (!) 89 % -   01/07/17 1954 - - - - 95 - 95 % -   01/07/17 1951 91/49 mmHg - - - - - 97 % -   01/07/17 1930 (!) 88/64 mmHg - - - - - 92 % -   01/07/17 1928 98/61 mmHg - - - - - 95 % -   01/07/17 1925 (!) 88/63 mmHg - - - - - 92 % -   01/07/17 1920 (!) 89/55 mmHg - - - - - 92 % -   01/07/17 1904 - - - - - - 96 % -   01/07/17 1900 98/58 mmHg - - - - - 95 % -   01/07/17 1856 96/68 mmHg - - - - - 94 % -   01/07/17 1850 99/59 mmHg - - - - - 96 % -   01/07/17 1848 - - - - - - 97 % -   01/07/17 1845 - - - - - - 93 % -   01/07/17 1840 - - - - - - 93 % -   01/07/17 1832 - - - - - - 97 % -   01/07/17 1830 100/57 mmHg - - - - - 92 % -   01/07/17 1824 102/56 mmHg - - - - - 93 % -   01/07/17 1810 115/62 mmHg 102.2  F (39  C) Temporal 108 108 26 95 % 77.111 kg (170 lb)   01/07/17 1805 115/62 mmHg - - - - - (!) 82 % -          Physical Exam  General:  Pt sleepy, appears in moderate discomfort, well kept.  Eyes:  PERRL, pupils small, conjunctivae pink, no scleral icterus or conjunctival injection.  ENT:  Dry mucus membranes, oropharynx clear without erythema or exudates, no lymphadenopathy, normal voice.  Resp:  Lungs course to auscultation bilaterally,  decreased air movement to bases, no wheezing, normal respiratory effort, no retractions.  CV:  Normal rate and rhythm, no murmurs/rubs/gallops.  GI:  Abdomen soft and nondistended.  Normoactive BS.  No tenderness, guarding or rebound, no palpable masses.  Skin:  Warm, dry.  No rashes, lesions, or petechiae.  Bruising noted to right shin.  Musculoskeletal: Mild peripheral edema,  no calf tenderness, normal gross ROM.   Neuro: Alert and oriented to person/place/time after gentle arousal, pt sleepy, normal sensation.  Responds appropriately to questions and commands after gentle stimulation.  Normal muscle tone. Clonus noted to lower extremities bilat.   Psychiatric: Normal mood and affect, cooperative, good eye contact.    Emergency Department Course   ECG:  @ 18:08:25  Indication: difficulty breathing  Vent. Rate 111 bpm. CA interval 140 ms. QRS duration 78 ms. QT/QTc 324/440 ms. P-R-T axis 60 0 48.   Sinus Tachycardia, possible left atrial enlargement,  Borderline ECG.   Read @ 1812 by Dr. Bowen.       Imaging:  XR Chest 2 Views   Final Result   IMPRESSION: Bibasilar infiltrates are again noted slightly worse than   that seen on the prior exam. Upper lung zones are clear. Heart size is   normal. No pleural effusions.      DAVID PULIDO MD      CT Head w/o Contrast   Final Result   IMPRESSION: Normal CT scan of the head.         DAVID PULIDO MD      Chest CT, IV contrast only - PE protocol   Preliminary Result   IMPRESSION:   1. No evidence for pulmonary artery embolism.   2. Bilateral mid and lower lung infiltrates (ground glass with   thickened interlobular septae) with subtle patchy infiltrates in the   upper lungs as well are concerning for atypical infectious or   inflammatory infiltrative processes versus pulmonary edema. Other   etiologies such as alveolar proteinosis are considered less likely.   These infiltrates have worsened in appearance since the prior CT dated   6/23/2016.   3. Lymphadenopathy in the  AP window and in the pretracheal regions are   noted in the mediastinum. This is likely reactive and is slightly more   prominent than on the prior study dated 6/23/2016.   4. Spleen appears enlarged but is not completely included.        Radiographic findings were communicated with the patient who voiced understanding of the findings.    All Readings Per Radiology Unless Otherwise Noted.    Laboratory:  CBC: WBC 8.5, HGB 12.9, (L), absolute neutrophil 0.6(L), otherwise WNL  CMP: Glucose 115(H), BUN 6(L), Ca 8.4(L), Rest WNL (Creatinine 0.99)  Blood gas arterial:  pH 7.33(L), pCO2: 44, pO2: 72(L), Bicarb: 23, FIO2 10L, oxyhemoglobin 88(L), base deficit: 2.9  Blood gas venous and oxyhgb: pH 7.34, PCO2 45, PO2 54(H), bicarb 24, FIO2 12L mask, oxyhemoglobin 82, base deficit 1.6  Blood culture x 2 :  Pending  Influenza A/B antigen:  negative  Lactic acid:  1.3  Ammonia:  35  TSH with free T4 reflex:   1.04  Alcohol ethyl:  <0.01  INR: 1.14  PTT:  36  D Dimer:  0.8(H)  Nt probnp:  294    Procedures:      Central Line Placement       PROCEDURE:  Central Line Placement to the right femoral vein with Ultrasound Guidance.    INDICATIONS: Vascular access    CONSENT: Risks (including but not limited to: pneumothorax,  bleeding, infection or artery puncture), benefits and alternatives were discussed with  patient and consent for procedure was obtained.    TIMEOUT: Universal protocol was followed. TIME OUT conducted just prior to starting procedure confirmed patient identity, site/side, procedure, patient position, and availability of correct equipment, and implants.?  Yes      MEDICATION: Lidocaine    PROCEDURAL NOTE: Right Femoral and the right femoral area was prepped, cleansed and draped in a sterile fashion.  Mask, gown and gloves were used per sterile protocol.  Patient was then placed into Trendelenburg position and lidocaine was used for local anesthesia.  Vascular probe with ultrasound was used in a sterile  fashion for guidence.  Introducer needle was then used to gain access to the central venous circulation.  Using Seldinger technique the  Triple lumen catheter was placed.  Catheter port(s) were aspirated and flushed.  Central line was sutured in place and sterile dressing applied.     PATIENT STATUS: Patient tolerated the procedure   well. There were  no complications.       Physician: Dr. Bowen    Procedure note: Arterial Line, radial    Indication:  Hypotension.  Need for real time blood pressure monitoring    Consent:  Implied/Verbal; given the critical nature of the situation    Location: Right Radial Artery    The patient's wrist is prepped with Chloro-prep.  A sterile field is created. The radial artery is palpated.     Lidocaine local anesthesia is used, 1%, 2-3 ml.      The needle is used to enter the artery.  Arterial blood returned in the syringe.  A wire was placed via Seldinger technique.  The arterial catheter is placed over the wire and the wire is removed.  The transducer line is connected and arterial waveform is confirmed.    The line is sewn into place.  A dressing is applied.      There were no complications to the procedure.    Interventions:  Medications   ipratropium - albuterol 0.5 mg/2.5 mg/3 mL (DUONEB) neb solution 3 mL (3 mLs Nebulization Given 1/7/17 1837)   lidocaine 1 % 1 mL (not administered)   lidocaine (LMX4) kit (not administered)   sodium chloride (PF) 0.9% PF flush 3 mL (not administered)   sodium chloride (PF) 0.9% PF flush 3 mL (not administered)   naloxone (NARCAN) injection 0.2 mg (0.4 mg Intravenous Canceled Entry 1/7/17 1957)   naloxone (NARCAN) injection 0.4 mg (0.4 mg Intravenous Not Given 1/7/17 2010)   norepinephrine (LEVOPHED) 16 mg in D5W 250 mL infusion (0.03 mcg/kg/min × 77.1 kg Intravenous New Bag 1/7/17 2232)   0.9% sodium chloride BOLUS (0 mLs Intravenous Stopped 1/7/17 1930)   acetaminophen (TYLENOL) tablet 1,000 mg (1,000 mg Oral Given 1/7/17 1858)   0.9%  sodium chloride BOLUS (0 mLs Intravenous Stopped 1/7/17 2208)   0.9% sodium chloride BOLUS (0 mLs Intravenous Stopped 1/7/17 2112)   iopamidol (ISOVUE-370) solution 500 mL (100 mLs Intravenous Given 1/7/17 1936)   naloxone (NARCAN) injection 0.4 mg (0.4 mg Intravenous Given 1/7/17 1957)   naloxone (NARCAN) injection 0.4 mg (0.4 mg Intravenous Given 1/7/17 2001)   piperacillin-tazobactam (ZOSYN) intermittent infusion 4.5 g (4.5 g Intravenous New Bag 1/7/17 2150)   levofloxacin (LEVAQUIN) infusion 750 mg (750 mg Intravenous New Bag 1/7/17 2047)   vancomycin (VANCOCIN) 1500 mg in 0.9% NaCl 250 mL PREMIX (0 mg Intravenous Stopped 1/7/17 2150)   0.9% sodium chloride BOLUS (0 mLs Intravenous Stopped 1/7/17 2050)   0.9% sodium chloride BOLUS (0 mLs Intravenous Stopped 1/7/17 2150)   0.9% sodium chloride BOLUS (0 mLs Intravenous Stopped 1/7/17 2234)       ED Course:  Nursing notes and past medical history reviewed.   I performed a physical examination of the patient as documented above.  IV inserted. Medicine administered as documented above.   Blood drawn. This was sent to the lab for further testing, results above.  Dr. Bowen in to see pt.    2020-Dr Bowen in to see pt again.  Pt admits to taking an extra 16 mg of suboxone today because of pain.  Pt to x ray.   2000-pt has received 2 doses of Narcan with no significant change in mental status or respiration.  Pt started on Bipap due to ongoing high supplemental oxygen needs and poor respiratory effort.  2025- Dr. Bowen in, pt with pressures 80's, additional fluids up, PEEP on bipap decreased from 6 to 4, pt continues to be sleepy, awakes with mild stimulation and is then alert for brief period. Mild clonus noted of lower extremities.  2045-recheck- Dr. Bowen in to see pt.  2108-Dr. Bowen rechecked patient and Dr. Da Silva at bedside.   2120-Dr. Bowen in to see pt.  Pt acting more alert. BP up to 101 systolic.   2150- Recheck  2220-recheck by Dr. Bowen.  Patient hypotensive despite appropriate IV fluid resuscitation.  central line and art line placed. - pt on Levophed  0005- pt to ICU with improved blood pressure on Levophed, still requiring BiPap for respiratory support.    Orders Placed This Encounter   Procedures     XR Chest 2 Views     CT Head w/o Contrast     Chest CT, IV contrast only - PE protocol     CBC with platelets differential     Comprehensive metabolic panel     Lactic acid whole blood     Ammonia     TSH with free T4 reflex     Alcohol ethyl     Blood gas venous and oxyhgb     Blood gas arterial and oxyhgb     Blood gas arterial and oxyhgb     INR     PTT     UA with Microscopic     INR     Partial thromboplastin time     D dimer quantitative     D dimer quantitative     BNP     Nt probnp inpatient     Troponin I (now)     Troponin I     Blood gas arterial and oxyhgb     EKG 12-lead, tracing only     Pulse oximetry nursing     Cardiac Continuous Monitoring     Peripheral IV: Standard     Pharmacy to dose vancomycin     Oxygen: Nasal cannula, Oxygen mask     Bipap continuous     ED Bed Request     Miscellaneous Supply Order         Impression & Plan      Medical Decision Making:  Sabina Figueroa is a 41 year old female with a history of COPD and asthma, who presents with difficulty breathing. She reports a 4 day history of increased difficulty breathing and fever.  Pt poor historian due to increased sleepiness and altered mental status.  She requires stimulation to stay awake and answer questions.  She is oriented x3.  Pt with extensive past medical history including ETOH cirrhosis, chronic pain syndrome, COPD, asthma, tobacco dependence, and depression.  She was recently hospitalized for pneumonia and discharged 12/5/16.  After further prompting, pt admitted to taking extra doses of her Suboxone today for increased pain.  On exam pt with coarse breath sounds throughout with decreased air movement to bases.  She is lethargic, but alert with  stimulation.  Pt was initially hypoxic on RA with sat of 84%, placed on mask O2 with increased saturations to mid 90's.  Pt then later placed on Bipap at 60% O2 with improvement in oxygenation.  Pt with elevated D dimer, labs otherwise unremarkable.  Blood cultures x 2 sent.  Pt started on Levaquin, Zosyn, and Vancomycin.  CT scan of chest showed no evidence for PE.  There were bilateral mid and lower lung infiltrates with subtle patchy infiltrates in the upper lungs as well concerning for atypical infectious or inflammatory infiltrative processes versus pulmonary edema. Her head CT was normal.  Pt was given several doses of Narcan without significant change in her mental status.  Unclear at this time etiology of pt's altered mental status.  This may represent early sepsis, drug effects from pt's polypharmacy, or possibly due to extra Suboxone pt took.  Due to her being a poor historian it is not possible to ascertain if she took extra of her other medications as well.  Dr. Bowen spoke with Dr. Da Silva who accepts pt admission.  Pt to be admitted to ICU.  Central line and art line placed by Dr. Bowen.  Pt started on Levophed for continued low pressures despite fluids.        Critical care time was 68 minutes for this patient excluding procedures.      Diagnosis:    ICD-10-CM    1. Acute respiratory failure with hypoxia (H) J96.01    2. Pneumonia of both lungs due to infectious organism, unspecified part of lung J18.9    3. Altered mental status, unspecified altered mental status type R41.82    4. Hypotension, unspecified hypotension type I95.9        Disposition:   Admitted to ICU in stable but guarded condition.       Elda CONWAY FNP-C, interviewed the patient, explained the course of action and discussed the patient with Dr. Bowen who then evaluated the patient.      Adrián CONWAY, am serving as a scribe on 1/7/2017 at 6:11 PM to personally document services performed by Nieves Bowen MD, based  on my observations and the provider's statements to me.      Elda Rodriguez NP  01/08/17 0007    Emergency Department Attending Supervision Note  1/8/2017  12:30 AM      I evaluated this patient in conjunction with Elda Rodriguez NP.      Briefly, the patient presented with fever, acute respiratory failure with hypoxia, and excessive sleepiness.      On my exam, the patient is sleeping but alert with awakening before immediately falling asleep. She has no focal neurologic deficits noted. She has myoclonus of the legs and is febrile. She is normotensive and tachycardic without pinpoint pupils. She has decreased respiratory effort with bradypnea when she falls asleep. Multiple etiologies were considered including overmedication, pneumonia, pulmonary edema, serotonin sydrome, early sepsis, brain injury, or hepatic encephalopathy. Ct chest shows findings consistent with multiple possible etiologies including infection and given clinical picture she was treated for HAP. She requiring respiratory support with BiPap and was stabilized in this manner, not requiring emergent intubation. She became hypotensive over her stay despite fluid resuscitation, therefore central venous access and arterial line were placed for vasopressor administration. She was in stable but guarded condition on admission with unclear cause for symptoms but suspicion for respiratory infection and polypharmacy with oversedation.    My impression is oversedation due to overuse of suboxone and other medications including hydroxyzine (potential serotonin syndrome), with possible septic picture due to pneumonia, now hypotensive, also likely multifactorial.        Diagnosis    ICD-10-CM    1. Acute respiratory failure with hypoxia (H) J96.01    2. Pneumonia of both lungs due to infectious organism, unspecified part of lung J18.9    3. Altered mental status, unspecified altered mental status type R41.82    4. Hypotension, unspecified hypotension type I95.9           Nieves Bradley MD  01/08/17 0038

## 2017-01-08 NOTE — PHARMACY-VANCOMYCIN DOSING SERVICE
.Pharmacy Vancomycin Initial Note  Date of Service 2017  Patient's  1975  41 year old, female    Indication: [facility] Pneumonia    Current estimated CrCl = Estimated Creatinine Clearance: 79.2 mL/min (based on Cr of 0.99).    Creatinine for last 3 days  2017:  6:14 PM Creatinine 0.99 mg/dL    Recent Vancomycin Level(s) for last 3 days  No results found for requested labs within last 3 days.      Vancomycin IV Administrations (past 72 hours)                   vancomycin (VANCOCIN) 1500 mg in 0.9% NaCl 250 mL PREMIX (mg) 1,500 mg New Bag 17                Nephrotoxins and other renal medications (Future)    Start     Dose/Rate Route Frequency Ordered Stop    17 0400  piperacillin-tazobactam (ZOSYN) intermittent infusion 4.5 g      4.5 g  200 mL/hr over 30 Minutes Intravenous EVERY 6 HOURS 17 0127      17 0400  vancomycin (VANCOCIN) 1000 mg in dextrose 5% 200 mL PREMIX      1,000 mg Intravenous EVERY 8 HOURS 17 0202      17 2224  norepinephrine (LEVOPHED) 16 mg in D5W 250 mL infusion      0.03-0.4 mcg/kg/min × 77.1 kg  2.2-28.9 mL/hr  Intravenous CONTINUOUS 17 2223            Contrast Orders - past 72 hours (72h ago through future)    Start     Dose/Rate Route Frequency Ordered Stop    17 193  iopamidol (ISOVUE-370) solution 500 mL      500 mL Intravenous ONCE 17 1935 17 193                Plan:  1.  Start vancomycin  1000 mg IV q8h.   2.  Goal Trough Level: 15-20 mg/L   3.  Pharmacy will check trough levels as appropriate in 1-3 Days.    4. Serum creatinine levels will be ordered daily for the first week of therapy and at least twice weekly for subsequent weeks.    5. [place] method utilized to dose vancomycin therapy: Method 2    Leo Vázquez [facility]

## 2017-01-08 NOTE — PROGRESS NOTES
[facility] Intensive Care Progress Note    Date of Service (when I saw the patient): 01/08/2017     Assessment and Plan  Sabina Figueroa is a 41 year old female with h/o COPD, liver cirrhosis, tobacco abuse, obesity, chronic pain who was admitted on 1/7/2017. Respiratory failure secondary to PNA vs COPD exacerbation or CHF.     Neurology:  Alert, oriented. H/o chronic pain.   Plan: restart Suboxone.     Cardiovascular:  Hypotension, unclear if 2nd to septic shock, cirrhosis or cardiogenic (h/o MR). On Vasopressor.   Plan: TTE. Continue low dose NE.     Pulmonary:        Hypoxemic respiratory failure on admission secondary to bilateral Pneumonia vs COPD exacerbation vs pulmonary congestion : improved with BiPAP. Chest X-ray with bilateral LL infiltrates. CT chest : no PE.   Plan: Intermittent BiPAP. Broad spectrum ABX. Start Prednisone and Nebs.     FiO2 (%): 50 %  Resp: 18    Renal  Good UOP and Cr.  Plan: Monitor.     ID:         Suspicion sepsis secondary to PNA. BC and UA sent. Not a candidate for bronch while on BiPAP.  Plan: Continue broad spectrum ABX.     GI  Ho etOH liver cirrhois with scites and splenomegaly.  Plan: Monitor.     Nutrition  Obesity with malnutrition.   Plan: Currently NPO ion setting of intermittent BiPAP.     Endocrine:  Monitor BG.    Heme/Onc:  Mild leucocytosis and thrombocytopenia.  Plan: Monitor.     DVT Prophylaxis: Enoxaprain (Lovenox) SQ  GI Prophylaxis: PPI    Restraints: Restraints for medical healing needed: NO    Family update by me today: No    Liset Boyd    Time Spent on This Encounter  Billing:  I spent 35 minutes bedside and on the inpatient unit today managing the critical care of Sabina Figueroa in relation to the issues listed in this note.    Main Plans for Today  Intromittent BiPAP, Start Prednisone and Duo-nebs, continue Antibiotic, restart Suboxone.       Physical Exam  Temp: 98.8  F (37.1  C) Temp src: Oral Temp  Min: 98.4   F (36.9  C)  Max: 102.2  F (39  C) BP: 97/65 mmHg Pulse: 108 Heart Rate: 54 Resp: 18 SpO2: 98 % O2 Device: BiPAP/CPAP Oxygen Delivery: 10 LPM  Filed Vitals:    01/07/17 1810   Weight: 77.111 kg (170 lb)     I/O last 3 completed shifts:  In: 710.42 [I.V.:710.42]  Out: 2925 [Urine:2925]    Neurologic:  Alert, oriented, conversant.   Cardiovascular: RRR, pulses present al 4 extremities.   Respiratory:  Decrease BS bilateral lower lobes.   GI:  Obese, soft, moderate distension.   Skin/Extremities:  No edema.   Lines: No erythema or discharge at entry site for Central Venous Catheter right femoral vein.  Current lines are required for patient management    Medications    NaCl 125 mL/hr at 01/08/17 0755     norepinephrine 0.06 mcg/kg/min (01/08/17 0445)       aspirin EC  81 mg Oral Daily     atorvastatin  20 mg Oral Daily     FLUoxetine (PROzac) capsule 40 mg  40 mg Oral QAM     lactulose  30 mL Oral TID     omeprazole (priLOSEC) CR capsule 20 mg  20 mg Oral QAM     rifaximin  550 mg Oral BID     thiamine tablet 100 mg  100 mg Oral Daily     folic acid  1 mg Oral Daily     tiotropium  18 mcg Inhalation Daily     piperacillin-tazobactam  4.5 g Intravenous Q6H     levofloxacin  750 mg Intravenous Q24H     enoxaparin  40 mg Subcutaneous Q24H     vancomycin (VANCOCIN) IV  1,000 mg Intravenous Q8H     nicotine   Transdermal Q8H     [START ON 1/9/2017] nicotine   Transdermal Daily     nicotine  1 patch Transdermal Daily     buprenorphine HCl-naloxone HCl  3 Film Sublingual Daily       Data    Recent Labs  Lab 01/08/17  0610 01/07/17  1814   WBC 11.5* 8.5   HGB 12.5 12.9   MCV 92 90   * 134*   INR  --  1.14    134   POTASSIUM 4.5 3.5   CHLORIDE 113* 102   CO2 24 23   BUN 7 6*   CR 0.95 0.99   ANIONGAP 5 9   VISHNU 7.2* 8.4*   * 115*   ALBUMIN  --  3.6   PROTTOTAL  --  7.1   BILITOTAL  --  0.6   ALKPHOS  --  101   ALT  --  17   AST  --  33   TROPI  --  <0.015The 99th percentile for upper reference range is 0.045  ug/L.  Troponin values in the range of 0.045 - 0.120 ug/L may be associated with risks of adverse clinical events.     Recent Results (from the past 24 hour(s))   XR Chest 2 Views    Narrative    CHEST TWO VIEW   1/7/2017 7:33 PM     HISTORY: Shortness of breath.    COMPARISON: 12/5/2016      Impression    IMPRESSION: Bibasilar infiltrates are again noted slightly worse than  that seen on the prior exam. Upper lung zones are clear. Heart size is  normal. No pleural effusions.    DAVID PULIDO MD   CT Head w/o Contrast    Narrative    CT SCAN OF THE HEAD WITHOUT CONTRAST   1/7/2017 7:50 PM     HISTORY: Altered mental status.    TECHNIQUE:  Axial images of the head and coronal reformations without  IV contrast material.  Radiation dose for this scan was reduced using  automated exposure control, adjustment of the mA and/or kV according  to patient size, or iterative reconstruction technique.    COMPARISON: 11/30/2013    FINDINGS:  The ventricles are normal in size, shape and configuration.   The brain parenchyma and subarachnoid spaces are normal. There is no  evidence of intracranial hemorrhage, mass, acute infarct or anomaly.     The visualized portions of the sinuses and mastoids appear normal.  There is no evidence of trauma.      Impression    IMPRESSION: Normal CT scan of the head.      DAVID PULIDO MD   Chest CT, IV contrast only - PE protocol    Narrative    CT CHEST PULMONARY EMBOLISM WITH CONTRAST  1/7/2017 7:52 PM    HISTORY:  Dyspnea, hypoxia.    TECHNIQUE: Scans obtained from the apices through the diaphragm with  IV contrast. 100 mL Isovue-370 injected.  Radiation dose for this scan was reduced using automated exposure  control, adjustment of the mA and/or kV according to patient size, or  iterative reconstruction technique.    COMPARISON:  Chest x-rays dated 1/7/2017 and CT chest dated 6/23/2016.    FINDINGS:  Consolidations in the bilateral lower lobes also involve  the inferior lingula, right middle  lobe and inferior right upper lobe.  There are patchy opacities in the upper lobes bilaterally superiorly  as well. Mild bullous disease is seen in the apices. These findings  are concerning for infectious infiltrates. Pulmonary edema is also  possible in the areas of the infiltrates.    Lymphadenopathy is seen in the chest with the AP window lymph nodes  measuring up to 1.3 cm in short axis. Subcarinal lymph node measures  up to 0.9 cm in short axis and is still within normal limits.  Pretracheal lymph node measures up to 1.2 cm in short axis. No  definite hilar lymphadenopathy is seen. The heart is normal in size.  There are coronary artery atherosclerotic calcifications.    Visualized portions of the upper abdominal contents demonstrate a  prominent spleen measuring 14.8 x 9.7 cm cross-sectionally and more  than 10.7 cm in craniocaudal dimension. This is not completely  included and therefore the complete craniocaudal dimension cannot be  calculated. Visualized portions of the upper abdominal contents are  otherwise unremarkable. No aggressive osseous lesions are seen.    There are no filling defects in the central to third order pulmonary  arteries to suggest pulmonary artery embolism.      Impression    IMPRESSION:  1. No evidence for pulmonary artery embolism.  2. Bilateral mid and lower lung infiltrates (ground glass with  thickened interlobular septae) with subtle patchy infiltrates in the  upper lungs as well are concerning for atypical infectious or  inflammatory infiltrative processes versus pulmonary edema. Other  etiologies such as alveolar proteinosis are considered less likely.  These infiltrates have worsened in appearance since the prior CT dated  6/23/2016.  3. Lymphadenopathy in the AP window and in the pretracheal regions are  noted in the mediastinum. This is likely reactive and is slightly more  prominent than on the prior study dated 6/23/2016.  4. Spleen appears enlarged but is not completely  included.    SHARAD ANRDEWS MD     The history and the 10 points review of systems are included in the note above.      I spent 35 minutes of critical care time evaluating and managing this patient, discussing with the consultants and the patient's family.    Liset Boyd MD  Anesthesiology Critical Care Medicine  Pg. [phone]

## 2017-01-08 NOTE — PROGRESS NOTES
Patient with encephalopathy and hypoxemia and fever to 102 CT Shows extensive GGO bilateral which was seen on prior CT Scan but is now worse. Patient is a smoker and chronic pain patient with known cirrhosis.  Improved on bipap in ED  A: respiratory failure with worsening GGO, this is not typical bacterial infection pattern and may need bronch to evaluate for viral infection,  eosinophilic pneumonia, drug toxicity, hemorrhage, DIP or more rarely alveolar proteinosis.  Ok for antibiotics now and bipap support.

## 2017-01-08 NOTE — PHARMACY-ADMISSION MEDICATION HISTORY
Admission medication history interview status for this patient is complete. See [facility] admission navigator for allergy information, prior to admission medications and immunization status.     Medication history interview source(s):Patient  Medication history resources (including written lists, pill bottles, clinic record):Medication list  Primary pharmacy:[facility]    Changes made to PTA medication list:  Added(2): Methocarbamol, Fish Oil  Deleted(2): Cefuroxime, Prednisone  Changed(1): Trazodone ( mg to 100 mg)    Actions taken by pharmacist (provider contacted, etc):None     Additional medication history information: None    Medication reconciliation/reorder completed by provider prior to medication history? Yes    For patients on insulin therapy: No (Yes/No)  Lantus/levemir/NPH/Mix 70/30 dose:  _____   in AM/PM  or twice daily   Sliding scale Novolog Y/N  If Yes, do you have a baseline novolog pre-meal dose:  ______units with meals   Patients eat three meals a day:   Y/N     Any Barriers to therapy:  cost of medications/comfortable with giving injections (if applicable)/ comfortable and confident with current diabetes regimen       Prior to Admission medications    Medication Sig Last Dose Taking? Auth Provider   ascorbic acid (VITAMIN C) 500 MG tablet Take 500 mg by mouth daily 1/7/2017 at AM Yes Unknown, Entered By History   FLUoxetine (PROZAC) 40 MG capsule Take 40 mg by mouth daily 1/7/2017 at AM Yes Unknown, Entered By History   furosemide (LASIX) 20 MG tablet Take 20 mg by mouth daily 1/7/2017 at AM Yes Unknown, Entered By History   gabapentin (NEURONTIN) 600 MG tablet Take 600 mg by mouth 3 times daily 1/7/2017 at PM Yes Unknown, Entered By History   hydrOXYzine (ATARAX) 10 MG tablet Take 10 mg by mouth every 8 hours as needed for itching 1/2/2017 at N/A Yes Unknown, Entered By History   lactulose (CHRONULAC) 10 GM/15ML solution Take 20 g by mouth 3 times daily 1/7/2017 at PM Yes Unknown, Entered By History    omeprazole (PRILOSEC) 20 MG CR capsule Take 20 mg by mouth daily 1/7/2017 at AM Yes Unknown, Entered By History   spironolactone (ALDACTONE) 50 MG tablet Take 50 mg by mouth daily 1/7/2017 at AM Yes Unknown, Entered By History   thiamine 100 MG tablet Take 100 mg by mouth daily 1/7/2017 at AM Yes Unknown, Entered By History   traZODone (DESYREL) 100 MG tablet Take 100 mg by mouth At Bedtime 1/6/2017 at Bedtime Yes Unknown, Entered By History   Omega-3 Fatty Acids (FISH OIL) 1200 MG CAPS Take 1 capsule by mouth daily 1/7/2017 at AM Yes Unknown, Entered By History   methocarbamol (ROBAXIN) 500 MG tablet Take 1,000 mg by mouth 4 times daily 1/7/2017 at PM Yes Unknown, Entered By History   aspirin EC 81 MG EC tablet Take 1 tablet (81 mg) by mouth daily 1/7/2017 at AM Yes Anatoliy Dos Santos DO   atorvastatin (LIPITOR) 20 MG tablet Take 1 tablet (20 mg) by mouth daily 1/6/2017 at PM Yes Anatoliy Dos Santos DO   rifaximin (XIFAXAN) 550 MG TABS tablet Take 1 tablet (550 mg) by mouth 2 times daily 1/7/2017 at AM Yes Anatoliy Dos Santos DO   ferrous sulfate (IRON) 325 (65 FE) MG tablet Take 325 mg by mouth daily (with breakfast) 1/7/2017 at AM Yes Unknown, Entered By History   albuterol (2.5 MG/3ML) 0.083% neb solution Take 1 vial by nebulization every 6 hours as needed for shortness of breath / dyspnea or wheezing 1/7/2017 at PM Yes Unknown, Entered By History   buprenorphine-naloxone (SUBOXONE) 8-2 MG SUBL sublingual tablet Place 3 tablets under the tongue daily 1/7/2017 at AM Yes Unknown, Entered By History   tiotropium (SPIRIVA) 18 MCG inhalation capsule Inhale 1 capsule (18 mcg) into the lungs daily 1/7/2017 at AM Yes Shelby Ambrocio MD   mirtazapine (REMERON) 30 MG tablet Take 1 tablet (30 mg) by mouth At Bedtime 1/6/2017 at Bedtime Yes Denton Dunlap MD   Lidocaine 0.5 % GEL Externally apply topically 3 times daily as needed Apply dime size amount to painful joints 1/5/2017 at N/A Yes Unknown, Entered By  History   alum & mag hydroxide-simethicone (MYLANTA ES/MAALOX  ES) 400-400-40 MG/5ML SUSP Take 30 mLs by mouth every 4 hours as needed for indigestion 1/7/2017 at N/A Yes Reported, Patient   albuterol (PROAIR HFA, PROVENTIL HFA, VENTOLIN HFA) 108 (90 BASE) MCG/ACT inhaler Inhale 2 puffs into the lungs every 4 hours as needed for shortness of breath / dyspnea or wheezing 1/7/2017 at PM Yes Reported, Patient   MULTIPLE VITAMINS PO Take 1 tablet by mouth daily 1/7/2017 at AM Yes Reported, Patient   folic acid (FOLVITE) 1 MG tablet Take 1 tablet (1 mg) by mouth daily 1/7/2017 at AM Yes Devin Peace MD

## 2017-01-08 NOTE — H&P
See dictated H&P  Pt being admitted for recurrent hypoxic resp failure.  Admitted one month ago for a similar presentation and treated for presumed CAP.  On BIPAP in ER currently.  Covering with broad antibitoics and check procal level, but given persistent infiltrates from one month ago wonder about unusual or non-infectious cause of presentation and pulm findings.  Could benefit from bronch but would need to be intubated given her current resp status to do this    I D/w Dr. Looney of ICU this evening to provide signout.

## 2017-01-08 NOTE — PROGRESS NOTES
Pt remains on continuous bipap on 12/6 and Fio2 weaned to 40%.  Duonebs QID ordered and given in line with Bipap. BS diminished with ocassional Insp wheeze heard.  Resp will continue to follow.

## 2017-01-08 NOTE — PROGRESS NOTES
Patient with h/o chronic pain on suboxone admitted with encephalopathy probably seconday to excessive suboxone use.  Now c/o pain all over.  On norepinephrine for hypotension and bipap for pulmonary infiltrates.    A: chronic pain, limited by mental status and respiratory failure and BP as to what to give patient.  Will try iv toradol.

## 2017-01-08 NOTE — PLAN OF CARE
Problem: Respiratory Insufficiency (Adult)  Goal: Effective Ventilation  Patient will demonstrate the desired outcomes by discharge/transition of care.  Outcome: Improving  ICU End of Shift Summary.  For vital signs and complete assessments, please see documentation flowsheets.     Pertinent assessments: Rested on Bipap 14/6 50% all night,tolerated well, afebrile,good urine output  Major Shift Events: Levo at 0.06mcg/kg/min  Plan (Upcoming Events): ? WEAN TO OXI MASK TODAY  Discharge/Transfer Needs:     Bedside Shift Report Completed   Bedside Safety Check Completed

## 2017-01-09 NOTE — PROGRESS NOTES
[facility] Intensive Care Progress Note    Date of Service (when I saw the patient): 01/09/2017     Assessment and Plan  Sabina Figueroa is a 41 year old female with h/o COPD, liver cirrhosis, tobacco abuse, obesity, chronic pain who was admitted on 1/7/2017. Respiratory failure secondary to PNA vs COPD exacerbation or CHF.     Neurology:  Alert, oriented. H/o chronic pain.   Plan: restart Suboxone.     Cardiovascular:  Hypotension, unclear if 2nd to septic shock, cirrhosis or cardiogenic (h/o MR). On Vasopressor.   Plan: TTE. Continue low dose NE.     Pulmonary:        Hypoxemic respiratory failure on admission secondary to bilateral Pneumonia vs pulmonary congestion, this is a recurrent presentation. NO known history of COPD : improved with BiPAP. Chest X-ray with bilateral LL infiltrates. CT chest : no PE.   Plan: Intermittent BiPAP. Broad spectrum ABX. Prednisone started 1/8.   Several admissions with similar presentation, abnormal chest imaging in last year  bronchoscopy in ICU by me tomorrow.     FiO2 (%): 40 %  Resp: 25    Renal  Good UOP and Cr.  Plan: Monitor.     ID:         Suspicion sepsis secondary to PNA. BC and UA sent. Not a candidate for bronch while on BiPAP.  Plan: Continue broad spectrum ABX.     GI  Ho etOH liver cirrhois with scites and splenomegaly.  Plan: Monitor.     Nutrition  Obesity with malnutrition.   Plan: Currently NPO ion setting of intermittent BiPAP.     Endocrine:  Monitor BG.    Heme/Onc:  Mild leucocytosis and thrombocytopenia.  Plan: Monitor.     DVT Prophylaxis: Enoxaprain (Lovenox) SQ  GI Prophylaxis: PPI    Restraints: Restraints for medical healing needed: NO    Family update by me today: No    Jennifer Mane    Time Spent on This Encounter  Billing:  I spent 35 minutes bedside and on the inpatient unit today managing the critical care of Sabina Figueroa in relation to the issues listed in this note.    Main Plans for Today  Use HFNC  instead of NPPV  Bronch tomorrow      Physical Exam  Temp: 100  F (37.8  C) Temp src: Axillary Temp  Min: 99.5  F (37.5  C)  Max: 100  F (37.8  C) BP: 116/68 mmHg   Heart Rate: 70 Resp: 25 SpO2: 100 % O2 Device: BiPAP/CPAP Oxygen Delivery: 15 LPM  Filed Vitals:    01/07/17 1810   Weight: 77.111 kg (170 lb)     I/O last 3 completed shifts:  In: 5300 [P.O.:1450; I.V.:3850]  Out: 3025 [Urine:3025]    Neurologic:  Alert, oriented, conversant.   Cardiovascular: RRR, pulses present al 4 extremities.   Respiratory:  Decrease BS bilateral lower lobes.   GI:  Obese, soft, moderate distension.   Skin/Extremities:  No edema.   Lines: No erythema or discharge at entry site for Central Venous Catheter right femoral vein.  Current lines are required for patient management    Medications    NaCl 125 mL/hr at 01/08/17 0755     norepinephrine 0.03 mcg/kg/min (01/09/17 0552)       aspirin EC  81 mg Oral Daily     atorvastatin  20 mg Oral Daily     FLUoxetine (PROzac) capsule 40 mg  40 mg Oral QAM     lactulose  30 mL Oral TID     omeprazole (priLOSEC) CR capsule 20 mg  20 mg Oral QAM     rifaximin  550 mg Oral BID     thiamine tablet 100 mg  100 mg Oral Daily     folic acid  1 mg Oral Daily     tiotropium  18 mcg Inhalation Daily     piperacillin-tazobactam  4.5 g Intravenous Q6H     levofloxacin  750 mg Intravenous Q24H     enoxaparin  40 mg Subcutaneous Q24H     vancomycin (VANCOCIN) IV  1,000 mg Intravenous Q8H     nicotine   Transdermal Q8H     nicotine   Transdermal Daily     nicotine  1 patch Transdermal Daily     buprenorphine HCl-naloxone HCl  3 Film Sublingual Daily     predniSONE  40 mg Oral Daily     ipratropium - albuterol 0.5 mg/2.5 mg/3 mL  3 mL Nebulization 4x daily     guaiFENesin  600 mg Oral BID       Data    Recent Labs  Lab 01/08/17  0610 01/07/17  1814   WBC 11.5* 8.5   HGB 12.5 12.9   MCV 92 90   * 134*   INR  --  1.14    134   POTASSIUM 4.5 3.5   CHLORIDE 113* 102   CO2 24 23   BUN 7 6*   CR 0.95  0.99   ANIONGAP 5 9   VISHNU 7.2* 8.4*   * 115*   ALBUMIN  --  3.6   PROTTOTAL  --  7.1   BILITOTAL  --  0.6   ALKPHOS  --  101   ALT  --  17   AST  --  33   TROPI  --  <0.015The 99th percentile for upper reference range is 0.045 ug/L.  Troponin values in the range of 0.045 - 0.120 ug/L may be associated with risks of adverse clinical events.     No results found for this or any previous visit (from the past 24 hour(s)).

## 2017-01-09 NOTE — PROGRESS NOTES
"LATE NOTE: Tolerated BiPap all nightat 40% with short breaks for water:  Attempted to wear oximask at 15L but requested to have BiPap back on stating \"I just feel better with it on even though I don't like it\";  Many unsuccessful attempts to correct dampened  A-line waveform; refer to cuff pressures; temp ; UOP adequate; denied pain.  "

## 2017-01-09 NOTE — PLAN OF CARE
Problem: Respiratory Insufficiency (Adult)  Goal: Identify Related Risk Factors and Signs and Symptoms  Related risk factors and signs and symptoms are identified upon initiation of Human Response Clinical Practice Guideline (CPG)   Outcome: No Change  ICU End of Shift Summary.  For vital signs and complete assessments, please see documentation flowsheets.     Pertinent assessments: On Bipap most of the morning. Requesting drinks of water and concerned about not getting suboxone. Feeling chilled and shivering when awake requests blankets and heat. Afebrile. Suboxone cont per MD.  This jason kai off BIPAP with oximask 15L. Desats with any activity but does not report dyspnea or appear in distress. Reports very thirsty and repeated requests  Calling out layo Watters with lrg amt urine output. Treece and norepi for hypotension. Improved this jason however very labile correlating with calm or increased with activity and alertness. Now feeling very hot. T99.8 Coughing freq. States its giving her a HA to cough and requesting trazadone to sleep. Call to tele HUB. Given trazadone and guaifenesin. Placed on Bipap per pt request.  Major Shift Events: When transferring from Oximask to BIPap. O2 sats 70's Placed on 60% Fio2. O2 sat 92%  Plan (Upcoming Events): Cont Bipap. Wean norepi as able.  Discharge/Transfer Needs: TBD    Bedside Shift Report Completed   Bedside Safety Check Completed

## 2017-01-09 NOTE — PROGRESS NOTES
[facility]  Hospitalist Progress Note  Benedict Da Silva MD 01/09/2017    Reason for Stay (Diagnosis): resp failure         Assessment and Plan:      Summary of Stay: Sabina Figueroa is a 41-year-old female with history of chronic pain.  She follows with a pain clinic and takes Suboxone.  History also notable for COPD and tobacco use.  More recent history is notable for admission to the hospital one month ago for acute hypoxic respiratory failure felt to be infectious in etiology.  She responded well to treatment for community-acquired pneumonia and was discharged without oxygen.  She presented to the hospital 1/7 for increasing work of breathing and altered mental status.  On presentation to the ER, she was noted to have a fever, tachycardia, hypotension, and hypoxia, ultimately requiring BiPAP therapy in the Emergency Department.  Workup in the ER included fairly unremarkable labs including normal white blood cell count and normal lactic acid level.  Chest CT scan, however, shows bilateral infiltrates, worsened since the previous CT one month ago when she was treated for pneumonia.  She was given vancomycin, Levaquin, and Zosyn for presumed PNA, and was admitted to the Intensive Care Unit on BiPAP   Problem List:   1.  Acute resp failure:  Being treated for presumed infection etiology with vanco/zosyn/levaquin for possible HCAP.  However given she had similar presentation one month ago and now with worsening findings on CT imaging consider atypical or non-infectious etiology.  procalcitonin level is elevated to 9; cultures NGTD.  pulm planning bronch tomorrow  2.  Hypotension:  ? Related to sepsis/infection.  Wean low dose pressor as able.  TTE shows normal LV ftn, 1+ MR (was felt to have mod/severe MR on previous TTE from 12/16)  3.  Recent admission to [facility] AS NOTED ABOVE, 12/05/2016 to 12/10/2016, for acute hypoxic respiratory failure, felt related to likely  community-acquired pneumonia versus pulmonary edema, infection favored.  By day of discharge, she was titrated off oxygen and breathing well on room air.  She was treated with Rocephin and azithromycin, and discharged on Ceftin to complete therapy.    4.  History of chronic obstructive pulmonary disease.    5.  Active smoker.    6.  Alcoholic liver disease, complicated by hepatic encephalopathy for which she takes lactulose and rifampin.    7.  Chronic pain syndrome, for which she takes Suboxone.        DVT Prophylaxis: Enoxaprain (Lovenox) SQ  Code Status: Full Code  Discharge Dispo: TCU VS HOME  Estimated Disch Date / # of Days until Disch: unknown, in ICU on ventilator        Interval History (Subjective):      C/o pain from receiving SQ heparin injections - was receiving SQ injection as I entered the room                  Physical Exam:      Last Vital Signs:  /67 mmHg  Pulse 108  Temp(Src) 98.6  F (37  C) (Oral)  Resp 25  Wt 77.111 kg (170 lb)  SpO2 98%      Intake/Output Summary (Last 24 hours) at 01/09/17 1745  Last data filed at 01/09/17 1447   Gross per 24 hour   Intake 3541.54 ml   Output   2825 ml   Net 716.54 ml       Constitutional: Awake, alert, cooperative, no apparent distress   Respiratory: Clear to auscultation bilaterally, no crackles or wheezing   Cardiovascular: Regular rate and rhythm, normal S1 and S2, and no murmur noted   Abdomen: Normal bowel sounds, soft, non-distended, non-tender   Skin: No rashes, no cyanosis, dry to touch   Neuro: Alert and oriented x3, no weakness, numbness, memory loss   Extremities: No edema, normal range of motion   Other(s):        All other systems: Negative          Medications:      All current medications were reviewed with changes reflected in problem list.         Data:      All new lab and imaging data was reviewed.   Labs:    Recent Labs  Lab 01/08/17  0610 01/07/17  1814   WBC 11.5* 8.5   HGB 12.5 12.9   HCT 37.5 38.4   MCV 92 90   * 134*       Imaging:   No results found for this or any previous visit (from the past 24 hour(s)).

## 2017-01-09 NOTE — PROGRESS NOTES
"Pt continued to be on the BiPAP throughout most of the morning, and then it was decided to placed her on the HFNC @ 80% 30LPM, she tolerated it for most of the afternoon, till her respiratory status was declining and needed to be placed back on it. An ABG was drawn prior to placing it on and the results are below. She is currently still on the BiPAP and is tolerating it well. She received a SVN x 3 (Albuterol 2.5 mg, Atrovent 0.5 mg) inline and tolerated it well with no adverse reactions noted. She has insp wheezes bilat, along with a fair NPC. Will continue to monitor and assess in hopes of liberating her from the BiPAP.    Vital signs:  Temp: 98.6  F (37  C) Temp src: Oral BP: 108/67 mmHg   Heart Rate: 89 Resp: 25 SpO2: 98 % O2 Device: BiPAP/CPAP Oxygen Delivery:  (30 LPM)   Weight: 77.111 kg (170 lb)  Estimated body mass index is 26.62 kg/(m^2) as calculated from the following:    Height as of 16: 1.702 m (5' 7\").    Weight as of this encounter: 77.111 kg (170 lb).        Recent Labs  Lab 17  1625 17  0200 17  2040 17  1845   PH 7.27* 7.26* 7.28* 7.33*   PCO2 46* 46* 45 44   PO2 66* 95 105 72*   HCO3 21 21 22 23   O2PER 30LMINNC 55%BIPAP 60% 10L     PAST MEDICAL HISTORY:   Past Medical History   Diagnosis Date     Arthritis      Bunion, left foot      Ovarian cyst, left      Anxiety      Hypertension      current     Alcohol abuse      Asthma      Depression      ALC (alcoholic liver cirrhosis) (H)      Ascites      COPD (chronic obstructive pulmonary disease) (H)      Chronic low back pain      Narcotic agreement signed in [employer] system       PAST SURGICAL HISTORY:   Past Surgical History   Procedure Laterality Date     Knee surgery       x3      section       Esophagoscopy, gastroscopy, duodenoscopy (egd), combined  2014     Procedure: COMBINED ESOPHAGOSCOPY, GASTROSCOPY, DUODENOSCOPY (EGD);  Surgeon: Brittany Lowe MD;  Location:  GI     Esophagoscopy, " gastroscopy, duodenoscopy (egd), combined N/A 1/23/2015     Procedure: COMBINED ESOPHAGOSCOPY, GASTROSCOPY, DUODENOSCOPY (EGD);  Surgeon: London Lenz MD;  Location:  GI     Esophagoscopy, gastroscopy, duodenoscopy (egd), combined N/A 11/11/2015     Procedure: COMBINED ESOPHAGOSCOPY, GASTROSCOPY, DUODENOSCOPY (EGD);  Surgeon: Barry Chavez MD;  Location:  GI       FAMILY HISTORY:   Family History   Problem Relation Age of Onset     Depression Mother      Anxiety Disorder Mother      MENTAL ILLNESS Mother      Substance Abuse Mother      Depression Father      Anxiety Disorder Father      Substance Abuse Father      MENTAL ILLNESS Father      Depression Daughter      Depression Brother      Schizophrenia Brother      Depression Brother      Anxiety Disorder Brother      Substance Abuse Brother        SOCIAL HISTORY:   Social History   Substance Use Topics     Smoking status: Current Every Day Smoker -- 1.00 packs/day for 18 years     Types: Cigarettes     Smokeless tobacco: Former User     Quit date: 05/11/2016     Alcohol Use: 0.0 oz/week     0 Standard drinks or equivalent per week      Comment: 8 drinks or more each day, abstinent since 4/2/15. History of CD treatment in past x1       Candido Jacobo RT  1/9/2017

## 2017-01-09 NOTE — PHARMACY-VANCOMYCIN DOSING SERVICE
Pharmacy Vancomycin Note  Date of Service 2017  Patient's  1975   41 year old, female    Indication: Healthcare-Associated Pneumonia  Goal Trough Level: 15-20 mg/L  Day of Therapy: 2  Current Vancomycin regimen:  1,000 mg IV q8h    Current estimated CrCl = Estimated Creatinine Clearance: 82.6 mL/min (based on Cr of 0.95).    Creatinine for last 3 days  2017:  6:14 PM Creatinine 0.99 mg/dL  2017:  6:10 AM Creatinine 0.95 mg/dL    Recent Vancomycin Levels (past 3 days)  2017:  6:50 PM Vancomycin Level 17.6 mg/L    Vancomycin IV Administrations (past 72 hours)                   vancomycin (VANCOCIN) 1000 mg in dextrose 5% 200 mL PREMIX (mg) 1,000 mg New Bag 17 1144     1,000 mg New Bag  0445    vancomycin (VANCOCIN) 1500 mg in 0.9% NaCl 250 mL PREMIX (mg) 1,500 mg New Bag 17                Nephrotoxins and other renal medications (Future)    Start     Dose/Rate Route Frequency Ordered Stop    17 0400  piperacillin-tazobactam (ZOSYN) intermittent infusion 4.5 g      4.5 g  200 mL/hr over 30 Minutes Intravenous EVERY 6 HOURS 17 0127      17 0400  vancomycin (VANCOCIN) 1000 mg in dextrose 5% 200 mL PREMIX      1,000 mg Intravenous EVERY 8 HOURS 17 0202      17 0259  ketorolac (TORADOL) injection 30 mg      30 mg Intravenous EVERY 6 HOURS PRN 17 0259 01/10/17 0258    17 2224  norepinephrine (LEVOPHED) 16 mg in D5W 250 mL infusion      0.03-0.4 mcg/kg/min × 77.1 kg  2.2-28.9 mL/hr  Intravenous CONTINUOUS 17 2223               Contrast Orders - past 72 hours (72h ago through future)    Start     Dose/Rate Route Frequency Ordered Stop    17 1936  iopamidol (ISOVUE-370) solution 500 mL      500 mL Intravenous ONCE 17 1935 17 1936          Interpretation of levels and current regimen:  Trough level is  Therapeutic    Has serum creatinine changed > 50% in last 72 hours: No    Urine output:  unable to  determine    Renal Function: Stable    Plan:  1.  Continue Current Dose  2.  Pharmacy will check trough levels as appropriate in 1-3 Days.    3. Serum creatinine levels will be ordered daily for the first week of therapy and at least twice weekly for subsequent weeks.      Grace Gentile        .

## 2017-01-09 NOTE — PROGRESS NOTES
She was on an Oximask at 15L most of the afternoon. She went on the Bipap during the evening. She does not tolerate being without O2 for long.

## 2017-01-10 NOTE — PROGRESS NOTES
[facility] Intensive Care Progress Note    Date of Service (when I saw the patient): 01/10/2017     Assessment and Plan  Sabina Figueroa is a 41 year old female with liver cirrhosis, tobacco abuse, obesity, chronic pain who was admitted on 1/7/2017. 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     Neurology:  Alert, oriented. H/o chronic pain.   Plan: restart Suboxone.     Cardiovascular:  known MR - may be etiology of alveolar hemorrhage   Plan: optimize medically (avoid tachycardia, keep afterload down)    Pulmonary:        Hypoxemic respiratory failure on admission secondary to bilateral Pneumonia vs pulmonary congestion, this is a recurrent presentation. Unclear history of COPD : CT chest : no PE. Several admissions with similar presentation, abnormal chest imaging in last year    Plan:   Mechanical ventilation  Pulse dose steroids for alveolar hemorrhage but may be related to MR  bronchoscopy at bedside today, required intubation    FiO2 (%): 55 %  Resp: 31    Renal  Good UOP and Cr. No proteinuria or hematuria, less likely pulmonary renal syndrome  Plan: Monitor.     ID:         Infections suspected? Empiric antibiotics since 1/7; no respiratory cultures obtained, no other cultures with growth   Plan:     GI  Ho etOH liver cirrhois with scites and splenomegaly.  Plan: Monitor.     Nutrition  Obesity with malnutrition.   Plan: Currently NPO in setting of intermittent BiPAP.     Endocrine:  Monitor BG.    Heme/Onc:  Mild leucocytosis and thrombocytopenia.  Plan: Monitor.     DVT Prophylaxis: Enoxaprain (Lovenox) SQ  GI Prophylaxis: PPI    Restraints: Restraints for medical healing needed: NO    Family update by me today: No    Jennifer Mane    Time Spent on This Encounter  Billing:  I spent 35 minutes bedside and on the  inpatient unit today managing the critical care of Sabina Figueroa in relation to the issues listed in this note.    Main Plans for Today  Bronchoscopy (procedure note in [facility], viewable under Procedures in Chart Review)  initiate pulse steroids for alveolar hemorrhage, additional workup ordered      Physical Exam  Temp: 98.8  F (37.1  C) Temp src: Axillary Temp  Min: 98.3  F (36.8  C)  Max: 98.8  F (37.1  C) BP: (!) 135/102 mmHg   Heart Rate: 89 Resp: (!) 31 SpO2: 100 % O2 Device: Mechanical Ventilator    Filed Vitals:    01/07/17 1810 01/10/17 0530   Weight: 77.111 kg (170 lb) 85.1 kg (187 lb 9.8 oz)     I/O last 3 completed shifts:  In: 4541.54 [P.O.:350; I.V.:4191.54]  Out: 3810 [Urine:3810]    Neurologic:  Anxious, oriented, agitated.   Cardiovascular: RRR, pulses present al 4 extremities.   Respiratory:  Decrease BS bilateral lower lobes.   GI:  Obese, soft, moderate distension.   Skin/Extremities:  No edema.   Lines: No erythema or discharge at entry site for Central Venous Catheter right femoral vein.  Current lines are required for patient management    Medications    NaCl 125 mL/hr at 01/10/17 0800     norepinephrine Stopped (01/09/17 0858)       fentaNYL  50 mcg Intravenous Once within 24 hrs     midazolam  1-2 mg Intravenous Once within 24 hrs     sodium chloride (PF)  3 mL Intracatheter Q8H     aspirin EC  81 mg Oral Daily     atorvastatin  20 mg Oral Daily     FLUoxetine (PROzac) capsule 40 mg  40 mg Oral QAM     lactulose  30 mL Oral TID     omeprazole (priLOSEC) CR capsule 20 mg  20 mg Oral QAM     rifaximin  550 mg Oral BID     thiamine tablet 100 mg  100 mg Oral Daily     folic acid  1 mg Oral Daily     piperacillin-tazobactam  4.5 g Intravenous Q6H     levofloxacin  750 mg Intravenous Q24H     enoxaparin  40 mg Subcutaneous Q24H     vancomycin (VANCOCIN) IV  1,000 mg Intravenous Q8H     nicotine   Transdermal Q8H     nicotine   Transdermal Daily     nicotine  1 patch Transdermal Daily      buprenorphine HCl-naloxone HCl  3 Film Sublingual Daily     predniSONE  40 mg Oral Daily     ipratropium - albuterol 0.5 mg/2.5 mg/3 mL  3 mL Nebulization 4x daily     guaiFENesin  600 mg Oral BID       Data    Recent Labs  Lab 01/10/17  0520 01/08/17  0610 01/07/17  1814   WBC 10.7 11.5* 8.5   HGB 12.2 12.5 12.9   MCV 92 92 90   * 136* 134*   INR 1.37*  --  1.14    142 134   POTASSIUM 3.9 4.5 3.5   CHLORIDE 109 113* 102   CO2 24 24 23   BUN 8 7 6*   CR 0.85 0.95 0.99   ANIONGAP 8 5 9   VISHNU 8.5 7.2* 8.4*   * 140* 115*   ALBUMIN  --   --  3.6   PROTTOTAL  --   --  7.1   BILITOTAL  --   --  0.6   ALKPHOS  --   --  101   ALT  --   --  17   AST  --   --  33   TROPI  --   --  <0.015The 99th percentile for upper reference range is 0.045 ug/L.  Troponin values in the range of 0.045 - 0.120 ug/L may be associated with risks of adverse clinical events.     No results found for this or any previous visit (from the past 24 hour(s)).

## 2017-01-10 NOTE — PROGRESS NOTES
[facility]  Hospitalist Progress Note  Benedict Da Silva MD 01/10/2017    Reason for Stay (Diagnosis): resp failure         Assessment and Plan:      Summary of Stay: Sabina Figueroa is a 41-year-old female with history of chronic pain.  She follows with a pain clinic and takes Suboxone.  History also notable for COPD and tobacco use.  More recent history is notable for admission to the hospital one month ago for acute hypoxic respiratory failure felt to be infectious in etiology.  She responded well to treatment for community-acquired pneumonia and was discharged without oxygen.  She presented to the hospital 1/7 for increasing work of breathing and altered mental status.  On presentation to the ER, she was noted to have a fever, tachycardia, hypotension, and hypoxia, ultimately requiring BiPAP therapy in the Emergency Department.  Workup in the ER included fairly unremarkable labs including normal white blood cell count and normal lactic acid level.  Chest CT scan, however, shows bilateral infiltrates, worsened since the previous CT one month ago when she was treated for pneumonia.  She was given vancomycin, Levaquin, and Zosyn for presumed PNA, and was admitted to the Intensive Care Unit on BiPAP.  Bronchoscopy was done on 1/10 and pt required intubation during the procedure for hypoxia and airway management  Problem List:    1.  Acute resp failure:  Treated with BIPAP since admit and subsequently required intubation on 1/10 at time of bronchoscopy.  Being treated for presumed infectious etiology with vanco/zosyn/levaquin for possible HCAP.  However given she had similar presentation one month ago and now with worsening findings on CT imaging consider atypical or non-infectious etiology.  procalcitonin level is elevated to 9; cultures NGTD.  Bronchoscopy done 1/10 showing alveolar hemorrhage; work up for this in process; pulm/intensivist started empiric steroids on 1/10.  Consider possibility  of MR contributing to this as well and may require cards input at some point if work up for other etiologies negative  2.  Hypotension:  ? Related to sepsis/infection.  was on low dose norepi on admit.  TTE shows normal LV ftn, 1+ MR (was felt to have mod/severe MR on previous TTE from 12/16)  3.  Recent admission to [facility] (12/05/2016 to 12/10/2016) as mentioned above, for acute hypoxic respiratory failure, felt related to likely community-acquired pneumonia versus pulmonary edema.  By day of discharge, she was titrated off oxygen and breathing well on room air.  She was treated with Rocephin and azithromycin, and discharged on Ceftin to complete therapy.    4.  History of chronic obstructive pulmonary disease.    5.  Active smoker.    6.  Alcoholic liver disease, complicated by hepatic encephalopathy for which she takes lactulose and rifampin.    7.  Chronic pain syndrome, for which she takes Suboxone.        DVT Prophylaxis: Enoxaprain (Lovenox) SQ  Code Status: Full Code  Discharge Dispo: TCU VS HOME  Estimated Disch Date / # of Days until Disch: unknown, in ICU on ventilator        Interval History (Subjective):      Intubated, sedated                  Physical Exam:      Last Vital Signs:  /70 mmHg  Pulse 108  Temp(Src) 98.3  F (36.8  C) (Axillary)  Resp 14  Wt 85.1 kg (187 lb 9.8 oz)  SpO2 100%      Intake/Output Summary (Last 24 hours) at 01/10/17 1458  Last data filed at 01/10/17 1312   Gross per 24 hour   Intake 5179.9 ml   Output   3685 ml   Net 1494.9 ml       Constitutional: Intubated, sedated   Respiratory: Clear to auscultation bilaterally, no crackles or wheezing   Cardiovascular: Regular rate and rhythm, normal S1 and S2, and no murmur noted   Abdomen: Normal bowel sounds, soft, non-distended, non-tender   Skin: No rashes, no cyanosis, dry to touch   Neuro: Intubated, sedated   Extremities: trace edema, normal range of motion   Other(s):        All other systems:  Negative          Medications:      All current medications were reviewed with changes reflected in problem list.         Data:      All new lab and imaging data was reviewed.   Labs:    Recent Labs  Lab 01/10/17  0520 01/08/17  0610 01/07/17  1814   WBC 10.7 11.5* 8.5   HGB 12.2 12.5 12.9   HCT 37.2 37.5 38.4   MCV 92 92 90   * 136* 134*      Imaging:   No results found for this or any previous visit (from the past 24 hour(s)).

## 2017-01-10 NOTE — PROGRESS NOTES
Patient remained on BiPAP 14/6 55% throughout night. Tolerating coming off BiPAP <3 minutes for sips of water. RT will continue to follow and wean BiPAP as able.    Clover Haddad, RT 1/10/2017, 6:03 AM

## 2017-01-10 NOTE — OR NURSING
Patient didn't tolerate Bronchoscopy with conscious sedation.Intubated the patient in the middle of the procedure. Sedation given and hemodynamically monitored  Patient by patient primary RN. RT person was at bedside.

## 2017-01-10 NOTE — PROGRESS NOTES
Pt called nurse into the room and said she can't breathe because her nose is stuffy, she is trying to rip off her bipap, pt did blow her nose and the pt could then breathe, nurse is trying to help her and telling her that the Dr at the tele hub has ordered her some atarax and some more trazadone, pt ref it, she said she needed iv meds to calm, tele hub stated that they wouldn't give her iv meds, when pt heard this she said ok I'll take the oral meds, with in 10 min pt has calmed down and stopped crying, vs are better now.

## 2017-01-10 NOTE — PROGRESS NOTES
"Pt was intubated this morning for airway protection and a bronch that was being performed. She continues to be on full support throughout the rest of the day with the only change was to decrease the FIO2, and is currently resting comfortably on it. She received a SVN x 3 (Albuterol 2.5 mg, Atrovent 0.5 mg) via the Aerogyn and tolerated it well with no adverse reaction noted. No PS trial was attempted, will continue to monitor and assess. BBS are coarse, suctioning a moderate amount of creamy/clear secretions.     Ventilation Mode: APRV  FiO2 (%): 60 %  Oxygen Concentration (%): 60 %  Resp: 15    Vital signs:  Temp: 98.3  F (36.8  C) Temp src: Axillary BP: 121/79 mmHg   Heart Rate: 70 Resp: 15 SpO2: 100 % O2 Device: Mechanical Ventilator Oxygen Delivery:  (30 LPM)   Weight: 85.1 kg (187 lb 9.8 oz)  Estimated body mass index is 29.38 kg/(m^2) as calculated from the following:    Height as of 16: 1.702 m (5' 7\").    Weight as of this encounter: 85.1 kg (187 lb 9.8 oz).      Recent Labs  Lab 17  1625 17  0200 17  2040 17  1845   PH 7.27* 7.26* 7.28* 7.33*   PCO2 46* 46* 45 44   PO2 66* 95 105 72*   HCO3 21 21 22 23   O2PER 30LMINNC 55%BIPAP 60% 10L     PAST MEDICAL HISTORY:   Past Medical History   Diagnosis Date     Arthritis      Bunion, left foot      Ovarian cyst, left      Anxiety      Hypertension      current     Alcohol abuse      Asthma      Depression      ALC (alcoholic liver cirrhosis) (H)      Ascites      COPD (chronic obstructive pulmonary disease) (H)      Chronic low back pain      Narcotic agreement signed in [employer] system       PAST SURGICAL HISTORY:   Past Surgical History   Procedure Laterality Date     Knee surgery       x3      section       Esophagoscopy, gastroscopy, duodenoscopy (egd), combined  2014     Procedure: COMBINED ESOPHAGOSCOPY, GASTROSCOPY, DUODENOSCOPY (EGD);  Surgeon: Brittany Lowe MD;  Location:  GI     Esophagoscopy, " gastroscopy, duodenoscopy (egd), combined N/A 1/23/2015     Procedure: COMBINED ESOPHAGOSCOPY, GASTROSCOPY, DUODENOSCOPY (EGD);  Surgeon: London Lenz MD;  Location:  GI     Esophagoscopy, gastroscopy, duodenoscopy (egd), combined N/A 11/11/2015     Procedure: COMBINED ESOPHAGOSCOPY, GASTROSCOPY, DUODENOSCOPY (EGD);  Surgeon: Barry Chavez MD;  Location:  GI     Bronchoscopy flexible N/A 1/10/2017     Procedure: BRONCHOSCOPY FLEXIBLE;  Surgeon: Jennifer Mane MD;  Location:  GI       FAMILY HISTORY:   Family History   Problem Relation Age of Onset     Depression Mother      Anxiety Disorder Mother      MENTAL ILLNESS Mother      Substance Abuse Mother      Depression Father      Anxiety Disorder Father      Substance Abuse Father      MENTAL ILLNESS Father      Depression Daughter      Depression Brother      Schizophrenia Brother      Depression Brother      Anxiety Disorder Brother      Substance Abuse Brother        SOCIAL HISTORY:   Social History   Substance Use Topics     Smoking status: Current Every Day Smoker -- 1.00 packs/day for 18 years     Types: Cigarettes     Smokeless tobacco: Former User     Quit date: 05/11/2016     Alcohol Use: 0.0 oz/week     0 Standard drinks or equivalent per week      Comment: 8 drinks or more each day, abstinent since 4/2/15. History of CD treatment in past x1     Candido Jacobo RT  1/10/2017

## 2017-01-10 NOTE — PLAN OF CARE
Problem: Respiratory Insufficiency (Adult)  Goal: Identify Related Risk Factors and Signs and Symptoms  Related risk factors and signs and symptoms are identified upon initiation of Human Response Clinical Practice Guideline (CPG)   Outcome: Improving  ICU End of Shift Summary.  For vital signs and complete assessments, please see documentation flowsheets.     Pertinent assessments: Pt has been very anxious all shift, no matter what the nurse did for her it wasn't good enough, she c/o meds not getting to her fast enough, c/o the nurse not getting her IV meds, when the nurse got oral meds to help her settle down she ref, but then did take the meds, pt was awake all shift, she took little cat naps of 10 - 15 min a few times, lungs were coarse at the beginning of the shift, but she has done some very strong coughing and she just sounds diminished this am with  fine crackles in the bases, can tolerate being off the bipap better this am when she does do some coughing, has had a low grade temp, VSS, had 2 loose stools.   Major Shift Events: high anxiety  Plan (Upcoming Events): Bronch this am early, reevaluate anxiety meds  Discharge/Transfer Needs: TBD    Bedside Shift Report Completed yes  Bedside Safety Check Completed yes

## 2017-01-11 NOTE — CONSULTS
"CLINICAL NUTRITION SERVICES  -  ASSESSMENT NOTE      Recommendations Ordered by Registered Dietitian (RD):   -IsoSource 1.5 @ 45 ml/hr x 24 hours + 2 pkts beneprotein BID via OGT  -Standard free water flushes of 60 ml q 4 hours  -Continue thiamine and folic acid      Malnutrition: Patient does not meet criteria at this time          REASON FOR ASSESSMENT  Sabina Figueroa is a 41 year old female seen by Registered Dietitian for LOS + Provided Order - RD to assess and order TF per MNT protocol      NUTRITION HISTORY  - Information obtained from chart review (pt intubated/sedated)  - Patient with history of COPD, CHF, chronic pain syndrome, and alcoholic liver disease complicated by hepatic encephalopathy (lactulose + rifampin)      CURRENT NUTRITION ORDERS  Diet Order:     NPO     Current Intake/Tolerance:  -Patient NPO since admission secondary to compromised respiratory status. No nutrition x 3 days of admission      PHYSICAL FINDINGS  Observed  -No overt s/sx of muscle or fat wasting  -Abdomen distended, but soft   Obtained from Chart/Interdisciplinary Team  -Intubated (1/10)  -16-Ind[place]ian OGT intact   -2-3 BMs/day    ANTHROPOMETRICS  Height: 5'7\"  Weight: 186 lbs 1.09 oz (84.4 kg)  Body mass index is 29.14 kg/(m^2).  Weight Status:  Overweight BMI 25-29.9  IBW: 61.36 kg   % IBW: 138%  Weight History: The data below suggests that weight has trended up over the past month; however anticipate that weight gain related to ascites / fluid retention.   Wt Readings from Last 10 Encounters:   01/11/17 84.4 kg (186 lb 1.1 oz)   12/10/16 77.976 kg (171 lb 14.5 oz)   06/26/16 76.476 kg (168 lb 9.6 oz)   06/22/16 77.565 kg (171 lb)   04/13/16 79.017 kg (174 lb 3.2 oz)   03/29/16 77.202 kg (170 lb 3.2 oz)   03/07/16 76.114 kg (167 lb 12.8 oz)   02/19/16 74.254 kg (163 lb 11.2 oz)   01/26/16 78.654 kg (173 lb 6.4 oz)   01/06/16 75.116 kg (165 lb 9.6 oz)   ]    LABS  Labs reviewed  -K+ - 3.1 mmol/L (L)  -Phos - 1.8 mg/dL (L) --> " oral replacement protocol ordered   -BGs - 176-191 mg/dL (H)    MEDICATIONS  Medications reviewed  -Methylprednisolone - 250 mg q 6 hours  -Thiamine - 100 mg, folic acid - 1 mg  -Lactulose - 30 ml TID   -Insulin gtt   -Propofol @ 5.1 ml/hr (135 kcals from lipids)    Dosing Weight: 67 kg (adjusted weight)    ASSESSED NUTRITION NEEDS:  Estimated Energy Needs: 4391-3841 kcals (25-30 Kcal/Kg)  Justification: maintenance  Estimated Protein Needs:  grams protein (1.2-2 g pro/Kg)  Justification: maintenance  Estimated Fluid Needs: 4118-8430  mL (1 mL/Kcal)  Justification: maintenance    MALNUTRITION:  % Weight Loss:  None noted  % Intake:  Decreased intake does not meet criteria for malnutrition   Subcutaneous Fat Loss:  None observed  Muscle Loss:  None observed  Fluid Retention:  None noted    Malnutrition Diagnosis: Patient does not meet two of the above criteria necessary for diagnosing malnutrition    NUTRITION DIAGNOSIS:  Inadequate protein-energy intake related to inability for PO secondary to intubation/sedation as evidenced by current intake (propofol) providing 8% of estimated energy needs and 0% of estimated protein needs.     NUTRITION INTERVENTIONS  Recommendations / Nutrition Prescription  1. Recommend initiation of EN via OGT per the following regimen/provisions:     Goal EN Regimen/Provisions: IsoSource 1.5 @ 45 ml/hr x 24 hours (1080 ml) +2 pkts beneprotein BID to provide 1620 kcals (24 kcal/kg - 97% of estimated energy needs), 97 g protein (1.5 g/kg), 190 g CHO, 16 g fiber, 820 ml free water and 100% of DRIs.     **Total intake (EN + propofol) = 1755 kcals (26 kcal/kg - 100% of estimated energy needs)    Advancement Schedule: Start at 15 ml/hr x 8 hours. If no s/sx of intolerance, advance by 15 ml q 6 hours to goal rate.   **May start EN once Phos >/=2.0 mg/dL     2. Free water flushes - 60 ml q 4 hours       Implementation  Nutrition education: Not appropriate at this time due to patient  condition  EN Composition, EN Schedule, Feeding Tube Flush - Ordered EN and free water flushes as indicated above    Collaboration and Referral of Nutrition care - Discussed patient during interdisciplinary rounds. Spoke with RN regarding initiation of EN.     Nutrition Goals  EN to advance to goal rate in the next 24-48 hours     MONITORING AND EVALUATION:  -Enteral Nutrition intake - Initiation/advancement and tolerance (stooling, biochemical review, weight trends)  -Procedures - Extubation    Rachael Horan RD, LD  Clinical Dietitian  3rd Floor/ICU Pager: [phone]  All Other Floors Pager: [phone]  Weekend/Holiday Pager: [phone]

## 2017-01-11 NOTE — PROGRESS NOTES
ICU End of Shift Summary.  For vital signs and complete assessments, please see documentation flowsheets.     Pertinent assessments: Patient is -4 on RASS. 40 propofol gtt. Lungs coarse, VENT at 60% FIO2. SR/SB, BPs stable. Pos bowel sounds, OG inserted, placement verified by XRAY. Good urine output, callaway. Skin intact. Bilateral wrist restraints still necessary. High dose steroids initiated IV, q4hr coverage for high blood sugars.  Major Shift Events: Patient was intubated and vented during bronchoscopy at 1000 for hypoxia and airway management. Called patient's daughter Peggy at 1600 to give update.  Plan (Upcoming Events): Monitor patient overnight, wean propofol if indicated.   Discharge/Transfer Needs:     Bedside Shift Report Completed   Bedside Safety Check Completed

## 2017-01-11 NOTE — PROGRESS NOTES
[facility] Intensive Care Progress Note    Date of Service (when I saw the patient): 01/11/2017     Assessment and Plan  Sabina Figueroa is a 41 year old female with liver cirrhosis, tobacco abuse, obesity, chronic pain who was admitted on 1/7/2017. 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     Neurology:  Alert, oriented. H/o chronic pain.   Suboxone  Plan: , care everywhere records indicate history of adverse reaction.     Cardiovascular:  known MR, recent echo results conflicting - may be etiology of alveolar hemorrhage   Plan: optimize medically (avoid tachycardia, keep afterload down)    Pulmonary:        Hypoxemic respiratory failure on admission secondary to bilateral Pneumonia vs pulmonary congestion, this is a recurrent presentation. Unclear history of COPD : CT chest : no PE. Several admissions with similar presentation, abnormal chest imaging in last year  Improved oxygen exchange today    Plan:   Mechanical ventilation  Pulse dose steroids for alveolar hemorrhage but may be related to MR  Vasculitis panel negative    Ventilation Mode: CMV/AC  FiO2 (%): 45 %  Rate Set (breaths/minute): 14 breaths/min  Tidal Volume Set (mL): 450 mL  PEEP (cm H2O): 10 cmH2O  Oxygen Concentration (%): 45 %  Resp: 16    Renal  Good UOP and Cr. No proteinuria or hematuria, less likely pulmonary renal syndrome  Plan: Monitor.  Gentle diuresis goal slightly negative    ID:         Infections suspected? Empiric antibiotics since 1/7; no respiratory cultures obtained, no other cultures with growth   Plan: Monitor cultures, no growth to date  Continue vancomycin, continue Zosyn  She does have PMNs on the BAL so would complete a course of antibiotics with something, possibly Levaquin    GI  Ho etOH liver cirrhois with scites and  splenomegaly.  Plan: Monitor.     Nutrition  Obesity with malnutrition.   Plan: Currently NPO in setting of intermittent BiPAP.     Endocrine:  Monitor BG.    Heme/Onc:  Mild leucocytosis and thrombocytopenia.  Plan: Monitor.     DVT Prophylaxis: Enoxaprain (Lovenox) SQ  GI Prophylaxis: PPI    Restraints: Restraints for medical healing needed: NO    Family update by me today: No    Jennifer Mane    Time Spent on This Encounter  Billing:  I spent 35 minutes bedside and on the inpatient unit today managing the critical care of Sabina Figueroa in relation to the issues listed in this note.    Main Plans for Today  Cardiology consultation  Wean ventilator    Physical Exam  Temp: 98.6  F (37  C) Temp src: Axillary Temp  Min: 98.1  F (36.7  C)  Max: 99.7  F (37.6  C) BP: 131/90 mmHg   Heart Rate: 90 Resp: 16 SpO2: 100 % O2 Device: Mechanical Ventilator    Filed Vitals:    01/07/17 1810 01/10/17 0530 01/11/17 0400   Weight: 77.111 kg (170 lb) 85.1 kg (187 lb 9.8 oz) 84.4 kg (186 lb 1.1 oz)     I/O last 3 completed shifts:  In: 3012.01 [I.V.:2712.01; NG/GT:300]  Out: 2450 [Urine:2130; Emesis/NG output:320]    Neurologic:  Sedated, on vent  Cardiovascular: RRR, pulses present al 4 extremities.   Respiratory:  Decrease BS bilateral lower lobes.   GI:  Obese, soft, moderate distension.   Skin/Extremities:  No edema.   Lines: No erythema or discharge at entry site for Central Venous Catheter right femoral vein.  Current lines are required for patient management    Medications    IV fluid REPLACEMENT ONLY       insulin (regular) 4 Units/hr (01/11/17 1320)     IV fluid REPLACEMENT ONLY       propofol (DIPRIVAN) infusion 40 mcg/kg/min (01/11/17 1402)     NaCl 10 mL/hr at 01/11/17 1400     NaCl 10 mL/hr at 01/11/17 1400     norepinephrine Stopped (01/09/17 0858)       potassium & sodium phosphates  1 packet Oral or Feeding Tube TID     protein modular  2 packet Per Feeding Tube BID     [START ON 1/12/2017] folic acid  1 mg  Oral or Feeding Tube Daily     rifaximin  550 mg Oral or Feeding Tube BID     [START ON 1/12/2017] thiamine tablet 100 mg  100 mg Oral or Feeding Tube Daily     FLUoxetine  40 mg Per Feeding Tube QAM     aspirin  81 mg Per Feeding Tube Daily     pantoprazole  40 mg Per Feeding Tube Daily     methylPREDNISolone  250 mg Intravenous Q6H     lactulose  30 mL Oral or Feeding Tube TID     atorvastatin  20 mg Oral or Feeding Tube Daily     sodium chloride (PF)  3 mL Intracatheter Q8H     piperacillin-tazobactam  4.5 g Intravenous Q6H     levofloxacin  750 mg Intravenous Q24H     enoxaparin  40 mg Subcutaneous Q24H     nicotine   Transdermal Q8H     nicotine   Transdermal Daily     nicotine  1 patch Transdermal Daily     buprenorphine HCl-naloxone HCl  3 Film Sublingual Daily     ipratropium - albuterol 0.5 mg/2.5 mg/3 mL  3 mL Nebulization 4x daily       Data    Recent Labs  Lab 01/11/17  0510 01/10/17  0520 01/08/17  0610 01/07/17  1814   WBC 3.5* 10.7 11.5* 8.5   HGB 11.6* 12.2 12.5 12.9   MCV 89 92 92 90   PLT 92* 100* 136* 134*   INR  --  1.37*  --  1.14    141 142 134   POTASSIUM 3.1* 3.9 4.5 3.5   CHLORIDE 109 109 113* 102   CO2 24 24 24 23   BUN 11 8 7 6*   CR 0.86 0.85 0.95 0.99   ANIONGAP 9 8 5 9   VISHNU 8.9 8.5 7.2* 8.4*   * 116* 140* 115*   ALBUMIN  --   --   --  3.6   PROTTOTAL  --   --   --  7.1   BILITOTAL  --   --   --  0.6   ALKPHOS  --   --   --  101   ALT  --   --   --  17   AST  --   --   --  33   TROPI  --   --   --  <0.015The 99th percentile for upper reference range is 0.045 ug/L.  Troponin values in the range of 0.045 - 0.120 ug/L may be associated with risks of adverse clinical events.     No results found for this or any previous visit (from the past 24 hour(s)).

## 2017-01-11 NOTE — CONSULTS
[facility]  Pain Service Consultation     Date of Admission:  1/7/2017  Date of Consult (When I saw the patient): 01/11/2017    Assessment and Plan  Sabina Figueroa is a 41 year old female who was admitted on 1/7/2017. I was asked to see the patient for pain management.    1) Acute pain flare of chronic pain, exacerbated by: acute illness    2)  Patient with chronic low back pain and B LE neuropathy, NOT on chronic opioid therapy for pain. But on Suboxone therapy at [facility] in [place] where she gets daily dispensed dose.  They watch her take it so question her presentation of taking more than usual.  [place] review: Gabapentin 600mg tabs, Oxycodone 5mg tabs last in September 2016.  Appears to have been on Suboxone therapy since that time..  Patient has an expected opioid tolerance.   Patient's opioid use thus far: 24mg SL buprenorphine and 400mcg IV fentanyl in past 24 hours    3)  Opioid induced side-effects:  Hx of allergy to Butrans patch- Sudden onset HA with  hypertension.    4) Other/Related:    -Disease of addiction to alcohol   -Anxiety/PTSD  -insomnia  -Alcoholic hepatitis      PLAN:   1) Buprenorphine likely at steady state with terminal half life of approx 37 hours I think it would be reasonable to keep her on it while she is vented and use Fentanyl drip, will likely need opioid tolerant dose.  She may need an opioid tolerant dose even if she is off Buprenorphine as she has been on chronic opioids as recently as October and dosing while she is clearing buprenorphine can be tricky.   2)Fentanyl drip is reasonable while vented for flare of chronic pain and ICU sedation, but at this point I see no reason for acute pain.  In this case, keeping her on her Buprenorphine and other chronic pain regime seems reasonable as it will likely be the plan for discharge vs. A patient who needs a longer course of opioids for acute pain, may benefit  from being off buprenorphine for this management,  but not always.   3)Multimodal Medication Therapy  Topical:ICY Hot PRN  NSAIDS:could use topical ketoprofen to low back.  Muscle Relaxants:no  Adjuvants:tylenol at low dose is an option, restart gabapentin via tube.  Antidepresants/anxiolytics:on propofol now.  Opioids:Recommend Fentanyl gtt titrate to effect.  4)Non-medication interventions  Relaxation, positioning  5)Constipation Prophylaxis  monitor  6) DC safety  -Opioids are not recommended for this patient for discharge  -Discharge with intra-nasal naloxone in setting of active addiction to alcohol.  Plan for education prior to discharge.    Time Spent on This Encounter  I spent  35 minutes is assessment of the patient and discussion with the patient and family.  Another 35 minutes in review of chart, documentation and discussion with the health care team.    Grace WHYTE, CNS  Pain Management and Palliative Care  [facility]  Pgr: [phone]      Reason for Consult  Reason for consult: I was asked by Dr. Behnaud to evaluate this patient for pain management.    Primary Care Physician  Primary Care Physician:Aidee Hemphill  Pain Specialist: no, has been seen at  pain clinic as well as [facility].  Most recently follows at [facility] in [place] for suboxone therapy..    Chief Complaint  unable    History is obtained from the patient and electronic health record    History of Present Illness  Sabina Figueroa is a 41 year old female who presents with shortness of breath reporting she took too many medications.  She is treated for acute respiratory failure on ventilator thought due to infectious etiology.  Thi is in setting of COPD, alcoholic liver disease and disease of addiction to alcohol.        CURRENT PAIN:  Unable.  Patient does nod yes when asked if pain is as chronic in back and B LE's.  She points up when asked if it is same intensity or worse than usual.    PAIN HISTORY:  The pain is mainly located in the low back and B  "LE's  Per chart review of most recent pain clinic consult this 7/2016:  Pain started after injury where she slipped and fell on icy steps in 1998. No surgery.    Pain location: low back and bilateral lateral aspect of the leg to the knee, and peripheral neuropathy in her feet  Quality and timing of pain: aching, shooting, sharp, miserable, tiring, unbearable and constant  Pain rating: intensity ranges from 5/10 to 10/10, and Averages 7/10 on a 0-10 scale.  Aggravating factors include: bending/flexion, lifting and prolonged positions  Relieving factors include: heat, medication (dilaudid 2 mg q4h prn) and lying down  Denies red flags including: bowel or bladder symptoms, fever, chills, saddle anesthesia, profound motor loss, history of cancer, history of immune compromise, weight loss.    History of stomach bleed.  Depressed, sleeping all time. Anxiety. Alcohol abuse. Last drink was April 2, 2015.    Norco worked best for her in the past. Was off all pain medication for the past 4 yrs until recently. \"When I don't have pain meds, I drink alcohol.\"  Alcohol hepatitis, hospitalized in April 2015 for 2 weeks.     Previous pain treatments included per chart review:  Sabina Figueroa has been seen at a pain clinic in the past. [facility]. Was on high dose oxycodone.   Medications:               NSAIDs: acetaminophen, celecoxib, ibuprofen,               Anti-depressants: Cymbalta              Muscle relaxants: cyclobenzaprine, methocarbamol, carisoprodol, diazepam              Opioids: hydrocodone-acetaminophen, butrans -high BP, fentanyl patch, oxycodone, oxycontin  PT: many years ago - made worse  Acupuncture: no  Chiropractic care: tried it -no help  TENs Unit: no  Injections: lumbar epidural steroid injection x 3 in 2012.  Surgery: no back surgery, had knee surgeries yrs ago on both knees.     Past Medical History  I have reviewed this patient's medical history and updated it with pertinent information if " needed.   Past Medical History   Diagnosis Date     Arthritis      Bunion, left foot      Ovarian cyst, left      Anxiety      Hypertension      current     Alcohol abuse      Asthma      Depression      ALC (alcoholic liver cirrhosis) (H)      Ascites      COPD (chronic obstructive pulmonary disease) (H)      Chronic low back pain      Narcotic agreement signed in [facility] system         Past Surgical History  I have reviewed this patient's surgical history and updated it with pertinent information if needed.  Past Surgical History   Procedure Laterality Date     Knee surgery       x3      section       Esophagoscopy, gastroscopy, duodenoscopy (egd), combined  2014     Procedure: COMBINED ESOPHAGOSCOPY, GASTROSCOPY, DUODENOSCOPY (EGD);  Surgeon: Brittany Lowe MD;  Location:  GI     Esophagoscopy, gastroscopy, duodenoscopy (egd), combined N/A 2015     Procedure: COMBINED ESOPHAGOSCOPY, GASTROSCOPY, DUODENOSCOPY (EGD);  Surgeon: London Lenz MD;  Location:  GI     Esophagoscopy, gastroscopy, duodenoscopy (egd), combined N/A 2015     Procedure: COMBINED ESOPHAGOSCOPY, GASTROSCOPY, DUODENOSCOPY (EGD);  Surgeon: Barry Chavez MD;  Location:  GI     Bronchoscopy flexible N/A 1/10/2017     Procedure: BRONCHOSCOPY FLEXIBLE;  Surgeon: Jennifer Mane MD;  Location:  GI         Prior to Admission Medications  Prior to Admission Medications   Prescriptions Last Dose Informant Patient Reported? Taking?   FLUoxetine (PROZAC) 40 MG capsule 2017 at AM Self Yes Yes   Sig: Take 40 mg by mouth daily   Lidocaine 0.5 % GEL 2017 at N/A Self Yes Yes   Sig: Externally apply topically 3 times daily as needed Apply dime size amount to painful joints   MULTIPLE VITAMINS PO 2017 at AM Self Yes Yes   Sig: Take 1 tablet by mouth daily   Omega-3 Fatty Acids (FISH OIL) 1200 MG CAPS 2017 at AM Self Yes Yes   Sig: Take 1 capsule by mouth daily   albuterol (2.5 MG/3ML) 0.083% neb  solution 1/7/2017 at PM Self Yes Yes   Sig: Take 1 vial by nebulization every 6 hours as needed for shortness of breath / dyspnea or wheezing   albuterol (PROAIR HFA, PROVENTIL HFA, VENTOLIN HFA) 108 (90 BASE) MCG/ACT inhaler 1/7/2017 at PM Self Yes Yes   Sig: Inhale 2 puffs into the lungs every 4 hours as needed for shortness of breath / dyspnea or wheezing   alum & mag hydroxide-simethicone (MYLANTA ES/MAALOX  ES) 400-400-40 MG/5ML SUSP 1/7/2017 at N/A Self Yes Yes   Sig: Take 30 mLs by mouth every 4 hours as needed for indigestion   ascorbic acid (VITAMIN C) 500 MG tablet 1/7/2017 at AM Self Yes Yes   Sig: Take 500 mg by mouth daily   aspirin EC 81 MG EC tablet 1/7/2017 at AM Self No Yes   Sig: Take 1 tablet (81 mg) by mouth daily   atorvastatin (LIPITOR) 20 MG tablet 1/6/2017 at PM Self No Yes   Sig: Take 1 tablet (20 mg) by mouth daily   buprenorphine-naloxone (SUBOXONE) 8-2 MG SUBL sublingual tablet 1/7/2017 at AM Self Yes Yes   Sig: Place 3 tablets under the tongue daily   ferrous sulfate (IRON) 325 (65 FE) MG tablet 1/7/2017 at AM Self Yes Yes   Sig: Take 325 mg by mouth daily (with breakfast)   folic acid (FOLVITE) 1 MG tablet 1/7/2017 at AM Self No Yes   Sig: Take 1 tablet (1 mg) by mouth daily   furosemide (LASIX) 20 MG tablet 1/7/2017 at AM Self Yes Yes   Sig: Take 20 mg by mouth daily   gabapentin (NEURONTIN) 600 MG tablet 1/7/2017 at PM Self Yes Yes   Sig: Take 600 mg by mouth 3 times daily   hydrOXYzine (ATARAX) 10 MG tablet 1/2/2017 at N/A Self Yes Yes   Sig: Take 10 mg by mouth every 8 hours as needed for itching   lactulose (CHRONULAC) 10 GM/15ML solution 1/7/2017 at PM Self Yes Yes   Sig: Take 20 g by mouth 3 times daily   methocarbamol (ROBAXIN) 500 MG tablet 1/7/2017 at PM Self Yes Yes   Sig: Take 1,000 mg by mouth 4 times daily   mirtazapine (REMERON) 30 MG tablet 1/6/2017 at Bedtime Self No Yes   Sig: Take 1 tablet (30 mg) by mouth At Bedtime   omeprazole (PRILOSEC) 20 MG CR capsule 1/7/2017  at AM Self Yes Yes   Sig: Take 20 mg by mouth daily   rifaximin (XIFAXAN) 550 MG TABS tablet 1/7/2017 at AM Self No Yes   Sig: Take 1 tablet (550 mg) by mouth 2 times daily   spironolactone (ALDACTONE) 50 MG tablet 1/7/2017 at AM Self Yes Yes   Sig: Take 50 mg by mouth daily   thiamine 100 MG tablet 1/7/2017 at AM Self Yes Yes   Sig: Take 100 mg by mouth daily   tiotropium (SPIRIVA) 18 MCG inhalation capsule 1/7/2017 at AM Self Yes Yes   Sig: Inhale 1 capsule (18 mcg) into the lungs daily   traZODone (DESYREL) 100 MG tablet 1/6/2017 at Bedtime Self Yes Yes   Sig: Take 100 mg by mouth At Bedtime      Facility-Administered Medications: None     Allergies  No Known Allergies    Social History  I have reviewed this patient's social history and updated it with pertinent information if needed. Sabina Figueroa  reports that she has been smoking Cigarettes.  She has a 18 pack-year smoking history. She quit smokeless tobacco use about 8 months ago. She reports that she drinks alcohol. She reports that she does not use illicit drugs.    Family History  I have reviewed this patient's family history and updated it with pertinent information if needed.   Family History   Problem Relation Age of Onset     Depression Mother      Anxiety Disorder Mother      MENTAL ILLNESS Mother      Substance Abuse Mother      Depression Father      Anxiety Disorder Father      Substance Abuse Father      MENTAL ILLNESS Father      Depression Daughter      Depression Brother      Schizophrenia Brother      Depression Brother      Anxiety Disorder Brother      Substance Abuse Brother        Review of Systems  Review of systems not obtained due to patient factors - mental status and intubation    Physical Exam  Temp:  [98.1  F (36.7  C)-99.7  F (37.6  C)] 98.6  F (37  C)  Heart Rate:  [] 90  Resp:  [13-41] 16  BP: (107-152)/(65-96) 131/90 mmHg  FiO2 (%):  [45 %-60 %] 45 %  SpO2:  [95 %-100 %] 100 %  186 lbs 1.09 oz  GEN:  Alert, appears to  communicate well, can write but after long attempt to understand her writing of pain clinic name, she ended up asking for water.  HEENT:  Normocephalic/atraumatic, no scleral icterus, no nasal discharge, mouth dry.  CV:  RRR, S1, S2; no murmurs or other irregularities noted.  +3 DP/PT pulses bilatererally; no edema BLE.  RESP:  Clear to auscultation bilaterally without rales/rhonchi/wheezing/retractions.  Symmetric chest rise on inhalation noted.  Vented.  ABD:  Rounded, soft, non-tender/mild distended.  +BS  EXT:  Edema & pulses as noted above.  CMS intact x 4.     SKIN:  Dry to touch, Face and neck flushed.  NEURO: Symmetric strength +5/5.  Sensation to touch intact all extremities.    PAIN BEHAVIOR: unable  Psych:  Confused?  Mildly agitated.      Data  Results for orders placed or performed during the hospital encounter of 01/07/17 (from the past 24 hour(s))   Glucose by meter   Result Value Ref Range    Glucose 204 (H) 70 - 99 mg/dL   Vancomycin level   Result Value Ref Range    Vancomycin Level 18.9 mg/L   Glucose by meter   Result Value Ref Range    Glucose 208 (H) 70 - 99 mg/dL   Glucose by meter   Result Value Ref Range    Glucose 238 (H) 70 - 99 mg/dL   Glucose by meter   Result Value Ref Range    Glucose 187 (H) 70 - 99 mg/dL   Glucose by meter   Result Value Ref Range    Glucose 175 (H) 70 - 99 mg/dL   Glucose by meter   Result Value Ref Range    Glucose 183 (H) 70 - 99 mg/dL   Hemoglobin A1c   Result Value Ref Range    Hemoglobin A1C 5.6 4.3 - 6.0 %   CBC with platelets differential   Result Value Ref Range    WBC 3.5 (L) 4.0 - 11.0 10e9/L    RBC Count 3.86 3.8 - 5.2 10e12/L    Hemoglobin 11.6 (L) 11.7 - 15.7 g/dL    Hematocrit 34.4 (L) 35.0 - 47.0 %    MCV 89 78 - 100 fl    MCH 30.1 26.5 - 33.0 pg    MCHC 33.7 31.5 - 36.5 g/dL    RDW 13.7 10.0 - 15.0 %    Platelet Count 92 (L) 150 - 450 10e9/L    Diff Method Automated Method     % Neutrophils 88.1 %    % Lymphocytes 8.8 %    % Monocytes 2.3 %    %  Eosinophils 0.0 %    % Basophils 0.0 %    % Immature Granulocytes 0.8 %    Nucleated RBCs 0 0 /100    Absolute Neutrophil 3.1 1.6 - 8.3 10e9/L    Absolute Lymphocytes 0.3 (L) 0.8 - 5.3 10e9/L    Absolute Monocytes 0.1 0.0 - 1.3 10e9/L    Absolute Eosinophils 0.0 0.0 - 0.7 10e9/L    Absolute Basophils 0.0 0.0 - 0.2 10e9/L    Abs Immature Granulocytes 0.0 0 - 0.4 10e9/L    Absolute Nucleated RBC 0.0    Basic metabolic panel   Result Value Ref Range    Sodium 142 133 - 144 mmol/L    Potassium 3.1 (L) 3.4 - 5.3 mmol/L    Chloride 109 94 - 109 mmol/L    Carbon Dioxide 24 20 - 32 mmol/L    Anion Gap 9 3 - 14 mmol/L    Glucose 179 (H) 70 - 99 mg/dL    Urea Nitrogen 11 7 - 30 mg/dL    Creatinine 0.86 0.52 - 1.04 mg/dL    GFR Estimate 72 >60 mL/min/1.7m2    GFR Estimate If Black 88 >60 mL/min/1.7m2    Calcium 8.9 8.5 - 10.1 mg/dL   Phosphorus   Result Value Ref Range    Phosphorus 1.8 (L) 2.5 - 4.5 mg/dL   Glucose by meter   Result Value Ref Range    Glucose 160 (H) 70 - 99 mg/dL   Glucose by meter   Result Value Ref Range    Glucose 176 (H) 70 - 99 mg/dL   Glucose by meter   Result Value Ref Range    Glucose 183 (H) 70 - 99 mg/dL   Glucose by meter   Result Value Ref Range    Glucose 198 (H) 70 - 99 mg/dL   Glucose by meter   Result Value Ref Range    Glucose 191 (H) 70 - 99 mg/dL   Nutrition Services Adult IP Consult    Narrative    Rachael Horan, RD     1/11/2017 11:28 AM  CLINICAL NUTRITION SERVICES  -  ASSESSMENT NOTE      Recommendations Ordered by Registered Dietitian (RD):   -IsoSource 1.5 @ 45 ml/hr x 24 hours + 2 pkts beneprotein BID via   OGT  -Standard free water flushes of 60 ml q 4 hours  -Continue thiamine and folic acid      Malnutrition: Patient does not meet criteria at this time          REASON FOR ASSESSMENT  Sabina Figueroa is a 41 year old female seen by Registered   Dietitian for LOS + Provided Order - RD to assess and order TF   per MNT protocol      NUTRITION HISTORY  - Information obtained from  "chart review (pt intubated/sedated)  - Patient with history of COPD, CHF, chronic pain syndrome, and   alcoholic liver disease complicated by hepatic encephalopathy   (lactulose + rifampin)      CURRENT NUTRITION ORDERS  Diet Order:     NPO     Current Intake/Tolerance:  -Patient NPO since admission secondary to compromised respiratory   status. No nutrition x 3 days of admission      PHYSICAL FINDINGS  Observed  -No overt s/sx of muscle or fat wasting  -Abdomen distended, but soft   Obtained from Chart/Interdisciplinary Team  -Intubated (1/10)  -16-Bhutanese OGT intact   -2-3 BMs/day    ANTHROPOMETRICS  Height: 5'7\"  Weight: 186 lbs 1.09 oz (84.4 kg)  Body mass index is 29.14 kg/(m^2).  Weight Status:  Overweight BMI 25-29.9  IBW: 61.36 kg   % IBW: 138%  Weight History: The data below suggests that weight has trended   up over the past month; however anticipate that weight gain   related to ascites / fluid retention.   Wt Readings from Last 10 Encounters:   01/11/17 84.4 kg (186 lb 1.1 oz)   12/10/16 77.976 kg (171 lb 14.5 oz)   06/26/16 76.476 kg (168 lb 9.6 oz)   06/22/16 77.565 kg (171 lb)   04/13/16 79.017 kg (174 lb 3.2 oz)   03/29/16 77.202 kg (170 lb 3.2 oz)   03/07/16 76.114 kg (167 lb 12.8 oz)   02/19/16 74.254 kg (163 lb 11.2 oz)   01/26/16 78.654 kg (173 lb 6.4 oz)   01/06/16 75.116 kg (165 lb 9.6 oz)   ]    LABS  Labs reviewed  -K+ - 3.1 mmol/L (L)  -Phos - 1.8 mg/dL (L) --> oral replacement protocol ordered   -BGs - 176-191 mg/dL (H)    MEDICATIONS  Medications reviewed  -Methylprednisolone - 250 mg q 6 hours  -Thiamine - 100 mg, folic acid - 1 mg  -Lactulose - 30 ml TID   -Insulin gtt   -Propofol @ 5.1 ml/hr (135 kcals from lipids)    Dosing Weight: 67 kg (adjusted weight)    ASSESSED NUTRITION NEEDS:  Estimated Energy Needs: 9112-0566 kcals (25-30 Kcal/Kg)  Justification: maintenance  Estimated Protein Needs:  grams protein (1.2-2 g pro/Kg)  Justification: maintenance  Estimated Fluid Needs: " 6227-5096  mL (1 mL/Kcal)  Justification: maintenance    MALNUTRITION:  % Weight Loss:  None noted  % Intake:  Decreased intake does not meet criteria for   malnutrition   Subcutaneous Fat Loss:  None observed  Muscle Loss:  None observed  Fluid Retention:  None noted    Malnutrition Diagnosis: Patient does not meet two of the above   criteria necessary for diagnosing malnutrition    NUTRITION DIAGNOSIS:  Inadequate protein-energy intake related to inability for PO   secondary to intubation/sedation as evidenced by current intake   (propofol) providing 8% of estimated energy needs and 0% of   estimated protein needs.     NUTRITION INTERVENTIONS  Recommendations / Nutrition Prescription  1. Recommend initiation of EN via OGT per the following   regimen/provisions:     Goal EN Regimen/Provisions: IsoSource 1.5 @ 45 ml/hr x 24 hours   (1080 ml) +2 pkts beneprotein BID to provide 1620 kcals (24   kcal/kg - 97% of estimated energy needs), 97 g protein (1.5   g/kg), 190 g CHO, 16 g fiber, 820 ml free water and 100% of DRIs.       **Total intake (EN + propofol) = 1755 kcals (26 kcal/kg - 100% of   estimated energy needs)    Advancement Schedule: Start at 15 ml/hr x 8 hours. If no s/sx of   intolerance, advance by 15 ml q 6 hours to goal rate.   **May start EN once Phos >/=2.0 mg/dL     2. Free water flushes - 60 ml q 4 hours       Implementation  Nutrition education: Not appropriate at this time due to patient   condition  EN Composition, EN Schedule, Feeding Tube Flush - Ordered EN and   free water flushes as indicated above    Collaboration and Referral of Nutrition care - Discussed patient   during interdisciplinary rounds. Spoke with RN regarding   initiation of EN.     Nutrition Goals  EN to advance to goal rate in the next 24-48 hours     MONITORING AND EVALUATION:  -Enteral Nutrition intake - Initiation/advancement and tolerance   (stooling, biochemical review, weight trends)  -Procedures - Extubation    Rachael  REDD Horan, LD  Clinical Dietitian  3rd Floor/ICU Pager: [phone]  All Other Floors Pager: [phone]  Weekend/Holiday Pager: [phone]                   Glucose by meter   Result Value Ref Range    Glucose 186 (H) 70 - 99 mg/dL   Glucose by meter   Result Value Ref Range    Glucose 174 (H) 70 - 99 mg/dL   Glucose by meter   Result Value Ref Range    Glucose 190 (H) 70 - 99 mg/dL

## 2017-01-11 NOTE — CONSULTS
CARDIOLOGY CONSULTATION       HISTORY OF PRESENT ILLNESS:  It is a pleasure for me to see Sabina Figueroa , a 41-year-old lady at the request of Dr. Mane for evaluation of mitral regurgitation as a possible cause of pulmonary hemorrhage.      This lady has respiratory failure and is currently intubated and sedated.  However I am able to evaluate her excellent medical records as she has been admitted multiple times and has seen multiple providers within the past few months.      This lady is only 41 years old but has a multitude of severe medical problems including the followin.  Recurrent respiratory failure.   2.  Chronic obstructive pulmonary disease.   3.  Chronic pain syndrome.   4.  Alcoholic liver disease with alcoholic liver cirrhosis and history of hepatic encephalopathy.   5.  Tobacco dependence.      She was last admitted with respiratory failure in December of last year.  An echocardiogram at that time reported a 2 to 3+ mitral regurgitation in the presence of a posteriorly directed mitral regurgitation jet.  I personally reviewed her echocardiogram and I am in complete agreement with my colleague, Dr. Ramos' interpretation.  The etiology of the mitral regurgitation is unclear.  However her left ventricular cavity size and systolic volume are normal.  Pulmonary artery pressures are at most only mildly elevated.  For this current admission an echocardiogram has been repeated.  I personally reviewed this study as well and I would agree with Dr. Candido Murphy' interpretation in that mitral regurgitation on this occasion is only mild.  Her left ventricular systolic function is hyperdynamic and similar to last echocardiogram, no specific wall motion abnormalities are present..  The right ventricle was not very clearly seen.      Her chest x-ray was personally reviewed.  There is very extensive bibasilar infiltrates which in my opinion is not typical of that or pulmonary edema either.  She had a  bronchoscopy which showed alveolar hemorrhage.      PHYSICAL EXAMINATION    GENERAL:  This lady is well sedated and is unresponsive.   VITAL SIGNS:  Blood pressure is 117/69 with heart rate at 64.  She is afebrile.  Cardiac apex is not palpable.   CHEST:  Reveals absolutely no cardiac murmurs at all.  Chest auscultation reveals ventilated breath sounds.   NECK:  JVP is not elevated and she has no significant peripheral edema.      DATA:  Hemoglobin is 11.6 with a white cell count of 3.5 and a platelet count of 92,000.  Her INR on no oral anticoagulation is 1.37.  Thus she has functional hypersplenism secondary to portal hypertension.      In my opinion this lady may have functional mitral regurgitation.  However the severity of her mitral regurgitation is unlikely to be a cause of alveolar hemorrhage.  Personally I have never seen this presentation though obviously I have seen acute pulmonary edema from severe mitral regurgitation.  The emphasis is on severe.  This lady's mitral regurgitation as seen in December is  To 2-3+ of that there is no question.  However it is not likely to hemodynamically significant given the normal left ventricular cavity size and the systolic function, minimal if any left atrial enlargement and more importantly nearly normal pulmonary artery pressures.  On this current admission mitral regurgitation is only mild.  Thus in my opinion it is extremely unlikely that her mitral valve disease is the cause of her pulmonary hemorrhage.  I do note that she has an underlying coagulopathy and though it is not severe, I wonder if this may be a contributor to the alveolar hemorrhage.      However, given the uncertainty of etiology of her MR and the fact that the valve is not seen clearly, I will  Arrange for her to have a FREDIS.  When she recovers from her current illness we could certainly consider a noninvasive stress test to start off with to evaluate for significant coronary artery disease which  may cause dynamic mitral regurgitation from papillary muscle dysfunction.      It would be my pleasure to follow her in hospital course with you as necessary.         OFELIA OLIVER MD, [facility]             D: 2017 15:57   T: 2017 16:41   MT: [id number]      Name:     SYDNI GLEZ   MRN:      [id number]        Account:       [id number]   :      1975           Consult Date:  2017      Document: [id number]

## 2017-01-11 NOTE — PROGRESS NOTES
Patient remained intubated and on APRV ventilator at prescribed settings. Weaned FiO2 throughout night, currently on 45%. BS coarse, suctioning small amounts of thick secretions from ETT.

## 2017-01-11 NOTE — PROGRESS NOTES
An ABG was drawn from the right radial, with no adverse reaction noted.    Candido Jacobo RT  .01/19/2017

## 2017-01-11 NOTE — PROGRESS NOTES
ICU End of Shift Summary.  For vital signs and complete assessments, please see documentation flowsheets.     Pertinent assessments: VSS. Weaned propofol to 15 mcg. Arouses to touch. At one time did follow commands. SB 45-60's. BP stable. APRV setting with fio2 at 45%.  One low grade temp. Adequate urine output. Skin intact. Daughter called for update.  Major Shift Events: Weaned propofol  Plan (Upcoming Events): Wean ventilator  Discharge/Transfer Needs: TBD    Bedside Shift Report Completed   Bedside Safety Check Completed

## 2017-01-11 NOTE — PHARMACY-ADMISSION MEDICATION HISTORY
Grace Pierce (torscotty) from Pain Team confirmed Suboxone PTA dose with Yumiko Altman - 24mg/day - pt goes in every day at 7:30am.

## 2017-01-11 NOTE — PROGRESS NOTES
[facility]  Hospitalist Progress Note  Benedict Da Silva MD 01/11/2017    Reason for Stay (Diagnosis): resp failure         Assessment and Plan:      Summary of Stay: Sabina Figueroa is a 41-year-old female with history of chronic pain.  She follows with a pain clinic and takes Suboxone.  History also notable for COPD and tobacco use.  More recent history is notable for admission to the hospital one month ago for acute hypoxic respiratory failure felt to be infectious in etiology.  She responded well to treatment for community-acquired pneumonia and was discharged without oxygen.  She presented to the hospital 1/7 for increasing work of breathing and altered mental status.  On presentation to the ER, she was noted to have a fever, tachycardia, hypotension, and hypoxia, ultimately requiring BiPAP therapy in the Emergency Department.  Workup in the ER included fairly unremarkable labs including normal white blood cell count and normal lactic acid level.  Chest CT scan, however, shows bilateral infiltrates, worsened since the previous CT one month ago when she was treated for pneumonia.  She was given vancomycin, Levaquin, and Zosyn for presumed PNA, and was admitted to the Intensive Care Unit on BiPAP.  Bronchoscopy was done on 1/10 and pt required intubation during the procedure for hypoxia and airway management  Problem List:    1.  Acute resp failure:  Treated with BIPAP since admit and subsequently required intubation on 1/10 at time of bronchoscopy.  Being treated for presumed infectious etiology with vanco/zosyn/levaquin for possible HCAP.  However given she had similar presentation one month ago and now with worsening findings on CT imaging consider atypical or non-infectious etiology.  procalcitonin level is elevated to 9; cultures NGTD.  Bronchoscopy done 1/10 showing alveolar hemorrhage; work up for this in process but so far labs to r/o vasculitis are negative; pulm/intensivist started  empiric steroids on 1/10.  Consider possibility of MR contributing - cards saw pt today and feels that MR unlikely etiology here  2.  Hypotension:  resolved.  ? Related to sepsis/infection.  was on low dose norepi on admit and this is now titrated off.  TTE shows normal LV ftn, 1+ MR (was felt to have mod/severe MR on previous TTE from 12/16)  3.  Recent admission to [facility] (12/05/2016 to 12/10/2016) as mentioned above, for acute hypoxic respiratory failure, felt related to likely community-acquired pneumonia versus pulmonary edema.  By day of discharge, she was titrated off oxygen and breathing well on room air.  She was treated with Rocephin and azithromycin, and discharged on Ceftin to complete therapy.    4.  History of chronic obstructive pulmonary disease.    5.  Active smoker.    6.  Alcoholic liver disease, complicated by hepatic encephalopathy for which she takes lactulose and rifampin.    7.  Chronic pain syndrome, for which she takes Suboxone.  appreciate pain team input      DVT Prophylaxis: Enoxaprain (Lovenox) SQ  Code Status: Full Code  Discharge Dispo: TCU VS HOME  Estimated Disch Date / # of Days until Disch: unknown, in ICU on ventilator        Interval History (Subjective):      Unable, sedated on vent                  Physical Exam:      Last Vital Signs:  /69 mmHg  Pulse 108  Temp(Src) 98.6  F (37  C) (Axillary)  Resp 13  Wt 84.4 kg (186 lb 1.1 oz)  SpO2 100%      Intake/Output Summary (Last 24 hours) at 01/11/17 1600  Last data filed at 01/11/17 1400   Gross per 24 hour   Intake 2300.35 ml   Output   1525 ml   Net 775.35 ml       Constitutional: Unable, sedated   Respiratory: Clear to auscultation bilaterally, no crackles or wheezing   Cardiovascular: Regular rate and rhythm, normal S1 and S2, and no murmur noted   Abdomen: Normal bowel sounds, soft, non-distended, non-tender   Skin: No rashes, no cyanosis, dry to touch   Neuro: Unable, sedated   Extremities: No  edema, normal range of motion   Other(s):        All other systems: Negative          Medications:      All current medications were reviewed with changes reflected in problem list.         Data:      All new lab and imaging data was reviewed.   Labs:    Recent Labs  Lab 01/11/17  0510 01/10/17  0520 01/08/17  0610   WBC 3.5* 10.7 11.5*   HGB 11.6* 12.2 12.5   HCT 34.4* 37.2 37.5   MCV 89 92 92   PLT 92* 100* 136*      Imaging:   No results found for this or any previous visit (from the past 24 hour(s)).

## 2017-01-11 NOTE — PLAN OF CARE
Problem: Restraint for Non-Violent/Non-Self-Destructive Behavior  Goal: Prevent/Manage Potential Problems  Maintain safety of patient and others during period of restraint.  Promote psychological and physical wellbeing.  Prevent injury to skin and involved body parts.  Promote nutrition, hydration, and elimination.   Right wrist and Left wrist restraints initiated on patient on 1/10/2017 at 6:18 PM    Clinical Justification: Pulling lines, pulling tubes, and pulling equipment  Less Restrictive Alternative: Repositioning, Re-evaluate equipment, Disguise equipment  Attending Physician Notified: Yes,     Order received: Yes     Family Notification:  Criteria explained to  Patient's Response:   Restraint care Plan initiated: Yes    Suly Barreto

## 2017-01-11 NOTE — PHARMACY
Pharmacy Tube Feeding Consult    Medication reviewed for administration by feeding tube and for potential food/drug interactions.    Recommendation: No changes are needed at this time.     Pharmacy will continue to follow as new medications are ordered.

## 2017-01-11 NOTE — PHARMACY-VANCOMYCIN DOSING SERVICE
Pharmacy Vancomycin Note  Date of Service January 10, 2017  Patient's  1975   41 year old, female    Indication: Healthcare-Associated Pneumonia  Goal Trough Level: 15-20 mg/L  Day of Therapy: 4  Current Vancomycin regimen:  1000 mg IV q8h    Current estimated CrCl = Estimated Creatinine Clearance: 96.6 mL/min (based on Cr of 0.85).    Creatinine for last 3 days  2017:  6:10 AM Creatinine 0.95 mg/dL  1/10/2017:  5:20 AM Creatinine 0.85 mg/dL    Recent Vancomycin Levels (past 3 days)  2017:  6:50 PM Vancomycin Level 17.6 mg/L  1/10/2017:  6:50 PM Vancomycin Level 18.9 mg/L    Vancomycin IV Administrations (past 72 hours)                   vancomycin (VANCOCIN) 1000 mg in dextrose 5% 200 mL PREMIX (mg) 1,000 mg New Bag 01/10/17 1205     1,000 mg New Bag  0325     1,000 mg New Bag 17 1951     1,000 mg New Bag  1151     1,000 mg New Bag  0535     1,000 mg New Bag 17 2128     1,000 mg New Bag  1144     1,000 mg New Bag  0445                Nephrotoxins and other renal medications (Future)    Start     Dose/Rate Route Frequency Ordered Stop    17 0400  piperacillin-tazobactam (ZOSYN) intermittent infusion 4.5 g      4.5 g  200 mL/hr over 30 Minutes Intravenous EVERY 6 HOURS 17 0127      17 0400  vancomycin (VANCOCIN) 1000 mg in dextrose 5% 200 mL PREMIX      1,000 mg Intravenous EVERY 8 HOURS 17 0202      17 2224  norepinephrine (LEVOPHED) 16 mg in D5W 250 mL infusion      0.03-0.4 mcg/kg/min × 77.1 kg  2.2-28.9 mL/hr  Intravenous CONTINUOUS 17 2223               Contrast Orders - past 72 hours (72h ago through future)    Start     Dose/Rate Route Frequency Ordered Stop    17 1030  perflutren diluted 1mL to 1mL with saline (OPTISON) diluted injection 3 mL  Status:  Discontinued      3 mL Intravenous ONCE 17 1021 17 1027    01/09/17 1030  perflutren diluted 1mL to 2mL with saline (OPTISON) diluted injection 3 mL      3 mL Intravenous ONCE  01/09/17 1025 01/09/17 1030    01/07/17 1936  iopamidol (ISOVUE-370) solution 500 mL      500 mL Intravenous ONCE 01/07/17 1935 01/07/17 1936          Interpretation of levels and current regimen:  Trough level is  Therapeutic    Has serum creatinine changed > 50% in last 72 hours: No    Urine output:  good urine output    Renal Function: Stable    Plan:  1.  Continue Current Dose  2.  Pharmacy will check trough levels as appropriate in 1-3 Days.    3. Serum creatinine levels will be ordered daily for the first week of therapy and at least twice weekly for subsequent weeks.      Ron Jordan        .

## 2017-01-12 NOTE — PROGRESS NOTES
[facility] Intensive Care Progress Note    Date of Service (when I saw the patient): 01/12/2017     Assessment and Plan  Sabina Figueroa is a 41 year old female with liver cirrhosis, tobacco abuse, obesity, chronic pain who was admitted on 1/7/2017. 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     Neurology:  Alert, oriented. H/o chronic pain.   Suboxone care everywhere records indicate history of adverse reaction to Suboxone but she has been getting daily observed therapy  Plan: Propofol and prn fentanyl, .     Cardiovascular:  known MR, recent echo results conflicting - may be etiology of alveolar hemorrhage   Plan: optimize medically (avoid tachycardia, keep afterload down)  FREDIS  Bronchoscopy with biopsy tentatively 1/12 at 11    Pulmonary:        Hypoxemic respiratory failure on admission secondary to bilateral Pneumonia vs pulmonary congestion, this is a recurrent presentation. Unclear history of COPD : CT chest : no PE. Several admissions with similar presentation, abnormal chest imaging in last year  Improved oxygen exchange on vent, may be able to be extubated, should tolerate bronch with Tbbx    Plan:   Mechanical ventilation  Pulse dose steroids for alveolar hemorrhage but may be related to MR  Vasculitis panel negative  Consider transbronchial lung biopsy    Ventilation Mode: CMV/AC  FiO2 (%): 40 %  Rate Set (breaths/minute): 14 breaths/min  Tidal Volume Set (mL): 450 mL  PEEP (cm H2O): 10 cmH2O  Oxygen Concentration (%): 40 %  Resp: 13    Renal  Good UOP and Cr. No proteinuria or hematuria, less likely pulmonary renal syndrome  Plan: Monitor.  Gentle diuresis goal slightly negative    ID:         Infections suspected? Empiric antibiotics since 1/7; no respiratory cultures obtained, no other cultures with growth   Plan:  Monitor cultures, no growth to date  Continue vancomycin, continue Zosyn  She does have PMNs on the BAL so would complete a course of antibiotics with something, possibly Levaquin    GI  Ho etOH liver cirrhois with scites and splenomegaly.  Plan: Monitor.     Nutrition  Obesity with malnutrition.   Plan: Currently NPO in setting of intermittent BiPAP.     Endocrine:  Monitor BG.    Heme/Onc:  Mild leucocytosis and thrombocytopenia.  Plan: Monitor.     DVT Prophylaxis: Enoxaprain (Lovenox) SQ  GI Prophylaxis: PPI    Restraints: Restraints for medical healing needed: NO    Family update by me today: No    Jennifer Mane    Time Spent on This Encounter  Billing:  I spent 35 minutes bedside and on the inpatient unit today managing the critical care of Sabina Figueroa in relation to the issues listed in this note.    Main Plans for Today  Wean ventilator    Physical Exam  Temp: 97.7  F (36.5  C) Temp src: Axillary Temp  Min: 97.7  F (36.5  C)  Max: 98.7  F (37.1  C) BP: 117/77 mmHg   Heart Rate: 59 Resp: 13 SpO2: 99 % O2 Device: Mechanical Ventilator    Filed Vitals:    01/10/17 0530 01/11/17 0400 01/12/17 0600   Weight: 85.1 kg (187 lb 9.8 oz) 84.4 kg (186 lb 1.1 oz) 84 kg (185 lb 3 oz)     I/O last 3 completed shifts:  In: 2195.62 [I.V.:1295.62; NG/GT:660]  Out: 1325 [Urine:1325]    Neurologic:  Alert, communicative, on vent  Cardiovascular: RRR, pulses present al 4 extremities.   Respiratory:  Decrease BS bilateral lower lobes.   GI:  Obese, soft, moderate distension.   Skin/Extremities:  No edema.   Lines: No erythema or discharge at entry site for Central Venous Catheter right femoral vein.  Current lines are required for patient management    Medications    IV fluid REPLACEMENT ONLY       insulin (regular) 3 Units/hr (01/12/17 0616)     IV fluid REPLACEMENT ONLY       propofol (DIPRIVAN) infusion 40 mcg/kg/min (01/12/17 0837)     NaCl 10 mL/hr at 01/12/17 0630     NaCl 10 mL/hr at 01/12/17 0630      norepinephrine Stopped (01/09/17 0858)       potassium & sodium phosphates  1 packet Oral or Feeding Tube TID     protein modular  2 packet Per Feeding Tube BID     folic acid  1 mg Oral or Feeding Tube Daily     rifaximin  550 mg Oral or Feeding Tube BID     thiamine tablet 100 mg  100 mg Oral or Feeding Tube Daily     gabapentin  400 mg Oral or Feeding Tube Q8H RACHEL     FLUoxetine  40 mg Per Feeding Tube QAM     aspirin  81 mg Per Feeding Tube Daily     pantoprazole  40 mg Per Feeding Tube Daily     methylPREDNISolone  250 mg Intravenous Q6H     lactulose  30 mL Oral or Feeding Tube TID     atorvastatin  20 mg Oral or Feeding Tube Daily     sodium chloride (PF)  3 mL Intracatheter Q8H     piperacillin-tazobactam  4.5 g Intravenous Q6H     levofloxacin  750 mg Intravenous Q24H     enoxaparin  40 mg Subcutaneous Q24H     nicotine   Transdermal Q8H     nicotine   Transdermal Daily     nicotine  1 patch Transdermal Daily     buprenorphine HCl-naloxone HCl  3 Film Sublingual Daily     ipratropium - albuterol 0.5 mg/2.5 mg/3 mL  3 mL Nebulization 4x daily       Data    Recent Labs  Lab 01/12/17  0715 01/11/17  2205 01/11/17  1420 01/11/17  0510 01/10/17  0520  01/07/17  1814   WBC 4.3  --   --  3.5* 10.7  < > 8.5   HGB 11.5*  --   --  11.6* 12.2  < > 12.9   MCV 93  --   --  89 92  < > 90   *  --   --  92* 100*  < > 134*   INR  --   --   --   --  1.37*  --  1.14     --   --  142 141  < > 134   POTASSIUM 4.2 3.6 3.1* 3.1* 3.9  < > 3.5   CHLORIDE 112*  --   --  109 109  < > 102   CO2 25  --   --  24 24  < > 23   BUN 22  --   --  11 8  < > 6*   CR 0.93  --   --  0.86 0.85  < > 0.99   ANIONGAP 7  --   --  9 8  < > 9   VISHNU 8.7  --   --  8.9 8.5  < > 8.4*   *  --   --  179* 116*  < > 115*   ALBUMIN 2.4*  --   --   --   --   --  3.6   PROTTOTAL 6.2*  --   --   --   --   --  7.1   BILITOTAL 0.5  --   --   --   --   --  0.6   ALKPHOS 62  --   --   --   --   --  101   ALT 13  --   --   --   --   --  17   AST 15   --   --   --   --   --  33   TROPI  --   --   --   --   --   --  <0.015The 99th percentile for upper reference range is 0.045 ug/L.  Troponin values in the range of 0.045 - 0.120 ug/L may be associated with risks of adverse clinical events.   < > = values in this interval not displayed.  No results found for this or any previous visit (from the past 24 hour(s)).

## 2017-01-12 NOTE — PROGRESS NOTES
Maintaining adequate sedation with propofol, responds to minimal stimulation. Tolerating TF well minimal returns obtained. VSS . LS diminished but clear. Glucoses bump slightly after steroids continues algorithm # 2. Starting to have Liquid BM's.

## 2017-01-12 NOTE — PROGRESS NOTES
Patient remained intubated and on ventilator overnight. No vent changes made. BS coarse, suctioning small amounts of creamy thick secretions from ETT. RT will continue to follow.    Clover Haddad, RT 1/12/2017, 5:22 AM

## 2017-01-12 NOTE — PROGRESS NOTES
ICU End of Shift Summary.  For vital signs and complete assessments, please see documentation flowsheets.     Pertinent assessments: -2 on RASS. Opens eyes to voice. Propofol at 40mcg. Anxious upon waking. Restraints still necessary for safety. Afebrile. Lungs coarse. Vent CMV FIO2 45% PEEP 10 . Heart regular, SR, ST, SB all this shift. BPS stable. Insulin gtt adjusted q1hr per algorithm/protocol. TF started via OG at 15ml/hr at 1700. 60cc flushes q4hr. Watters with adequate urine output.  Replaced K+ and phos today.   Major Shift Events:   Plan (Upcoming Events): Continue to monitor.  Discharge/Transfer Needs:     Bedside Shift Report Completed   Bedside Safety Check Completed

## 2017-01-12 NOTE — PLAN OF CARE
Problem: Restraint for Non-Violent/Non-Self-Destructive Behavior  Goal: Prevent/Manage Potential Problems  Maintain safety of patient and others during period of restraint.  Promote psychological and physical wellbeing.  Prevent injury to skin and involved body parts.  Promote nutrition, hydration, and elimination.   Outcome: No Change  Right wrist and Left wrist restraints continued 1/11/2017    Clinical Justification: Pulling lines, pulling tubes, and pulling equipment  Less Restrictive Alternative: Repositioning, Re-evaluate equipment, Disguise equipment  Attending Physician Notified: Yes,     New orders placed Yes   Length of Order: 1 Day      Suly Barreto

## 2017-01-12 NOTE — PROGRESS NOTES
Resp Care: patient remained on full vent support, tolerated well. BS are coarse throughout both lungs, suctioned small  amount of thick creamy secretion. Vent setting changed from APRV to CMV per MD order. Will continue to monitor patient's progress and assess patient's respiratory status.     Matthew Cueva  January 11, 2017.7:11 PM

## 2017-01-12 NOTE — PROGRESS NOTES
"[facility]  Hospitalist Progress Note  Mini Mercado MD 01/12/2017    Reason for Stay (Diagnosis):recurrent acute hypoxic respiratory failure         Assessment and Plan:      Summary of Stay: Sabina Figueroa is a 41 year old female admitted on 1/7/2017     Problem List:   1. Recurrent acute hypoxic respiratory failure- intubated 1/10  2. Consolidation RML, RUL, lingula  Patchy infiltrate FRANCESCO  3. Similar episodes one month ago and last June  4. Bronchoscopy showed alveolar hemorrhage  5. Being treated with high dose steroids but cause of hemorrhage not known  6. Alcohol abuse, cirrhosis and hepatic encephalopathy in the past  7. Chronic pain  On suboxone    PLAN  Improved oxygenation and needing less ventilatory support  alert oriented and only lightly sedated  No evidence of vasculitis by lab work up  Needs lung biopsy to be done by bronchoscopy in am  Downstairs with fluoro  Can likely be extubated after bronchoscopy  Will cancel FREDIS as no MT murmur on exam and normal sized atrium  Does not have severe MR and this is not the cause of the alveolar hemorrhage  Continue tube feedings and change insulin drip to lantus 20 units daily with novolog 4 units q 4  Fentanyl as needed for pain and respiratory distress        Interval History (Subjective):      \" able to write that she was sorry\"                  Physical Exam:      Last Vital Signs:  /61 mmHg  Pulse 108  Temp(Src) 97.7  F (36.5  C) (Axillary)  Resp 15  Wt 84 kg (185 lb 3 oz)  SpO2 98%    Constitutional: Awake, alert, cooperative, after coughing spell increased respiratory rate and agitation/ anxiety   Respiratory: Good air flow bilateral with coarse rales no wheezing  AC 40% rate 14  peep 10   Cardiovascular: Regular rate and rhythm, normal S1 and S2, No murmur is present   Abdomen: Normal bowel sounds, soft, non-distended, non-tender   Skin: No rashes, no cyanosis, dry to touch   Neuro: Alert and oriented x3, no focal " weakness, numbness, memory loss   Extremities: No edema, normal range of motion   Other(s):        All other systems: Negative          Medications:      All current medications were reviewed with changes reflected in problem list.         Data:      All new lab and imaging data was reviewed.   Labs: Na 144 K 4.2  Creat 0.93  LFT normal albumin 2.4  Glucose 166    Wbc 4.3  Hemoglobin 11.5  Platelets 101  Imaging: CT 1/7/2017  Consolidations lower lobes lingula RML RUL  Patchy infiltrates in FRANCESCO

## 2017-01-13 NOTE — PROGRESS NOTES
SPIRITUAL HEALTH SERVICES Progress Note  [facility] ICU    Visited pt Sabina per her length of stay.  Sabina reported that she was just extubated and did not want to talk much in order to conserve her breathing.  She requested water, of which her RN is aware.  Sabina named her daughter and a friend as being central to her support [place].  Oriented Sabina to  service.  She acknowledged the availability of  support but expressed no needs at this time.  I and other chaplains remain available per pt/family request.    Dereck Dorsey M.Div., [facility]  Staff   Pager [phone]

## 2017-01-13 NOTE — PLAN OF CARE
Problem: Respiratory Insufficiency (Adult)  Goal: Identify Related Risk Factors and Signs and Symptoms  Related risk factors and signs and symptoms are identified upon initiation of Human Response Clinical Practice Guideline (CPG)   Outcome: No Change  ICU End of Shift Summary.  For vital signs and complete assessments, please see documentation flowsheets.     Pertinent assessments: Rested well on 45mcgs propofol fentanyl given x 3 doses for pain with good effect.Continues to have large amt liquid stool. VSS  Major Shift Events: NPO from mn   Plan (Upcoming Events): For bronchoscopy todayDischarge/Transfer Needs:     Bedside Shift Report Completed   Bedside Safety Check Completed

## 2017-01-13 NOTE — PROGRESS NOTES
ICU End of Shift Summary.  For vital signs and complete assessments, please see documentation flowsheets.     Pertinent assessments: LARGE amount of thick secretions. VERY ANXIOUS. PRN fent given.   Major Shift Events: DC'd insulin gtt.   Plan (Upcoming Events): Bronch in XR for biopsy at 0900am.   Discharge/Transfer Needs:     Bedside Shift Report Completed   Bedside Safety Check Completed

## 2017-01-13 NOTE — PROGRESS NOTES
[facility]  Pain Management Progress Note    Date of Service (when I saw the patient): 01/13/2017     Assessment and Plan  Sabina Figueroa is a 41 year old female who was admitted on 1/7/2017.     1) Acute pain flare of chronic pain, exacerbated by: acute illness    2)  Patient with chronic low back pain and B LE neuropathy, NOT on chronic opioid therapy for pain. But on Suboxone therapy at [facility] in [place] where she gets daily dispensed dose.  They watch her take it so question her presentation of taking more than usual.  [place]  database review: Gabapentin 600mg tabs, Oxycodone 5mg tabs last in September 2016.  Appears to have been on Suboxone therapy since that time..  Patient has an expected opioid tolerance.    Patient's opioid use thus far: 24mg SL buprenorphine and 300mcg IV fentanyl in past 24 hours    3)  Opioid induced side-effects:  Hx of allergy to Butrans patch- Sudden onset HA with  hypertension.    4) Other/Related:    -Disease of addiction to alcohol    -Anxiety/PTSD  -insomnia  -Alcoholic hepatitis      PLAN:   1) Buprenorphine likely at steady state with terminal half life of approx 37 hours I think it would be reasonable to keep her on it while she is vented and use Fentanyl drip or continue Fentanyl PRN, will likely need opioid tolerant dose.    2)Difficult to assess if agitation is related to vent or new pain, assume pain present and tolerating up to 100mcg IV Fentanyl well.  Plan for further assessment once patient is extubated and able to give pain history and report.  3)Multimodal Medication Therapy  Topical:ICY Hot PRN  NSAIDS:could use topical ketoprofen to low back.  Muscle Relaxants:no  Adjuvants:tylenol at low dose is an option,  gabapentin via tube.  Antidepresants/anxiolytics:on propofol now.  Opioids:Recommend Fentanyl gtt titrate to effect or continue with PRN Fentanyl.  4)Non-medication interventions  Relaxation, positioning  5)Constipation Prophylaxis  monitor  6)  DC safety  -Opioids are not recommended for this patient for discharge  -Discharge with intra-nasal naloxone in setting of active addiction to alcohol.  Plan for education prior to discharge.    Grace Pierce APRN, CNS  Pain Management and Palliative Care  [facility]  Pgr: [phone]    Time Spent on This Encounter  I spent  5 minutes is assessment of the patient and discussion with the patient and family.  Another 10 minutes in review of chart, documentation and discussion with the health care team.      Interval History  Patient is agitated today, possible extubation?    Review of Systems   unable, vented agitated    Physical Exam  Temp:  [97.8  F (36.6  C)-98.9  F (37.2  C)] 98.9  F (37.2  C)  Heart Rate:  [] 78  Resp:  [8-15] 14  BP: (105-132)/(50-81) 132/74 mmHg  FiO2 (%):  [40 %] 40 %  SpO2:  [98 %-100 %] 98 %  189 lbs 9.53 oz  GEN:  Alert, appears to communicate well, can write but too agitated this AM.  HEENT:  Normocephalic/atraumatic, no scleral icterus, no nasal discharge, mouth dry.  CV:    +3 DP/PT pulses bilatererally; no edema BLE.  RESP:  .Symmetric chest rise on inhalation noted.  Vented.  ABD:  Rounded, soft, non-tender/mild distended.  +BS  EXT:  Edema & pulses as noted above.  CMS intact x 4.      SKIN:  Dry to touch, Face and neck flushed.  NEURO: DAY. Sensation to touch intact all extremities.     Psych:  Confused? Agitated.    Medications    dexmedetomidine       IV fluid REPLACEMENT ONLY 45 mL/hr at 01/13/17 0819     insulin (regular) Stopped (01/12/17 3305)     IV fluid REPLACEMENT ONLY       propofol (DIPRIVAN) infusion 20 mcg/kg/min (01/13/17 0953)     NaCl 10 mL/hr at 01/13/17 0825     NaCl 10 mL/hr at 01/13/17 0825     norepinephrine Stopped (01/09/17 0858)       [START ON 1/16/2017] fentaNYL  50 mcg Intravenous Once within 24 hrs     [START ON 1/16/2017] midazolam  1-2 mg Intravenous Once within 24 hrs     gabapentin  600 mg Oral or Feeding Tube Q8H RACHEL      insulin glargine  20 Units Subcutaneous QAM AC     insulin aspart  4 Units Subcutaneous Q4H     insulin aspart  1-12 Units Subcutaneous Q4H     potassium & sodium phosphates  1 packet Oral or Feeding Tube TID     protein modular  2 packet Per Feeding Tube BID     folic acid  1 mg Oral or Feeding Tube Daily     rifaximin  550 mg Oral or Feeding Tube BID     thiamine tablet 100 mg  100 mg Oral or Feeding Tube Daily     FLUoxetine  40 mg Per Feeding Tube QAM     aspirin  81 mg Per Feeding Tube Daily     pantoprazole  40 mg Per Feeding Tube Daily     lactulose  30 mL Oral or Feeding Tube TID     atorvastatin  20 mg Oral or Feeding Tube Daily     sodium chloride (PF)  3 mL Intracatheter Q8H     piperacillin-tazobactam  4.5 g Intravenous Q6H     levofloxacin  750 mg Intravenous Q24H     enoxaparin  40 mg Subcutaneous Q24H     nicotine   Transdermal Q8H     nicotine   Transdermal Daily     nicotine  1 patch Transdermal Daily     buprenorphine HCl-naloxone HCl  3 Film Sublingual Daily     ipratropium - albuterol 0.5 mg/2.5 mg/3 mL  3 mL Nebulization 4x daily       Data  Results for orders placed or performed during the hospital encounter of 01/07/17 (from the past 24 hour(s))   Glucose by meter   Result Value Ref Range    Glucose 166 (H) 70 - 99 mg/dL   Glucose by meter   Result Value Ref Range    Glucose 252 (H) 70 - 99 mg/dL   Glucose by meter   Result Value Ref Range    Glucose 304 (H) 70 - 99 mg/dL   Glucose by meter   Result Value Ref Range    Glucose 152 (H) 70 - 99 mg/dL   Creatinine   Result Value Ref Range    Creatinine 0.84 0.52 - 1.04 mg/dL    GFR Estimate 74 >60 mL/min/1.7m2    GFR Estimate If Black 89 >60 mL/min/1.7m2   Phosphorus   Result Value Ref Range    Phosphorus 3.6 2.5 - 4.5 mg/dL   CBC with platelets   Result Value Ref Range    WBC 4.2 4.0 - 11.0 10e9/L    RBC Count 3.83 3.8 - 5.2 10e12/L    Hemoglobin 11.3 (L) 11.7 - 15.7 g/dL    Hematocrit 35.8 35.0 - 47.0 %    MCV 94 78 - 100 fl    MCH 29.5 26.5 -  33.0 pg    MCHC 31.6 31.5 - 36.5 g/dL    RDW 14.1 10.0 - 15.0 %    Platelet Count 107 (L) 150 - 450 10e9/L   Basic metabolic panel   Result Value Ref Range    Sodium 152 (H) 133 - 144 mmol/L    Potassium 3.7 3.4 - 5.3 mmol/L    Chloride 115 (H) 94 - 109 mmol/L    Carbon Dioxide 29 20 - 32 mmol/L    Anion Gap 8 3 - 14 mmol/L    Glucose 132 (H) 70 - 99 mg/dL    Urea Nitrogen 25 7 - 30 mg/dL    Creatinine 0.97 0.52 - 1.04 mg/dL    GFR Estimate 63 >60 mL/min/1.7m2    GFR Estimate If Black 76 >60 mL/min/1.7m2    Calcium 8.4 (L) 8.5 - 10.1 mg/dL   CBC with platelets   Result Value Ref Range    WBC 4.3 4.0 - 11.0 10e9/L    RBC Count 3.86 3.8 - 5.2 10e12/L    Hemoglobin 11.6 (L) 11.7 - 15.7 g/dL    Hematocrit 36.3 35.0 - 47.0 %    MCV 94 78 - 100 fl    MCH 30.1 26.5 - 33.0 pg    MCHC 32.0 31.5 - 36.5 g/dL    RDW 14.1 10.0 - 15.0 %    Platelet Count 111 (L) 150 - 450 10e9/L   Glucose by meter   Result Value Ref Range    Glucose 113 (H) 70 - 99 mg/dL   Glucose by meter   Result Value Ref Range    Glucose 221 (H) 70 - 99 mg/dL

## 2017-01-13 NOTE — PROGRESS NOTES
Resp Care: patient remained on full vent support, tolerated well. BS are coarse throughout both lungs, suctioned moderate amount of thick brown, creamy secretion. PEEP was decreased from 10 to 8. Inline nebs given. Will continue to monitor patient's progress and assess patient's respiratory status.     Matthew Cueva  January 12, 2017

## 2017-01-13 NOTE — PROGRESS NOTES
[facility] Intensive Care Progress Note    Date of Service (when I saw the patient): 01/13/2017     Assessment and Plan  Sabina Figueroa is a 41 year old female with liver cirrhosis, tobacco abuse, obesity, chronic pain who was admitted on 1/7/2017. 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     Neurology:  Alert, oriented. H/o chronic pain.   Suboxone care everywhere records indicate history of adverse reaction to Suboxone but she has been getting daily observed therapy  Plan: Propofol and prn fentanyl, .     Cardiovascular:  known MR, recent echo results conflicting - unclear etiology of alveolar hemorrhage   Plan: optimize medically (avoid tachycardia, keep afterload down)  Bronchoscopy with biopsy planned 1/12 at 11 but patient inadvertently received scheduled Lovenox, rescheduled for Monday    Pulmonary:        Hypoxemic respiratory failure on admission secondary to bilateral Pneumonia vs pulmonary congestion, this is a recurrent presentation. Unclear history of COPD : CT chest : no PE. Several admissions with similar presentation, abnormal chest imaging in last year  Improved oxygen exchange on vent, may be able to be extubated, should tolerate bronch with Tbbx    Plan:   Mechanical ventilation  Pulse dose steroids for alveolar hemorrhage but may be related to MR  Vasculitis panel negative  transbronchial lung biopsy    Ventilation Mode: CPAP/PS  FiO2 (%): 40 %  Rate Set (breaths/minute): 14 breaths/min  Tidal Volume Set (mL): 450 mL  PEEP (cm H2O): 5 cmH2O  Pressure Support (cm H2O): 7 cmH2O  Oxygen Concentration (%): 40 %  Resp: 14    Renal  Good UOP and Cr. No proteinuria or hematuria, less likely pulmonary renal syndrome  Plan: Monitor.  Gentle diuresis goal slightly negative    ID:         Infections suspected? Empiric  antibiotics since 1/7; no respiratory cultures obtained, no other cultures with growth   Plan: Monitor cultures, no growth to date  Continue vancomycin, continue Zosyn  She does have PMNs on the BAL so would complete a course of antibiotics with something, possibly Levaquin    GI  Ho etOH liver cirrhois with scites and splenomegaly.  Plan: Monitor.     Nutrition  Obesity with malnutrition.   Plan: She removed her gastric tube overnight, should be able to extubate and then she can take oral    Endocrine:  Monitor BG.    Heme/Onc:  Mild leucocytosis and thrombocytopenia.  Plan: Monitor.     DVT Prophylaxis: Enoxaprain (Lovenox) SQ  GI Prophylaxis: PPI    Restraints: Restraints for medical healing needed: NO    Family update by me today: No    Jennifer Mane    Time Spent on This Encounter  Billing:  I spent 35 minutes bedside and on the inpatient unit today managing the critical care of Sabina Figueroa in relation to the issues listed in this note.    Main Plans for Today  T piece trial today and extubate if she tolerates it    Physical Exam  Temp: 98.9  F (37.2  C) Temp src: Axillary Temp  Min: 97.8  F (36.6  C)  Max: 98.9  F (37.2  C) BP: 132/74 mmHg   Heart Rate: 78 Resp: 14 SpO2: 98 % O2 Device: Mechanical Ventilator    Filed Vitals:    01/11/17 0400 01/12/17 0600 01/13/17 0630   Weight: 84.4 kg (186 lb 1.1 oz) 84 kg (185 lb 3 oz) 86 kg (189 lb 9.5 oz)     I/O last 3 completed shifts:  In: 2413.59 [I.V.:1318.59; NG/GT:390]  Out: 2600 [Urine:2500; Stool:100]    Neurologic:  Alert, communicative, on vent  Cardiovascular: RRR, pulses present al 4 extremities.   Respiratory:  Decrease BS bilateral lower lobes.   GI:  Obese, soft, moderate distension.   Skin/Extremities:  No edema.   Lines: No erythema or discharge at entry site for Central Venous Catheter right femoral vein.  Current lines are required for patient management    Medications    dexmedetomidine       IV fluid REPLACEMENT ONLY 45 mL/hr at 01/13/17  0819     insulin (regular) Stopped (01/12/17 1555)     IV fluid REPLACEMENT ONLY       propofol (DIPRIVAN) infusion 20 mcg/kg/min (01/13/17 0953)     NaCl 10 mL/hr at 01/13/17 0825     NaCl 10 mL/hr at 01/13/17 0825     norepinephrine Stopped (01/09/17 0858)       [START ON 1/16/2017] fentaNYL  50 mcg Intravenous Once within 24 hrs     [START ON 1/16/2017] midazolam  1-2 mg Intravenous Once within 24 hrs     gabapentin  600 mg Oral or Feeding Tube Q8H RACHEL     insulin glargine  20 Units Subcutaneous QAM AC     insulin aspart  4 Units Subcutaneous Q4H     insulin aspart  1-12 Units Subcutaneous Q4H     potassium & sodium phosphates  1 packet Oral or Feeding Tube TID     protein modular  2 packet Per Feeding Tube BID     folic acid  1 mg Oral or Feeding Tube Daily     rifaximin  550 mg Oral or Feeding Tube BID     thiamine tablet 100 mg  100 mg Oral or Feeding Tube Daily     FLUoxetine  40 mg Per Feeding Tube QAM     aspirin  81 mg Per Feeding Tube Daily     pantoprazole  40 mg Per Feeding Tube Daily     lactulose  30 mL Oral or Feeding Tube TID     atorvastatin  20 mg Oral or Feeding Tube Daily     sodium chloride (PF)  3 mL Intracatheter Q8H     piperacillin-tazobactam  4.5 g Intravenous Q6H     levofloxacin  750 mg Intravenous Q24H     enoxaparin  40 mg Subcutaneous Q24H     nicotine   Transdermal Q8H     nicotine   Transdermal Daily     nicotine  1 patch Transdermal Daily     buprenorphine HCl-naloxone HCl  3 Film Sublingual Daily     ipratropium - albuterol 0.5 mg/2.5 mg/3 mL  3 mL Nebulization 4x daily       Data    Recent Labs  Lab 01/13/17  0625 01/13/17  0508 01/12/17  0715 01/11/17  2205  01/11/17  0510 01/10/17  0520  01/07/17  1814   WBC 4.3 4.2 4.3  --   --  3.5* 10.7  < > 8.5   HGB 11.6* 11.3* 11.5*  --   --  11.6* 12.2  < > 12.9   MCV 94 94 93  --   --  89 92  < > 90   * 107* 101*  --   --  92* 100*  < > 134*   INR  --   --   --   --   --   --  1.37*  --  1.14   *  --  144  --   --  142 884   < > 134   POTASSIUM 3.7  --  4.2 3.6  < > 3.1* 3.9  < > 3.5   CHLORIDE 115*  --  112*  --   --  109 109  < > 102   CO2 29  --  25  --   --  24 24  < > 23   BUN 25  --  22  --   --  11 8  < > 6*   CR 0.97 0.84 0.93  --   --  0.86 0.85  < > 0.99   ANIONGAP 8  --  7  --   --  9 8  < > 9   VISHNU 8.4*  --  8.7  --   --  8.9 8.5  < > 8.4*   *  --  188*  --   --  179* 116*  < > 115*   ALBUMIN  --   --  2.4*  --   --   --   --   --  3.6   PROTTOTAL  --   --  6.2*  --   --   --   --   --  7.1   BILITOTAL  --   --  0.5  --   --   --   --   --  0.6   ALKPHOS  --   --  62  --   --   --   --   --  101   ALT  --   --  13  --   --   --   --   --  17   AST  --   --  15  --   --   --   --   --  33   TROPI  --   --   --   --   --   --   --   --  <0.015The 99th percentile for upper reference range is 0.045 ug/L.  Troponin values in the range of 0.045 - 0.120 ug/L may be associated with risks of adverse clinical events.   < > = values in this interval not displayed.  No results found for this or any previous visit (from the past 24 hour(s)).

## 2017-01-13 NOTE — PROGRESS NOTES
Respiratory Therapy    Patient remains intubated and on mechanical ventilator throughout the shift settings as follows:  Ventilation Mode: CMV/AC  FiO2 (%): 40 %  Rate Set (breaths/minute): 14 breaths/min  Tidal Volume Set (mL): 450 mL  PEEP (cm H2O): 8 cmH2O  Oxygen Concentration (%): 40 %  Resp: 14    Breath sounds are coarse/diminished throughout and suctioning out thick secretions via ETT. Will continue to assess and monitor.    Antoinette Rodriguez RT  1/13/2017

## 2017-01-13 NOTE — PLAN OF CARE
Problem: Restraint for Non-Violent/Non-Self-Destructive Behavior  Goal: Prevent/Manage Potential Problems  Maintain safety of patient and others during period of restraint.  Promote psychological and physical wellbeing.  Prevent injury to skin and involved body parts.  Promote nutrition, hydration, and elimination.   Outcome: No Change  Soft bilateral wrist restraints continued for pt safety and maintenance of ET tube lines etc, will continue to monitor  the need for them

## 2017-01-13 NOTE — PROVIDER NOTIFICATION
Right wrist and Left wrist restraints discontinued at 1:00 PM on 1/13/2017.    Restraint discontinue criteria met, patient is calm, cooperative and safe. Restraints removed. Extubated.     Patient's Response:   Family Notification: Other  Attending Physician Notified: Alhaji Mercado

## 2017-01-14 NOTE — CONSULTS
Care Transition Initial Assessment - RN        Met with: Patient.    DATA   Active Problems:    Respiratory failure (H)       Cognitive Status: sleepy.        Contact information and PCP information verified: Yes, but pt states she lives in [address] not [address]    Lives With: friend(s)                      Insurance concerns: No Insurance issues identified    ASSESSMENT  Patient currently receives the following services:  none        Identified issues/concerns regarding health management: medication management  Pt states she has the support of her friend JANEY and her daughter. Pt insists she had been taking her medications properly but that she developed pneumonia again and that is why she is in the hospital again. Pt states she had made it to her last hospital follow up appointment with Dr. Hemphill of [facility].      PLAN  Patient anticipates discharging to home.        Patient anticipates needs for home equipment: No    Plan/Disposition: Home   Appointments: tbd        Care  (CTS) will continue to follow as needed.    Kori Roth RN   Care Coordinator  [phone]

## 2017-01-14 NOTE — PROGRESS NOTES
"Pt was placed on a PS trial this morning, followed by a norma piece trial with 6 LPM bled in. And after being on it for 3-4 hrs she was successfully extubated to a 3 LPM NC and is currently resting comfortably on it. She also received a SVN x 3 (Albuterol 2.5 mg, Atrovent 0.5 mg) and tolerated that well with no adverse reaction noted. BBS are diminished, she has a fair strong NPC. Will continue to monitor and assess.    Vital signs:  Temp: 98.5  F (36.9  C) Temp src: Axillary BP: 136/86 mmHg   Heart Rate: 53 Resp: 12 SpO2: 95 % O2 Device: Nasal cannula Oxygen Delivery: 3 LPM   Weight: 86 kg (189 lb 9.5 oz)  Estimated body mass index is 29.69 kg/(m^2) as calculated from the following:    Height as of 16: 1.702 m (5' 7\").    Weight as of this encounter: 86 kg (189 lb 9.5 oz).        PAST MEDICAL HISTORY:   Past Medical History   Diagnosis Date     Arthritis      Bunion, left foot      Ovarian cyst, left      Anxiety      Hypertension      current     Alcohol abuse      Asthma      Depression      ALC (alcoholic liver cirrhosis) (H)      Ascites      COPD (chronic obstructive pulmonary disease) (H)      Chronic low back pain      Narcotic agreement signed in [facility] system       PAST SURGICAL HISTORY:   Past Surgical History   Procedure Laterality Date     Knee surgery       x3      section       Esophagoscopy, gastroscopy, duodenoscopy (egd), combined  2014     Procedure: COMBINED ESOPHAGOSCOPY, GASTROSCOPY, DUODENOSCOPY (EGD);  Surgeon: Brittany Lowe MD;  Location:  GI     Esophagoscopy, gastroscopy, duodenoscopy (egd), combined N/A 2015     Procedure: COMBINED ESOPHAGOSCOPY, GASTROSCOPY, DUODENOSCOPY (EGD);  Surgeon: London Lenz MD;  Location:  GI     Esophagoscopy, gastroscopy, duodenoscopy (egd), combined N/A 2015     Procedure: COMBINED ESOPHAGOSCOPY, GASTROSCOPY, DUODENOSCOPY (EGD);  Surgeon: Barry Chavez MD;  Location:  GI     Bronchoscopy flexible N/A 1/10/2017 "     Procedure: BRONCHOSCOPY FLEXIBLE;  Surgeon: Jennifer Mane MD;  Location:  GI       FAMILY HISTORY:   Family History   Problem Relation Age of Onset     Depression Mother      Anxiety Disorder Mother      MENTAL ILLNESS Mother      Substance Abuse Mother      Depression Father      Anxiety Disorder Father      Substance Abuse Father      MENTAL ILLNESS Father      Depression Daughter      Depression Brother      Schizophrenia Brother      Depression Brother      Anxiety Disorder Brother      Substance Abuse Brother        SOCIAL HISTORY:   Social History   Substance Use Topics     Smoking status: Current Every Day Smoker -- 1.00 packs/day for 18 years     Types: Cigarettes     Smokeless tobacco: Former User     Quit date: 05/11/2016     Alcohol Use: 0.0 oz/week     0 Standard drinks or equivalent per week      Comment: 8 drinks or more each day, abstinent since 4/2/15. History of CD treatment in past x1     Candido Jacobo RT  1/13/2017

## 2017-01-14 NOTE — PLAN OF CARE
Problem: Individualization  Goal: Patient Preferences  Outcome: Improving  ICU End of Shift Summary.  For vital signs and complete assessments, please see documentation flowsheets.     Pertinent assessments: Tolerating 3L nc after extubation. NPO, sips of water with meds only at this time possible speech eval tomorrow. D10 infusing, fsbs q4. K replaced for 3.4, recheck in am. Tele SR. F/C and rectal tube. Discussed possibility of dc'ing now that extubated but pt refuses at this time stating she 'doesn't do well with bedpans', needs strengthening.  Scheduled nebs. Triple lumen to R groin, x2 PIV SL. Chronic pain, pain MD following.   Major Shift Events: Extubated around 1400. Stopped Propofol and started on precedex gtt.   Plan (Upcoming Events): Bronch Monday.   Discharge/Transfer Needs: TBD    Bedside Shift Report Completed   Bedside Safety Check Completed

## 2017-01-14 NOTE — PROGRESS NOTES
"[facility]  Hospitalist Progress Note  Mini Mercado MD 01/14/2017    Reason for Stay (Diagnosis):recurrent acute hypoxic respiratory failure         Assessment and Plan:      Summary of Stay: Sabina Figueroa is a 41 year old female admitted on 1/7/2017     Problem List:   1. Recurrent acute hypoxic respiratory failure- intubated 1/10 and extubated 1/13  2. Consolidation RML, RUL, lingula  Patchy infiltrate FRANCESCO  ( cause is not clear)  3. Similar episodes one month ago and last June  4. Bronchoscopy 1/10 showed alveolar hemorrhage  5. Treated with high dose pulsed steroids for 3 days  6. Alcohol abuse, cirrhosis and hepatic encephalopathy in the past  7. Chronic pain     PLAN  alert oriented and calm, no labored respirations  No evidence of vasculitis by lab work up  MR is not an issue here as has no murmur and normal size LA  C/o sore throat but can eat  2 gram sodium diet ( liver cirrhosis)   up to chair each shift and ambulate  Transfer to floor  Bronchoscopy for biopsy rescheduled for Monday  S/p one week levaquin  Will discontinue        Interval History (Subjective):      \" My throat is sore\"                  Physical Exam:      Last Vital Signs:  BP 96/56 mmHg  Pulse 108  Temp(Src) 98  F (36.7  C) (Oral)  Resp 14  Wt 86 kg (189 lb 9.5 oz)  SpO2 94%    Constitutional: Awake, alert, cooperative, comfortable after extubation   Respiratory: Good breath sounds bilat no wheezing or rhonchi  Nasal O2 3 LPM   Cardiovascular: Regular rate and rhythm, normal S1 and S2, No murmur is present   Abdomen: Normal bowel sounds, soft, non-distended, non-tender   Skin: No rashes, no cyanosis, dry to touch   Neuro: Alert and oriented x3, no focal weakness, numbness, memory loss   Extremities: No edema, normal range of motion   Other(s): Conversant and interactive  C/o sore throat       All other systems: Negative          Medications:      All current medications were reviewed with changes reflected in " problem list.         Data:      All new lab and imaging data was reviewed.   Labs: Na  140   K 3.3    Creat 0.89 phosphorus 2.5  LFT normal albumin 2.4    Glucose 130   Wbc 4.3  Hemoglobin 11.5  Platelets 101  Imaging: CT 1/7/2017  Consolidations lower lobes lingula RML RUL  Patchy infiltrates in FRANCESCO

## 2017-01-14 NOTE — PROGRESS NOTES
"[facility]  Hospitalist Progress Note  Mini Mercado MD 01/13/2017    Reason for Stay (Diagnosis):recurrent acute hypoxic respiratory failure         Assessment and Plan:      Summary of Stay: Sabina Figueroa is a 41 year old female admitted on 1/7/2017     Problem List:   1. Recurrent acute hypoxic respiratory failure- intubated 1/10  2. Consolidation RML, RUL, lingula  Patchy infiltrate FRANCESCO  3. Similar episodes one month ago and last June  4. Bronchoscopy 1/10 showed alveolar hemorrhage  5. Being treated with high dose steroids but cause of hemorrhage not known  6. Alcohol abuse, cirrhosis and hepatic encephalopathy in the past  7. Chronic pain  On suboxone with listed allergy    PLAN  Planned bronchoscopy today for biopsy canceled as patient received enoxaparin  Able to be extubated at 2 pm and is on nasal O2  alert oriented and calm  No evidence of vasculitis by lab work up  Will cancel FREDIS as no MT murmur on exam and normal sized atrium  Does not have severe MR and this is not the cause of the alveolar hemorrhage  Lungs improving with IV steroids but if changes were secondary to suboxone   this should be stopped ( lung injury could be secondary to suboxone)  Bronchoscopy rescheduled for Monday        Interval History (Subjective):      \" i am hungry\"                  Physical Exam:      Last Vital Signs:  /86 mmHg  Pulse 108  Temp(Src) 98.5  F (36.9  C) (Axillary)  Resp 12  Wt 86 kg (189 lb 9.5 oz)  SpO2 95%    Constitutional: Awake, alert, cooperative, comfortable after extubation   Respiratory: Good breath sounds bilat no wheezing or rhonchi  Nasal O2 3 LPM   Cardiovascular: Regular rate and rhythm, normal S1 and S2, No murmur is present   Abdomen: Normal bowel sounds, soft, non-distended, non-tender   Skin: No rashes, no cyanosis, dry to touch   Neuro: Alert and oriented x3, no focal weakness, numbness, memory loss   Extremities: No edema, normal range of motion   Other(s): " C/o being thirsty safely swallowed pills and water       All other systems: Negative          Medications:      All current medications were reviewed with changes reflected in problem list.         Data:      All new lab and imaging data was reviewed.   Labs: Na  152 increased from 144  K 3.7    Creat 0.97  LFT normal albumin 2.4    Glucose 221   Wbc 4.3  Hemoglobin 11.5  Platelets 101  Imaging: CT 1/7/2017  Consolidations lower lobes lingula RML RUL  Patchy infiltrates in FRANCESCO

## 2017-01-15 NOTE — PLAN OF CARE
Problem: Goal Outcome Summary  Goal: Goal Outcome Summary  Outcome: Improving  Tolerating 2gm  NA diet %. See previous note, urinary retention, callaway placed. Groin rash raised and inner thighs and perineal, order for cream not yet available to apply. Up with 1 and T belt unsteady gait and SOB with activity. LS dim. Alert and oriented. Repeat Bronchoscopy in 2 days. Stool x 1.

## 2017-01-15 NOTE — PLAN OF CARE
Problem: Goal Outcome Summary  Goal: Goal Outcome Summary  Outcome: Improving  End of shift summary.  For full assessment see flow sheets.    Dennard: Alert and orientated x 4  VS: VSS  O2: 97% 3 L NC  LS: Dim, Crackles to bases bilaterally.  GI: Active.  Had Bowel movement x 2 this shift.   : Pt continues to have difficulty voiding.  Straight cath preformed this shift.   Pain: Pt denies pain.   Transfer: Assist of 1 and gait belt.   IV:  SL  Plan: To be determined.

## 2017-01-15 NOTE — PROVIDER NOTIFICATION
Pt unable to void, very uncomfortable. Bladder scan for greater than 900. Pt had been st. Cath 2 times already. Page sent for request to place callaway due to retention. Callaway placed due to urgency to void and unable. 850cc clear return. Amy very red rash and excoriated. Text message sent to MD for order to keep callaway in place and cream for yeast. Awiting order.

## 2017-01-15 NOTE — PLAN OF CARE
Problem: Goal Outcome Summary  Goal: Goal Outcome Summary  Outcome: No Change  Pt up with one assist, gait very unsteady at times. Oxygen saturation at 88% on RA, 90% on 2L, currently on 3L and oxygen saturation at 94-98%. Groin and in between buttocks very red and excoriated, cleansed with soap and water and barrier cream applied. Potassium was 3.3 this morning, replaced when pt arrived to unit. Pt unable to void at 2030, bladder scanned for 627. Pt tried x2 to void on her own but was unable, straight cath for 825 ml. IV in right hand infiltrated, cold applied and arm elevated. New IV started in right forearm. CVC removed from right groin earlier today, dressing C/D/I. Alert and oriented x4, forgetful at times. Alarms on for safety. LS diminished, fine crackles to bilateral lower lobes. Agitated at times.

## 2017-01-15 NOTE — PROGRESS NOTES
[facility]  Hospitalist Progress Note  Patient Name: Sabina Figueroa    MRN: [id number]  Provider: Owen Damian MD  01/15/2017    Initial presenting complaint/issue to hospital (Diagnosis): respiratory failure         Assessment and Plan:      Summary of Stay: Sabina Figueroa is a 41-year-old female with history of chronic pain.  She follows with a pain clinic and takes Suboxone.  History also notable for COPD and tobacco use.  More recent history is notable for admission to the hospital one month ago for acute hypoxic respiratory failure felt to be infectious in etiology.  She responded well to treatment for community-acquired pneumonia and was discharged home without oxygen.  She presented to the hospital on 1/7 for increasing work of breathing and altered mental status.  On presentation to the ER, she was noted to have a fever, tachycardia, hypotension, and hypoxia.  She required BiPAP in the ER.  Workup in the ER included fairly unremarkable labs including normal white blood cell count and normal lactic acid level.  Chest CT scan, however, showed bilateral infiltrates that had worsened since the previous CT one month prior when she was treated for pneumonia.  She was given vancomycin, Levaquin, and Zosyn for presumed health care associated pneumonia and was admitted to the Intensive Care Unit for respiratory failure and support with BiPAP.  Bronchoscopy was done on 1/10 showing bronchoalveolar hemorrhage.  Sabina required intubation during the procedure for hypoxia and airway management.  She was extubated on 1/13. Sabina has been treated with 4 days of Vancomycin (stopped 1/11) and 7 days of Zosyn and Levaquin (stopped 1/14).  She was also treated with 3 days of prednisone followed by 4 days of IV solumedrol. The cause of her respiratory is not clear.  Work up has not suggested vasculitis.  Atypical pneumonia is still a possibility.   She has slowly improved.  Repeat bronchoscopy is planned for  1/16.    Problem List:    1.  Acute resp failure of unclear cause- possibly atypical or health associated pneumonia. Consider also non-infectious causes.  There was alveolar hemorrhage on bronch but work up has not been suggestive of vasculitis.  Improving.  She has completed 7 days of broad spectrum antibiotics (Vancomcin X 4 d and Zosyn and Levaquin X7d). She is off of BIPAP and vent now and stable.  Repeat bronch planned for tomorrow (Lovenox is on hold for this).  2.  Hypotension, resolved.  Suspect related to SIRS or sepsis related to 1 above.  She was on low dose norepi initially, but has been off of this for several days.    3.  Recent admission to [facility] (12/05/2016 to 12/10/2016) as mentioned above for acute hypoxic respiratory failure, felt related to likely community-acquired pneumonia versus pulmonary edema.  By day of discharge, she was titrated off oxygen and breathing well on room air.  She was treated with Rocephin and azithromycin, and discharged on Ceftin to complete therapy.    4.  History of chronic obstructive pulmonary disease.    5.  Active smoker.    6.  Alcoholic liver disease, complicated by hepatic encephalopathy for which she takes lactulose and rifampin.    7.  Chronic pain syndrome, for which she is on Suboxone chronically.   8.  Physical deconditioning.  PT consult.   9.  Urinary retention. Wtaters placed  10.  Periarea candidiasis.  Miconazole cream BID.    DVT Prophylaxis: Enoxaprain (Lovenox) SQ (on hold for bronch tomorrow)  Code Status: Full Code  Discharge Dispo: TCU VS HOME  Estimated Disch Date / # of Days until Disch: 2-3 days, I suspect        Interval History:      Patient is feeling better today.  Denies SOB. Feels a little weak.  No new problems.                   Physical Exam:      Last Vital Signs:  BP 99/57 mmHg  Pulse 54  Temp(Src) 98.3  F (36.8  C) (Oral)  Resp 14  Wt 86 kg (189 lb 9.5 oz)  SpO2 98%    Intake/Output Summary (Last 24 hours) at  01/15/17 1151  Last data filed at 01/15/17 0709   Gross per 24 hour   Intake 1069.75 ml   Output   3975 ml   Net -2905.25 ml       GENERAL:  Comfortable. Cooperative.  PSYCH: pleasant, oriented, No acute distress.  EYES: PERRLA, Normal conjunctiva.  HEART:  Regular rate and rhythm. No JVD. Pulses normal. No edema.  LUNGS:  Clear to auscultation, normal Respiratory effort.  ABDOMEN:  Soft, no hepatosplenomegaly, normal bowel sounds.  EXTREMETIES: No clubbing, cyanosis or ischemia  SKIN:  Dry to touch, No rash.           Medications:      All current medications were reviewed.         Data:      All new lab and imaging data was reviewed.   Labs:    Recent Labs  Lab 01/10/17  1024   CULT Culture negative after 3 days  No growth  Canceled, Test credited Inappropriate specimen type ANAEROBIC CULTURES ARE INAPROPRIATE ON RESPIRATORY SPECIMENS Notification of test cancellation was given to Dr Jennifer Mane  Canceled, Test credited Incorrectly ordered by PCU/Clinic Duplicate request          NA      140   1/14/2017  NA      152   1/13/2017  NA      144   1/12/2017 CHLORIDE      106   1/14/2017  CHLORIDE      115   1/13/2017  CHLORIDE      112   1/12/2017 BUN       22   1/14/2017  BUN       25   1/13/2017  BUN       22   1/12/2017   POTASSIUM      3.6   1/15/2017  POTASSIUM      3.3   1/15/2017  POTASSIUM      3.3   1/14/2017 CO2       29   1/14/2017  CO2       29   1/13/2017  CO2       25   1/12/2017 CR     0.89   1/14/2017  CR     0.97   1/13/2017  CR     0.84   1/13/2017       Recent Labs  Lab 01/13/17  0625   WBC 4.3   HGB 11.6*   HCT 36.3   MCV 94   *

## 2017-01-16 NOTE — ANESTHESIA POSTPROCEDURE EVALUATION
Patient: Sabina Figueroa    BRONCHOSCOPY FLEXIBLE (N/A Bronchus)  Additional InformationProcedure(s):  BRONCHOSCOPY FLEXIBLE with Biopsies - Wound Class: II-Clean Contaminated    Diagnosis:Abnormal CT  Diagnosis Additional Information:       Pre-operative diagnosis:  Abnormal CT   Post-operative diagnosis  * No post-op diagnosis entered *  Alveolar hemorrhage   Procedure:  Procedure(s):  BRONCHOSCOPY FLEXIBLE with Biopsies - Wound Class: II-Clean Contaminated             Anesthesia Type:  General, ETT    Note:  Anesthesia Post Evaluation    Patient location during evaluation: Phase 2  Patient participation: Able to fully participate in evaluation  Level of consciousness: awake  Pain management: adequate  Airway patency: patent  Cardiovascular status: acceptable  Respiratory status: acceptable  Hydration status: euvolemic  PONV: controlled     Anesthetic complications: None          Last vitals:  Filed Vitals:    01/16/17 1406 01/16/17 1411 01/16/17 1415   BP: 106/72 106/72 101/70   Pulse:      Temp:  99.1  F (37.3  C)    Resp: 12 11 10   SpO2: 97% 96% 97%       Electronically Signed By: Anatoliy Moon MD  January 16, 2017  2:34 PM

## 2017-01-16 NOTE — PROGRESS NOTES
"[facility]  Hospitalist Progress Note  Jonathan Black MD 01/16/2017    Reason for Stay (Diagnosis): Recurrent Acute hypoxic respiratory failure.         Assessment and Plan:      Summary of Stay: Sabina Figueroa is a 41 year old female admitted on 1/7/2017     Problem List:   1. Recurrent acute hypoxic respiratory failure- She was intubated on1/10 and extubated 1/13. She had two episodes, one in June, the other one month ago. She is improving, off oxygen. No evidence of vasculitis.    2. Consolidation RML, RUL, lingula  Patchy infiltrate FRANCESCO, Likely bilateral Pneumonia vs pulmonary congestion, no clear cut cause identified yet. She had Bronchoscopy on 01/10, which showed alveolar hemorrhage. Planned follow up bronchoscopy for today.She completed one week of Levaquin. She was treated with high dose pulsed steroids for 3 days.    3. Alcohol abuse, cirrhosis and hepatic encephalopathy in the past- Stable at this time.    4. Chronic pain- Tolerable. She was on Suboxone PTA.    DVT prophylaxis- Lovenox.  Full code.        Interval History (Subjective):      Patient seen and examined, assumed care today. No new issues, feels about the same. Pain better, no nausea or vomiting.                  Physical Exam:      Last Vital Signs:  BP 98/58 mmHg  Pulse 54  Temp(Src) 97.2  F (36.2  C) (Temporal)  Resp 15  Ht 1.715 m (5' 7.5\")  Wt 85.73 kg (189 lb)  BMI 29.15 kg/m2  SpO2 95%    Constitutional: Awake, alert, cooperative, comfortable after extubation   Respiratory: Good breath sounds bilat no wheezing or rhonchi  Nasal O2 3 LPM   Cardiovascular: Regular rate and rhythm, normal S1 and S2, No murmur is present   Abdomen: Normal bowel sounds, soft, non-distended, non-tender   Skin: No rashes, no cyanosis, dry to touch   Neuro: Alert and oriented x3, no focal weakness, numbness, memory loss   Extremities: No edema, normal range of motion   Other(s): Conversant and interactive  C/o sore throat     "   All other systems: Negative          Medications:      All current medications were reviewed with changes reflected in problem list.         Data:      All new lab and imaging data was reviewed.     Recent Labs  Lab 01/16/17  0703 01/13/17  0625 01/13/17  0508   WBC 7.7 4.3 4.2   HGB 12.0 11.6* 11.3*   HCT 36.4 36.3 35.8   MCV 90 94 94   * 111* 107*       Recent Labs  Lab 01/16/17  0703 01/15/17  1045 01/15/17  0125 01/14/17  0545 01/13/17  0625     --   --  140 152*   POTASSIUM 4.0 3.6 3.3* 3.3* 3.7   CHLORIDE 105  --   --  106 115*   CO2 29  --   --  29 29   ANIONGAP 6  --   --  5 8   *  --   --  116* 132*   BUN 13  --   --  22 25   CR 0.73  --   --  0.89 0.97   GFRESTIMATED 88  --   --  69 63   GFRESTBLACK >90African American GFR Calc  --   --  84 76   VISHNU 8.2*  --   --  8.1* 8.4*

## 2017-01-16 NOTE — BRIEF OP NOTE
[facility] Brief Operative Note    Pre-operative diagnosis: Abnormal CT   Post-operative diagnosis * No post-op diagnosis entered *  Alveolar hemorrhage   Procedure: Procedure(s):  BRONCHOSCOPY FLEXIBLE with Biopsies - Wound Class: II-Clean Contaminated   Surgeon(s): Surgeon(s) and Role:     * Jennifer Mane MD - Primary   Estimated blood loss: * No values recorded between 1/16/2017  1:10 PM and 1/16/2017  1:43 PM *    Specimens:   ID Type Source Tests Collected by Time Destination   1 : Brochial Aveolar Lavage-Left Lingula Bronch Lavage Bronchial CELL COUNT WITH DIFFERENTIAL FLUID, CYTOLOGY NON GYN Jennifer Mane MD 1/16/2017  1:16 PM    2 : Bronchial Alveolar Lavage-Right Middle Lobe Bronch Lavage Bronchial CELL COUNT WITH DIFFERENTIAL FLUID, CYTOLOGY NON GYN Jennifer Mane MD 1/16/2017  1:17 PM    A : Right Middle Lobe Biopsy Tissue Lung, Right Middle Lobe SURGICAL PATHOLOGY EXAM Jennifer Mane MD 1/16/2017  1:25 PM       Findings: Bloody return from Lingula and RML lavage. Tbbx x 10 taken  Needs post op CXR to eval for pneumothorax

## 2017-01-16 NOTE — PLAN OF CARE
Problem: Goal Outcome Summary  Goal: Goal Outcome Summary  Alert and oriented x4 but lethargic in beginning of shift. Regular HR. Lung sounds diminished. KOROMA and desats into 80's when up without oxygen. Up to BR with assist of 1 and gait belt. VSS. BP runs soft. Blood sugar this shift 115.

## 2017-01-16 NOTE — PLAN OF CARE
Problem: Goal Outcome Summary  Goal: Goal Outcome Summary  Outcome: No Change  Pt up with one assist, gait unsteady at times. Groin and between buttocks very red and excoriated. Area cleansed, antifungal to groin and barrier cream to area between buttocks. Watters patent and draining adequate amount of urine. Fair appetite.

## 2017-01-16 NOTE — PLAN OF CARE
Problem: Goal Outcome Summary  Goal: Goal Outcome Summary  Outcome: Improving  Pt. Was very lethargic but arousable this morning but this afternoon after return from bronchoscopy alert and oriented and talkative, up with assist x1, continues with callaway catheter for retention, oxygen at 4L, respiration 10-24 mostly drops when sleeping, frequent prod cough, per PACU some bld in sputum after procedure, currently on menses heating pad for comfort, rash in michael and left wrist, LS coarse bases and diminished, continue to encourage use of IS

## 2017-01-16 NOTE — ANESTHESIA PREPROCEDURE EVALUATION
PAC NOTE:       ANESTHESIA PRE EVALUATION:  Anesthesia Evaluation     .        ROS/MED HX    ENT/Pulmonary:     (+)Mild Persistent asthma moderate COPD, , recent URI unresolved Required intubation, recent extubation: . .   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 : . . . :. .      (-) CAD, CHF, arrhythmias, congenital heart disease and dyslipidemia   METS/Exercise Tolerance:     Hematologic:        (-) anemia   Musculoskeletal:   (+) arthritis, , , other musculoskeletal- Chronic Back pain      GI/Hepatic:     (+) hiatal hernia, hepatitis (w/ ascites and hx GI Bleed) type Alcoholic, liver disease,      (-) GERD and inflamatory bowel disease   Renal/Genitourinary:      (-) renal disease and other renal    Endo:      (-) Type I DM, Type II DM and thyroid disease   Psychiatric: Comment: Polysubstance abuse    (+) psychiatric history depression and anxiety      Infectious Disease:  - neg infectious disease ROS       Malignancy:      - no malignancy   Other:    (+) H/O Chronic Pain,             Physical Exam      Airway   Mallampati: II  TM distance: >3 FB  Neck ROM: full    Dental     Cardiovascular   Rhythm and rate: regular and normal  (-) no murmur    Pulmonary    breath sounds clear to auscultation    Other findings: Lab Test        01/16/17 01/13/17 01/13/17      --          01/10/17      --          01/07/17      --          12/06/16                       0703          0625          0508           --           0520           --           1814           --           0610          WBC          7.7          4.3          4.2            < >        10.7           < >        8.5            < >        10.2          HGB          12.0         11.6*        11.3*          < >        12.2           < >        12.9           < >        10.9*         MCV          90           94           94             < >        92              < >        90             < >        97            PLT          126*         111*         107*           < >        100*           < >        134*           < >        137*          INR           --           --           --           --          1.37*         --          1.14          --          1.19*          < > = values in this interval not displayed.                  Lab Test        01/16/17     01/15/17     01/15/17     01/14/17     01/13/17                       0703          1045          0125          0545          0625          NA           140           --           --          140          152*          POTASSIUM    4.0          3.6          3.3*         3.3*         3.7           CHLORIDE     105           --           --          106          115*          CO2          29            --           --          29           29            BUN          13            --           --          22           25            CR           0.73          --           --          0.89         0.97          ANIONGAP     6             --           --          5            8             VISHNU          8.2*          --           --          8.1*         8.4*          GLC          115*          --           --          116*         132*                   Anesthesia Plan      History & Physical Review  History and physical reviewed and following examination; no interval change.    ASA Status:  4 .    NPO Status:  > 8 hours    Plan for General and ETT with Propofol induction.   PONV prophylaxis:  Ondansetron (or other 5HT-3) and Dexamethasone or Solumedrol       Postoperative Care  Postoperative pain management:  IV analgesics and Oral pain medications.      Consents  Anesthetic plan, risks, benefits and alternatives discussed with:  Patient.  Use of blood products discussed: Yes.   Use of blood products discussed with Patient.  Consented to blood products.  .                            .

## 2017-01-16 NOTE — PROGRESS NOTES
CLINICAL NUTRITION SERVICES - REASSESSMENT NOTE      Future/Additional Recommendations: Ensure and MVI/M with diet adv.        EVALUATION OF PROGRESS TOWARD GOALS   -Enteral Nutrition intake - Initiation/advancement and tolerance  -Procedures - Extubation (stooling, biochemical review, weight trends)  Update/Evaluation: OGT pulled with extubation .  Diet advanced to fulls  with further advancement to 2 gm Na  per MD.  With diet advancement, patient consuming 50-75% of meals.  No diet order currently, assuming NPO in anticipation of repeat bronchoscopy today.  Patient seeming somewhat lethargic this AM.  Per meal ordering system review, and based on 75% consumption, patient able to meet 99% of energy needs and 85% of protein needs yesterday via oral intakes of 3 meals.       Dosing Weight: 67 kg (adjusted weight)    ASSESSED NUTRITION NEEDS:  Estimated Energy Needs: 3695-6809 kcals (25-30 Kcal/Kg)  Justification: maintenance  Estimated Protein Needs:  grams protein (1.2-2 g pro/Kg)  Justification: maintenance  Estimated Fluid Needs: 1417-5840  mL (1 mL/Kcal)  Justification: maintenance      NEW FINDINGS:   - Medications and labs reviewed.  - Wt trendin# wt gain x 9 days since admission:  Filed Vitals:    17 1810 01/10/17 0530 17 0400 17 0600   Weight: 77.111 kg (170 lb) 85.1 kg (187 lb 9.8 oz) 84.4 kg (186 lb 1.1 oz) 84 kg (185 lb 3 oz)    17 0630   Weight: 86 kg (189 lb 9.5 oz)   - BM this AM (on Lactulose).    Previous Goals:   EN to advance to goal rate in the next 24-48 hours   Evaluation: Not met as OG pulled with extubation.    Previous Nutrition Diagnosis:   Inadequate protein-energy intake related to inability for PO secondary to intubation/sedation as evidenced by current intake (propofol) providing 8% of estimated energy needs and 0% of estimated protein needs.   Evaluation: Improving, changed below    CURRENT NUTRITION DIAGNOSIS  Inadequate oral intake  related to ethargy with recent diet advancement as evidenced by meal ordering system indicating patient met <100% needs yesterday via oral intakes of 3 meals yesterday + NPO x <1 day.      INTERVENTIONS  Recommendations / Nutrition Prescription  Diet advancement back to 2 gm Na per MD.     Addition of Ensure supplement (vanilla) between meals with diet advancement.     Addition of daily MVI/M when diet advanced.     Implementation  Collaboration and Referral of Nutrition care: Discussed POC with team during rounds and reasoning for NPO with RN.     Nutrition Education: Discussed addition of Ensure with advancement, patient agreeable.    Goals  Diet advancement back to 2 gm Na w/in 24 hrs.  With diet advancement, patient able to meet at least 75% needs orally.      MONITORING AND EVALUATION:  Diet Order, Food intake and Liquid meal replacement or supplement - Monitor adequacy.       Gabrielle Israel RD, LD  Clinical Dietitian  3rd floor/ICU: [phone]  All other floors: [phone]  Weekend/holiday: [phone]

## 2017-01-17 NOTE — PROGRESS NOTES
01/17/17 1349   Quick Adds   Type of Visit Initial Occupational Therapy Evaluation   Living Environment   Lives With friend(s)   Living Arrangements apartment   Home Accessibility no concerns   Number of Stairs to Enter Home 0   Number of Stairs Within Home 0   Transportation Available family or friend will provide;public transportation   Living Environment Comment Pt can use elevator to access 3rd level. Friend assist with driving. Pts friend works- will get home in afternoon. Pt goes to Methadone clinic 6x week- uses cab.    Self-Care   Dominant Hand right   Usual Activity Tolerance good   Regular Exercise no   Equipment Currently Used at Home none   Activity/Exercise/Self-Care Comment Pt does not work, on disability.   Functional Level Prior   Ambulation 0-->independent   Transferring 0-->independent   Toileting 0-->independent   Bathing 0-->independent   Dressing 0-->independent   Eating 0-->independent   Communication 0-->understands/communicates without difficulty   Swallowing 0-->swallows foods/liquids without difficulty   Cognition 0 - no cognition issues reported   Fall history within last six months no   Prior Functional Level Comment Pt I with ADLs, A with cooking, shared cleaning, A transportation for shopping, I with laundry, I medications/finances. Pt has standard toilets with vanity, tub/shower, no grab bars.    General Information   Onset of Illness/Injury or Date of Surgery - Date 01/17/17   Referring Physician Sofia   Patient/Family Goals Statement to d/c home   Additional Occupational Profile Info/Pertinent History of Current Problem Sabina Figueroa is a 41 year old female admitted on 1/7/2017, 1.  Recurrent acute hypoxic respiratory failure- She was intubated on1/10 and extubated 1/13. She had two episodes, one in June, the other one month ago. She is improving, off oxygen. No evidence of vasculitis. chronic pain. 2.  Consolidation RML, RUL, lingula  Patchy infiltrate FRANCESCO, Likely bilateral  Pneumonia vs pulmonary congestion, no clear cut cause identified yet. She had Bronchoscopy on 01/10, which showed alveolar hemorrhage with follow up broncoscopy 1/16.    Precautions/Limitations oxygen therapy device and L/min  (Pt on 2L)   General Observations Pt agreeable to OT.    General Info Comments Pt has been up with nursing. On 2L 02 at this time, does not wear at home.    Cognitive Status Examination   Orientation orientation to person, place and time   Level of Consciousness alert   Able to Follow Commands WNL/WFL   Personal Safety (Cognitive) WNL/WFL   Cognitive Comment Per medical notes, had episode of AMS- will continue to monitor.    Visual Perception   Visual Perception Wears glasses   Visual Perception Comments Reading glasses   Sensory Examination   Sensory Comments Pt reports no change in sensation   Pain Assessment   Patient Currently in Pain No   Integumentary/Edema   Integumentary/Edema Comments Pt reports B LE are swollen, no pitting noted.    Range of Motion (ROM)   ROM Quick Adds No deficits were identified   Strength   Strength Comments General weakness noted, 4/5 B    Hand Strength   Hand Strength Comments Good B   Mobility   Bed Mobility Comments I bed mobility   Transfer Skill: Bed to Chair/Chair to Bed   Level of [place]: Bed to Chair minimum assist (75% patients effort)   Physical Assist/Nonphysical Assist: Bed to Chair supervision   Assistive Device - Transfer Skill Bed to Chair Chair to Bed Rehab Eval (hand held assist)   Transfer Skill: Sit to Stand   Level of [place]: Sit/Stand contact guard   Physical Assist/Nonphysical Assist: Sit/Stand supervision;verbal cues   Assistive Device for Transfer: Sit/Stand standard walker   Transfer Skill: Toilet Transfer   Level of [place]: Toilet stand-by assist   Physical Assist/Nonphysical Assist: Toilet supervision   Assistive Device standard walker   Lower Body Dressing   Level of [place]: Dress Lower Body independent  "  Toileting   Level of [place]: Toilet independent   Grooming   Level of [place]: Grooming stand-by assist   Eating/Self Feeding   Level of [place]: Eating independent   Activities of Daily Living Analysis   Impairments Contributing to Impaired Activities of Daily Living balance impaired;strength decreased  (Activity tolerance)   General Therapy Interventions   Planned Therapy Interventions ADL retraining;IADL retraining;strengthening;transfer training;home program guidelines;progressive activity/exercise   Clinical Impression   Criteria for Skilled Therapeutic Interventions Met yes, treatment indicated   OT Diagnosis Impaired ADLS/IADLS and functional mobility   Influenced by the following impairments Weakness, impaired activity tolerance, 02 use   Assessment of Occupational Performance 1-3 Performance Deficits   Identified Performance Deficits Activity tolerance and strength for ADLS/IADLS   Clinical Decision Making (Complexity) Low complexity   Therapy Frequency daily   Predicted Duration of Therapy Intervention (days/wks) 3-5 days   Anticipated Discharge Disposition Home with Assist;Home with Home Therapy;Transitional Care Facility  (pending progress)   Risks and Benefits of Treatment have been explained. Yes   Patient, Family & other staff in agreement with plan of care Yes   [facility] Social DJ TM \"6 Clicks\"   2016, Trustees of [facility], under license to [employer].  All rights reserved.   6 Clicks Short Forms Daily Activity Inpatient Short Form   [facility] Damage [employer]  \"6 Clicks\" Daily Activity Inpatient Short Form   1. Putting on and taking off regular lower body clothing? 4 - None   2. Bathing (including washing, rinsing, drying)? 3 - A Little   3. Toileting, which includes using toilet, bedpan or urinal? 3 - A Little   4. Putting on and taking off regular upper body clothing? 4 - None   5. Taking care of personal grooming such as brushing teeth? 4 - None   6. Eating meals? " 4 - None   Daily Activity Raw Score (Score out of 24.Lower scores equate to lower levels of function) 22   Total Evaluation Time   Total Evaluation Time (Minutes) 8

## 2017-01-17 NOTE — PLAN OF CARE
Problem: Goal Outcome Summary  Goal: Goal Outcome Summary  Outcome: Improving  PT/OT consult today, up with assist x1, continues to require oxygen to maintain level > 92% currently on 2L, LS decreased with crackles in bases, ibuprofen for abd cramps currently has menses, michael escoriated antifungal cream applied, alert and oriented,

## 2017-01-17 NOTE — PLAN OF CARE
Problem: Goal Outcome Summary  Goal: Goal Outcome Summary  OT: Orders received, eval completed. Pt lives with friend in apt, can take elevator to 3rd floor. Pt I with ADLs, no AD at baseline, share IADLS, A with transportation. Pt admitted 1/7 for recurrent hypoxia, intubated 1/10 and extubated 1/13. Pt now on 2L at eval- does not wear at baseline.     Currently, Pt I with bed mobility supine<>sit, functional mobility with MIN A initally- provided with FWW SBA. Pt on 2L 02, 96-98% at baseline, functional mobility in room and in narvaez 50-75ft 02 93%. Mobility to bathroom for toileting without 02, pt dropped to 88%. Pt alert and oriented, don/doff socks with increased time and effort with KOROMA. Pt able to stand at sink to complete g/h tasks with SBA for balance, toileting transfer SBA with FWW, task with I. Pt able to don robe I. Pt oriented, wanting to d/c home with A from roommate/friend. Pt will be home alone during the day. Pt reports weakness and fatigue with activity. No pain outside of chronic back pain reported. Educated on POC and role of OT. Bed alarm on at end of session.     Pending progress and level of assist at home, anticipate d/c home with A from friend/roommate and home OT.

## 2017-01-17 NOTE — PROGRESS NOTES
"[facility]  Hospitalist Progress Note  Jonathan Black MD 01/17/2017    Reason for Stay (Diagnosis): Recurrent Acute hypoxic respiratory failure.         Assessment and Plan:      Summary of Stay: Sabina Figueroa is a 41 year old female admitted on 1/7/2017     Problem List:   1. Recurrent acute hypoxic respiratory failure- She was intubated on1/10 and extubated 1/13. She had two episodes, one in June, the other one month ago. She is improving, off oxygen. No evidence of vasculitis.    2. Consolidation RML, RUL, lingula  Patchy infiltrate FRANCESCO, Likely bilateral Pneumonia vs pulmonary congestion, no clear cut cause identified yet. She had Bronchoscopy on 01/10, which showed alveolar hemorrhage. Had repeat bronchoscopy on 01/16. She completed one week of Levaquin. She was treated with high dose pulsed steroids for 3 days.    3. Alcohol abuse, cirrhosis and hepatic encephalopathy in the past- Stable at this time.    4. Chronic pain- Tolerable. She was on Suboxone PTA.    5.  Bilateral Inguinal folds excoriation and hyperemia- Keep it air dry and decrease pressure to the area. Doubt infection.    DVT prophylaxis- Lovenox.  Full code.  Disposition- Home with home OT/PT in 1-2 days        Interval History (Subjective):      Patient seen and examined, no new issues, feels better, c/o sore in her groin. No new issues, feels about the same. Pain better, no nausea or vomiting.                  Physical Exam:      Last Vital Signs:  /53 mmHg  Pulse 54  Temp(Src) 98.8  F (37.1  C) (Oral)  Resp 14  Ht 1.715 m (5' 7.5\")  Wt 79.47 kg (175 lb 3.2 oz)  BMI 27.02 kg/m2  SpO2 97%    Constitutional: Awake, alert, cooperative, comfortable after extubation   Respiratory: Good breath sounds bilat no wheezing or rhonchi  Nasal O2 3 LPM   Cardiovascular: Regular rate and rhythm, normal S1 and S2, No murmur is present   Abdomen: Normal bowel sounds, soft, non-distended, non-tender   Skin: No rashes, no " cyanosis, dry to touch   Neuro: Alert and oriented x3, no focal weakness, numbness, memory loss   Extremities: No edema, normal range of motion   Other(s): Conversant and interactive  C/o sore throat       All other systems: Negative          Medications:      All current medications were reviewed with changes reflected in problem list.         Data:      All new lab and imaging data was reviewed.     Recent Labs  Lab 01/16/17  0703 01/13/17  0625 01/13/17  0508   WBC 7.7 4.3 4.2   HGB 12.0 11.6* 11.3*   HCT 36.4 36.3 35.8   MCV 90 94 94   * 111* 107*       Recent Labs  Lab 01/16/17  0703 01/15/17  1045 01/15/17  0125 01/14/17  0545 01/13/17  0625     --   --  140 152*   POTASSIUM 4.0 3.6 3.3* 3.3* 3.7   CHLORIDE 105  --   --  106 115*   CO2 29  --   --  29 29   ANIONGAP 6  --   --  5 8   *  --   --  116* 132*   BUN 13  --   --  22 25   CR 0.73  --   --  0.89 0.97   GFRESTIMATED 88  --   --  69 63   GFRESTBLACK >90African American GFR Calc  --   --  84 76   VISHNU 8.2*  --   --  8.1* 8.4*

## 2017-01-17 NOTE — PLAN OF CARE
Problem: Goal Outcome Summary  Goal: Goal Outcome Summary  Outcome: No Change  Pt tooerating diet, no chest pain or discomfort since broncoscopy performed. Up with 1 assist and gait belt. Watters patent, has menses. Groin/thigh rash not looking improved with present ointment. Blistered and raw, excoriated. MD should evaluate in am if change in treatment needed. Watters patent and Pt has menses.

## 2017-01-17 NOTE — PROGRESS NOTES
"[facility]     Date: 1/17/2017    Admission Dx: AMD, ARF    Pulmonary History: COPD    Home Nebulizer/MDI Use: Alb prn    Home Oxygen: N/A    Acuity Level (RCAT flow sheet): 3    Aerosol Therapy initiated: Duoneb QID    Pulmonary Hygiene initiated:Cough and DB    Volume Expansion initiated: Continue IS    Current Oxygen Requirements: 2 lpm    Current SpO2: 96%    Re-evaluation date: 1/20/17    Patient Education: Bronchodilator dosage and affect    See \"RT Assessments\" flow sheet for patient assessment scoring and Acuity Level Details.                  "

## 2017-01-17 NOTE — PLAN OF CARE
Problem: Goal Outcome Summary  Goal: Goal Outcome Summary  Outcome: No Change  Pt slept well during the night. VSS-soft bp. On 2L O2.  AO lethargic/tired. Pt denies any pain besides mild cramps and has heating pad. Pt on menses. Has reddened groin. Pt transfers with ax1 and gait belt. Has patent callaway d/t retention. Tolerating a 2gm Na diet. Has nicotine patch on R arm. Will continue to monitor.

## 2017-01-18 NOTE — PLAN OF CARE
Problem: Goal Outcome Summary  Goal: Goal Outcome Summary  VSS, pt up with SBA, A&Ox4, tolerating low na diet, S/L, LS clear, 2 LPM, NPC, dyspneic with activity, callaway in place with good output, currently has menses, BS A&Ax4, passing gas, michael area excoriated, will continue to monitor and provide supportive cares.

## 2017-01-18 NOTE — CONSULTS
Care Transition Initial Assessment - RN    Reason For Consult: discharge planning   Met with: Patient.    DATA   Active Problems:    Respiratory failure (H)       Cognitive Status: awake, alert and oriented.  [facility]  Primary Care MD Name: Aidee Hemphill  Contact information and PCP information verified: Yes    Lives With: friend(s) in an apt  Living Arrangements: apartment  Quality Of Family Relationships: supportive                Insurance concerns: No Insurance issues identified She has transport benefit for discharge and medical appointments    ASSESSMENT  Patient currently receives the following services:  NONE.  Home care is recommended at time of discharge.  However, pt is declining this.  She is also saying she is dizzy.  PT is scheduled at 1500.  PT Gómez paged and made aware of situation.  She declines walker for balance support.   MD notified that pt is declining hc, but he will keep it in orders in case she changes her mind.         Identified issues/concerns regarding health management: Declining resources for support.  On call Sw will available if complications occur during dc planning for today.      PLAN  Patient anticipates discharging to apt with friend.  She said she did call her suboxone clinic and is scheduled to see them tomorrow.  She was surprised about her discharge date.  Pt given resources, but declines support.  .        Patient anticipates needs for home equipment: No, declines walker    Plan/Disposition: Home   Appointments: She made her own suboxone clinic appt for tomorrow.  She is agreeable to f/u with Dr Hemphill within 7 days after discharge.  Will give handoff to Dr Hemphill's RN CTS at time of dc since pt declining support.          Care  (CTS) will continue to follow as needed.    Estella PARMAR CTS [phone]

## 2017-01-18 NOTE — PLAN OF CARE
Problem: Goal Outcome Summary  Goal: Goal Outcome Summary  Outcome: Improving  VSS. Currently on Rm air w/ sats in low-mid 90's. Denied pain. Very lethargic at beginning of shift from PRN trazodone. LS diminished, denied SOB. Good urine output w/ callaway, potential D/C.

## 2017-01-18 NOTE — PLAN OF CARE
Problem: Goal Outcome Summary  Goal: Goal Outcome Summary    PT:  Belaal complete.  Pt currently demonstrates good functional strength and balance, mild slowed gait speed but no LOB.  Pt reporting some mild abdominal discomfort and SOB with activity, endorsing some lightheadedness.  BP stable sit > stand, 118/70 and 112/59 respectively.  SaO2 93% at rest on RA, down to 87-90% with activity, recovers to 95% quickly with cues for upright posture and PLB.  Amb x 400ft today with good tolerance, indep with transfers and mobility in room, SBA/indep amb hallway, no AD needs.  No need for skilled PT intervention at this time, can d/c home to apartment with friend when stable.

## 2017-01-18 NOTE — PLAN OF CARE
Problem: Goal Outcome Summary  Goal: Goal Outcome Summary  OT: Attempted tx session this AM. Pt with respiratory therapy, declining to work with OT at respiratory completion. Pt requesting OT attempt back later as schedule allows. Left educational handouts and HEP in room for next session.

## 2017-01-18 NOTE — PLAN OF CARE
Problem: Goal Outcome Summary  Goal: Goal Outcome Summary  Outcome: Improving  VSS, Alert and orientated x 4, transfers with SBA.  Watters catheter discontinued today.  Pt was able to void 500 ml clear yellow urine. PVR 34 ml.  Lung sounds clear bilaterally. Pt cleared for discharge today.  Care coordinator currently working with pt to plan discharge follow up and home care services.  Pt to discharge to [facility] home.

## 2017-01-18 NOTE — PROGRESS NOTES
01/18/17 1507   Quick Adds   Type of Visit Initial PT Evaluation   Living Environment   Lives With friend(s)   Living Arrangements apartment   Home Accessibility no concerns   Number of Stairs to Enter Home 0   Number of Stairs Within Home 0   Living Environment Comment elevator access   Self-Care   Dominant Hand right   Usual Activity Tolerance good   Current Activity Tolerance moderate   Regular Exercise no   Equipment Currently Used at Home none   Activity/Exercise/Self-Care Comment Does not work, on disability. Indep PLOF.   Functional Level Prior   Ambulation 0-->independent   Transferring 0-->independent   Toileting 0-->independent   Bathing 0-->independent   Dressing 0-->independent   Eating 0-->independent   Communication 0-->understands/communicates without difficulty   Swallowing 0-->swallows foods/liquids without difficulty   Cognition 0 - no cognition issues reported   Fall history within last six months no   Which of the above functional risks had a recent onset or change? none   Prior Functional Level Comment Indep PLOF.   General Information   Onset of Illness/Injury or Date of Surgery - Date 01/17/17   Referring Physician Jonathan Black MD   Patient/Family Goals Statement pt plans to d/c home with friend   Pertinent History of Current Problem (include personal factors and/or comorbidities that impact the POC) Sabina Figueroa is a 41 year old female admitted on 1/7/2017, 1.  Recurrent acute hypoxic respiratory failure- She was intubated on1/10 and extubated 1/13. She had two episodes, one in June, the other one month ago. She is improving, off oxygen. No evidence of vasculitis. chronic pain. 2.  Consolidation RML, RUL, lingula  Patchy infiltrate FRANCESCO, Likely bilateral Pneumonia vs pulmonary congestion, no clear cut cause identified yet. She had Bronchoscopy on 01/10, which showed alveolar hemorrhage with follow up broncoscopy 1/16.   Precautions/Limitations no known precautions/limitations  "  General Observations pt resting in room, on RA, SaO2 93%.   Cognitive Status Examination   Orientation orientation to person, place and time   Pain Assessment   Patient Currently in Pain Yes, see Vital Sign flowsheet  (mild abdominal discomfort)   Integumentary/Edema   Integumentary/Edema no deficits were identifed   Posture    Posture Forward head position   Range of Motion (ROM)   ROM Comment WFL   Strength   Strength Comments Mild proximal yuli LE weakness consistent with deconditiong from prolonged hospitalization, 5/5 distally, 4+/5 yuli hip flexion.   Bed Mobility   Bed Mobility Comments Indep   Transfer Skills   Transfer Comments Indep, steady on feet, no AD.  BP stable sit > stand.   Gait   Gait Comments Amb iwth SBA/indep, no AD.  Mild slowed gait speed, mild imbalance with dynamic head turns, but no LOB.  SOB reported with activity, SaO2 down to 87% with activity, recovers to mid-90's with cues for posture and PLB.  Amb x 400ft during eval.   Balance   Balance Comments NBOS EO and EC x 30 sec, mild slowed gait speed, overall steady, no AD needs.   Sensory Examination   Sensory Perception no deficits were identified   Clinical Impression   Criteria for Skilled Therapeutic Intervention no problems identified which require skilled intervention;evaluation only   Clinical Presentation Stable/Uncomplicated   Clinical Presentation Rationale one system involved -cardiopulm, good funtional strength and balance, not needing sig assistance   Clinical Decision Making (Complexity) Low complexity   Anticipated Equipment Needs at Discharge (none)   Anticipated Discharge Disposition Home   Risk & Benefits of therapy have been explained Yes   Patient, Family & other staff in agreement with plan of care Yes   [facility] AM-PAC  \"6 Clicks\" V.2 Basic Mobility Inpatient Short Form   1. Turning from your back to your side while in a flat bed without using bedrails? 4 - None   2. Moving from lying on your back to sitting " on the side of a flat bed without using bedrails? 4 - None   3. Moving to and from a bed to a chair (including a wheelchair)? 4 - None   4. Standing up from a chair using your arms (e.g., wheelchair, or bedside chair)? 4 - None   5. To walk in hospital room? 4 - None   6. Climbing 3-5 steps with a railing? 3 - A Little   Basic Mobility Raw Score (Score out of 24.Lower scores equate to lower levels of function) 23   Total Evaluation Time   Total Evaluation Time (Minutes) 20

## 2017-01-19 NOTE — PLAN OF CARE
Problem: Goal Outcome Summary  Goal: Goal Outcome Summary  Occupational Therapy Discharge Summary    Reason for therapy discharge:    Discharged to home with home therapy.    Progress towards therapy goal(s). See goals on Care Plan in [facility] electronic health record for goal details.  Goals partially met.  Barriers to achieving goals:   discharge from facility.    Therapy recommendation(s):    Continued therapy is recommended.  Rationale/Recommendations:  Home OT. .

## 2017-01-19 NOTE — DISCHARGE SUMMARY
DATE OF ADMISSION:  1/7/2017.        DATE OF DISCHARGE:  1/18/2017.       PRIMARY CARE PHYSICIAN:  Aidee Hemphill MD.      DISCHARGE DIAGNOSES:   1.  Recurrent acute hypoxic respiratory failure.   2.  Bilateral infiltrate of the lung/ bilateral pneumonia.   3.  COPD (chronic obstructive pulmonary disease) exacerbation.   4.  Alcohol dependence.   5.  Cirrhosis with hepatic encephalopathy.   6.  Chronic pain syndrome.   7.  Bilateral inguinal folds excoriation.   8.  Deconditioning.   9.  Alcohol withdrawal.      DISPOSITION:  Home.      DISCHARGE MEDICATIONS:  Reviewed and reconciled as in electronic medical record.  There is no new medication except Nicoderm.      DISCHARGE CONDITION:  Fair.      DISCHARGE VITAL SIGNS:  Blood pressure 112/62, pulse rate 55, temperature 97.6, oxygen saturation 96%.      DISCHARGE PHYSICAL EXAMINATION:   GENERAL:  Awake, alert not in distress.   HEENT:  Pink nonicteric, extraocular muscle movement intact.   NECK:  Supple, no JVD, no thyromegaly.   CHEST:  Good air entry bilaterally, scattered wheezing and decreased breath sounds basal noted.   EXTREMITIES:  No edema, cyanosis or clubbing.   NEURO:  No focal neurologic deficits, cranial nerves grossly intact.   SKIN:  There is significant excoriation and hyperemia on the inguinal fold on both sides.   PSYCHIATRIC:  Normal mood and affect, keeps eye contact.        PROCEDURE DONE:  During this admission includes 2 bronchoscopies done by intensivist.      CONSULTATIONS:  Intensivist, cardiologist, Pain and Palliative and nutrition.      DISCHARGE LABORATORY:  Reviewed.  Pending surgical pathology from the bronchoscopy, fungus culture and AFB culture.      DISCHARGE DIET:  As tolerated, 2 gram sodium.      DISCHARGE ACTIVITY:  As tolerated with fall precaution.      DISCHARGE FOLLOWUP:  With primary care provider in 1 week, with pulmonologist as instructed.      BRIEF HISTORY AND HOSPITAL COURSE:  Sabina Figueroa is a 41-year female  who was admitted to the hospital on 01/07/2017.  She has history of COPD and is an active smoker, has a history of chronic pain on Suboxone presented to the hospital with some confusion and cough and shortness of breath.  The patient was evaluated in the emergency room and admitted with recurrent hypoxic respiratory failure with fever and infiltrate on the chest x-ray, infection was considered, treated with IV antibiotics and steroids and workup progressed including bronchoscopy done.  Broad-spectrum antibiotic used, she was on vancomycin, Levaquin and Zosyn.  The patient's respiratory status deteriorated, she was intubated on 1/10/2017 and extubated on 01/13/2017.  The patient had had a similar respiratory failure, one in 06/2016 and one about a month ago.  After extubation she showed improvement, she had a consolidation in right middle, right upper lobe and lingula and patchy infiltrate in left lower lobe, bilateral pneumonia suspected and treated accordingly.  She had bronchoscopy first one done on 01/10 which showed alveolar hemorrhage and repeat bronchoscopy on 01/16 done did not show any significant pathology in grossly, biopsy was done and the result is pending.  The patient was also treated on high dose pulsed steroids during intubation.  The patient has a history of hepatic encephalopathy, ammonia level was near normal  this time, she was on lactulose and rifaximin and also she was not basically encephalopathic.  The patient remained stable improved slowly, weaned her off oxygen.  She is off oxygen for the last 24 hours, Watters catheter was removed earlier, she was able to void and she is being discharged home in stable condition.  All her questions and concerns were addressed, showed understanding.  The patient needs home health care and home occupational therapy face-to-face and order was placed but patient declined the home health care.  The patient is at increased risk of readmission given her underlying  condition unless she has followup and has evaluation of her respiratory status at home.  The patient lives with roommates/friend.      Total time spent coordinating her discharge face-to-face with the patient was over 40 minutes.         CAROLE ORR MD             D: 2017 16:09   T: 2017 17:59   MT: [id number]      Name:     SYDNI GLEZ   MRN:      [id number]        Account:        [id number]   :      1975           Admit Date:                                       Discharge Date: 2017      Document: [id number]       cc: DALE RUSSELL MD

## 2017-01-19 NOTE — PROGRESS NOTES
Writer went over D/C paperwork with pt including medications and administration times as well as follow up appointments. Pt sent with filled prescription of nicotine patches. No further questions/concerns at times of D/C and pt requested no further teaching/education. Pt D/C at 1700 with taxi for transportation.

## 2017-01-19 NOTE — TELEPHONE ENCOUNTER
MTM referral from: Transitions of Care (recent hospital discharge or ED visit)    MTM referral outreach attempt #1 on January 19, 2017 at 10:57 AM      Outcome: Left Message    Josefina Mendez MTM Coordinator Intern

## 2017-01-19 NOTE — PROGRESS NOTES
Transition Communication Hand-off for Care Transitions to Next Level of Care Provider    Name: Sabina Figueroa  MRN #: [id number]  Primary Care Provider: Aidee Hemphill  Primary Care MD Name: Aidee Hemphill  Primary Clinic: [facility] [address]  [place] [address]  Primary Care Clinic Name: [facility]  Reason for Hospitalization:  Acute respiratory failure with hypoxia (H) [J96.01]  Hypotension, unspecified hypotension type [I95.9]  Altered mental status, unspecified altered mental status type [R41.82]  Pneumonia of both lungs due to infectious organism, unspecified part of lung [J18.9]  Admit Date/Time: 1/7/2017  5:57 PM  Discharge Date: 1/18/17  Payor Source: Payor: MEDICA / Plan: MEDICA [place] / Product Type: HMO /         Plan of Care Goals/Milestone Events:   Patient Concerns: She still feels dizzy at time of dc, but decline Home care PT            Reason for Communication Hand-off Referral: Admission diagnoses: PN  Admission diagnoses: COPD  Difficulty understanding plan of care  No support or lacking a support system  Non-adherence to plan of care related to:  Other declines home care support PT/OT    Discharge Plan:  Discharge Plan:       Most Recent Value    Concerns To Be Addressed compliance issue concerns, medication concerns           Concern for non-adherence with plan of care:   YES Mental Health, Substance abuse-on suboxone  Discharge Needs Assessment:  Needs       Most Recent Value    Equipment Currently Used at Home none    Transportation Available family or friend will provide, public transportation    # of Referrals Placed by [facility] External Care Coordination, Homecare          Already enrolled in Tele-monitoring program and name of program:  NO  Follow-up specialty is recommended: Yes    Follow-up plan:  Future Appointments  Date Time Provider Department Center   2/23/2017 1:30 PM  PFL [facility]   2/23/2017 4:00 PM Jennifer Mane MD [facility]       Any outstanding tests  or procedures:        Referrals     Future Labs/Procedures    Home care nursing referral     Comments:    RN skilled nursing visit. RN to assess respiratory and cardiac status, patients ability to take and record daily blood pressure, temp and weight, hydration, nutrition and bowel status and home safety.  RN to teach medication management.    Your provider has ordered home care nursing services. If you have not been contacted within 2 days of your discharge please call the inpatient department phone number at [phone] .    Home Care OT Referral for Hospital Discharge     Comments:    OT to eval and treat    Your provider has ordered home care - occupational therapy. If you have not been contacted within 2 days of your discharge please call the department phone number listed on the top of this document.    Home Care PT Referral for Hospital Discharge     Comments:    PT to eval and treat    Your provider has ordered home care - physical therapy. If you have not been contacted within 2 days of your discharge please call the department phone number listed on the top of this document.            Key Recommendations:  PNA/COPD dx, needs specialty f/u see appts, hx of ETOH/mental health, substance abuse. Declined support at time of dc.     Sandra Walls [phone]  Sent via [facility] to Dr Hemphill's RN CTS     AVS/Discharge Summary is the source of truth; this is a helpful guide for improved communication of patient story

## 2017-01-20 NOTE — TELEPHONE ENCOUNTER
MTM referral from: Transitions of Care (recent hospital discharge or ED visit)    MTM referral outreach attempt #2 on January 20, 2017 at 1:25 PM      Outcome: Patient is not interested at this time because they are not a [place] patient, will route to MTM Pharmacist/Provider as an FYI. Thank you for the referral.     Carmen Dejesus MTM Coordinator

## 2017-01-30 NOTE — TELEPHONE ENCOUNTER
I called Sabina today to review the pathology results. I had previously called when initially available on 1/24, got voicemail then. Again today, I got voicemail. I indicated results were back, nothing to worry too much about, especially if breathing is okay. I encouraged her to call clinic nurses to discuss if having breathing symptoms so we can intervene before she might need to be in the hospital again.

## 2017-02-16 PROBLEM — J18.9 PNEUMONIA: Status: ACTIVE | Noted: 2017-01-01

## 2017-02-16 NOTE — PROGRESS NOTES
RT- patient placed on Bipap 14/6 with rate at 12 and FIO2 40%. Patient wearing a medium full face mask. Will continue to monitor patient.    Oksana Lawton, RT  2/16/2017 12:47 PM

## 2017-02-16 NOTE — PHARMACY-VANCOMYCIN DOSING SERVICE
Pharmacy Vancomycin Initial Note  Date of Service 2017  Patient's  1975  41 year old, female    Indication: [facility] Pneumonia    Current estimated CrCl = Estimated Creatinine Clearance: 80.7 mL/min (based on Cr of 0.96).    Creatinine for last 3 days  2017:  9:43 AM Creatinine 0.96 mg/dL    Recent Vancomycin Level(s) for last 3 days  No results found for requested labs within last 72 hours.      Vancomycin IV Administrations (past 72 hours)      No vancomycin orders with administrations in past 72 hours.                Nephrotoxins and other renal medications (Future)    Start     Dose/Rate Route Frequency Ordered Stop    17 1700  piperacillin-tazobactam (ZOSYN) 4.5 g vial to attach to  mL bag      4.5 g  over 1 Hours Intravenous EVERY 6 HOURS 17 1552      17 1600  vancomycin (VANCOCIN) 1000 mg in dextrose 5% 200 mL PREMIX      1,000 mg Intravenous EVERY 8 HOURS 17 1557            Contrast Orders - past 72 hours     None                Plan:  1.  Start vancomycin  1000 mg IV q8h.   2.  Goal Trough Level: 15-20 mg/L   3.  Pharmacy will check trough levels as appropriate in 1-3 Days.    4. Serum creatinine levels will be ordered daily for the first week of therapy and at least twice weekly for subsequent weeks.    5. [place] method utilized to dose vancomycin therapy: Method 1    Keiry Elliott

## 2017-02-16 NOTE — PROGRESS NOTES
RT- arterial blood gas drawn from right radial artery with patient on 10 LPM oxymask.    Oksana Lawton, RT  2/16/2017 11:00 AM

## 2017-02-16 NOTE — ED NOTES
Patient presents complaining of shortness of breath that started last evening and was not responding to nebulizer treatments at home. She arrives tachypniec and dyspniec, complaining of headache, alert and oriented, oxygen saturations 55% on room air. Oxygen applied via oximask at 15 liters and saturations improved to 93%. Airway and breathing intact at this time.

## 2017-02-16 NOTE — ED PROVIDER NOTES
History     Chief Complaint:  Shortness of breath    HPI   Sabina Figueroa is a 41 year old female smoker with a significant history of alcoholic liver cirrhosis and asthma/COPD who presents for evaluation of shortness of breath. The patient states that she was feeling normal prior to the development of these symptoms last night and has been getting progressively worse. She complains of shortness of breath and pain with deep inspiration. She tried a duoneb treatment at home this morning, but this did not alleviate her symptoms. She admits to a vague history of fever and chills. She does not use supplemental oxygen at home.     The patient was recently admitted here on 17 for a similar presentation and had an extended stay until 17. During her admission, she received breathing treatments, IV steroids and IV antibiotics for a bilateral pneumonia. She also underwent pulmonary biopsy via bronchoscopy. She did require intubation during that admission.    Cardiac Risk Factors   Sex: Female   Tobacco: Negative   Hypertension: Negative  Diabetes: Negative  Hyperlipidemia: Negative  Family History: Negative    PE/DVT Risk Factors   Personal History: Negative  Recent Travel: Negative   Recent Surgery/Hospitalization: Negative  Tobacco: Negative  Family History: Negative  Hormone Use: Negative   Cancer: Negative  Trauma: Negative      Allergies:  No known drug allergies.     Medications:    Nicoderm  Vitamin C  Prozac  Lasix  Neurontin  Atarax  Chronulac  Prilosec  Aldactone  Thiamine  Desyrel  Fish oil  Robaxin  Aspirin  Lipitor  Xifaxan  Irton  Albuterol  Spiriva  Remeron  Mylanta  Folvite  Multivitamin     Past Medical History:    Alcoholic live cirrhosis  Alcohol abuse  Anxiety  Arthritis  Asthma  COPD  Depression  Hypertension    Past Surgical History:    Knee surgery x 3      Family History:    Psychiatric disorder (mother, father, brother)    Social History:  Marital Status: single  Presents to the  ED alone  Tobacco Use: yes  Alcohol Use: yes  PCP: Aidee Hemphill     Review of Systems   Constitutional: Positive for chills and fever.   Respiratory: Positive for chest tightness and shortness of breath.    Cardiovascular: Positive for chest pain.   All other systems reviewed and are negative.      Physical Exam   First Vitals:  BP: 104/67 mmHg  Heart Rate: 120   Resp: 24  SpO2: 55% RA  Temp: 98.7  F (oral)       Physical Exam    Vital signs and nursing notes reviewed.     Constitutional: laying on gurney appears with nasal cannula in place, mild distress noted  HENT: Oropharynx is clear and moist  Eyes: Conjunctivae are normal bilaterally. Pupils equal  Neck: normal range of motion  Cardiovascular: Normal rate, regular rhythm, normal heart sounds.   Pulmonary/Chest: Mild crackles at the bases. Prolonged expiratory phase. No significant wheezing or rhonchi noted. Tachypnea. Able to speak in full sentences.   Abdominal: Soft. Bowel sounds are normal. No tenderness to palpation. No rebound or guarding.   Musculoskeletal: No joint swelling or edema.  No calf tenderness or leg swelling  Neurological: Alert and oriented. No focal weakness.  Drowsy appearance  Skin: Skin is warm and dry. No rash noted.   Psych: normal affect      Emergency Department Course   ECG:  @ 0936  Indication: Shortness of breath  Vent. Rate 105 bpm. AZ interval 136 ms. QRS duration 76 ms. QT/QTc 358/473 ms. P-R-T axis 50 -5 22.   Sinus tachycardia. Otherwise normal ECG.    Read @ 0937 by Dr. Danielle.    Imaging:    Portable Chest XR  Patchy mixed interstitial and airspace opacities over both  lungs, most likely representing pulmonary edema, but atypical  infection could have a similar appearance. No significant pleural  effusion. No pneumothorax.  Report per radiology.    Radiographic findings were communicated with the patient who voiced understanding of the findings.    Laboratory:  CBC:  WBC 12.8 (H), HGB 13.7,  (L), otherwise  WNL  BMP: Na 132 (L), Glucose 151 (H), Calcium 8.2 (L), otherwise WNL (Creatinine 0.96)  Hepatic panel: AST 58 (H), otherwise WNL    BNP: 2649 (H)    (0943) Lactic acid: 2.1 (H)  (0943) Troponin: 0.018  (0943) Alcohol ethyl: <0.01    Ammonia: 26    (0943) Blood gas venous and oxyhgb: pH 7.34 PCO2 42 PO2 31 Bicarbonate 23 O2 Sat Venous 56 Lactic acid 2.0    (0949) Blood gas venous: pH 7.34 PCO2 46 PO2 33 Bicarbonate 25 Oxyhemoglobin 60 Base 0.9 RA     (1055) Blood gas arterial and oxyhgb: pH 7.34 (L) PCO2 47 (H) PO2 81 Bicarbonate 25 Oxyhemoglobin 90 (L) Base 0.5  10 liters/minute.         Blood culture x 2: pending    Drug abuse screen: negative    UA: Clear straw-colored urine, few bacteria, mucous present, otherwise WNL    Interventions:  (0951) Albuterol-Ipratropium inhalation solution, 2.5 mg-0.5 mg/3 ml; 6 mL, inhalation  (0957) Solumedrol, 125 mg, IV  (1046) Nicoderm CQ, 14 mg/24 hr, transdermal  (1047) Lasix, 40 mg, IV  (1108) Zosyn, 3.375 g, IV    Emergency Department Course:  Nursing notes and vitals reviewed.  I performed an exam of the patient as documented above.     A peripheral IV was established. Blood was drawn from the patient. This was sent for laboratory testing, findings above. Urine sample was obtained and sent for laboratory analysis, findings above. EKG was done, interpretation as above.    The patient was sent for a chest x-ray while in the emergency department, findings above.     (1150) I consulted with Dr. Ashby of the hospitalist service regarding the patient.    Findings and plan explained to the patient who consents to admission.   (1205) I discussed the patient with Dr. Da Silva of the hospitalist service, who will admit the patient to a medical ICU bed for further monitoring, evaluation, and treatment.    (1230) The patient was unable to void her bladder. Bladder scan shows >1,000 cc's of urine. Watters catheter placed with 1,450 cc's of urine.      Impression & Plan      Medical  Decision Making:  Sabina Figueroa is a 41 year old female who presents with shortness of breath. On presentation , the patient was hypoxic, which improved with duoneb therapy and oxygen supplementation. Chest x-ray shows bilateral lower lobe infiltrates versus pulmonary edema. Based on her recent recurrent similar presentations and hospital admissionsprior history, without definable findings on her recent echo to suggest CHF,  I started her on zosyn for potential recurrent infection.  I also gave her a dose of lasix. Initially, she was having some lower blood pressure readings, but this improved with IV hydration. Her ABG's did not show significant CO2 retention at this time, but did show low oxygenation level. She also is noted to have urinary retention for unexplained reasons as she has no reported back pain or injury. Of note, the patient has had recurrent suggestions of infection over the last couple of months and once required intubation. Based on this similar presentation I felt she should be monitored more closely in the ICU. She was started on BiPAP prior to admission to the ICU to try preventing potential decline at this time. I discussed the case with Dr. Da Silva who accepted the patient and agrees with the current plan.    Critical Care time was 30 minutes for this patient excluding procedures.    Diagnosis:    ICD-10-CM   1. Acute respiratory failure with hypoxia (H) J96.01       2. Urinary retention R33.9   3. Pneumonia of both lower lobes due to infectious organism J18.9       Disposition:  Admitted to Dr. Da Silva of the hospitalist service    Ian CONWAY, am serving as a scribe on 2/16/2017 at 9:31 AM to personally document services performed by Dr. Shashi MD based on my observations and the provider's statements to me.     2/16/2017   [facility] EMERGENCY DEPARTMENT       Josemanuel Danielle MD  02/16/17 [id number]

## 2017-02-16 NOTE — IP AVS SNAPSHOT
MRN:[id number]                      After Visit Summary   2/16/2017    Sabina Figueroa    MRN: [id number]           Thank you!     Thank you for choosing [facility] for your care. Our goal is always to provide you with excellent care. Hearing back from our patients is one way we can continue to improve our services. Please take a few minutes to complete the written survey that you may receive in the mail after you visit. If you would like to speak to someone directly about your visit please contact Patient Relations at [phone]. Thank you!          Patient Information     Date Of Birth          1975        About your hospital stay     You were admitted on:  February 16, 2017 You last received care in the:  [address] [facility]    You were discharged on:  February 22, 2017       Who to Call     For medical emergencies, please call 911.  For non-urgent questions about your medical care, please call your primary care provider or clinic, [phone]          Attending Provider     Provider Specialty    Josemanuel Danielle MD Emergency Medicine    [facility]       Primary Care Provider Office Phone # Fax #    Aidee Hemphill [phone]       [facility] [address] [facility] [address]        Follow-up Appointments     Follow-up and recommended labs and tests        F/U with Dr. Mane as scheduled at [facility] pulmonary clinic tomorrow at 130 pm  F/U with primary MD in 1-2 weeks, you will need insulin taper to match your prednisone taper                  Your next 10 appointments already scheduled     Feb 23, 2017  1:30 PM CST   FULL PULMONARY FUNCTION with  PFL B   [facility] Pulmonary Function Testing ([facility])    [address]  3rd Floor  [facility] [address] [phone]            Feb 23, 2017  4:00 PM CST   (Arrive by 3:45 PM)   Return Visit with Jennifer Gamez  MD [place]   [facility] ([facility])    [address]  3rd Floor  [facility] [address] [phone]              Additional Services     Med Therapy Manage Referral       Your provider has referred you to: **[facility] Scheduling (numerous locations) [phone]   [email or web address]    Reason for Referral: HX asthma/COPD/PNA adittng diagnosis, chronuic pain --SUBOXONE     The [facility] department will contact you to schedule an appointment.  You may also schedule the appointment by calling [phone].  For [facility]   [place] patients, please call [phone] to confirm/schedule your appointment on the next business day.    This service is designed to help you get the most from your medications.  A specially trained Pharmacist will work closely with you and your providers to solve any questions, concerns, issues or problems related to your medications.    Please bring all of your prescription and non-prescription medications (such as vitamins, over-the-counter medications, and herbals) or a detailed medication list to your appointment.    If you have a glucose meter or other home monitoring information, please also bring this to your appointment (i.e. blood glucose log, blood pressure log, pain log, etc.).                  Further instructions from your care team       You have a ride scheduled through your [employer] [facility] for February 23 for your pulmonary appt.  They will arrive at 12:45 to pick you up.  The taxi company will give you a courtesy call before they arrive.  This is for a round trip ride.      Pending Results     Date and Time Order Name Status Description    2/16/2017 1725 Sputum Culture Aerobic Bacterial Preliminary             Statement of Approval     Ordered          02/22/17 1632  I have reviewed and agree with all the  "recommendations and orders detailed in this document.  EFFECTIVE NOW     Approved and electronically signed by:  Yisel Henry MD             Admission Information     Date & Time Department Dept. Phone    [address] [address] Medical Surgical [phone]      Your Vitals Were     Blood Pressure Pulse Temperature Respirations Height Weight    114/63 84 96.8  F (36  C) (Axillary) 18 1.702 m (5' 7\") 80.4 kg (177 lb 3.2 oz)    Pulse Oximetry BMI (Body Mass Index)                94% 27.75 kg/m2          [employer] Information     [employer] lets you send messages to your doctor, view your test results, renew your prescriptions, schedule appointments and more. To sign up, go to [email or web address] . Click on \"Log in\" on the left side of the screen, which will take you to the Welcome page. Then click on \"Sign up Now\" on the right side of the page.     You will be asked to enter the access code listed below, as well as some personal information. Please follow the directions to create your username and password.     Your access code is: [email or web address]  Expires: 3/7/2017  4:37 PM     Your access code will  in 90 days. If you need help or a new code, please call your [place] clinic or [phone].        Care EveryWhere ID     This is your Care EveryWhere ID. This could be used by other organizations to access your [place] medical records  [id number]           Review of your medicines      START taking        Dose / Directions    blood glucose monitoring meter device kit   Used for:  Hyperglycemia        Use to test blood sugars BID times daily or as directed.   Quantity:  1 kit   Refills:  0       insulin isophane human 100 UNIT/ML injection   Commonly known as:  HumuLIN N PEN   Used for:  Hyperglycemia        Dose:  15 Units   Inject 15 Units Subcutaneous every morning   Quantity:  10 mL   Refills:  0       metroNIDAZOLE 500 MG tablet   Commonly known as:  FLAGYL   Used for:  C. difficile colitis        " Dose:  500 mg   Take 1 tablet (500 mg) by mouth 3 times daily   Quantity:  21 tablet   Refills:  0       predniSONE 20 MG tablet   Commonly known as:  DELTASONE   Used for:  Bronchiolitis        60 mg po every am for 2 weeks, then decrease to 40 mg po qd, then per Dr. Mane   Quantity:  100 tablet   Refills:  0       sulfamethoxazole-trimethoprim 800-160 MG per tablet   Commonly known as:  BACTRIM DS/SEPTRA DS   Indication:  pjp ppx   Used for:  Bronchiolitis        Dose:  1 tablet   Take 1 tablet by mouth three times a week   Quantity:  30 tablet   Refills:  0         CONTINUE these medicines which have NOT CHANGED        Dose / Directions    * albuterol 108 (90 BASE) MCG/ACT Inhaler   Commonly known as:  PROAIR HFA/PROVENTIL HFA/VENTOLIN HFA        Dose:  2 puff   Inhale 2 puffs into the lungs every 4 hours as needed for shortness of breath / dyspnea or wheezing   Refills:  0       * albuterol (2.5 MG/3ML) 0.083% neb solution        Dose:  1 vial   Take 1 vial by nebulization every 6 hours as needed for shortness of breath / dyspnea or wheezing   Refills:  0       alum & mag hydroxide-simethicone 400-400-40 MG/5ML Susp suspension   Commonly known as:  MYLANTA ES/MAALOX  ES        Dose:  30 mL   Take 30 mLs by mouth every 4 hours as needed for indigestion   Refills:  0       ascorbic acid 500 MG tablet   Commonly known as:  VITAMIN C        Dose:  500 mg   Take 500 mg by mouth daily   Refills:  0       aspirin 81 MG EC tablet   Used for:  Elevated troponin I level        Dose:  81 mg   Take 1 tablet (81 mg) by mouth daily   Quantity:  30 tablet   Refills:  1       atorvastatin 20 MG tablet   Commonly known as:  LIPITOR   Used for:  Elevated troponin I level        Dose:  20 mg   Take 1 tablet (20 mg) by mouth daily   Quantity:  30 tablet   Refills:  1       buprenorphine-naloxone 8-2 MG Subl sublingual tablet   Commonly known as:  SUBOXONE        Dose:  3 tablet   Place 3 tablets under the tongue daily   Refills:   0       ferrous sulfate 325 (65 FE) MG tablet   Commonly known as:  IRON        Dose:  325 mg   Take 325 mg by mouth daily (with breakfast)   Refills:  0       Fish Oil 1200 MG Caps        Dose:  1 capsule   Take 1 capsule by mouth daily   Refills:  0       FLUoxetine 40 MG capsule   Commonly known as:  PROzac        Dose:  40 mg   Take 40 mg by mouth daily   Refills:  0       folic acid 1 MG tablet   Commonly known as:  FOLVITE   Used for:  Alcoholic hepatitis, Alcohol dependence (H)        Dose:  1 mg   Take 1 tablet (1 mg) by mouth daily   Quantity:  30 tablet   Refills:  0       furosemide 20 MG tablet   Commonly known as:  LASIX        Dose:  20 mg   Take 20 mg by mouth daily   Refills:  0       gabapentin 600 MG tablet   Commonly known as:  NEURONTIN        Dose:  600 mg   Take 600 mg by mouth 3 times daily   Refills:  0       hydrOXYzine 10 MG tablet   Commonly known as:  ATARAX        Dose:  10 mg   Take 10 mg by mouth every 8 hours as needed for itching   Refills:  0       lactulose 10 GM/15ML solution   Commonly known as:  CHRONULAC        Dose:  20 g   Take 20 g by mouth 3 times daily   Refills:  0       mirtazapine 30 MG tablet   Commonly known as:  REMERON   Used for:  Alcohol withdrawal, uncomplicated (H)        Dose:  30 mg   Take 1 tablet (30 mg) by mouth At Bedtime   Quantity:  30 tablet   Refills:  0       MULTIPLE VITAMINS PO   Used for:  Alcoholic hepatitis        Dose:  1 tablet   Take 1 tablet by mouth daily   Refills:  0       nicotine 21 MG/24HR 24 hr patch   Commonly known as:  NICODERM CQ   Used for:  Tobacco use disorder        Dose:  1 patch   Place 1 patch onto the skin daily   Quantity:  30 patch   Refills:  0       omeprazole 20 MG CR capsule   Commonly known as:  priLOSEC        Dose:  20 mg   Take 20 mg by mouth daily   Refills:  0       rifaximin 550 MG Tabs tablet   Commonly known as:  XIFAXAN   Used for:  Other ascites        Dose:  550 mg   Take 1 tablet (550 mg) by mouth 2  times daily   Quantity:  60 tablet   Refills:  0       ROBAXIN 500 MG tablet   Generic drug:  methocarbamol        Dose:  1000 mg   Take 1,000 mg by mouth 4 times daily   Refills:  0       spironolactone 50 MG tablet   Commonly known as:  ALDACTONE        Dose:  50 mg   Take 50 mg by mouth daily   Refills:  0       thiamine 100 MG tablet        Dose:  100 mg   Take 100 mg by mouth daily   Refills:  0       tiotropium 18 MCG capsule   Commonly known as:  SPIRIVA   Used for:  Obstructive chronic bronchitis with exacerbation (H)        Dose:  18 mcg   Inhale 1 capsule (18 mcg) into the lungs daily   Quantity:  30 capsule   Refills:  11       traZODone 100 MG tablet   Commonly known as:  DESYREL        Dose:  100 mg   Take 100 mg by mouth At Bedtime   Refills:  0       * Notice:  This list has 2 medication(s) that are the same as other medications prescribed for you. Read the directions carefully, and ask your doctor or other care provider to review them with you.         Where to get your medicines      These medications were sent to [id number] [address]  [address] [address]     Phone:  [phone]     blood glucose monitoring meter device kit    insulin isophane human 100 UNIT/ML injection    metroNIDAZOLE 500 MG tablet    predniSONE 20 MG tablet    sulfamethoxazole-trimethoprim 800-160 MG per tablet                Protect others around you: Learn how to safely use, store and throw away your medicines at www.disposemymeds.org.             Medication List: This is a list of all your medications and when to take them. Check marks below indicate your daily home schedule. Keep this list as a reference.      Medications           Morning Afternoon Evening Bedtime As Needed    * albuterol 108 (90 BASE) MCG/ACT Inhaler   Commonly known as:  PROAIR HFA/PROVENTIL HFA/VENTOLIN HFA   Inhale 2 puffs into the lungs every 4 hours as needed for shortness of breath / dyspnea or  wheezing                                * albuterol (2.5 MG/3ML) 0.083% neb solution   Take 1 vial by nebulization every 6 hours as needed for shortness of breath / dyspnea or wheezing                                alum & mag hydroxide-simethicone 400-400-40 MG/5ML Susp suspension   Commonly known as:  MYLANTA ES/MAALOX  ES   Take 30 mLs by mouth every 4 hours as needed for indigestion   Last time this was given:  30 mLs on 2/22/2017  7:51 AM                                ascorbic acid 500 MG tablet   Commonly known as:  VITAMIN C   Take 500 mg by mouth daily                                aspirin 81 MG EC tablet   Take 1 tablet (81 mg) by mouth daily   Last time this was given:  81 mg on 2/22/2017  8:20 AM                                atorvastatin 20 MG tablet   Commonly known as:  LIPITOR   Take 1 tablet (20 mg) by mouth daily   Last time this was given:  20 mg on 2/21/2017  9:29 PM                                blood glucose monitoring meter device kit   Use to test blood sugars BID times daily or as directed.                                buprenorphine-naloxone 8-2 MG Subl sublingual tablet   Commonly known as:  SUBOXONE   Place 3 tablets under the tongue daily                                ferrous sulfate 325 (65 FE) MG tablet   Commonly known as:  IRON   Take 325 mg by mouth daily (with breakfast)                                Fish Oil 1200 MG Caps   Take 1 capsule by mouth daily                                FLUoxetine 40 MG capsule   Commonly known as:  PROzac   Take 40 mg by mouth daily   Last time this was given:  40 mg on 2/22/2017  8:20 AM                                folic acid 1 MG tablet   Commonly known as:  FOLVITE   Take 1 tablet (1 mg) by mouth daily   Last time this was given:  1 mg on 2/22/2017  8:20 AM                                furosemide 20 MG tablet   Commonly known as:  LASIX   Take 20 mg by mouth daily   Last time this was given:  20 mg on 2/22/2017  8:20 AM                                 gabapentin 600 MG tablet   Commonly known as:  NEURONTIN   Take 600 mg by mouth 3 times daily   Last time this was given:  600 mg on 2/22/2017  8:20 AM                                hydrOXYzine 10 MG tablet   Commonly known as:  ATARAX   Take 10 mg by mouth every 8 hours as needed for itching                                insulin isophane human 100 UNIT/ML injection   Commonly known as:  HumuLIN N PEN   Inject 15 Units Subcutaneous every morning   Last time this was given:  15 Units on 2/22/2017  8:21 AM                                lactulose 10 GM/15ML solution   Commonly known as:  CHRONULAC   Take 20 g by mouth 3 times daily   Last time this was given:  20 g on 2/22/2017  8:20 AM                                metroNIDAZOLE 500 MG tablet   Commonly known as:  FLAGYL   Take 1 tablet (500 mg) by mouth 3 times daily   Last time this was given:  500 mg on 2/22/2017  2:07 PM                                mirtazapine 30 MG tablet   Commonly known as:  REMERON   Take 1 tablet (30 mg) by mouth At Bedtime   Last time this was given:  30 mg on 2/21/2017  9:29 PM                                MULTIPLE VITAMINS PO   Take 1 tablet by mouth daily                                nicotine 21 MG/24HR 24 hr patch   Commonly known as:  NICODERM CQ   Place 1 patch onto the skin daily                                omeprazole 20 MG CR capsule   Commonly known as:  priLOSEC   Take 20 mg by mouth daily   Last time this was given:  20 mg on 2/22/2017  8:20 AM                                predniSONE 20 MG tablet   Commonly known as:  DELTASONE   60 mg po every am for 2 weeks, then decrease to 40 mg po qd, then per Dr. Mane   Last time this was given:  80 mg on 2/22/2017  8:20 AM                                rifaximin 550 MG Tabs tablet   Commonly known as:  XIFAXAN   Take 1 tablet (550 mg) by mouth 2 times daily   Last time this was given:  550 mg on 2/22/2017  8:20 AM                                ROBAXIN 500  MG tablet   Take 1,000 mg by mouth 4 times daily   Last time this was given:  1,000 mg on 2/19/2017  8:32 PM   Generic drug:  methocarbamol                                spironolactone 50 MG tablet   Commonly known as:  ALDACTONE   Take 50 mg by mouth daily   Last time this was given:  50 mg on 2/22/2017  8:20 AM                                sulfamethoxazole-trimethoprim 800-160 MG per tablet   Commonly known as:  BACTRIM DS/SEPTRA DS   Take 1 tablet by mouth three times a week   Last time this was given:  1 tablet on 2/22/2017 12:39 PM                                thiamine 100 MG tablet   Take 100 mg by mouth daily   Last time this was given:  100 mg on 2/22/2017  8:20 AM                                tiotropium 18 MCG capsule   Commonly known as:  SPIRIVA   Inhale 1 capsule (18 mcg) into the lungs daily                                traZODone 100 MG tablet   Commonly known as:  DESYREL   Take 100 mg by mouth At Bedtime   Last time this was given:  100 mg on 2/21/2017  9:29 PM                                * Notice:  This list has 2 medication(s) that are the same as other medications prescribed for you. Read the directions carefully, and ask your doctor or other care provider to review them with you.

## 2017-02-16 NOTE — H&P
[facility]  Medical Student Admission Note  Name: Sabina Figueroa    MRN: [id number]  YOB: 1975    Age: 41 year old  Date of admission: 2/16/2017  Primary care provider: Aidee Hemphill    Chief Complaint:  Shortness of Breath    Assessment and Plan:   1. Recurrent Hypoxic Respiratory Failure 2/2 Infectious v. Other: Presentation concerning for infectious vs. Noninfectious ILD or pulmonary edema. Similar presentations warranted recent hospitalizations 12/16 and 1/17. Prior workup extensive with bronchoscopy, and lung CT (see chart). Exact etiology remains unclear. Presentation today presumed infectious with symptoms of subjective fever, chills, productive cough, infiltrate on CXR, and leukocytosis. Broad spectrum coverage for potential hospital acquired PNA given recent prior hospitalizations. Cardiology consult from prior hosp. suggests MR is not contributing to this presentation, lowering suspicion for cardiogenic pulmonary edema.    - Initiate BS Antibiotic therapy with Levofloxacin, Vancomycin, Zosyn.   - Sputum Culture and Stain  - Influenza A/B, RSV PCR  - ESR  - CRP  - Procalcitonin  - Consider Steroid therapy with poor or no response to antibiotics    2.   COPD/Asthma  - Reinitiated duoneb, albuterol, solumedrol.     3.   Chronic Pain Syndrome: Resumed on suboxone therapy.     4.   Tobacco Use Disorder: Advised to quit smoking.     5.   Liver Cirrhosis and Hepatic Encephalopathy: Ammonia normal in ED workup.   -Resume Rifamixin, lactulose, thiamine    6. Depression/Anxiety  - Resume PTA depression medications      DVT Prophylaxis: Lovenox  Code Status: Full Code  Discharge Dispo: TBD  Estimated Disch Date / # of Days until Disch: TBD    History of Present Illness:    Sabina Figueroa is a 41 year old female with multiple recent (December and January) hospitalizations for recurrent hypoxic respiratory failure and PMHx of COPD, liver cirrhosis, and chronic pain syndrone (on  suboxone) who presents with 2-3 days onset of shortness of breath. One day prior to admission she reports sinus pressure, subjective fever, chills, and productive cough. She is a difficult historian due to repeatedly falling asleep mid-sentence.     She reports sudden worsening in breathing with increased activity, as she became progressively short of breath and then developed chest pain with inspiration. Duonebs at home did not alleviate the symptoms.     She denies: chest pain radiating to her abdomen or shoulder, symptom improvement with positional change, headache, dizziness, confusion, hemoptysis, emesis, abdominal pain, diarrhea, painful urination, changes in color to urine or stool.     Reports: Sinus pressure, Chest tightness, subjective fever/chills, and productive cough with greenish-yellow sputum.     In the ED she was found to have a GCS of 15. She was given Duonebs and Solumedral, placed on lasix, and treated with 3.375g Zosyn. The patient required a callaway to void and she produced 1450 cc's of urine. ED Workup remarkable for hypoxia on RA, prolonged expiratory phase with crackles on auscultation, and tachypnea. An ECG performed was Sinus Rhythm with tachycardia. Imaging remarkable for CXR demonstrating bilateral patchy interstitial infiltrates suggestive of pulmonary edema v. Atypical infection, but without PTX or pleural effusion. Lab testing remarkable for Leukocytosis to 12.8, Hgb 13.7, thrombocytopenia (113), Hyponatremia to 132, Hyperglycemia to 151, Hypocalcemia to 8.2, and Hepatic panel with slightly elevated AST (58). BNP was elevated to 2649. She was found to have elevated lactic acid to 2.1, normal ammonia levels, and an ABG demonstrating slight respiratory acidosis with pH 7.34 and hypoxia with a PO2 of 81. Drug abuse screen was negative. UA was bland. Blood Cultures were ordered and are currently pending.     She was most recently hospitalized on 1/7/17 after presenting with confusion,  cough, and shortness of breath. At that time infectious etiology was considered and she was treated with broad-spectrum antibiotics for suspected bilateral PNA, fever, and recurrent hypoxic respiratory failure and infiltrate on CXR. She was intubated d/t deterioration for a period of 3 days and treated with high dose pulsed steroids. Bronchoscopy was performed 1/10 and a repeat on  to delineate the etiology of the recurrent respiratory failure. Pathology report remarkable for: Intra-alveolar hemorrhage, fibrin deposition, reactive changes, and areas of organizing pneumonia. Her condition did eventually improve and she was discharged on 17.      Past Medical History:  Past Medical History   Diagnosis Date     ALC (alcoholic liver cirrhosis) (H)      Alcohol abuse      Anxiety      Arthritis      Ascites      Asthma      Bunion, left foot      Chronic low back pain      Narcotic agreement signed in [facility] system     COPD (chronic obstructive pulmonary disease) (H)      Depression      Hypertension      current     Ovarian cyst, left     - Mitral Regurgitation         Past Surgical History:  Past Surgical History   Procedure Laterality Date     Knee surgery       x3      section       Esophagoscopy, gastroscopy, duodenoscopy (egd), combined  2014     Procedure: COMBINED ESOPHAGOSCOPY, GASTROSCOPY, DUODENOSCOPY (EGD);  Surgeon: Brittany Lowe MD;  Location:  GI     Esophagoscopy, gastroscopy, duodenoscopy (egd), combined N/A 2015     Procedure: COMBINED ESOPHAGOSCOPY, GASTROSCOPY, DUODENOSCOPY (EGD);  Surgeon: London Lenz MD;  Location:  GI     Esophagoscopy, gastroscopy, duodenoscopy (egd), combined N/A 2015     Procedure: COMBINED ESOPHAGOSCOPY, GASTROSCOPY, DUODENOSCOPY (EGD);  Surgeon: Barry Chavez MD;  Location:  GI     Bronchoscopy flexible N/A 1/10/2017     Procedure: BRONCHOSCOPY FLEXIBLE;  Surgeon: Jennifer Mane MD;  Location:  GI     Bronchoscopy  flexible N/A 1/16/2017     Procedure: BRONCHOSCOPY FLEXIBLE;  Surgeon: Jennifer Mane MD;  Location: [facility] OR     Social History:  Social History   Substance Use Topics     Smoking status: Current Every Day Smoker     Packs/day: 1.00     Years: 18.00     Types: Cigarettes     Smokeless tobacco: Former User     Quit date: 5/11/2016     Alcohol use 0.0 oz/week     0 Standard drinks or equivalent per week      Comment: 8 drinks or more each day, abstinent since 4/2/15. History of CD treatment in past x1     Social History     Social History Narrative    Denies illicit IV or intranasal drug use    No tattoos or piercings     Family History:  Family History   Problem Relation Age of Onset     Depression Mother      Anxiety Disorder Mother      MENTAL ILLNESS Mother      Substance Abuse Mother      Depression Father      Anxiety Disorder Father      Substance Abuse Father      MENTAL ILLNESS Father      Depression Daughter      Depression Brother      Schizophrenia Brother      Depression Brother      Anxiety Disorder Brother      Substance Abuse Brother      Allergies:  No Known Allergies  Medications:  Prescriptions Prior to Admission   Medication Sig Dispense Refill Last Dose     ascorbic acid (VITAMIN C) 500 MG tablet Take 500 mg by mouth daily   2/15/2017 at a.m     FLUoxetine (PROZAC) 40 MG capsule Take 40 mg by mouth daily   2/15/2017 at a.m     furosemide (LASIX) 20 MG tablet Take 20 mg by mouth daily   2/16/2017 at a.m     gabapentin (NEURONTIN) 600 MG tablet Take 600 mg by mouth 3 times daily   2/16/2017 at a.m     hydrOXYzine (ATARAX) 10 MG tablet Take 10 mg by mouth every 8 hours as needed for itching   2/16/2017 at x1     lactulose (CHRONULAC) 10 GM/15ML solution Take 20 g by mouth 3 times daily   2/15/2017 at afternoon     omeprazole (PRILOSEC) 20 MG CR capsule Take 20 mg by mouth daily   2/15/2017 at a.m     spironolactone (ALDACTONE) 50 MG tablet Take 50 mg by mouth daily   2/16/2017 at a.m     thiamine  100 MG tablet Take 100 mg by mouth daily   2/15/2017 at a.m     traZODone (DESYREL) 100 MG tablet Take 100 mg by mouth At Bedtime   2/15/2017 at hs     Omega-3 Fatty Acids (FISH OIL) 1200 MG CAPS Take 1 capsule by mouth daily   2/15/2017 at a.m     methocarbamol (ROBAXIN) 500 MG tablet Take 1,000 mg by mouth 4 times daily   2/16/2017 at x1     aspirin EC 81 MG EC tablet Take 1 tablet (81 mg) by mouth daily 30 tablet 1 2/16/2017 at a.m     atorvastatin (LIPITOR) 20 MG tablet Take 1 tablet (20 mg) by mouth daily 30 tablet 1 2/15/2017 at hs     rifaximin (XIFAXAN) 550 MG TABS tablet Take 1 tablet (550 mg) by mouth 2 times daily 60 tablet 0 Past Week at 2 weeks due to insurance issues     ferrous sulfate (IRON) 325 (65 FE) MG tablet Take 325 mg by mouth daily (with breakfast)   2/16/2017 at a.m     albuterol (2.5 MG/3ML) 0.083% neb solution Take 1 vial by nebulization every 6 hours as needed for shortness of breath / dyspnea or wheezing   2/16/2017 at a.m     buprenorphine-naloxone (SUBOXONE) 8-2 MG SUBL sublingual tablet Place 3 tablets under the tongue daily   2/15/2017 at a.m     tiotropium (SPIRIVA) 18 MCG inhalation capsule Inhale 1 capsule (18 mcg) into the lungs daily 30 capsule 11 2/15/2017 at a.m     mirtazapine (REMERON) 30 MG tablet Take 1 tablet (30 mg) by mouth At Bedtime 30 tablet 0 2/15/2017 at hs     alum & mag hydroxide-simethicone (MYLANTA ES/MAALOX  ES) 400-400-40 MG/5ML SUSP Take 30 mLs by mouth every 4 hours as needed for indigestion   Past Month at Unknown time     albuterol (PROAIR HFA, PROVENTIL HFA, VENTOLIN HFA) 108 (90 BASE) MCG/ACT inhaler Inhale 2 puffs into the lungs every 4 hours as needed for shortness of breath / dyspnea or wheezing   Past Week at Unknown time     MULTIPLE VITAMINS PO Take 1 tablet by mouth daily   2/15/2017 at a.m     folic acid (FOLVITE) 1 MG tablet Take 1 tablet (1 mg) by mouth daily 30 tablet  2/16/2017 at a.m     nicotine (NICODERM CQ) 21 MG/24HR 24 hr patch Place 1  "patch onto the skin daily 30 patch 0 unknown at does not use outside of hospital/noncompliance     Review of Systems:  A Comprehensive greater than 10 system review of systems was carried out.  Pertinent positives and negatives are noted above.  Otherwise negative for contributory information.     Physical Exam:  Blood pressure 92/58, pulse 77, temperature 97.4  F (36.3  C), temperature source Oral, resp. rate 20, height 1.702 m (5' 7\"), weight 75.1 kg (165 lb 9.1 oz), SpO2 96 %, not currently breastfeeding.  Wt Readings from Last 1 Encounters:   02/16/17 75.1 kg (165 lb 9.1 oz)     Exam:  General: Lying in ED bed. Calm. Somnolent but arousable on command. Diaphoretic and appears exhausted. Falls asleep after each sentence.   HEENT: NC/AT, Pinpoint Pupils equal but do not react to light, eyes anicteric, external occular movements intact, face symmetric. MM moist.  Cardiac: Tachycardic and RR, S1, S2.  No murmurs heard on auscultation. No rubs, or gallops appreciated. Radial and DP pulses 2+ bilaterally.  Pulmonary: Chest rise symmetric, She is breathing with aid of bipap. Expiratory wheeze appreciated, and crackles heard bilaterally in lower lung fields.   Abdomen: BS normoactive, soft, non-tender, non-distended. No guarding present.  Extremities: no deformities.  Warm, well perfused.  Skin: no rashes or lesions noted.  Diaphoretic and warm. Limited to head, back, abdomen, UE/LE.   Neuro: A&Ox3, remote and distant memory intact, performs serial 3's. Upper and lower extremity strength intact grossly. No focal deficits. Speech clear. Coordination and strength grossly normal.    Data:  EKG:  Sinus tachycardia, otherwise WNL.   Imaging:  Recent Results (from the past 24 hour(s))   Chest  XR, 1 view PORTABLE    Narrative    XR CHEST PORT 1 VW 2/16/2017 9:45 AM    COMPARISON: 1/16/2017    HISTORY: Dyspnea      Impression    IMPRESSION: Patchy mixed interstitial and airspace opacities over both  lungs, most likely " representing pulmonary edema, but atypical  infection could have a similar appearance. No significant pleural  effusion. No pneumothorax.    YENY BAE     Labs:    Recent Labs  Lab 02/16/17  0949 02/16/17  0943   PHV 7.34 7.34   PO2V 31 33   PCO2V 42 46   HCO3V 23 25       Recent Labs  Lab 02/16/17  0943   WBC 12.8*   HGB 13.7   HCT 41.4   MCV 89   *         Recent Labs  Lab 02/16/17  1015   CULT Pending       Recent Labs  Lab 02/16/17  0943   *   POTASSIUM 4.0   CHLORIDE 97   CO2 26   ANIONGAP 9   *   BUN 14   CR 0.96   GFRESTIMATED 63   GFRESTBLACK 77   VISHNU 8.2*   PROTTOTAL 7.6   ALBUMIN 3.4   BILITOTAL 0.8   ALKPHOS 106   AST 58*   ALT 19       Recent Labs  Lab 02/16/17  0943   NTBNPI 2649*       Recent Labs  Lab 02/16/17  0943   CR 0.96       Recent Labs  Lab 02/16/17  0949 02/16/17  0943   LACT 2.0 2.1*         UA RESULTS:  Lab Test  02/16/17   1211   COLOR  Straw   APPEARANCE  Clear   URINEGLC  Negative   URINEBILI  Negative   URINEKETONE  Negative   SG  1.003   UBLD  Negative   URINEPH  6.5   PROTEIN  Negative   UROBILINOGEN   --    NITRITE  Negative   LEUKEST  Negative   RBCU  <1   WBCU  0     Urine Screen for Drugs of Abuse  Negative for: Amph, Cocain, Opiates, Cannabis, Barbs, PCP, Benzos  EtOH <0.1    History and Physical as scribed by: Allen Zaidi, MS4    Supervising Attending Hospitalist: ***, M.D.

## 2017-02-16 NOTE — ED NOTES
Patient assisted to bedside commode, unable to void.  Bladder scan shows > 1,000/cc.  Watters catheter placed by ERT with immediate returns of clear yellow urine in the amount of 1,450.

## 2017-02-16 NOTE — LETTER
Transition Communication Hand-off for Care Transitions to Next Level of Care Provider    Name: Sabina Figueroa  MRN #: [id number]  Primary Care Provider: Aidee Hemphill  Primary Care MD Name: Joby  Primary Clinic: [facility] [address] [place] [address]  Primary Care Clinic Name: [facility]  Reason for Hospitalization:  Urinary retention [R33.9]  Acute respiratory failure with hypoxia (H) [J96.01]  Pneumonia of both lower lobes due to infectious organism [J18.9]  Admit Date/Time: 2/16/2017  9:23 AM  Discharge Date: 2/22/17  Payor Source: Payor: MEDICA / Plan: MEDICA [place] / Product Type: HMO /          Reason for Communication Hand-off Referral: Admission diagnoses: PN  Fragility  Non-adherence to plan of care related to:  Other active smoker  Avoidable readmission within 30 days  Other 3rd admission since december    Discharge Plan:       Concern for non-adherence with plan of care:   YES  Discharge Needs Assessment:  Needs       Most Recent Value    # of Referrals Placed by [facility]          Already enrolled in Tele-monitoring program and name of program:  NA  Follow-up specialty is recommended: Yes pulmonary and MTM    Follow-up plan:  Future Appointments  Date Time Provider Department Center   2/23/2017 1:30 PM  PFL B [facility]   2/23/2017 4:00 PM Jennifer Mane MD [facility]       Any outstanding tests or procedures:        Referrals     Future Labs/Procedures    Med Therapy Manage Referral     Comments:    Your provider has referred you to: **[facility] Scheduling (numerous locations) [phone]   [email or web address]    Reason for Referral: HX asthma/COPD/PNA adittng diagnosis, chronuic pain --SUBOXONE     The [facility] department will contact you to schedule an appointment.  You may also schedule the appointment by calling [phone].  For [facility]  patients, please call [phone] to confirm/schedule your appointment on the next business day.    This service is designed to help you get the most from your medications.  A specially trained Pharmacist will work closely with you and your providers to solve any questions, concerns, issues or problems related to your medications.    Please bring all of your prescription and non-prescription medications (such as vitamins, over-the-counter medications, and herbals) or a detailed medication list to your appointment.    If you have a glucose meter or other home monitoring information, please also bring this to your appointment (i.e. blood glucose log, blood pressure log, pain log, etc.).          F/U with Dr. Mane as scheduled at [facility] pulmonary clinic tomorrow at 130 pm   F/U with primary MD in 1-2 weeks, you will need insulin taper to match your prednisone taper                      Your next 10 appointments already scheduled      Feb 23, 2017  1:30 PM CST   FULL PULMONARY FUNCTION with  PFL B   [facility] Pulmonary Function Testing ([facility])    [address] [place] [id number] [phone]              Feb 23, 2017  4:00 PM CST   (Arrive by 3:45 PM)   Return Visit with Jennifer Mane MD   [facility] ([facility])    [address] [place] [id number] [phone]                Key Recommendations:  Readmit, active smoker with PNA/ILS? Has pulmonary f/u scheduled 2/23.  High dose steroids with taper-bg elevated-sent home on new insulin and with new glucometer.  Insulin will need to be tapered with steroid taper.      Sandra Walls [phone]  Sent via Matchalarm to Dr Hemphill's RN CTS    AVS/Discharge Summary is the source of truth; this is a helpful guide for improved communication of patient story

## 2017-02-16 NOTE — H&P
"CHIEF COMPLAINT:  Shortness of breath, fevers, chills, cough.      HISTORY OF PRESENT ILLNESS:  Ms. Sabina Figueroa is a 41-year-old female with a history of smoking, COPD, chronic pain syndrome on Suboxone, cirrhosis, who presents to the hospital for the above concerns.  This is the patient's third admission since December for respiratory failure.  She initially presented 12/05/2016 with shortness of breath, cough and infectious symptoms.  She was diagnosed with suspected community-acquired pneumonia.  She was treated with antibiotics and discharged with oral prednisone and oral Ceftin.  She returned to the hospital and was admitted again on 1/7/2017 presenting at that point again for recurrent hypoxic respiratory failure.  This was approximately 1 month prior to her previous admission for community-acquired pneumonia.  She had an extensive hospitalization and then ultimately discharging 11 days after admission.  Her workup included a brochoscopy with biopsy performed.  Bronchoscopy did not grow any significant maikol and her biopsy showed an \"organizing pneumonia.\"  On review of her biopsy report there was no malignancy present and stains were all negative for organisms.  There was a concern about possible interstitial lung disease related to smoking as a potential cause for her symptoms.      Ultimately, the patient was discharged from the hospital.      The patient reports she was doing well up until about 3 days prior to this presentation when she began to develop worsening shortness of breath.  She states that over the past 24 hours she has developed shaking chills, sinus drainage, and worsening dyspnea to the point where she is having trouble getting up and moving around.  She therefore presented to the [facility] Emergency Department for evaluation.      In the ER vital signs included a blood pressure 105/65 with heart rate of 120.  Afebrile, saturation 92% on 15 liters of oxygen.  The patient was placed on BiPAP " in the Emergency Department.  Workup in the ER included labs notable for lactic acid 2.1, white cell count of 12.8, essentially normal liver function tests, sodium 132, negative alcohol level, BNP of 2600, troponin level within normal range, ammonia level within normal range, VBG with pH of 7.34 and a pCO2 47.  Urinalysis was unremarkable.  Urine toxicology screen was negative.  Blood cultures obtained and pending.      Imaging included chest x-ray that I did review.  Shows fluffy infiltrates, bilateral lower lobes with some interstitial changes in the upper lobes.  She is being admitted to the Hospitalist Service.  I spoke with Dr. Bacon of the ER upon presentation to the hospital.      PAST MEDICAL HISTORY:   1.  Several recent admissions for acute respiratory failure in 12/2016 and again in 01/2017.  She was presumptively treated for infectious cause.  She did undergo an extensive workup during her last admission.  Workup at that time included a chest CT scan which showed bilateral mid and lower lung infiltrates concerning for atypical infection or inflammatory infiltrated processes versus pulmonary edema.  She also underwent bronchoscopy twice, one performed on 1/10/2017 and another on 1/16/2017.  She does have some alveolar hemorrhage on the initial bronchoscopy.  Her repeat bronchoscopy again showed some alveolar hemorrhage and a biopsy was performed.  Biopsy results noted above.  She also was seen by Cardiology that admission who did not feel her mitral regurgitation as a cause for her respiratory issues.  In addition, she also had a vasculitis workup that was negative.  The patient did have an echocardiogram performed that admission as well in January which showed ejection fraction greater than 70% with no wall motion abnormalities seen.  1+ mitral regurgitation noted with no mitral valve stenosis.   2.  History of chronic pain syndrome on chronic Suboxone therapy.   3.  Chart history of COPD, although  unclear if she has ever had formal PFTs to diagnose this.   4.  Active smoker.   5.  Alcoholic liver disease for which she takes lactulose and rifaximin.   6.  Mitral regurgitation.  Previous echocardiograms indicate a 2-3+ mitral regurgitation, but most recent echocardiogram shows only 1+ mitral regurgitation.  She was seen by Cardiology during her last admission to the hospital and did not feel that her pulmonary symptoms are related to mitral regurgitation.      CURRENT MEDICATIONS:   1.  Albuterol.   2.  Aspirin.   3.  Atorvastatin.   4.  Prilosec.   5.  Folate.   6.  Lactulose.   7.  Omeprazole.   8.  Rifaximin.   9.  Thiamine.   10.  Spiriva.   11.  Mylanta.   12.  Vitamin C.   13.  Suboxone.   14.  Ferrous sulfate.   15.  Lasix.   16.  Gabapentin.   17.  Hydroxyzine.   18.  Vioxx.   19.  Remeron   20.  Multivitamin.   21.  Omega-3 fatty acid.   22.  Spironolactone   23.  Trazodone.      DRUG ALLERGIES:  No known drug allergies.      FAMILY HISTORY:  Reviewed.  Nothing to contribute this admission.      SOCIAL HISTORY:  The patient is an every day smoker.  She initially reported that her quit date was going to be 03/01, but in discussing it with her she had decided her quit date was going to be today.  Social drinking.      REVIEW OF SYSTEMS:  Please see HPI for details.  Comprehensive greater than 10-point review of systems otherwise negative besides that detailed above.      PHYSICAL EXAMINATION:   VITAL SIGNS:  Blood pressure is currently 90/60, heart rate 60, afebrile, saturation 96% on 8 liters of oxygen.   GENERAL:  The patient appears initially somewhat somnolent and tired when I walked in the room.  However, after attempting to wake her up several times and falling asleep again, she was able to finally maintain wakefulness throughout my entire history and exam.  By the end of this exam she was requesting food and her pain medications and was able to easily converse and did not appear drowsy.  She is a  reasonable historian.   HEENT:  Head is atraumatic.  Sclerae white.  Eyelids are normal.  Conjunctivae are normal.  Pulses are intact.   NECK:  Supple.  No cervical or supraclavicular lymphadenopathy.   HEART:  Regular rate and rhythm.  No significant murmurs.  No lower extremity edema.   LUNGS:  Notable for a few scattered crackles and rhonchi bilaterally, right greater than left.  No intercostal retractions.  No conversational dyspnea.   ABDOMEN:  Nontender, nondistended.  Soft.  No masses.  No organomegaly.   EXTREMITIES:  No edema.   SKIN:  Reveals no rash.  No jaundice.  Skin is dry to touch.   NEUROLOGIC:  Cranial nerves II-XII are intact.  Moves all extremities appropriately.  Sensation intact to light touch in upper and lower extremities bilaterally.   PSYCHIATRIC:  The patient is awake, alert and oriented x3.  Mood and affect are appropriate.      LABORATORY AND IMAGING DATA:  Reviewed above in HPI.      Chest x-ray reviewed above in HPI.      IMPRESSION AND PLAN:  Ms. Figueroa is a 41-year-old female with a medical history notable for smoking, chart history of chronic obstructive pulmonary disease and several recent admissions for acute respiratory failure who presents to the hospital today again for acute hypoxic respiratory failure.      She had an extensive hospitalization and evaluation in January, 1 month prior to this presentation, which included a chest CT imaging and a bronchoscopy.  Bronchoscopy results notable for a negative culture and fairly unremarkable biopsy beyond showing pattern of organizing pneumonia.  Vasculitis workup at that time was also negative.      She presents this admission for concerns about worsening shortness of breath, cough productive of greenish and brownish sputum, and chills with symptoms that sound consistent with infection.   1.  Recurrent hypoxic respiratory failure:  Will treat for presumed infection initially with broad antibiotics treating for possible  healthcare-associated pneumonia with vancomycin, Zosyn and Levaquin.  However, I remain concerned about possible noninfectious cause for symptoms as this is the third presentation as many months with respiratory failure and bilateral infiltrates.  If she fails to respond to antibiotics would have a low threshold for starting steroids.  I strongly advised smoking cessation to this patient as well.      To further help differentiate infectious versus noninfectious etiology of her respiratory failure we will check procalcitonin level, CRP, and sedimentation rate.  Will also order a Gram stain and sputum culture.  Finally we will check an influenza titer as well.  I do not feel the pulmonary edema is a major cause of her current symptoms and will hold off on diuretic therapy for now.  Her most recent echocardiogram showed a preserved left ventricular function.   2.  Active smoker:  Advised cessation as above.   3.  Presumed COPD, although unclear if she has truly had PFTs to formally diagnose this.  This is a chart diagnosis.   4.  Chronic pain syndrome on chronic Suboxone therapy.      The patient will be admitted to the Intensive Care Unit.  Anticipate greater than 2 midnights in the hospital.         JEOVANNY DO MD             D: 2017 16:14   T: 2017 16:48   MT: [id number]      Name:     SYDNI GLEZ   MRN:      [id number]        Account:      [id number]   :      1975           Admitted:     [id number]      Document: [id number]

## 2017-02-16 NOTE — PHARMACY-ADMISSION MEDICATION HISTORY
Admission medication history interview status for this patient is complete. See [facility] admission navigator for allergy information, prior to admission medications and immunization status.     Medication history interview source(s):Patient  Medication history resources (including written lists, pill bottles, clinic record):[facility] list  Primary pharmacy:[facility]    Changes made to [facility] medication list:  Added: none  Deleted: none  Changed: none    Actions taken by pharmacist (provider contacted, etc):As above     Additional medication history information:None    Medication reconciliation/reorder completed by provider prior to medication history? No    For patients on insulin therapy: No antus/levemir/NPH/Mix 70/30 dose: _____ in AM/PM or twice daily   Sliding scale Novolog Y/N   If Yes, do you have a baseline novolog pre-meal dose: ______units with meals   Patients eat three meals a day: Y/N   Any Barriers to therapy: cost of medications/comfortable with giving injections (if applicable)/ comfortable and confident with current diabetes regimen     Prior to Admission medications    Medication Sig Last Dose Taking? Auth Provider   ascorbic acid (VITAMIN C) 500 MG tablet Take 500 mg by mouth daily 2/15/2017 at a.m Yes Unknown, Entered By History   FLUoxetine (PROZAC) 40 MG capsule Take 40 mg by mouth daily 2/15/2017 at a.m Yes Unknown, Entered By History   furosemide (LASIX) 20 MG tablet Take 20 mg by mouth daily 2/16/2017 at a.m Yes Unknown, Entered By History   gabapentin (NEURONTIN) 600 MG tablet Take 600 mg by mouth 3 times daily 2/16/2017 at a.m Yes Unknown, Entered By History   hydrOXYzine (ATARAX) 10 MG tablet Take 10 mg by mouth every 8 hours as needed for itching 2/16/2017 at x1 Yes Unknown, Entered By History   lactulose (CHRONULAC) 10 GM/15ML solution Take 20 g by mouth 3 times daily 2/15/2017 at afternoon Yes Unknown, Entered By History   omeprazole (PRILOSEC) 20 MG CR capsule Take 20 mg by mouth daily 2/15/2017 at a.m  Yes Unknown, Entered By History   spironolactone (ALDACTONE) 50 MG tablet Take 50 mg by mouth daily 2/16/2017 at a.m Yes Unknown, Entered By History   thiamine 100 MG tablet Take 100 mg by mouth daily 2/15/2017 at a.m Yes Unknown, Entered By History   traZODone (DESYREL) 100 MG tablet Take 100 mg by mouth At Bedtime 2/15/2017 at hs Yes Unknown, Entered By History   Omega-3 Fatty Acids (FISH OIL) 1200 MG CAPS Take 1 capsule by mouth daily 2/15/2017 at a.m Yes Unknown, Entered By History   methocarbamol (ROBAXIN) 500 MG tablet Take 1,000 mg by mouth 4 times daily 2/16/2017 at x1 Yes Unknown, Entered By History   aspirin EC 81 MG EC tablet Take 1 tablet (81 mg) by mouth daily 2/16/2017 at a.m Yes Anatoliy Dos Santos DO   atorvastatin (LIPITOR) 20 MG tablet Take 1 tablet (20 mg) by mouth daily 2/15/2017 at hs Yes Anatoliy Dos Santos DO   rifaximin (XIFAXAN) 550 MG TABS tablet Take 1 tablet (550 mg) by mouth 2 times daily Past Week at 2 weeks due to insurance issues Yes Anatoliy Dos Santos DO   ferrous sulfate (IRON) 325 (65 FE) MG tablet Take 325 mg by mouth daily (with breakfast) 2/16/2017 at a.m Yes Unknown, Entered By History   albuterol (2.5 MG/3ML) 0.083% neb solution Take 1 vial by nebulization every 6 hours as needed for shortness of breath / dyspnea or wheezing 2/16/2017 at a.m Yes Unknown, Entered By History   buprenorphine-naloxone (SUBOXONE) 8-2 MG SUBL sublingual tablet Place 3 tablets under the tongue daily 2/15/2017 at a.m Yes Unknown, Entered By History   tiotropium (SPIRIVA) 18 MCG inhalation capsule Inhale 1 capsule (18 mcg) into the lungs daily 2/15/2017 at a.m Yes Shelby Ambrocio MD   mirtazapine (REMERON) 30 MG tablet Take 1 tablet (30 mg) by mouth At Bedtime 2/15/2017 at hs Yes Denton Dunlap MD   alum & mag hydroxide-simethicone (MYLANTA ES/MAALOX  ES) 400-400-40 MG/5ML SUSP Take 30 mLs by mouth every 4 hours as needed for indigestion Past Month at Unknown time Yes Reported, Patient    albuterol (PROAIR HFA, PROVENTIL HFA, VENTOLIN HFA) 108 (90 BASE) MCG/ACT inhaler Inhale 2 puffs into the lungs every 4 hours as needed for shortness of breath / dyspnea or wheezing Past Week at Unknown time Yes Reported, Patient   MULTIPLE VITAMINS PO Take 1 tablet by mouth daily 2/15/2017 at a.m Yes Reported, Patient   folic acid (FOLVITE) 1 MG tablet Take 1 tablet (1 mg) by mouth daily 2/16/2017 at a.m Yes Devin Peace MD   nicotine (NICODERM CQ) 21 MG/24HR 24 hr patch Place 1 patch onto the skin daily unknown at does not use outside of hospital/noncompliance  Jonathan Black MD

## 2017-02-17 NOTE — PROGRESS NOTES
[facility]  Medical Student Progress Note  02/17/17    Reason for Stay (Diagnosis): Hypoxic Respiratory Failure, Pneumonia          Assessment and Plan:        Summary: Sabina Figueroa is a 41 year old female who presented to the ED with SOB, pleuritic chest pain, and b/l interstitial infiltrates on CXR. Her recent history includes multiple hospitalizations for recurrent hypoxic respiratory failure and PMH of COPD, liver cirrhosis, and chronic pain syndrome on suboxone. Prior extensive workup inclusive of bronchoscopy and BAL with a pattern that suggests organizing pneumonia. Infectious process has not been ruled out at this time.      1. Recurrent Hypoxic Respiratory Failure 2/2 Infectious v. Other: Presentation concerning for infectious vs. Noninfectious ILD/Organizing Pneumonia. Similar presentations on 3 recent hospitalizations. Prior workup extensive with bronchoscopy, and lung CT (see chart). Exact etiology remains unclear, but BAL suggests organizing pneumonia. Presentation with subjective fever, chills, productive cough, infiltrate on CXR, and leukocytosis warranted BS antibiotics Levo, Vanc, and Zosyn. Pulm rec includes 3 days of 1g solumedrol, bipap/O2 as needed, and slow taper for 6 months in addition to current cares.    - Continue Levofloxacin, Vancomycin, Zosyn.   - Sputum Culture and Stain results to be collected.   - 1g Solumedrol for 3 days (day 1/3), follow with 1mg/kg/day prednisone to be tapered slowly over 6 months.      2. COPD/Asthma  - Continue duoneb, albuterol, solumedrol.      3. Chronic Pain Syndrome: Resumed on suboxone therapy.      4. Tobacco Use Disorder: Advised to quit smoking.      5. Liver Cirrhosis and Hepatic Encephalopathy: Ammonia normal in ED workup.   -Resume Rifamixin, lactulose, thiamine     6. Depression/Anxiety  - Resume PTA depression medications     7. Hypotension: Soft pressures likely due to lack of mIVF overnight and lasix/spironolactone. She is  "currently asymptomatic.   - D/C diuretics  - Initiate mIVNS 100cc/hr.     8. Hyperglycemia 2/2 Steroid Use:   - 15U Lantus with 4U prandial novolog.  - POC Glucose checks       DVT Prophylaxis: Lovenox  Code Status: Full Code  Discharge Dispo: TBD  Estimated Disch Date / # of Days until Disch: TBD        Interval History (Subjective):      The patient reports that she feels better, noting decrease in subjective feelings of fever/chills and chest pain. She was placed on bi-PAP overnight. Blood pressures were soft. Additionally, the overnight team consulted pulmonology who recommended 1g  solumedrol daily for 3 days and slow taper over next 6 months and insulin to control blood sugar while on steroid tx.                    Physical Exam:      Last Vital Signs:  /63  Pulse 77  Temp 97.5  F (36.4  C) (Axillary)  Resp 17  Ht 1.702 m (5' 7\")  Wt 77.5 kg (170 lb 13.7 oz)  SpO2 92%  BMI 26.76 kg/m2    Intake/Output Summary (Last 24 hours)   Gross per 24 hour   Intake           838.33 ml   Output             3500 ml   Net         -2661.67 ml     General: Somnolent but wakes easily to voice, no acute distress, on bipap machine.  HEENT: NC/AT, eyes anicteric, MM moist.  Cardiac: RRR, S1, S2.  No murmurs appreciated. DP's bilaterally 2+.  Pulmonary: Chest rise symmetric, no increase work of breathing. Crackles bilaterally on auscultation.   Abdomen: soft, non-tender, non-distended. No guarding.  Extremities: no deformities.  Warm, well perfused. Brisk capillary reflex.   Skin: no rashes or lesions noted.  Warm and Dry.  Neuro: No focal deficits noted.  Speech clear.  Coordination and strength grossly normal.         Medications:      All current medications were reviewed with changes reflected in problem list.         Data:      Labs:  CBC  2/16/17: WBC 12.9, HgB 13.7, HCT 41.4, MCV 89,   2/17/17: WBC 9.4, Hgb 12.6, HCT 37.9, MCV 89, PLT 91  Electrolytes:  2/16/17: Na 132, K 4.0, Cl 97, CO2 26, AG 9, , BUN " 14, Cr 0.96, Neto 8.2, TProt 7.6, Albumin 3.4, TBil 0.8, , AST 58, ALT 19.   2/17/17: Na 136, K 4.2, Cl 102, CO2 24, AG 10, , BUN 16, Cr 0.83, Neto 8.6.   Other Lab:  2/16/17   ABG: PH 7.34, PO2 81, PCO2 47, HCO3 25  Ammonia: 26  Culture: NGTD, 17 hrs.   NTBNP: 2649   ESR: 16  CRP: 217  Pro-Neto: 1.65  Cultures: Need Collection     Imaging:   Results for orders placed or performed during the hospital encounter of 02/16/17   Chest  XR, 1 view PORTABLE    Narrative    XR CHEST PORT 1 VW 2/16/2017 9:45 AM    COMPARISON: 1/16/2017    HISTORY: Dyspnea      Impression    IMPRESSION: Patchy mixed interstitial and airspace opacities over both  lungs, most likely representing pulmonary edema, but atypical  infection could have a similar appearance. No significant pleural  effusion. No pneumothorax.    YENY BAE     Progress note as scribed by: Allen Zaidi, MS4    Supervising Hospitalist  ***, MD.

## 2017-02-17 NOTE — PROGRESS NOTES
Patient remained on BIPAP throughout night 55-60%. SpO2 90-95%. BS coarse. Patient continues to receive Q4H Duonebs. RT will continue to follow and wean from BIPAP as able.    Clover Haddad, RT 2/17/2017, 5:49 AM

## 2017-02-17 NOTE — PROGRESS NOTES
[facility]  Hospitalist Progress Note  Benedict Da Silva MD 02/17/2017    Reason for Stay (Diagnosis): resp failure         Assessment and Plan:      Summary of Stay: Sabina Figueroa is a 41-year-old female with a medical history notable for smoking, chart history of chronic obstructive pulmonary disease and several recent admissions for acute respiratory failure who presented to the hospital 2/16 again for acute hypoxic respiratory failure.       She had an extensive hospitalization and evaluation in January, one month prior to this presentation, which included a chest CT imaging and a bronchoscopy. Bronchoscopy results notable for a negative culture and fairly unremarkable biopsy beyond showing a pattern of organizing pneumonia. Vasculitis workup at that time was negative.  She was treated with antibiotics and a brief prednisone taper on discharge fro the hospital      She presented 2/16 this admission for concerns about worsening shortness of breath, cough productive of greenish and brownish sputum, and chills with symptoms that sound consistent with infection, though the pattern of recurrent resp failure and negative work up for infectious cause last admit makes this sound more like non-infectious lung dz (BOOP/, interstitial lung dz, etc) .     1. Recurrent hypoxic respiratory failure: I suspect a  noninfectious cause for symptoms (likely BOOP/) as this is the third presentation as many months with respiratory failure and bilateral infiltrates. Agree with steroids being added by pulmonologist (1 g daily for 3 days followed by 1 mg/kg/day prednisone with a slow taper over 6 months for treatment of organizing pneumonia - see note by Dr. Yee on 2/16 for details).  I strongly advised smoking cessation to this patient as well.     She continues to have high O2 needs with intermittent BIPAP use     Being empirically treated for possible infection with broad antibiotics since 2/16 for  "healthcare-associated pneumonia (though lack of fever/leukocytosis not c/w infection).  vanco stopped 2/17 as MRSA PCR neg.  Remains on zosyn/levaquin for now but if cx continue to remain neg would stop Zosyn soon       I do not feel the pulmonary edema is a major cause of her current symptoms and will hold off on diuretic therapy for now. Her most recent echocardiogram showed a preserved left ventricular function.     2. Active smoker: Advised cessation as above.   3. Presumed COPD, although unclear if she has truly had PFTs to formally diagnose this. This is a chart diagnosis.   4. Chronic pain syndrome on chronic Suboxone therapy.   5. Hyperglycemia:  Secondary to steroids.  Started Lantus and Novolog     Code:  Full  dvt ppx:  lovenox  Dispo:  Unknown at this time    Continue ICU cares in setting of acute resp failure and intermittent BIPAP need            Interval History (Subjective):      No significant change from yesterday                  Physical Exam:      Last Vital Signs:  /55  Pulse 77  Temp 97.5  F (36.4  C) (Axillary)  Resp 15  Ht 1.702 m (5' 7\")  Wt 77.5 kg (170 lb 13.7 oz)  SpO2 92%  BMI 26.76 kg/m2      Intake/Output Summary (Last 24 hours) at 02/17/17 1529  Last data filed at 02/17/17 1400   Gross per 24 hour   Intake          1841.66 ml   Output             2825 ml   Net          -983.34 ml       Constitutional: Awake but drowsy, cooperative, no apparent distress   Respiratory: Clear to auscultation bilaterally, no crackles or wheezing   Cardiovascular: Regular rate and rhythm, normal S1 and S2, and no murmur noted   Abdomen: Normal bowel sounds, soft, non-distended, non-tender   Skin: No rashes, no cyanosis, dry to touch   Neuro: drowsy but oriented x3, no weakness, numbness, memory loss   Extremities: No edema, normal range of motion   Other(s):        All other systems: Negative          Medications:      All current medications were reviewed with changes reflected in problem list.     "     Data:      All new lab and imaging data was reviewed.   Labs:    Recent Labs  Lab 02/17/17  0600 02/16/17  0943   WBC 9.4 12.8*   HGB 12.6 13.7   HCT 37.9 41.4   MCV 89 89   PLT 91* 113*      Imaging:   No results found for this or any previous visit (from the past 24 hour(s)).

## 2017-02-17 NOTE — PROGRESS NOTES
Patient's chart reviewed. It appears that she has acute hypoxic respiratory failure with bibasilar infiltrates on the CXR. She has had multiple similar presentations in the past 1 year including one last month during which she underwent bronchoscopy with BAL and transbronchial biopsies. The serial BAL were consistent with alveolar hemorrhage and biopsy suggested organizing pneumonia. She was treated with 3 days of 1 g solumedrol with significant improvement and discharged to home while the biopsy results were still pending. It is unclear if she continued steroids following discharge. Her vasculitis workup has been negative.   The current presentation is likely from similar etiology as before but infection cannot be ruled out. Agree with broad spectrum antibiotic and infectious workup including sputum micro. Also recommend initiating high dose steroids - 1 g daily for 3 days followed by 1 mg/kg/day prednisone with a slow taper over 6 months for treatment of organizing pneumonia. The etiology of alveolar hemorrhage and organizing pneumonia remains unclear but could possibly be medications related.     For ICU related cares - Trial of high flow O2 circuit or BiPAP to maintain sats > 92%. Will also need insulin infusion for blood sugars control while on high dose steroids.     Evangelista Yee

## 2017-02-17 NOTE — PROVIDER NOTIFICATION
Dr. Damian paged and notified that pt continues to have high blood glucose levels despite subQ insulin order. Asked for orders for insulin gtt to be place. Dr. Damian called back and asked for Day team Dr. Min to address as he does not know if it would be his wish to start the insulin drip. Will pass on to oncoming AGATA Smith.

## 2017-02-17 NOTE — PLAN OF CARE
Problem: Patient Care Overview (Adult)  Goal: Plan of Care Review  Outcome: No Change  ICU End of Shift Summary.  For vital signs and complete assessments, please see documentation flowsheets.      Pertinent assessments: Pt admitted to ICU @ 1430. Lethargic, difficult to stay awake when initially up to ICU. AOx4 despite effort needed to keep pt awake. Reports CP with deep breath and chronic low back pain which is managed with suboxone. BP soft, Tele SA. HR 60-70's. LS coarse, crackles in bilateral lower lobes. Expiratory wheezing. Sats low to mid 90's on 10L oxymask. Productive cough, thick, pale yellow sputum. Good uop. Last BM 2/15 per pt.  Major Shift Events: Sats dropped to 63% when Oxymask removed by pt.   Plan (Upcoming Events): Abx, manage pain, monitor O2 sats.  Discharge/Transfer Needs: continue icu cares     Bedside Shift Report Completed yes  Bedside Safety Check Completed yes

## 2017-02-17 NOTE — PLAN OF CARE
Problem: Patient Care Overview (Adult)  Goal: Plan of Care Review  ICU End of Shift Summary.  For vital signs and complete assessments, please see documentation flowsheets.      Pertinent assessments: pt hypoxic during beginning of shift needing increased O2 eventually requiring bipap that pt states helps her breathing a lot. BP soft at times with good urine production. Pt A&o and independently repositions.  Major Shift Events: need for bipap  Plan (Upcoming Events): transfer from ICU  Discharge/Transfer Needs: decrease bipap and high O2 needs     Bedside Shift Report Completed   Bedside Safety Check Completed

## 2017-02-18 NOTE — PLAN OF CARE
Problem: Patient Care Overview (Adult)  Goal: Plan of Care Review  Outcome: Improving  ICU End of Shift Summary.  For vital signs and complete assessments, please see documentation flowsheets.      Pertinent assessments: Required Cpap last noc. Continuing to manage high blood sugars and dosing of insulin. Reg diet, appetite decreased. BUE shaky. Sched nebs. High dose solumedrol IVPB BID. Face flushed. Up with SBA x1 bsc. Loose stools-chronic lactulose use. Tolerated up to chair today. Chronic pain, scheduled suboxone with prn norco and IV MS. Continue IV abx.   Major Shift Events: Weaned off BiPap this am. Tolerating oximask/oximizer NC 10-12 L.   Plan (Upcoming Events): Monitor oxygen needs. Increase activity as tolerated.   Discharge/Transfer Needs: TBD     Bedside Shift Report Completed   Bedside Safety Check Completed

## 2017-02-18 NOTE — PLAN OF CARE
ICU End of Shift Summary.  For vital signs and complete assessments, please see documentation flowsheets.     Pertinent assessments: A&O x4. Tremulous BUE. Afebrile. IV zosyn x2. Adequate UOP. Tolerated oximyzer cannula and oxymask at 10/min; BiPAP overnight; sats in mid 90s. Sats quickly drop to low 80s on room air. PO Norco and IV morphine x1 at beginning of shift.   Major Shift Events: BiPAP overnight. Patient slept well.   Plan (Upcoming Events): Continue IVPB solumedrol. Monitor oxygen needs.   Discharge/Transfer Needs: Pending.     Bedside Shift Report Completed   Bedside Safety Check Completed

## 2017-02-18 NOTE — PROGRESS NOTES
[facility]  Medical Student Progress Note  02/17/17    Reason for Stay (Diagnosis): Hypoxic Respiratory Failure, Pneumonia          Assessment and Plan:        Summary: Sabina Figueroa is a 41 year old female who presented to the ED with SOB, pleuritic chest pain, and b/l interstitial infiltrates on CXR. Her recent history includes multiple hospitalizations for recurrent hypoxic respiratory failure and PMH of COPD, liver cirrhosis, and chronic pain syndrome on suboxone. Prior extensive workup inclusive of bronchoscopy and BAL with a pattern that suggests organizing pneumonia over other ILD. Infectious PNA process has not been ruled out at this time. Improvement in leukocytosis and lung exam reflects clinical improvement in respiratory symptoms.       #Neuro:   Current Mental Status AAOx3, GCS 15. Neurologic exam unremarkable. Suboxone for addiction, and IV morphine and PRN norco for pain management.     1. Hepatic Encephalopathy (Chronic, Controlled): History remarkable for PMHx Cirrhosis and EtOH use. On Lactulose and rifaximin. Physical Exam remarkable for AAOx3 and no gross findings on exam. Lab workup remarkable for slight elevation in LFTS (AST 58), ammonia WNL.  -Continue PTA meds and monitor with change in MS.     2. Chronic Pain Syndrome: History remarkable for chronic back pain and pleuritic chest pain on admission. Pain medications Received in the Past 24 hours: 81mg ASA, Suboxone 8-2mg Film x3, Norco 325-5mg x5, and 6mg IV Morphine. Rating pain 8/10.   -Goal to decrease pain med use by discharge.     #CV  1. Hypotension (Resolved since admission): Vitals remarkable for heart rate 50s-70s, blood pressure 110s-130s. Exam remarkable for 1+ pitting edema. Fluid status down 2000cc over past 24 hours. Labs remarkable for Chemistry panel WNL (excet for Ca 8.3) and Creatinine 0.89. Now currently on PTA Spironolactone and Lasix for fluid retention.   -Monitor vitals, labs.     #Pulm  1. Recurrent  Hypoxic Respiratory Failure 2/2 likely : Presentation was  concerning for infectious vs. Noninfectious ILD/Organizing Pneumonia. Now less likely infectious etiology with low WBC, stable vitals, and low-risk pro-calcitonin. Similar presentations on 3 recent hospitalizations. Prior workup extensive with bronchoscopy, and lung CT (see chart). Exact etiology remains unclear, but BAL suggests organizing pneumonia. Presentation with subjective fever, chills, productive cough, infiltrate on CXR, and leukocytosis warranted innitial BS antibiotics Levo, Vanc, and Zosyn. Pulm rec includes 3 days of 1g solumedrol, bipap/O2 as needed, and slow taper for 6 months in addition to current cares. Vitals stable, temperature afebrile. Sat's 96% on 5L O2 Mask. Overall patient reports improvement in SOB and pleuritic chest pain. Physical exam remarkable for improved air-movement and lack of crackles or wheezing on auscultation. Labs remarkable for negative MRSA, gram stain with no organisms, cultures showing NGTD, and procalcitonin 0.12 down from 1.65 (2/16).     - O2 Mask, wean as tolerated.   - Levofloxacin Day 4.  - D/C Zosyn.     - Prednisone 80 mg tablet with 1mg/kg/day prednisone to slow-taper over 6 months.   -Advised to quit smoking    2. COPD/Asthma:   - Continue duoneb, albuterol, solumedrol.  - OP PFT's for definitive diagnosis(?)      3. Tobacco Use Disorder: Advised to quit smoking.       #ID: Vitals remarkable for: Afebrile. Labs remarkable for normal WBC since admission (on steroids), procalcitonin 0.12 (1.65), MRSA negative, Blood cultures x2 with NGTD, Infl A/B/RSV negative, Gram stain negative.     1. C.Diff: History remarkable for loose stools. Takes lactulose and rifaximin. Labs remarkable for + C.Diff stool Antigen PCR.   -Flagyl 500mg TID    2. Candidiasis: Moderate Growth C. Albicans/Dubliniensis on Sputum Aerobic Culture.     #Renal: UOP past 24hours 3950, now at 1.91 cc/kg/hr. Tolerating PO fluids.  "    #GI  1. Liver Cirrhosis: Physical Exam unremarkable. Labs remarkable for LFT elevation (ALT 81). On lactulose and rifaximin.    -Monitor LFT's    FEN: PO Intake as tolerated.     #Endocrine  1. Hyperglycemia 2/2 Steroid Use: Labs remarkable for POC glucose 210. Currently managed with Lantus 40U Lantus and required 59U Aspart SSI. While poorly controlled, continue to monitor the next 24 hours with change from 1000mg Solumedrol to 80mg Prednisone.   -POC Glucose  -Initiate Prednisone 80mg PO.   -DM Rec use NHP on D/C for improved coverage of steroid induced hyperglycemia.       DVT Prophylaxis: PCDs  Code Status: Full Code  Discharge Dispo: TBD  Estimated Disch Date / # of Days until Disch: TBD        Interval History (Subjective):      Patient reports feeling much better. Denies SOB, pleuritic chest pain, or symptoms of subjective f/c/s. Reports diarrhea. Overnight, was placed on Bipap 14/6 fio2 40%. Now tolerating O2 Facemask. C. Diff was positive and so flagyl was started.          Physical Exam:      Last Vital Signs:  BP 95/62  Pulse 84  Temp 98.1  F (36.7  C) (Axillary)  Resp 16  Ht 1.702 m (5' 7\")  Wt 86.2 kg (190 lb 0.6 oz)  SpO2 98%  BMI 29.76 kg/m2    Intake/Output Summary (Last 24 hours)     Gross per 24 hour   Intake             1600 ml   Output             3925 ml   Net            -2325 ml     Vitals:    02/16/17 1426 02/17/17 0445 02/18/17 0546 02/19/17 0500   Weight: 75.1 kg (165 lb 9.1 oz) 77.5 kg (170 lb 13.7 oz) 78.2 kg (172 lb 6.4 oz) 84.1 kg (185 lb 6.5 oz)    02/20/17 0500   Weight: 86.2 kg (190 lb 0.6 oz)       General: Alert, Awake, NAD. O2 facemask in place.  HEENT: NC/AT, eyes anicteric, MM moist.  Cardiac: RRR, S1, S2.  No murmurs appreciated. DP's and RP's bilaterally 2+.  Pulmonary: Chest rise symmetric, no increase work of breathing. No crackles, wheeze, or rhonchi appreciated on auscultation. Abdomen: soft, tender under diaphragm and in the lower quadrants, non-distended and without " guarding.  Extremities: no deformities.  Warm, well perfused. Brisk capillary reflex. 1+ pitting edema BLE.    Skin: no rashes or lesions noted. Warm and Dry.  Neuro: AAOx3. No focal deficits noted.  Speech clear.  Coordination and strength grossly normal.         Medications:      All current medications were reviewed with changes reflected in problem list.         Data:      Labs:  CBC  2/16/17: WBC 12.9, HgB 13.7, HCT 41.4, MCV 89,   2/17/17: WBC 9.4, Hgb 12.6, HCT 37.9, MCV 89, PLT 91  2/18/17: WBC 6.9, Hgb 11.7, Hct 36.8, PLT 87, RBC 3.97, MCV 93  2/19/17: WBC 4.7, Hgb 11.5, Hct 36.1, PLT 82, RBC 3.9, MCV 93, RDW 15.5, ANC 5.0  2/20/17: WBC 4.7, HgB 11.6, Hct 36.4, PLT 83, RBC 3.95, MCV 92  Electrolytes:  2/16/17: Na 132, K 4.0, Cl 97, CO2 26, AG 9, , BUN 14, Cr 0.96, Neto 8.2, TProt 7.6, Albumin 3.4, TBil 0.8, , AST 58, ALT 19.   2/17/17: Na 136, K 4.2, Cl 102, CO2 24, AG 10, , BUN 16, Cr 0.83, Neto 8.6.   2/18/17: Na 140, K 4.3, Cl 105, CO2 27, BUN 14, Cr 0.79, Ca 8.2, AG 8, Glc 214  2/19/17: Na 138, K 4.1, Cl 102, CO2 28, BUN 20, Creatinine 0.87, Ca 8.5, AG 8.   2/20/17: Na 138, K 4.0, Cl 102, CO2 31, BUN 21, Cr 0.89, Ca 8.3, AG 5,    Other Lab:  2/20/17  Pro calcitonin 0.12 (low infection risk).   C.Diff Positive  Micro Cultures with Moderate growth Candidiasis, light normal growth maikol  2/18/17  Influenza A/B/RSV: Negative  MRSA: Negative  Gram Stain   Culture: NGTD.   Sputum: Pending  Gram Stain: no organisms.  2/16/17  ABG: PH 7.34, PO2 81, PCO2 47, HCO3 25  Ammonia: 26  Influenza A/B/RSV: Negative  MRSA: Negative  Culture: NGTD.    NTBNP: 2649   ESR: 16  CRP: 217  Pro-Neto: 1.65    Imaging:   Results for orders placed or performed during the hospital encounter of 02/16/17   Chest  XR, 1 view PORTABLE    Narrative    XR CHEST PORT 1 VW 2/16/2017 9:45 AM    COMPARISON: 1/16/2017    HISTORY: Dyspnea      Impression    IMPRESSION: Patchy mixed interstitial and airspace opacities  over both  lungs, most likely representing pulmonary edema, but atypical  infection could have a similar appearance. No significant pleural  effusion. No pneumothorax.    YENY BAE     Progress note as scribed by: Allen Zaidi, MS4    Supervising Hospitalist  ***, MD.

## 2017-02-18 NOTE — PROGRESS NOTES
Respiratory Therapy    Patient placed onto BiPAP for the night 14/6 RR 12 FiO2 60%, lung sounds diminished FRANCESCO with inspiratory wheezing. Duonebs given as ordered Q4, will continue to assess and montior.    Antoinette Rodriguez RT  2/18/2017

## 2017-02-18 NOTE — PROGRESS NOTES
[facility]  Hospitalist Progress Note  Benedict Da Silva MD 02/18/2017    Reason for Stay (Diagnosis): resp failure         Assessment and Plan:      Summary of Stay: Sabina Figueroa is a 41-year-old female with a medical history notable for smoking, chart history of chronic obstructive pulmonary disease and several recent admissions for acute respiratory failure who presented to the hospital 2/16 again for acute hypoxic respiratory failure.       She had an extensive hospitalization and evaluation in January, one month prior to this presentation, which included a chest CT imaging and a bronchoscopy. Bronchoscopy results notable for a negative culture and fairly unremarkable biopsy beyond showing a pattern of organizing pneumonia. Vasculitis workup at that time was negative. She was treated with antibiotics and a brief prednisone taper on discharge fro the hospital      She presented 2/16 this admission for concerns about worsening shortness of breath, cough productive of greenish and brownish sputum, and chills with symptoms that sound consistent with infection, though the pattern of recurrent resp failure and negative work up for infectious cause last admit makes this sound more like non-infectious lung dz (BOOP/, interstitial lung dz, etc) .      1. Recurrent hypoxic respiratory failure: I suspect a  noninfectious cause for symptoms (likely BOOP/) as this is the third presentation as many months with respiratory failure and bilateral infiltrates. Agree with steroids being added by pulmonologist (1 g daily for 3 days followed by 1 mg/kg/day prednisone with a slow taper over 6 months for treatment of organizing pneumonia - see note by Dr. Yee on 2/16 for details). I strongly advised smoking cessation to this patient as well.      She continues to have high O2 needs with intermittent BIPAP use      Being empirically treated for possible infection with broad antibiotics since 2/16 for  "healthcare-associated pneumonia (though lack of fever/leukocytosis not c/w infection). vanco stopped 2/17 as MRSA PCR neg. Remains on zosyn/levaquin for now but if cx continue to remain neg would stop Zosyn tomorrow      I do not feel the pulmonary edema is a major cause of her current symptoms and will hold off on diuretic therapy for now. Her most recent echocardiogram showed a preserved left ventricular function.      2. Active smoker: Advised cessation as above.   3. Presumed COPD, although unclear if she has truly had PFTs to formally diagnose this. This is a chart diagnosis.   4. Chronic pain syndrome on chronic Suboxone therapy.   5. Hyperglycemia: Secondary to steroids. Started Lantus and Novolog - increased Lantus to 20 u bid and Novolog to 12u tid     Code: Full  dvt ppx: lovenox  Dispo: Unknown at this time     Continue ICU cares in setting of acute resp failure and intermittent BIPAP need        Interval History (Subjective):      Breathing about the same                  Physical Exam:      Last Vital Signs:  /65  Pulse 77  Temp 97.7  F (36.5  C) (Axillary)  Resp 16  Ht 1.702 m (5' 7\")  Wt 78.2 kg (172 lb 6.4 oz)  SpO2 96%  BMI 27 kg/m2      Intake/Output Summary (Last 24 hours) at 02/18/17 1735  Last data filed at 02/18/17 1611   Gross per 24 hour   Intake             2110 ml   Output             4025 ml   Net            -1915 ml       Constitutional: A bit drowsy but able to answer questions appropriately, cooperative, no apparent distress   Respiratory: Clear to auscultation bilaterally, no crackles or wheezing   Cardiovascular: Regular rate and rhythm, normal S1 and S2, and no murmur noted   Abdomen: Normal bowel sounds, soft, non-distended, non-tender   Skin: No rashes, no cyanosis, dry to touch   Neuro: Awake, no weakness, numbness, memory loss   Extremities: No edema, normal range of motion   Other(s):        All other systems: Negative          Medications:      All current medications " were reviewed with changes reflected in problem list.         Data:      All new lab and imaging data was reviewed.   Labs:    Recent Labs  Lab 02/18/17  0555 02/17/17  0600 02/16/17  0943    136 132*   POTASSIUM 4.3 4.2 4.0   CHLORIDE 105 102 97   CO2 27 24 26   ANIONGAP 8 10 9   * 293* 151*   BUN 14 16 14   CR 0.79 0.83 0.96   GFRESTIMATED 79 75 63   GFRESTBLACK >90African American GFR Calc >90African American GFR Calc 77   VISHNU 8.2* 8.6 8.2*   PROTTOTAL  --   --  7.6   ALBUMIN  --   --  3.4   BILITOTAL  --   --  0.8   ALKPHOS  --   --  106   AST  --   --  58*   ALT  --   --  19      Imaging:   No results found for this or any previous visit (from the past 24 hour(s)).

## 2017-02-18 NOTE — PLAN OF CARE
Problem: Patient Care Overview (Adult)  Goal: Plan of Care Review  Outcome: Improving  ICU End of Shift Summary.  For vital signs and complete assessments, please see documentation flowsheets.      Pertinent assessments: BPs soft, daily lasix and spiraldactone held this am & initiated IVFs. IV abx. Afebrile. PCDs. Up with sba x1 to bsc. Tremulous BUE. Cont pulse ox. SA tele. Sputum sample sent and currently pending. F/C with adequate UO. Pain management for lower chest/upper abd pain with increased RR with prn norco and IV MS. Scheduled nebs. BUE tremulous. Anxious at times, remains oriented and less somnolent. PCDs and subq lovenox.   Major Shift Events: Weaned off Bipap this morning. Tolerating non re breather mask & nasal canula 10-12 L delivery. Increased glucose, continue ACHS monitoring with addition of novolog meal coverage and lantus. Solumedrol IVPB infusion BID.  Plan (Upcoming Events): Continue IV abx, monitor oxygen needs. Increase ambulation as tolerated.   Discharge/Transfer Needs: TBD     Bedside Shift Report Completed   Bedside Safety Check Completed

## 2017-02-19 NOTE — PROVIDER NOTIFICATION
Pt  pre dinner. However pt had just had a snack of a rice krispie treat and a whole milk and also had her previous meal with insulin coverage only 3 hours ago. SS and mealtime insulin given as ordered and MD trtoter paged to update. Encouraged pt to try and get on more of a routine with meals in order to assist in facilitating her insulin needs and also in normalizing a schedule not sleeping through the day but taking naps if able knowing she will be more sleepy than normal with high doses of steroids. Increased activity has promoted lung function with more productive cough and use of IS when up in chair. Goal is to continue and wean O2 needs and increase strength and activity.

## 2017-02-19 NOTE — CONSULTS
Care Transition Initial Assessment - RN        Met with: Patient.    DATA   Active Problems:    Pneumonia       Cognitive Status: awake, alert and oriented.        Contact information and PCP information verified: Yes   [facility]    Insurance concerns: No Insurance issues identified    ASSESSMENT  Patient currently receives the following services:  NONE       Identified issues/concerns regarding health management: NONE  Patient admitted on 2/16 for treatment of PNA vs ILD.  Only concern she expressed is she doesn't want to be DC'd too soon.  Last admit she feels she was DC'd 1 day too early.    At baseline she lives independently with her SO.  No use of DME.  No falls recently. Follows a regular diet - good PO intake.  No transportation issues, either her SO will drive or she can take taxi that is provided by her insurance.    PLAN  Patient anticipates discharging to Anticipate patient will be able to DC home.  No needs identified at this time.  Will continue to monitor.     Patient anticipates needs for home equipment: No    Plan/Disposition: Home     Care  (CTS) will continue to follow as needed.

## 2017-02-19 NOTE — PROGRESS NOTES
She wore the Bipap about half of the shift. 12/6 r12 60%. She was up in the chair tonight and was cordial.

## 2017-02-19 NOTE — PROGRESS NOTES
[facility]  Hospitalist Progress Note  Benedict Da Silva MD 02/19/2017    Reason for Stay (Diagnosis): resp failure         Assessment and Plan:      Summary of Stay: Sabina Figueroa is a 41-year-old female with a medical history notable for smoking, chart history of chronic obstructive pulmonary disease and several recent admissions for acute respiratory failure who presented to the hospital 2/16 again for acute hypoxic respiratory failure.       She had an extensive hospitalization and evaluation in January, one month prior to this presentation, which included a chest CT imaging and a bronchoscopy. Bronchoscopy results notable for a negative culture and fairly unremarkable biopsy beyond showing a pattern of organizing pneumonia. Vasculitis workup at that time was negative. She was treated with antibiotics and a brief prednisone taper on discharge fro the hospital      She presented 2/16 this admission for concerns about worsening shortness of breath, cough productive of greenish and brownish sputum, and chills with symptoms that sound consistent with infection, though the pattern of recurrent resp failure and negative work up for infectious cause last admit makes this sound more like non-infectious lung dz (BOOP/, interstitial lung dz, etc) .       1. Recurrent hypoxic respiratory failure: I suspect a noninfectious cause for symptoms (likely BOOP/) as this is the third presentation as many months with respiratory failure and bilateral infiltrates. Agree with steroids being added by pulmonologist (1 g daily for 3 days followed by 1 mg/kg/day prednisone with a slow taper over 6 months for treatment of organizing pneumonia - see note by Dr. Yee on 2/16 for details). I strongly advised smoking cessation to this patient as well.       She continues to have high O2 needs with intermittent BIPAP use       Being empirically treated for possible infection with broad antibiotics since 2/16 for  "healthcare-associated pneumonia (though lack of fever/leukocytosis not c/w infection). vanco stopped 2/17 as MRSA PCR neg. Remains on zosyn/levaquin since admit but as cx continue to remain neg will stop Zosyn.  Probably can also stop Levaquin soon - will check procalcitonin level again in AM.  May need to consider long term Bactrim ppx if being discharged on chronic steroids      I do not feel the pulmonary edema is a major cause of her current symptoms and will hold off on diuretic therapy for now. Her most recent echocardiogram showed a preserved left ventricular function.       2. Active smoker: Advised cessation as above.   3. Presumed COPD, although unclear if she has truly had PFTs to formally diagnose this. This is a chart diagnosis.   4. Chronic pain syndrome on chronic Suboxone therapy.   5. Hyperglycemia: Secondary to steroids. Started Lantus and Novolog - contine Lantus 20 u bid and Novolog 12u tid.  As last dose of high dose IV steroids are tonight will avoid titrating insulin higher as expect her BS will start to fall as steroid dose reduced      Code: Full  dvt ppx: lovenox  Dispo: Unknown at this time      Would expect pt can transfer to the floor soon        Interval History (Subjective):      Feels similar to yesterday.  Still SOB.  + cough                  Physical Exam:      Last Vital Signs:  /72 (BP Location: Left arm)  Pulse 84  Temp 97.5  F (36.4  C) (Axillary)  Resp 14  Ht 1.702 m (5' 7\")  Wt 84.1 kg (185 lb 6.5 oz)  SpO2 97%  BMI 29.04 kg/m2      Intake/Output Summary (Last 24 hours) at 02/19/17 1229  Last data filed at 02/19/17 1000   Gross per 24 hour   Intake             2730 ml   Output             3275 ml   Net             -545 ml       Constitutional: Awake, alert, cooperative, no apparent distress   Respiratory: Clear to auscultation bilaterally, no crackles or wheezing   Cardiovascular: Regular rate and rhythm, normal S1 and S2, and no murmur noted   Abdomen: Normal bowel " sounds, soft, non-distended, non-tender   Skin: No rashes, no cyanosis, dry to touch   Neuro: Alert and oriented x3, no weakness, numbness, memory loss   Extremities: No edema, normal range of motion   Other(s):        All other systems: Negative          Medications:      All current medications were reviewed with changes reflected in problem list.         Data:      All new lab and imaging data was reviewed.   Labs:    Recent Labs  Lab 02/19/17  0600 02/18/17  0555 02/17/17  0600   WBC 5.7 6.9 9.4   HGB 11.5* 11.7 12.6   HCT 36.1 36.8 37.9   MCV 93 93 89   PLT 82* 87* 91*      Imaging:   No results found for this or any previous visit (from the past 24 hour(s)).

## 2017-02-19 NOTE — PROGRESS NOTES
Patient off of BIPAP since this morning and tolerated well. Pt remains on 10-12L Oxymask or oxymizer cannula. SpO2 mid 90's, BS diminished, RR 20's, Duoneb given as ordered. Will continue to monitor pt's respiratory status closely.    Emani Waller, RT  2/18/2017 6:54 PM

## 2017-02-19 NOTE — PROGRESS NOTES
Patient off of BIPAP since this morning and on 12L oxymizer tolerated well able to wean O2 down to 6L, maintain SpO2 mid 90's high 90's. Pt given IS and able to achieved 1000-1250ml with a lot of encouragment. Duoneb given as ordered. Will continue to monitor pt's respiratory status closely.    Emani Waller, RT  2/19/2017 6:58 PM

## 2017-02-19 NOTE — PLAN OF CARE
Problem: Goal/Outcome  Goal: Goal Outcome Summary  Outcome: No Change  ICU End of Shift Summary.  For vital signs and complete assessments, please see documentation flowsheets.      Pertinent assessments: Pt on oxymizer at 12 LPM or 50% bipap, LS diminished insp wheeze, norco/morphine for pain, zosyn, high dose solumedrol  Major Shift Events: pain meds every 2 hours             Plan (Upcoming Events):continue current treatment  Discharge/Transfer Needs: ?to floor today     Bedside Shift Report Completed   Bedside Safety Check Completed

## 2017-02-20 NOTE — PROGRESS NOTES
[facility]  Hospitalist Progress Note  Frida Spear MD, MD 02/20/2017    Reason for Stay (Diagnosis): resp failure         Assessment and Plan:      Summary of Stay: Sabina Figueroa is a 41-year-old female with a medical history notable for smoking, chart history of chronic obstructive pulmonary disease and several recent admissions for acute respiratory failure who presented to the hospital 2/16 again for acute hypoxic respiratory failure. She had multiple admissions in the past for respiratory failure requiring workup with CT scan and bronchoscopy. She also had vasculitis workup which was negative. Patient was treated with antibiotics and prednisone taper. Her recurrent respiratory failure is suspected to be more like noninfectious(BOOP) than infectious etiology. She was started on steroids.       1. Recurrent hypoxic respiratory failure:  --Suspected to be secondary to  BOOP/ due to ongoing respiratory failure with persistent bilateral infiltrates.  --Pulmonary added prednisone 1 g daily for 3 days followed by 1 mg/kg/day prednisone with a slow taper over 6 months for treatment of organizing pneumonia.( Please see note by  Dr. Yee on 2/16).  --She is now off BIPAP. Will wean off oxygen as able.   --empirically treated for HCAP.Vanco was discontinued on 2/17. On Levaquin. Procalcitonin 0.12. Will d/c Levaquin tomorrow.   --Consider long term bactrim while on steroids     2. Clostridium difficile diarrhea: Stool for C.diff toxin B. PCR positive. Started on oral flagyl.       3. Active smoker: Advised cessation as above.   --Patient advised to quit smoking.     4. Presumed COPD, although unclear if she has truly had PFTs to formally diagnose this. This is a chart diagnosis.     5. Chronic pain syndrome on chronic Suboxone therapy.     6. Hyperglycemia: Secondary to steroids. On  Lantus 20 u bid and Novolog 12u tid. Will change to NPH 20 units BID. Will monitor blood sugar closely.  "      Code: Full    dvt ppx: lovenox    Dispo: Unknown at this time. can transfer to medical floor if remains off BIPAP.         Interval History (Subjective):      Patient claims that she is feeling better today. She denies nausea or vomiting. She has no fever.                   Physical Exam:      Last Vital Signs:  BP 95/62  Pulse 84  Temp 98.1  F (36.7  C) (Axillary)  Resp 16  Ht 1.702 m (5' 7\")  Wt 86.2 kg (190 lb 0.6 oz)  SpO2 98%  BMI 29.76 kg/m2      Intake/Output Summary (Last 24 hours) at 02/19/17 1229  Last data filed at 02/19/17 1000   Gross per 24 hour   Intake             2730 ml   Output             3275 ml   Net             -545 ml       Constitutional: Awake, alert, cooperative, no apparent distress   Respiratory: Clear to auscultation bilaterally, no crackles or wheezing   Cardiovascular: Regular rate and rhythm, normal S1 and S2, and no murmur noted   Abdomen: Normal bowel sounds, soft, non-distended, non-tender   Skin: No rashes, no cyanosis, dry to touch   Neuro: Alert and oriented x3, no weakness, numbness, memory loss   Extremities: No edema, normal range of motion   Other(s):        All other systems: Negative          Medications:      All current medications were reviewed with changes reflected in problem list.         Data:      All new lab and imaging data was reviewed.   Labs:    Recent Labs  Lab 02/20/17  0529 02/19/17  0600 02/18/17  0555   WBC 4.7 5.7 6.9   HGB 11.6* 11.5* 11.7   HCT 36.4 36.1 36.8   MCV 92 93 93   PLT 83* 82* 87*      Imaging:   No results found for this or any previous visit (from the past 24 hour(s)).   "

## 2017-02-20 NOTE — PROGRESS NOTES
I was called for c diff test coming back positive.  Chart reviewed- this does not seem to be severe infection (no leukocytosis or renal insufficiency).  Vital signs are stable.  I ordered Flagyl suspension by LEANNA MCDANIEL. I d/c'd Zosyn as this seemed to be the plan from earlier today.  I will defer when to d/c Levaquin to rounding team.

## 2017-02-20 NOTE — PLAN OF CARE
Problem: Goal/Outcome  Goal: Goal Outcome Summary  Outcome: No Change  ICU End of Shift Summary.  For vital signs and complete assessments, please see documentation flowsheets.      Pertinent assessments: Oxymask initially then placed on bipap, PRN morphine and norco given, c-diff came back positive-flagyl started and enteric precations, Trace edema BLE,   Major Shift Events: started flagyl  Plan (Upcoming Events): transition to po prednisone today, continue flagyl   Discharge/Transfer Needs: ? To floor     Bedside Shift Report Completed   Bedside Safety Check Completed

## 2017-02-20 NOTE — PROGRESS NOTES
She was on the Bipap the last half of the shift. 14/6 r12 40%. She has been using her Incentive spirometer and her O2 needs are coming down. She is able to do about 1300ml.

## 2017-02-20 NOTE — PLAN OF CARE
Problem: Pneumonia (Adult)  Goal: Signs and Symptoms of Listed Potential Problems Will be Absent or Manageable (Pneumonia)  Signs and symptoms of listed potential problems will be absent or manageable by discharge/transition of care (reference Pneumonia (Adult) CPG).   Outcome: Improving  ICU End of Shift Summary.  For vital signs and complete assessments, please see documentation flowsheets.      Pertinent assessments: Flushed face and shaky BUE. High dose of steroids and sched neb txs. Watters with adequate UO. IS to 1200 (needs encouragement). Now coughing up more flem/sputum with deep breathing exercises. SCDs while in bed. IV abx. Frequent requests for pain medication exactly when it is available next and/or multiple times prior. Still receiving prn norco and IV MS as ordered and requested by pt. Multiple requests and needs requiring encouragement to provide self care as able.  Major Shift Events: Only using Bipap @ noc. Oximask/Oximizer NC during the day, titrating as tolerated. Encourage self cares, ROM/Ambulation, & up to chair. Educated on the importance to try and maintain a schedule with meals in order to regulate and manage blood sugars with insulin requirements.   Plan (Upcoming Events): Continue to monitor oxygen needs and wean as tolerated. Increase activity.  Discharge/Transfer Needs: Tx to floor soon? DC home needs and date TBD     Bedside Shift Report Completed   Bedside Safety Check Completed

## 2017-02-20 NOTE — PROGRESS NOTES
Infection Prevention:    Patient requires Enteric Precautions because of Cdiff. Please contact Infection Prevention with any questions/concerns at *[phone].    Emily Muniz, ICP

## 2017-02-21 NOTE — PROGRESS NOTES
[facility]  Medical Student Progress Note  02/17/17    Reason for Stay (Diagnosis): Hypoxic Respiratory Failure, Pneumonia          Assessment and Plan:      Summary: Sabina Figueroa is a 41 year old female who presented to the ED with SOB, pleuritic chest pain, and b/l interstitial infiltrates on CXR. Her recent history includes multiple hospitalizations for recurrent hypoxic respiratory failure, and PMH of COPD, liver cirrhosis, and chronic pain syndrome managed with suboxone.   Prior hospitalizations include extensive pneumonia workup inclusive of bronchoscopy and BAL with a pattern that suggests organizing pneumonia over other ILD or infectious processes.     #Neuro:   Ambulates without Weakness. Current Mental Status AAOx3, GCS 15. Neurologic exam grossly normal. Receiving suboxone for pain/addiction, and IV morphine and PRN norco for symptomatic pleuritic chest pain management.     1. Hepatic Encephalopathy (Chronic, Controlled): History remarkable for PMHx Cirrhosis and EtOH abuse. Takes lactulose, rifaximin, and thiamine. Physical Exam remarkable for AAOx3 with no changes in mental status and no gross findings on exam. Lab workup remarkable for slight elevation in LFTS (AST 58), ammonia WNL.  -Continue PTA meds and monitor with change in MS.     2. Chronic Pain Syndrome: History of chronic back pain managed with suboxone, robaxin, and neurontin PTA. Sees provider out of [place] for suboxone. Requests PRN IV Morphine and Norco at available max of Q4H.     Pain medications Received in the Past 12 hours: 81mg ASA, Suboxone 8-2mg Film 3, Norco 325-5mg x3, and 2mg IV Morphine. Rating pain 8/10.   - Goal to decrease IV pain meds as tolerated.     #CV  1. Hypotension (Resolved since admission): Vitals remarkable for heart rate 50s-70s, blood pressure 110s-130s. Exam remarkable for 1+ pitting edema. Fluid status down 2000cc over past 24 hours. Labs remarkable for Chemistry panel WNL (excet for Ca 8.3)  and Creatinine 0.89. Now currently on PTA Spironolactone and Lasix.  -Monitor vitals, labs.     #Pulm  1. Recurrent Hypoxic Respiratory Failure 2/2 suspected : Presenting symptoms of subjective fever/chills, cough, dyspnea, pleuritic CP, and bilateral interstitial infiltrate on xray. Concerning initially for infectious vs. Noninfectious ILD/Organizing Pneumonia. Low WBC, stable vitals, and low-risk pro-calcitonin suggest non-infectious etiology. Similar presentations on 3 recent hospitalizations. Prior workup extensive with bronchoscopy, and lung CT (see chart). Exact etiology remains unclear, but BAL suggests organizing pneumonia. Initially on Levaquin, Vancomycin (negative MRSA), and Zosyn, most recently completed day 6/6 Levaquin and is no longer receiving antibiotics for pneumonia.     Pulmonology recommendation (See note in patient's chart) included 3 day course of 1g solumedrol (completed 2/19/16), bipap/O2 as needed, and slow taper for 6 months in addition to nebs/steroids/albuterol prn for underlying COPD. Overall,  patient reports improvement in symptoms. Vitals stable, temperature afebrile. Sat's mid-high 90% on 2L O2 Mask with decreased supplemental O2 needs the over past 48hrs. Physical exam remarkable for improved air-movement compared to admission, although does have crackles and wheezing in lower lung lobes bilaterally still on exam. Labs remarkable for negative MRSA, gram stain with no organisms, cultures showing NGTD, and procalcitonin 0.12 down from 1.65 (2/16).       - O2 Mask, wean as tolerated.   - Prednisone 80 mg tablet with 1mg/kg/day prednisone to slow-taper over 6 months.   -Advised to quit smoking    2. COPD/Asthma: Unclear if PFT's or official diagnosis has been performed for this diagnosis. Symptoms appear improved with nebs and steroids. Oxygen requirements now improved to 2L NC from previous need for BiPAP.   - Continue duoneb, albuterol, solumedrol.  - Out Patient F/U PFT's for  definitive diagnosis(?)      3. Tobacco Use Disorder: Advised to quit smoking.       #ID: History negative for F/C/S. Reports Diarrhea c/w chronic lactulose use. Vitally stable and afebrile. Labs remarkable for normal WBC since admission (on steroids), procalcitonin 0.12 (1.65), MRSA negative, Blood cultures x2 with NGTD, Infl A/B/RSV negative, Gram stain negative, CDiff Positive.    1. C.Diff: History remarkable for baseline loose stools on lactulose. Vitally stable and afebrile. Found positive for C.Diff on 2/19/17. Now day 2 of 14 for oral course of flagyl 500mg TID.  -Flagyl 500mg TID    2. Candidiasis: Moderate Growth C. Albicans/Dubliniensis on Sputum Aerobic Culture.   -Monitor      #Renal: Tolerates PO fluids. UOP Good. Net negative ~7L since admission with improvement in pedal edema on physical exam. On Lasix and Spironolacone.   1) Hypokalemia: repletion and re-check.       #GI  1. Liver Cirrhosis: Physical Exam unremarkable. Labs remarkable for LFT elevation (ALT 81) on 2/16/17. On lactulose, rifaximin, and spironolactone/lasix for ascites.    -Monitor LFT's  -PTA Lasix 20mg and Spironolactone    FEN: PO Intake. No restrictions.    #Endocrine  1. Hyperglycemia 2/2 Steroid Use: Poor Glucose control with initiation of steroid regiment of 1g solumedrol daily. Management changed 2/20/17 to NPH with SSI from Lantus +/- SSI. Glucose control now improved. Requirement of 20U NPH with 12U SSI since midnight. Most recent .    -POC Glucose  - Day 2/6month  Prednisone 80mg PO.   -NPH 40U basal with SSI.     DVT Prophylaxis: PCDs  Code Status: Full Code  Discharge Dispo: Med-Overflow. Ready to discharge.  Estimated Disch Date / # of Days until Disch: Ready to discharge.        Interval History (Subjective):      Patient reports improvement in symptoms of dyspnea, shortness of breath, and tolerance of physical activity. Ambulating and able to use the restroom as needed. Complains of 8/10 abdominal pain below  "diaphragm. Worse with breathing. Denies F/C/S. Reports loose stools consistent with PTA on lactulose. Tolerates Oxy-Mask with decreased need, currently 2LPM.          PE:      Last Vital Signs:  /78 (BP Location: Left arm)  Pulse 84  Temp 97.3  F (36.3  C) (Axillary)  Resp 18  Ht 1.702 m (5' 7\")  Wt 86.2 kg (190 lb 0.6 oz)  SpO2 99%  BMI 29.76 kg/m2    Intake/Output Summary (Last 24 hours)    Gross per 24 hour   Intake              950 ml   Output             3600 ml   Net            -2650 ml     Net -7600cc since admission on 2/16/17.    -Examination-  General: Pleasant, AAOx3. NAD. Nasal Cannula in place.   HEENT: NC/AT, eyes anicteric.  Cardiac: RRR. No murmurs appreciated. Dorsalis pulse 2+. Capillary Refill Brisk.   Pulmonary: Chest rise symmetric, no accessory muscle use. Wheezing and crackles L>R.   Abdomen: BS Normoactive. Soft. Tenderness to palpation inferior to the diaphragm. Non-distended and without guarding.  Extremities: No deformities. Warm and well perfused. ROM intact. Without Pitting Edema.   Skin: Warm and Dry.  Neuro: AAOx3. No focal deficits noted. Speech clear. Coordination and strength grossly normal.         Medications:      All current medications were reviewed with changes reflected in problem list.         Data:      Labs:  CBC  2/21/17: WBC 6.8, Hgb 11.9, Hct 37.6, , RBC 4.06, MCV 93, ANC 4.4    2/20/17: WBC 4.7, HgB 11.6, Hct 36.4, PLT 83, RBC 3.95, MCV 92  2/19/17: WBC 4.7, Hgb 11.5, Hct 36.1, PLT 82, RBC 3.9, MCV 93, RDW 15.5, ANC 5.0  2/18/17: WBC 6.9, Hgb 11.7, Hct 36.8, PLT 87, RBC 3.97, MCV 93  2/17/17: WBC 9.4, Hgb 12.6, HCT 37.9, MCV 89, PLT 91  2/16/17: WBC 12.9, HgB 13.7, HCT 41.4, MCV 89,     Electrolytes:  2/21/17 Na 142, K 3.0, Cl 104, CO2 30, BUN 22, Cr 0.78, Ca 8.0, AG 8, Glc 109    2/20/17: Na 138, K 4.0, Cl 102, CO2 31, BUN 21, Cr 0.89, Ca 8.3, AG 5,  2/19/17: Na 138, K 4.1, Cl 102, CO2 28, BUN 20, Creatinine 0.87, Ca 8.5, AG 8.   2/18/17: Na 140, K " 4.3, Cl 105, CO2 27, BUN 14, Cr 0.79, Ca 8.2, AG 8, Glc 214  17: Na 136, K 4.2, Cl 102, CO2 24, AG 10, , BUN 16, Cr 0.83, Neto 8.6.   17: Na 132, K 4.0, Cl 97, CO2 26, AG 9, , BUN 14, Cr 0.96, Neto 8.2, TProt 7.6, Albumin 3.4, TBil 0.8, , AST 58, ALT 19.     Other:  17  Pro calcitonin 0.12 (low infection risk).   C.Diff Positive  Micro Cultures with Moderate growth Candidiasis, light normal growth maikol  17  Influenza A/B/RSV: Negative  MRSA: Negative  Gram Stain   Culture: NGTD.   Sputum: Pending  Gram Stain: no organisms.  17  ABG: PH 7.34, PO2 81, PCO2 47, HCO3 25  Ammonia: 26  Influenza A/B/RSV: Negative  MRSA: Negative  Culture: NGTD.    NTBNP: 2649   ESR: 16  CRP: 217  Pro-Neto: 1.65    Imagin17  CXR for Dyspnea: Bilateral Interstitial opacities    Radiologist Impression:  Patchy mixed interstitial and airspace opacities over both  lungs, most likely representing pulmonary edema, but atypical  infection could have a similar appearance. No significant pleural  effusion. No pneumothorax.    Progress note as scribed by: Allen Zaidi, MS4    Supervising Hospitalist  ***, MD.

## 2017-02-21 NOTE — PROGRESS NOTES
[facility]  Hospitalist Progress Note  Frida Spear MD, MD 02/21/2017    Reason for Stay (Diagnosis): resp failure         Assessment and Plan:      Summary of Stay: Sabina Figueroa is a 41-year-old female with a medical history notable for smoking, chart history of chronic obstructive pulmonary disease and several recent admissions for acute respiratory failure who presented to the hospital 2/16 again for acute hypoxic respiratory failure. She had multiple admissions in the past for respiratory failure requiring workup with CT scan and bronchoscopy. She also had vasculitis workup which was negative. Patient was treated with antibiotics and prednisone taper. Her recurrent respiratory failure is suspected to be more like noninfectious(BOOP) than infectious etiology. She was started on steroids.       1. Recurrent hypoxic respiratory failure:  --Suspected to be secondary to  BOOP/ due to ongoing respiratory failure with persistent bilateral infiltrates.  --Pulmonary added prednisone 1 g daily for 3 days followed by 1 mg/kg/day prednisone with a slow taper over 6 months for treatment of organizing pneumonia.( Please see note by  Dr. Yee on 2/16).  --Off BIPAP.   --Empirically treated for HCAP and completed course of antibiotics.   --Consider long term bactrim while on steroids     2. Clostridium difficile diarrhea: Stool for C.diff toxin B. PCR positive. Started on oral flagyl.       3. Active smoker: Advised cessation as above.   --Patient advised to quit smoking.     4. Presumed COPD, although unclear if she has truly had PFTs to formally diagnose this. This is a chart diagnosis.     5. Chronic pain syndrome on chronic Suboxone therapy.     6. Hyperglycemia: On  NPH 20 units BID.Dose decreased to 15 units BID today. Will monitor blood sugar closely. On discharge, she needs NPH dose tapered with prednisone taper to avoid hypoglycemia.       Code: Full    dvt ppx: lovenox    Dispo:  "Unknown at this time.anticipate d/c in 1-2 days if she remains stable. Can transfer to medical floor.         Interval History (Subjective):      Patient seen and examined.                   Physical Exam:      Last Vital Signs:  /78 (BP Location: Left arm)  Pulse 84  Temp 97.3  F (36.3  C) (Axillary)  Resp 18  Ht 1.702 m (5' 7\")  Wt 86.2 kg (190 lb 0.6 oz)  SpO2 98%  BMI 29.76 kg/m2      Intake/Output Summary (Last 24 hours) at 02/19/17 1229  Last data filed at 02/19/17 1000   Gross per 24 hour   Intake             2730 ml   Output             3275 ml   Net             -545 ml       Constitutional: Awake, alert, cooperative, no apparent distress   Respiratory: Clear to auscultation bilaterally, no crackles or wheezing   Cardiovascular: Regular rate and rhythm, normal S1 and S2, and no murmur noted   Abdomen: Normal bowel sounds, soft, non-distended, non-tender   Skin: No rashes, no cyanosis, dry to touch   Neuro: Alert and oriented x3, no weakness, numbness, memory loss   Extremities: No edema, normal range of motion   Other(s):        All other systems: Negative          Medications:      All current medications were reviewed with changes reflected in problem list.         Data:      All new lab and imaging data was reviewed.   Labs:    Recent Labs  Lab 02/21/17  0515 02/20/17  0529 02/19/17  0600   WBC 6.8 4.7 5.7   HGB 11.9 11.6* 11.5*   HCT 37.6 36.4 36.1   MCV 93 92 93   * 83* 82*      Imaging:   No results found for this or any previous visit (from the past 24 hour(s)).   "

## 2017-02-21 NOTE — PROGRESS NOTES
Patient remains off of BIPAP, Pt continue on 3L NC tolerated well, SpO2 mid to high 90's, BS coarse and diminished. Achieved IS 1500ml with a lot of encouragement. Nebs given as ordered. Will continue to monitor pt's respiratory status radha.    Emani Waller, RT  2/20/2017 6:11 PM

## 2017-02-21 NOTE — PROGRESS NOTES
Respiratory Therapy Note        Pt is receiving Duoneb Q4 hours with incruse QD.  Her breath sounds are diminished but clear with some fine crackles in the LLL.  Currently she is on 2 Lpm NC, SpO2 99%.    February 21, 2017 11:30 AM  Lincoln Lawrence

## 2017-02-21 NOTE — PLAN OF CARE
Problem: Patient Care Overview (Adult)  Goal: Plan of Care Review  ICU End of Shift Summary.  For vital signs and complete assessments, please see documentation flowsheets.      Pertinent assessments: LS diminished but clear, A&Ox4, frequent pain in abdomen controled with PRN norco, up to BSC with SBA, good output, tolerating regular diet, BGs in 200s covered per SSI  Major Shift Events: off BiPAP all day, weaned to 2L NC, transferred to med floor  Plan (Upcoming Events): home soon on regular pain meds and possibly O2  Discharge/Transfer Needs: home O2 set up     Bedside Shift Report Completed   Bedside Safety Check Completed

## 2017-02-21 NOTE — PLAN OF CARE
Problem: Goal/Outcome  Goal: Goal Outcome Summary  Outcome: No Change  Pt MOF.  Slept well.  Received norco 1 tablet q 4 hours for mid/lower back pain.  Maintaining sats on 2-3 LPM per NC.  Up to BSC voiding without difficulty. On PO steroids.  BG better.

## 2017-02-22 NOTE — CONSULTS
Pt is scheduled with pulmonary f/u.  DC AVS updated.  Will verify with pt that she is still agreeable with this f/u since it was set up during last hospitalization.  Pt isn't currently in her room or on the floor.  Following. Estella PARMAR CTS [id number]    Feb 23, 2017  1:30 PM CST   FULL PULMONARY FUNCTION with  PFL B   [facility] Pulmonary Function Testing ([facility])    [address] [place] [id number] [phone]               Feb 23, 2017  4:00 PM CST   (Arrive by 3:45 PM)   Return Visit with Jennifer Mane MD   [facility] ([facility])    [address] [place] [id number] [phone]        Pt is agreeable to f/u with pulmonary, but she said she doesn't have a ride since she is suppose to call several days before an appt.  I called [employer] [facility] and they said they have a taxi coming tomorrow at 12:45 to pick her up for her apt.  They will give her a courtesy call before they come.  It is a round trip ride.  Pt said she will call for her ride home from the hospital.  DC AVS updated.

## 2017-02-22 NOTE — PLAN OF CARE
Problem: Goal/Outcome  Goal: Goal Outcome Summary  Outcome: Improving  No stools this shift, norco given for pain x2. Up independently in room    Problem: Goal Outcome Summary  Goal: Goal Outcome Summary  Outcome: Improving  No stools this shift, norco given for pain x2. Up independently in room

## 2017-02-22 NOTE — PLAN OF CARE
Problem: Goal/Outcome  Goal: Goal Outcome Summary  A&Ox4, up independently, VSS. Pain in lower back controled with PRN norco. Tolerating regular diet. BG  with no SSI. Up walking ad reed. 2L NC PRN. Will d/c home today around . Needs insulin teaching prior to d/c.    Problem: Goal Outcome Summary  Goal: Goal Outcome Summary  A&Ox4, up independently, VSS. Pain in lower back controled with PRN norco. Tolerating regular diet. BG  with no SSI. Up walking ad reed. 2L NC PRN. Will d/c home today around . Needs insulin teaching prior to d/c.

## 2017-02-22 NOTE — DISCHARGE INSTRUCTIONS
You have a ride scheduled through your [employer] [facility] for February 23 for your pulmonary appt.  They will arrive at 12:45 to pick you up.  The taxi company will give you a courtesy call before they arrive.  This is for a round trip ride.

## 2017-02-22 NOTE — PROGRESS NOTES
"[facility]     Date: 2/21/2017    Admission Dx: PNA    Pulmonary History: COPD    Home Nebulizer/MDI Use: Albuterol prn    Home Oxygen: None    Acuity Level (RCAT flow sheet): 3    Aerosol Therapy initiated: Duoneb QID, Albuterol prn    Pulmonary Hygiene initiated: cough and deep breathing    Volume Expansion initiated: Incentive Spirometry    Current Oxygen Requirements: 2L NC    Current SpO2: 94%    Re-evaluation date: 2/24/2017      See \"RT Assessments\" flow sheet for patient assessment scoring and Acuity Level Details.                  "

## 2017-02-22 NOTE — PLAN OF CARE
Problem: Patient Care Overview (Adult)  Goal: Plan of Care Review  Pt arrived from ICU at 1730, VSS, pt independently in room, steady gait, showered,  A&Ox4, tolerating regular diet, pain controlled with norco prn q 4 hrs, LS diminished, room air, NPC, BS A&Ax4, passing gas, cdiff +, po flagyl,  and 168, loss of IV access, will continue to monitor and provide supportive cares.     Pt has own nicotine patch from home, dosage different (pt's is 21mg), locked in med room and labeled.

## 2017-02-22 NOTE — PLAN OF CARE
Problem: Goal/Outcome  Goal: Goal Outcome Summary  Outcome: Adequate for Discharge Date Met:  02/22/17  Pt to discharge to home with significant other for support   meds and glucometer are being filled at [facility], education on glucometer here through hospital machine- pt able to verbalize steps and used lancet to independently prick finger  Explained the importance of checking blood sugars 3 times a day while adjusting to home eating routine  Pt verbalizes understanding of MD appt tomorrow  Pt plans to take a cab home, wheelchair to transport to [facility]  Removed nicotine patch prior to leaving building  Prednisone to be tapered with follow up of PMD and pulmonology        Problem: Goal Outcome Summary  Goal: Goal Outcome Summary  Outcome: Adequate for Discharge Date Met:  02/22/17  Pt to discharge to home with significant other for support   meds and glucometer are being filled at [facility], education on glucometer here through hospital machine- pt able to verbalize steps and used lancet to independently prick finger  Explained the importance of checking blood sugars 3 times a day while adjusting to home eating routine  Pt verbalizes understanding of MD appt tomorrow  Pt plans to take a cab home, wheelchair to transport to [facility]  Removed nicotine patch prior to leaving building  Prednisone to be tapered with follow up of PMD and pulmonology

## 2017-02-23 NOTE — LETTER
2/23/2017       RE: Sabina Figueroa  [address]  [place] [facility] [place] [id number]     Dear Colleague,    Thank you for referring your patient, Sabina Figueroa, to the [facility] at [facility]. Please see a copy of my visit note below.    Reason for Visit  Sabina Figueroa is a 41 year old female who is seen for follow up of recurrent respiratory failure  Pulmonary HPI  Sabina is a 41-year-old female who I initially saw in the hospital.  She has been hospitalized approximately 5 times in the last year for respiratory failure with diffuse ground-glass opacities.  Most recently she was discharged from [facility] yesterday.  Back in January when I first saw her, she had severe respiratory failure and was intubated due to this.  She had a bronchoscopy, which showed bloody return, suggestive of diffuse alveolar hemorrhage.  She was treated with pulse of steroids and workup for a vasculitis was negative.  When she was more stable, I performed transbronchial lung biopsies and the biopsies showed organizing pneumonia.  She also has a history of mitral regurgitation and alcoholic liver disease as well as opioid dependence.  She is currently on Suboxone for the latter and she has quit drinking alcohol altogether.  She has no other symptoms suggestive of systemic inflammatory disorder.  Cursory review of medications in comparison with known toxicities does not reveal any likely causative agents.  She is doing well since hospitalization and was discharged with 60 mg of prednisone.  She is also on Bactrim for prophylaxis.  She reports feeling well, but is noticing a change in her vision on the prednisone and has been started on insulin but has not yet received a blood sugar monitoring device.   She is still smoking, is homeless and living with her boyfriend who smokes. She doesn't think she can ask him to stop smoking.   The patient was seen and  examined by Jennifer Mane MD   Current Outpatient Prescriptions   Medication     insulin isophane human (HUMULIN N PEN) 100 UNIT/ML injection     predniSONE (DELTASONE) 20 MG tablet     sulfamethoxazole-trimethoprim (BACTRIM DS/SEPTRA DS) 800-160 MG per tablet     blood glucose monitoring (ACCU-CHEK TONIA PLUS) meter device kit     metroNIDAZOLE (FLAGYL) 500 MG tablet     nicotine (NICODERM CQ) 21 MG/24HR 24 hr patch     ascorbic acid (VITAMIN C) 500 MG tablet     FLUoxetine (PROZAC) 40 MG capsule     furosemide (LASIX) 20 MG tablet     gabapentin (NEURONTIN) 600 MG tablet     hydrOXYzine (ATARAX) 10 MG tablet     lactulose (CHRONULAC) 10 GM/15ML solution     omeprazole (PRILOSEC) 20 MG CR capsule     spironolactone (ALDACTONE) 50 MG tablet     thiamine 100 MG tablet     traZODone (DESYREL) 100 MG tablet     Omega-3 Fatty Acids (FISH OIL) 1200 MG CAPS     methocarbamol (ROBAXIN) 500 MG tablet     aspirin EC 81 MG EC tablet     atorvastatin (LIPITOR) 20 MG tablet     rifaximin (XIFAXAN) 550 MG TABS tablet     ferrous sulfate (IRON) 325 (65 FE) MG tablet     albuterol (2.5 MG/3ML) 0.083% neb solution     buprenorphine-naloxone (SUBOXONE) 8-2 MG SUBL sublingual tablet     tiotropium (SPIRIVA) 18 MCG inhalation capsule     mirtazapine (REMERON) 30 MG tablet     MULTIPLE VITAMINS PO     folic acid (FOLVITE) 1 MG tablet     alum & mag hydroxide-simethicone (MYLANTA ES/MAALOX  ES) 400-400-40 MG/5ML SUSP     albuterol (PROAIR HFA, PROVENTIL HFA, VENTOLIN HFA) 108 (90 BASE) MCG/ACT inhaler     No current facility-administered medications for this visit.      No Known Allergies  Social History     Social History     Marital status: Single     Spouse name: N/A     Number of children: N/A     Years of education: N/A     Occupational History     Not on file.     Social History Main Topics     Smoking status: Current Every Day Smoker     Packs/day: 1.00     Years: 18.00     Types: Cigarettes     Smokeless tobacco: Former  User     Quit date: 2016     Alcohol use 0.0 oz/week     0 Standard drinks or equivalent per week      Comment: 8 drinks or more each day, abstinent since 4/2/15. History of CD treatment in past x1     Drug use: No     Sexual activity: Not on file     Other Topics Concern     Not on file     Social History Narrative    Denies illicit IV or intranasal drug use    No tattoos or piercings     Past Medical History   Diagnosis Date     ALC (alcoholic liver cirrhosis) (H)      Alcohol abuse      Anxiety      Arthritis      Ascites      Asthma      Bunion, left foot      Chronic low back pain      Narcotic agreement signed in [facility] system     COPD (chronic obstructive pulmonary disease) (H)      Depression      Hypertension      current     Ovarian cyst, left      Past Surgical History   Procedure Laterality Date     Knee surgery       x3      section       Esophagoscopy, gastroscopy, duodenoscopy (egd), combined  2014     Procedure: COMBINED ESOPHAGOSCOPY, GASTROSCOPY, DUODENOSCOPY (EGD);  Surgeon: Brittany Lowe MD;  Location:  GI     Esophagoscopy, gastroscopy, duodenoscopy (egd), combined N/A 2015     Procedure: COMBINED ESOPHAGOSCOPY, GASTROSCOPY, DUODENOSCOPY (EGD);  Surgeon: London Lenz MD;  Location:  GI     Esophagoscopy, gastroscopy, duodenoscopy (egd), combined N/A 2015     Procedure: COMBINED ESOPHAGOSCOPY, GASTROSCOPY, DUODENOSCOPY (EGD);  Surgeon: Barry Chavez MD;  Location:  GI     Bronchoscopy flexible N/A 1/10/2017     Procedure: BRONCHOSCOPY FLEXIBLE;  Surgeon: Jennifer Mane MD;  Location:  GI     Bronchoscopy flexible N/A 2017     Procedure: BRONCHOSCOPY FLEXIBLE;  Surgeon: Jennifer Mane MD;  Location:  OR     Family History   Problem Relation Age of Onset     Depression Mother      Anxiety Disorder Mother      MENTAL ILLNESS Mother      Substance Abuse Mother      Depression Father      Anxiety Disorder Father      Substance  "Abuse Father      MENTAL ILLNESS Father      Depression Daughter      Depression Brother      Schizophrenia Brother      Depression Brother      Anxiety Disorder Brother      Substance Abuse Brother      ROS Pulmonary  Dyspnea: No, Cough: No, Chest pain: No, Wheezing: No, Sputum Production: No, Hemoptysis: No  A complete ROS was otherwise negative except as noted in the HPI.  /74  Pulse 99  Ht 1.695 m (5' 6.75\")  Wt 77.1 kg (170 lb)  SpO2 95%  BMI 26.83 kg/m2  Exam:   GENERAL APPEARANCE: Well developed, well nourished, alert, and in no apparent distress.  EYES: PERRL, EOMI  HENT: Nasal mucosa with no edema and no hyperemia. No nasal polyps.  EARS: Canals clear, TMs normal  MOUTH: Oral mucosa is moist, without any lesions, no tonsillar enlargement, no oropharyngeal exudate.  NECK: supple, no masses, no thyromegaly.  LYMPHATICS: No significant axillary, cervical, or supraclavicular nodes.  RESP: normal percussion, good air flow throughout.  No crackles. No rhonchi. Few inspiratory squeaks.  CV: Normal S1, S2, regular rhythm, normal rate. No murmur.  No rub. No gallop. No LE edema.   ABDOMEN:  Bowel sounds normal, soft, nontender, no HSM or masses.   MS: extremities normal. No clubbing. No cyanosis.  SKIN: no rash on limited exam  NEURO: Mentation intact, speech normal, normal strength and tone, normal gait and stance  PSYCH: mentation appears normal. and affect normal/bright  Results:  PFTs today mildly abnormal, nonspecific pattern  CXR 2/15 shows bilateral alveolar infiltrates    Assessment and plan: Sabina is a 41-year-old female who is a recovering alcoholic and opioid addict.  She has had multiple episodes of respiratory failure, with increasing severity and frequency over the last 9-12 months.  She had transbronchial lung biopsies after an episode of respiratory failure requiring endotracheal intubation in 01/2017 showing organizing pneumonia.  She had PFTs for the first time today which were mildly abnormal " in a nonspecific pattern.  I spent 30 minutes with Sabina, the majority of which was in counseling and coordination of care.  I explained to her the diagnosis of organizing pneumonia and our search for underlying etiologies.  I will have her perform some labs today to evaluate for other systemic inflammatory disorders.  Her medications have been reviewed and do not appear to be contributory to this organizing pneumonia.  I have explained to her that she will be on prednisone long-term.  She is also taking Bactrim for Pneumocystis prophylaxis.  I have provided her a prednisone taper schedule.  I will plan to see her back in approximately 2 months with a repeat chest x-ray at that time and then will plan to perform pulmonary function tests at our next followup visit.  I anticipate she will be on prednisone continuously for at least 6, possibly 12-month.       I have instructed her to stop taking Spiriva, she does not have COPD. In the future we may consider inhaled steroids to reduce systemic steroid needs.     Again, thank you for allowing me to participate in the care of your patient.      Sincerely,    Jennifer Mane MD

## 2017-02-23 NOTE — PATIENT INSTRUCTIONS
Continue metronidazole for a week total (this is for clostridium difficile colon infection)  The other antibiotic sulfamethoxazole-trimethaprim is to prevent infection as long as you are on prednisone more than 30mg per day.     Sign up for MyChart we can communicate, not for urgent purposes, but good for routine questions and concerns    Prednisone dosing:   Continue 60mg every day for 2 weeks  On March 9, decrease to 40 mg every day   On April 9, decrease to 30mg every day  Stay on 30mg until you see me back in clinic.    You DO NOT have COPD. You don't need Spiriva inhaler. Stop taking it.

## 2017-02-23 NOTE — DISCHARGE SUMMARY
PRIMARY CARE PHYSICIAN:  Dr. Aidee Hemphill at the [facility] in [place], [place].       PULMONOLOGIST:  Dr. Jennifer Rodriguez.       DATE OF ADMISSION:  02/16/2017.      DATE OF DISCHARGE:  02/22/2017.       PRESENTING COMPLAINT:  Shortness of breath, fever and chills.      DISCHARGE DIAGNOSES:   1.  Acute, recurrent hypoxic respiratory failure.   2.  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   3.  Clostridium difficile colitis in the setting of antibiotics.   4.  Tobacco abuse.   5.  Steroid-induced hyperglycemia.   6.  Underlying alcoholic cirrhosis.   7.  Chronic pain on Suboxone therapy.      PROCEDURES DURING THIS HOSPITALIZATION:  None.      HOSPITAL COURSE:  Ms. Sabina Figueroa is a 41-year-old female with a history of alcohol-related cirrhosis, chronic pain syndrome on Suboxone, who was admitted on 02/16/2017 for her third hospitalization in the last 8 months for hypoxic respiratory failure.  She also carries a questionable diagnosis of COPD in the setting of chronic tobacco abuse, but that has never been formally established.      She was most recently hospitalized here in January and had an extensive evaluation at that time, which included a chest CT along with bronchoscopy and bronchoscopy results notable for negative culture and fairly unremarkable biopsy showing of a pattern of organizing pneumonia.  Vasculitis workup at that time was also negative.  She was treated with antibiotics in a brief prednisone taper on discharge from the hospital.  Her total admission date at that time was from 01/07/2017 to 01/18/2017.  She then re-presented on 02/16/2017, again with hypoxemic respiratory failure.  She did require BiPAP rescue therapy in the Emergency Department and admission to the Intensive Care Unit.  Given her subjective count of fevers and chills, she was empirically placed on antibiotics as well as steroids for presumed atypical infectious process.   Echocardiogram was pursued at her last hospitalization and was noted to be within normal limits, except for some mild mitral regurgitation.  So this was not felt related to an acute pulmonary edema type picture.  Evaluation did also include a chest x-ray which showed fluffy infiltrates with some interstitial changes and so she was placed on broad-spectrum antibiotics with vancomycin, Zosyn and Levaquin, and plans were that if she failed to respond to antibiotics that there was a low threshold for starting steroids.  She did ultimately get weaned off of her antibiotics, she got started on steroids and I did discuss the case with the pulmonologist who saw her at her last hospitalization who feels like what she has is actually bronchiolitis obliterans with organizing pneumonia/cryptogenic organizing pneumonia.  She has been placed on high-dose steroids and now has been weaned to prednisone and she will go out on a slow prednisone taper; 60 mg for the next 2 weeks and then down to 40 mg a day.  She has an appointment with Dr. Mane tomorrow at 1:30 and I have told her it is imperative that she keep this followup appointment for ongoing evaluation and determination of further steroid treatment and to discuss her diagnosis with the specialist.  Furthermore, I have initiated her Bactrim for PJP prophylaxis in the setting of chronic steroid use.  It is likely that she is going to need steroids for up to 6 months, possibly more.  Regarding her hypoxic respiratory failure; that has dramatically improved, she is actually off of all supplemental oxygen and will be discharged without oxygen.      C. diff.  She did develop some diarrhea during this hospitalization; C. diff was appropriately sent in the setting of exposure to multiple rounds of antibiotics in the recent past as well as multiple hospitalizations, and it has returned positive for C. diff toxin.  She has been placed on metronidazole and she will complete a 7-day  course at this time.      Tobacco abuse.  She has been counseled that this is terrible for her ongoing lung process and that she needs to stop this and she has got a nicotine patch at home, but she has actively been smoking during this hospitalization.      Steroid-induced hyperglycemia.  Her blood sugars were quite high when she was on high-dose IV steroids and they have improved with the institution of just prednisone.  She was on prednisone at 80 mg a day and I am going to be decreasing her on 60 mg a day.  I have ordered coverage with NPH insulin at 15 units a day and I have recommended b.i.d. blood sugar testing.  Therefore, I sent her out with a glucometer kit as well as some NPH insulin; her NPH insulin will need to be weaned as her steroids are weaned as well.      Alcoholic cirrhosis without evidence of exacerbation.      Chronic pain on Suboxone.      DISCHARGE MEDICATIONS:   NEW MEDICATIONS:   1.  NPH insulin 15 units subq every morning; this will require tapering as her prednisone is tapered in the outpatient setting.   2.  Metronidazole 500 mg p.o. t.i.d. for the next 7 days.   3.  Prednisone 60 mg p.o. every a.m. for 2 weeks and then decrease to 40 mg per day and then per Dr. Rodriguez.   4.  Sulfamethoxazole/trimethoprim 1 tablet 3 times a week.      CONTINUED MEDICATIONS WHICH ARE UNCHANGED:   1.  Albuterol nebulizing solution q.6h. p.r.n. shortness of breath.   2.  Simethicone 30 mL q.4h. p.r.n. indigestion.   3.  Vitamin C 500 mg p.o. daily.   4.  Aspirin 81 mg p.o. daily.   5.  Atorvastatin 20 mg p.o. daily.   6.  Suboxone 3 tablets under the tongue daily.   7.  Ferrous sulfate 325 mg p.o. daily.   8.  Fish oil 1200 mg p.o. daily.   9.  Fluoxetine 40 mg p.o. daily.   10.  Folic acid 1 mg p.o. daily.   11.  Furosemide 20 mg p.o. daily.   12.  Gabapentin 600 mg p.o. t.i.d.   13.  Hydroxyzine 10 mg every 8 hours p.r.n. itching.   14.  Lactulose 20 grams by mouth 3 daily.   15.  Mirtazapine 30 mg p.o. at  bedtime.   16.  Multivitamin 1 tablet p.o. daily.   17.  Nicotine 21 mg patch onto the skin daily.   18.  Omeprazole 20 mg p.o. daily.   19.  Rifaximin 550 mg p.o. b.i.d.   20.  Robaxin 1000 mg p.o. 4 times daily.   21.  Spironolactone 50 mg p.o. daily.   22.  Thiamine 100 mg p.o. daily.   23.  Tiotropium 18 mcg inhalation capsule into the lungs daily.   24.  Trazodone 100 mg p.o. at bedtime.      DISCHARGE PHYSICAL EXAMINATION:   VITAL SIGNS:  Blood pressures are in the 1-teens to 120s/60s-80s, heart rate in the 60s-70s, respiratory rate is 18, sats are 94% on 2 liters.  She is afebrile at 96.8 degrees.   IN GENERAL:  This is a pleasant female.  She is in no acute distress.  She looks her stated age.   HEAD:  Normocephalic, atraumatic.  Sclerae are clear.   RESPIRATORY:  Lungs are clear to auscultation.  She exhibits normal respiratory effort.   CARDIOVASCULAR:  Heart is of a regular rate and rhythm without murmurs, rubs or gallops.   ABDOMEN:  Soft, nontender, nondistended.   SKIN:  Warm and dry and there is no cyanosis or clubbing of the extremities.   EXTREMITIES:  She is moving all extremities.     NEUROLOGIC:  Affect is appropriate.  She is ambulating without difficulty.      DISCHARGE INSTRUCTIONS:   1.  She is discharged home in good condition.  She has been instructed to follow up with Dr. Rodriguez as scheduled at the [facility] tomorrow at 1:30 p.m.  I have stressed the incredible importance of keeping this appointment.   2.  I have asked that she follow up with her primary MD in the next 1-2 weeks as she will need an insulin taper to match her prednisone taper.        Note that 45 minutes was spent on this discharge, of which greater than 50% of the time was spent in coordination of care.         STEFFANY SHEEHAN MD             D: 2017 16:35   T: 2017 21:41   MT: [id number]      Name:     SYDNI GLEZ   MRN:      [id number]        Account:        [id number]   :      1975            Admit Date:     [id number]                                  Discharge Date: 02/22/2017      Document: [id number]       cc: Jennifer RUSSELL MD

## 2017-02-23 NOTE — PROGRESS NOTES
Reason for Visit  Sabina Figueroa is a 41 year old female who is seen for follow up of recurrent respiratory failure  Pulmonary HPI  Sabina is a 41-year-old female who I initially saw in the hospital.  She has been hospitalized approximately 5 times in the last year for respiratory failure with diffuse ground-glass opacities.  Most recently she was discharged from [facility] yesterday.  Back in January when I first saw her, she had severe respiratory failure and was intubated due to this.  She had a bronchoscopy, which showed bloody return, suggestive of diffuse alveolar hemorrhage.  She was treated with pulse of steroids and workup for a vasculitis was negative.  When she was more stable, I performed transbronchial lung biopsies and the biopsies showed organizing pneumonia.  She also has a history of mitral regurgitation and alcoholic liver disease as well as opioid dependence.  She is currently on Suboxone for the latter and she has quit drinking alcohol altogether.  She has no other symptoms suggestive of systemic inflammatory disorder.  Cursory review of medications in comparison with known toxicities does not reveal any likely causative agents.  She is doing well since hospitalization and was discharged with 60 mg of prednisone.  She is also on Bactrim for prophylaxis.  She reports feeling well, but is noticing a change in her vision on the prednisone and has been started on insulin but has not yet received a blood sugar monitoring device.   She is still smoking, is homeless and living with her boyfriend who smokes. She doesn't think she can ask him to stop smoking.   The patient was seen and examined by Jennifer Mane MD   Current Outpatient Prescriptions   Medication     insulin isophane human (HUMULIN N PEN) 100 UNIT/ML injection     predniSONE (DELTASONE) 20 MG tablet     sulfamethoxazole-trimethoprim (BACTRIM DS/SEPTRA DS) 800-160 MG per tablet     blood glucose monitoring (ACCU-CHEK TONIA PLUS)  meter device kit     metroNIDAZOLE (FLAGYL) 500 MG tablet     nicotine (NICODERM CQ) 21 MG/24HR 24 hr patch     ascorbic acid (VITAMIN C) 500 MG tablet     FLUoxetine (PROZAC) 40 MG capsule     furosemide (LASIX) 20 MG tablet     gabapentin (NEURONTIN) 600 MG tablet     hydrOXYzine (ATARAX) 10 MG tablet     lactulose (CHRONULAC) 10 GM/15ML solution     omeprazole (PRILOSEC) 20 MG CR capsule     spironolactone (ALDACTONE) 50 MG tablet     thiamine 100 MG tablet     traZODone (DESYREL) 100 MG tablet     Omega-3 Fatty Acids (FISH OIL) 1200 MG CAPS     methocarbamol (ROBAXIN) 500 MG tablet     aspirin EC 81 MG EC tablet     atorvastatin (LIPITOR) 20 MG tablet     rifaximin (XIFAXAN) 550 MG TABS tablet     ferrous sulfate (IRON) 325 (65 FE) MG tablet     albuterol (2.5 MG/3ML) 0.083% neb solution     buprenorphine-naloxone (SUBOXONE) 8-2 MG SUBL sublingual tablet     tiotropium (SPIRIVA) 18 MCG inhalation capsule     mirtazapine (REMERON) 30 MG tablet     MULTIPLE VITAMINS PO     folic acid (FOLVITE) 1 MG tablet     alum & mag hydroxide-simethicone (MYLANTA ES/MAALOX  ES) 400-400-40 MG/5ML SUSP     albuterol (PROAIR HFA, PROVENTIL HFA, VENTOLIN HFA) 108 (90 BASE) MCG/ACT inhaler     No current facility-administered medications for this visit.      No Known Allergies  Social History     Social History     Marital status: Single     Spouse name: N/A     Number of children: N/A     Years of education: N/A     Occupational History     Not on file.     Social History Main Topics     Smoking status: Current Every Day Smoker     Packs/day: 1.00     Years: 18.00     Types: Cigarettes     Smokeless tobacco: Former User     Quit date: 5/11/2016     Alcohol use 0.0 oz/week     0 Standard drinks or equivalent per week      Comment: 8 drinks or more each day, abstinent since 4/2/15. History of CD treatment in past x1     Drug use: No     Sexual activity: Not on file     Other Topics Concern     Not on file     Social History  Narrative    Denies illicit IV or intranasal drug use    No tattoos or piercings     Past Medical History   Diagnosis Date     ALC (alcoholic liver cirrhosis) (H)      Alcohol abuse      Anxiety      Arthritis      Ascites      Asthma      Bunion, left foot      Chronic low back pain      Narcotic agreement signed in [facility] system     COPD (chronic obstructive pulmonary disease) (H)      Depression      Hypertension      current     Ovarian cyst, left      Past Surgical History   Procedure Laterality Date     Knee surgery       x3      section       Esophagoscopy, gastroscopy, duodenoscopy (egd), combined  2014     Procedure: COMBINED ESOPHAGOSCOPY, GASTROSCOPY, DUODENOSCOPY (EGD);  Surgeon: Brittany Lowe MD;  Location:  GI     Esophagoscopy, gastroscopy, duodenoscopy (egd), combined N/A 2015     Procedure: COMBINED ESOPHAGOSCOPY, GASTROSCOPY, DUODENOSCOPY (EGD);  Surgeon: London Lenz MD;  Location:  GI     Esophagoscopy, gastroscopy, duodenoscopy (egd), combined N/A 2015     Procedure: COMBINED ESOPHAGOSCOPY, GASTROSCOPY, DUODENOSCOPY (EGD);  Surgeon: Barry Chavez MD;  Location:  GI     Bronchoscopy flexible N/A 1/10/2017     Procedure: BRONCHOSCOPY FLEXIBLE;  Surgeon: Jennifer Mane MD;  Location:  GI     Bronchoscopy flexible N/A 2017     Procedure: BRONCHOSCOPY FLEXIBLE;  Surgeon: Jennifer Mane MD;  Location:  OR     Family History   Problem Relation Age of Onset     Depression Mother      Anxiety Disorder Mother      MENTAL ILLNESS Mother      Substance Abuse Mother      Depression Father      Anxiety Disorder Father      Substance Abuse Father      MENTAL ILLNESS Father      Depression Daughter      Depression Brother      Schizophrenia Brother      Depression Brother      Anxiety Disorder Brother      Substance Abuse Brother      ROS Pulmonary  Dyspnea: No, Cough: No, Chest pain: No, Wheezing: No, Sputum Production: No, Hemoptysis: No  A  "complete ROS was otherwise negative except as noted in the HPI.  /74  Pulse 99  Ht 1.695 m (5' 6.75\")  Wt 77.1 kg (170 lb)  SpO2 95%  BMI 26.83 kg/m2  Exam:   GENERAL APPEARANCE: Well developed, well nourished, alert, and in no apparent distress.  EYES: PERRL, EOMI  HENT: Nasal mucosa with no edema and no hyperemia. No nasal polyps.  EARS: Canals clear, TMs normal  MOUTH: Oral mucosa is moist, without any lesions, no tonsillar enlargement, no oropharyngeal exudate.  NECK: supple, no masses, no thyromegaly.  LYMPHATICS: No significant axillary, cervical, or supraclavicular nodes.  RESP: normal percussion, good air flow throughout.  No crackles. No rhonchi. Few inspiratory squeaks.  CV: Normal S1, S2, regular rhythm, normal rate. No murmur.  No rub. No gallop. No LE edema.   ABDOMEN:  Bowel sounds normal, soft, nontender, no HSM or masses.   MS: extremities normal. No clubbing. No cyanosis.  SKIN: no rash on limited exam  NEURO: Mentation intact, speech normal, normal strength and tone, normal gait and stance  PSYCH: mentation appears normal. and affect normal/bright  Results:  PFTs today mildly abnormal, nonspecific pattern  CXR 2/15 shows bilateral alveolar infiltrates    Assessment and plan: Sabina is a 41-year-old female who is a recovering alcoholic and opioid addict.  She has had multiple episodes of respiratory failure, with increasing severity and frequency over the last 9-12 months.  She had transbronchial lung biopsies after an episode of respiratory failure requiring endotracheal intubation in 01/2017 showing organizing pneumonia.  She had PFTs for the first time today which were mildly abnormal in a nonspecific pattern.  I spent 30 minutes with Sabina, the majority of which was in counseling and coordination of care.  I explained to her the diagnosis of organizing pneumonia and our search for underlying etiologies.  I will have her perform some labs today to evaluate for other systemic inflammatory " disorders.  Her medications have been reviewed and do not appear to be contributory to this organizing pneumonia.  I have explained to her that she will be on prednisone long-term.  She is also taking Bactrim for Pneumocystis prophylaxis.  I have provided her a prednisone taper schedule.  I will plan to see her back in approximately 2 months with a repeat chest x-ray at that time and then will plan to perform pulmonary function tests at our next followup visit.  I anticipate she will be on prednisone continuously for at least 6, possibly 12-month.       I have instructed her to stop taking Spiriva, she does not have COPD. In the future we may consider inhaled steroids to reduce systemic steroid needs.

## 2017-02-23 NOTE — TELEPHONE ENCOUNTER
MTM referral from: Transitions of Care (recent hospital discharge or ED visit)    MTM referral outreach attempt #1 on February 23, 2017 at 9:35 AM      Outcome: Patient is not interested at this time because they are an [facility] patient and information was passed along to them upon discharge, will route to MTM Pharmacist/Provider as an FYI. Thank you for the referral.     Azalia Puentes, MTM Coordinator

## 2017-02-23 NOTE — MR AVS SNAPSHOT
After Visit Summary   2/23/2017    Sabina Figueroa    MRN: [id number]           Patient Information     Date Of Birth          1975        Visit Information        Provider Department      2/23/2017 4:00 PM Jennifer Mane MD [facility]        Today's Diagnoses     Organizing pneumonia (H)    -  1      Care Instructions    Continue metronidazole for a week total (this is for clostridium difficile colon infection)  The other antibiotic sulfamethoxazole-trimethaprim is to prevent infection as long as you are on prednisone more than 30mg per day.     Sign up for MyChart we can communicate, not for urgent purposes, but good for routine questions and concerns    Prednisone dosing:   Continue 60mg every day for 2 weeks  On March 9, decrease to 40 mg every day   On April 9, decrease to 30mg every day  Stay on 30mg until you see me back in clinic.    You DO NOT have COPD. You don't need Spiriva inhaler. Stop taking it.        Follow-ups after your visit        Follow-up notes from your care team     Return in about 2 months (around 4/23/2017).      Your next 10 appointments already scheduled     Apr 10, 2017 11:20 AM CDT   (Arrive by 11:05 AM)   XR CHEST 2 VIEWS with UCXR1   [facility] ([facility] [place])    [address]  1st Floor  [place] [address] [phone]           Please bring a list of your current medicines to your exam. (Include vitamins, minerals and over-thecounter medicines.) Leave your valuables at home.  Tell your doctor if there is a chance you may be pregnant.  You do not need to do anything special for this exam.            Apr 10, 2017 12:00 PM CDT   (Arrive by 11:45 AM)   Return Visit with Jennifer Mane MD   [facility] ([facility])    [address]  3rd Floor  [place] [address] [phone]              Future  "tests that were ordered for you today     Open Future Orders        Priority Expected Expires Ordered    XR Chest 2 Views Routine 2017            Who to contact     If you have questions or need follow up information about today's clinic visit or your schedule please contact [facility] directly at [phone].  Normal or non-critical lab and imaging results will be communicated to you by Jukelyhart, letter or phone within 4 business days after the clinic has received the results. If you do not hear from us within 7 days, please contact the clinic through Jukelyhart or phone. If you have a critical or abnormal lab result, we will notify you by phone as soon as possible.  Submit refill requests through Funbuilt or call your pharmacy and they will forward the refill request to us. Please allow 3 business days for your refill to be completed.          Additional Information About Your Visit        Funbuilt Information     Funbuilt lets you send messages to your doctor, view your test results, renew your prescriptions, schedule appointments and more. To sign up, go to [email or web address] . Click on \"Log in\" on the left side of the screen, which will take you to the Welcome page. Then click on \"Sign up Now\" on the right side of the page.     You will be asked to enter the access code listed below, as well as some personal information. Please follow the directions to create your username and password.     Your access code is: [email or web address]  Expires: 3/7/2017  4:37 PM     Your access code will  in 90 days. If you need help or a new code, please call your [facility] clinic or [phone].        Care EveryWhere ID     This is your Care EveryWhere ID. This could be used by other organizations to access your [facility] medical records  [id number]        Your Vitals Were     Pulse Height Pulse Oximetry BMI (Body Mass Index)          99 1.695 m (5' 6.75\") 95% 26.83 kg/m2  "        Blood Pressure from Last 3 Encounters:   02/23/17 119/74   02/22/17 114/63   01/18/17 112/62    Weight from Last 3 Encounters:   02/23/17 77.1 kg (170 lb)   02/22/17 80.4 kg (177 lb 3.2 oz)   01/17/17 79.5 kg (175 lb 3.2 oz)               Primary Care Provider Office Phone # Fax #    Aidee Hemphill [phone]       [facility] [address] [place] [address]        Thank you!     Thank you for [facility]  for your care. Our goal is always to provide you with excellent care. Hearing back from our patients is one way we can continue to improve our services. Please take a few minutes to complete the written survey that you may receive in the mail after your visit with us. Thank you!             Your Updated Medication List - Protect others around you: Learn how to safely use, store and throw away your medicines at www.disposemymeds.org.          This list is accurate as of: 2/23/17 11:59 PM.  Always use your most recent med list.                   Brand Name Dispense Instructions for use    * albuterol 108 (90 BASE) MCG/ACT Inhaler    PROAIR HFA/PROVENTIL HFA/VENTOLIN HFA     Inhale 2 puffs into the lungs every 4 hours as needed for shortness of breath / dyspnea or wheezing Reported on 2/23/2017       * albuterol (2.5 MG/3ML) 0.083% neb solution      Take 1 vial by nebulization every 6 hours as needed for shortness of breath / dyspnea or wheezing       alum & mag hydroxide-simethicone 400-400-40 MG/5ML Susp suspension    MYLANTA ES/MAALOX  ES     Take 30 mLs by mouth every 4 hours as needed for indigestion Reported on 2/23/2017       ascorbic acid 500 MG tablet    VITAMIN C     Take 500 mg by mouth daily       aspirin 81 MG EC tablet     30 tablet    Take 1 tablet (81 mg) by mouth daily       atorvastatin 20 MG tablet    LIPITOR    30 tablet    Take 1 tablet (20 mg) by mouth daily       blood glucose monitoring meter device kit     1 kit     Use to test blood sugars BID times daily or as directed.       buprenorphine-naloxone 8-2 MG Subl sublingual tablet    SUBOXONE     Place 3 tablets under the tongue daily       ferrous sulfate 325 (65 FE) MG tablet    IRON     Take 325 mg by mouth daily (with breakfast)       Fish Oil 1200 MG Caps      Take 1 capsule by mouth daily       FLUoxetine 40 MG capsule    PROzac     Take 40 mg by mouth daily       folic acid 1 MG tablet    FOLVITE    30 tablet    Take 1 tablet (1 mg) by mouth daily       furosemide 20 MG tablet    LASIX     Take 20 mg by mouth daily       gabapentin 600 MG tablet    NEURONTIN     Take 600 mg by mouth 3 times daily       hydrOXYzine 10 MG tablet    ATARAX     Take 10 mg by mouth every 8 hours as needed for itching       insulin isophane human 100 UNIT/ML injection    HumuLIN N PEN    10 mL    Inject 15 Units Subcutaneous every morning       lactulose 10 GM/15ML solution    CHRONULAC     Take 20 g by mouth 3 times daily       metroNIDAZOLE 500 MG tablet    FLAGYL    21 tablet    Take 1 tablet (500 mg) by mouth 3 times daily       mirtazapine 30 MG tablet    REMERON    30 tablet    Take 1 tablet (30 mg) by mouth At Bedtime       MULTIPLE VITAMINS PO      Take 1 tablet by mouth daily       nicotine 21 MG/24HR 24 hr patch    NICODERM CQ    30 patch    Place 1 patch onto the skin daily       omeprazole 20 MG CR capsule    priLOSEC     Take 20 mg by mouth daily       predniSONE 20 MG tablet    DELTASONE    100 tablet    60 mg po every am for 2 weeks, then decrease to 40 mg po qd, then per Dr. Mane       rifaximin 550 MG Tabs tablet    XIFAXAN    60 tablet    Take 1 tablet (550 mg) by mouth 2 times daily       ROBAXIN 500 MG tablet   Generic drug:  methocarbamol      Take 1,000 mg by mouth 4 times daily       spironolactone 50 MG tablet    ALDACTONE     Take 50 mg by mouth daily       sulfamethoxazole-trimethoprim 800-160 MG per tablet    BACTRIM DS/SEPTRA DS    30 tablet    Take 1 tablet by  mouth three times a week       thiamine 100 MG tablet      Take 100 mg by mouth daily       traZODone 100 MG tablet    DESYREL     Take 100 mg by mouth At Bedtime       * Notice:  This list has 2 medication(s) that are the same as other medications prescribed for you. Read the directions carefully, and ask your doctor or other care provider to review them with you.

## 2017-02-23 NOTE — NURSING NOTE
Chief Complaint   Patient presents with     Breathing Problem     Patient is being seen for follow up of breathing issues      Maryjane Sifuentes CMA at 4:00 PM on 2/23/2017

## 2017-02-23 NOTE — PROGRESS NOTES
Transition Communication Hand-off for Care Transitions to Next Level of Care Provider    Name: Sabina Figueroa  MRN #: [id number]  Primary Care Provider: Aidee Hemphill  Primary Care MD Name: Joby  Primary Clinic: [facility] [address] [address]  Primary Care Clinic Name: [facility]  Reason for Hospitalization:  Urinary retention [R33.9]  Acute respiratory failure with hypoxia (H) [J96.01]  Pneumonia of both lower lobes due to infectious organism [J18.9]  Admit Date/Time: 2/16/2017  9:23 AM  Discharge Date: 2/22/17  Payor Source: Payor: MEDICA / Plan: MEDICA [place] / Product Type: HMO /          Reason for Communication Hand-off Referral: Admission diagnoses: PN  Fragility  Non-adherence to plan of care related to:  Other active smoker  Avoidable readmission within 30 days  Other 3rd admission since december    Discharge Plan:       Concern for non-adherence with plan of care:   YES  Discharge Needs Assessment:  Needs       Most Recent Value    # of Referrals Placed by [facility]          Already enrolled in Tele-monitoring program and name of program:  NA  Follow-up specialty is recommended: Yes pulmonary and MTM    Follow-up plan:  Future Appointments  Date Time Provider Department Center   2/23/2017 1:30 PM  PFL B [facility]   2/23/2017 4:00 PM Jennifer Mane MD [facility]       Any outstanding tests or procedures:        Referrals     Future Labs/Procedures    Med Therapy Manage Referral     Comments:    Your provider has referred you to: **[facility] Scheduling (numerous locations) [phone]   [email or web address]    Reason for Referral: HX asthma/COPD/PNA adittng diagnosis, chronuic pain --SUBOXONE     The [facility] department will contact you to schedule an appointment.  You may also schedule the appointment by calling [phone].  For [facility]  patients, please call [phone] to confirm/schedule your appointment on the next business day.    This service is designed to help you get the most from your medications.  A specially trained Pharmacist will work closely with you and your providers to solve any questions, concerns, issues or problems related to your medications.    Please bring all of your prescription and non-prescription medications (such as vitamins, over-the-counter medications, and herbals) or a detailed medication list to your appointment.    If you have a glucose meter or other home monitoring information, please also bring this to your appointment (i.e. blood glucose log, blood pressure log, pain log, etc.).          F/U with Dr. Mane as scheduled at [facility] pulmonary clinic tomorrow at 130 pm   F/U with primary MD in 1-2 weeks, you will need insulin taper to match your prednisone taper                      Your next 10 appointments already scheduled      Feb 23, 2017  1:30 PM CST   FULL PULMONARY FUNCTION with  PFL B   [facility]monary Function Testing ([facility])    [address] [place] [address] [phone]              Feb 23, 2017  4:00 PM CST   (Arrive by 3:45 PM)   Return Visit with Jennifer Mane MD   [facility] ([facility])    [address] [place] [address] [phone]                Key Recommendations:  Readmit, active smoker with PNA/ILS? Has pulmonary f/u scheduled 2/23.  High dose steroids with taper-bg elevated-sent home on new insulin and with new glucometer.  Insulin will need to be tapered with steroid taper.      Sandra Walls [phone]  Sent via [place] to Dr Hemphill's RN CTS

## 2017-03-17 NOTE — ED NOTES
Face/neck  swelling since an angiogram 2 weeks ago. Patient has not been taking meds since Monday secondary to ETOH use.  Feeling suicidal and states if she was home she would hang herself.   Last drink at 1300 today. Patient alert and oriented x3.  Airway, breathing and circulation intact.

## 2017-03-17 NOTE — ED PROVIDER NOTES
History     Chief Complaint:  Facial swelling x 2 weeks     HPI   Sabina Figueroa is a 41 year old female with a history significant for obstructing pneumonia (on chronic steroids, taking 40 mg of Prednisone currently, non-O2 dependent) with chronic cough, alcohol abuse with liver cirrhosis and depression. She presents today for evaluation of swollen face. The swelling began 2 days after she had a coronary angiogram 2 weeks ago. She has not taken any of her medications for the last 4 days because she has been feeling nauseated. She denies vomiting or diarrhea. No fever. She has also began drinking alcohol for the last ~10 days, on a daily basis. Her last drink was at 1 PM today, vodka and orange juice. She is worried about herself, because she is not feeling well, she has not been able to take her medications and began drinking alcohol again. She would like to get detox and find out why she is puffy and get back on her medications. When she was home she started thinking about hanging herself, but admits to doing so in order to get admitted to the hospital, and that she wouldn't actually do it.     Allergies:  No Known Allergies     Medications:    Insuline  Prednisone  Bactrim  Metronidazole  Vit C  Fluoxetine  Furosemide  Gabapentin   Hydroxyzine  Lactulose  Omeprazole  Spironolactone  Vit B1  Methocarbamol   Trazodone  ASA 81  Atorvastatin  Rifaximin  Iron supplement  Buprenorphine-Naloxone  Mirtazapine   Mylanta ES  Albuterol inh  MVI  Vit B9     Past Medical History:    Alcohol abuse  Alcoholic liver cirrhosis  GI bleed  Alcoholic peripheral neuropathy  Anemia   Edema  Ascitis  COPD  Asthma   HTN  Hyponatremia   Anxiety  Depression  Left ovarian cyst   Left foot bunion  Arthritis  DDD  Bilateral low back pain with sciatica  Pneumonia 16  Respiratory failure x2    Past Surgical History:    Knee surgery x 3    EGD x 3  Bronchoscopy x 2    Family History:    Depression  Anxiety  Substance  abuse  Schizophrenia    Social History:  Marital Status:  Single [1]  Smoking status: current, 1 PPD x 18   Alcohol status: 8 drinks + / day, history of alcohol dependence treatment in the past.  Patient presents via EMS.  PCP: Aidee Hemphill A     Review of Systems   Constitutional: Negative for fever.   HENT: Positive for facial swelling.    Respiratory: Positive for cough.    Gastrointestinal: Positive for nausea. Negative for diarrhea and vomiting.   All other systems reviewed and are negative.      Physical Exam     Patient Vitals for the past 24 hrs:   BP Temp Pulse Heart Rate Resp SpO2   03/17/17 2315 (!) 144/96 - 98 - - 94 %   03/17/17 2215 (!) 144/106 - - - - 95 %   03/17/17 2200 (!) 154/126 - - - - 94 %   03/17/17 2000 123/77 - - - - 95 %   03/17/17 1900 (!) 171/103 - 117 - 22 97 %   03/17/17 1800 (!) 142/101 - - - - 97 %   03/17/17 1645 141/90 - - - - 97 %   03/17/17 1546 (!) 139/107 100.3  F (37.9  C) 104 104 20 95 %        Physical Exam  Nursing note and vitals reviewed.  Constitutional: Cooperative.   HENT:   Mouth/Throat: dry mucous membranes.   Swollen face. No dental pain. No sinus pain.   Eyes: EOMI, nonicteric sclera  Cardiovascular: Tachycardic, regular rhythm, no murmurs, rubs, or gallops  Pulmonary/Chest: Effort normal and breath sounds normal. No respiratory distress. No wheezes. No rales.   Abdominal: Soft. Nontender, nondistended, no guarding or rigidity. BS present.   Musculoskeletal: Normal range of motion.   Neurological: Alert. Moves all extremities spontaneously.   Skin: Skin is warm and dry. No rash noted.   Psychiatric: Normal mood and affect. Denies suicidal ideation with prompting.      Emergency Department Course     Laboratory:  Laboratory findings were communicated with the patient who voiced understanding of the findings.  1610: ISTAT lactate: pH 7.46, HCO3 25, Lactic acid 2.5   CBC: WBC 8.2, HGB 15.3, PLT 92  Blood cultures x2: no growth after 4 hrs  CMP: , Creatinine  "0.78  Lipase: 104  EtOH level: <0.01  1935:  Lactic Acid: 1.4     Interventions:  1605: NS 1,000 mL, IV   1744: NS 1,000 mL, IV  1811: Methocarbamol 750 mg, PO  1916: NS 1,000 mL, IV   1916: Zofran 4mg, IV  1947: Lorazepam 1 mg, PO    2136: Zofran 4mg, IV  2137: Lorazepam 1 mg, PO      Emergency Department Course:  Nursing notes and vitals reviewed.  I performed an exam of the patient as documented above.   The patient was placed on continuous pulse oximetry and cardiac monitoring.   A peripheral IV was established and blood was drawn for laboratory testing, results above.  The patient provided a urine sample here in the emergency department. This was sent for laboratory testing, findings above.  The patient was evaluated by a DEC .   At 2127 the patient was rechecked and was updated on the results of her laboratory studies.     Patient will be discharged for continuation of treatment at [facility] facility   I personally reviewed the findings with the patient and answered all related questions prior to discharge.    Impression & Plan      Medical Decision Making:  This is a 41 year old female who presents with multiple complaints including some facial swelling, alcohol withdrawal, chronic pain, among other complaints. Patient also reported some suicidal ideation to nursing stating that if she were sent home she would \"hang herself\". In my discussion with the patient, specifically discussing her threats to harm herself, she states that she only said that so that we would not send her home. This was further evaluated by DEC and she said the same thing to the , and at this point I feel comfortable that she does not have any active plans to harm herself. When asked why patient wanted to stay in the hospital, she states that she wants help withdrawing from alcohol. I did explain that this is not something we typically admit patients to the hospital for, and offered outpatient detox which she " does accept. I reviewed her medication and sent her with 3 days worth of medications that I think she needs over the next 3 days. I purposely held her insulin since she's only taking it because of the prednisone she's taking and her blood sugar is 150 here. Regarding her facial swelling, this does not appear to be infectious in origin as there is no erythema or induration. I doubt erysipelas or other cellulitis. This does not appear to be an extension from a sinus or dental infection. Patient has been on high doses of steroids for the last couple of weeks since she was discharged for her organizing pneumonia and this is the most likely reason for her facial swelling. Patient also has liver disease and has been drinking, and this may also be contributing. Pt also may not be taking her diuretics and is retaining fluid. Ultimately, no indication for admission to the hospital at this time. Discussed with [facility] and they accept patient. She is in stable condition at time of discharge. All questions were answered and she is in agreement with the plan.     Diagnosis:    ICD-10-CM    1. Alcohol withdrawal, uncomplicated (H) F10.230    2. Facial swelling R22.0        Disposition:   Discharge to detox facility    Discharge Medications:  New Prescriptions    BUPRENORPHINE-NALOXONE (SUBOXONE) 8-2 MG SUBL SUBLINGUAL TABLET    Place 3 tablets under the tongue daily for 3 days    FLUOXETINE (PROZAC) 40 MG CAPSULE    Take 1 capsule (40 mg) by mouth daily for 3 days    FUROSEMIDE (LASIX) 20 MG TABLET    Take 1 tablet (20 mg) by mouth daily for 3 days    GABAPENTIN (NEURONTIN) 600 MG TABLET    Take 1 tablet (600 mg) by mouth 3 times daily    LACTULOSE (CHRONULAC) 10 GM/15ML SOLUTION    Take 30 mLs (20 g) by mouth 3 times daily for 3 days    METHOCARBAMOL (ROBAXIN) 750 MG TABLET    Take 1 tablet (750 mg) by mouth 3 times daily as needed for muscle spasms    MIRTAZAPINE (REMERON) 30 MG TABLET    Take 1 tablet (30 mg) by  mouth At Bedtime for 3 days    PREDNISONE (DELTASONE) 20 MG TABLET    Take 2 tablets (40 mg) by mouth daily for 3 days    SPIRONOLACTONE (ALDACTONE) 50 MG TABLET    Take 1 tablet (50 mg) by mouth daily    TRAZODONE (DESYREL) 100 MG TABLET    Take 1 tablet (100 mg) by mouth nightly as needed for sleep       Scribe Disclosure:  I, Sherice Salazar, am serving as a scribe at 4:13 PM on 3/17/2017 to document services personally performed by No att. providers found, based on my observations and the provider's statements to me.    [facility] EMERGENCY DEPARTMENT        Josemanuel Damon MD  03/18/17 0139

## 2017-03-18 NOTE — ED NOTES
Pt resting in bed; reports NV x 1 now; states she feels jittery and going into withdrawal.  HR elevated.  Pt restless.

## 2017-03-18 NOTE — ED NOTES
Full report called to Lizeth PARMAR [facility].  Questions verified and answered; MD states pt does not need to be on insulin due to relatively normal blood glucose off insulin.  MD wrote RX for meds he feels she needs for the next 3 days.

## 2017-03-22 PROBLEM — A41.9 SEPSIS (H): Status: ACTIVE | Noted: 2017-01-01

## 2017-03-22 NOTE — IP AVS SNAPSHOT
"` ` Patient Information     Patient Name Sex     Sabina Figueroa ([id number]) Female 1975       Room Bed    [facility]8 0508-01      Patient Demographics     Address Phone    [address]  [place] [address] [phone] (Home) *Preferred*  none (Work)  [phone] (Mobile)      Patient Ethnicity & Race     Ethnic Group Patient Race    American White      Emergency Contact(s)     Name Relation Home Work Mobile    MALCOLM BRODERICK \"JJ\" Friend [phone] none    Peggy Figueroa Daughter [phone]    Sumeet Stokes Other   [phone]      Documents on File        Status Date Received Description       Documents for the Patient    Privacy Notice - [place] Received 10/06/11     Insurance Card Received 10/06/11     Patient ID Received 02/27/15 [place] 09/15/2014/ [facility] MEDICAID     Consent for Services - Hospital/Clinic  ()      Insurance Card Received 14 [facility] MEDICAID     Consent for Services - Hospital/Clinic Received () 12     External Medication Information Consent Accepted 12     Other Accepted 10/08/12 bill of rights    HIM LUZ Authorization  12     Consent for EHR Access  13 Copied from existing Consent for services - C/HOD collected on 2012    [facility] Specified Other  () 13 CONSENT FOR SERVICES -42 CFR    HIM LUZ Authorization - File Only  04/10/13 PILI JACKSON    HIM LUZ Authorization - File Only  13 DEC CONSENT FOR RELEASE OF INFORMATION 13    Consent for Services - Hospital/Clinic Received () 13     [facility] Specified Other Received but Refused Insurance Consent and Research () 14     Waiver - Payment Received 14 LP    HIM LUZ Authorization - File Only Received 14 Malcolm Broderick Friend    [facility] Specified Other Received () 14 [id number]    HIM LUZ Authorization - File Only  14 AUTHORIZATION TO RELEASE PHI-RELATED TO SUSPECTED MALTREATMENT    " HIM LUZ Authorization - File Only  10/22/14 DEC CONSENT FOR RELEASE OF INFORMATION 10/21/14    HIM LUZ Authorization - File Only  14 AUTHORIZATION TO RELEASE PHI-RELATED TO SUSPECTED MALTREATMENT    Consent for Services - Hospital/Clinic Received () 01/19/15     [facility]IM LUZ [place]  06/19/15 [place]    [place]IM LUZ Authorization  () 06/23/15 [facility] S[place]    Ins[place] Card Received 07/03/15 [employer]    HIM LUZ Authorization  07/09/15 [place]    HIM LUZ Authorization - File Only  07/15/15 [facility], LUZ 07/03/15    Insurance Card Received 08/04/15 [employer]    Consent to Communicate Received 09/08/15 PHI    Privacy Notice - [facility]  10/13/15 ACKNOWLEDGMENT OF RECEIPT OF NOTICE OF PRIVACY PRACTICE    Business/Insurance/Care Coordination/Health Form - Patient  10/20/15 [employer] AUTHORIZATION OF SERVICES 13 PT VISITS 07/13/15 - 11/21/15    Consent for Services/Privacy Notice - Hospital/Clinic Received () 16     HIM LUZ Authorization  16 [facility] Services    HIM LUZ Authorization  16 [facility] services-[facility]    Consent to Communicate  16 AUTHORIZATION TO DISCUSS PROTECTED HEALTH INFORMATION    HIM LUZ Authorization  16 [facility] SERVICES    HIM LUZ Authorization  16 [facility] SERVICE    [facility]    HIM LUZ Authorization  16 [place] [facility]    HIM LUZ Authorization  16 [facility]    Consent to Communicate  16 AUTHORIZATION TO DISCUSS PROTECTED HEALTH INFORMATION    Insurance Card Received 16 [place]    Patient ID Scan Refused 16     Consent for Services - [facility]       HIM LUZ Authorization - File Only  17 CARE EVERYWHERE    HIM LUZ Authorization  17 [facility]    HIM LUZ Authorization  17 [facility]    HIM LUZ Authorization  17 [facility] SERVICES-FM    External Medication Information Consent   (Deleted)      CE Prospective Auth Received () 16 Authorization Document from [employer]    System-Retrieved CE Auth Form Received () 16 External Authorization Form from [employer]    Patient Photo   Photo of Patient       Documents for the Encounter    CMS IM for Patient Signature         Admission Information     Attending Provider Admitting Provider Admission Type Admission Date/Time    Lvi Harley MD Mithani, Salima Aziz, MD Emergency 17    Discharge Date Hospital Service Auth/Cert Status Service Area     Hospitalist [facility]    Unit Room/Bed Admission Status        5 MEDICAL SURGICAL 0508/0508- Admission (Confirmed)       Admission     Complaint    Sepsis (H)      Hospital Account     Name Acct ID Class Status Primary Coverage    Sabina Figueroa [id number] Inpatient [place]            Guarantor Account (for Hospital Account #[id number])     Name Relation to Pt Service Area Active? Acct Type    Sabina Figueroa  FCS Yes Personal/Family    Address Phone          [address]  [place] [address] [phone](H)  none(O)              Coverage Information (for Hospital Account #[id number])     F/O Payor/Plan Precert #    [place]     Subscriber Subscriber #    Sabina Figueroa [id number]    Address Phone    [address] [address]  [place] [address] [phone]

## 2017-03-22 NOTE — IP AVS SNAPSHOT
MRN:[id number]                      After Visit Summary   3/22/2017    Sabina Figueroa    MRN: [id number]           Thank you!     Thank you for choosing [facility] for your care. Our goal is always to provide you with excellent care. Hearing back from our patients is one way we can continue to improve our services. Please take a few minutes to complete the written survey that you may receive in the mail after you visit. If you would like to speak to someone directly about your visit please contact Patient Relations at [phone]. Thank you!          Patient Information     Date Of Birth          1975        About your hospital stay     You were admitted on:  March 22, 2017 You last received care in the:  [address] [facility]    You were discharged on:  April 1, 2017       Who to Call     For medical emergencies, please call 911.  For non-urgent questions about your medical care, please call your primary care provider or clinic, [phone]          Attending Provider     Provider Specialty    Josemanuel Damon MD Emergency Medicine    [facility]       Primary Care Provider Office Phone # Fax #    Aidee Hemphill [phone]       [facility] [address] [place] [address]        After Care Instructions     Activity       Your activity upon discharge: activity as tolerated and no driving while on analgesics (or suboxone).            Diet       Follow this diet upon discharge: Orders Placed This Encounter      Room Service      Snacks/Supplements Adult: Other - Please comment; Cheese + meat plate @ 2 pm; With Meals      Consistent Carbohydrate Diet [id number] Calories: Moderate Consistent CHO (4-6 CHO units/meal)            Oxygen Adult       [facility] Order 2 liters by nasal cannula at rest and 4 liters with activity with use of portable tank. Expected treatment length is 1 months.. Test on  conserving device as applicable.    Patients who qualify for home O2 coverage under the CMS guidelines require ABG tests or O2 sat readings obtained closest to, but no earlier than 2 days prior to the discharge, as evidence of the need for home oxygen therapy. Testing must be performed while patient is in the chronic stable state. See notes for O2 sats.    I certify that this patient, Sabina Figueroa has been under my care and that I, or a nurse practitioner or physician's assistant working with me, had a face-to-face encounter that meets the face-to-face encounter requirements with this patient on 3/31/2017. The patient, Sabina Figueroa was evaluated or treated in whole, or in part, for the following medical condition, which necessitates the use of the ordered oxygen. Treatment Diagnosis:     Attending Provider: Anatoliy Dos Santos  Physician signature: See electronic signature associated with these discharge orders  Date of Order: March 31, 2017                  Follow-up Appointments     Follow-up and recommended labs and tests        Follow up with primary care provider, Aidee Hemphill, within 7 days for hospital follow- up.  The following labs/tests are recommended: BMP, CBC. Evaluate insulin and diabetic management.  Follow up with Dr Mane in pulmonary clinic as scheduled on 4/10.  Abstain from alcohol and tobacco use.  Continue prednisone 60 mg daily with Bactrim as ordered until follow up with Dr Mane.                  Your next 10 appointments already scheduled     Apr 10, 2017 11:20 AM CDT   (Arrive by 11:05 AM)   XR CHEST 2 VIEWS with UCXR1   [facility] ([facility])    [address] [address] [phone]           Please bring a list of your current medicines to your exam. (Include vitamins, minerals and over-thecounter medicines.) Leave your valuables at home.  Tell your doctor if there is a chance you may be  "pregnant.  You do not need to do anything special for this exam.            Apr 10, 2017 12:00 PM CDT   (Arrive by 11:45 AM)   Return Visit with Jennifer Mane MD   [facility] ([facility])    [address] [facility]  3rd Floor  [facility] [address] [phone]              Further instructions from your care team       Oxygen Provider:  Arranged through [facility], contact number [phone]. Home visit arranged for 4/1/2017 after patient discharges home, where home set up of equipment will occur. If you have any questions or concerns please call the oxygen company directly.  [facility] [place] -Lisa Valencia-Monday April 3 at 9am  [address], [address]  [place] [address] [phone] Phone  [phone] Fax    Phones answered  Monday - Thursday 8:00-5:00  Friday 8:00 - 4:00    Schedule Outpatient Sleep Study [phone] Sleep Clinic for appointment       Pending Results     No orders found from 3/20/2017 to 3/23/2017.            Statement of Approval     Ordered          04/01/17 0826  I have reviewed and agree with all the recommendations and orders detailed in this document.  EFFECTIVE NOW     Approved and electronically signed by:  Anatoliy Dos Santos DO             Admission Information     Date & Time Provider Department Dept. Phone    3/22/2017 Liv Harley MD [place] [address] Medical Surgical [phone]      Your Vitals Were     Blood Pressure Pulse Temperature Respirations Height Weight    100/57 (BP Location: Left arm) 91 97.7  F (36.5  C) (Axillary) 18 1.702 m (5' 7\") 80.5 kg (177 lb 8 oz)    Pulse Oximetry BMI (Body Mass Index)                97% 27.8 kg/m2          MyChart Information     [employer] lets you send messages to your doctor, view your test results, renew your prescriptions, schedule appointments and more. To sign up, go to [email or web address] . Click on \"Log in\" on " "the left side of the screen, which will take you to the Welcome page. Then click on \"Sign up Now\" on the right side of the page.     You will be asked to enter the access code listed below, as well as some personal information. Please follow the directions to create your username and password.     Your access code is: [email or web address]  Expires: 6/15/2017  5:43 PM     Your access code will  in 90 days. If you need help or a new code, please call your [facility] or [phone].        Care EveryWhere ID     This is your Care EveryWhere ID. This could be used by other organizations to access your [facility] medical records  [id number]           Review of your medicines      START taking        Dose / Directions    * insulin aspart 100 UNIT/ML injection   Commonly known as:  NovoLOG PEN   Used for:  Hyperglycemia        1 U per 15 g carbs   Quantity:  50 mL   Refills:  0       * insulin aspart 100 UNIT/ML injection   Commonly known as:  NovoLOG PEN   Used for:  Hyperglycemia        For Pre-Meal  - 189 give 1 unit.  For Pre-Meal  - 239 give 2 units.  For Pre-Meal  - 289 give 3 units.  For Pre-Meal  - 339 give 4 units.  For Pre-Meal -399 give 5 units. For Pre-Meal -449 give 6 units. For Pre-Meal BG = or > 450 give 7 units.   Quantity:  50 mL   Refills:  0       * insulin aspart 100 UNIT/ML injection   Commonly known as:  NovoLOG PEN   Used for:  Hyperglycemia        Bedtime Blood Glucose (BG) For  - 249 give 1 units.  For  - 299 give 2 units.  For  - 349 give 3 units. For - 399 give 4 units.  For BG = or > 400 give 5 units.   Quantity:  50 mL   Refills:  0       insulin glargine 100 UNIT/ML injection   Commonly known as:  LANTUS   Used for:  Hyperglycemia        Dose:  20 Units   Inject 20 Units Subcutaneous every morning (before breakfast)   Quantity:  50 mL   Refills:  0       * Notice:  This list has 3 medication(s) that are the same as other " medications prescribed for you. Read the directions carefully, and ask your doctor or other care provider to review them with you.      CONTINUE these medicines which may have CHANGED, or have new prescriptions. If we are uncertain of the size of tablets/capsules you have at home, strength may be listed as something that might have changed.        Dose / Directions    predniSONE 1 MG tablet   Commonly known as:  DELTASONE   This may have changed:    - medication strength  - how much to take        Dose:  60 mg   Take 60 tablets (60 mg) by mouth daily   Quantity:  30 tablet   Refills:  0       sulfamethoxazole-trimethoprim 800-160 MG per tablet   Commonly known as:  BACTRIM DS/SEPTRA DS   Indication:  pjp ppx   This may have changed:  additional instructions   Used for:  Bronchiolitis        Dose:  1 tablet   Take 1 tablet by mouth three times a week   Quantity:  30 tablet   Refills:  0         CONTINUE these medicines which have NOT CHANGED        Dose / Directions    * albuterol 108 (90 BASE) MCG/ACT Inhaler   Commonly known as:  PROAIR HFA/PROVENTIL HFA/VENTOLIN HFA        Dose:  2 puff   Inhale 2 puffs into the lungs every 4 hours as needed for shortness of breath / dyspnea or wheezing Reported on 2/23/2017   Refills:  0       * albuterol (2.5 MG/3ML) 0.083% neb solution        Dose:  1 vial   Take 1 vial by nebulization every 6 hours as needed for shortness of breath / dyspnea or wheezing   Refills:  0       alum & mag hydroxide-simethicone 400-400-40 MG/5ML Susp suspension   Commonly known as:  MYLANTA ES/MAALOX  ES        Dose:  30 mL   Take 30 mLs by mouth every 4 hours as needed for indigestion Reported on 2/23/2017   Refills:  0       ascorbic acid 500 MG tablet   Commonly known as:  VITAMIN C        Dose:  500 mg   Take 500 mg by mouth daily   Refills:  0       aspirin 81 MG EC tablet   Used for:  Elevated troponin I level        Dose:  81 mg   Take 1 tablet (81 mg) by mouth daily   Quantity:  30 tablet    Refills:  1       atorvastatin 20 MG tablet   Commonly known as:  LIPITOR   Used for:  Elevated troponin I level        Dose:  20 mg   Take 1 tablet (20 mg) by mouth daily   Quantity:  30 tablet   Refills:  1       blood glucose monitoring meter device kit   Used for:  Hyperglycemia        Use to test blood sugars BID times daily or as directed.   Quantity:  1 kit   Refills:  0       buprenorphine-naloxone 8-2 MG Subl sublingual tablet   Commonly known as:  SUBOXONE        Dose:  3 tablet   Place 3 tablets under the tongue daily   Refills:  0       ferrous sulfate 325 (65 FE) MG tablet   Commonly known as:  IRON        Dose:  325 mg   Take 325 mg by mouth daily (with breakfast)   Refills:  0       Fish Oil 1200 MG Caps        Dose:  1 capsule   Take 1 capsule by mouth daily   Refills:  0       FLUoxetine 40 MG capsule   Commonly known as:  PROzac        Dose:  40 mg   Take 40 mg by mouth daily   Refills:  0       folic acid 1 MG tablet   Commonly known as:  FOLVITE   Used for:  Alcoholic hepatitis, Alcohol dependence (H)        Dose:  1 mg   Take 1 tablet (1 mg) by mouth daily   Quantity:  30 tablet   Refills:  0       furosemide 20 MG tablet   Commonly known as:  LASIX        Dose:  20 mg   Take 20 mg by mouth daily   Refills:  0       gabapentin 600 MG tablet   Commonly known as:  NEURONTIN        Dose:  600 mg   Take 1 tablet (600 mg) by mouth 3 times daily   Quantity:  9 tablet   Refills:  0       hydrOXYzine 10 MG tablet   Commonly known as:  ATARAX        Dose:  10 mg   Take 10 mg by mouth every 8 hours as needed for itching   Refills:  0       lactulose 10 GM/15ML solution   Commonly known as:  CHRONULAC        Dose:  20 g   Take 20 g by mouth 3 times daily   Refills:  0       mirtazapine 30 MG tablet   Commonly known as:  REMERON   Used for:  Alcohol withdrawal, uncomplicated (H)        Dose:  30 mg   Take 1 tablet (30 mg) by mouth At Bedtime   Quantity:  30 tablet   Refills:  0       MULTIPLE VITAMINS PO    Used for:  Alcoholic hepatitis        Dose:  1 tablet   Take 1 tablet by mouth daily   Refills:  0       omeprazole 20 MG CR capsule   Commonly known as:  priLOSEC        Dose:  20 mg   Take 20 mg by mouth daily   Refills:  0       rifaximin 550 MG Tabs tablet   Commonly known as:  XIFAXAN   Used for:  Other ascites        Dose:  550 mg   Take 1 tablet (550 mg) by mouth 2 times daily   Quantity:  60 tablet   Refills:  0       ROBAXIN 500 MG tablet   Generic drug:  methocarbamol        Dose:  1000 mg   Take 1,000 mg by mouth 4 times daily   Refills:  0       spironolactone 50 MG tablet   Commonly known as:  ALDACTONE        Dose:  50 mg   Take 1 tablet (50 mg) by mouth daily   Quantity:  3 tablet   Refills:  0       thiamine 100 MG tablet        Dose:  100 mg   Take 100 mg by mouth daily   Refills:  0       tiotropium 18 MCG capsule   Commonly known as:  SPIRIVA        Dose:  18 mcg   Inhale 18 mcg into the lungs daily   Refills:  0       traZODone 100 MG tablet   Commonly known as:  DESYREL        Dose:  100 mg   Take 100 mg by mouth At Bedtime   Refills:  0       * Notice:  This list has 2 medication(s) that are the same as other medications prescribed for you. Read the directions carefully, and ask your doctor or other care provider to review them with you.      STOP taking     HumuLIN N  UNIT/ML injection   Generic drug:  insulin isophane human                Where to get your medicines      These medications were sent to [facility] [place] [address]     Phone:  [phone]     insulin aspart 100 UNIT/ML injection    insulin glargine 100 UNIT/ML injection    predniSONE 1 MG tablet         Some of these will need a paper prescription and others can be bought over the counter. Ask your nurse if you have questions.     Bring a paper prescription for each of these medications     insulin aspart 100 UNIT/ML injection     insulin aspart 100 UNIT/ML injection                Protect others around you: Learn how to safely use, store and throw away your medicines at www.disposemymeds.org.             Medication List: This is a list of all your medications and when to take them. Check marks below indicate your daily home schedule. Keep this list as a reference.      Medications           Morning Afternoon Evening Bedtime As Needed    * albuterol 108 (90 BASE) MCG/ACT Inhaler   Commonly known as:  PROAIR HFA/PROVENTIL HFA/VENTOLIN HFA   Inhale 2 puffs into the lungs every 4 hours as needed for shortness of breath / dyspnea or wheezing Reported on 2/23/2017                                   * albuterol (2.5 MG/3ML) 0.083% neb solution   Take 1 vial by nebulization every 6 hours as needed for shortness of breath / dyspnea or wheezing   Last time this was given:  2.5 mg on 3/23/2017 11:31 AM                                   alum & mag hydroxide-simethicone 400-400-40 MG/5ML Susp suspension   Commonly known as:  MYLANTA ES/MAALOX  ES   Take 30 mLs by mouth every 4 hours as needed for indigestion Reported on 2/23/2017                                   ascorbic acid 500 MG tablet   Commonly known as:  VITAMIN C   Take 500 mg by mouth daily   Last time this was given:  500 mg on 4/1/2017  7:53 AM                                   aspirin 81 MG EC tablet   Take 1 tablet (81 mg) by mouth daily   Last time this was given:  81 mg on 4/1/2017  7:52 AM                                   atorvastatin 20 MG tablet   Commonly known as:  LIPITOR   Take 1 tablet (20 mg) by mouth daily   Last time this was given:  20 mg on 4/1/2017  8:12 AM                                   blood glucose monitoring meter device kit   Use to test blood sugars BID times daily or as directed.                                buprenorphine-naloxone 8-2 MG Subl sublingual tablet   Commonly known as:  SUBOXONE   Place 3 tablets under the tongue daily                                    ferrous sulfate 325 (65 FE) MG tablet   Commonly known as:  IRON   Take 325 mg by mouth daily (with breakfast)   Last time this was given:  325 mg on 4/1/2017  7:49 AM                                   Fish Oil 1200 MG Caps   Take 1 capsule by mouth daily                                   FLUoxetine 40 MG capsule   Commonly known as:  PROzac   Take 40 mg by mouth daily   Last time this was given:  40 mg on 4/1/2017  7:49 AM                                   folic acid 1 MG tablet   Commonly known as:  FOLVITE   Take 1 tablet (1 mg) by mouth daily   Last time this was given:  1 mg on 4/1/2017  7:54 AM                                   furosemide 20 MG tablet   Commonly known as:  LASIX   Take 20 mg by mouth daily                                   gabapentin 600 MG tablet   Commonly known as:  NEURONTIN   Take 1 tablet (600 mg) by mouth 3 times daily   Last time this was given:  600 mg on 4/1/2017  7:52 AM                                         hydrOXYzine 10 MG tablet   Commonly known as:  ATARAX   Take 10 mg by mouth every 8 hours as needed for itching   Last time this was given:  10 mg on 3/30/2017  1:34 PM                                   * insulin aspart 100 UNIT/ML injection   Commonly known as:  NovoLOG PEN   1 U per 15 g carbs   Last time this was given:  10 Units on 4/1/2017  8:09 AM            With meals.                                * insulin aspart 100 UNIT/ML injection   Commonly known as:  NovoLOG PEN   For Pre-Meal  - 189 give 1 unit.  For Pre-Meal  - 239 give 2 units.  For Pre-Meal  - 289 give 3 units.  For Pre-Meal  - 339 give 4 units.  For Pre-Meal -399 give 5 units. For Pre-Meal -449 give 6 units. For Pre-Meal BG = or > 450 give 7 units.   Last time this was given:  10 Units on 4/1/2017  8:09 AM            With meals; in addition to the carb counting                             * insulin aspart 100 UNIT/ML injection   Commonly known as:  NovoLOG PEN    Bedtime Blood Glucose (BG) For  - 249 give 1 units.  For  - 299 give 2 units.  For  - 349 give 3 units. For - 399 give 4 units.  For BG = or > 400 give 5 units.   Last time this was given:  10 Units on 4/1/2017  8:09 AM                        Check blood sugar before bed.  This sliding scale is for bedtime only           insulin glargine 100 UNIT/ML injection   Commonly known as:  LANTUS   Inject 20 Units Subcutaneous every morning (before breakfast)   Last time this was given:  20 Units on 4/1/2017  7:51 AM                                   lactulose 10 GM/15ML solution   Commonly known as:  CHRONULAC   Take 20 g by mouth 3 times daily   Last time this was given:  20 g on 4/1/2017  7:54 AM                                         mirtazapine 30 MG tablet   Commonly known as:  REMERON   Take 1 tablet (30 mg) by mouth At Bedtime   Last time this was given:  30 mg on 3/31/2017  9:43 PM                                   MULTIPLE VITAMINS PO   Take 1 tablet by mouth daily                                   omeprazole 20 MG CR capsule   Commonly known as:  priLOSEC   Take 20 mg by mouth daily   Last time this was given:  20 mg on 4/1/2017  7:54 AM                                   predniSONE 1 MG tablet   Commonly known as:  DELTASONE   Take 60 tablets (60 mg) by mouth daily   Last time this was given:  60 mg on 4/1/2017  7:53 AM                                   rifaximin 550 MG Tabs tablet   Commonly known as:  XIFAXAN   Take 1 tablet (550 mg) by mouth 2 times daily   Last time this was given:  550 mg on 4/1/2017  7:50 AM                                      ROBAXIN 500 MG tablet   Take 1,000 mg by mouth 4 times daily   Last time this was given:  1,000 mg on 4/1/2017  7:50 AM   Generic drug:  methocarbamol                                            spironolactone 50 MG tablet   Commonly known as:  ALDACTONE   Take 1 tablet (50 mg) by mouth daily   Last time this was given:  50 mg on 4/1/2017   7:51 AM                                   sulfamethoxazole-trimethoprim 800-160 MG per tablet   Commonly known as:  BACTRIM DS/SEPTRA DS   Take 1 tablet by mouth three times a week   Last time this was given:  1 tablet on 3/31/2017  8:00 AM            3 times a week.  Resume previous schedule                       thiamine 100 MG tablet   Take 100 mg by mouth daily                                   tiotropium 18 MCG capsule   Commonly known as:  SPIRIVA   Inhale 18 mcg into the lungs daily                                   traZODone 100 MG tablet   Commonly known as:  DESYREL   Take 100 mg by mouth At Bedtime   Last time this was given:  100 mg on 3/31/2017  9:40 PM                                   * Notice:  This list has 5 medication(s) that are the same as other medications prescribed for you. Read the directions carefully, and ask your doctor or other care provider to review them with you.              More Information        Diabetes: Understanding Carbohydrates  A car needs the right type of fuel to run. And you need the right kind of food to function. To keep your energy level up, your body needs food that has carbohydrates. But carbohydrates raise blood sugar levels higher and faster than other kinds of food. Your dietitian will work with you to figure out the amount of carbohydrates you need.     Starches  Starches are found in grains, some vegetables, and beans. Grain products include bread, pasta, cereal, and tortillas. Starchy vegetables include potatoes, peas, corn, lima beans, yams, and squash. Kidney beans, moody beans, black beans, garbanzo beans, and lentils also contain starches.  Sugars  Sugars are found naturally in many foods. Or sugar can be added. Foods that contain natural sugar include fruits and fruit juices, dairy products, honey, and molasses. Added sugars are found in most desserts, processed foods, candy, regular soda, and fruit drinks. These are very  helpful for treating low blood  sugar, or hypoglycemia. They provide sugar quickly.   Fiber  Fiber comes from plant foods. Most fiber isn t digested by the body. Instead of raising blood sugar levels like other carbohydrates, it actually stops blood sugar from rising too fast. Fiber is found in fruits, vegetables, whole grains, beans, peas, and many nuts.  Carb counting  It s important to keep track of the amount of carbohydrates you eat. This can help you keep the right balance of physical activity and medicine. The amount of carbohydrates needed will vary for each person. It depends on many things such as your health, the medicines you take, and how active you are. Your healthcare team will help you figure out the right amount of carbohydrates for you. You may start with around 45 to 60 grams of carbohydrate per meal, depending on your situation. Ask your dietitian to teach you a method called carb counting. This system helps you keep track of the carbohydrates you eat at each meal.  Carbohydrates come from a variety of foods. These include grains, starchy vegetables, fruit, milk, beans, and snack foods. You can either count carbohydrate grams or carbohydrate servings. When you count carbohydrate servings, 1 carbohydrate serving = 15 grams of carbohydrates.  Here are some examples of foods containing about 15 grams of carbohydrates (1 serving of carbohydrates):    1/2 cup of canned or frozen fruit    A small piece of fresh fruit (4 ounces)    1 slice of bread    1/2 cup of oatmeal    1/3 cup of rice    4 to 6 crackers    1/2 English muffin    1/2 cup of black beans    1/4 of a large baked potato (3 ounces)    2/3 cup of plain fat-free yogurt    1 cup of soup    1/2 cup of casserole    6 chicken nuggets    2-inch square brownie or cake without frosting    2 small cookies    1/2 cup of ice cream or sherbet  Carb counting is easier when food labels are available. Look at the label to see how many grams of total carbohydrates the food contains. Then  you can figure out how much you should eat.   It s also important to be consistent with the amount and time you eat when taking a fixed dose of diabetes medicine.     1279-4516 [employer]. [address] [place] [address], [place] [address]. All rights reserved. This information is not intended as a substitute for professional medical care. Always follow your healthcare professional's instructions.

## 2017-03-22 NOTE — LETTER
Transition Communication Hand-off for Care Transitions to Next Level of Care Provider  Name: Sabina Figueroa  MRN #: [id number]  Primary Care Provider: Aidee Hemphill  Primary Care MD Name: Joby  Primary Clinic: [facility] [address] [place] [address]  [facility] Care [facility] Name: [facility]  Reason for Hospitalization:  Severe sepsis (H) [A41.9, R65.20]  Admit Date/Time: 3/22/2017  8:47 PM  Discharge Date: 4/1/17  Payor Source: Payor: MEDICA / Plan: MEDICA [place] / Product Type: HMO /     Readmission Assessment Measure (KENNEDY) Risk Score/category: High    Plan of Care Goals/Milestone Events:   Patient Concerns: Per nursing note patient was  anxious to discharge to go to her pain clinic  appointment.     Patient Goals:   Short-term Manage diabetes at home   Long-term Follow up with AA   Medical Goals   Short-term Manage and control diabetes   Long-term Follow up with AA, stop smoking and  drinking,         Reason for Communication Hand-off Referral: Ongoing coordination of care.      Discharge Plan: Follow up with PCP Dr. Hemphill, within 7 days for hospital f/u.       Concern for non-adherence with plan of care:   Y/N no  Discharge Needs Assessment:  Needs       Most Recent Value    Transportation Available Medicaid transportation    Current Discharge Risk homeless, substance abuse    # of Referrals Placed by [facility] External Care Coordination, Behavioral Health, Transportation, Community Resources, Scheduled Follow-up appointments, Communication hand-offs to next level of Care Providers, Financial Services          Already enrolled in Tele-monitoring program and name of program:  n/a  Follow-up specialty is recommended: Yes; Follow up with O2 company today 4/2; Follow up with Dr. Mane in Pulmonary Clinic as scheduled on 4/10/17    Follow-up plan:  Future Appointments  Date Time Provider Department Center   4/10/2017 11:20 AM [facility]   4/10/2017 12:00 PM Jennifer Mane MD [facility]        Any outstanding tests or procedures:    Procedures     Future Labs/Procedures    Oxygen Adult     Comments:    New Home Oxygen Order 2 liters by nasal cannula at rest and 4 liters with activity with use of portable tank. Expected treatment length is 1 months.. Test on conserving device as applicable.    Patients who qualify for home O2 coverage under the CMS guidelines require ABG tests or O2 sat readings obtained closest to, but no earlier than 2 days prior to the discharge, as evidence of the need for home oxygen therapy. Testing must be performed while patient is in the chronic stable state. See notes for O2 sats.    I certify that this patient, Sabina Figueroa has been under my care and that I, or a nurse practitioner or physician's assistant working with me, had a face-to-face encounter that meets the face-to-face encounter requirements with this patient on 3/31/2017. The patient, Sabina Figueroa was evaluated or treated in whole, or in part, for the following medical condition, which necessitates the use of the ordered oxygen. Treatment Diagnosis:     Attending Provider: Anatoliy Dos Santos  Physician signature: See electronic signature associated with these discharge orders  Date of Order: March 31, 2017                Key Recommendations:   Patient has new home oxygen order 2L by NC at rest and 4L with activity with use of portable tank.      Rebeka Champion, RN, BSN, PHN, CTS  Care Transitions Specialist  [facility]  [phone]    AVS/Discharge Summary is the source of truth; this is a helpful guide for improved communication of patient story

## 2017-03-22 NOTE — IP AVS SNAPSHOT
[place] [address] [facility]    [address]    [facility] [address]    Phone:  [phone]    Fax:  [phone]                                       After Visit Summary   3/22/2017    Sabina Figueroa    MRN: [id number]           After Visit Summary Signature Page     I have received my discharge instructions, and my questions have been answered. I have discussed any challenges I see with this plan with the nurse or doctor.    ..........................................................................................................................................  Patient/Patient Representative Signature      ..........................................................................................................................................  Patient Representative Print Name and Relationship to Patient    ..................................................               ................................................  Date                                            Time    ..........................................................................................................................................  Reviewed by Signature/Title    ...................................................              ..............................................  Date                                                            Time

## 2017-03-23 NOTE — PROGRESS NOTES
ICU End of Shift Summary.  For vital signs and complete assessments, please see documentation flowsheets.     Pertinent assessments: LS dim, wheezy at times. Pt has no respiratory reserve and drops sats easily. Pt tried briefly on oxymask this am after bipap off and at 15L oxymask sats were 70's. Pt put back on bipap but then was switched to high-flow and has remained on high-flow all day at 40L and 70%FiO2. Pt freq asking to eat, she desats with talking and activity in the bed and has high risk for intubation and advised no eating today but have allowed sips of water.   Major Shift Events: See above  Plan (Upcoming Events): Monitor sats. Wean O2 as able.   Discharge/Transfer Needs: tbd    Bedside Shift Report Completed   Bedside Safety Check Completed

## 2017-03-23 NOTE — PHARMACY-VANCOMYCIN DOSING SERVICE
Pharmacy Vancomycin Initial Note  Date of Service 2017  Patient's  1975  41 year old, female    Indication: Healthcare-Associated Pneumonia    Current estimated CrCl = Estimated Creatinine Clearance: 91.5 mL/min (based on Cr of 0.89).    Creatinine for last 3 days  3/22/2017:  8:57 PM Creatinine 0.89 mg/dL    Recent Vancomycin Level(s) for last 3 days  No results found for requested labs within last 72 hours.      Vancomycin IV Administrations (past 72 hours)                   vancomycin (VANCOCIN) 1500 mg in 0.9% NaCl 250 mL PREMIX (mg) 1,500 mg New Bag 17 2215                Nephrotoxins and other renal medications (Future)    Start     Dose/Rate Route Frequency Ordered Stop    17 0600  vancomycin 1250 mg in 0.9% NaCl 250 mL PREMIX      1,250 mg Intravenous EVERY 8 HOURS 17 0013      17 0400  piperacillin-tazobactam (ZOSYN) intermittent infusion 4.5 g      4.5 g  200 mL/hr over 30 Minutes Intravenous EVERY 6 HOURS 17 2336            Contrast Orders - past 72 hours     None                Plan:  1.  Start vancomycin  1250 mg IV q8h.   2.  Goal Trough Level: 15-20 mg/L   3.  Pharmacy will check trough levels as appropriate in 1-3 Days.    4. Serum creatinine levels will be ordered daily for the first week of therapy and at least twice weekly for subsequent weeks.    5. Union method utilized to dose vancomycin therapy: Method 2    Leo Vázquez [facility]

## 2017-03-23 NOTE — CONSULTS
"CLINICAL NUTRITION SERVICES  -  ASSESSMENT NOTE      RECOMMENDATIONS FOR MD/PROVIDER TO ORDER:   -Diet advancement as appropriate     Recommendations Ordered by Registered Dietitian (RD):   -Diet appropriate nutritional supplement     Future/Additional Recommendations:   -If unable to advance diet x 48 hours, consideration of EN     Malnutrition: Patient does not meet criteria for malnutrition at this time          REASON FOR ASSESSMENT  Sabina Figueroa is a 41 year old female seen by Registered Dietitian for Provider Order - \"EtOH withdrawal\"      NUTRITION HISTORY  - Information obtained from patient and chart review   - Patient is on a regular diet at home  - Typical food/fluid intake: Limited information obtained from patient due to lethargy and ongoing BiPAP needs; however patient noted no significant changes to appetite/intake prior to admission.   - Patient with PMH of COPD, alcohol abuse and associated cirrhosis.       CURRENT NUTRITION ORDERS  Diet Order:     Clear Liquid     Current Intake/Tolerance:  -Minimal intake since admission secondary to BiPAP       PHYSICAL FINDINGS  Observed  No nutrition-related physical findings observed  Obtained from Chart/Interdisciplinary Team  -BiPAP (as of 0400 on 3/23)    ANTHROPOMETRICS  Height: 5' 7\"  Weight: 184 lbs 1.35 oz (83.5 kg)  Dry weight of admission (3/22): 77.6 kg   Body mass index is 28.83 kg/(m^2).  Weight Status:  Overweight BMI 25-29.9  IBW: 61.36 kg   % IBW: 126%  Weight History: No recent changes to weight per patient. The data below suggests that patient's weight has been stable at 78 kg +/- 1 kg over the past three months.   Wt Readings from Last 10 Encounters:   03/22/17 83.5 kg (184 lb 1.4 oz)   03/17/17 77.7 kg (171 lb 4.8 oz)   02/23/17 77.1 kg (170 lb)   02/22/17 80.4 kg (177 lb 3.2 oz)   01/17/17 79.5 kg (175 lb 3.2 oz)   12/10/16 78 kg (171 lb 14.5 oz)   06/26/16 76.5 kg (168 lb 9.6 oz)   06/22/16 77.6 kg (171 lb)   04/13/16 79 kg (174 lb 3.2 " oz)   03/29/16 77.2 kg (170 lb 3.2 oz)   ]    LABS   Labs reviewed  -BGs -  mg/dL (labile) --> steroid induced hyperglycemia     MEDICATIONS  Medications reviewed  -Ascorbic acid - 500 mg/day --> smoking history?  -Ferrous sulfate - 325 mg daily  -Folic acid - 1 mg/day + infuvite with thiamine and folate --> EtOH abuse   -Medium SSI before meals TID + at bedtime   -Humulin - 15 units BID   -Lactulose - 20 g TID   -Methyprednisolone - 62.5 mg q 8 hours   -Remeron - 30 mg at bedtime     Dosing Weight: 65.4 kg (adjusted weight)    ASSESSED NUTRITION NEEDS:  Estimated Energy Needs: 9822-9460 kcals (25-30 Kcal/Kg)  Justification: maintenance  Estimated Protein Needs: 78-98 grams protein (1.2-1.5 g pro/Kg)  Justification: maintenance with acute on chronic illness  Estimated Fluid Needs: 0729-7076  mL (1 mL/Kcal)  Justification: maintenance    MALNUTRITION:  % Weight Loss:  None noted  % Intake:  No decreased intake noted  Subcutaneous Fat Loss:  None observed  Muscle Loss:  None observed  Fluid Retention:  None noted    Malnutrition Diagnosis: Patient does not meet two of the above criteria necessary for diagnosing malnutrition    NUTRITION DIAGNOSIS:  Inadequate oral intake related to limited ability for PO secondary to BiPAP requirement as evidenced by minimal PO since admission (x 1 day)    NUTRITION INTERVENTIONS  Recommendations / Nutrition Prescription  1. Diet advancement per MD    2. Diet appropriate nutritional supplement prn     3. If unable to advance diet in the next 48 hours, recommend consideration of EN per the following regimen/provisions:    IsoSource 1.5 @ 50 ml/hr x 24 hours (1200 ml) to provide 1800 kcals (28 kcal/kg), 82 g protein (1.2 g/kg), 211 g CHO, 18 g fiber, 912 ml free water and 100% of DRIs    Implementation  Nutrition education: Per Provider order if indicated   Medical Food Supplement - Will allow patient to order supplements as desired     Collaboration and Referral of Nutrition care  - Discussed patient during interdisciplinary rounds     Nutrition Goals  Diet advancement beyond clear liquids x 48 hours     MONITORING AND EVALUATION:  Diet Order and Enteral access - Ability to advance diet vs need for EN    Rachael Horan RD, LD  Clinical Dietitian  3rd Floor/ICU Pager: [phone]  All Other Floors Pager: [phone]  Weekend/Holiday Pager: [phone]

## 2017-03-23 NOTE — PROGRESS NOTES
[facility]  Hospitalist Progress Note  Emerald Goodwin MD     Date of Service (when I saw the patient): 03/23/2017    Reason for Stay (Diagnosis): Respiratory Failure         Assessment and Plan:      Summary of Stay: Sabina Figueroa is a 41 year old female with past medical history of alcohol abuse and associated cirrhosis, recurrent admissions for respiratory failure, alveolar hemorrhage and organizing PNA (detected on bronchoscopy in 1/17 while intubated) who was admitted on 3/22/2017 with cough, fever and worsening SOB and was found to have acute hypoxic respiratory failure and sepsis.      1. Acute Recurrent Hypoxic Respiratory Failure: Patient has had recurrent admissions for respiratory failure, alveolar hemorrhage and organizing PNA.  She presented with fever and worsening dyspnea.  This is in the context of not taking her Prednisone for several days due to alcohol use.  She initially required BiPAP therapy, was able to transition to high flow oxygen today.  She does have fever, leukocytosis, elevated lactate and extensive bilateral interstitial and alveolar infiltrates.  Procalcitonin elevated to 1.01.  - Continue to follow cultures, attempt induced sputum  - Solumedrol 60 mg IV Q24H  - Continue Vancomycin/Zosyn/Levo  - Continue Bactrim for prophylaxis    2. Steroid Induced Hyperglycemia: HgbA1C this admission 6.2 (up from 5.6 in 1/2017, likely from steroids).    - NPH 15 units BID  - High SSI    3. Alcohol Use Disorder with Alcoholic Liver Disease: Started drinking again recently.  At this time no withdrawal.    - CIWA scale  - Continue vitamins  - Lactulose TID  - Continue Rifaximin and Spironolactone    4. Lactic Acidosis: Lactate elevated to 3, improved to 1.9 this morning.    5. Chronic Pain: On Suboxone.  Having exacerbation of chronic pain, will have PRN Morphine available.  Continue Suboxone and Neurontin.  Also start Lidoderm patches today.  Continue Robaxin.    6. Depression:  "continue Prozac, Remeron, Trazodone.    DVT Prophylaxis: Heparin SQ  Code Status: Full Code  Discharge Dispo: Home  Estimated Disch Date / # of Days until Disch: several more days until respiratory status improving, needs to remain in ICU given high flow oxygen and possible BiPAP needs        Interval History (Subjective):      Patient reports she feels \"crappy\" this morning.  She states she has a HA, back pain and her lungs hurt.  She still feels quite SOB.  She notes that she developed a fever the day before admission (did not measure her temperature) and her breathing worsened.  She had slight coughing but it was a dry cough.                    Physical Exam:      Last Vital Signs:  /66  Pulse 134  Temp 99  F (37.2  C) (Oral)  Resp 15  Ht 1.702 m (5' 7\")  Wt 83.5 kg (184 lb 1.4 oz)  SpO2 97%  BMI 28.83 kg/m2    General: Alert, awake, BiPAP in place, appears fatigued.  HEENT: Normocephalic and atraumatic, eyes anicteric and without scleral injection, EOMI  Cardiac: Tachycardic, regular rhythm, normal S1, S2. No m/g/r, no LE edema.  Pulmonary: Normal chest rise, increased work of breathing currently on BiPAP.  Decreased breath sounds diffusely with faint crackles in bilateral bases  Abdomen: soft, non-tender, non-distended.  Normoactive bowel sounds, no guarding or rebound tenderness.  Extremities: no deformities.  Warm, well perfused.  Skin: no rashes or lesions.  Warm and Dry.  Neuro: No focal deficits.  Speech clear.  Coordination and strength grossly normal.  Psych: Alert and oriented x3. Appropriate affect.         Medications:      All current medications were reviewed with changes reflected in problem list.         Data:      All new lab and imaging data was reviewed.   Labs:    Recent Labs  Lab 03/23/17  0520 03/22/17 2057 03/17/17  1600     139 131* 137   POTASSIUM 4.9 3.6 4.0   CHLORIDE 108 95 102   CO2 17* 25 24   ANIONGAP 12 11 11   * 94 157*   BUN 14 22 8   CR 0.85  0.85 " 0.89 0.78   GFRESTIMATED 73  73 69 81   GFRESTBLACK 89  88 84 >90African American GFR Calc   VISHNU 7.4* 8.8 9.1       Recent Labs  Lab 03/22/17 2057 03/17/17  1600   WBC 15.5* 8.2   HGB 14.0 15.3   HCT 41.1 46.8   MCV 93 92    152      Imaging:   Recent Results (from the past 24 hour(s))   Chest  XR, 1 view PORTABLE    Narrative    XR CHEST PORT 1 VW  3/22/2017 9:07 PM     HISTORY:  SOB    COMPARISON: Film dated 2/16/2017    FINDINGS:  The cardiac silhouette is mildly prominent. There are  extensive interstitial and alveolar infiltrates in both lungs. The  patient had a similar pattern on the previous film. This could be due  to bone marrow edema. Pneumonitis cannot be excluded.      Impression    IMPRESSION: Extensive bilateral interstitial and alveolar infiltrate.  Patient had similar findings on a plain film dated 2/16/2017.    MD Emerald FALCON MD.

## 2017-03-23 NOTE — PROGRESS NOTES
[facility] Intensive Care Progress Note    Problem List  Organizing pneumonia (cryptogenic?)  Substance abuse    Date of Service (when I saw the patient): 03/23/2017     Assessment & Plan   Sabina Figueroa is a 41 year old female who was admitted on 3/22/2017. She has had recurrent admissions for respiratory failure, alveolar hemorrhage and organizing pneumonia detected on bronchoscopy in January when she was intubated.     Pulmonary:        Acute recurrent hypoxic respiratory failure: acute onset with fever, leukocytosis  - likely acute bacterial pneumonia, possibly concomitant acute worsening of OP due to noncompliance  - transition to HFNC if possible  - bronchoscopy would be last resort, she doesn't tolerate well and is difficult to sedate    FiO2 (%): 60 %  Resp: 14    ID:         Attempt sputum culture: RT induced sputum attempt  -    Endocrine:  Hyperglycemia on steroids: continue subcut insulin  -    DVT Prophylaxis: Heparin SQ  GI Prophylaxis: Not indicated    Restraints: Restraints for medical healing needed: NO    Family update by me today: No    Jennifer Mane MD    Time Spent on this Encounter   Billing:  I spent 30 minutes bedside and on the inpatient unit today managing the critical care of Sabina Figueroa in relation to the issues listed in this note.    Main Plans for Today   Continue supportive care  Switch to HFNC, induced sputum     Interval History   Sabina came in with one day of fever, cough, shortness of breath. She didn't call my clinic because she lost the number. She had relapsed with alcohol and stopped taking steroids for about a week or so    Physical Exam   Temp: 99  F (37.2  C) Temp src: Oral Temp  Min: 99  F (37.2  C)  Max: 102.6  F (39.2  C) BP: 112/72 Pulse: 134 Heart Rate: 69 Resp: 14 SpO2: 92 % O2 Device: High Flow Nasal Cannula (HFNC) Oxygen Delivery: 10 LPM  Vitals:    03/22/17 2052 03/22/17 2300   Weight: 77.6 kg (171 lb 1.6 oz) 83.5 kg (184  lb 1.4 oz)     I/O last 3 completed shifts:  In: 956.67 [I.V.:956.67]  Out: 1450 [Urine:1450]    Current Vitals:Temp:  [99  F (37.2  C)-102.6  F (39.2  C)] 99  F (37.2  C)  Pulse:  [134-149] 134  Heart Rate:  [] 69  Resp:  [14-34] 14  BP: ()/(44-88) 112/72  FiO2 (%):  [45 %-60 %] 60 %  SpO2:  [78 %-97 %] 92 %   Awake, alert and following commands  PERRL and conjugate gaze  NPPV  Lungs clear  H-RR w/o murmur  Abdomen-soft, non tender, non distended  Extremities-warm and no edema  Lines: No erythema or discharge at entry site for No invasive lines  Current lines are required for patient management    Medications     NaCl 100 mL/hr at 03/23/17 0653       vancomycin (VANCOCIN) IV  1,250 mg Intravenous Q8H     lidocaine  1 patch Transdermal Q24H     lidocaine   Transdermal Q24h     lidocaine   Transdermal Q8H     sodium chloride (PF)  3 mL Intracatheter Q8H     ascorbic acid  500 mg Oral Daily     aspirin  81 mg Oral Daily     atorvastatin  20 mg Oral Daily     buprenorphine HCl-naloxone HCl  3 Film Sublingual Daily     ferrous sulfate  325 mg Oral Daily with breakfast     FLUoxetine  40 mg Oral Daily     [START ON 3/27/2017] folic acid  1 mg Oral Daily     gabapentin  600 mg Oral TID     insulin isophane human  15 Units Subcutaneous BID AC     lactulose  20 g Oral TID     methocarbamol  1,000 mg Oral 4x Daily     mirtazapine  30 mg Oral At Bedtime     omeprazole  20 mg Oral QAM AC     rifaximin  550 mg Oral BID     [START ON 3/24/2017] sulfamethoxazole-trimethoprim  1 tablet Oral Once per day on Mon Wed Fri     traZODone  100 mg Oral At Bedtime     piperacillin-tazobactam  4.5 g Intravenous Q6H     levofloxacin  750 mg Intravenous Q24H     IV infusion builder WITH LARGE additive list   Intravenous Q24H     ipratropium - albuterol 0.5 mg/2.5 mg/3 mL  3 mL Nebulization Q4H     insulin aspart  1-7 Units Subcutaneous TID AC     insulin aspart  1-5 Units Subcutaneous At Bedtime     methylPREDNISolone  62.5 mg  Intravenous Q8H     spironolactone  50 mg Oral Daily       Data     Recent Labs  Lab 03/23/17  0520 03/22/17  2057 03/17/17  1600   WBC  --  15.5* 8.2   HGB  --  14.0 15.3   MCV  --  93 92   PLT  --  194 152     139 131* 137   POTASSIUM 4.9 3.6 4.0   CHLORIDE 108 95 102   CO2 17* 25 24   BUN 14 22 8   CR 0.85  0.85 0.89 0.78   ANIONGAP 12 11 11   VISHNU 7.4* 8.8 9.1   * 94 157*   ALBUMIN  --  3.6 3.9   PROTTOTAL  --  8.1 7.7   BILITOTAL  --  0.4 0.7   ALKPHOS  --  130 132   ALT  --  34 42   AST  --  54* 27     Recent Results (from the past 24 hour(s))   Chest  XR, 1 view PORTABLE    Narrative    XR CHEST PORT 1 VW  3/22/2017 9:07 PM     HISTORY:  SOB    COMPARISON: Film dated 2/16/2017    FINDINGS:  The cardiac silhouette is mildly prominent. There are  extensive interstitial and alveolar infiltrates in both lungs. The  patient had a similar pattern on the previous film. This could be due  to bone marrow edema. Pneumonitis cannot be excluded.      Impression    IMPRESSION: Extensive bilateral interstitial and alveolar infiltrate.  Patient had similar findings on a plain film dated 2/16/2017.    DAYSI LORENZO MD

## 2017-03-23 NOTE — PROGRESS NOTES
Patient was on BiPAP this morning, and switched over to HFNC at 40 LPM and 60-65% FiO2.  She de-sat to the 70's when she got up to use the commode.  She sounded clear/dimminished.  Neb tx were done inline through the HFNC.  Respiratory will continue to follow.

## 2017-03-23 NOTE — ED PROVIDER NOTES
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 difficile colitis, currently on 40 mg qd of Prednisone on a taper. She presents with SOB that began yesterday afternoon and has been progressively worsening since. She was doing some shopping at [employer] earlier and \"almost passed out\" from the difficulty breathing. She also feels feverish. She admits to having one glass of alcohol last night before bed.   The patient visited this ED on 3/17 for facial swelling and alcohol withdrawal, was discharged to [facility] detox facility, where she stayed Friday and Saturday.     Allergies:  No Known Allergies     Medications:    Insuline  Prednisone  Bactrim  Metronidazole  Vit C  Fluoxetine  Furosemide  Gabapentin   Hydroxyzine  Lactulose  Omeprazole  Spironolactone  Vit B1  Methocarbamol   Trazodone  ASA 81  Atorvastatin  Rifaximin  Iron supplement  Buprenorphine-Naloxone  Mirtazapine   Mylanta ES  Albuterol inh  MVI  Vit B9      Past Medical History:    Alcohol abuse  Alcoholic liver cirrhosis  GI bleed  Alcoholic peripheral neuropathy  Anemia   Edema  Ascitis  COPD  Asthma   HTN  Hyponatremia   Anxiety  Depression  Left ovarian cyst   Left foot bunion  Arthritis  DDD  Bilateral low back pain with sciatica  Pneumonia 16  Respiratory failure x2     Past Surgical History:   Knee surgery x 3    EGD x 3  Bronchoscopy x 2     Family History:   Depression  Anxiety  Substance abuse  Schizophrenia     Social History:  Marital Status: Single [1]  Smoking status: current, 1 PPD x 18   Alcohol status: 8 drinks + / day, history of alcohol dependence treatment in the past.  Patient presents alone.  PCP: Aidee Hemphill     Review of " Systems  Nursing note and vitals reviewed.  Constitutional: Cooperative.   HENT:   Mouth/Throat: Moist mucous membranes.   Eyes: EOMI, nonicteric sclera  Cardiovascular: Normal rate, regular rhythm, no murmurs, rubs, or gallops  Pulmonary/Chest: Effort normal and breath sounds normal. No respiratory distress. No wheezes. No rales.   Abdominal: Soft. Nontender, nondistended, no guarding or rigidity. BS present.   Musculoskeletal: Normal range of motion.   Neurological: Alert. Moves all extremities spontaneously.   Skin: Skin is warm and dry. No rash noted.   Psychiatric: Normal mood and affect.       Physical Exam     Patient Vitals for the past 24 hrs:   BP Temp Temp src Pulse Heart Rate Resp SpO2 Height Weight   03/22/17 2256 - - - - 112 - 94 % - -   03/22/17 2255 - - - - 113 - 93 % - -   03/22/17 2250 - - - - 115 - 91 % - -   03/22/17 2245 95/54 - - - 114 - (!) 86 % - -   03/22/17 2240 - 99  F (37.2  C) Oral - - - - - -   03/22/17 2230 94/58 - - - 124 - (!) 88 % - -   03/22/17 2217 - - - - 126 - 91 % - -   03/22/17 2200 100/55 - - - 123 - 90 % - -   03/22/17 2150 - - - - 128 - (!) 87 % - -   03/22/17 2143 - - - - 126 - 90 % - -   03/22/17 2130 107/52 - - - 127 - - - -   03/22/17 2120 - - - - 125 - 93 % - -   03/22/17 2115 109/50 - - - 130 - 93 % - -   03/22/17 2110 - - - - 133 - 93 % - -   03/22/17 2104 97/68 - - - 135 - 90 % - -   03/22/17 2057 119/88 102.6  F (39.2  C) Oral 134 - (!) 34 (!) 78 % - -   03/22/17 2052 119/88 - - 149 - 28 (!) 78 % - 77.6 kg (171 lb 1.6 oz)        Physical Exam  Nursing note and vitals reviewed.  Constitutional: In respiratory distress upon arrival.   HENT:   Mouth/Throat: Moist mucous membranes.   Eyes: EOMI, nonicteric sclera  Cardiovascular: tachycardic, regular rhythm, no murmurs, rubs, or gallops  Pulmonary/Chest: Tachypneic. Using accessory muscles. Bilateral wheezes. No rhonchi/rales.   Abdominal: Soft. Nontender, nondistended, no guarding or rigidity. BS present.    Musculoskeletal: Normal range of motion.   Neurological: Alert. Moves all extremities spontaneously.   Skin: Skin is warm and dry. No rash noted.   Psychiatric: Normal mood and affect.       Emergency Department Course     ECG (22:31:35):  Indication: SOB .   Rate 124 bpm. KS interval 130 ms. QRS duration 76 ms. QT/QTc 338/485 ms. R axis 5.   Interpretation: Sinus tachycardia. Otherwise normal ECG  Agree with computer interpretation.  When compared with ECG of 02/16/2017 T wave amplitude has decreased in Lateral leads.  Interpreted by Josemanuel Damon MD.     Imaging:  Radiology findings were communicated with the patient who voiced understanding of the findings.    Portable Chest XR, per radiology:     Extensive bilateral interstitial and alveolar infiltrate. Patient had similar findings on a plain film dated 2/16/2017.    Laboratory:  Laboratory findings were communicated with the patient who voiced understanding of the findings.  Influenza A/B Antigen: negative   ISTAT lactate: pH 7.37, HCO3 24, Lactic acid 2.9   CBC: WBC 15.5, HGB 14.0,    CMP: Na 131, Creatinine 0.89  Lactic Acid: 3.0  Blood cultures x2: Pending     Interventions:  2103, 2154: Duoneb 6 ml solution, nebulized   2109, 2112, 2215: NS 3000 mL, IV total.  2109: Acetaminophen 975 mg, PO  2148: Zosyn 4.5 g, IV  2215: Vancomycin 1,500 mg, IV     Emergency Department Course:  Nursing notes and vitals reviewed.  I performed an exam of the patient as documented above.   The patient was placed on continuous pulse oximetry and cardiac monitoring.   A peripheral IV was established.     A portable CXR was obtained while in the emergency department, findings above.   IV was inserted and blood was drawn for laboratory testing, results above.  A nasal swab sample was obtained for Influenza testing, results above.     At 2000, I discussed the patient with Dr. Harley, hospitalist.    I rechecked the patient and the findings were explained to her,  who consents to admission. I discussed the patient with Dr. Harley, who will admit the patient for further monitoring, evaluation and treatment.      Impression & Plan      Medical Decision Making:  Patient presents with complaint of significant SOB. Patient has a history of organizing pneumonia, she has been in and out of the hospital frequently in the last few months. She is currently on a steroid taper and was also recently at detox for alcohol withdrawal. She arrives in respiratory distress with low O2 sats and tachypnea. She has diffuse bilateral wheezing. She is also febrile and tachycardic. Lactic acid is >3. She currently meets criteria for severe sepsis, likely secondary to HCAP. Pt is also immunosuppressed. I will initiate vancomycin and zosyn for empiric treatment. Patient's Influenza swab is negative. It has been negative when previously checked during these several hospitalizations. Frequent reexaminations show that patient is still wheezing, able to converse normally though. Her heart rate is coming down nicely. She does seem to have some sleep apnea where she falls asleep and obstructs the airway. VBG do not show any significant hypercapnia. Contacted the hospitalist and talked to Dr. Harley for admission. She is in stable condition at this time. All of her questions were answered and she agrees with the plan.       Diagnosis:    ICD-10-CM    1. Severe sepsis (H) A41.9 UA with Microscopic   2. HCAP (healthcare-associated pneumonia) J18.9    3. Acute respiratory failure with hypoxia (H) J96.01        Disposition:   ICU    Scribe Disclosure:  I, Sherice Salazar, am serving as a scribe at 9:05 PM on 3/22/2017 to document services personally performed by Josemanuel Damon MD, based on my observations and the provider's statements to me.  [facility] EMERGENCY DEPARTMENT       Josemanuel Damon MD  03/23/17 0138

## 2017-03-23 NOTE — ED NOTES
Pt comes in sob  resp rate  32   Hx of pneumonia several times this year  Recently seen   Smoking 1 1/2 pkg a day  Was at detox overweek end  Lungs  Having b/l in/exp wheezing   Slightly diminished at the bases   Was out shopping today and felt like she was going to pass out   Last drink was this am  No edema peripheral   md at bedside

## 2017-03-23 NOTE — PHARMACY-ADMISSION MEDICATION HISTORY
Admission medication history interview status for this patient is complete. See [facility] admission navigator for allergy information, prior to admission medications and immunization status.     Medication history interview source(s):Patient  Medication history resources (including written lists, pill bottles, clinic record):[facility] List, Care Everywhere  Primary pharmacy:[facility] [address]    Changes made to PTA medication list:  Added: Spiriva  Deleted: Flagyl, duplicate spironolactone and trazodone  Changed: Humulin from daily to bid    Actions taken by pharmacist (provider contacted, etc):None     Additional medication history information:None    Medication reconciliation/reorder completed by provider prior to medication history? No    For patients on insulin therapy: Yes (Yes/No)  Humulin N bid  Sliding scale Novolog Y/N - No  If Yes, do you have a baseline novolog pre-meal dose:  ______units with meals   Patients eat three meals a day:   Y/N  - Yes   Any Barriers to therapy:  cost of medications/comfortable with giving injections (if applicable)/ comfortable and confident with current diabetes regimen - No      Prior to Admission medications    Medication Sig Last Dose Taking? Auth Provider   tiotropium (SPIRIVA) 18 MCG capsule Inhale 18 mcg into the lungs daily 3/22/2017 at Unknown time Yes Unknown, Entered By History   insulin isophane human (HUMULIN N PEN) 100 UNIT/ML injection Inject 15 Units Subcutaneous 2 times daily (before meals) 3/22/2017 at x2 Yes Unknown, Entered By History   predniSONE (DELTASONE) 20 MG tablet Take 40 mg by mouth daily 3/22/2017 at Unknown time Yes Unknown, Entered By History   spironolactone (ALDACTONE) 50 MG tablet Take 1 tablet (50 mg) by mouth daily 3/22/2017 at Unknown time Yes Josemanuel Damon MD   gabapentin (NEURONTIN) 600 MG tablet Take 1 tablet (600 mg) by mouth 3 times daily 3/22/2017 at x1 Yes Josemanuel Damon MD   sulfamethoxazole-trimethoprim (BACTRIM DS/SEPTRA DS)  800-160 MG per tablet Take 1 tablet by mouth three times a week  Patient taking differently: Take 1 tablet by mouth three times a week on Mon/Wed/Friday 3/22/2017 at Unknown time Yes Yisel Henry MD   ascorbic acid (VITAMIN C) 500 MG tablet Take 500 mg by mouth daily 3/22/2017 at Unknown time Yes Unknown, Entered By History   FLUoxetine (PROZAC) 40 MG capsule Take 40 mg by mouth daily 3/22/2017 at Unknown time Yes Unknown, Entered By History   furosemide (LASIX) 20 MG tablet Take 20 mg by mouth daily 3/22/2017 at Unknown time Yes Unknown, Entered By History   hydrOXYzine (ATARAX) 10 MG tablet Take 10 mg by mouth every 8 hours as needed for itching 3/22/2017 at Unknown time Yes Unknown, Entered By History   lactulose (CHRONULAC) 10 GM/15ML solution Take 20 g by mouth 3 times daily Past Week at Unknown time Yes Unknown, Entered By History   omeprazole (PRILOSEC) 20 MG CR capsule Take 20 mg by mouth daily Past Week at Unknown time Yes Unknown, Entered By History   thiamine 100 MG tablet Take 100 mg by mouth daily 3/22/2017 at Unknown time Yes Unknown, Entered By History   traZODone (DESYREL) 100 MG tablet Take 100 mg by mouth At Bedtime 3/21/2017 at pm Yes Unknown, Entered By History   Omega-3 Fatty Acids (FISH OIL) 1200 MG CAPS Take 1 capsule by mouth daily 3/22/2017 at Unknown time Yes Unknown, Entered By History   methocarbamol (ROBAXIN) 500 MG tablet Take 1,000 mg by mouth 4 times daily 3/22/2017 at x2 Yes Unknown, Entered By History   aspirin EC 81 MG EC tablet Take 1 tablet (81 mg) by mouth daily 3/22/2017 at Unknown time Yes Anatoliy Dos Santos DO   atorvastatin (LIPITOR) 20 MG tablet Take 1 tablet (20 mg) by mouth daily Past Week at Unknown time Yes Anatoliy Dos Santos DO   rifaximin (XIFAXAN) 550 MG TABS tablet Take 1 tablet (550 mg) by mouth 2 times daily 3/22/2017 at am Yes Anatoliy Dos Santos DO   ferrous sulfate (IRON) 325 (65 FE) MG tablet Take 325 mg by mouth daily (with breakfast) 3/22/2017 at Unknown  time Yes Unknown, Entered By History   albuterol (2.5 MG/3ML) 0.083% neb solution Take 1 vial by nebulization every 6 hours as needed for shortness of breath / dyspnea or wheezing 3/22/2017 at am Yes Unknown, Entered By History   buprenorphine-naloxone (SUBOXONE) 8-2 MG SUBL sublingual tablet Place 3 tablets under the tongue daily 3/22/2017 at Unknown time Yes Unknown, Entered By History   mirtazapine (REMERON) 30 MG tablet Take 1 tablet (30 mg) by mouth At Bedtime 3/21/2017 at pm Yes Denton Dunlap MD   albuterol (PROAIR HFA, PROVENTIL HFA, VENTOLIN HFA) 108 (90 BASE) MCG/ACT inhaler Inhale 2 puffs into the lungs every 4 hours as needed for shortness of breath / dyspnea or wheezing Reported on 2/23/2017 3/22/2017 at Unknown time Yes Reported, Patient   MULTIPLE VITAMINS PO Take 1 tablet by mouth daily 3/22/2017 at Unknown time Yes Reported, Patient   folic acid (FOLVITE) 1 MG tablet Take 1 tablet (1 mg) by mouth daily 3/22/2017 at Unknown time Yes Devin Peace MD   blood glucose monitoring (ACCU-CHEK TONIA PLUS) meter device kit Use to test blood sugars BID times daily or as directed.   Yisel Henry MD   alum & mag hydroxide-simethicone (MYLANTA ES/MAALOX  ES) 400-400-40 MG/5ML SUSP Take 30 mLs by mouth every 4 hours as needed for indigestion Reported on 2/23/2017 Unknown at Unknown time  Reported, Patient

## 2017-03-23 NOTE — ED NOTES
Pt not feeling well   sats dropping lowest 84  Diminished lung sounds  Did have pt  Cough    sats to 90  Duoneb started   Temp coming down

## 2017-03-23 NOTE — PROGRESS NOTES
ICU End of Shift Summary.  For vital signs and complete assessments, please see documentation flowsheets.     Pertinent assessments: Bipap at 60%. CIWA 1. Sleepy. Banana bag infused. Abx. Solumedrol.  Major Shift Events: bipap on at 0400  Plan (Upcoming Events): CD consult  Discharge/Transfer Needs: TBD    Bedside Shift Report Completed   Bedside Safety Check Completed

## 2017-03-23 NOTE — PROGRESS NOTES
Placed patient on BiPAP 14/6 12 60% due to increasing oxygen needs and increased WOB. Tolerating well, patient received Q4 scheduled nebs, breath sounds are diminished bilaterally. RT will continue to follow.    Brenda Evangelista  March 23, 2017.5:45 AM

## 2017-03-23 NOTE — ED NOTES
sats again 85   Pt is on oxygen at 6 liters   Will give  Neb  Still with diminshed breath sounds  Enc to cough

## 2017-03-23 NOTE — H&P
[facility]    Hospitalist History and Physical    Name: Sabina Figueroa    MRN: [id number]  YOB: 1975    Age: 41 year old  Date of Admission:  3/22/2017  Date of Service (when I saw the patient): 03/22/17    Assessment & Plan   Sabina Figueroa is a 41 year old female with PMH sig for smoking, unconfirmed diagnosis of COPD, alcohol abuse and associated cirrhosis and recurrent recent admissions for acute hypoxic respiratory failure presented to ED with Shortness of breath. She was hypoxic, febrile, had elevated lactic acidosis and leukocytosis is being admitted for acute respiratory failure and Sepsis.    Severe Sepsis Lactic acidosis of 3 on admission. Tachycardic, Hypoxic, febrile, elevated wbc count with SBP in 90s  -- Blood cultures sent in ED  -- Negative for influenza  -- started on broad spectrum antibiotics for possible HCAP given several recent hospitalizations  -- IV fluids for resuscitation  -- admit to ICU   -- recheck lactic acidosis after hydration    Hypoxemic respiratory failure on admission secondary to bilateral Pneumonia vs pulmonary congestion Vs HCAP, this is a recurrent presentation. Unclear history of COPD   -- recent bronchoscopy showed alveolar hemorrhage s/p biopsy showing Organizing pneumonia  --She has been admitted atleast 5 times in past year for respiratory distress  -- Is maintaining sats on supplemental O2 but if worsens will need to be intubated  -- admitted to ICU  -- was started on broad spectrum antibiotics for possible HCAP associated Sepsis  -- will treat COPD with IV steroids ( she was at  A slow taper of steroids at 60mg PTA)  --Start her on nebs      Alcohol dependence with signs of  Early withdrawal  -- start her on CiWA protocol   -- thiamine, Folate  -- will consider adding precedex if worsens    Cirrhosis   -- No abd pain or tenderness  -- clinically stable  -- Continue Xifaxan   -- On Spironolactone PTA will restart when BP is  stable    Chronic pain syndrome.   -- prn pain control  -- c/o worsening back spasm  -- is on gabapentin and has been on  Suboxone    Tobacco use  -- smokes 1ppd unable to smoke today secondary to distress  -- will use Nicotine Patch    Steroid Induced Hyperglycemia/DM  -- will place her on sliding scale    H/O Hypertension  -- Hypotensive will hold meds   -- restart once stanble    Care plan d/w ICU staff    DVT Prophylaxis: Pneumatic Compression Devices  Code Status: Full Code    Disposition: Admitted to ICU for further care    Primary Care Physician   Aidee Hemphill    Chief Complaint   Shortness of breath worse today    History is obtained from the patient    History of Present Illness   Sabina Figueroa is a 41 year old female with PMH significant for several admissions for recurrent hypoxemic respiratory failure, recent hospitalization, alcohol abuse presented to ED with Shortness of breath. Patient said  Yesterday she did a lot of house work and developed increasing exertional Shortness of breath that got worse [facility]. She had increased dry cough which was productive felt warm did not check temp,  + lightheadedness,  Increasing confusion unable to concentrate and felt dizzy as if would pass out.. She asked her boy friend to call Cab and came to ED. In ED was found to be hypoxic, tachycardic, febrile. Clinically improved with nebs and initial resuscitation. Received broad spectrum antibiotics.     On review of records this is her 6th presentation with similar symptoms to ED, she continues to smoke and uses Alcohol.    Past Medical History    Past Medical History:   Diagnosis Date     ALC (alcoholic liver cirrhosis) (H)      Alcohol abuse      Anxiety      Arthritis      Ascites      Asthma      Bunion, left foot      Chronic low back pain     Narcotic agreement signed in [employer] system     COPD (chronic obstructive pulmonary disease) (H)      Depression      Hypertension      current     Ovarian cyst, left          Past Surgical History   Past Surgical History:   Procedure Laterality Date     BRONCHOSCOPY FLEXIBLE N/A 1/10/2017    Procedure: BRONCHOSCOPY FLEXIBLE;  Surgeon: Jennifer Mane MD;  Location:  GI     BRONCHOSCOPY FLEXIBLE N/A 2017    Procedure: BRONCHOSCOPY FLEXIBLE;  Surgeon: Jennifer Mane MD;  Location:  OR      SECTION       ESOPHAGOSCOPY, GASTROSCOPY, DUODENOSCOPY (EGD), COMBINED  2014    Procedure: COMBINED ESOPHAGOSCOPY, GASTROSCOPY, DUODENOSCOPY (EGD);  Surgeon: Brittany Lowe MD;  Location:  GI     ESOPHAGOSCOPY, GASTROSCOPY, DUODENOSCOPY (EGD), COMBINED N/A 2015    Procedure: COMBINED ESOPHAGOSCOPY, GASTROSCOPY, DUODENOSCOPY (EGD);  Surgeon: London Lenz MD;  Location:  GI     ESOPHAGOSCOPY, GASTROSCOPY, DUODENOSCOPY (EGD), COMBINED N/A 2015    Procedure: COMBINED ESOPHAGOSCOPY, GASTROSCOPY, DUODENOSCOPY (EGD);  Surgeon: Barry Chavez MD;  Location: [facility]     KNEE SURGERY      x3       Prior to Admission Medications   Prior to Admission Medications   Prescriptions Last Dose Informant Patient Reported? Taking?   FLUoxetine (PROZAC) 40 MG capsule  Self Yes No   Sig: Take 40 mg by mouth daily   MULTIPLE VITAMINS PO  Self Yes No   Sig: Take 1 tablet by mouth daily   Omega-3 Fatty Acids (FISH OIL) 1200 MG CAPS  Self Yes No   Sig: Take 1 capsule by mouth daily   albuterol (2.5 MG/3ML) 0.083% neb solution  Self Yes No   Sig: Take 1 vial by nebulization every 6 hours as needed for shortness of breath / dyspnea or wheezing   albuterol (PROAIR HFA, PROVENTIL HFA, VENTOLIN HFA) 108 (90 BASE) MCG/ACT inhaler  Self Yes No   Sig: Inhale 2 puffs into the lungs every 4 hours as needed for shortness of breath / dyspnea or wheezing Reported on 2017   alum & mag hydroxide-simethicone (MYLANTA ES/MAALOX  ES) 400-400-40 MG/5ML SUSP  Self Yes No   Sig: Take 30 mLs by mouth every 4 hours as needed for indigestion Reported  on 2017   ascorbic acid (VITAMIN C) 500 MG tablet  Self Yes No   Sig: Take 500 mg by mouth daily   aspirin EC 81 MG EC tablet  Self No No   Sig: Take 1 tablet (81 mg) by mouth daily   atorvastatin (LIPITOR) 20 MG tablet  Self No No   Sig: Take 1 tablet (20 mg) by mouth daily   blood glucose monitoring (ACCU-CHEK TONIA PLUS) meter device kit   No No   Sig: Use to test blood sugars BID times daily or as directed.   buprenorphine-naloxone (SUBOXONE) 8-2 MG SUBL sublingual tablet  Self Yes No   Sig: Place 3 tablets under the tongue daily   ferrous sulfate (IRON) 325 (65 FE) MG tablet  Self Yes No   Sig: Take 325 mg by mouth daily (with breakfast)   folic acid (FOLVITE) 1 MG tablet  Self No No   Sig: Take 1 tablet (1 mg) by mouth daily   furosemide (LASIX) 20 MG tablet  Self Yes No   Sig: Take 20 mg by mouth daily   gabapentin (NEURONTIN) 600 MG tablet  Self Yes No   Sig: Take 600 mg by mouth 3 times daily   gabapentin (NEURONTIN) 600 MG tablet   No No   Sig: Take 1 tablet (600 mg) by mouth 3 times daily   hydrOXYzine (ATARAX) 10 MG tablet  Self Yes No   Sig: Take 10 mg by mouth every 8 hours as needed for itching   insulin isophane human (HUMULIN N PEN) 100 UNIT/ML injection   No No   Sig: Inject 15 Units Subcutaneous every morning   lactulose (CHRONULAC) 10 GM/15ML solution  Self Yes No   Sig: Take 20 g by mouth 3 times daily   methocarbamol (ROBAXIN) 500 MG tablet  Self Yes No   Sig: Take 1,000 mg by mouth 4 times daily   metroNIDAZOLE (FLAGYL) 500 MG tablet   No No   Sig: Take 1 tablet (500 mg) by mouth 3 times daily   mirtazapine (REMERON) 30 MG tablet  Self No No   Sig: Take 1 tablet (30 mg) by mouth At Bedtime   omeprazole (PRILOSEC) 20 MG CR capsule  Self Yes No   Sig: Take 20 mg by mouth daily   predniSONE (DELTASONE) 20 MG tablet   No No   Si mg po every am for 2 weeks, then decrease to 40 mg po qd, then per Dr. Mane   rifaximin (XIFAXAN) 550 MG TABS tablet  Self No No   Sig: Take 1 tablet (550 mg)  by mouth 2 times daily   spironolactone (ALDACTONE) 50 MG tablet  Self Yes No   Sig: Take 50 mg by mouth daily   spironolactone (ALDACTONE) 50 MG tablet   No No   Sig: Take 1 tablet (50 mg) by mouth daily   sulfamethoxazole-trimethoprim (BACTRIM DS/SEPTRA DS) 800-160 MG per tablet   No No   Sig: Take 1 tablet by mouth three times a week   thiamine 100 MG tablet  Self Yes No   Sig: Take 100 mg by mouth daily   traZODone (DESYREL) 100 MG tablet  Self Yes No   Sig: Take 100 mg by mouth At Bedtime   traZODone (DESYREL) 100 MG tablet   No No   Sig: Take 1 tablet (100 mg) by mouth nightly as needed for sleep      Facility-Administered Medications: None     Allergies   No Known Allergies    Social History   Social History   Substance Use Topics     Smoking status: Current Every Day Smoker     Packs/day: 1.50     Years: 18.00     Types: Cigarettes     Smokeless tobacco: Former User     Quit date: 5/11/2016     Alcohol use 0.0 oz/week     0 Standard drinks or equivalent per week      Comment: 8 drinks or more each day, abstinent since 4/2/15. History of CD treatment in past x1     Social History     Social History Narrative    Denies illicit IV or intranasal drug use    No tattoos or piercings     Lives with her boy friend  Smokes 1ppd   alcohol recent ED visit with intoxication last drink yesterday  Daughter is next of Kin    Family History   Mom possibly had HTN    Review of Systems   A Comprehensive greater than 10 system review of systems was carried out.  Pertinent positives and negatives are noted above.  Otherwise negative for contributory information.    Physical Exam   Temp: 102.6  F (39.2  C) Temp src: Oral BP: 107/52 Pulse: 134 Heart Rate: 128 Resp: (!) 34 SpO2: (!) 87 % O2 Device: Aerosol mask Oxygen Delivery: 9 LPM  Vital Signs with Ranges  Temp:  [102.6  F (39.2  C)] 102.6  F (39.2  C)  Pulse:  [134-149] 134  Heart Rate:  [125-135] 128  Resp:  [28-34] 34  BP: ()/(50-88) 107/52  SpO2:  [78 %-93 %] 87  %  171 lbs 1.6 oz    GEN:  Alert, oriented x 3, Tachypnaeic, clammy skin  HEENT:  Normocephalic/atraumatic, no scleral icterus, no nasal discharge, mouth DRY. Pupils mildly constricted  CV:  Tachycardic  + murmur or JVD.  S1 + S2 noted.  LUNGS:  Decreased breath sounds bilaterally in both bases, + crackles bilaterally, shallow breathing. Symmetric chest rise on inhalation noted.  ABD:  Active bowel sounds, Distended with Ascites soft, non-tender .  No rebound/guarding/rigidity.  EXT:  No edema.  No cyanosis.    SKIN:  Dry to touch, no exanthems noted in the visualized areas.  NEURO:  Symmetric muscle strength, + tremers   No new focal deficits appreciated.    Data   Data reviewed today:  I personally reviewed the EKG tracing showing Sinus Tachycardia.      Recent Labs  Lab 03/22/17 2104 03/17/17  1610   PHV 7.37 7.46*   PO2V 25 30   PCO2V 42 36*   HCO3V 24 25       Recent Labs  Lab 03/22/17 2057 03/17/17  1600   WBC 15.5* 8.2   HGB 14.0 15.3   HCT 41.1 46.8   MCV 93 92    152       Recent Labs  Lab 03/22/17 2057 03/17/17  1600   * 137   POTASSIUM 3.6 4.0   CHLORIDE 95 102   CO2 25 24   ANIONGAP 11 11   GLC 94 157*   BUN 22 8   CR 0.89 0.78   GFRESTIMATED 69 81   GFRESTBLACK 84 >90African American GFR Calc   VISHNU 8.8 9.1       Recent Labs  Lab 03/17/17  1638 03/17/17  1600   CULT No growth after 5 days No growth after 5 days       Recent Labs  Lab 03/22/17 2057 03/17/17  1600   HGB 14.0 15.3       Recent Labs  Lab 03/22/17 2057 03/17/17  1600   AST 54* 27   ALT 34 42   ALKPHOS 130 132   BILITOTAL 0.4 0.7     No results for input(s): INR in the last 168 hours.    Recent Labs  Lab 03/22/17 2104 03/22/17 2057 03/17/17  1935   LACT 2.9* 3.0* 1.4       Recent Labs  Lab 03/17/17  1600   LIPASE 104     No results for input(s): TROPONIN, TROPI, TROPR in the last 168 hours.    Invalid input(s): TROP, TROPONINIES  No results for input(s): COLOR, APPEARANCE, URINEGLC, URINEBILI, URINEKETONE, SG, UBLD,  URINEPH, PROTEIN, UROBILINOGEN, NITRITE, LEUKEST, RBCU, WBCU in the last 168 hours.    Recent Results (from the past 24 hour(s))   Chest  XR, 1 view PORTABLE    Narrative    XR CHEST PORT 1 VW  3/22/2017 9:07 PM     HISTORY:  SOB    COMPARISON: Film dated 2/16/2017    FINDINGS:  The cardiac silhouette is mildly prominent. There are  extensive interstitial and alveolar infiltrates in both lungs. The  patient had a similar pattern on the previous film. This could be due  to bone marrow edema. Pneumonitis cannot be excluded.      Impression    IMPRESSION: Extensive bilateral interstitial and alveolar infiltrate.  Patient had similar findings on a plain film dated 2/16/2017.    DAYSI LORENZO MD

## 2017-03-24 NOTE — PROGRESS NOTES
Patient has a      Leonor Cervantes [phone] [phone]    Minerva Alcazar RN BSN CTS  [facility]   Care Management Coordinator  [email or web address]   [phone]

## 2017-03-24 NOTE — PROGRESS NOTES
ICU End of Shift Summary.  For vital signs and complete assessments, please see documentation flowsheets.     Pertinent assessments: LS diminished w/ expiratory wheezes. Infrequent cough. On hfnc 40 L 60 % prior to switching to bipap 55% for night. Alert and oriented. Oral intake fair when on hfnc. Afebrile. Adequate urine output. One bowel movement. Repositions self in bed. Morphine given x1 for back pain. CIWA score 2-5.   Major Shift Events: BiPAP at night  Plan (Upcoming Events): HFNC during day w/ wean  Discharge/Transfer Needs: TBD    Bedside Shift Report Completed   Bedside Safety Check Completed

## 2017-03-24 NOTE — PLAN OF CARE
Problem: Goal Outcome Summary  Goal: Goal Outcome Summary  Outcome: No Change  ICU End of Shift Summary.  For vital signs and complete assessments, please see documentation flowsheets.      Pertinent assessments:  On HFNC 60%-70% fi02 , 40 lpm. Pt has a non-productive cough. Has coughing spells. During coughing 02 sats drop to 77%. Takes 1-2 minutes to recover from coughing. Pt stands and pivots to commode and chair.    Major Shift Events: Pt drowsy but arouses to voice and oriented x3. Reports severe lower back pain. Offered warm pack and lidoderm patches but pt refused and reports that she tried in the past and it didn't help with the pain. Gave 2 mg of morphine this afternoon. Pt now sleepy but reports decrease in back pain.   Plan (Upcoming Events): wean 02 as able.   Discharge/Transfer Needs: TBD     Bedside Shift Report Completed   Bedside Safety Check Completed

## 2017-03-24 NOTE — PROGRESS NOTES
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 What activities have you engaged in when using alcohol/other drugs that could be hazardous to you or others (i.e. driving a car/motorcycle/boat, operating machinery, unsafe sex, sharing needles for drugs or tattoos, etc     None    4. How much time do you spend getting, using or getting over using alcohol or drugs? (DSM)     Probably start drinking about 2pm and be done by 11pm. No hangovers.     5. Reasons for drinking/drug use (Use the space below to record answers. It may not be necessary to ask each item.)  Like the feeling Yes   Trying to forget problems Yes   To cope with stress Yes   To relieve physical pain Yes   To cope with anxiety Yes   To cope with depression Yes   To relax or unwind Yes   Makes it easier to talk with people No   Partner encourages use No   Most friends drink or use Yes   To cope with family problems Yes   Afraid of withdrawal symptoms/to feel better Yes   Other (specify)  N/A     A. What concerns other people about your alcohol or drug use/Has anyone told you that you use too much? What did they say? (DSM)     My boyfriend, who I live with right now, he has extremely been upset and worried and he hides his alcohol and sometimes call a cab and go get it. When he is at work and go get some.      B. What did you think about that/ do you think you have a problem with alcohol or drug use?     He is worrying about me dying. I agree, makes me feel good that he worries about me. Very hard on him. He does not need it, makes me sad too. I do not want him to go through anymore stress.     6. What changes are you willing to make? What substance are you willing to stop using? How are you going to do that? Have you tried that before? What interfered with your success with that goal?      Just stop drinking, and not  the bottle, and I know I can.     7. What would be helpful to you in making this change?     I am not sure.     Dimension IV Ratings   Readiness for Change - The placing authority must use the criteria in Dimension IV to determine a client s readiness for change.   RISK DESCRIPTIONS - Severity rating: 3 Client displays inconsistent compliance, minimal awareness of either the client s addiction or mental disorder, and is minimally cooperative.    REASONS SEVERITY WAS ASSIGNED - (What information did the person provide that supports your assessment of his or her readiness to change? How aware is the person of problems caused by continued use? How willing is she or he to make changes? What does the person feel would be helpful? What has the person been able to do without help?)      Pt reported the problem is daily stress as the problem. Pt reported her boyfriend, has expressed concern. Pt reported she agrees with him. Pt reported she is willing to stop drinking and knows she can. Pt verbal report is inconsistent with past behavior. Pt appears to be in the precontemplation stage of change.          DIMENSION V - Relapse, Continued Use, and Continued Problem Potential   1. In what ways have you tried to control, cut-down or quit your use? If you have had periods of sobriety, how did you accomplish that? What was helpful? What happened to prevent you from continuing your sobriety? (DSM)     Treatment,  "longest period of sobriety was 63 days, that was after treatment, what lead back to use was stress, going through a lot of pain again, physical pain again, cannot deal with, whole reason I started drinking, used it as a pain reliever. So frustrating. Day to day pain, plus the annoyance of some days that I was signed up for IOP treatment. Doing a lot of walking to stay sober, did a lot of reading and painting, taking walk to woods.     2. Have you experienced cravings? If yes, ask follow up questions to determine if the person recognizes triggers and if the person has had any success in dealing with them.     No    3. Have you been treated for alcohol/other drug abuse/dependence?     Yes.  3B. Number of times(lifetime) (over what period) 3.  3C. Number of times completed treatment (lifetime) 2.  3D. During the past three years have you participated in outpatient and/or residential?  Yes.  3E. When and where? [facility] October-November 2016, [place] [facility],  [facility] 2014 with Alexandrea SINHA As counselor .   3F. What was helpful? Coping skills What was not? Nothing that I can think of.    Per EMR tx discharge summary 10/6/14:   \"Alexandrea Michele, [facility]   Chemical Dependency      REFERRAL SOURCE: [facility].   PROGRAM: [facility], Women's Chemical Dependency Outpatient DBT I and DBT II Program at [place].   ADMISSION DATE: 08/05/2014   LAST SESSION DATE: 09/02/2014   DISCHARGE DATE: 10/06/2014   ADMISSION DIAGNOSIS: Alcohol use disorder, severe, 303.90.   DISCHARGE DIAGNOSIS: Alcohol use disorder, severe, 303.90. Opioid use disorder,304.00 in sustained remission The client reports a history of anxiety, depression, and previous trauma.   LAST USE DATE: The client reported 07/10/2014.   DISCHARGE STATUS: Without staff approval. The client had been referred to Women's Outpatient DBT Phase I and II Program by her [facility] counselors. She did not complete " "this program.       PRESENTING INFORMATION: Sabina Figueroa sought an evaluation at [facility] because she was unable to remain sober on her own, had many health-related issues regarding her alcoholism, and she completed [facility] and was referred to the Women's Outpatient DBT Phase I and II.      \"    4. Support group participation: Have you/do you attend support group meetings to reduce/stop your alcohol/drug use? How recently? What was your experience? Are you willing to restart? If the person has not participated, is he or she willing?     Last attendance of sober support group meeting, was AA, that would be about a year ago.     5. What would assist you in staying sober/straight?     Not sure    Dimension V Ratings   Relapse/Continued Use/Continued problem potential - The placing authority must use the criteria in Dimension V to determine a client s relapse, continued use, and continued problem potential.   RISK DESCRIPTIONS - Severity ratin No awareness of the negative impact of mental health problems or substance abuse. No coping skills to arrest mental health or addiction illnesses, or prevent relapse.    REASONS SEVERITY WAS ASSIGNED - (What information did the person provide that indicates his or her understanding of relapse issues? What about the person s experience indicates how prone he or she is to relapse? What coping skills does the person have that decrease relapse potential?)      Pt reported her longest period of sobriety has been 63 days after treatment. Pt reported she was using coping skills, reading, and exercising as ways she stayed sober. Pt reported no cravings when not using. Pt reported 3 past treatments in her lifetime in which she completed two of them. PT reported past attendance to meetings but denied any current. Pt reported last meeting was about a year ago. Pt reported she is not sure what will be helpful to her in staying sober.          DIMENSION VI - " Recovery Environment   1. Are you employed/attending school? Tell me about that.     Not working or attending school.     2A. Describe a typical day; evening for you. Work, school, social, leisure, volunteer, spiritual practices. Include time spent obtaining, using, recovering from drugs or alcohol. (DSM)     Wake up, have two cigarettes, watch tv, hope in shower, wait for ride to [employer] to get my pain medication, on suboxone for pain, not abusing opioids it was for withdrawal from prescribed medications for pain, come home, lay down, take a nap, or start cleaning, empty , then go on Internet news, talk to daughter online, everyday. Wait for boyfriend to come home, then he gets home around 3:30pm and then we play games like Gift Card Combo, Renzo, and Sorry.     2B. How often do you spend more time than you planned using or use more than you planned? (DSM)     Every time.     3. How important is using to your social connections? Do many of your family or friends use?     Not important and yet reported significant other drinks    4A. Are you currently in a significant relationship?     Yes.  4B. How long? 3 years    4C. Sexual Orientation:     Heterosexual    5A. Who do you live with?      Live with him, not supposed to because apartment building is the one I used to live in I was on [address], my daughter left to go to college and live with boyfriend, I had to downsize, and I told Section [employer], they told me to tell manager, that I need to move, which I did, she said I am sorry you are two days late of telling me, you have to pay for all of April, and housing assistance would not help me, so I had to pay all of that, the bill was 1100, so I did not pay it, and the manager that runs the building is still there, I was so sick that I left, I could not clean apartment, says in the lease that you are supposed to have company for only a certain amount of time.     5B. Tell me about their alcohol/drug use and mental health  issues.     He uses, alcohol to cope with pain.     5C. Are you concerned for your safety there? No    5D. Are you concerned about the safety of anyone else who lives with you? No    6A. Do you have children who live with you?     No    6B. Do you have children who do not live with you?     Yes.  (Ask follow up questions to learn where the children are, who has custody and what the person s relationship and responsibility is with these children and what hopes the person has for his or her future with these children.) 19, lives with boyfriend and goes to college.     7A. Who supports you in making changes in your alcohol or drug use? What are they willing to do to support you? Who is upset or angry about you making changes in your alcohol or drug use? How big a problem is this for you?      Boyfriend, daughter and her boyfriend.     7B. This table is provided to record information about the person s relationships and available support It is not necessary to ask each item; only to get a comprehensive picture of their support system.  How often can you count on the following people when you need someone?   Partner / Spouse Always supportive   Parent(s)/Aunt(s)/Uncle(s)/Grandparents N/A   Sibling(s)/Cousin(s) Always supportive   Child(yessica) Always supportive   Other relative(s) N/A   Friend(s)/neighbor(s) Never supportive   Child(yessica) s father(s)/mother(s) Usually supportive   Support group member(s) N/A   Community of rinku members N/A   /counselor/therapist/healer Usually supportive   Other (specify) N/A     8A. What is your current living situation?     Lives with boyfriend in apartment    8B. What is your long term plan for where you will be living?     Trying to get housing with  with [facility]    8C. Tell me about your living environment/neighborhood? Ask enough follow up questions to determine safety, criminal activity, availability of alcohol and drugs, supportive or antagonistic to  the person making changes.      It is ok.     9. Criminal justice history: Gather current/recent history and any significant history related to substance use--Arrests? Convictions? Circumstances? Alcohol or drug involvement? Sentences? Still on probation or parole? Expectations of the court? Current court order? Any sex offenses - lifetime? What level? (DSM)    None    10. What obstacles exist to participating in treatment? (Time off work, childcare, funding, transportation, pending nursing home time, living situation)     None    Dimension VI Ratings   Recovery environment - The placing authority must use the criteria in Dimension VI to determine a client s recovery environment.   RISK DESCRIPTIONS - Severity ratin Client has (A) Chronically antagonistic significant other, living environment, family, peer group or long-term criminal justice involvement that is harmful to recovery or treatment progress, or (B) Client has an actively antagonistic significant other, family, work, or living environment with immediate threat to the client s safety and well-being.    REASONS SEVERITY WAS ASSIGNED - (What support does the person have for making changes? What structure/stability does the person have in his or her daily life that will increase the likelihood that changes can be sustained? What problems exist in the person s environment that will jeopardize getting/staying clean and sober?)     (A) Pt reported she is not working or attending school. Pt reported using is not important to social connections and yet reported her boyfriend who she lives with drinks alcohol daily to cope with physical pain. Pt reported her boyfriend, daughter, and daughter's boyfriend are supportive of her. Pt reported her daughter lives independently with her boyfriend and attends college. Pt reported she is working with [facility]  to find housing due to not paying rent at her last apartment and having to move due to it. Pt reported she  can only stay with her boyfriend technically for periods at a time. Pt denied past or current legals.          Client Choice/Exceptions   Would you like services specific to language, age, gender, culture, Congregational preference, race, ethnicity, sexual orientation or disability?  No    What particular treatment choices and options would you like to have? NA    Do you have a preference for a particular treatment program? NA    Criteria for Diagnosis     Criteria for Diagnosis  DSM-5 Criteria for Substance Use Disorder  Instructions: Determine whether the client currently meets the criteria for Substance Use Disorder using the diagnostic criteria in the DSM-V pp.[phone]. Current means during the most recent 12 months outside a facility that controls access to substances    Category of Substance Severity (ICD-10 Code / DSM 5 Code)     Alcohol Use Disorder Severe  (10.20) (303.90)   Cannabis Use Disorder NA   Hallucinogen Use Disorder NA   Inhalant Use Disorder NA   Opioid Use Disorder NA   Sedative, Hypnotic, or Anxiolytic Use Disorder NA   Stimulant Related Disorder NA   Tobacco Use Disorder Moderate   (F17.200) (305.1)   Other (or unknown) Substance Use Disorder NA       Collateral Contact Summary   Number of contacts made: 1    Contact with referring person:  Yes, EMR.    If court related records were reviewed, summarize here: NA    Information from collateral contacts supported/largely agreed with information from the client and associated risk ratings.      Rule 25 Assessment Summary and Plan   's Recommendation    1. Abstain from using all mood altering chemicals and substances. Take all medications as prescribed and directed by licensed physicians.  2. Follow all recommendations and referrals made by hospital providers during admission and at discharge.  3. Attend a residential treatment program such as a [facility], [facility].  4. Start attending sober support  "group meetings until admitted to program.  5. Continue care with other providers such as , therapist, and follow recommendations and referrals made by them.    Rationale: Pt reported her use has negatively impacted multiple jones of her life and yet she continues to use. Pt reported her use has negatively impacted her physical/mental health, and relationships. Pt reported she is not sure if she will follow through with recommendations at this time due to needed to get housing in place. These are the reasons for the above recommendations. Pt appears to be high risk for continued use and does not have structured sober routine or living environment.       Collateral Contacts     Name:    [facility] ([facility])   Relationship:    Medical Records    Phone Number:    NA Releases:    Yes     Pertinent chart notes were reviewed and some were utilized as collateral to this assessment.    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 difficile colitis, currently on 40 mg qd of Prednisone on a taper. She presents with SOB that began yesterday afternoon and has been progressively worsening since. She was doing some shopping at [employer] earlier and \"almost passed out\" from the difficulty breathing. She also feels feverish. She admits to having one glass of alcohol last night before bed.   The patient visited this ED on 3/17 for facial swelling and alcohol withdrawal, was discharged to [facility] detox facility, where she stayed Friday and Saturday\".     per EMR ED admission note on 3/22/17:  \"Past Medical History:    Alcohol abuse  Alcoholic liver cirrhosis  GI bleed  Alcoholic " "peripheral neuropathy  Anemia   Edema  Ascitis  COPD  Asthma   HTN  Hyponatremia   Anxiety  Depression  Left ovarian cyst   Left foot bunion  Arthritis  DDD  Bilateral low back pain with sciatica  Pneumonia 16  Respiratory failure x2      Past Surgical History:   Knee surgery x 3    EGD x 3  Bronchoscopy x 2      Family History:   Depression  Anxiety  Substance abuse  Schizophrenia\"    Per EMR ED admission note medications prior to admission were:  Prior to Admission medications    Medication Sig Last Dose Taking? Auth Provider   tiotropium (SPIRIVA) 18 MCG capsule Inhale 18 mcg into the lungs daily 3/22/2017 at Unknown time Yes Unknown, Entered By History   insulin isophane human (HUMULIN N PEN) 100 UNIT/ML injection Inject 15 Units Subcutaneous 2 times daily (before meals) 3/22/2017 at x2 Yes Unknown, Entered By History   predniSONE (DELTASONE) 20 MG tablet Take 40 mg by mouth daily 3/22/2017 at Unknown time Yes Unknown, Entered By History   spironolactone (ALDACTONE) 50 MG tablet Take 1 tablet (50 mg) by mouth daily 3/22/2017 at Unknown time Yes Josemanuel Damon MD   gabapentin (NEURONTIN) 600 MG tablet Take 1 tablet (600 mg) by mouth 3 times daily 3/22/2017 at x1 Yes Josemanuel Damon MD   sulfamethoxazole-trimethoprim (BACTRIM DS/SEPTRA DS) 800-160 MG per tablet Take 1 tablet by mouth three times a week  Patient taking differently: Take 1 tablet by mouth three times a week on Mon/Wed/Friday 3/22/2017 at Unknown time Yes Yisel Henry MD   ascorbic acid (VITAMIN C) 500 MG tablet Take 500 mg by mouth daily 3/22/2017 at Unknown time Yes Unknown, Entered By History   FLUoxetine (PROZAC) 40 MG capsule Take 40 mg by mouth daily 3/22/2017 at Unknown time Yes Unknown, Entered By History   furosemide (LASIX) 20 MG tablet Take 20 mg by mouth daily 3/22/2017 at Unknown time Yes Unknown, Entered By History   hydrOXYzine (ATARAX) 10 MG tablet Take 10 mg by mouth every 8 hours as needed for itching " 3/22/2017 at Unknown time Yes Unknown, Entered By History   lactulose (CHRONULAC) 10 GM/15ML solution Take 20 g by mouth 3 times daily Past Week at Unknown time Yes Unknown, Entered By History   omeprazole (PRILOSEC) 20 MG CR capsule Take 20 mg by mouth daily Past Week at Unknown time Yes Unknown, Entered By History   thiamine 100 MG tablet Take 100 mg by mouth daily 3/22/2017 at Unknown time Yes Unknown, Entered By History   traZODone (DESYREL) 100 MG tablet Take 100 mg by mouth At Bedtime 3/21/2017 at pm Yes Unknown, Entered By History   Omega-3 Fatty Acids (FISH OIL) 1200 MG CAPS Take 1 capsule by mouth daily 3/22/2017 at Unknown time Yes Unknown, Entered By History   methocarbamol (ROBAXIN) 500 MG tablet Take 1,000 mg by mouth 4 times daily 3/22/2017 at x2 Yes Unknown, Entered By History   aspirin EC 81 MG EC tablet Take 1 tablet (81 mg) by mouth daily 3/22/2017 at Unknown time Yes Anatoliy Dos Santos DO   atorvastatin (LIPITOR) 20 MG tablet Take 1 tablet (20 mg) by mouth daily Past Week at Unknown time Yes Anatoliy Dos Santos DO   rifaximin (XIFAXAN) 550 MG TABS tablet Take 1 tablet (550 mg) by mouth 2 times daily 3/22/2017 at am Yes Anatoliy Dos Santos DO   ferrous sulfate (IRON) 325 (65 FE) MG tablet Take 325 mg by mouth daily (with breakfast) 3/22/2017 at Unknown time Yes Unknown, Entered By History   albuterol (2.5 MG/3ML) 0.083% neb solution Take 1 vial by nebulization every 6 hours as needed for shortness of breath / dyspnea or wheezing 3/22/2017 at am Yes Unknown, Entered By History   buprenorphine-naloxone (SUBOXONE) 8-2 MG SUBL sublingual tablet Place 3 tablets under the tongue daily 3/22/2017 at Unknown time Yes Unknown, Entered By History   mirtazapine (REMERON) 30 MG tablet Take 1 tablet (30 mg) by mouth At Bedtime 3/21/2017 at pm Yes Denton Dunlap MD   albuterol (PROAIR HFA, PROVENTIL HFA, VENTOLIN HFA) 108 (90 BASE) MCG/ACT inhaler Inhale 2 puffs into the lungs every 4 hours as needed for  shortness of breath / dyspnea or wheezing Reported on 2/23/2017 3/22/2017 at Unknown time Yes Reported, Patient   MULTIPLE VITAMINS PO Take 1 tablet by mouth daily 3/22/2017 at Unknown time Yes Reported, Patient   folic acid (FOLVITE) 1 MG tablet Take 1 tablet (1 mg) by mouth daily 3/22/2017 at Unknown time Yes Devin Peace MD   blood glucose monitoring (ACCU-CHEK TONIA PLUS) meter device kit Use to test blood sugars BID times daily or as directed.     Yisel Henry MD   alum & mag hydroxide-simethicone (MYLANTA ES/MAALOX ES) 400-400-40 MG/5ML SUSP Take 30 mLs by mouth every 4 hours as needed for indigestion Reported on 2/23/2017            Medications during admission  Current Facility-Administered Medications   Medication     lidocaine (LIDODERM) 5 % Patch 1 patch     lidocaine (LIDODERM) patch REMOVAL     lidocaine (LIDODERM) Patch in Place     methylPREDNISolone sodium succinate (solu-MEDROL) injection 62.5 mg     heparin sodium PF injection 5,000 Units     insulin aspart (NovoLOG) inj (RAPID ACTING)     insulin aspart (NovoLOG) inj (RAPID ACTING)     piperacillin-tazobactam (ZOSYN) 4.5 g vial to attach to  mL bag     nicotine Patch in Place     nicotine patch REMOVAL     nicotine (NICODERM CQ) 21 MG/24HR 24 hr patch 1 patch     vancomycin (VANCOCIN) 1,750 mg in NaCl 0.9 % 500 mL intermittent infusion     sodium chloride (PF) 0.9% PF flush 3 mL     sodium chloride (PF) 0.9% PF flush 3 mL     albuterol neb solution 2.5 mg     alum & mag hydroxide-simethicone (MYLANTA ES/MAALOX  ES) suspension 30 mL     ascorbic acid (VITAMIN C) tablet 500 mg     aspirin EC EC tablet 81 mg     atorvastatin (LIPITOR) tablet 20 mg     buprenorphine HCl-naloxone HCl (SUBOXONE) 8-2 MG per film 3 Film     ferrous sulfate (IRON) tablet 325 mg     FLUoxetine (PROzac) capsule 40 mg     [START ON 3/27/2017] folic acid (FOLVITE) tablet 1 mg     gabapentin (NEURONTIN) tablet 600 mg     hydrOXYzine (ATARAX) tablet 10 mg     insulin  isophane human (HumuLIN N PEN) injection 15 Units     lactulose (CHRONULAC) solution 20 g     mirtazapine (REMERON) tablet 30 mg     omeprazole (priLOSEC) CR capsule 20 mg     rifaximin (XIFAXAN) tablet 550 mg     sulfamethoxazole-trimethoprim (BACTRIM DS/SEPTRA DS) 800-160 MG per tablet 1 tablet     umeclidinium (INCRUSE ELLIPTA) 62.5 MCG/INH oral inhaler 62.5 mcg     traZODone (DESYREL) tablet 100 mg     naloxone (NARCAN) injection 0.1-0.4 mg     levofloxacin (LEVAQUIN) infusion 750 mg     0.9% sodium chloride infusion     LORazepam (ATIVAN) injection 1-4 mg     NaCl 0.9 % 500 mL with multivitamin-ADULT (INFUVITE) 10 mL, folic acid 1 mg, thiamine 100 mg infusion     potassium chloride SA (K-DUR/KLOR-CON M) CR tablet 20-40 mEq     potassium chloride (KLOR-CON) Packet 20-40 mEq     potassium chloride 10 mEq in 100 mL intermittent infusion     potassium chloride 10 mEq in 100 mL intermittent infusion with 10 mg lidocaine     potassium chloride 20 mEq in 50 mL intermittent infusion     magnesium sulfate 4 g in 100 mL sterile water (premade)     ipratropium - albuterol 0.5 mg/2.5 mg/3 mL (DUONEB) neb solution 3 mL     ipratropium - albuterol 0.5 mg/2.5 mg/3 mL (DUONEB) neb solution 3 mL     ondansetron (ZOFRAN-ODT) ODT tab 4 mg    Or     ondansetron (ZOFRAN) injection 4 mg     glucose 40 % gel 15-30 g    Or     dextrose 50 % injection 25-50 mL    Or     glucagon injection 1 mg     spironolactone (ALDACTONE) tablet 50 mg     Per EMR tx discharge summary on 10/6/14:  Alexandrea Michele LADC   Chemical Dependency      REFERRAL SOURCE: [facility].   PROGRAM: [facility] Chemical Dependency Outpatient DBT I and DBT II Program at [place].   ADMISSION DATE: 08/05/2014   LAST SESSION DATE: 09/02/2014   DISCHARGE DATE: 10/06/2014   ADMISSION DIAGNOSIS: Alcohol use disorder, severe, 303.90.   DISCHARGE DIAGNOSIS: Alcohol use disorder, severe, 303.90. Opioid use  "disorder,304.00 in sustained remission The client reports a history of anxiety, depression, and previous trauma.   LAST USE DATE: The client reported 07/10/2014.   DISCHARGE STATUS: Without staff approval. The client had been referred to Women's Outpatient DBT Phase I and II Program by her [facility] counselors. She did not complete this program.       PRESENTING INFORMATION: Sabina Figueroa sought an evaluation at [facility] because she was unable to remain sober on her own, had many health-related issues regarding her alcoholism, and she completed [facility] and was referred to the Women's Outpatient DBT Phase I and II.      \"      Collateral Contacts     Name:    NA   Relationship:    NA   Phone Number:    NA   Releases:         NA    ollateral Contacts      A problematic pattern of alcohol/drug use leading to clinically significant impairment or distress, as manifested by at least two of the following, occurring within a 12-month period:    Alcohol/drug is often taken in larger amounts or over a longer period than was intended.  There is a persistent desire or unsuccessful efforts to cut down or control alcohol/drug use  A great deal of time is spent in activities necessary to obtain alcohol, use alcohol, or recover from its effects.  Craving, or a strong desire or urge to use alcohol/drug  Continued alcohol use despite having persistent or recurrent social or interpersonal problems caused or exacerbated by the effects of alcohol/drug.  Alcohol/drug use is continued despite knowledge of having a persistent or recurrent physical or psychological problem that is likely to have been caused or exacerbated by alcohol.  Tolerance, as defined by either of the following: A need for markedly increased amounts of alcohol/drug to achieve intoxication or desired effect. and A markedly diminished effect with continued use of the same amount of alcohol/drug.  Withdrawal, as manifested by either of the " following: The characteristic withdrawal syndrome for alcohol/drug (refer to Criteria A and B of the criteria set for alcohol/drug withdrawal). and Alcohol/drug (or a closely related substance, such as a benzodiazepine) is taken to relieve or avoid withdrawal symptoms.      Specify if: In early remission:  After full criteria for alcohol/drug use disorder were previously met, none of the criteria for alcohol/drug use disorder have been met for at least 3 months but for less than 12 months (with the exception that Criterion A4,  Craving or a strong desire or urge to use alcohol/drug  may be met).     In sustained remission:   After full criteria for alcohol use disorder were previously met, non of the criteria for alcohol/drug use disorder have been met at any time during a period of 12 months or longer (with the exception that Criterion A4,  Craving or strong desire or urge to use alcohol/drug  may be met).   Specify if:   This additional specifier is used if the individual is in an environment where access to alcohol is restricted.    Mild: Presence of 2-3 symptoms    Moderate: Presence of 4-5 symptoms    Severe: Presence of 6 or more symptoms

## 2017-03-24 NOTE — CONSULTS
"The writer met with the patient, introduced herself and her role. The patient completed an assessment with the writer and yet reported multiple reasons as to why she would not be able to attend a treatment program at this time such as working with her  to find housing in which she only has until April 1, 2017 to do so, and also filling out paperwork to renew her insurance which may term at the end of the month. The writer discussed the importance of abstaining from use due to her physical health and those concerns. The writer asked her what her plan was if she is not interested in following through with treatment and the patient reported go back to AA meetings and also using \"coping skills\" she had not been using before. The patient reported the reason she went back to using was due to pain and her medications not working. The writer discussed with the patient thought that she reported she would not take her medications when she was using. The patient reported by the end of the assessment she would be interested in at some point to go back to  at [facility]. The writer recommended she sign an LUZ for the program so the writer can send the assessment to the program next week and get her on the wait list if they have one, which most programs do. The patient declined and told the writer to hold off until she has some stuff figured out. The writer gave the patient her business card, wrote that the assessment was good for 45 days, and the current date on the back. The writer told the patient to contact her to set up treatment services which at this time recommendation would be a residential program since the patient is at high risk for continued use. The writer discussed this with a MD after consult and told the MD that if patient changed her mind regarding sending the assessment to program while admitted to have social work contact the writer.   "

## 2017-03-24 NOTE — PROGRESS NOTES
[facility]  Hospitalist Progress Note  Emerald Goodwin MD     Date of Service (when I saw the patient): 03/24/2017    Reason for Stay (Diagnosis): Respiratory Failure         Assessment and Plan:      Summary of Stay: Sabina Figueroa is a 41 year old female with past medical history of alcohol abuse and associated cirrhosis, recurrent admissions for respiratory failure, alveolar hemorrhage and organizing PNA (detected on bronchoscopy in 1/17 while intubated) who was admitted on 3/22/2017 with cough, fever and worsening SOB and was found to have acute hypoxic respiratory failure and sepsis.      1. Acute Recurrent Hypoxic Respiratory Failure: Patient has had recurrent admissions for respiratory failure, alveolar hemorrhage and organizing PNA.  She presented with fever and worsening dyspnea.  This is in the context of not taking her Prednisone for several days due to alcohol use.  She initially required BiPAP therapy, was able to transition to high flow oxygen but continues to have high oxygen needs.  She does have fever, leukocytosis, elevated lactate and extensive bilateral interstitial and alveolar infiltrates.  Procalcitonin elevated to 1.01.  She is currently being treated for possible underlying PNA.  Continue current antibiotics through today, potentially start to taper tomorrow if improving respiratory status.  - Continue to follow cultures, attempt induced sputum  - Increase Solumedrol from 60 mg to 85 mg IV Q24H  - Continue Vancomycin/Zosyn/Levo  - Continue Bactrim for prophylaxis    2. Steroid Induced Hyperglycemia: HgbA1C this admission 6.2 (up from 5.6 in 1/2017, likely from steroids).  Glucose so far today under better control.  - NPH 15 units BID  - High SSI    3. Alcohol Use Disorder with Alcoholic Liver Disease: Started drinking again recently.  At this time no withdrawal.    - CIWA scale  - Continue vitamins  - Lactulose TID  - Continue Rifaximin and Spironolactone  - CD consulted,  "appreciate recommendations    4. Lactic Acidosis: Lactate elevated to 3, subsequently normalized.    5. Chronic Pain: On Suboxone.  Having exacerbation of chronic pain, will have PRN Morphine available.  Continue Suboxone and Neurontin.  Sydney held this morning due to sedation, this has now resolved so will resume this but PRN instead of scheduled.    6. Depression: continue Prozac, Remeron, Trazodone.    DVT Prophylaxis: Heparin SQ  Code Status: Full Code  Discharge Dispo: Home  Estimated Disch Date / # of Days until Disch: several more days until respiratory status improving, needs to remain in ICU given high flow oxygen        Interval History (Subjective):      Patient states she is feeling bad this morning but is quite confused.                    Physical Exam:      Last Vital Signs:  /59  Pulse 134  Temp 98  F (36.7  C) (Oral)  Resp 20  Ht 1.702 m (5' 7\")  Wt 83.7 kg (184 lb 8.4 oz)  SpO2 93%  BMI 28.9 kg/m2    General: Awake, mumbling at times  HEENT: Normocephalic and atraumatic, eyes anicteric and without scleral injection, EOMI  Cardiac: Tachycardic, regular rhythm, normal S1, S2. No m/g/r, no LE edema.  Pulmonary: Normal chest rise, increased work of breathing currently on HFNC.  Decreased breath sounds diffusely with faint crackles Abdomen: soft, non-tender, non-distended.  Normoactive bowel sounds, no guarding or rebound tenderness.  Extremities: no deformities.  Warm, well perfused.  Skin: no rashes or lesions.  Warm and Dry.  Neuro: No focal deficits.  Confused, mumbling at times.  Psych: Alert, confused         Medications:      All current medications were reviewed with changes reflected in problem list.         Data:      All new lab and imaging data was reviewed.   Labs:    Recent Labs  Lab 03/24/17  0525 03/23/17  0520 03/22/17 2057    139  139 131*   POTASSIUM 4.0 4.9 3.6   CHLORIDE 112* 108 95   CO2 23 17* 25   ANIONGAP 8 12 11   GLC 95 231* 94   BUN 7 14 22   CR 0.79 " 0.85  0.85 0.89   GFRESTIMATED 80 73  73 69   GFRESTBLACK >90African American GFR Calc 89  88 84   VISHNU 8.4* 7.4* 8.8       Recent Labs  Lab 03/24/17  0525 03/22/17 2057 03/17/17  1600   WBC 5.4 15.5* 8.2   HGB 10.3* 14.0 15.3   HCT 32.7* 41.1 46.8   * 93 92   * 194 152      Imaging:   No results found for this or any previous visit (from the past 24 hour(s)).    Emerald Goodwin MD.

## 2017-03-24 NOTE — PHARMACY-VANCOMYCIN DOSING SERVICE
Pharmacy Vancomycin Note  Date of Service 2017  Patient's  1975   41 year old, female    Indication: Healthcare-Associated Pneumonia  Goal Trough Level: 15-20 mg/L  Day of Therapy: 2  Current Vancomycin regimen:  1,250 mg IV q12h    Current estimated CrCl = Estimated Creatinine Clearance: 95.8 mL/min (based on Cr of 0.85).    Creatinine for last 3 days  3/22/2017:  8:57 PM Creatinine 0.89 mg/dL  3/23/2017:  5:20 AM Creatinine 0.85 mg/dL;  5:20 AM Creatinine 0.85 mg/dL    Recent Vancomycin Levels (past 3 days)  3/23/2017:  7:50 PM Vancomycin Level 20.8 mg/L    Vancomycin IV Administrations (past 72 hours)                   vancomycin 1250 mg in 0.9% NaCl 250 mL PREMIX (mg) 1,250 mg New Bag 17     1,250 mg New Bag  1226     1,250 mg New Bag  0512    vancomycin (VANCOCIN) 1500 mg in 0.9% NaCl 250 mL PREMIX (mg) 1,500 mg New Bag 17 2215                Nephrotoxins and other renal medications (Future)    Start     Dose/Rate Route Frequency Ordered Stop    17 0600  vancomycin (VANCOCIN) 1,750 mg in NaCl 0.9 % 500 mL intermittent infusion      1,750 mg Intravenous EVERY 12 HOURS 17 2159      17 2300  piperacillin-tazobactam (ZOSYN) 4.5 g vial to attach to  mL bag      4.5 g  over 1 Hours Intravenous EVERY 6 HOURS 17 1703               Contrast Orders - past 72 hours     None          Interpretation of levels and current regimen:  Trough level is  Supratherapeutic    Has serum creatinine changed > 50% in last 72 hours: No    Urine output:  good urine output    Renal Function: Stable    Plan:  1.  Change to 1,750 mg q12h  2.  Pharmacy will check trough levels as appropriate in 1-3 Days.    3. Serum creatinine levels will be ordered daily for the first week of therapy and at least twice weekly for subsequent weeks.      Grace Gentile        .
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
## 2017-03-24 NOTE — PROGRESS NOTES
Respiratory Therapy    Patient remains on BiPAP throughout the shift and was on HFNC 40LPM 60% FiO2 initially. Breath sounds are diminsihed with expiratory wheezing throughout. Duonebs are being given as ordered Q4. Patient was unable to produce a sputum. RT to follow.    Antoinette Rodriguez RT  3/24/2017

## 2017-03-24 NOTE — PROGRESS NOTES
Respiratory Therapy    Patient seen resting in bed on HFNC 40 LPM, FiO2 60-70%, SpO2 92-95%. Respiratory rate 20-22 and breath sounds diminished with I/E wheezes. Patient will continue to receive Duoneb Q4hrs. RT to follow.    Sofi Robles  March 24, 2017, 6:02 PM

## 2017-03-24 NOTE — PROGRESS NOTES
[facility] Intensive Care Progress Note    Problem List  Organizing pneumonia (cryptogenic?)  Substance abuse  Hypoxic respiratory failure    Date of Service (when I saw the patient): 03/24/2017     Assessment & Plan   Sabina Figueroa is a 41 year old female who was admitted on 3/22/2017. She has had recurrent admissions for respiratory failure, alveolar hemorrhage and organizing pneumonia detected on bronchoscopy in January when she was intubated.     Pulmonary:        Acute recurrent hypoxic respiratory failure: acute onset with fever, leukocytosis  - likely acute bacterial pneumonia, possibly concomitant acute worsening of OP due to noncompliance  - transition to HFNC if possible  - bronchoscopy would be last resort, she doesn't tolerate well and is difficult to sedate    FiO2 (%): 100 %  Resp: 24    ID:         Attempt sputum culture: RT induced sputum attempt  -    Endocrine:  Hyperglycemia on steroids: continue subcut insulin  -    DVT Prophylaxis: Heparin SQ  GI Prophylaxis: Not indicated    Restraints: Restraints for medical healing needed: NO    Family update by me today: No    Jennifer Mane MD    Time Spent on this Encounter   Billing:  I spent 30 minutes bedside and on the inpatient unit today managing the critical care of Sabina Figueroa in relation to the issues listed in this note.    Main Plans for Today   Continue supportive care      Interval History   Met with chem dep for assessment today    Physical Exam   Temp: 98  F (36.7  C) Temp src: Oral Temp  Min: 97.9  F (36.6  C)  Max: 98.7  F (37.1  C) BP: 99/60   Heart Rate: 110 Resp: 24 SpO2: (!) 81 % O2 Device: High Flow Nasal Cannula (HFNC) Oxygen Delivery: Other (Comments) (40 LPM)  Vitals:    03/22/17 2052 03/22/17 2300 03/24/17 0400   Weight: 77.6 kg (171 lb 1.6 oz) 83.5 kg (184 lb 1.4 oz) 83.7 kg (184 lb 8.4 oz)     I/O last 3 completed shifts:  In: 3661.67 [P.O.:100; I.V.:3561.67]  Out: 2469  [Urine:3725]    Current Vitals:Temp:  [97.9  F (36.6  C)-98.7  F (37.1  C)] 98  F (36.7  C)  Heart Rate:  [] 110  Resp:  [11-31] 24  BP: ()/(58-87) 99/60  FiO2 (%):  [50 %-100 %] 100 %  SpO2:  [81 %-97 %] 81 %   Awake, alert and following commands  PERRL and conjugate gaze  NPPV  Lungs clear  H-RR w/o murmur  Abdomen-soft, non tender, non distended  Extremities-warm and no edema  Lines: No erythema or discharge at entry site for No invasive lines  Current lines are required for patient management    Medications     NaCl 100 mL/hr at 03/24/17 1131       lidocaine  1 patch Transdermal Q24H     lidocaine   Transdermal Q24h     lidocaine   Transdermal Q8H     methylPREDNISolone  62.5 mg Intravenous Q24H     heparin  5,000 Units Subcutaneous Q8H     insulin aspart  1-10 Units Subcutaneous TID AC     insulin aspart  1-7 Units Subcutaneous At Bedtime     piperacillin-tazobactam  4.5 g Intravenous Q6H     nicotine   Transdermal Q8H     nicotine   Transdermal Daily     nicotine  1 patch Transdermal Daily     vancomycin (VANCOCIN) IV  1,750 mg Intravenous Q12H     sodium chloride (PF)  3 mL Intracatheter Q8H     ascorbic acid  500 mg Oral Daily     aspirin  81 mg Oral Daily     atorvastatin  20 mg Oral Daily     buprenorphine HCl-naloxone HCl  3 Film Sublingual Daily     ferrous sulfate  325 mg Oral Daily with breakfast     FLUoxetine  40 mg Oral Daily     [START ON 3/27/2017] folic acid  1 mg Oral Daily     gabapentin  600 mg Oral TID     insulin isophane human  15 Units Subcutaneous BID AC     lactulose  20 g Oral TID     mirtazapine  30 mg Oral At Bedtime     omeprazole  20 mg Oral QAM AC     rifaximin  550 mg Oral BID     sulfamethoxazole-trimethoprim  1 tablet Oral Once per day on Mon Wed Fri     traZODone  100 mg Oral At Bedtime     levofloxacin  750 mg Intravenous Q24H     IV infusion builder WITH LARGE additive list   Intravenous Q24H     ipratropium - albuterol 0.5 mg/2.5 mg/3 mL  3 mL Nebulization Q4H      spironolactone  50 mg Oral Daily       Data     Recent Labs  Lab 03/24/17  0525 03/23/17  0520 03/22/17 2057 03/17/17  1600   WBC 5.4  --  15.5* 8.2   HGB 10.3*  --  14.0 15.3   *  --  93 92   *  --  194 152    139  139 131* 137   POTASSIUM 4.0 4.9 3.6 4.0   CHLORIDE 112* 108 95 102   CO2 23 17* 25 24   BUN 7 14 22 8   CR 0.79 0.85  0.85 0.89 0.78   ANIONGAP 8 12 11 11   VISHNU 8.4* 7.4* 8.8 9.1   GLC 95 231* 94 157*   ALBUMIN 2.2*  --  3.6 3.9   PROTTOTAL 5.6*  --  8.1 7.7   BILITOTAL 0.5  --  0.4 0.7   ALKPHOS 90  --  130 132   ALT 28  --  34 42   AST 54*  --  54* 27     No results found for this or any previous visit (from the past 24 hour(s)).

## 2017-03-25 NOTE — PROGRESS NOTES
Rested for short intervals.  MS for discomfort effective. Dose desat's at times but with rest and breathing re'coop's fairly well..

## 2017-03-25 NOTE — PROGRESS NOTES
[facility]  Hospitalist Progress Note  Emerald Goodwin MD     Date of Service (when I saw the patient): 03/25/2017    Reason for Stay (Diagnosis): Respiratory Failure         Assessment and Plan:      Summary of Stay: Sabina Figueroa is a 41 year old female with past medical history of alcohol abuse and associated cirrhosis, recurrent admissions for respiratory failure, alveolar hemorrhage and organizing PNA (detected on bronchoscopy in 1/17 while intubated) who was admitted on 3/22/2017 with cough, fever and worsening SOB and was found to have acute hypoxic respiratory failure and sepsis.  Narrowing antibiotics today given sputum cultures.    1. Acute Recurrent Hypoxic Respiratory Failure: Patient has had recurrent admissions for respiratory failure, alveolar hemorrhage and organizing PNA.  She presented with fever and worsening dyspnea.  This is in the context of not taking her Prednisone for several days due to alcohol use.  She initially required BiPAP therapy, was able to transition to high flow oxygen but continues to have high oxygen needs.  She does have fever, leukocytosis, elevated lactate and extensive bilateral interstitial and alveolar infiltrates.  Procalcitonin elevated to 1.01.  She was started on Vancomycin, Levofloxacin and Zosyn for underlying PNA.  Sputum culture now showing staph aureus (sensitivities pending).  - Continue to follow cultures, attempt induced sputum  - Solumedrol from 85 mg IV Q24H  - Stop Zosyn  - Continue Vancomycin and Levo  - Continue Bactrim for prophylaxis    2. Steroid Induced Hyperglycemia: HgbA1C this admission 6.2 (up from 5.6 in 1/2017, likely from steroids).  Glucose so far today under better control.  - Increase morning NPH to 18 units given spikes in glucose after steroid, continue NPH 15 units QHS  - High SSI    3. Alcohol Use Disorder with Alcoholic Liver Disease: Started drinking again recently.  At this time no withdrawal.    - CIWA scale  -  "Continue vitamins  - Lactulose TID  - Continue Rifaximin and Spironolactone  - CD consulted, appreciate recommendations    4. Lactic Acidosis: Lactate elevated to 3, subsequently normalized.    5. Chronic Pain: On Suboxone.  Having exacerbation of chronic pain, will have PRN Morphine available.  Continue Suboxone and Neurontin.  Roboxin changed to PRN instead of scheduled.    6. Depression: continue Prozac, Remeron, Trazodone.    DVT Prophylaxis: Heparin SQ  Code Status: Full Code  Discharge Dispo: Home  Estimated Disch Date / # of Days until Disch: several more days until respiratory status improving, needs to remain in ICU given high flow oxygen        Interval History (Subjective):      Patient states she is feeling bad this morning.  Her breathing continues to be hard.  Last night she did not sleep much because of SOB and frequent coughing.  At times she is able to get something up and this helps her breathing slightly.  Has significant back pain which she states is chronic.  Usually manages it by frequently changing positions.                  Physical Exam:      Last Vital Signs:  /63  Pulse 134  Temp 98.4  F (36.9  C) (Oral)  Resp 20  Ht 1.702 m (5' 7\")  Wt 85.7 kg (188 lb 15 oz)  SpO2 95%  BMI 29.59 kg/m2    General: Awake, BiPAP in place, answering questions  HEENT: Normocephalic and atraumatic, eyes anicteric and without scleral injection, EOMI  Cardiac: Tachycardic, regular rhythm, normal S1, S2. No m/g/r, no LE edema.  Pulmonary: Normal chest rise, increased work of breathing currently on BiPAP.  Diffuse wheezing and faint crackles throughout all lung fields  Abdomen: soft, non-tender, non-distended.  Normoactive bowel sounds, no guarding or rebound tenderness.  Extremities: no deformities.  Warm, well perfused.  Skin: no rashes or lesions.  Warm and Dry.  Neuro: No focal deficits.  Spontaneously moving all extremities.  Answering questions appropriately.  Psych: Alert and oriented x3        "  Medications:      All current medications were reviewed with changes reflected in problem list.         Data:      All new lab and imaging data was reviewed.   Labs:    Recent Labs  Lab 03/25/17 0530 03/24/17 0525 03/23/17 0520 03/22/17 2057   NA  --  143 139  139 131*   POTASSIUM  --  4.0 4.9 3.6   CHLORIDE  --  112* 108 95   CO2  --  23 17* 25   ANIONGAP  --  8 12 11   GLC  --  95 231* 94   BUN  --  7 14 22   CR 0.88 0.79 0.85  0.85 0.89   GFRESTIMATED 70 80 73  73 69   GFRESTBLACK 85 >90African American GFR Calc 89  88 84   VISHNU  --  8.4* 7.4* 8.8       Recent Labs  Lab 03/25/17 0530 03/24/17 0525 03/22/17 2057   WBC 5.6 5.4 15.5*   HGB 10.4* 10.3* 14.0   HCT 33.3* 32.7* 41.1   * 101* 93   * 113* 194      Imaging:   No results found for this or any previous visit (from the past 24 hour(s)).    Emerald Goodwin MD.

## 2017-03-25 NOTE — PROGRESS NOTES
Patient on BIPAP throughout most of night. While awake patient tolerating HFNC 40 LPM 60-70%. BS inspiratory/expiratory wheezes, patient receiving Duonebs Q4H. SpO2 92-98%. RT will continue to follow and wean HFNC as able.    Clover Haddad, RT 3/25/2017, 5:25 AM

## 2017-03-25 NOTE — PROGRESS NOTES
ICU End of Shift Summary.  For vital signs and complete assessments, please see documentation flowsheets.     Pertinent assessments: Bipap overnight 55%. HFNC early AM 75% 40L. L/S coarse with expiratory wheezes. Coughing spells sats drop with slow recovery. 2mg mophine q3 hrs. Afebrile. Adequate urine output. One bowel movement.   Major Shift Events: Morphine q3. Unable to wean 02  Plan (Upcoming Events): Wean HFNC as able  Discharge/Transfer Needs: TBD    Bedside Shift Report Completed   Bedside Safety Check Completed

## 2017-03-25 NOTE — PROGRESS NOTES
Respiratory Therapy    Patient seen resting in bed on HFNC 40 LPM and 60-80% FiO2, SpO2 95%. Respiratory rate 18-23 and breath sounds coarse with occasional wheezes. Patient will continue to receive Duoneb Q4 hrs. RT to follow.    Sofi Robles  March 25, 2017, 5:14 PM

## 2017-03-26 NOTE — PROGRESS NOTES
Patient remained on HFNC 40 LPM 65% throughout night. RR 18-24, SPO2 92-96%. Patient continues to receive Q4H nebulizers, BS expiratory wheezes. RT will continue to follow and further wean HFNC as able.    Clover Haddad, RT 3/26/2017, 5:16 AM

## 2017-03-26 NOTE — PROGRESS NOTES
Respiratory Therapy    Patient seen resting in bed on HFNC 40 LPM and 50-65% FiO2, SpO2 94%. Respiratory rate 20 and breath sounds clear/diminished. Patient will continue to receive Duoneb Q4. RT to follow.    Sofi Robles  March 26, 2017, 5:23 PM

## 2017-03-26 NOTE — PLAN OF CARE
Problem: Sepsis (Adult)  Goal: Signs and Symptoms of Listed Potential Problems Will be Absent or Manageable (Sepsis)  Signs and symptoms of listed potential problems will be absent or manageable by discharge/transition of care (reference Sepsis (Adult) CPG).   Vss, tracking and treating blood glucose levels. Independent in bed, up with assist of 1 to bedside commode, tolerating hi flow oxygen at 60% isabella 40 LPM  ICU End of Shift Summary.  For vital signs and complete assessments, please see documentation flowsheets.      Pertinent assessments:   Major Shift Events:   Plan (Upcoming Events): continue to monitor accucvhecks, pain management  Discharge/Transfer Needs: tbd     Bedside Shift Report Completed   Bedside Safety Check Completed

## 2017-03-26 NOTE — PLAN OF CARE
Problem: Goal Outcome Summary  Goal: Goal Outcome Summary  Outcome: Improving  ICU End of Shift Summary.  For vital signs and complete assessments, please see documentation flowsheets.      Pertinent assessments: VSS, A+OX4. Reports back pain 8-9/10, pain decreased to 5/10 post I.V morphine.  Major Shift Events: HFNC 50% fi02, 40 LPM. Dyspnea on exertion. Getting up to the chair with meals. Watters catheter removed. Pt gets up to the commode with standby assist.   Plan (Upcoming Events): Continue to wean 02  Discharge/Transfer Needs: TBD      Bedside Shift Report Completed   Bedside Safety Check Completed

## 2017-03-26 NOTE — PROGRESS NOTES
[facility]  Hospitalist Progress Note  Emerald Goodwin MD     Date of Service (when I saw the patient): 03/26/2017    Reason for Stay (Diagnosis): Respiratory Failure         Assessment and Plan:      Summary of Stay: Sabina Figueroa is a 41 year old female with past medical history of alcohol abuse and associated cirrhosis, recurrent admissions for respiratory failure, alveolar hemorrhage and organizing PNA (detected on bronchoscopy in 1/17 while intubated) who was admitted on 3/22/2017 with cough, fever and worsening SOB and was found to have acute hypoxic respiratory failure and sepsis.  Continues to require HFNC.    1. Acute Recurrent Hypoxic Respiratory Failure: Patient has had recurrent admissions for respiratory failure, alveolar hemorrhage and organizing PNA.  She presented with fever and worsening dyspnea.  This is in the context of not taking her Prednisone for several days due to alcohol use.  She initially required BiPAP therapy, was able to transition to high flow oxygen but continues to have high oxygen needs.  She had fever, leukocytosis, elevated lactate and extensive bilateral interstitial and alveolar infiltrates.  Procalcitonin elevated to 1.01.  She was started on Vancomycin, Levofloxacin and Zosyn for underlying PNA.  Sputum culture now showing staph aureus (sensitivities pending).  - Continue to follow cultures  - Solumedrol from 85 mg IV Q24H  - Continue Vancomycin and Levo  - Continue Bactrim for prophylaxis  - Recommendation from Dr. Mac: Continue steroids at 1mg/kg daily until out of ICU. Prednisone 60mg until next clinic visit with Dr. Mac (scheduled 4/10). Nocturnal and Exertional oximetry prior to discharge to assess for home O2 need. Continue PCP prophylaxis as long as she is on more than 30mg daily prednisone    2. Steroid Induced Hyperglycemia: HgbA1C this admission 6.2 (up from 5.6 in 1/2017, likely from steroids).  Glucose so far today under better  "control.  - Increase morning NPH to 18 units given spikes in glucose after steroid, continue NPH 15 units QHS  - High SSI    3. Alcohol Use Disorder with Alcoholic Liver Disease: Started drinking again recently.  At this time no withdrawal.    - CIWA scale  - Continue vitamins  - Lactulose TID  - Continue Rifaximin and Spironolactone  - CD consulted, appreciate recommendations    4. Lactic Acidosis: Lactate elevated to 3, subsequently normalized.    5. Chronic Pain: On Suboxone.  Having exacerbation of chronic pain, will have PRN Morphine available.  Continue Suboxone and Neurontin.  Roboxin changed to PRN instead of scheduled.    6. Depression: continue Prozac, Remeron, Trazodone.    DVT Prophylaxis: Heparin SQ  Code Status: Full Code  Discharge Dispo: Home  Estimated Disch Date / # of Days until Disch: several more days until respiratory status improving, needs to remain in ICU given high flow oxygen        Interval History (Subjective):      Patient is tired but feeling a little better.  She remained on HFNC last night (did not require BiPAP).  Feels her breathing may be a little better than admission.                  Physical Exam:      Last Vital Signs:  /79  Pulse 134  Temp 97.8  F (36.6  C) (Axillary)  Resp 16  Ht 1.702 m (5' 7\")  Wt 87.3 kg (192 lb 7.4 oz)  SpO2 90%  BMI 30.14 kg/m2    General: Sleeping, awakens briefly to answer questions  HEENT: Normocephalic and atraumatic, eyes anicteric and without scleral injection, EOMI  Cardiac: Tachycardic, regular rhythm, normal S1, S2. No m/g/r, no LE edema.  Pulmonary: Normal chest rise, increased work of breathing currently on HFNC.  Diffuse wheezing and faint crackles throughout all lung fields  Abdomen: soft, non-tender, non-distended.  Normoactive bowel sounds, no guarding or rebound tenderness.  Extremities: no deformities.  Warm, well perfused.  Skin: no rashes or lesions.  Warm and Dry.  Neuro: No focal deficits.  Spontaneously moving all " extremities.  Sleepy but answers questions appropriately.  Psych: Alert and oriented x3         Medications:      All current medications were reviewed with changes reflected in problem list.         Data:      All new lab and imaging data was reviewed.   Labs:    Recent Labs  Lab 03/26/17 0520 03/25/17 0530 03/24/17 0525 03/23/17 0520 03/22/17 2057   NA  --   --  143 139  139 131*   POTASSIUM  --   --  4.0 4.9 3.6   CHLORIDE  --   --  112* 108 95   CO2  --   --  23 17* 25   ANIONGAP  --   --  8 12 11   GLC  --   --  95 231* 94   BUN  --   --  7 14 22   CR 0.62 0.88 0.79 0.85  0.85 0.89   GFRESTIMATED >90Non  GFR Calc 70 80 73  73 69   GFRESTBLACK >90African American GFR Calc 85 >90African American GFR Calc 89  88 84   VISHNU  --   --  8.4* 7.4* 8.8       Recent Labs  Lab 03/25/17 0530 03/24/17 0525 03/22/17 2057   WBC 5.6 5.4 15.5*   HGB 10.4* 10.3* 14.0   HCT 33.3* 32.7* 41.1   * 101* 93   * 113* 194      Imaging:   No results found for this or any previous visit (from the past 24 hour(s)).    Emerald Goodwin MD.

## 2017-03-26 NOTE — PHARMACY-VANCOMYCIN DOSING SERVICE
Pharmacy Vancomycin Note  Date of Service 2017  Patient's  1975   41 year old, female    Indication: Healthcare-Associated Pneumonia  Goal Trough Level: 15-20 mg/L  Day of Therapy: 5  Current Vancomycin regimen:  1750 mg IV q12h    Current estimated CrCl = Estimated Creatinine Clearance: 134.2 mL/min (based on Cr of 0.62).    Creatinine for last 3 days  3/24/2017:  5:25 AM Creatinine 0.79 mg/dL  3/25/2017:  5:30 AM Creatinine 0.88 mg/dL  3/26/2017:  5:20 AM Creatinine 0.62 mg/dL    Recent Vancomycin Levels (past 3 days)  3/23/2017:  7:50 PM Vancomycin Level 20.8 mg/L  3/26/2017:  8:50 AM Vancomycin Level 18.1 mg/L    Vancomycin IV Administrations (past 72 hours)                   vancomycin (VANCOCIN) 1,750 mg in NaCl 0.9 % 500 mL intermittent infusion (mg) 1,750 mg New Bag 17 0946     1,750 mg New Bag 17 2207     1,750 mg New Bag  0924     1,750 mg New Bag 17 2155     1,750 mg New Bag  0749                Nephrotoxins and other renal medications (Future)    Start     Dose/Rate Route Frequency Ordered Stop    17 0600  vancomycin (VANCOCIN) 1,750 mg in NaCl 0.9 % 500 mL intermittent infusion      1,750 mg Intravenous EVERY 12 HOURS 17 2159               Contrast Orders - past 72 hours     None          Interpretation of levels and current regimen:  Trough level is  Therapeutic    Has serum creatinine changed > 50% in last 72 hours: No    Urine output:  Good urine output    Renal Function: Stable and improving     Plan:  1.  Continue Current Dose  2.  Pharmacy will check trough levels as appropriate in 3-5 Days.    3. Serum creatinine levels will be ordered daily for the first week of therapy and at least twice weekly for subsequent weeks.      [place]        .

## 2017-03-26 NOTE — PROGRESS NOTES
Notified Tele ICU of  pre meal. BG wast 492 at 1700 ( pt was eating at this time) pt was covered 10 units Aspart. Pt does have irregular meal times and snacks. TELE hub RN call back later.

## 2017-03-27 NOTE — PROGRESS NOTES
Patient remains on HFNC 40 LPM 50% throughout night, receiving Q4H nebulizers. BS expiratory wheezes/crackles. RT will continue to follow and wean HFNC as able.    Clover Haddad, RT 3/27/2017, 6:31 AM
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
## 2017-03-27 NOTE — PLAN OF CARE
Problem: Goal Outcome Summary  Goal: Goal Outcome Summary  Outcome: No Change  ICU End of Shift Summary.  For vital signs and complete assessments, please see documentation flowsheets.      Pertinent assessments: A&O x4; VSS. Pain 6-7/10 in lower back, decreased to 4-5/10 with PRN IV morphine requested Q3H.   Major Shift Events: HFNC 40 LPM 50%; dyspnea on exertion, O2 sats drop with movement. Good UOP per bedside commode. Scheduled lactulose given; no stool this shift.   Plan (Upcoming Events): Continue to wean O2 as able  Discharge/Transfer Needs: Pending     Bedside Shift Report Completed   Bedside Safety Check Completed

## 2017-03-27 NOTE — PROGRESS NOTES
[facility]  Hospitalist Progress Note  Nakul Reagan MD     Date of Service (when I saw the patient): 03/27/2017    Reason for Stay (Diagnosis): Respiratory Failure         Assessment and Plan:      Summary of Stay: Sabina Figueroa is a 41 year old female with past medical history of alcohol abuse and associated cirrhosis, recurrent admissions for respiratory failure, alveolar hemorrhage and organizing PNA (detected on bronchoscopy in 1/17 while intubated) who was admitted on 3/22/2017 with cough, fever and worsening SOB and was found to have acute hypoxic respiratory failure and sepsis.  Continues to require HFNC.    1. Acute Recurrent Hypoxic Respiratory Failure: Patient has had recurrent admissions for respiratory failure, alveolar hemorrhage and organizing PNA.  She presented with fever and worsening dyspnea.  This is in the context of not taking her Prednisone for several days due to alcohol use.  She initially required BiPAP therapy, was able to transition to high flow oxygen but continues to have high oxygen needs.  She had fever, leukocytosis, elevated lactate and extensive bilateral interstitial and alveolar infiltrates.  Procalcitonin elevated to 1.01.  She was started on Vancomycin, Levofloxacin and Zosyn for underlying PNA.  Sputum culture now showing staph aureus (sensitivities pending).    - Follow cultures   - Solumedrol  85 mg IV Q24H  - Continue Vancomycin and Levo  - Continue Bactrim for prophylaxis  - Recommendation from Dr. Mac: Continue steroids at 1mg/kg daily until out of ICU. Prednisone 60mg until next clinic visit with Dr. Mac (scheduled 4/10). Nocturnal and Exertional oximetry prior to discharge to assess for home O2 need. Continue PCP prophylaxis as long as she is on more than 30mg daily prednisone    2. Steroid Induced Hyperglycemia: HgbA1C this admission 6.2 (up from 5.6 in 1/2017, likely from steroids).  Glucose so far today under better control.  Very poor  "control of blood sugar, start insulin drip to calculate total need per 24 hours    3. Alcohol Use Disorder with Alcoholic Liver Disease: Started drinking again recently.  At this time no withdrawal.    - CIWA scale  - Continue vitamins  - Lactulose TID  - Continue Rifaximin and Spironolactone  - CD consulted, follow-up CD recommendations    4. Lactic Acidosis: Lactate elevated to 3, subsequently normalized.    5. Chronic Pain: On Suboxone.  Having exacerbation of chronic pain, will have PRN Morphine available.  Continue Suboxone and Neurontin.  Roboxin changed to PRN instead of scheduled.    6. Depression: continue Prozac, Remeron, Trazodone.    DVT Prophylaxis: Heparin SQ  Code Status: Full Code  Discharge Dispo: Home  Estimated Disch Date / # of Days until Disch: several more days until respiratory status improving, needs to remain in ICU given high flow oxygen and insulin drip        Interval History (Subjective):        Continues to have shortness of breath off high flow    Discussed with the nurse and the intensivist                  Physical Exam:      Last Vital Signs:  /74  Pulse 134  Temp 98.4  F (36.9  C) (Axillary)  Resp 12  Ht 1.702 m (5' 7\")  Wt 84.6 kg (186 lb 8.2 oz)  SpO2 (!) 86%  BMI 29.21 kg/m2    General: NAD  HEENT: Normocephalic and atraumatic, eyes anicteric and without scleral injection, EOMI  Cardiac: Tachycardic, regular rhythm, normal S1, S2. No m/g/r, no LE edema.  Pulmonary: Normal chest rise, increased work of breathing currently on HFNC.  Diffuse wheezing and faint crackles throughout all lung fields  Abdomen: soft, non-tender, non-distended.  Normoactive bowel sounds, no guarding or rebound tenderness.  Extremities: no deformities.  Warm, well perfused.  Skin: no rashes or lesions.  Warm and Dry.  Neuro: No focal deficits.  Spontaneously moving all extremities.  Sleepy but answers questions appropriately.  Psych: Alert and oriented x3         Medications:      All current " medications were reviewed with changes reflected in problem list.         Data:      All new lab and imaging data was reviewed.   Labs:    Recent Labs  Lab 03/27/17 0525 03/26/17  1730 03/26/17 0520 03/25/17 0530 03/24/17 0525 03/23/17 0520     --   --   --  143 139  139   POTASSIUM 3.5 4.4  --   --  4.0 4.9   CHLORIDE 105  --   --   --  112* 108   CO2 26  --   --   --  23 17*   ANIONGAP 11  --   --   --  8 12   *  --   --   --  95 231*   BUN 11  --   --   --  7 14   CR 0.64  0.66  --  0.62 0.88 0.79 0.85  0.85   GFRESTIMATED >90Non  GFR Calc  >90CORRECTED ON 03/27 AT 0807: PREVIOUSLY REPORTED AS >90 Non  GFR Calc  --  >90Non  GFR Calc 70 80 73  73   GFRESTBLACK >90African American GFR Calc  >90CORRECTED ON 03/27 AT 0807: PREVIOUSLY REPORTED AS >90  GFR Calc  --  >90African American GFR Calc 85 >90African American GFR Calc 89  88   VISHNU 8.7  --   --   --  8.4* 7.4*       Recent Labs  Lab 03/25/17 0530 03/24/17 0525 03/22/17 2057   WBC 5.6 5.4 15.5*   HGB 10.4* 10.3* 14.0   HCT 33.3* 32.7* 41.1   * 101* 93   * 113* 194

## 2017-03-27 NOTE — PHARMACY-CONSULT NOTE
[facility]  Antimicrobial Stewardship Team (AST) Note            To: Hospitalist  Unit: ICU - 369  Patient Name: Sabina Figueroa  Allergies: NKDA    Brief Summary:  40 yo F admitted 3/22/17 for shortness of breath, cough, and fever. Previously admitted 2/16/17 for organizing pneumonia and c. diff. Sputum culture from last admission showed only candida growth. Upon admission she was found on this admission to have hypoxic respiratory failure and sepsis (temp=102.6, cu=577, resp=28, lactic acid=3). Vancomycin and levofloxacin were added for sepsis secondary to pneumonia.  Sputum cultures are positive for staph aureus and sensitivities are back.  Assessment:   Current Antibiotic therapy:   Vancomycin 1750 mg IV q12hr (day 6)  Rifaximin 550 mg PO BID (PTA for alcoholic liver disease)  Bactrim /160 mg once daily on MWF (PTA for alcoholic liver disease)  Levofloxacin 750 mg IV q24hr (day 5)    Clinical Features/Vital Signs (VS):                    Culture Results:  Date Culture Site Organism   3/23 Sputum Staph Aureus   3/22 Blood No growth                     Imaging:          Recommendation/Interventions:    Consider discontinuing vancomycin as the cultures show methicillin sensitivity. Levofloxacin is appropriate for coverage.     Discussed w/ ID Staff-Dr Jorge Donaldson, Pharmacy Intern

## 2017-03-27 NOTE — PROGRESS NOTES
Respiratory Therapy    Patient seen resting in bed on HFNC 40 LPM and 40%-45% FiO2, SpO2 94%. Respiratory rate 20 and breath sounds coarse with expiratory wheezes. Patient will continue to receive Duoneb Q4 hrs. RT to follow.    Sofi Robles  March 27, 2017, 4:48 PM

## 2017-03-28 NOTE — PROGRESS NOTES
ICU End of Shift Summary.  For vital signs and complete assessments, please see documentation flowsheets.     Pertinent assessments: VSS. Alert and Oriented. LS diminished and coarse. BS active.  Major Shift Events: BS gradually decreasing this shift. Pain 5-6/10 taking IV morphine as needed. CIWA score 2.  Plan (Upcoming Events): Wean HFNC as tolerated.  Discharge/Transfer Needs: Possible home o2 needed    Bedside Shift Report Completed   Bedside Safety Check Completed

## 2017-03-28 NOTE — PROGRESS NOTES
Respiratory Therapy Note        Pt has remained off BIPAP and it was removed from the room.  Her HFNC is down to 30 Lpm and 30%.  Her breath sounds are coarse and diminished.    March 28, 2017 4:38 PM  Lincoln Lawrence

## 2017-03-28 NOTE — PROGRESS NOTES
[facility]  Hospitalist Progress Note  Nakul Reagan MD     Date of Service (when I saw the patient): 03/28/2017    Reason for Stay (Diagnosis): Respiratory Failure         Assessment and Plan:      Summary of Stay: Sabina Figueroa is a 41 year old female with past medical history of alcohol abuse and associated cirrhosis, recurrent admissions for respiratory failure, alveolar hemorrhage and organizing PNA (detected on bronchoscopy in 1/17 while intubated) who was admitted on 3/22/2017 with cough, fever and worsening SOB and was found to have acute hypoxic respiratory failure and sepsis.  Continues to require HFNC and having SOB with minimal activity.    1. Acute Recurrent Hypoxic Respiratory Failure: Patient has had recurrent admissions for respiratory failure, alveolar hemorrhage and organizing PNA.  She presented with fever and worsening dyspnea.  This is in the context of not taking her Prednisone for several days due to alcohol use.  She initially required BiPAP therapy, was able to transition to high flow oxygen but continues to have high oxygen needs.  She had fever, leukocytosis, elevated lactate and extensive bilateral interstitial and alveolar infiltrates.  Procalcitonin elevated to 1.01.  She was started on Vancomycin, Levofloxacin and Zosyn for underlying PNA.  Sputum culture now showing staph aureus (sensitive).    - Solumedrol  85 mg IV Q24H, change to prednisone orally once out of the ICU  - Continue Levo  Discontinue vancomycin  - Continue Bactrim for prophylaxis  - Recommendation from Dr. Mac: Continue steroids at 1mg/kg daily until out of ICU. Prednisone 60mg until next clinic visit with Dr. Mac (scheduled 4/10). Nocturnal and Exertional oximetry prior to discharge to assess for home O2 need. Continue PCP prophylaxis as long as she is on more than 30mg daily prednisone    2. Steroid Induced Hyperglycemia: HgbA1C this admission 6.2 (up from 5.6 in 1/2017, likely from  "steroids).  Glucose so far today under better control.  Very poor control of blood sugar, continue insulin drip she is on 16 units per hour, noncompliance with diet, discussed    3. Alcohol Use Disorder with Alcoholic Liver Disease: Started drinking again recently.  At this time no withdrawal.    - CIWA scale  - Continue vitamins  - Lactulose TID  - Continue Rifaximin and Spironolactone  - CD consulted, follow-up CD recommendations    4. Lactic Acidosis: Lactate elevated to 3, subsequently normalized.    5. Chronic Pain: On Suboxone.  Also getting morphine in the hospital, pain team consultation to assist with her pain management  6. Depression: continue Prozac, Remeron, Trazodone.    DVT Prophylaxis: Heparin SQ  Code Status: Full Code  Discharge Dispo: TCU vs home  Estimated Disch Date / # of Days until Disch: several more days >3 until respiratory status improving, needs to remain in ICU given high flow oxygen and insulin drip        Interval History (Subjective):        She denies complaints, continues to have significant shortness of breath with minimal activity  Very poor compliance with diet, eating sugary food    Discussed with the nurse and the intensivist                  Physical Exam:      Last Vital Signs:  /78  Pulse 134  Temp 97.8  F (36.6  C) (Oral)  Resp 14  Ht 1.702 m (5' 7\")  Wt 84.6 kg (186 lb 8.2 oz)  SpO2 98%  BMI 29.21 kg/m2    General: NAD  HEENT: Normocephalic and atraumatic, eyes anicteric and without scleral injection, EOMI  Cardiac: Tachycardic, regular rhythm, normal S1, S2. No m/g/r, no LE edema.  Pulmonary: Normal chest rise, increased work of breathing currently on HFNC.  Diffuse wheezing and faint crackles throughout all lung fields  Abdomen: soft, non-tender, non-distended.  Normoactive bowel sounds, no guarding or rebound tenderness.  Extremities: no deformities.  Warm, well perfused.  Skin: no rashes or lesions.  Warm and Dry.  Neuro: No focal deficits.  Spontaneously " moving all extremities.  Sleepy but answers questions appropriately.  Psych: Alert and oriented x3         Medications:      All current medications were reviewed with changes reflected in problem list.         Data:      All new lab and imaging data was reviewed.   Labs:    Recent Labs  Lab 03/28/17  0505 03/27/17  0525 03/26/17  1730 03/26/17  0520  03/24/17  0525    142  --   --   --  143   POTASSIUM 3.4 3.5 4.4  --   --  4.0   CHLORIDE 103 105  --   --   --  112*   CO2 30 26  --   --   --  23   ANIONGAP 9 11  --   --   --  8   * 181*  --   --   --  95   BUN 11 11  --   --   --  7   CR 0.73 0.64  0.66  --  0.62  < > 0.79   GFRESTIMATED 87 >90Non  GFR Calc  >90CORRECTED ON 03/27 AT 0807: PREVIOUSLY REPORTED AS >90 Non  GFR Calc  --  >90Non  GFR Calc  < > 80   GFRESTBLACK >90African American GFR Calc >90African American GFR Calc  >90CORRECTED ON 03/27 AT 0807: PREVIOUSLY REPORTED AS >90  GFR Calc  --  >90African American GFR Calc  < > >90African American GFR Calc   VISHNU 8.6 8.7  --   --   --  8.4*   < > = values in this interval not displayed.    Recent Labs  Lab 03/28/17  0505 03/25/17  0530 03/24/17  0525   WBC 8.2 5.6 5.4   HGB 10.9* 10.4* 10.3*   HCT 33.7* 33.3* 32.7*   MCV 96 101* 101*   * 132* 113*

## 2017-03-28 NOTE — PLAN OF CARE
Problem: Goal Outcome Summary  Goal: Goal Outcome Summary  ICU End of Shift Summary.  For vital signs and complete assessments, please see documentation flowsheets.      Pertinent assessments: VSS, remains on HFo2 40, 40 LPM, tolerating well, SpO2 decreases to 70's with activity,  percentage increased to 60-70% with activity. Morphine IV given for chronic Back pain. Good urine output, BM x 1. Questions answered, support given, continue to monitor.   Major Shift Events: Insulin gtt initiated, will continue to give novolog to cover carb count. Tolerating will  Plan (Upcoming Events): monitor O2 need, wean o2 if possible, pain control  Discharge/Transfer Needs: TBD     Bedside Shift Report Completed :yes  Bedside Safety Check Completed :yes

## 2017-03-28 NOTE — PROGRESS NOTES
ICU End of Shift Summary.  For vital signs and complete assessments, please see documentation flowsheets.     Pertinent assessments: Pt A/O x4, insulin gtt continues, up to bsc with 1 person assist, lung sounds coarse and diminished.  Major Shift Events: none  Plan (Upcoming Events): wean off high isabella oxygen, wean off insulin gtt  Discharge/Transfer Needs: unsure at this time    Bedside Shift Report Completed   Bedside Safety Check Completed

## 2017-03-28 NOTE — CONSULTS
CLINICAL NUTRITION SERVICES - REASSESSMENT NOTE      Recommendations Ordered by Registered Dietitian (RD):   -Moderate consistent CHO diet   -Protein-based snack at 2 pm         EVALUATION OF PROGRESS TOWARD GOALS   Diet Order and Enteral access - Ability to advance diet vs need for EN  Update/Evaluation: Patient's diet advanced to clear liquids and then regular diet on 3/23. Diet changed to moderate consistent CHO diet on 3/27 with increasing BGs secondary to steroids. Patient with good-fair appetite since diet advancement, eating % of meals 2-3x/day.    Patient denies any current factors affecting intake. She does add that she feels very hungry, likely a consequence of high dose steroids and possible Remeron. Of note patient has been requesting carbohydrate-dense / simple-carbohydrate containing foods from RN staff (e.g. Jell-O, regular popsicles)     Dosing Weight: 65.4 kg (adjusted weight)    ASSESSED NUTRITION NEEDS:  Estimated Energy Needs: 5358-2867 kcals (25-30 Kcal/Kg)  Justification: maintenance  Estimated Protein Needs: 78-98 grams protein (1.2-1.5 g pro/Kg)  Justification: maintenance with acute on chronic illness  Estimated Fluid Needs: 5680-8320  mL (1 mL/Kcal)  Justification: maintenance      NEW FINDINGS:   -Pertinent medications - Methylprednisolone - 87.5 mg q 24 hours, Remeron - 30 mg at bedtime, and insulin gtt     Previous Goals:   Diet advancement beyond clear liquids x 48 hours   Evaluation: Met    Previous Nutrition Diagnosis:   Inadequate oral intake related to limited ability for PO secondary to BiPAP requirement as evidenced by minimal PO since admission (x 1 day)  Evaluation: Completed      MALNUTRITION: (assessed 3/23/2017)  % Weight Loss:  None noted  % Intake:  No decreased intake noted  Subcutaneous Fat Loss:  None observed  Muscle Loss:  None observed  Fluid Retention:  None noted    Malnutrition Diagnosis: Patient does not meet two of the above criteria necessary for  diagnosing malnutrition      CURRENT NUTRITION DIAGNOSIS  Food and nutrition-related knowledge deficit related to no prior nutrition education regarding BG control as evidenced by pt report and food choices     INTERVENTIONS  Recommendations / Nutrition Prescription  Moderate consistent CHO diet     Implementation  Nutrition Education: Discussed diet relative to hyperglycemia. Discussed common sources of carbohydrates and encouraged patient to limit large amounts of carbohydrates at meals and snacks. Also encouraged patient to limit intake of simple carbohydrates such as Jell-O, rice krispie bars, popsicles. Encourage protein-dense meals/snacks to encourage satiety while minimizing fluctuations in BGs    Medical nutrition supplement / snack - Discussed the benefit of protein-based snack to encourage satiety and limit excessive snacks on carbohydrate-dense foods. Ordered cheese + meat snack at 2 pm    Collaboration and Referral of Nutrition care - Discussed patient during interdisciplinary rounds     Goals  Pt to verbalize understanding of consistent CHO diet by stating three foods containing carbohydrates     MONITORING AND EVALUATION:  Food and nutrition related knowledge - Monitor for understanding and compliance with moderate carbohydrate diet    Rachael Horan RD, LD  Clinical Dietitian  3rd Floor/ICU Pager: [phone]  All Other Floors Pager: [phone]  Weekend/Holiday Pager: [phone]

## 2017-03-28 NOTE — PROGRESS NOTES
Resp Care:  Patient remains on HFNC 40%,40L, patient tolerating well, bipap on standby at bedside.

## 2017-03-28 NOTE — PROGRESS NOTES
ICU End of Shift Summary.  For vital signs and complete assessments, please see documentation flowsheets.     Pertinent assessments: 40L and 40%FiO2 on high-flow (weaning as kai). KOROMA when up to commode or chair. Pt insistent on morphine every 3hrs-encouraged Ibuprofen. Pain consult ordered. Pt freq requesting popsicles and jello despite her carb restrictions causing BGM to increase while on gtt-MD notified who discussed with pt importance of limiting carbs. VSS.   Major Shift Events: See above  Plan (Upcoming Events): Pain nurse to change pain med orders. Wean O2 as kai.   Discharge/Transfer Needs: TBD    Bedside Shift Report Completed   Bedside Safety Check Completed

## 2017-03-29 NOTE — PROGRESS NOTES
[facility]  Hospitalist Progress Note  Nakul Reagan MD     Date of Service (when I saw the patient): 03/29/2017    Reason for Stay (Diagnosis): Respiratory Failure         Assessment and Plan:      Summary of Stay: Sabina Figueroa is a 41 year old female with past medical history of alcohol abuse and associated cirrhosis, recurrent admissions for respiratory failure, alveolar hemorrhage and organizing PNA (detected on bronchoscopy in 1/17 while intubated) who was admitted on 3/22/2017 with cough, fever and worsening SOB and was found to have acute hypoxic respiratory failure and sepsis.  Continues to require HFNC and having SOB with minimal activity.    1. Acute Recurrent Hypoxic Respiratory Failure: Patient has had recurrent admissions for respiratory failure, alveolar hemorrhage and organizing PNA.  She presented with fever and worsening dyspnea.  This is in the context of not taking her Prednisone for several days due to alcohol use.  She initially required BiPAP therapy, was able to transition to high flow oxygen but continues to have high oxygen needs.  She had fever, leukocytosis, elevated lactate and extensive bilateral interstitial and alveolar infiltrates.  Procalcitonin elevated to 1.01.  She was started on Vancomycin, Levofloxacin and Zosyn for underlying PNA.  Sputum culture now showing staph aureus (sensitive).    - Solumedrol  85 mg IV Q24H, changed to prednisone orally 60 mg starting tomorrow  - Continue Levo, can be changed to oral tomorrow, started on March 23, I would at least give her 10 days  Discontinue vancomycin  - Continue Bactrim for prophylaxis  - Recommendation from Dr. Mac: Continue steroids at 1mg/kg daily until out of ICU. Prednisone 60mg until next clinic visit with Dr. Mac (scheduled 4/10). Nocturnal and Exertional oximetry prior to discharge to assess for home O2 need. Continue PCP prophylaxis as long as she is on more than 30mg daily prednisone    2.  "Steroid Induced Hyperglycemia: HgbA1C this admission 6.2 (up from 5.6 in 1/2017, likely from steroids).  Glucose so far today under better control.  Very poor control of blood sugar,   Discontinue insulin drip and changed to subcutaneous insulin    3. Alcohol Use Disorder with Alcoholic Liver Disease: Started drinking again recently.  At this time no withdrawal.    - Continue vitamins  - Lactulose TID  - Continue Rifaximin and Spironolactone  - CD consulted, follow-up CD recommendations    4. Lactic Acidosis: Lactate elevated to 3, subsequently normalized.    5. Chronic Pain: On Suboxone.  Morphine discontinued by the pain management, follow-up with the recommendation, no opioids recommended for this patient in the hospital or on discharge   6. Depression: continue Prozac, Remeron, Trazodone.    DVT Prophylaxis: Heparin SQ  Code Status: Full Code  Discharge Dispo: TCU vs home, once patient is off high flow oxygen and able to do therapy consult physical therapy for further recommendation  Estimated Disch Date / # of Days until Disch: several more days >3 until respiratory status improving, she can transfer to general medicine on high flow oxygen since requirement have improved        Interval History (Subjective):        Continued to slowly improve, blood sugar significantly better after she monitored her carbohydrate intake    Discussed with the nurse and the intensivist                  Physical Exam:      Last Vital Signs:  BP 97/69  Pulse 134  Temp 97.7  F (36.5  C) (Axillary)  Resp 9  Ht 1.702 m (5' 7\")  Wt 88.4 kg (194 lb 14.2 oz)  SpO2 96%  BMI 30.52 kg/m2    General: NAD  HEENT: Normocephalic and atraumatic, eyes anicteric and without scleral injection, EOMI  Cardiac: Tachycardic, regular rhythm, normal S1, S2. No m/g/r, no LE edema.  Pulmonary: Normal chest rise, increased work of breathing currently on HFNC.  Diffuse wheezing and faint crackles throughout all lung fields  Abdomen: soft, non-tender, " non-distended.  Normoactive bowel sounds, no guarding or rebound tenderness.  Extremities: no deformities.  Warm, well perfused.  Skin: no rashes or lesions.  Warm and Dry.  Neuro: No focal deficits.  Spontaneously moving all extremities.  Sleepy but answers questions appropriately.  Psych: Alert and oriented x3         Medications:      All current medications were reviewed with changes reflected in problem list.         Data:      All new lab and imaging data was reviewed.   Labs:    Recent Labs  Lab 03/29/17  0533 03/28/17  0505 03/27/17  0525    142 142   POTASSIUM 3.4 3.4 3.5   CHLORIDE 103 103 105   CO2 30 30 26   ANIONGAP 9 9 11   GLC 74 106* 181*   BUN 16 11 11   CR 0.83 0.73 0.64  0.66   GFRESTIMATED 75 87 >90Non  GFR Calc  >90CORRECTED ON 03/27 AT 0807: PREVIOUSLY REPORTED AS >90 Non  GFR Calc   GFRESTBLACK >90African American GFR Calc >90African American GFR Calc >90African American GFR Calc  >90CORRECTED ON 03/27 AT 0807: PREVIOUSLY REPORTED AS >90  GFR Calc   VISHNU 8.6 8.6 8.7       Recent Labs  Lab 03/29/17  0533 03/28/17  0505 03/25/17  0530 03/24/17  0525   WBC  --  8.2 5.6 5.4   HGB 11.2* 10.9* 10.4* 10.3*   HCT  --  33.7* 33.3* 32.7*   MCV  --  96 101* 101*   PLT  --  146* 132* 113*

## 2017-03-29 NOTE — PROGRESS NOTES
ICU End of Shift Summary.  For vital signs and complete assessments, please see documentation flowsheets.     Pertinent assessments: VSS. L/S diminished w/ expiratory wheezesw. HFNC 30 L 30% fio2. Pt complains of pain; levels 7-8 to back. Refused icy hot patch-education given. Education reinforced on diet and carb counting. Up to commode for voiding with dyspnea-recovers well. Slept well. Insulin gtt off 0400.  Major Shift Events: n/a  Plan (Upcoming Events): wean off hfnc  Discharge/Transfer Needs: TBD    Bedside Shift Report Completed :   Bedside Safety Check Completed:

## 2017-03-29 NOTE — CONSULTS
[facility]  Pain Service Consultation   Text Page    Date of Admission:  3/22/2017    Assessment & Plan   Sabina Figueroa is a 41 year old female who was admitted on 3/22/2017. I was asked to see the patient for morphine weaning.    1) Chronic pain  Musculoskeletal pain involving lumbar spine and adjacent muscles.   Chronic peripheral neuropathy of LEs   Hx of sciatica.    2)  Patient with chronic low back pain, on suboxone at [facility]. Other pain medications prescribed by her PCP.  [place] monthly oxycodone scripts until 9/15/2016. Then monthly gabapentin scripts. Consistent prescribers.  0mg Daily Morphine Equivalent  Patient has a moderate opioid tolerance due to suboxone.     Patient's opioid use last 24 hours:   morphine 12mg IV = 36 mg Daily Morphine Equivalent    3)  Opioid induced side-effects:  -Constipation     4) Other/Related:    -Depression/anxiety  -Disease of addiction   -Asthma, COPD    PLAN:   3)Multimodal Medication Therapy  Topical: switch to icy hot patch when awake - 9 a-5 p, then ketoprofen 10% PLO gel TID at 6 p, 10 p and 6 a if awake.  NSAIDS:No.  Muscle Relaxants: Robaxin 1000 mg 4 times a day prn.  Adjuvants:gabapentin TID as pta  Antidepresants/anxiolytics: Remeron, trazadone  Opioids:D/C Morphine. No opioids recommended for pt.  4)Non-medication interventions  Hot packs.   Mobility, ambulation, position changes  5)Constipation Prophylaxis  Scheduled lactulose.  6) DC safety  -Opioids are not recommended for this patient for discharge  -Pt to follow up with PCP for non-opioid pain management, and [facility] for Suboxone therapy.    Time Spent on this Encounter   I spent  30 minutes in assessment of the patient and discussion with the patient and family.  Another 30 minutes in review of chart, documentation and discussion with the health care team.    Nancy WHYTE, CNS  Pain Management and Palliative Care  [facility]  Pgr:  "[phone]      Reason for Consult   Reason for consult: I was asked by Dr. Reagan to evaluate this patient for morphine taper.    Primary Care Physician   Primary Care Physician:Aidee Hemphill  Pain Specialist: Followed at [facility] for suboxone therapy. Has been followed at the [facility], last visit 4/2016.    Chief Complaint   Back pain    History is obtained from the patient and electronic health record    History of Present Illness   Sabina Figueroa is a 41 year old female who presents with low back pain not managed with current pain plan and requesting opioids as part of her therapy. She was hospitalized on 3/22 with SOB and found to be in acute respiratory failure with sepsis. She has been hospitalized with recurrent respiratory failure and pneumonia four times since 12/16. She was also admitted for relapse of alcohol use and alcohol withdrawal in 2/17. She has had back pain for many years which she originally attributes to slipping and falling on the ice. She reports that she has degenerative joint disease and occasional slipped discs in her lumbar spine.    CURRENT PAIN:  Her pain is located in the lower lumbar spine and adjoining muscles.  It is described as Aching, Burning, Sharp, Stabbing and dull at times - \"it just depends\"  She rates it as ranging between 4/10 and 9/10  The average is 5-6/10 on a scale of 0-10  Currently it is rated as 8/10  It improves by changing position, icy hot patches, IV morphine, subaxone, warm baths, hot packs. Robaxin. thermacare patches. Sometimes she drinks to help with her pain.  It worsens by staying in the same position, over doing it.  She has been compliant with the recommendations related to her pain control while in the hospital.      PAIN HISTORY:  The pain is mainly located in the lumbar low back, but \"when she gets a slipped disc, she gets radiculopathy and sciatica down her legs\"  It is described as Burning and Shooting  She currently is " "not having this pain now.  She also has chronic peripheral neuropathy in her feet, up to her ankle on the left and up to her midshin on the right which she attributes to drinking alcohol. The gabapentin helps this pain, but \"not the back pain\".    PAST PAIN TREATMENT:   Medications:opioids, gabapentin, antianxiety/antidepressants, ibuprofen, robaxin.  Non-phamacologic modalities:Tens unit, PT, chiropractor, pain clinic.  Previous interventions/surgeries: spine and facet injections           D.I.R.E Score: Patient Selection for Chronic Opioid Analgesia    For each factor, rate the patient's score from 1 - 3 based on the explanations on the right.       Diagnosis             2         1 = Benign chronic condition with minimal objective findings or no definite medical diagnosis.  Examples:  fibromyalgia, migraine, headaches, non-specific back pain.  2 = Slowly progressive condition concordant with moderate pain, or fixed condition with moderate objective findings.  Examples: failed back surgery syndrome, back pain with moderate degenerative changes, neuropathic pain.  3 = Advanced condition concordant with severe pain with objective findings.  Examples: severe ischemic vascular disease, advanced neuropathy, severe spinal stenosis.    Intractability             2         1 = Few therapies have been tried and the patient takes a passive role in his/her pain management process.   2 = Most costomary treatments have been tried but the patient is not fully engaged in the pain management process, or barriers prevent (insurance, transportation, medical illness)  3 = Patient fully engaged in a spectrum of appropriate treatments but with inadequate response.    Risk   (Risk = Total of P+C+R+S below)       Psychological             1         1 = Serious personality dysfunction or mental illness interfering with care.  Examples: personality disorder, severe affective disorder, significant personality issues.  2 = Personality or " mental health interferes moderately.  Example: depression or anxiety disorder.  3 = Good communication with the clinic.  No significant personality dysfunction or mental illness.       Chemical      Health             1         1 = Active or very recent use of illicit drugs, excessive alcohol, or prescription drug abuse.  2 = Chemical coper (uses medications to cope with stress) or history of chemical dependency in remission.  3 = No CD history.  Not drug-focused or chemically reliant       Reliability             2         1 = History of numerous problems: medication misuse, missed appointments, rarely follows through.  2 = Occasional difficulties with compliance, but generally reliable.  3 = Highly reliable patient with medications, appointments and treatment.       Social      Support             2         1= Life in chaos.  Little family support and few close relationships.  Loss of most normal life roles.  2 = Reduction in some relationships and life roles.  3 = Supportive family/close relationships.  Involved in work or school and no social isolation.    Efficacy score             1         1 = Poor function or minimal pain relief despite moderate to high doses.  2 = Moderate benefit with function improved in a number of ways (or insufficient info - hasn't tried opioid yet or very low doses or too short a trial.  3 = Good improvement in pain and function and quality of life with stable doses over time.                                    11    Total score = D + I + R + E    Score 7-13: Not a suitable candidate for long-term opioid analgesia  Score 14-21: May be a good candidate for long-term opioid analgesia    Copyright 2013 Keith Valero MD, The DIRE Score: Predicting Outcomes of Opioid Prescribing for Chronic Pain. The Journal of Pain. 7(9) (September), 2006:671-681    Past Medical History   I have reviewed this patient's medical history and updated it with pertinent information if needed.   Past Medical  History:   Diagnosis Date     ALC (alcoholic liver cirrhosis) (H)      Alcohol abuse      Anxiety      Arthritis      Ascites      Asthma      Bunion, left foot      Chronic low back pain     Narcotic agreement signed in [facility] system     COPD (chronic obstructive pulmonary disease) (H)      Depression      Hypertension     current     Ovarian cyst, left        Past Surgical History   I have reviewed this patient's surgical history and updated it with pertinent information if needed.  Past Surgical History:   Procedure Laterality Date     BRONCHOSCOPY FLEXIBLE N/A 1/10/2017    Procedure: BRONCHOSCOPY FLEXIBLE;  Surgeon: Jennifer Mane MD;  Location:  GI     BRONCHOSCOPY FLEXIBLE N/A 2017    Procedure: BRONCHOSCOPY FLEXIBLE;  Surgeon: Jennifer Mane MD;  Location: [facility] OR      SECTION       ESOPHAGOSCOPY, GASTROSCOPY, DUODENOSCOPY (EGD), COMBINED  2014    Procedure: COMBINED ESOPHAGOSCOPY, GASTROSCOPY, DUODENOSCOPY (EGD);  Surgeon: Brittany Lowe MD;  Location:  GI     ESOPHAGOSCOPY, GASTROSCOPY, DUODENOSCOPY (EGD), COMBINED N/A 2015    Procedure: COMBINED ESOPHAGOSCOPY, GASTROSCOPY, DUODENOSCOPY (EGD);  Surgeon: London Lenz MD;  Location:  GI     ESOPHAGOSCOPY, GASTROSCOPY, DUODENOSCOPY (EGD), COMBINED N/A 2015    Procedure: COMBINED ESOPHAGOSCOPY, GASTROSCOPY, DUODENOSCOPY (EGD);  Surgeon: Barry Chavez MD;  Location: [facility]     KNEE SURGERY      x3         Prior to Admission Medications   Prior to Admission Medications   Prescriptions Last Dose Informant Patient Reported? Taking?   FLUoxetine (PROZAC) 40 MG capsule 3/22/2017 at Unknown time Self Yes Yes   Sig: Take 40 mg by mouth daily   MULTIPLE VITAMINS PO 3/22/2017 at Unknown time Self Yes Yes   Sig: Take 1 tablet by mouth daily   Omega-3 Fatty Acids (FISH OIL) 1200 MG CAPS 3/22/2017 at Unknown time Self Yes Yes   Sig: Take 1 capsule by mouth daily   albuterol (2.5 MG/3ML) 0.083% neb solution 3/22/2017  at am Self Yes Yes   Sig: Take 1 vial by nebulization every 6 hours as needed for shortness of breath / dyspnea or wheezing   albuterol (PROAIR HFA, PROVENTIL HFA, VENTOLIN HFA) 108 (90 BASE) MCG/ACT inhaler 3/22/2017 at Unknown time Self Yes Yes   Sig: Inhale 2 puffs into the lungs every 4 hours as needed for shortness of breath / dyspnea or wheezing Reported on 2/23/2017   alum & mag hydroxide-simethicone (MYLANTA ES/MAALOX  ES) 400-400-40 MG/5ML SUSP Unknown at Unknown time Self Yes No   Sig: Take 30 mLs by mouth every 4 hours as needed for indigestion Reported on 2/23/2017   ascorbic acid (VITAMIN C) 500 MG tablet 3/22/2017 at Unknown time Self Yes Yes   Sig: Take 500 mg by mouth daily   aspirin EC 81 MG EC tablet 3/22/2017 at Unknown time Self No Yes   Sig: Take 1 tablet (81 mg) by mouth daily   atorvastatin (LIPITOR) 20 MG tablet Past Week at Unknown time Self No Yes   Sig: Take 1 tablet (20 mg) by mouth daily   blood glucose monitoring (ACCU-CHEK TONIA PLUS) meter device kit   No No   Sig: Use to test blood sugars BID times daily or as directed.   buprenorphine-naloxone (SUBOXONE) 8-2 MG SUBL sublingual tablet 3/22/2017 at Unknown time Self Yes Yes   Sig: Place 3 tablets under the tongue daily   ferrous sulfate (IRON) 325 (65 FE) MG tablet 3/22/2017 at Unknown time Self Yes Yes   Sig: Take 325 mg by mouth daily (with breakfast)   folic acid (FOLVITE) 1 MG tablet 3/22/2017 at Unknown time Self No Yes   Sig: Take 1 tablet (1 mg) by mouth daily   furosemide (LASIX) 20 MG tablet 3/22/2017 at Unknown time Self Yes Yes   Sig: Take 20 mg by mouth daily   gabapentin (NEURONTIN) 600 MG tablet 3/22/2017 at x1  No Yes   Sig: Take 1 tablet (600 mg) by mouth 3 times daily   hydrOXYzine (ATARAX) 10 MG tablet 3/22/2017 at Unknown time Self Yes Yes   Sig: Take 10 mg by mouth every 8 hours as needed for itching   insulin isophane human (HUMULIN N PEN) 100 UNIT/ML injection 3/22/2017 at x2  Yes Yes   Sig: Inject 15 Units  Subcutaneous 2 times daily (before meals)   lactulose (CHRONULAC) 10 GM/15ML solution Past Week at Unknown time Self Yes Yes   Sig: Take 20 g by mouth 3 times daily   methocarbamol (ROBAXIN) 500 MG tablet 3/22/2017 at x2 Self Yes Yes   Sig: Take 1,000 mg by mouth 4 times daily   mirtazapine (REMERON) 30 MG tablet 3/21/2017 at pm Self No Yes   Sig: Take 1 tablet (30 mg) by mouth At Bedtime   omeprazole (PRILOSEC) 20 MG CR capsule Past Week at Unknown time Self Yes Yes   Sig: Take 20 mg by mouth daily   predniSONE (DELTASONE) 20 MG tablet 3/22/2017 at Unknown time  Yes Yes   Sig: Take 40 mg by mouth daily   rifaximin (XIFAXAN) 550 MG TABS tablet 3/22/2017 at am Self No Yes   Sig: Take 1 tablet (550 mg) by mouth 2 times daily   spironolactone (ALDACTONE) 50 MG tablet 3/22/2017 at Unknown time  No Yes   Sig: Take 1 tablet (50 mg) by mouth daily   sulfamethoxazole-trimethoprim (BACTRIM DS/SEPTRA DS) 800-160 MG per tablet 3/22/2017 at Unknown time  No Yes   Sig: Take 1 tablet by mouth three times a week   Patient taking differently: Take 1 tablet by mouth three times a week on Mon/Wed/Friday   thiamine 100 MG tablet 3/22/2017 at Unknown time Self Yes Yes   Sig: Take 100 mg by mouth daily   tiotropium (SPIRIVA) 18 MCG capsule 3/22/2017 at Unknown time  Yes Yes   Sig: Inhale 18 mcg into the lungs daily   traZODone (DESYREL) 100 MG tablet 3/21/2017 at pm Self Yes Yes   Sig: Take 100 mg by mouth At Bedtime      Facility-Administered Medications: None     Allergies   No Known Allergies    Social History   I have reviewed this patient's social history and updated it with pertinent information if needed. Sabina ROMANO Henry  reports that she has been smoking Cigarettes.  She has a 27.00 pack-year smoking history. She quit smokeless tobacco use about 10 months ago. She reports that she drinks alcohol. She reports that she does not use illicit drugs.    Family History   I have reviewed this patient's family history and updated it  with pertinent information if needed.   Family History   Problem Relation Age of Onset     Depression Mother      Anxiety Disorder Mother      MENTAL ILLNESS Mother      Substance Abuse Mother      Depression Father      Anxiety Disorder Father      Substance Abuse Father      MENTAL ILLNESS Father      Depression Daughter      Depression Brother      Schizophrenia Brother      Depression Brother      Anxiety Disorder Brother      Substance Abuse Brother      Family history of addiction Yes.    Review of Systems   C: NEGATIVE for fever, chills, change in weight  E/M: NEGATIVE for ear, mouth and throat problems  R: POSITIVE  for significant cough and SOB  CV: NEGATIVE for chest pain, palpitations or peripheral edema    Physical Exam   Temp:  [97.8  F (36.6  C)-98.1  F (36.7  C)] 98  F (36.7  C)  Heart Rate:  [67-92] 74  Resp:  [14] 14  BP: (114-128)/(66-97) 122/78  FiO2 (%):  [30 %-40 %] 30 %  SpO2:  [91 %-100 %] 91 %  186 lbs 8.15 oz  GEN:  Alert, oriented x 3, appears comfortable but verbalizes pain, NAD.  HEENT:  Normocephalic/atraumatic, no scleral icterus, no nasal discharge, mouth moist.  CV:  RRR, S1, S2; no murmurs or other irregularities noted.  +3 DP/PT pulses bilatererally; no edema BLE.  RESP:  Rales/rhonchi bilaterally.  Symmetric chest rise on inhalation noted.  Normal respiratory effort. Highflow oxygen.  ABD:  Rounded, soft, non-tender/non-distended.  +BS  EXT:  Edema & pulses as noted above.  CMS intact x 4.     M/S:   Tender to palpation lumbar spine and adjoining muscles on Right side.    SKIN:  Dry to touch, no exanthems noted in the visualized areas.    NEURO: Symmetric strength +5/5.  Sensation to touch intact all extremities.   There is no area of allodynia or hyperesthesia.  PAIN BEHAVIOR: Cooperative.  Psych:  Normal affect.  Calm, cooperative, conversant appropriately.       Data   Results for orders placed or performed during the hospital encounter of 03/22/17 (from the past 24 hour(s))    Glucose by meter   Result Value Ref Range    Glucose 189 (H) 70 - 99 mg/dL   Glucose by meter   Result Value Ref Range    Glucose 148 (H) 70 - 99 mg/dL   Glucose by meter   Result Value Ref Range    Glucose 173 (H) 70 - 99 mg/dL   Glucose by meter   Result Value Ref Range    Glucose 183 (H) 70 - 99 mg/dL   Glucose by meter   Result Value Ref Range    Glucose 159 (H) 70 - 99 mg/dL   Glucose by meter   Result Value Ref Range    Glucose 174 (H) 70 - 99 mg/dL   Glucose by meter   Result Value Ref Range    Glucose 184 (H) 70 - 99 mg/dL   Glucose by meter   Result Value Ref Range    Glucose 148 (H) 70 - 99 mg/dL   Glucose by meter   Result Value Ref Range    Glucose 123 (H) 70 - 99 mg/dL   Basic metabolic panel   Result Value Ref Range    Sodium 142 133 - 144 mmol/L    Potassium 3.4 3.4 - 5.3 mmol/L    Chloride 103 94 - 109 mmol/L    Carbon Dioxide 30 20 - 32 mmol/L    Anion Gap 9 3 - 14 mmol/L    Glucose 106 (H) 70 - 99 mg/dL    Urea Nitrogen 11 7 - 30 mg/dL    Creatinine 0.73 0.52 - 1.04 mg/dL    GFR Estimate 87 >60 mL/min/1.7m2    GFR Estimate If Black >90   GFR Calc   >60 mL/min/1.7m2    Calcium 8.6 8.5 - 10.1 mg/dL   CBC with platelets   Result Value Ref Range    WBC 8.2 4.0 - 11.0 10e9/L    RBC Count 3.50 (L) 3.8 - 5.2 10e12/L    Hemoglobin 10.9 (L) 11.7 - 15.7 g/dL    Hematocrit 33.7 (L) 35.0 - 47.0 %    MCV 96 78 - 100 fl    MCH 31.1 26.5 - 33.0 pg    MCHC 32.3 31.5 - 36.5 g/dL    RDW 15.9 (H) 10.0 - 15.0 %    Platelet Count 146 (L) 150 - 450 10e9/L   Magnesium   Result Value Ref Range    Magnesium 1.8 1.6 - 2.3 mg/dL   Glucose by meter   Result Value Ref Range    Glucose 114 (H) 70 - 99 mg/dL   Glucose by meter   Result Value Ref Range    Glucose 111 (H) 70 - 99 mg/dL   Glucose by meter   Result Value Ref Range    Glucose 122 (H) 70 - 99 mg/dL   Glucose by meter   Result Value Ref Range    Glucose 116 (H) 70 - 99 mg/dL   Glucose by meter   Result Value Ref Range    Glucose 112 (H) 70 - 99 mg/dL    Glucose by meter   Result Value Ref Range    Glucose 130 (H) 70 - 99 mg/dL   Glucose by meter   Result Value Ref Range    Glucose 246 (H) 70 - 99 mg/dL   Glucose by meter   Result Value Ref Range    Glucose 290 (H) 70 - 99 mg/dL   Glucose by meter   Result Value Ref Range    Glucose 238 (H) 70 - 99 mg/dL   Glucose by meter   Result Value Ref Range    Glucose 136 (H) 70 - 99 mg/dL   Glucose by meter   Result Value Ref Range    Glucose 102 (H) 70 - 99 mg/dL   Glucose by meter   Result Value Ref Range    Glucose 129 (H) 70 - 99 mg/dL   Glucose by meter   Result Value Ref Range    Glucose 135 (H) 70 - 99 mg/dL       Exam Date Exam Time Accession # Performing Department Results       7/14/15  1:09 PM [id number] [facility]        [employer] Interactive Report and InfoRx      View the interactive report       PACS Images      Show images for MR Lumbar Spine w/o Contrast       Study Result      MR LUMBAR SPINE WITHOUT CONTRAST July 14, 2015 1:09 PM     HISTORY: Lumbago.     TECHNIQUE: Sagittal T1 and T2, sagittal IR, and transverse proton  density and T2-weighted pulse sequences.     FINDINGS: Five lumbar vertebrae are assumed. Moderate degenerative  loss of disc signal with normal disc height is noted at L3-L4, L4-L5,  L5-S1. Mild apophyseal joints degenerative arthrosis is noted,  greatest on the right at L5-S1. Vertebral body heights and sagittal  alignment appear within normal limits. Marrow signal is within normal  limits. The conus medullaris is unremarkable in appearance on the  sagittal images.      L3-L4: Small central disc protrusion mildly indenting the thecal sac  with only borderline narrowing of the central canal, unchanged from  3/22/2007. The neural foramen are widely patent bilaterally.      L4-L5: Small central disc protrusion with only minimal indentation  upon the thecal sac and borderline narrowing of the central canal,  slightly more prominent than in 2007. The neural  foramen appear to be  patent bilaterally.     L5-S1: Mild annular bulge, contacting but not indenting the thecal  sac. Previously identified disc protrusion in 2007 appears less  prominent. There is no central stenosis or asymmetrical displacement  of either S1 nerve root. Mild bilateral foraminal stenosis is noted,  slightly progressed in the interval.     IMPRESSION  IMPRESSION:  1. Early multilevel degenerative disc disease from L3-L4 through  L5-S1.  2. Central disc bulges or protrusions with borderline narrowing of the  central canal at L3-L4 and L4-L5, slightly progressed at L4-L5 since  3/22/2007.  3. Mild bilateral L5 foraminal stenosis, also slightly progressed  since 2007.     BALDEV WANG MD

## 2017-03-29 NOTE — PROGRESS NOTES
[facility]  Pain Management Progress Note  Text Page     Assessment & Plan   Sabina Figueroa is a 41 year old female who was admitted on 3/22/2017. I was asked to see the patient for morphine weaning.     1) Chronic pain  Musculoskeletal pain involving lumbar spine and adjacent muscles.   Chronic peripheral neuropathy of LEs   Hx of sciatica.     2)  Patient with chronic low back pain, on suboxone at [facility]. Other pain medications prescribed by her PCP.  [place] [facility] database review: monthly oxycodone scripts until 9/15/2016. Then monthly gabapentin scripts. Consistent prescribers.  0mg Daily Morphine Equivalent  Patient has a moderate opioid tolerance due to suboxone.      Patient's opioid use last 24 hours:   morphine 12mg IV = 36 mg Daily Morphine Equivalent     3) Opioid induced side-effects:  -Constipation      4) Other/Related:   -Depression/anxiety  -Disease of addiction   -Asthma, COPD     PLAN:   3)Multimodal Medication Therapy  Topical: switch to icy hot patch q 8 hours prn or Ketoprofen 10% PLO gel TID prn  NSAIDS:No.  Muscle Relaxants: Robaxin 1000 mg 4 times a day. Changed to scheduled per pt request.  Adjuvants:gabapentin TID as pta. Atarax 10mg PO TID prn pain or itching.  Antidepresants/anxiolytics: Remeron, trazadone  Opioids:D/C Morphine. No opioids recommended for pt.  4)Non-medication interventions  Hot packs.   Mobility, ambulation, position changes  5)Constipation Prophylaxis  Scheduled lactulose.  6) DC safety  -Opioids are not recommended for this patient for discharge  -Pt to follow up with PCP for non-opioid pain management, and [facility] for Suboxone therapy.  -Call to [facility] regarding pt discharge situation.       Nancy WHYTE, CNS  Pain Management and Palliative Care  [facility]  Pgr: [phone]    Time Spent on this Encounter   I spent  10 minutes is assessment of the patient and discussion with the patient and family.  Another 15 minutes in  review of chart, documentation and discussion with the health care team.      Interval History   Pt sitting up in bed. Alert and oriented. Pain is about a 7. Slept. Reviewed pain plan and follow up after discharge at pain clinic and for suboxone. Pt contemplating her options for discharge plan with her pain management, substance use disorder, living and work arrangements. She is agreeable to meet with SWS. Pt wants to get up and move as much as she is allowed with high flow o2.     Review of Systems    C: NEGATIVE for fever, chills, change in weight  E/M: NEGATIVE for ear, mouth and throat problems  R: POSITIVE for cough and  SOB  CV: NEGATIVE for chest pain, palpitations or peripheral edema    Physical Exam   Temp:  [97.5  F (36.4  C)-98.2  F (36.8  C)] 97.6  F (36.4  C)  Heart Rate:  [66-91] 70  Resp:  [8-28] 11  BP: (114-132)/(75-83) 125/79  FiO2 (%):  [30 %-35 %] 30 %  SpO2:  [87 %-99 %] 97 %  194 lbs 14.19 oz  GEN:  Alert, oriented x 3, appears comfortable, NAD.  HEENT:  Normocephalic/atraumatic, no scleral icterus, no nasal discharge, mouth moist.  CV:  deferred  RESP:  Deferred auscultation. Symmetric chest rise on inhalation noted.  Normal respiratory effort.  ABD:  Deferred.   EXT:  Deferred.   M/S:   Deferred.   SKIN:  Dry to touch, no exanthems noted in the visualized areas.    NEURO: Symmetric strength +5/5.    PAIN BEHAVIOR: Cooperative  Psych:  Normal affect.  Calm, cooperative, conversant appropriately.    Medications     NaCl 10 mL/hr at 03/29/17 0920     IV fluid REPLACEMENT ONLY       insulin (regular) 2 Units/hr (03/29/17 0700)       insulin aspart   Subcutaneous TID w/meals     insulin aspart  1-10 Units Subcutaneous TID AC     insulin aspart  1-7 Units Subcutaneous At Bedtime     insulin glargine  20 Units Subcutaneous QAM AC     menthol  1 patch Topical Daily    And     menthol   Transdermal Daily    And     menthol   Transdermal Daily     ketoprofen 10% in PLO   Transdermal TID      methylPREDNISolone  87.5 mg Intravenous Q24H     heparin  5,000 Units Subcutaneous Q8H     nicotine   Transdermal Q8H     nicotine   Transdermal Daily     nicotine  1 patch Transdermal Daily     sodium chloride (PF)  3 mL Intracatheter Q8H     ascorbic acid  500 mg Oral Daily     aspirin  81 mg Oral Daily     atorvastatin  20 mg Oral Daily     buprenorphine HCl-naloxone HCl  3 Film Sublingual Daily     ferrous sulfate  325 mg Oral Daily with breakfast     FLUoxetine  40 mg Oral Daily     folic acid  1 mg Oral Daily     gabapentin  600 mg Oral TID     lactulose  20 g Oral TID     mirtazapine  30 mg Oral At Bedtime     omeprazole  20 mg Oral QAM AC     rifaximin  550 mg Oral BID     sulfamethoxazole-trimethoprim  1 tablet Oral Once per day on Mon Wed Fri     traZODone  100 mg Oral At Bedtime     levofloxacin  750 mg Intravenous Q24H     ipratropium - albuterol 0.5 mg/2.5 mg/3 mL  3 mL Nebulization Q4H     spironolactone  50 mg Oral Daily       Data   Results for orders placed or performed during the hospital encounter of 03/22/17 (from the past 24 hour(s))   Glucose by meter   Result Value Ref Range    Glucose 290 (H) 70 - 99 mg/dL   Glucose by meter   Result Value Ref Range    Glucose 238 (H) 70 - 99 mg/dL   Glucose by meter   Result Value Ref Range    Glucose 136 (H) 70 - 99 mg/dL   Glucose by meter   Result Value Ref Range    Glucose 102 (H) 70 - 99 mg/dL   Glucose by meter   Result Value Ref Range    Glucose 129 (H) 70 - 99 mg/dL   Glucose by meter   Result Value Ref Range    Glucose 135 (H) 70 - 99 mg/dL   Glucose by meter   Result Value Ref Range    Glucose 126 (H) 70 - 99 mg/dL   Glucose by meter   Result Value Ref Range    Glucose 134 (H) 70 - 99 mg/dL   Glucose by meter   Result Value Ref Range    Glucose 121 (H) 70 - 99 mg/dL   Glucose by meter   Result Value Ref Range    Glucose 109 (H) 70 - 99 mg/dL   Glucose by meter   Result Value Ref Range    Glucose 97 70 - 99 mg/dL   Glucose by meter   Result Value  Ref Range    Glucose 101 (H) 70 - 99 mg/dL   Glucose by meter   Result Value Ref Range    Glucose 126 (H) 70 - 99 mg/dL   Glucose by meter   Result Value Ref Range    Glucose 126 (H) 70 - 99 mg/dL   Glucose by meter   Result Value Ref Range    Glucose 123 (H) 70 - 99 mg/dL   Glucose by meter   Result Value Ref Range    Glucose 77 70 - 99 mg/dL   Glucose by meter   Result Value Ref Range    Glucose 77 70 - 99 mg/dL   Basic metabolic panel   Result Value Ref Range    Sodium 142 133 - 144 mmol/L    Potassium 3.4 3.4 - 5.3 mmol/L    Chloride 103 94 - 109 mmol/L    Carbon Dioxide 30 20 - 32 mmol/L    Anion Gap 9 3 - 14 mmol/L    Glucose 74 70 - 99 mg/dL    Urea Nitrogen 16 7 - 30 mg/dL    Creatinine 0.83 0.52 - 1.04 mg/dL    GFR Estimate 75 >60 mL/min/1.7m2    GFR Estimate If Black >90   GFR Calc   >60 mL/min/1.7m2    Calcium 8.6 8.5 - 10.1 mg/dL   Hemoglobin   Result Value Ref Range    Hemoglobin 11.2 (L) 11.7 - 15.7 g/dL   Magnesium   Result Value Ref Range    Magnesium 1.9 1.6 - 2.3 mg/dL   Glucose by meter   Result Value Ref Range    Glucose 93 70 - 99 mg/dL   Glucose by meter   Result Value Ref Range    Glucose 116 (H) 70 - 99 mg/dL   Glucose by meter   Result Value Ref Range    Glucose 129 (H) 70 - 99 mg/dL   Glucose by meter   Result Value Ref Range    Glucose 110 (H) 70 - 99 mg/dL   Glucose by meter   Result Value Ref Range    Glucose 95 70 - 99 mg/dL

## 2017-03-30 NOTE — PLAN OF CARE
Problem: Goal Outcome Summary  Goal: Goal Outcome Summary  Outcome: No Change  Vital signs stable. Currently on 5L oxymask 88-94%.  PIV NS 10/hr. Continues IV Levaquin.   Up with SBA calls appropriately.   Atarax x 1 for lower back pain.    CIWA 2,2 for tremor.   Starting oral Prednisone today.  Will continue to monitor and provide supportive care.

## 2017-03-30 NOTE — CONSULTS
Care Transition Initial Assessment - SW     Met with: Patient    Active Problems:    Sepsis (H)         DATA  Lives With: significant other- Pt reports that she is not on the lease and that her sig other does not want her to return to his apartment.    Patient feels that they have adequate support @ home? No- pt feels that she needs a [facility]  to assist with her relocation services.      ASSESSMENT  Cognitive Status:  Alert and oriented  Concerns to be addressed: SW met with pt to discuss her housing concerns. Pt reports that about a year ago she was evicted from her housing in [place] [facility] [place] [facility] because they were not [facility] approved and the landlord was not receiving payment for pt rent. Pt reports at that time she moved in with her boyfriend in [facility] and that she is not on the lease and so the boyfriend is not wanting pt to return to the apartment due to fear that he will be evicted. Pt was receiving relocation services through [facility] and has a  Abdon Cervantes ([phone]. [phone]). Pt reports that her case management with Abdon will end 4/1/17 and that they were unable to get pt services extended. Pt reports that she was informed my [place] that pt would need to speak with the hospital  re: housing options now that pt will be homeless. SW discussed with pt that hospital  is not able to provide relocation services from the hospital and that this writer could provide homeless shelter resources.  SW also left message with Abdon requesting return call to discuss pt dc plan. Pt also mentioned that she was told that she could dc to a SNF until she was able to find housing. SW discussed with pt that if she did not have a skilled need that she could not dc to a SNF and that if she had a skilled need they would not keep her until she found housing because it would be a short term stay. Pt states that she did not want to dc to a SNF and plans to contact  [place] herself.        PLAN  Patient given options and choices for discharge: Yes.  Patient/family is agreeable to the plan?  Yes  Patient Goals and Preferences: Return to the community.  Patient anticipates discharging to:  Uncertain.

## 2017-03-30 NOTE — DISCHARGE INSTRUCTIONS
Oxygen Provider:  Arranged through [facility] [employer] [facility], contact number [phone]. Home visit arranged for 4/1/2017 after patient discharges home, where home set up of equipment will occur. If you have any questions or concerns please call the oxygen company directly.  [facility] [place]-Monday April 3 at 9am  [address], [address]  [place] [address]  [phone] Phone  [phone] Fax    Phones answered  Monday - Thursday 8:00-5:00  Friday 8:00 - 4:00    Schedule Outpatient Sleep Study [phone] Sleep Clinic for appointment

## 2017-03-30 NOTE — PROGRESS NOTES
"SPIRITUAL HEALTH SERVICES  SPIRITUAL ASSESSMENT Progress Note  [facility] Med. Surg. 5    PRIMARY FOCUS:     Goals of care    Emotional/spiritual/Congregational distress    Support for coping    ILLNESS CIRCUMSTANCES:   Reviewed documentation. Brief conversation with pt Sabina to assess her spiritual/emotional support.     Context of Serious Illness/Symptom(s) - Sabina is being treated for respiratory failure; she has a history of alcoholic liver disease and chronic back pain.    Persons/Resources Involved - She named her S/O and her daughter.    DISTRESS:     Emotional/Existential/Relational Distress - Sabina shared that she is trying to find housing because she is not on her S/O's lease; in addition, she has stored in possessions at another place and needs to move them out.    Spiritual/Buddhism Distress - none expressed.    Social/Cultural/Economic Distress -   o Sabina talked about wanting to buy a car in order to start working again.  o She indicated that she tends isolate herself, spending the day in her S/O's apartment.    SPIRIT (Coping):     Sabianism/Bibiana - Sabina reported that she finds meaning more through her relationships than through Church or bibiana.    Spiritual Practice(s) - she mentioned, however, that she \"talks to God everyday.\"    Emotional/Existential/Relational Connections - Sabina has gone through CD treatment twice and has attended AA meetings but has not found one that she wants to engage regularly.    SENSE-MAKING:    Goals of Care - Sabina talked about doing a sleep study tonight.  She acknowledged the availability of  support but expressed no needs at this time.    Meaning/Sense-Making - She pleasantly responded to my inquiries but did not reflect further on her illness or other aspects of her life.    PLAN: No further plans; I and other chaplains remain available per pt's request.    Dereck Dorsey M.Div., [facility]  Staff   Pager [phone]  "

## 2017-03-30 NOTE — PROGRESS NOTES
[facility]  Hospitalist Progress Note  Anatoliy Dos Santos DO MPH 03/30/2017    Reason for Stay (Diagnosis): PNA         Assessment and Plan:      Summary of Stay: Sabina Figueroa is a 41 year old female with past medical history of alcohol abuse and associated cirrhosis, recurrent admissions for respiratory failure, alveolar hemorrhage and organizing PNA (detected on bronchoscopy in 1/17 while intubated) who was admitted on 3/22/2017 with cough, fever and worsening SOB and was found to have acute hypoxic respiratory failure and sepsis. Continues to require HFNC and having SOB with minimal activity.     1. Acute Recurrent Hypoxic Respiratory Failure: Patient has had recurrent admissions for respiratory failure, alveolar hemorrhage and organizing PNA. She presented with fever and worsening dyspnea. This is in the context of not taking her Prednisone for several days due to alcohol use. She initially required BiPAP therapy, was able to transition to high flow oxygen but continued to have high oxygen needs that are now finally improving. She had fever, leukocytosis, elevated lactate and extensive bilateral interstitial and alveolar infiltrates. Procalcitonin was elevated to 1.01. She was started on Vancomycin, Levofloxacin and Zosyn for underlying PNA. Sputum culture now showing staph aureus (sensitive).  - Was on high dose IV solumedrol but will change to prednisone 60 mg daily until pulmonary f/u on 4/10.  - Continue Levofloxacin, can be changed to oral, started on March 23, I would at least give her 10 days  - Continue Bactrim for prophylaxis  - Request RT consult to simplify O2 delivery and perform exertional and nocturnal oximetry to determine home O2 needs  - Recommendation from Dr. Mac: Continue steroids at 1mg/kg daily until out of ICU. Prednisone 60mg until next clinic visit with Dr. Mac (scheduled 4/10). Nocturnal and Exertional oximetry prior to discharge to assess for home O2 need. Continue PCP  prophylaxis as long as she is on more than 30mg daily prednisone     2. Steroid Induced Hyperglycemia: HgbA1C this admission 6.2 (up from 5.6 in 1/2017, likely from steroids). Glucose has been erratic, likely due in part to steroids. Appears better this morning. Now off insulin drip.  - Continue lantus 20 daily  - High SSI  - 2 U per carb U TID WM     3. Alcohol abuse vs dep with etoh liver disease: Started drinking again recently. At this time no withdrawal.   - Continue vitamins  - Lactulose TID  - Continue Rifaximin and Spironolactone  - CD consulted, provided with CD resources (declined immediate IP tx but intends to pursue tx program and abstain from etoh)     4. Lactic Acidosis: Lactate elevated to 3, subsequently normalized. Likely due to sepsis, volume depletion, nebs, liver disease.     5. Chronic Pain: On Suboxone, resumed. Morphine discontinued by the pain management team. No opioids recommended for this patient in the hospital or on discharge.     6. Depression: Continue Prozac, Remeron, Trazodone.    7. Deconditioning: Will ask for PT evaluation.      DVT Prophylaxis: Heparin SQ  Code Status: Full Code  Discharge Dispo: Discussed with SW. Only discharge options are to stay with boyfriend or shelter. She tells me today she will be [place]ina to stay with boyfriend. Expect discharge on Sat or Sun.        Interval History (Subjective):      Assumed care from previous hospitalist. The history was fully reviewed.  Pt doing well. No chest pain or shortness of breath. No nausea, vomiting, diarrhea, constipation. No fevers. No other complaints identified.                   Physical Exam:      Vital Signs:  Temp: 98.1  F (36.7  C) Temp src: Oral BP: 103/59   Heart Rate: 75 Resp: 14 SpO2: (!) 70 % O2 Device: None (Room air) (patient placed on 4 LPM nasal cannula after bathroom) Oxygen Delivery: 5 LPM (weaned to 4 LPM per SpO2)  Vitals:    03/26/17 0400 03/27/17 0400 03/29/17 0400   Weight: 87.3 kg (192 lb 7.4 oz) 84.6  kg (186 lb 8.2 oz) 88.4 kg (194 lb 14.2 oz)     Vital Signs with Ranges  Temp:  [97.7  F (36.5  C)-98.1  F (36.7  C)] 98.1  F (36.7  C)  Heart Rate:  [62-83] 75  Resp:  [9-16] 14  BP: ()/(59-69) 103/59  FiO2 (%):  [30 %] 30 %  SpO2:  [70 %-97 %] 70 %  I/O last 3 completed shifts:  In: 2540 [P.O.:2540]  Out: 2450 [Urine:1450; Stool:1000]    GENERAL: No apparent distress. Awake, alert, and fully oriented.  HEENT: Normocephalic, atraumatic. Extraocular movements intact.  CARDIOVASCULAR: Regular rate and rhythm without murmurs or rubs. No S3.  PULMONARY: Clear bilaterally.  ABDOMINAL: Soft, non-tender, non-distended. Bowel sounds normoactive. No hepatosplenomegaly.  EXTREMITIES: No cyanosis or clubbing. No edema.  NEUROLOGICAL: CN 2-12 grossly intact, no focal neurological deficits.  DERMATOLOGICAL: No rash, ulcer, ecchymoses, jaundice.         Medications:      All current medications were reviewed with changes reflected in problem list.         Data:      All new lab, EKGs, telemetry strips, and imaging data was personally reviewed.   Labs:    Recent Labs  Lab 03/29/17  0533 03/28/17  0505 03/25/17  0530 03/24/17  0525   WBC  --  8.2 5.6 5.4   HGB 11.2* 10.9* 10.4* 10.3*   HCT  --  33.7* 33.3* 32.7*   MCV  --  96 101* 101*   PLT  --  146* 132* 113*       Recent Labs  Lab 03/29/17  0533 03/28/17  0505 03/27/17  0525    142 142   POTASSIUM 3.4 3.4 3.5   CHLORIDE 103 103 105   CO2 30 30 26   ANIONGAP 9 9 11   GLC 74 106* 181*   BUN 16 11 11   CR 0.83 0.73 0.64  0.66   VISHNU 8.6 8.6 8.7       Recent Labs  Lab 03/30/17  1058 03/30/17  1029 03/30/17  0103 03/29/17  2114 03/29/17  1906  03/29/17  0533  03/28/17  0505  03/27/17  0525  03/24/17  0525   GLC  --   --   --   --   --   --  74  --  106*  --  181*  --  95   * 120* 148* 221* 307*  < >  --   < >  --   < >  --   < >  --    < > = values in this interval not displayed.  Imaging (past 24 hours):   No results found for this or any previous visit (from the  past 24 hour(s)).    Anatoliy Dos Santos DO MPH  [facility] Hospitalist  [address] [place] [address]  Pager: [phone]  03/30/2017

## 2017-03-30 NOTE — CONSULTS
"Care Transition Initial Assessment - RN    Reason For Consult: care coordination/care conference, discharge planning, financial concerns, insurance concerns, psychosocial concerns, transportation   Pt is a high risk d/t 4 admits every month since Dec for PNA, ETOH abuse, noncompliance with medications, mental health, and homeless (plans on staying with SO)  Met with: Patient.    DATA   Active Problems:    Sepsis (H)       Cognitive Status: awake, alert and oriented.  [facility]: [facility]  Primary Care MD Name: Joaquínmi  Contact information and PCP information verified: Yes, Pulmonary Dr Mane April 10th next appt; Mental Health Therapist Lisa Valencia Monday April 3 9am    Lives With: significant other-he is 65 years old and has his own issues physically.    Living Arrangements: apartment-her SO's apt     Description of Support System: Other (see comments) (He is 65 so he can't help her that much)   Who is your support system?: Significant Other         Insurance concerns: Other Pt said she needed to renew her insurance and our  did help her do this during this admission.      ASSESSMENT  Patient currently receives the following services:  She has a  Leonor Cervantes [phone] ext [phone] that is helping her with housing, but she just told her she most likely won't have any resources for housing for her.  She also must get her belongings out of the [facility] by April 15th and move them to her other storage place.  She is planning on hiring someone        Identified issues/concerns regarding health management: Pt said she \"fell off the wagon again.\"  When she starts drinking again she always stops all of her medications because she is afraid of the interactions.  She stopped taking her prednisone and now ended up back in the hospital for the 4th time in 4 months.  She did meet with CD and they recommended residential treatment and to connect with her therapist.  Pt feels " she must get all of her social issues figured out before she can go to treatment.  She would like to go to [facility] when things are situated.  She has the counselors business card and is aware she has 45 days.  She has been successful in the past to stay sober by doing arts and crafts, reading and staying busy.  Pt agrees to let me set up her f/u appt for mental health, but she doesn't remember name.  Pt see Lisa Valencia with [facility] in [place].  She is scheduled on Monday April 3 at 9am.  DC AVS updated.  Pt also still smokes.  She does follow with [facility] Methadone program.      Pt does have DM.  Her a1c is up to 6.2.  She said she has been checking her blood glucose and the Humalog has been going well.       PLAN  Patient given options and choices for discharge YES.  Patient/family is agreeable to the plan?  Yes:   Patient anticipates discharging to SO apt. She does have transport through her medica MA        Patient anticipates needs for home equipment: No    Plan/Disposition: SO apt   Appointments: see above charting for pulmonary and mental health        Care  (CTS) will continue to follow as needed.    Estella PARMAR CTS [phone]

## 2017-03-30 NOTE — PLAN OF CARE
"Problem: Goal Outcome Summary  Goal: Goal Outcome Summary  Outcome: Improving  Pt transferred to unit from ICU at 1830. VSS, sats maintained at mid-90's on 5LPM with oximask. LS dim with crackles in LLL, productive cough, SOB with activity. IV Levaquin for PNA. Mod CHO diet, good intake,  + 221, SSI + carb count insulin administered. C/o \"chronic back pain,\" declines heat/ice.       "

## 2017-03-30 NOTE — PROGRESS NOTES
"[facility]     Date: 03/29/17  Admission Dx: Sepsis  Pulmonary History: unconfirmed COPD, organizing PNA  Home Nebulizer/MDI Use: Albuterol Q6 prn, Albuterol MDI 2p Q4 prn  Home Oxygen: None  Acuity Level (RCAT flow sheet): 3  Aerosol Therapy initiated: Duoneb QID, Duoneb Q4 prn, and Albuterol Q6 prn      Pulmonary Hygiene initiated: Coughing Techniques      Volume Expansion initiated: Incentive Spirometry      Current Oxygen Requirements: 5 LPM oxymask  Current SpO2: 96%    Re-evaluation date: 04/01/17    Patient Education: Discussed the use of nebulizer's and benefits.      See \"RT Assessments\" flow sheet for patient assessment scoring and Acuity Level Details.             "

## 2017-03-31 NOTE — PROGRESS NOTES
2:30pm- Was able to confirm that patient's insurance is good through 4/30/17. Spoke with [facility] staff at [facility] and they call patient and can deliver E-tank tonight, along with directions for discharge. Then we will complete home set up tomorrow once patient is discharged.   2:56pm- Spoke with Nieves to let her know that we are able to set up the patient with a portable tank tonight, and will provide her with instructions for home set up after discharge. She said she would let the nurse know too.

## 2017-03-31 NOTE — PROGRESS NOTES
03/31/17  6:39 PM  Chart review.  Patient is able to tell me her follow up plan with [facility] in [place] for her Suboxone and follow up with her counselor.  Appreciate [facility] and Care Coordinator assistance with discharge planning.  Continue with recommendation for no opioids this hospitalization.  Pain plan in place.  Nancy [facility] CNS

## 2017-03-31 NOTE — PROGRESS NOTES
Received alert via on-call phone from Nieves at 11:35am, attempted to return the call to retrieve necessary information at 11:42am, however Nieves was not available to take my call and I was told she would call me back. Nieves returned my call at 12:16pm. We discussed the difference between the order and the SATs and it was determined that nieves would check with the physician about the order before we proceeded. Also explained the issues with insurance and due to the situation, we would not be delivering oxygen to the hospital until tomorrow. Nieves understood. Explained that I would then be calling patient to offer choice and to ask some more information about patient's insurance.   12:33pm- Spoke with patient and inquired about her insurance. Patient said she had reapplied online with the help of a [employer] employee, Angelica. Explained that due to the unknown insurance situation we will need to wait and see what her active insurance is for tomorrow, as it may change who her oxygen provider will need to be, but if [place] can set her up she is okay with us doing so. We discussed that the order requires us to bring a tank to the hospital tomorrow before discharge and then once she is home we will complete the home set up. We then discussed her schedule for tomorrow. She understood and I explained that we would keep her in the loop as to when and who to expect oxygen from.   12:53pm- attempted to call Angelica, in hopes of gathering more information about patient's insurance, but there was no answer, so I left a voicemail asking for a return call.

## 2017-03-31 NOTE — PROGRESS NOTES
3:45pm - Delivered ETank to patient for discharge. Patient demonstrated good understanding of use and care of oxygen tank. Scheduled with patient for home setup tomorrow at 2:00pm. Patient will call if that time has to change.

## 2017-03-31 NOTE — PROGRESS NOTES
Patient has been assessed for Home Oxygen needs.  Oxygen readings:   *   RA - at rest  Pulse oximetry SPO2 85%  *   RA - during activity/with exercise SPO2 79%  *   O2 at  2 liters/minute (at rest) ...SPO2 95%  *   O2 at  2 liters/minute (during activity/with exercise) ...SPO2 87 %  *   O2 at  4 liters/minute (during activity/with exercise) ...SPO2 92 %    (Pt needs 2L O2 at rest and 4L O2 with activity)    Berna Louis LPN  3/31/2017 12:03pm

## 2017-03-31 NOTE — PLAN OF CARE
Problem: Goal Outcome Summary  Goal: Goal Outcome Summary  Outcome: Improving  Pt A&O, up in room with SBA. VSS, sats maintained at high-90's on 4LPM NC, afebrile. LS dim with occasional productive cough. Denies SOB at rest, some dyspnea with activity. C/o chronic back pain, ketoprofen applied. Mod CHO diet,  + 168, carb count insulin given, not requiring SSI.

## 2017-03-31 NOTE — PLAN OF CARE
Problem: Goal Outcome Summary  Goal: Goal Outcome Summary  Outcome: No Change  AO, VSS-2LNC, sleep study done over night. Denies pain. Up SBA. Slept well.

## 2017-03-31 NOTE — PROCEDURES
"Interpretation of Nocturnal Recording Oximetry for study night of: 2017   Study running from 22:07 to 04:50   (See attached graphics in paper chart)   This study was:   1. 28% FI02 by nasal cannula 2 liters   2. Abnormal study   Findings include:   1. Bradycardia   2. Mean pulse 57 bpm, PA 45 bpm   3. Multiple desaturations below 90% despite oxygen 2 liters, 267 events   4. Repetitive and suggestive of \"Sleep Disordered Breathing\"   5. Awake sats were documented: 97% on 2 liters NC oxygen   6. Asleep sats documented: 96% on 2 liters NC oxygen   Suggested Follow Up:   1.  * desaturation <90% for 3.3% of tracing with AP 84% on oxygen   2. Clinical correlation   3. Schedule Outpatient Sleep Study [phone] Sleep Clinic for apt   OTHER: Almost continuous repetitive desaturations despite supplemental oxygen. Strongly suggestive of sleep disordered breathing.         YANELIS MASCORRO MD             D: 2017 14:33   T: 2017 14:47   MT:       Name:     SYDNI GLEZ   MRN:      [id number]        Account:        [id number]   :      1975           Procedure Date: 2017      Document: [id number]       cc: DALE RUSSELL MD   "

## 2017-03-31 NOTE — PROGRESS NOTES
[facility]  Hospitalist Progress Note  Anatoliy Dos Santos DO MPH 03/31/2017    Reason for Stay (Diagnosis): PNA         Assessment and Plan:      Summary of Stay: Sabina Figueroa is a 41 year old female with past medical history of alcohol abuse and associated cirrhosis, recurrent admissions for respiratory failure, alveolar hemorrhage and organizing PNA (detected on bronchoscopy in 1/17 while intubated) who was admitted on 3/22/2017 with cough, fever and worsening SOB and was found to have acute hypoxic respiratory failure and sepsis. Now on 2L O2 NC but having SOB with minimal activity.     1. Acute Recurrent Hypoxic Respiratory Failure: Patient has had recurrent admissions for respiratory failure, alveolar hemorrhage and organizing PNA. She presented with fever and worsening dyspnea. This is in the context of not taking her Prednisone for several days due to alcohol use. She initially required BiPAP therapy, was able to transition to high flow oxygen but continued to have high oxygen needs that are now finally improving. She had fever, leukocytosis, elevated lactate and extensive bilateral interstitial and alveolar infiltrates. Procalcitonin was elevated to 1.01. She was started on Vancomycin, Levofloxacin and Zosyn for underlying PNA. Sputum culture now showing staph aureus (sensitive).  - Was on high dose IV solumedrol but changed to prednisone 60 mg daily until pulmonary f/u on 4/10.  - Continue Levofloxacin, can be changed to oral, started on March 23, I would at least give her 10 days  - Continue Bactrim for prophylaxis  - RT consulted to simplify O2 delivery (now on 2L O2 NC at rest) and perform exertional and nocturnal oximetry to determine home O2 needs (will request documentation regarding these results)  - Recommendation from Dr. Mac: Continue steroids at 1mg/kg daily until out of ICU. Prednisone 60mg until next clinic visit with Dr. Mac (scheduled 4/10). Nocturnal and Exertional oximetry  prior to discharge to assess for home O2 need. Continue PCP prophylaxis as long as she is on more than 30mg daily prednisone     2. Steroid Induced Hyperglycemia: HgbA1C this admission 6.2 (up from 5.6 in 1/2017, likely from steroids). Glucose has been erratic, likely due in part to steroids. Appears better now off IV steroids. Now off insulin drip.  - Continue lantus 20 daily  - High SSI  - 2 U per carb U TID WM     3. Alcohol abuse vs dep with etoh liver disease: Started drinking again recently. At this time no withdrawal.   - Continue vitamins  - Lactulose TID  - Continue Rifaximin and Spironolactone  - CD consulted, provided with CD resources (declined immediate IP tx but intends to pursue tx program and abstain from etoh)     4. Lactic Acidosis: Lactate elevated to 3, subsequently normalized. Likely due to sepsis, volume depletion, nebs, liver disease.     5. Chronic Pain: On Suboxone, resumed. Morphine discontinued by the pain management team. No opioids recommended for this patient in the hospital or on discharge.     6. Depression: Continue Prozac, Remeron, Trazodone.    7. Deconditioning: Will ask for PT evaluation.      DVT Prophylaxis: Heparin SQ  Code Status: Full Code  Discharge Dispo: Discussed with SW. Only discharge options are to stay with boyfriend or shelter. She tells me today she will be [place] to stay with boyfriend. Expect discharge tomorrow morning. Will have orders for meds and O2 ready as she needs to be in pain clinic in [place] before 230 pm.        Interval History (Subjective):      Pt doing well. No chest pain and shortness of breath much improved. Better activity tolerance. Become hypoxic to mid 80s on 2L with minimal activity but responds quickly to rest. Now on O2 NC 2L at rest. No nausea, vomiting, diarrhea, constipation. No fevers. No other complaints identified.                   Physical Exam:      Vital Signs:  Temp: 97.5  F (36.4  C) Temp src: Axillary BP: 100/60 Pulse: 74    Resp: 16 SpO2: 96 % O2 Device: Nasal cannula Oxygen Delivery: 2 LPM  Vitals:    03/27/17 0400 03/29/17 0400 03/31/17 0621   Weight: 84.6 kg (186 lb 8.2 oz) 88.4 kg (194 lb 14.2 oz) 80.5 kg (177 lb 8 oz)     Vital Signs with Ranges  Temp:  [97.2  F (36.2  C)-98.1  F (36.7  C)] 97.5  F (36.4  C)  Pulse:  [58-74] 74  Resp:  [16-18] 16  BP: (100-114)/(56-64) 100/60  SpO2:  [70 %-97 %] 96 %  I/O last 3 completed shifts:  In: 2316.1 [P.O.:2200; I.V.:116.1]  Out: -     GENERAL: No apparent distress. Awake, alert, and fully oriented.  HEENT: Normocephalic, atraumatic. Extraocular movements intact.  CARDIOVASCULAR: Regular rate and rhythm without murmurs or rubs. No S3.  PULMONARY: Clear bilaterally.  ABDOMINAL: Soft, non-tender, non-distended. Bowel sounds normoactive. No hepatosplenomegaly.  EXTREMITIES: No cyanosis or clubbing. No edema.  NEUROLOGICAL: CN 2-12 grossly intact, no focal neurological deficits.  DERMATOLOGICAL: No rash, ulcer, ecchymoses, jaundice.         Medications:      All current medications were reviewed with changes reflected in problem list.         Data:      All new lab, EKGs, telemetry strips, and imaging data was personally reviewed.   Labs:    Recent Labs  Lab 03/31/17  0617 03/29/17 0533 03/28/17 0505 03/25/17  0530   WBC  --   --  8.2 5.6   HGB  --  11.2* 10.9* 10.4*   HCT  --   --  33.7* 33.3*   MCV  --   --  96 101*     --  146* 132*       Recent Labs  Lab 03/29/17 0533 03/28/17  0505 03/27/17  0525    142 142   POTASSIUM 3.4 3.4 3.5   CHLORIDE 103 103 105   CO2 30 30 26   ANIONGAP 9 9 11   GLC 74 106* 181*   BUN 16 11 11   CR 0.83 0.73 0.64  0.66   VISHNU 8.6 8.6 8.7       Recent Labs  Lab 03/31/17  0714 03/31/17  0102 03/30/17  2114 03/30/17  1748 03/30/17  1507  03/29/17  0533  03/28/17  0505  03/27/17  0525   GLC  --   --   --   --   --   --  74  --  106*  --  181*   * 119* 168* 138* 213*  < >  --   < >  --   < >  --    < > = values in this interval not  displayed.  Imaging (past 24 hours):   No results found for this or any previous visit (from the past 24 hour(s)).    Anatoliy Dos Santos DO MPH  Formerly [facility] Hospitalist  [address] [place] [address]  Pager: [phone]  03/31/2017

## 2017-04-01 NOTE — PLAN OF CARE
Problem: Goal Outcome Summary  Goal: Goal Outcome Summary  VSS, on her baseline of 2L o2 NC w/o activity and 4L with activity sats > 92% . O2 off  for 20 min  while asleep/at rest and managed to keep sats at 92%. Lung sounds diminished with some coughing. Refusing sub q hep but walking in hallways independently. Getting suboxone for pain. Wants to d/c before 0930 to attend pain clinic apt

## 2017-04-01 NOTE — PLAN OF CARE
Problem: Individualization  Goal: Patient Preferences  Outcome: Improving  Oxygen study performed per nursing; 02 2 lpm at rest and 4 lpm with activity to maintain 02 sats >92%. Pt ambulated in [place] with minimal dyspnea. LS diminished. Pt will need home 02; consult placed. PO levaquin and bactrim as ordered. Monitor BGL. Pt hopes to leave in am to follow up with pain clinic (takes suboxone).

## 2017-04-01 NOTE — PLAN OF CARE
Problem: Individualization  Goal: Patient Preferences  Outcome: Adequate for Discharge Date Met:  04/01/17  Pt discharged to home at this time. Reviewed insulin in detail; carb counting and sliding scale. Gave typed-out sliding scale sheet and reviewed closely with teach back. Pt performed insulin administration. Pt sent home with home 02; will follow up with  company later today. Pt states she will follow up with AA. Encouraged No smoking or drinking alcohol. Pt d/cd this am; anxious to dc to go to her pain clinic appointment. No further questions at this time.

## 2017-04-02 NOTE — PROGRESS NOTES
Transition Communication Hand-off for Care Transitions to Next Level of Care Provider  Name: Sabina Figueroa  MRN #: [id number]  Primary Care Provider: Aidee Hemphill  Primary Care MD Name: Joby  Primary Clinic: [facility] [address] [place] [address]  Primary Care Clinic Name: [facility]  Reason for Hospitalization:  Severe sepsis (H) [A41.9, R65.20]  Admit Date/Time: 3/22/2017  8:47 PM  Discharge Date: 4/1/17  Payor Source: Payor: MEDICA / Plan: MEDICA [place] / Product Type: HMO /     Readmission Assessment Measure (KENNEDY) Risk Score/category: High    Plan of Care Goals/Milestone Events:   Patient Concerns: Per nursing note patient was  anxious to discharge to go to her pain clinic  appointment.     Patient Goals:   Short-term Manage diabetes at home   Long-term Follow up with AA   Medical Goals   Short-term Manage and control diabetes   Long-term Follow up with AA, stop smoking and  drinking,         Reason for Communication Hand-off Referral: Ongoing coordination of care.      Discharge Plan: Follow up with PCP Dr. Hemphill, within 7 days for hospital f/u.       Concern for non-adherence with plan of care:   Y/N no  Discharge Needs Assessment:  Needs       Most Recent Value    Transportation Available Medicaid transportation    Current Discharge Risk homeless, substance abuse    # of Referrals Placed by [facility] External Care Coordination, Behavioral Health, Transportation, Community Resources, Scheduled Follow-up appointments, Communication hand-offs to next level of Care Providers, Financial Services          Already enrolled in Tele-monitoring program and name of program:  n/a  Follow-up specialty is recommended: Yes; Follow up with O2 company today 4/2; Follow up with Dr. Mane in Pulmonary Clinic as scheduled on 4/10/17    Follow-up plan:  Future Appointments  Date Time Provider Department Center   4/10/2017 11:20 AM [facility]   4/10/2017 12:00 PM Jennifer Mane MD [facility]        Any outstanding tests or procedures:    Procedures     Future Labs/Procedures    Oxygen Adult     Comments:    New Home Oxygen Order 2 liters by nasal cannula at rest and 4 liters with activity with use of portable tank. Expected treatment length is 1 months.. Test on conserving device as applicable.    Patients who qualify for home O2 coverage under the CMS guidelines require ABG tests or O2 sat readings obtained closest to, but no earlier than 2 days prior to the discharge, as evidence of the need for home oxygen therapy. Testing must be performed while patient is in the chronic stable state. See notes for O2 sats.    I certify that this patient, Sabina Figueroa has been under my care and that I, or a nurse practitioner or physician's assistant working with me, had a face-to-face encounter that meets the face-to-face encounter requirements with this patient on 3/31/2017. The patient, Sabina Figueroa was evaluated or treated in whole, or in part, for the following medical condition, which necessitates the use of the ordered oxygen. Treatment Diagnosis:     Attending Provider: Anatoliy Dos Santos  Physician signature: See electronic signature associated with these discharge orders  Date of Order: March 31, 2017                Key Recommendations:   Patient has new home oxygen order 2L by NC at rest and 4L with activity with use of portable tank.      Rebeka Champion, RN, BSN, PHN, CTS  Care Transitions Specialist  [facility]  [phone]  AVS/Discharge Summary is the source of truth; this is a helpful guide for improved communication of patient story

## 2017-04-10 NOTE — MR AVS SNAPSHOT
After Visit Summary   4/10/2017    Sabina Figeuroa    MRN: [id number]           Patient Information     Date Of Birth          1975        Visit Information        Provider Department      4/10/2017 12:00 PM Jennifer Mane MD [facility]        Today's Diagnoses     Organizing pneumonia (H)    -  1      Care Instructions    On 4/22, decrease to 40mg every day. Stay there until you see me again.   Continue using Duoneb nebulizer 2-4 times daily  Keep taking bactrim.   Good luck with your journey!        Follow-ups after your visit        Follow-up notes from your care team     Return in about 3 months (around 7/10/2017).      Your next 10 appointments already scheduled     Jul 24, 2017 11:00 AM CDT   FULL PULMONARY FUNCTION with  PFL A   [facility] Pulmonary Function Testing ([facility])    [address] [place] [address] [id number] [phone]            Jul 24, 2017 12:00 PM CDT   (Arrive by 11:45 AM)   Return Visit with Jennifer Mane MD   [facility] ([facility])    [address] [place] [id number] [phone]              Future tests that were ordered for you today     Open Future Orders        Priority Expected Expires Ordered    Pulmonary Function Test Routine 7/9/2017 4/10/2018 4/10/2017    General PFT Lab (Please always keep checked) Routine 7/9/2017 4/10/2018 4/10/2017            Who to contact     If you have questions or need follow up information about today's clinic visit or your schedule please contact [facility] directly at [phone].  Normal or non-critical lab and imaging results will be communicated to you by MyChart, letter or phone within 4 business days after the clinic has received the results. If you do not hear from us within 7 days, please contact the clinic through  "[employer] or phone. If you have a critical or abnormal lab result, we will notify you by phone as soon as possible.  Submit refill requests through [employer] or call your pharmacy and they will forward the refill request to us. Please allow 3 business days for your refill to be completed.          Additional Information About Your Visit        [employer] Information     [employer] lets you send messages to your doctor, view your test results, renew your prescriptions, schedule appointments and more. To sign up, go to [email or web address] . Click on \"Log in\" on the left side of the screen, which will take you to the Welcome page. Then click on \"Sign up Now\" on the right side of the page.     You will be asked to enter the access code listed below, as well as some personal information. Please follow the directions to create your username and password.     Your access code is: [email or web address]  Expires: 6/15/2017  5:43 PM     Your access code will  in 90 days. If you need help or a new code, please call your [place] clinic or [phone].        Care EveryWhere ID     This is your Care EveryWhere ID. This could be used by other organizations to access your [place] medical records  [id number]        Your Vitals Were     Pulse Temperature Respirations Height Pulse Oximetry BMI (Body Mass Index)    72 99  F (37.2  C) (Oral) 18 1.702 m (5' 7\") 94% 26.63 kg/m2       Blood Pressure from Last 3 Encounters:   04/10/17 106/68   17 100/57   17 140/80    Weight from Last 3 Encounters:   04/10/17 77.1 kg (170 lb)   17 80.5 kg (177 lb 8 oz)   17 77.7 kg (171 lb 4.8 oz)                 Today's Medication Changes          These changes are accurate as of: 4/10/17 12:45 PM.  If you have any questions, ask your nurse or doctor.               These medicines have changed or have updated prescriptions.        Dose/Directions    sulfamethoxazole-trimethoprim 800-160 MG per tablet   Commonly known as:  BACTRIM " DS/SEPTRA DS   Indication:  pjp ppx   This may have changed:  additional instructions   Used for:  Bronchiolitis        Dose:  1 tablet   Take 1 tablet by mouth three times a week   Quantity:  30 tablet   Refills:  0         Stop taking these medicines if you haven't already. Please contact your care team if you have questions.     tiotropium 18 MCG capsule   Commonly known as:  SPIRIVA   Stopped by:  Jennifer Mane MD                    Primary Care Provider Office Phone # Fax #    Aidee Hemphill [phone]       [facility] [address] [place] [place] [address]        Thank you!     Thank you for choosing [facility]  for your care. Our goal is always to provide you with excellent care. Hearing back from our patients is one way we can continue to improve our services. Please take a few minutes to complete the written survey that you may receive in the mail after your visit with us. Thank you!             Your Updated Medication List - Protect others around you: Learn how to safely use, store and throw away your medicines at www.disposemymeds.org.          This list is accurate as of: 4/10/17 12:45 PM.  Always use your most recent med list.                   Brand Name Dispense Instructions for use    * albuterol 108 (90 BASE) MCG/ACT Inhaler    PROAIR HFA/PROVENTIL HFA/VENTOLIN HFA     Inhale 2 puffs into the lungs every 4 hours as needed for shortness of breath / dyspnea or wheezing Reported on 2/23/2017       * albuterol (2.5 MG/3ML) 0.083% neb solution      Take 1 vial by nebulization every 6 hours as needed for shortness of breath / dyspnea or wheezing       alum & mag hydroxide-simethicone 400-400-40 MG/5ML Susp suspension    MYLANTA ES/MAALOX  ES     Take 30 mLs by mouth every 4 hours as needed for indigestion Reported on 2/23/2017       ascorbic acid 500 MG tablet    VITAMIN C     Take 500 mg by mouth daily       aspirin 81 MG EC tablet     30  tablet    Take 1 tablet (81 mg) by mouth daily       atorvastatin 20 MG tablet    LIPITOR    30 tablet    Take 1 tablet (20 mg) by mouth daily       blood glucose monitoring meter device kit     1 kit    Use to test blood sugars BID times daily or as directed.       buprenorphine-naloxone 8-2 MG Subl sublingual tablet    SUBOXONE     Place 3 tablets under the tongue daily       ferrous sulfate 325 (65 FE) MG tablet    IRON     Take 325 mg by mouth daily (with breakfast)       Fish Oil 1200 MG Caps      Take 1 capsule by mouth daily       FLUoxetine 40 MG capsule    PROzac     Take 40 mg by mouth daily       folic acid 1 MG tablet    FOLVITE    30 tablet    Take 1 tablet (1 mg) by mouth daily       furosemide 20 MG tablet    LASIX     Take 20 mg by mouth daily       gabapentin 600 MG tablet    NEURONTIN    9 tablet    Take 1 tablet (600 mg) by mouth 3 times daily       hydrOXYzine 10 MG tablet    ATARAX     Take 10 mg by mouth every 8 hours as needed for itching       * insulin aspart 100 UNIT/ML injection    NovoLOG PEN    50 mL    1 U per 15 g carbs       * insulin aspart 100 UNIT/ML injection    NovoLOG PEN    50 mL    For Pre-Meal  - 189 give 1 unit.  For Pre-Meal  - 239 give 2 units.  For Pre-Meal  - 289 give 3 units.  For Pre-Meal  - 339 give 4 units.  For Pre-Meal -399 give 5 units. For Pre-Meal -449 give 6 units. For Pre-Meal BG = or > 450 give 7 units.       * insulin aspart 100 UNIT/ML injection    NovoLOG PEN    50 mL    Bedtime Blood Glucose (BG) For  - 249 give 1 units.  For  - 299 give 2 units.  For  - 349 give 3 units. For - 399 give 4 units.  For BG = or > 400 give 5 units.       insulin glargine 100 UNIT/ML injection    LANTUS    50 mL    Inject 20 Units Subcutaneous every morning (before breakfast)       lactulose 10 GM/15ML solution    CHRONULAC     Take 20 g by mouth 3 times daily       mirtazapine 30 MG tablet    REMERON    30 tablet     Take 1 tablet (30 mg) by mouth At Bedtime       MULTIPLE VITAMINS PO      Take 1 tablet by mouth daily       omeprazole 20 MG CR capsule    priLOSEC     Take 20 mg by mouth daily       predniSONE 1 MG tablet    DELTASONE    30 tablet    Take 60 tablets (60 mg) by mouth daily       rifaximin 550 MG Tabs tablet    XIFAXAN    60 tablet    Take 1 tablet (550 mg) by mouth 2 times daily       ROBAXIN 500 MG tablet   Generic drug:  methocarbamol      Take 1,000 mg by mouth 4 times daily       spironolactone 50 MG tablet    ALDACTONE    3 tablet    Take 1 tablet (50 mg) by mouth daily       sulfamethoxazole-trimethoprim 800-160 MG per tablet    BACTRIM DS/SEPTRA DS    30 tablet    Take 1 tablet by mouth three times a week       thiamine 100 MG tablet      Take 100 mg by mouth daily       traZODone 100 MG tablet    DESYREL     Take 100 mg by mouth At Bedtime       * Notice:  This list has 5 medication(s) that are the same as other medications prescribed for you. Read the directions carefully, and ask your doctor or other care provider to review them with you.

## 2017-04-10 NOTE — NURSING NOTE
Chief Complaint   Patient presents with     RECHECK     2 month follow up on Sabina post hospital discharge     Olvin Dwyer CMA at 12:18 PM on 4/10/2017

## 2017-04-10 NOTE — PROGRESS NOTES
Reason for Visit  Sabina Figueroa is a 41 year old female who is seen for follow up of recurrent respiratory failure  Pulmonary HPI  Since our last visit, Sabina was hospitalized at [facility] with severe hypoxemia. She was discharged on 4/1/17. She has been on prednisone 60mg since 3/22/17. She is sleepy today, slept late. She has not been able to stay sober, she is drinking on the weekends, planning to get back into treatments. She is taking Bactrim three times a week.     The patient was seen and examined by Jennifer Mane MD   Current Outpatient Prescriptions   Medication     insulin glargine (LANTUS) 100 UNIT/ML injection     insulin aspart (NOVOLOG PEN) 100 UNIT/ML injection     insulin aspart (NOVOLOG PEN) 100 UNIT/ML injection     insulin aspart (NOVOLOG PEN) 100 UNIT/ML injection     predniSONE (DELTASONE) 1 MG tablet     tiotropium (SPIRIVA) 18 MCG capsule     spironolactone (ALDACTONE) 50 MG tablet     gabapentin (NEURONTIN) 600 MG tablet     sulfamethoxazole-trimethoprim (BACTRIM DS/SEPTRA DS) 800-160 MG per tablet     blood glucose monitoring (ACCU-CHEK TONIA PLUS) meter device kit     ascorbic acid (VITAMIN C) 500 MG tablet     FLUoxetine (PROZAC) 40 MG capsule     furosemide (LASIX) 20 MG tablet     hydrOXYzine (ATARAX) 10 MG tablet     lactulose (CHRONULAC) 10 GM/15ML solution     omeprazole (PRILOSEC) 20 MG CR capsule     thiamine 100 MG tablet     traZODone (DESYREL) 100 MG tablet     Omega-3 Fatty Acids (FISH OIL) 1200 MG CAPS     methocarbamol (ROBAXIN) 500 MG tablet     aspirin EC 81 MG EC tablet     atorvastatin (LIPITOR) 20 MG tablet     rifaximin (XIFAXAN) 550 MG TABS tablet     ferrous sulfate (IRON) 325 (65 FE) MG tablet     albuterol (2.5 MG/3ML) 0.083% neb solution     buprenorphine-naloxone (SUBOXONE) 8-2 MG SUBL sublingual tablet     mirtazapine (REMERON) 30 MG tablet     alum & mag hydroxide-simethicone (MYLANTA ES/MAALOX  ES) 400-400-40 MG/5ML SUSP     albuterol (PROAIR HFA,  PROVENTIL HFA, VENTOLIN HFA) 108 (90 BASE) MCG/ACT inhaler     MULTIPLE VITAMINS PO     folic acid (FOLVITE) 1 MG tablet     No current facility-administered medications for this visit.      No Known Allergies  Social History     Social History     Marital status: Single     Spouse name: N/A     Number of children: N/A     Years of education: N/A     Occupational History     Not on file.     Social History Main Topics     Smoking status: Current Every Day Smoker     Packs/day: 1.50     Years: 18.00     Types: Cigarettes     Smokeless tobacco: Former User     Quit date: 2016     Alcohol use 0.0 oz/week     0 Standard drinks or equivalent per week      Comment: 8 drinks or more each day, abstinent since 4/2/15. History of CD treatment in past x1     Drug use: No     Sexual activity: Not on file     Other Topics Concern     Not on file     Social History Narrative    Denies illicit IV or intranasal drug use    No tattoos or piercings     Past Medical History:   Diagnosis Date     ALC (alcoholic liver cirrhosis) (H)      Alcohol abuse      Anxiety      Arthritis      Ascites      Asthma      Bunion, left foot      Chronic low back pain     Narcotic agreement signed in [employer] system     COPD (chronic obstructive pulmonary disease) (H)      Depression      Hypertension     current     Ovarian cyst, left      Past Surgical History:   Procedure Laterality Date     BRONCHOSCOPY FLEXIBLE N/A 1/10/2017    Procedure: BRONCHOSCOPY FLEXIBLE;  Surgeon: Jennifer Mane MD;  Location:  GI     BRONCHOSCOPY FLEXIBLE N/A 2017    Procedure: BRONCHOSCOPY FLEXIBLE;  Surgeon: Jennifer Mane MD;  Location:  OR      SECTION       ESOPHAGOSCOPY, GASTROSCOPY, DUODENOSCOPY (EGD), COMBINED  2014    Procedure: COMBINED ESOPHAGOSCOPY, GASTROSCOPY, DUODENOSCOPY (EGD);  Surgeon: Brittany Lowe MD;  Location:  GI     ESOPHAGOSCOPY, GASTROSCOPY, DUODENOSCOPY (EGD), COMBINED N/A 2015    Procedure:  "COMBINED ESOPHAGOSCOPY, GASTROSCOPY, DUODENOSCOPY (EGD);  Surgeon: London Lenz MD;  Location:  GI     ESOPHAGOSCOPY, GASTROSCOPY, DUODENOSCOPY (EGD), COMBINED N/A 11/11/2015    Procedure: COMBINED ESOPHAGOSCOPY, GASTROSCOPY, DUODENOSCOPY (EGD);  Surgeon: Barry Chavez MD;  Location:  GI     KNEE SURGERY      x3     Family History   Problem Relation Age of Onset     Depression Mother      Anxiety Disorder Mother      MENTAL ILLNESS Mother      Substance Abuse Mother      Depression Father      Anxiety Disorder Father      Substance Abuse Father      MENTAL ILLNESS Father      Depression Daughter      Depression Brother      Schizophrenia Brother      Depression Brother      Anxiety Disorder Brother      Substance Abuse Brother      ROS Pulmonary  Dyspnea: No, Cough: No, Chest pain: No, Wheezing: No, Sputum Production: No, Hemoptysis: No  A complete ROS was otherwise negative except as noted in the HPI.  /68  Pulse 72  Temp 99  F (37.2  C) (Oral)  Resp 18  Ht 1.702 m (5' 7\")  Wt 77.1 kg (170 lb)  SpO2 94%  BMI 26.63 kg/m2  Exam:   GENERAL APPEARANCE: Well developed, well nourished, alert, and in no apparent distress.  EYES: PERRL, EOMI  HENT: Nasal mucosa with no edema and no hyperemia. No nasal polyps.  EARS: Canals clear, TMs normal  MOUTH: Oral mucosa is moist, without any lesions, no tonsillar enlargement, no oropharyngeal exudate.  NECK: supple, no masses, no thyromegaly.  LYMPHATICS: No significant axillary, cervical, or supraclavicular nodes.  RESP: normal percussion, good air flow throughout.  No crackles. No rhonchi. Few inspiratory squeaks.  CV: Normal S1, S2, regular rhythm, normal rate. No murmur.  No rub. No gallop. No LE edema.   ABDOMEN:  Bowel sounds normal, soft, nontender, no HSM or masses.   MS: extremities normal. No clubbing. No cyanosis.  SKIN: no rash on limited exam  NEURO: Mentation intact, speech normal, normal strength and tone, normal gait and stance  PSYCH: " mentation appears normal. and affect normal/bright  Results:  PFTs 3/31/17 mildly abnormal, nonspecific pattern  CXR today shows improved bilateral alveolar infiltrates    Assessment and plan: Sabina is a 41-year-old female who is a recovering alcoholic and opioid addict.  She has had multiple episodes of respiratory failure, with increasing severity and frequency over the last 9-12 months.  She had transbronchial lung biopsies after an episode of respiratory failure requiring endotracheal intubation in 01/2017 showing organizing pneumonia.      Organizing Pneumonia - improved after recent hospitalization precipitated by alcohol relapse. Her medications have been reviewed and do not appear to be contributory to this organizing pneumonia.     Continue prednisone, reduce from 60 to 40mg daily at the end of this month.  She is also taking Bactrim for Pneumocystis prophylaxis.  I have provided her a prednisone taper schedule.  I anticipate she will be on prednisone continuously for at least 6, possibly 12-month.     Nicotine Dependence - Her alcohol abstinence is too fragile to focus on smoking cessation now. Will continue to address.      I will plan to see her back in approximately 3 months with PFTs    I have instructed her to stop taking Spiriva, she does not have COPD. In the future we may consider inhaled steroids to reduce systemic steroid needs.

## 2017-04-11 PROBLEM — J84.89 ORGANIZING PNEUMONIA (H): Status: ACTIVE | Noted: 2017-01-01

## 2017-04-11 PROBLEM — F17.218 CIGARETTE NICOTINE DEPENDENCE WITH OTHER NICOTINE-INDUCED DISORDER: Status: ACTIVE | Noted: 2017-01-01

## 2017-05-02 NOTE — IP AVS SNAPSHOT
[place] [address] [facility]    [address]    [facility] [address]    Phone:  [phone]    Fax:  [phone]                                       After Visit Summary   5/2/2017    Sabina Figueroa    MRN: [id number]           After Visit Summary Signature Page     I have received my discharge instructions, and my questions have been answered. I have discussed any challenges I see with this plan with the nurse or doctor.    ..........................................................................................................................................  Patient/Patient Representative Signature      ..........................................................................................................................................  Patient Representative Print Name and Relationship to Patient    ..................................................               ................................................  Date                                            Time    ..........................................................................................................................................  Reviewed by Signature/Title    ...................................................              ..............................................  Date                                                            Time

## 2017-05-02 NOTE — ED PROVIDER NOTES
History     Chief Complaint:  Shortness of Breath       HPI   Sabina Figueroa is a 41 year old female with a history of asthma, hypertension, and alcohol abuse who presents to the emergency department today for evaluation of shortness of breath. The patient complains of shortness of breath with wheezing. Symptoms began earlier today. She reports no exertional activities prior to onset. Patient also complains of productive cough for the last two weeks. Patient reports her shortness of breath feels similar to her asthma. Patient did not use her inhaler today. In addition to her shortness of breath, patient reports redness and tingling in her face and hands. Patient has history of intubation for asthma in the past. Patient denies fever, rash, or swelling in her mouth or throat. Patient is taking Prednisone for pneumonia. She endorses recent multiple admissions for pneumonia and similar symptoms.      Allergies:  No Known Drug Allergies    Medications:    insulin glargine (LANTUS) 100 UNIT/ML injection  insulin aspart (NOVOLOG PEN) 100 UNIT/ML injection  insulin aspart (NOVOLOG PEN) 100 UNIT/ML injection  predniSONE (DELTASONE) 1 MG tablet  spironolactone (ALDACTONE) 50 MG tablet  gabapentin (NEURONTIN) 600 MG tablet  sulfamethoxazole-trimethoprim (BACTRIM DS/SEPTRA DS) 800-160 MG per tablet  blood glucose monitoring (ACCU-CHEK TONIA PLUS) meter device kit  FLUoxetine (PROZAC) 40 MG capsule  furosemide (LASIX) 20 MG tablet  hydrOXYzine (ATARAX) 10 MG tablet  lactulose (CHRONULAC) 10 GM/15ML solution  omeprazole (PRILOSEC) 20 MG CR capsule  traZODone (DESYREL) 100 MG tablet  methocarbamol (ROBAXIN) 500 MG tablet  aspirin EC 81 MG EC tablet  atorvastatin (LIPITOR) 20 MG tablet  rifaximin (XIFAXAN) 550 MG TABS tablet  ferrous sulfate (IRON) 325 (65 FE) MG tablet  albuterol (2.5 MG/3ML) 0.083% neb solution  buprenorphine-naloxone (SUBOXONE) 8-2 MG SUBL sublingual tablet  mirtazapine (REMERON) 30 MG tablet  alum & mag  hydroxide-simethicone (MYLANTA ES/MAALOX  ES) 400-400-40 MG/5ML SUSP  albuterol (PROAIR HFA, PROVENTIL HFA, VENTOLIN HFA) 108 (90 BASE) MCG/ACT inhaler  folic acid (FOLVITE) 1 MG tablet    Past Medical History:    ALC (alcoholic liver cirrhosis) (H)   Alcohol abuse   Anxiety   Arthritis   Ascites   Asthma   Bunion, left foot   Chronic low back pain   Depression   Hypertension   Ovarian cyst, left    Past Surgical History:    BRONCHOSCOPY FLEXIBLE    SECTION   ESOPHAGOSCOPY, GASTROSCOPY, DUODENOSCOPY (EGD), COMBINED   ESOPHAGOSCOPY, GASTROSCOPY, DUODENOSCOPY (EGD), COMBINED   ESOPHAGOSCOPY, GASTROSCOPY, DUODENOSCOPY (EGD), COMBINED   KNEE SURGERY    Family History:    Mother: Depression, anxiety, mental illness, substance abuse   Father: Depression, anxiety, substance abuse, mental illness   Daughter: Depression   Brother: Depression, Schizophrenia, anxiety, substance abuse     Social History:  The patient was accompanied to the ED by family.  Smoking Status: Current every day smoker--1.50 packs/day  Smokeless Tobacco: Former user  Alcohol Use: No  Marital Status:  Single [1]       Review of Systems   Constitutional: Negative for fever.   Respiratory: Positive for shortness of breath and wheezing.    Skin: Positive for color change. Negative for rash.   Neurological:        Tingling      All other systems reviewed and are negative.    Physical Exam   Vitals:  Patient Vitals for the past 24 hrs:   BP Pulse Resp SpO2   17 1852 - - - 91 %   17 1848 122/70 138 (!) 36 (!) 48 %            Physical Exam  Constitutional: The patient is oriented to person, place, and time. Alert and cooperative.  HENT:   Right Ear: External ear normal.   Left Ear: External ear normal.   Nose: Nose normal.   Mouth/Throat: Uvula is midline, oropharynx is clear and moist and mucous membranes are normal. No posterior oropharyngeal edema or erythema.   Eyes: Conjunctivae, EOM and lids are normal. Pupils are equal, round, and  reactive to light.   Neck: Trachea normal. Normal range of motion. Neck supple.   Cardiovascular: tachycardia, regular rhythm, normal heart sounds, and intact distal pulses.    Pulmonary/Chest: Tachypnea present. Increased work of breathing, but able to talk in complete sentences. Decreased breath sounds bilaterally with inspiratory and expiratory wheezes. No rales or rhonchi appreciated.  Abdominal: Soft. No tenderness. No rebound and no guarding.   Musculoskeletal: Normal range of motion.  No extremity tenderness or edema.  Neurological: Alert and Oriented. Strength 5/5 in upper and lower extremities bilaterally. Sensation intact to light touch throughout.  Skin: Skin is dry. No rash noted.          Emergency Department Course     ECG:  ECG taken at 1912, ECG read at 1914  Sinus tachycardia   Otherwise normal ECG  Rate 133 bpm. RI interval 118. QRS duration 68. QT/QTc 280/416. P-R-T axes 52 -9 40.      Imaging:  Radiology findings were communicated with the patient who voiced understanding of the findings.    Chest XR, 1 View Portable:  Extensive bilateral infiltrate. Patient had similar  findings on 3/22/2017. This could be due to pulmonary edema.  Pneumonitis could have this appearance.  Reading per radiology      Laboratory:  Laboratory findings were communicated with the patient who voiced understanding of the findings.    Blood culture: Pending    Blood culture(2): Pending     ISTAT gases lactate davon POCT: Ph venous: 7.43, PCO2 Venous: 39(L), PO2 Venous: 45, Bicarbonate Venous: 26, O2 Sat Venous: 82, Lactic acid: 2.8(H)    CBC: WBC: 16.2(H), PLT: 130(L) o/w WNL. (HGB 14.7)     CMP: NA: 130(L), Chloride: 92(L), Glucose: 134(H), GFR: 59(L), AST: 62(H) WNL (Creatinine: 1.03)    Lactic acid whole blood: 3.0(H)    Blood gas venous and oxyhgb: Ph venous: 7.43, PCO2 Venous: 42, PO2 Venous: 53(H), Bicarbonate venous: 28, oxyhgb: 84    D Dimer (Collected 1854): 1.0(H)    Alcohol level blood: 0.05(H)    INR:  1.15(H)    Nt probnp inpatient: 676(H)    Troponin (Collected 1854): <0.015    Interventions:  1940- lactated ringer 2313 mL IV  1945- Zosyn 4.5 g IV  1939- Tylenol 975 mg Oral  1924- Albuterol 2.5 mg Nebulization  1931- Albuterol 2.5 mg Nebulization  1920- Duoneb 6 mL Nebulization   1853- Duoneb 6 mL Nebulization   1911- Prednisone 125 mg IV       Emergency Department Course:  Nursing notes and vitals reviewed.  I performed an exam of the patient as documented above.     IV was inserted and blood was drawn for laboratory testing, results above.    The patient was sent for a XR while in the emergency department, results above.     At 1917 the patient was rechecked.    I discussed the treatment plan with the patient. They expressed understanding of this plan and consented to admission. I discussed the patient with Dr. Bloom, who will admit the patient to a monitored bed for further evaluation and treatment.    I personally reviewed the laboratory results with the Patient and answered all related questions prior to admission.    Impression & Plan      Medical Decision Making:  Sabina Figueroa is a 41 year old female who presents to the emergency department today for evaluation of shortness of breath.  Upon presentation in the ED, the patient is febrile, tachycardic, tachypneic, and with an oxygen saturation of 48% on room air.  She was immediately placed on supplemental oxygen with improvement in her oxygen saturations.  She was then placed on BiPAP.  On exam, she is alert, oriented, and neurologic exam is nonfocal.  She is tachycardic, but cardiac exam is otherwise unremarkable.  On auscultation of her lungs, she does have diminished breath sounds bilaterally with diffuse inspiratory and expiratory wheezes.  I do not appreciate any rales or rhonchi.  Abdomen is soft and nontender throughout.  She has no evidence of intraoral swelling or hives.  The rest of her exam is as mentioned above.    Per review of records,  the patient has had several admissions recently for similar presentations, for which she was found to have an organizing pneumonia.  Given that she is febrile and meets SIRS criteria, I am suspicious for an infectious process and a septic workup was performed.  EKG was obtained and demonstrates sinus tachycardia.  There are no concerning acute ischemic changes.  Labs were obtained and are as mentioned above.  Notably, she does have a leukocytosis of 16.2 and lactate is elevated at 3.0.  Portable chest x-ray was obtained and demonstrates extensive bilateral infiltrates, similar to the patient's presentation on 3/22/17.  Per radiology, this could represent pulmonary edema versus pneumonitis.  Upon repeat evaluation after DuoNeb's and the BiPAP, the patient appears improved with decreased work of breathing and oxygen saturations are within normal limits.  On repeat auscultation of her lungs, she does have improved aeration, but continues to have mild wheezes.  Given this patient's history and presentation, I am suspicious for sepsis secondary to healthcare acquired pneumonia. She was initiated on broad spectrum antibiotics. Although she does have a mildly elevated d-dimer, the patient's symptoms are more consistent with pneumonia rather than pulmonary embolism, therefore I do not feel that further evaluation for this is indicated at this time.  The patient was discussed with the hospitalist and he agreed to accept this patient on his service.  She was stable/improved at time of admission.      Diagnosis:    ICD-10-CM    1. Pneumonia J18.9          Disposition:   The patient was admitted.     Scribe Disclosure:  Gordy CONWAY, am serving as a scribe at 6:53 PM on 5/2/2017 to document services personally performed by Benita Ceaj MD, based on my observations and the provider's statements to me.    5/2/2017   [facility] EMERGENCY DEPARTMENT     Benita Ceja MD  05/03/17 [id number]       Benita Ceja  MD JUAN ANTONIO  05/03/17 154

## 2017-05-02 NOTE — LETTER
Transition Communication Hand-off for Care Transitions to Next Level of Care Provider    Name: Sabina Figueroa  MRN #: [id number]  Primary Care Provider: Aidee Hemphill  Primary Care MD Name: Joby  Primary Clinic: [facility] [address] [place] [address]  Primary Care Clinic Name: [facility]  Reason for Hospitalization:  Pneumonia [J18.9]  Admit Date/Time: 5/2/2017  6:42 PM  Discharge Date: 5/9/17  Payor Source: Payor: [facility] / Plan: ARE MA / Product Type: HMO /     Readmission Assessment Measure (KENNEDY) Risk Score/category: HIGH         Reason for Communication Hand-off Referral: Admission diagnoses: PN  No support or lacking a support system  Non-adherence to plan of care related to:  Other 5 ed/ip this year  Avoidable readmission within 30 days    Discharge Plan:  Discharge Plan:       Most Recent Value    Concerns To Be Addressed care coordination/care conferences, compliance issue concerns           Concern for non-adherence with plan of care:   YES  Discharge Needs Assessment:  Needs       Most Recent Value    Anticipated Changes Related to Illness none    Equipment Currently Used at Home none    Transportation Available Medicaid transportation    Current Discharge Risk substance abuse    # of Referrals Placed by [facility]    Follow up plan Community resource assistance    Other Resources [facility]          Already enrolled in Tele-monitoring program and name of program:  NA  Follow-up specialty is recommended: Yes    Follow-up plan:  Future Appointments  Date Time Provider Department Center   7/24/2017 11:00 AM  ANA PORTILLO [facility]   7/24/2017 12:00 PM Jennifer Mane MD [facility]       Any outstanding tests or procedures:         Follow-up and recommended labs and tests        See Dr. Mane within the next 2 weeks.  Call to make an appointment   Continue prednisone 60 mg daily until you see Dr. Mane in the clinic   No smoking   No drinking alcohol    Continue to take Bactrim (antibiotic) at home three times a week as you have been for infection prevention while taking prednisone                     Your next 10 appointments already scheduled      Jul 24, 2017 11:00 AM CDT   FULL PULMONARY FUNCTION with [facility] P[facility] BANDAR   [facility] ([facility])    [address] [place] [id number] [phone]              Jul 24, 2017 12:00 PM CDT   (Arrive by 11:45 AM)   Return Visit with Jennifer Mane MD   [facility] ([facility])    [address] [place] [id number] [phone]                  Further instructions from your care team      [facility] will pick you up on Thursday May 11 at 2pm. Please Call [facility] at [phone] with any changes.              Key Recommendations:   Readmit, 5 ed/ip in 2017, Missed [facility] CD admit d/t hospitalization, but they can get her in now on Thursday.     Sandra Walls [phone]  Sent via [employer] to Dr Hemphill's RN CTS  [facility]    AVS/Discharge Summary is the source of truth; this is a helpful guide for improved communication of patient story

## 2017-05-02 NOTE — ED NOTES
"Pt presents to ED reporting SOB, pt arrives hyperventilating, reporting tingling to her fingers. Pt stating \"I hope my oxygen level is not too low that I can recover from.\" Pt arrives with oxygen level of 48% on RA. States she has a hx of pneumonia and asthma. Pt very red to face.   "

## 2017-05-02 NOTE — IP AVS SNAPSHOT
MRN:[id number]                      After Visit Summary   5/2/2017    Sabina Figueroa    MRN: [id number]           Thank you!     Thank you for choosing [facility] for your care. Our goal is always to provide you with excellent care. Hearing back from our patients is one way we can continue to improve our services. Please take a few minutes to complete the written survey that you may receive in the mail after you visit. If you would like to speak to someone directly about your visit please contact Patient Relations at [phone]. Thank you!          Patient Information     Date Of Birth          1975        Designated Caregiver       Most Recent Value    Caregiver    Will someone help with your care after discharge? yes    Name of designated caregiver Peggy Figueroa    Phone number of caregiver on chart    Caregiver address on chart      About your hospital stay     You were admitted on:  May 2, 2017 You last received care in the:  [place] [address] [facility]    You were discharged on:  May 9, 2017       Who to Call     For medical emergencies, please call 911.  For non-urgent questions about your medical care, please call your primary care provider or clinic, [phone]          Attending Provider     Provider Specialty    Benita Ceja MD Emergency Medicine    Gadiel Bloom DO Internal Medicine       Primary Care Provider Office Phone # Fax #    Aidee Hemphill [phone]       [facility] [address] [address]        After Care Instructions     Activity       Your activity upon discharge: activity as tolerated            Diet       Follow this diet upon discharge: regular                  Follow-up Appointments     Follow-up and recommended labs and tests        See Dr. Mane within the next 2 weeks.  Call to make an appointment  Continue prednisone 60 mg daily until you see Dr. Mane in the clinic  No  "smoking  No drinking alcohol  Continue to take Bactrim (antibiotic) at home three times a week as you have been for infection prevention while taking prednisone                  Your next 10 appointments already scheduled     Jul 24, 2017 11:00 AM CDT   FULL PULMONARY FUNCTION with  PFL BANDAR   [facility] ([facility])    [address] [address]   [phone]            Jul 24, 2017 12:00 PM CDT   (Arrive by 11:45 AM)   Return Visit with Jennifer Mane MD   [facility] ([facility])    [address] [place] [address]   [phone]              Pending Results     No orders found from 4/30/2017 to 5/3/2017.            Statement of Approval     Ordered          05/09/17 0950  I have reviewed and agree with all the recommendations and orders detailed in this document.  EFFECTIVE NOW     Approved and electronically signed by:  Benedict Da Silva MD             Admission Information     Date & Time Provider Department Dept. Phone    5/2/2017 Gadiel Bloom,  [place] [address] [facility] [phone]      Your Vitals Were     Blood Pressure Pulse Temperature Respirations Weight Pulse Oximetry    107/67 (BP Location: Left arm) 64 97.3  F (36.3  C) (Oral) 16 79.2 kg (174 lb 8 oz) 91%    BMI (Body Mass Index)                   27.33 kg/m2           MyChart Information     [facility] [employer] lets you send messages to your doctor, view your test results, renew your prescriptions, schedule appointments and more. To sign up, go to [email or web address] . Click on \"Log in\" on the left side of the screen, which will take you to the Welcome page. Then click on \"Sign up Now\" on the right side of the page.     You will be asked to enter the access code listed below, as well as some personal information. Please follow the directions to create your username " and password.     Your access code is: [email or web address]  Expires: 6/15/2017  5:43 PM     Your access code will  in 90 days. If you need help or a new code, please call your [facility] clinic or [phone].        Care EveryWhere ID     This is your Care EveryWhere ID. This could be used by other organizations to access your [facility] medical records  [id number]           Review of your medicines      CONTINUE these medicines which may have CHANGED, or have new prescriptions. If we are uncertain of the size of tablets/capsules you have at home, strength may be listed as something that might have changed.        Dose / Directions    sulfamethoxazole-trimethoprim 800-160 MG per tablet   Commonly known as:  BACTRIM DS/SEPTRA DS   Indication:  pjp ppx   This may have changed:  additional instructions   Used for:  Bronchiolitis        Dose:  1 tablet   Take 1 tablet by mouth three times a week   Quantity:  30 tablet   Refills:  0         CONTINUE these medicines which have NOT CHANGED        Dose / Directions    * albuterol 108 (90 BASE) MCG/ACT Inhaler   Commonly known as:  PROAIR HFA/PROVENTIL HFA/VENTOLIN HFA        Dose:  2 puff   Inhale 2 puffs into the lungs every 4 hours as needed for shortness of breath / dyspnea or wheezing Reported on 2017   Refills:  0       * albuterol (2.5 MG/3ML) 0.083% neb solution        Dose:  1 vial   Take 1 vial by nebulization every 6 hours as needed for shortness of breath / dyspnea or wheezing   Refills:  0       alum & mag hydroxide-simethicone 400-400-40 MG/5ML Susp suspension   Commonly known as:  MYLANTA ES/MAALOX  ES        Dose:  30 mL   Take 30 mLs by mouth every 4 hours as needed for indigestion Reported on 2017   Refills:  0       ascorbic acid 500 MG tablet   Commonly known as:  VITAMIN C        Dose:  500 mg   Take 500 mg by mouth daily   Refills:  0       aspirin 81 MG EC tablet   Used for:  Elevated troponin I level        Dose:  81 mg   Take 1 tablet (81 mg)  by mouth daily   Quantity:  30 tablet   Refills:  1       atorvastatin 20 MG tablet   Commonly known as:  LIPITOR   Used for:  Elevated troponin I level        Dose:  20 mg   Take 1 tablet (20 mg) by mouth daily   Quantity:  30 tablet   Refills:  1       blood glucose monitoring meter device kit   Used for:  Hyperglycemia        Use to test blood sugars BID times daily or as directed.   Quantity:  1 kit   Refills:  0       buprenorphine-naloxone 8-2 MG Subl sublingual tablet   Commonly known as:  SUBOXONE        Dose:  3 tablet   Place 3 tablets under the tongue daily   Refills:  0       ferrous sulfate 325 (65 FE) MG tablet   Commonly known as:  IRON        Dose:  325 mg   Take 325 mg by mouth daily (with breakfast)   Refills:  0       Fish Oil 1200 MG Caps        Dose:  1 capsule   Take 1 capsule by mouth daily   Refills:  0       FLUoxetine 40 MG capsule   Commonly known as:  PROzac        Dose:  40 mg   Take 40 mg by mouth daily   Refills:  0       folic acid 1 MG tablet   Commonly known as:  FOLVITE   Used for:  Alcoholic hepatitis, Alcohol dependence (H)        Dose:  1 mg   Take 1 tablet (1 mg) by mouth daily   Quantity:  30 tablet   Refills:  0       furosemide 20 MG tablet   Commonly known as:  LASIX        Dose:  20 mg   Take 20 mg by mouth daily   Refills:  0       gabapentin 600 MG tablet   Commonly known as:  NEURONTIN        Dose:  600 mg   Take 1 tablet (600 mg) by mouth 3 times daily   Quantity:  9 tablet   Refills:  0       hydrOXYzine 10 MG tablet   Commonly known as:  ATARAX        Dose:  10 mg   Take 10 mg by mouth every 8 hours as needed for itching   Refills:  0       * insulin aspart 100 UNIT/ML injection   Commonly known as:  NovoLOG PEN   Used for:  Hyperglycemia        1 U per 15 g carbs   Quantity:  50 mL   Refills:  0       * insulin aspart 100 UNIT/ML injection   Commonly known as:  NovoLOG PEN   Used for:  Hyperglycemia        For Pre-Meal  - 189 give 1 unit.  For Pre-Meal   - 239 give 2 units.  For Pre-Meal  - 289 give 3 units.  For Pre-Meal  - 339 give 4 units.  For Pre-Meal -399 give 5 units. For Pre-Meal -449 give 6 units. For Pre-Meal BG = or > 450 give 7 units.   Quantity:  50 mL   Refills:  0       * insulin aspart 100 UNIT/ML injection   Commonly known as:  NovoLOG PEN   Used for:  Hyperglycemia        Bedtime Blood Glucose (BG) For  - 249 give 1 units.  For  - 299 give 2 units.  For  - 349 give 3 units. For - 399 give 4 units.  For BG = or > 400 give 5 units.   Quantity:  50 mL   Refills:  0       insulin glargine 100 UNIT/ML injection   Commonly known as:  LANTUS   Used for:  Hyperglycemia        Dose:  20 Units   Inject 20 Units Subcutaneous every morning (before breakfast)   Quantity:  50 mL   Refills:  0       lactulose 10 GM/15ML solution   Commonly known as:  CHRONULAC        Dose:  20 g   Take 20 g by mouth 3 times daily   Refills:  0       MULTIPLE VITAMINS PO   Used for:  Alcoholic hepatitis        Dose:  1 tablet   Take 1 tablet by mouth daily   Refills:  0       omeprazole 20 MG CR capsule   Commonly known as:  priLOSEC        Dose:  20 mg   Take 20 mg by mouth daily   Refills:  0       predniSONE 1 MG tablet   Commonly known as:  DELTASONE   Used for:  HCAP (healthcare-associated pneumonia)        Dose:  60 mg   Take 60 tablets (60 mg) by mouth daily   Quantity:  30 tablet   Refills:  0       rifaximin 550 MG Tabs tablet   Commonly known as:  XIFAXAN   Used for:  Other ascites        Dose:  550 mg   Take 1 tablet (550 mg) by mouth 2 times daily   Quantity:  60 tablet   Refills:  0       ROBAXIN 500 MG tablet   Generic drug:  methocarbamol        Dose:  1000 mg   Take 1,000 mg by mouth 4 times daily   Refills:  0       spironolactone 50 MG tablet   Commonly known as:  ALDACTONE        Dose:  50 mg   Take 1 tablet (50 mg) by mouth daily   Quantity:  3 tablet   Refills:  0       thiamine 100 MG tablet        Dose:  100 mg    Take 100 mg by mouth daily   Refills:  0       traZODone 100 MG tablet   Commonly known as:  DESYREL        Dose:  100 mg   Take 100 mg by mouth At Bedtime   Refills:  0       * Notice:  This list has 5 medication(s) that are the same as other medications prescribed for you. Read the directions carefully, and ask your doctor or other care provider to review them with you.         Where to get your medicines      These medications were sent to [place], [place] - [address] [facility]  [address] [facility] [address]     Phone:  [phone]     predniSONE 1 MG tablet                Protect others around you: Learn how to safely use, store and throw away your medicines at www.disposemymeds.org.             Medication List: This is a list of all your medications and when to take them. Check marks below indicate your daily home schedule. Keep this list as a reference.      Medications           Morning Afternoon Evening Bedtime As Needed    * albuterol 108 (90 BASE) MCG/ACT Inhaler   Commonly known as:  PROAIR HFA/PROVENTIL HFA/VENTOLIN HFA   Inhale 2 puffs into the lungs every 4 hours as needed for shortness of breath / dyspnea or wheezing Reported on 2/23/2017                                   * albuterol (2.5 MG/3ML) 0.083% neb solution   Take 1 vial by nebulization every 6 hours as needed for shortness of breath / dyspnea or wheezing   Last time this was given:  5/2/2017  7:35 PM                                   alum & mag hydroxide-simethicone 400-400-40 MG/5ML Susp suspension   Commonly known as:  MYLANTA ES/MAALOX  ES   Take 30 mLs by mouth every 4 hours as needed for indigestion Reported on 2/23/2017                                   ascorbic acid 500 MG tablet   Commonly known as:  VITAMIN C   Take 500 mg by mouth daily   Next Dose Due:  Resume as previously prescribed                                 aspirin 81 MG EC tablet   Take 1 tablet (81 mg) by mouth daily    Last time this was given:  81 mg on 5/9/2017  9:47 AM   Next Dose Due:  Tomorrow morning 5/10/17                                   atorvastatin 20 MG tablet   Commonly known as:  LIPITOR   Take 1 tablet (20 mg) by mouth daily   Last time this was given:  20 mg on 5/9/2017  9:46 AM   Next Dose Due:  Tomorrow morning 5/10/17                                blood glucose monitoring meter device kit   Use to test blood sugars BID times daily or as directed.                                buprenorphine-naloxone 8-2 MG Subl sublingual tablet   Commonly known as:  SUBOXONE   Place 3 tablets under the tongue daily   Next Dose Due:  Next dose due tomorrow 5/10/17                                ferrous sulfate 325 (65 FE) MG tablet   Commonly known as:  IRON   Take 325 mg by mouth daily (with breakfast)   Next Dose Due:  Resume as previously prescribed                                 Fish Oil 1200 MG Caps   Take 1 capsule by mouth daily   Next Dose Due:  Resume as previously prescribed                                 FLUoxetine 40 MG capsule   Commonly known as:  PROzac   Take 40 mg by mouth daily   Last time this was given:  40 mg on 5/9/2017  9:46 AM   Next Dose Due:  Tomorrow morning 5/10/17                                   folic acid 1 MG tablet   Commonly known as:  FOLVITE   Take 1 tablet (1 mg) by mouth daily   Last time this was given:  1 mg on 5/9/2017  9:46 AM   Next Dose Due:  Tomorrow morning 5/10/17                                   furosemide 20 MG tablet   Commonly known as:  LASIX   Take 20 mg by mouth daily   Last time this was given:  20 mg on 5/9/2017  9:46 AM   Next Dose Due:  Tomorrow morning 5/10/17                                   gabapentin 600 MG tablet   Commonly known as:  NEURONTIN   Take 1 tablet (600 mg) by mouth 3 times daily   Last time this was given:  600 mg on 5/9/2017  9:46 AM   Next Dose Due:  Take next early evening 5/9/17                                         hydrOXYzine 10 MG  tablet   Commonly known as:  ATARAX   Take 10 mg by mouth every 8 hours as needed for itching                                   * insulin aspart 100 UNIT/ML injection   Commonly known as:  NovoLOG PEN   1 U per 15 g carbs   Last time this was given:  5 Units on 5/9/2017 12:54 PM                                * insulin aspart 100 UNIT/ML injection   Commonly known as:  NovoLOG PEN   For Pre-Meal  - 189 give 1 unit.  For Pre-Meal  - 239 give 2 units.  For Pre-Meal  - 289 give 3 units.  For Pre-Meal  - 339 give 4 units.  For Pre-Meal -399 give 5 units. For Pre-Meal -449 give 6 units. For Pre-Meal BG = or > 450 give 7 units.   Last time this was given:  5 Units on 5/9/2017 12:54 PM                                * insulin aspart 100 UNIT/ML injection   Commonly known as:  NovoLOG PEN   Bedtime Blood Glucose (BG) For  - 249 give 1 units.  For  - 299 give 2 units.  For  - 349 give 3 units. For - 399 give 4 units.  For BG = or > 400 give 5 units.   Last time this was given:  5 Units on 5/9/2017 12:54 PM                                insulin glargine 100 UNIT/ML injection   Commonly known as:  LANTUS   Inject 20 Units Subcutaneous every morning (before breakfast)                                lactulose 10 GM/15ML solution   Commonly known as:  CHRONULAC   Take 20 g by mouth 3 times daily   Last time this was given:  20 g on 5/9/2017  9:47 AM   Next Dose Due:  Take this evening 5/9/17                                         MULTIPLE VITAMINS PO   Take 1 tablet by mouth daily   Next Dose Due:  Resume as previously prescribed                                 omeprazole 20 MG CR capsule   Commonly known as:  priLOSEC   Take 20 mg by mouth daily   Last time this was given:  20 mg on 5/9/2017  9:46 AM   Next Dose Due:  Tomorrow morning 5/10/17                                   predniSONE 1 MG tablet   Commonly known as:  DELTASONE   Take 60 tablets (60 mg) by mouth  daily   Last time this was given:  60 mg on 5/9/2017  9:46 AM   Next Dose Due:  Tomorrow morning 5/10/17                                   rifaximin 550 MG Tabs tablet   Commonly known as:  XIFAXAN   Take 1 tablet (550 mg) by mouth 2 times daily   Last time this was given:  550 mg on 5/9/2017  9:46 AM   Next Dose Due:  Tonight 5/9/17                                      ROBAXIN 500 MG tablet   Take 1,000 mg by mouth 4 times daily   Generic drug:  methocarbamol   Next Dose Due:  Resume as previously prescribed                                 spironolactone 50 MG tablet   Commonly known as:  ALDACTONE   Take 1 tablet (50 mg) by mouth daily   Last time this was given:  50 mg on 5/9/2017 10:09 AM   Next Dose Due:  Tomorrow morning 5/10/17                                   sulfamethoxazole-trimethoprim 800-160 MG per tablet   Commonly known as:  BACTRIM DS/SEPTRA DS   Take 1 tablet by mouth three times a week   Last time this was given:  1 tablet on 5/8/2017 10:13 AM   Next Dose Due:  Resume previous schedule                                 thiamine 100 MG tablet   Take 100 mg by mouth daily   Next Dose Due:  Resume as previously prescribed                                 traZODone 100 MG tablet   Commonly known as:  DESYREL   Take 100 mg by mouth At Bedtime   Last time this was given:  100 mg on 5/8/2017  9:01 PM   Next Dose Due:  Tonight at bedtime 5/9/17                                * Notice:  This list has 5 medication(s) that are the same as other medications prescribed for you. Read the directions carefully, and ask your doctor or other care provider to review them with you.              More Information        Discharge Instructions for Pneumonia  You have been diagnosed with pneumonia, a serious lung infection. Most cases of pneumonia are caused by bacteria. Pneumonia most often occurs in older adults, young children, and people with chronic health problems.  Home care    Take your medication exactly as  directed. Don t skip doses. Continue taking your antibiotics as directed until they are all gone -- even if you start to feel better. This will prevent the pneumonia from coming back.    Drink at least 8 glasses of water daily, unless directed otherwise. This helps to loosen and thin secretions so that you can cough them up.    Use a cool-mist humidifier in your bedroom. Be sure to clean the humidifier daily.    Coughing up mucus is normal. Don t use medications to suppress your cough unless your cough is dry, painful, or interferes with your sleep. You may use an expectorant if ordered by your doctor.    Warm compresses or a heating pad on the lowest setting can be used to relieve chest discomfort. Use several times a day for 15 to 20 minutes at a time. (To prevent injuring your skin, be sure the temperature of the compress or heating pad is warm, not hot.)    Get plenty of rest until your fever, shortness of breath, and chest pain go away.    Plan to get a flu shot every year.    Ask your doctor about pneumonia vaccinations.     Follow-up care  Make a follow-up appointment as directed by our staff.     When to seek medical care  Call 911 right away if you have any of the following:    Chest pain    Trouble breathing    Blue lips or fingernails  Otherwise, call your doctor if you have any of the following:    Fever above 101.5 F  (38.6 C)    Yellow, green, bloody, or smelly sputum    More than normal mucus production    Vomiting     [id number] [employer]. [address] [place] [address], [place] [address]. All rights reserved. This information is not intended as a substitute for professional medical care. Always follow your healthcare professional's instructions.

## 2017-05-03 NOTE — PROGRESS NOTES
Transported patient to ICU from ER while on BiPAP without incident.    Brenda Evangelista  May 2, 2017.9:35 PM

## 2017-05-03 NOTE — PHARMACY-ADMISSION MEDICATION HISTORY
Suboxone dose confirmation (requested by Grace Pierce, ISABELL CNS)    Called [facility] in [place] [phone], faxed a Release of information form signed by patient.     Suboxone dose PTA = 24mg/day (three 8-2mg SL tabs daily).  Last dispensed yesterday: May 2, 2017 @07:44am.

## 2017-05-03 NOTE — PROGRESS NOTES
RT- patient remains on Bipap 14/8 with FIO2 weaned to 35%. Breath sounds coarse throughout.    Will continue to monitor patient.    Oksana Lawton, RT  5/3/2017 3:00 AM

## 2017-05-03 NOTE — PROGRESS NOTES
RT- patient transported to CT on Bipap, patient subsequently transported back to ICU wearing Bipap throughout the transport.     Oksana Lawton, RT  5/3/2017 12:17 AM

## 2017-05-03 NOTE — PLAN OF CARE
Problem: Goal Outcome Summary  Goal: Goal Outcome Summary  Outcome: No Change  ICU End of Shift Summary.  For vital signs and complete assessments, please see documentation flowsheets.      Pertinent assessments: Patient A&O x4. BiPAP on continuous since admission. Afebrile. Good UOP per callaway. Refused repositioning; risks and benefits explained re: repositioning.   Major Shift Events: Insulin gtt started. Patient slept most of night after CT scan. LS coarse.   Plan (Upcoming Events): Wean off BiPAP as able; monitor blood glucose.  Discharge/Transfer Needs: Pending     Bedside Shift Report Completed :   Bedside Safety Check Completed:

## 2017-05-03 NOTE — PHARMACY-ADMISSION MEDICATION HISTORY
Admission medication history interview status for this patient is complete. See [facility] admission navigator for allergy information, prior to admission medications and immunization status.     Medication history interview source(s):Patient  Medication history resources (including written lists, pill bottles, clinic record):[facility] List  Primary pharmacy:None identified    Changes made to [facility] medication list:  Added: None  Deleted: Remeron  Changed: None    Actions taken by pharmacist (provider contacted, etc):None     Additional medication history information:None    Medication reconciliation/reorder completed by provider prior to medication history? No    For patients on insulin therapy: Yes  Lantus daily   Sliding scale Novolog Y/N: Yes  If Yes, do you have a baseline novolog pre-meal dose:  No   Patients eat three meals a day:  Yes   Any Barriers to therapy:  cost of medications/comfortable with giving injections (if applicable)/ comfortable and confident with current diabetes regimen : No      Prior to Admission medications    Medication Sig Last Dose Taking? Auth Provider   insulin glargine (LANTUS) 100 UNIT/ML injection Inject 20 Units Subcutaneous every morning (before breakfast) 5/1/2017 at Unknown time Yes Anatoliy Dos Santos DO   insulin aspart (NOVOLOG PEN) 100 UNIT/ML injection 1 U per 15 g carbs 5/1/2017 at Unknown time Yes Anatoliy Dos Santos DO   predniSONE (DELTASONE) 1 MG tablet Take 60 tablets (60 mg) by mouth daily 5/2/2017 at am Yes Anatoliy Dos Santos DO   spironolactone (ALDACTONE) 50 MG tablet Take 1 tablet (50 mg) by mouth daily 5/2/2017 at am Yes Josemanuel Damon MD   gabapentin (NEURONTIN) 600 MG tablet Take 1 tablet (600 mg) by mouth 3 times daily 5/2/2017 at am Yes Josemanuel Damon MD   sulfamethoxazole-trimethoprim (BACTRIM DS/SEPTRA DS) 800-160 MG per tablet Take 1 tablet by mouth three times a week  Patient taking differently: Take 1 tablet by mouth three times a week on  Mon/Wed/Friday Past Week at Misplaced meds Yes Yisel Henry MD   ascorbic acid (VITAMIN C) 500 MG tablet Take 500 mg by mouth daily Past Month at Unknown time Yes Unknown, Entered By History   FLUoxetine (PROZAC) 40 MG capsule Take 40 mg by mouth daily 5/2/2017 at am Yes Unknown, Entered By History   furosemide (LASIX) 20 MG tablet Take 20 mg by mouth daily 5/2/2017 at am Yes Unknown, Entered By History   omeprazole (PRILOSEC) 20 MG CR capsule Take 20 mg by mouth daily 5/2/2017 at am Yes Unknown, Entered By History   thiamine 100 MG tablet Take 100 mg by mouth daily 5/2/2017 at am Yes Unknown, Entered By History   traZODone (DESYREL) 100 MG tablet Take 100 mg by mouth At Bedtime 5/1/2017 at pm Yes Unknown, Entered By History   Omega-3 Fatty Acids (FISH OIL) 1200 MG CAPS Take 1 capsule by mouth daily 5/2/2017 at am Yes Unknown, Entered By History   methocarbamol (ROBAXIN) 500 MG tablet Take 1,000 mg by mouth 4 times daily 5/2/2017 at am Yes Unknown, Entered By History   aspirin EC 81 MG EC tablet Take 1 tablet (81 mg) by mouth daily 5/2/2017 at Unknown time Yes Anatoliy Dos Santos DO   atorvastatin (LIPITOR) 20 MG tablet Take 1 tablet (20 mg) by mouth daily Past Month at Unknown time Yes Anatoliy Dos Santos DO   rifaximin (XIFAXAN) 550 MG TABS tablet Take 1 tablet (550 mg) by mouth 2 times daily 5/2/2017 at am Yes Anatoliy Dos Santos DO   ferrous sulfate (IRON) 325 (65 FE) MG tablet Take 325 mg by mouth daily (with breakfast) 5/2/2017 at am Yes Unknown, Entered By History   buprenorphine-naloxone (SUBOXONE) 8-2 MG SUBL sublingual tablet Place 3 tablets under the tongue daily 5/2/2017 at am Yes Unknown, Entered By History   MULTIPLE VITAMINS PO Take 1 tablet by mouth daily 5/2/2017 at am Yes Reported, Patient   folic acid (FOLVITE) 1 MG tablet Take 1 tablet (1 mg) by mouth daily 5/2/2017 at am Yes Devin Peace MD   insulin aspart (NOVOLOG PEN) 100 UNIT/ML injection For Pre-Meal  - 189 give 1 unit.   For  Pre-Meal  - 239 give 2 units.   For Pre-Meal  - 289 give 3 units.   For Pre-Meal  - 339 give 4 units.   For Pre-Meal -399 give 5 units.  For Pre-Meal -449 give 6 units.  For Pre-Meal BG = or > 450 give 7 units. Unknown at Unknown time  Anatoliy Dos Santos, DO   insulin aspart (NOVOLOG PEN) 100 UNIT/ML injection Bedtime Blood Glucose (BG)  For  - 249 give 1 units.   For  - 299 give 2 units.   For  - 349 give 3 units.  For - 399 give 4 units.   For BG = or > 400 give 5 units. Unknown at Unknown time  Anatoliy Dos Santos, DO   blood glucose monitoring (ACCU-CHEK TONIA PLUS) meter device kit Use to test blood sugars BID times daily or as directed.   Yisel Henry MD   hydrOXYzine (ATARAX) 10 MG tablet Take 10 mg by mouth every 8 hours as needed for itching Unknown at Unknown time  Unknown, Entered By History   lactulose (CHRONULAC) 10 GM/15ML solution Take 20 g by mouth 3 times daily Unknown at Unknown time  Unknown, Entered By History   albuterol (2.5 MG/3ML) 0.083% neb solution Take 1 vial by nebulization every 6 hours as needed for shortness of breath / dyspnea or wheezing Unknown at Unknown time  Unknown, Entered By History   alum & mag hydroxide-simethicone (MYLANTA ES/MAALOX  ES) 400-400-40 MG/5ML SUSP Take 30 mLs by mouth every 4 hours as needed for indigestion Reported on 2/23/2017 Unknown at Unknown time  Reported, Patient   albuterol (PROAIR HFA, PROVENTIL HFA, VENTOLIN HFA) 108 (90 BASE) MCG/ACT inhaler Inhale 2 puffs into the lungs every 4 hours as needed for shortness of breath / dyspnea or wheezing Reported on 2/23/2017 Unknown at Unknown time  Reported, Patient

## 2017-05-03 NOTE — CONSULTS
[facility]  Pain Service Consultation   Text Page    Date of Admission:  5/2/2017    Assessment & Plan   Sabina Figueroa is a 41 year old female who was admitted on 5/2/2017. I was asked to see the patient for pain management.    1) Acute pain flare of chronic low back pain, exacerbated by: acute illness    2)  Patient with chronic low back pain, on suboxone dispensed daily at [facility].  [place] [facility] database review: gabapentin only    Patient's opioid use thus far: none    3)  Opioid induced side-effects:  -constipation, on chronic lactulose  Hx of allergy to Butrans patch- Sudden onset HA with hypertension    4) Other/Related:    -Anxiety/PTSD  -Disease of addiction to alcohol , BAL 0.05g/dL on admission  -Asthma, COPD, recurrent admissions with respiratory failure  -Insomnia  -Alcoholic hepatitis    PLAN:   1) Plan to restart Suboxone as continuation of chronic therapy, however will reduce dose in setting of current sedation.  2)Avoid opioids if no acute pain, may need Fentanyl if re-intubated for comfort, in that case, patient will likley need higher than normal dose due to concurrent Suboxone.  3)Multimodal Medication Therapy  Topical:ICY HOT, ketoprofen gel  NSAIDS:Toradol as previously ordered, PRN only  Muscle Relaxants:restart chronic robaxin when sedation improves  Adjuvants:tylenol PRN, no more than 2000mg/day   Antidepresants/anxiolytics:prozac restarted, gabapentin at reduced dose in setting of sedation.  Opioids:Suboxone only, avoid other opioids unless needed for acute pain or comfort intubated.  4)Non-medication interventions  Relaxation, positioning, ETOH withdrawal management  5)Constipation Prophylaxis  Lactulose as chronic  6) DC safety  -Opioids are not recommended for this patient for discharge  -Pt to follow up with PCP for non-opioid pain management, and [facility] for Suboxone therapy.  -Consider further chem dep intervention with recent ETOH use?    Time Spent on this  Encounter   I spent  35 minutes in assessment of the patient and discussion with the patient and family.  Another 35 minutes in review of chart, documentation and discussion with the health care team.    Grace WHYTE, CNS  Pain Management and Palliative Care  [facility]  Pgr: [phone]      Reason for Consult   Reason for consult: I was asked by Dr. Rapp to evaluate this patient for pain management.    Primary Care Physician   Primary Care Physician:Aidee Hemphill  Pain Specialist: none    Chief Complaint   Low back pain    History is obtained from the patient and electronic health record    History of Present Illness   Sabina Figueroa is a 41 year old female who presents with shortness of breath.  She is treated for acute hypoxic respiratory failure thought related to PNA, this is her 5th admission in the past 6 months with similar episode.  She has DM and Liver cirrhosis though due to ETOH abuse. She is on suboxone therapy via [facility] for opioid abuse.    CURRENT PAIN:  Her pain is located in the back  It is described as Aching and some spasms last night  She rates it as ranging between 0/10 and 8/10      PAIN HISTORY:  The pain is mainly located in the low back and peripheral neuropathy in B feet  It is described as Aching with spasms  Rates it as ranging between 6/10 and 7/10  It improves by unable  It worsens by unable    PAST PAIN TREATMENT:   Medications:opioids, gabapentin, antianxiety/antidepressants, ibuprofen, robaxin.  Non-phamacologic modalities:Tens unit, PT, chiropractor, pain clinic.  Previous interventions/surgeries: spine and facet injections      Past Medical History   I have reviewed this patient's medical history and updated it with pertinent information if needed.   Past Medical History:   Diagnosis Date     ALC (alcoholic liver cirrhosis) (H)      Alcohol abuse      Anxiety      Arthritis      Ascites      Asthma      Bunion, left foot       Chronic low back pain     Narcotic agreement signed in [facility] system     Depression      Hypertension     current     Ovarian cyst, left        Past Surgical History   I have reviewed this patient's surgical history and updated it with pertinent information if needed.  Past Surgical History:   Procedure Laterality Date     BRONCHOSCOPY FLEXIBLE N/A 1/10/2017    Procedure: BRONCHOSCOPY FLEXIBLE;  Surgeon: Jennifer Mane MD;  Location:  GI     BRONCHOSCOPY FLEXIBLE N/A 2017    Procedure: BRONCHOSCOPY FLEXIBLE;  Surgeon: Jennifer Mane MD;  Location: [facility] OR      SECTION       ESOPHAGOSCOPY, GASTROSCOPY, DUODENOSCOPY (EGD), COMBINED  2014    Procedure: COMBINED ESOPHAGOSCOPY, GASTROSCOPY, DUODENOSCOPY (EGD);  Surgeon: Brittany Lowe MD;  Location:  GI     ESOPHAGOSCOPY, GASTROSCOPY, DUODENOSCOPY (EGD), COMBINED N/A 2015    Procedure: COMBINED ESOPHAGOSCOPY, GASTROSCOPY, DUODENOSCOPY (EGD);  Surgeon: London Lenz MD;  Location:  GI     ESOPHAGOSCOPY, GASTROSCOPY, DUODENOSCOPY (EGD), COMBINED N/A 2015    Procedure: COMBINED ESOPHAGOSCOPY, GASTROSCOPY, DUODENOSCOPY (EGD);  Surgeon: Barry Chavez MD;  Location: [facility] GI     KNEE SURGERY      x3         Prior to Admission Medications   Prior to Admission Medications   Prescriptions Last Dose Informant Patient Reported? Taking?   FLUoxetine (PROZAC) 40 MG capsule 2017 at am Self Yes Yes   Sig: Take 40 mg by mouth daily   MULTIPLE VITAMINS PO 2017 at am Self Yes Yes   Sig: Take 1 tablet by mouth daily   Omega-3 Fatty Acids (FISH OIL) 1200 MG CAPS 2017 at am Self Yes Yes   Sig: Take 1 capsule by mouth daily   albuterol (2.5 MG/3ML) 0.083% neb solution Unknown at Unknown time Self Yes No   Sig: Take 1 vial by nebulization every 6 hours as needed for shortness of breath / dyspnea or wheezing   albuterol (PROAIR HFA, PROVENTIL HFA, VENTOLIN HFA) 108 (90 BASE) MCG/ACT inhaler Unknown at Unknown time Self Yes No    Sig: Inhale 2 puffs into the lungs every 4 hours as needed for shortness of breath / dyspnea or wheezing Reported on 2017   alum & mag hydroxide-simethicone (MYLANTA ES/MAALOX  ES) 400-400-40 MG/5ML SUSP Unknown at Unknown time Self Yes No   Sig: Take 30 mLs by mouth every 4 hours as needed for indigestion Reported on 2017   ascorbic acid (VITAMIN C) 500 MG tablet Past Month at Unknown time Self Yes Yes   Sig: Take 500 mg by mouth daily   aspirin EC 81 MG EC tablet 2017 at Unknown time Self No Yes   Sig: Take 1 tablet (81 mg) by mouth daily   atorvastatin (LIPITOR) 20 MG tablet Past Month at Unknown time Self No Yes   Sig: Take 1 tablet (20 mg) by mouth daily   blood glucose monitoring (ACCU-CHEK TONIA PLUS) meter device kit   No No   Sig: Use to test blood sugars BID times daily or as directed.   buprenorphine-naloxone (SUBOXONE) 8-2 MG SUBL sublingual tablet 2017 at am Self Yes Yes   Sig: Place 3 tablets under the tongue daily   ferrous sulfate (IRON) 325 (65 FE) MG tablet 2017 at am Self Yes Yes   Sig: Take 325 mg by mouth daily (with breakfast)   folic acid (FOLVITE) 1 MG tablet 2017 at am Self No Yes   Sig: Take 1 tablet (1 mg) by mouth daily   furosemide (LASIX) 20 MG tablet 2017 at am Self Yes Yes   Sig: Take 20 mg by mouth daily   gabapentin (NEURONTIN) 600 MG tablet 2017 at am  No Yes   Sig: Take 1 tablet (600 mg) by mouth 3 times daily   hydrOXYzine (ATARAX) 10 MG tablet Unknown at Unknown time Self Yes No   Sig: Take 10 mg by mouth every 8 hours as needed for itching   insulin aspart (NOVOLOG PEN) 100 UNIT/ML injection 2017 at Unknown time  No Yes   Si U per 15 g carbs   insulin aspart (NOVOLOG PEN) 100 UNIT/ML injection Unknown at Unknown time  No No   Sig: For Pre-Meal  - 189 give 1 unit.   For Pre-Meal  - 239 give 2 units.   For Pre-Meal  - 289 give 3 units.   For Pre-Meal  - 339 give 4 units.   For Pre-Meal -399 give 5  units.  For Pre-Meal -449 give 6 units.  For Pre-Meal BG = or > 450 give 7 units.   insulin aspart (NOVOLOG PEN) 100 UNIT/ML injection Unknown at Unknown time  No No   Sig: Bedtime Blood Glucose (BG)  For  - 249 give 1 units.   For  - 299 give 2 units.   For  - 349 give 3 units.  For - 399 give 4 units.   For BG = or > 400 give 5 units.   insulin glargine (LANTUS) 100 UNIT/ML injection 5/1/2017 at Unknown time  No Yes   Sig: Inject 20 Units Subcutaneous every morning (before breakfast)   lactulose (CHRONULAC) 10 GM/15ML solution Unknown at Unknown time Self Yes No   Sig: Take 20 g by mouth 3 times daily   methocarbamol (ROBAXIN) 500 MG tablet 5/2/2017 at am Self Yes Yes   Sig: Take 1,000 mg by mouth 4 times daily   omeprazole (PRILOSEC) 20 MG CR capsule 5/2/2017 at am Self Yes Yes   Sig: Take 20 mg by mouth daily   predniSONE (DELTASONE) 1 MG tablet 5/2/2017 at am  No Yes   Sig: Take 60 tablets (60 mg) by mouth daily   rifaximin (XIFAXAN) 550 MG TABS tablet 5/2/2017 at am Self No Yes   Sig: Take 1 tablet (550 mg) by mouth 2 times daily   spironolactone (ALDACTONE) 50 MG tablet 5/2/2017 at am  No Yes   Sig: Take 1 tablet (50 mg) by mouth daily   sulfamethoxazole-trimethoprim (BACTRIM DS/SEPTRA DS) 800-160 MG per tablet Past Week at Misplaced meds  No Yes   Sig: Take 1 tablet by mouth three times a week   Patient taking differently: Take 1 tablet by mouth three times a week on Mon/Wed/Friday   thiamine 100 MG tablet 5/2/2017 at am Self Yes Yes   Sig: Take 100 mg by mouth daily   traZODone (DESYREL) 100 MG tablet 5/1/2017 at pm Self Yes Yes   Sig: Take 100 mg by mouth At Bedtime      Facility-Administered Medications: None     Allergies   Allergies   Allergen Reactions     No Clinical Screening - See Comments      Bupronide patch allergy       Social History   I have reviewed this patient's social history and updated it with pertinent information if needed. Sabina Figueroa  reports that  she has been smoking Cigarettes.  She has a 27.00 pack-year smoking history. She quit smokeless tobacco use about a year ago. She reports that she drinks alcohol. She reports that she does not use illicit drugs.    Family History   I have reviewed this patient's family history and updated it with pertinent information if needed.   Family History   Problem Relation Age of Onset     Depression Mother      Anxiety Disorder Mother      MENTAL ILLNESS Mother      Substance Abuse Mother      Depression Father      Anxiety Disorder Father      Substance Abuse Father      MENTAL ILLNESS Father      Depression Daughter      Depression Brother      Schizophrenia Brother      Depression Brother      Anxiety Disorder Brother      Substance Abuse Brother        Review of Systems   The 10 point Review of Systems is negative other than noted in the HPI or here but difficult given patient's sedation and inability to cooperate with questioning at times.   Denies Bowel or bladder dysfunction    Physical Exam   Temp:  [97.3  F (36.3  C)-101.5  F (38.6  C)] 97.5  F (36.4  C)  Pulse:  [117-138] 117  Heart Rate:  [] 52  Resp:  [10-36] 13  BP: ()/(46-99) 111/56  FiO2 (%):  [35 %-80 %] 35 %  SpO2:  [48 %-100 %] 96 %  186 lbs 15.2 oz  GEN:  Somnolent, awakens easily to voice most of the time. oriented x 3, appears comfortable, NAD.  HEENT:  Normocephalic/atraumatic, no scleral icterus, no nasal discharge, mouth moist.  CV:  RRR, S1, S2; no murmurs or other irregularities noted.  +3 DP/PT pulses bilatererally; no edema BLE.  RESP:  DIminished to auscultation bilaterally without rales/rhonchi/wheezing/retractions.  Symmetric chest rise on inhalation noted.  Normal respiratory effort.  ABD:  Rounded, soft, non-tender/non-distended.  +BS  EXT:  Edema & pulses as noted above.  CMS intact x 4.      SKIN:  Dry to touch, no exanthems noted in the visualized areas.    NEURO: Symmetric strength +5/5.  Mild myoclonic jerks, worst in upper  extremities.  PAIN BEHAVIOR: Cooperative  Psych:  Flat, sedated.  Nurse noted high anxiety level earlier today.    Data   Results for orders placed or performed during the hospital encounter of 05/02/17 (from the past 24 hour(s))   CBC + differential   Result Value Ref Range    WBC 16.2 (H) 4.0 - 11.0 10e9/L    RBC Count 4.56 3.8 - 5.2 10e12/L    Hemoglobin 14.7 11.7 - 15.7 g/dL    Hematocrit 43.7 35.0 - 47.0 %    MCV 96 78 - 100 fl    MCH 32.2 26.5 - 33.0 pg    MCHC 33.6 31.5 - 36.5 g/dL    RDW 15.1 (H) 10.0 - 15.0 %    Platelet Count 130 (L) 150 - 450 10e9/L    Diff Method Automated Method     % Neutrophils 81.5 %    % Lymphocytes 10.8 %    % Monocytes 5.2 %    % Eosinophils 0.0 %    % Basophils 0.3 %    % Immature Granulocytes 2.2 %    Nucleated RBCs 0 0 /100    Absolute Neutrophil 13.2 (H) 1.6 - 8.3 10e9/L    Absolute Lymphocytes 1.8 0.8 - 5.3 10e9/L    Absolute Monocytes 0.9 0.0 - 1.3 10e9/L    Absolute Eosinophils 0.0 0.0 - 0.7 10e9/L    Absolute Basophils 0.1 0.0 - 0.2 10e9/L    Abs Immature Granulocytes 0.4 0 - 0.4 10e9/L    Absolute Nucleated RBC 0.0    Comprehensive metabolic panel   Result Value Ref Range    Sodium 130 (L) 133 - 144 mmol/L    Potassium 3.6 3.4 - 5.3 mmol/L    Chloride 92 (L) 94 - 109 mmol/L    Carbon Dioxide 25 20 - 32 mmol/L    Anion Gap 13 3 - 14 mmol/L    Glucose 134 (H) 70 - 99 mg/dL    Urea Nitrogen 12 7 - 30 mg/dL    Creatinine 1.03 0.52 - 1.04 mg/dL    GFR Estimate 59 (L) >60 mL/min/1.7m2    GFR Estimate If Black 71 >60 mL/min/1.7m2    Calcium 8.8 8.5 - 10.1 mg/dL    Bilirubin Total 0.9 0.2 - 1.3 mg/dL    Albumin 3.5 3.4 - 5.0 g/dL    Protein Total 7.5 6.8 - 8.8 g/dL    Alkaline Phosphatase 145 40 - 150 U/L    ALT 48 0 - 50 U/L    AST 62 (H) 0 - 45 U/L   Lactic acid whole blood   Result Value Ref Range    Lactic Acid 3.0 (H) 0.7 - 2.1 mmol/L   Blood gas venous and oxyhgb   Result Value Ref Range    Ph Venous 7.43 7.32 - 7.43 pH    PCO2 Venous 42 40 - 50 mm Hg    PO2 Venous 53 (H)  25 - 47 mm Hg    Bicarbonate Venous 28 21 - 28 mmol/L    FIO2 88%     Oxyhemoglobin Venous 84 %    Base Excess Venous 3.1 mmol/L   D dimer quantitative   Result Value Ref Range    D Dimer 1.0 (H) 0.0 - 0.50 ug/ml FEU   Alcohol level blood   Result Value Ref Range    Ethanol g/dL 0.05 (H) <0.01 g/dL   INR   Result Value Ref Range    INR 1.15 (H) 0.86 - 1.14   Nt probnp inpatient   Result Value Ref Range    N-Terminal Pro BNP Inpatient 674 (H) 0 - 450 pg/mL   Troponin I   Result Value Ref Range    Troponin I ES  0.000 - 0.045 ug/L     <0.015  The 99th percentile for upper reference range is 0.045 ug/L.  Troponin values in   the range of 0.045 - 0.120 ug/L may be associated with risks of adverse   clinical events.     ISTAT gases lactate davon POCT   Result Value Ref Range    Ph Venous 7.43 7.32 - 7.43 pH    PCO2 Venous 39 (L) 40 - 50 mm Hg    PO2 Venous 45 25 - 47 mm Hg    Bicarbonate Venous 26 21 - 28 mmol/L    O2 Sat Venous 82 %    Lactic Acid 2.8 (H) 0.7 - 2.1 mmol/L   Chest  XR, 1 view PORTABLE    Narrative    XR CHEST PORT 1 VW  5/2/2017 7:10 PM     HISTORY:  Shortness of breath.    COMPARISON: Film dated 4/10/2017    FINDINGS:  There are extensive bilateral interstitial and alveolar  infiltrates present. These are new since 4/10/2017 patient had a  similar finding on the film of 3/22/2017.      Impression    IMPRESSION: Extensive bilateral infiltrate. Patient had similar  findings on 3/22/2017. This could be due to pulmonary edema.  Pneumonitis could have this appearance.    DAYSI LORENZO MD   EKG 12 lead   Result Value Ref Range    Interpretation ECG Click View Image link to view waveform and result    Blood culture   Result Value Ref Range    Specimen Description Blood Right Arm     Special Requests Aerobic and anaerobic bottles received     Culture Micro No growth after 8 hours     Micro Report Status Pending    Blood culture   Result Value Ref Range    Specimen Description Blood Left Hand     Special Requests  Aerobic and anaerobic bottles received     Culture Micro No growth after 8 hours     Micro Report Status Pending    Lactic acid whole blood   Result Value Ref Range    Lactic Acid 1.8 0.7 - 2.1 mmol/L   Methicillin Resistant Staph Aureus PCR   Result Value Ref Range    Specimen Description Nares     S Aur Meth Resis PCR  NEG     Negative  MRSA Negative: SA Negative  MRSA and Staphylococcus aureus target DNA not   detected, presumed negative for MRSA and SA colonization or the number of   bacteria present may be below the limit of detection for the assay. FDA   approved assay performed using [employer] GeneXpert(R) real-time PCR.     CT Chest Pulmonary Embolism w Contrast    Narrative    CT CHEST PULMONARY EMBOLISM W CONTRAST  5/2/2017 11:10 PM     HISTORY: Hypoxia, elevated D-dimer.    TECHNIQUE: Volumetric acquisition of the chest after the  administration of 70 mL Isovue-370 I.V. contrast. Radiation dose for  this scan was reduced using automated exposure control, adjustment of  the mA and/or kV according to patient size, or iterative  reconstruction technique.     COMPARISON: 1/7/2017.    FINDINGS: No visualized pulmonary embolism. No thoracic aortic  aneurysm or dissection. Again seen are extensive bilateral infiltrates  overall similar in character to the previous exam but slightly  increased with greater involvement of the upper lobes compared to  previous. Infiltrates are predominantly alveolar with mixed  interstitial component. Given the chronicity, findings are more likely  due to an inflammatory than infectious etiology or edema. Alveolar  proteinosis is a possibility. Emphysema. Heart size within normal  limits. Moderate mediastinal adenopathy has slightly increased. There  is also mild hilar adenopathy. No pleural effusions.      Impression    IMPRESSION:  1. No visualized pulmonary embolism.  2. Extensive bilateral infiltrates, primarily alveolar, have slightly  increased with increasing upper lobe  involvement. Pattern favors  inflammatory etiology over infection or edema. UIP and alveolar  proteinosis are possibilities. Moderate mediastinal adenopathy  slightly increased and mild axillary adenopathy.  3. Mild emphysema.    JEANNETTE MEJIAS MD   Glucose by meter   Result Value Ref Range    Glucose 359 (H) 70 - 99 mg/dL   Glucose by meter   Result Value Ref Range    Glucose 304 (H) 70 - 99 mg/dL   Glucose by meter   Result Value Ref Range    Glucose 261 (H) 70 - 99 mg/dL   Creatinine   Result Value Ref Range    Creatinine 0.81 0.52 - 1.04 mg/dL    GFR Estimate 78 >60 mL/min/1.7m2    GFR Estimate If Black >90   GFR Calc   >60 mL/min/1.7m2   Glucose by meter   Result Value Ref Range    Glucose 234 (H) 70 - 99 mg/dL   Glucose by meter   Result Value Ref Range    Glucose 177 (H) 70 - 99 mg/dL   Glucose by meter   Result Value Ref Range    Glucose 149 (H) 70 - 99 mg/dL   Glucose by meter   Result Value Ref Range    Glucose 168 (H) 70 - 99 mg/dL   Basic metabolic panel   Result Value Ref Range    Sodium 137 133 - 144 mmol/L    Potassium 4.0 3.4 - 5.3 mmol/L    Chloride 103 94 - 109 mmol/L    Carbon Dioxide 27 20 - 32 mmol/L    Anion Gap 7 3 - 14 mmol/L    Glucose 158 (H) 70 - 99 mg/dL    Urea Nitrogen 12 7 - 30 mg/dL    Creatinine 0.79 0.52 - 1.04 mg/dL    GFR Estimate 80 >60 mL/min/1.7m2    GFR Estimate If Black >90   GFR Calc   >60 mL/min/1.7m2    Calcium 8.4 (L) 8.5 - 10.1 mg/dL   Glucose by meter   Result Value Ref Range    Glucose 162 (H) 70 - 99 mg/dL   Glucose by meter   Result Value Ref Range    Glucose 163 (H) 70 - 99 mg/dL   Glucose by meter   Result Value Ref Range    Glucose 158 (H) 70 - 99 mg/dL

## 2017-05-03 NOTE — H&P
CHIEF COMPLAINT:  Shortness of breath.      HISTORY OF PRESENT ILLNESS:  Sabina Figueroa is a 41-year-old female who has had multiple problems with pneumonia over the past few months, most recently diagnosed with organizing pneumonia.  She had been feeling okay up until the last 2 days.  Currently history is somewhat difficult to obtain from the patient herself as she is quite sedated at this time.  Much of the information is obtained from the emergency room physician who I talked with, although the patient did answer some questions for me.  Again, she has been feeling short of breath for the past several days.  Has also had a cough that has been productive of yellow-colored sputum.  She does have some abdominal pain as well, does have a history of chronic pain syndrome, is having back pain today also.  Other than shortness of breath, cough, back pain, abdominal pain no other complaints at this time, although again she is somewhat sedated currently after medications given in the emergency room.  The patient is currently also on BiPAP therapy that was initiated in the emergency room.      PAST MEDICAL HISTORY:   1.  Opiate abuse.   2.  Alcohol abuse.   3.  Tobacco use disorder.   4.  Hypertension.   5.  Depression.   6.  Chronic pain syndrome.   7.  Asthma.   8.  Cirrhosis.   9.  Ascites.      ALLERGIES:  Does have an allergy specifically to BUPRONIDE PATCH.      MEDICATIONS:  Prior to hospitalization, the patient has been takin.  Lantus 20 units subcutaneously once a day.   2.  NovoLog insulin on a carb counting basis.   3.  Prednisone 60 mg a day.   4.  Spironolactone 50 mg a day.   5.  Gabapentin 600 mg 3 times a day.   6.  Bactrim Double Strength 3 times a week.   7.  Vitamin C 500 mg a day.   8.  Prozac 40 mg a day.   9.  Lasix 20 mg a day.   10.  Omeprazole 20 mg a day.     11.  Thiamine 100 mg a day.   12.  Trazodone 100 mg a day.   13.  Robaxin 1000 mg 4 times a day.   14.  Fish oil supplement once a  day.   15.  Aspirin 81 mg a day.   16.  Lipitor 20 mg a day.   17.  Rifaximin 550 mg twice a day.   18.  Ferrous sulfate 325 mg a day.   19.  Suboxone 8/2 mg 3 tablets a day.   20.  Multivitamin once a day.   21.  Folic acid 1 mg a day.   22.  Atarax 10 mg every 8 hours as needed.   23.  Lactulose 20 g 3 times a day.      SOCIAL HISTORY:  The patient smokes a pack and a half of cigarettes a day.  Also drinks several alcoholic beverages a day.  It is quite difficult to pin her down on exactly how much she drinks.      FAMILY HISTORY:  No coronary artery disease or cancer in the immediate family that the patient knows of.      REVIEW OF SYSTEMS:  Positive for shortness of breath, cough, abdominal pain, back pain.  Otherwise, a 10-point review of systems is negative for acute problems.      PHYSICAL EXAMINATION:   VITAL SIGNS:  Today show a temperature of 98.8, heart rate 105, blood pressure 117/74, respiratory rate 22, oxygen saturation 99% on BiPAP.   GENERAL:  The patient is well-developed, well-nourished, currently on BiPAP therapy.  Appears to be comfortable on BiPAP therapy, although she is somewhat lethargic and sedated, does awaken fairly easily but goes back to sleep fairly quickly.  When she is awake seems to answer questions fairly appropriately and seems to be oriented to person, place and time.   HEENT:  Head is normocephalic, atraumatic.  Eyes:  Pupils equal, round, react to light.  Extraocular muscles intact.  Sclerae are nonicteric.  Oropharynx as seen through BiPAP mask appears to be moist and no obvious lesions noted.   NECK:  Supple, no masses noted.   HEART:  Regular, slightly tachycardic.  No murmurs.   LUNGS:  The patient has wheezing present bilaterally.  The patient is using normal respiratory effort to breathe, no accessory muscle use.   ABDOMEN:  Has positive bowel sounds, soft, nontender to palpation, nondistended.   SKIN:  Warm and dry to touch.  No rash over examined skin.   EXTREMITIES:   Mild nonpitting edema present in the lower extremities bilaterally.  No cyanosis.   NEUROLOGIC:  Cranial nerves II-XII are grossly intact.  The patient does have BiPAP therapy on at this time complicating the exam slightly.  Patient moves all 4 extremities.   PSYCHIATRIC:  The patient seems to have appropriate affect, although again she is somewhat sedated but does awaken fairly easily and answers questions seemingly appropriately.      LABORATORY DATA:  Metabolic panel today shows a sodium of 130, chloride 92, creatinine 1.03, AST 62.  Lactic acid had initially been 2.8, now down to 1.8.  N-terminal proBNP of 674.  Glucose 134, otherwise unremarkable.  Complete blood count shows a white count of 16.2, platelets 130,000 otherwise unremarkable.  INR is 1.15.  D-dimer 1.  Ethanol level is 0.05.  Chest x-ray shows extensive bilateral infiltrates.      ASSESSMENT AND PLAN:  A 41-year-old female with pneumonia.   1.  Pneumonia:  The patient does have a recent diagnosis of organizing pneumonia.  Will increase her steroids at this time to IV Solu-Medrol 40 mg every 8 hours.  The patient will need to be seen in consultation by the intensivist in the morning.  She was started on IV antibiotics with Zosyn, vancomycin and Levaquin in the emergency room.  For now we will continue the Zosyn and vancomycin again with the intensivist to see the patient in the morning.   2.  Acute hypoxic respiratory failure:  Suspect due to pneumonia which is likely an organizing pneumonia, although the possibility of a bacterial pneumonia.  Will have her on IV steroids as noted above with Solu-Medrol, also on IV Zosyn and vancomycin for now.  Currently on BiPAP therapy and continue BiPAP therapy as needed.  The patient will be n.p.o. initially while on BiPAP therapy.  Hold her usual medications.  Will have her on a NovoLog sliding scale in place of her usual insulin.   3.  Alcohol abuse:  Once patient is recovered enough to be able to participate  likely should see chemical dependency.  Suspected at least high risk for alcohol withdrawals.  Will have the patient on Ativan withdrawal protocol.  Will change patient's usual thiamine and folic acid as well as multivitamin to IV form for now.   4.  Diabetes:  Have the patient on NovoLog sliding scale while n.p.o.  Hold the patient's usual carb counting NovoLog as the patient is going to be n.p.o. initially.  For now, hold the patient's Lantus that she normally takes in the morning.  Likely this can be restarted at least at half dose tomorrow if blood sugars are elevated.   5.  Chronic pain syndrome:  Would avoid opiates as the patient does have a history of opiate abuse, if at all possible.  Have IV Tylenol available as needed.  The patient does have chronic liver disease, although her LFTs are only minimally elevated at this time, and with the patient's current sedation, she should not have opiates.  A short course of acetaminophen if needed at this time.  It is likely safer for the patient than IV opiates currently as IV opiates may actually cause worsened respiratory failure.   6.  Hyponatremia, possibly due to continued diuretic use.  Will hold the patient's spironolactone and Lasix while n.p.o.  The patient is unfortunately going to need to be on gentle IV fluids while n.p.o.   7.  Cirrhosis:  Again, try to avoid medications that may be liver toxic as much as possible, although the patient likely will need occasional IV acetaminophen if needed for pain.  Will also have small doses of IV Toradol available if needed for pain as again this would likely be less problematic for the patient than IV opiates at this time.     8.  Opiate abuse:  Again try to avoid opiates as much as possible.  Currently, the patient is sedated and would definitely avoid IV opiates for now.  The patient may need to be seen in consultation by pain management team during hospital stay.   9.  Elevated D-dimer:  The patient did have a  D-dimer checked in the emergency room.  Unfortunately, with this elevated D-dimer and her respiratory therapy does need to have further evaluation for possible underlying thromboembolic disease.  Will check a CT scan of the chest with contrast.   10.  Deep venous thrombosis prophylaxis:  Have the patient on pneumatic compression devices for now.  Again, check a CT scan of the chest for possible underlying thromboembolic disease.         ANNA GEORGES DO             D: 2017 22:20   T: 2017 23:38   MT: [id number]      Name:     SYDNI GLEZ   MRN:      [id number]        Account:      [id number]   :      1975           Admitted:     [id number]      Document: [id number]

## 2017-05-03 NOTE — PROGRESS NOTES
RT- patient placed on Bipap 14/7 with FIO2 80% secondary to increased work of breathing and hypoxia. Patient given three Duoneb by RN and then three Albuterol (total of 7.5mg Albuterol in line with Bipap).    Breath sounds diminished with exp wheezes.    Oksana Lawton, RT  5/2/2017 7:33 PM

## 2017-05-03 NOTE — PLAN OF CARE
Problem: Goal Outcome Summary  Goal: Goal Outcome Summary  Outcome: No Change  ICU End of Shift Summary.  For vital signs and complete assessments, please see documentation flowsheets.      Pertinent assessments: resp distress. Trial of oxymask for brief moment (maybe 30 minutes). bipap reapplied.  Major Shift Events: ls very diminished. bipap for most of day. Pt on iv abx, iv steroids. Sb/sr, other vss afebrile. Pt requesting pain meds. Pain team consulted. Ciwa 7 with 1 mg of ativan given, recheck 2. Blood sugar with iv insulin given.  Plan (Upcoming Events): none  Discharge/Transfer Needs: cd?     Bedside Shift Report Completed :   Bedside Safety Check Completed:

## 2017-05-03 NOTE — PROGRESS NOTES
RT: Received patient on 14/8, 14, 35% Bipap. Patient weaned to 3L oxymask but slowly had to titrate oxygen up to 6L. Patient was taking shallow breaths and very lethargic. She was placed back on BIPAP after being off < 1 hour. BBS diminished t/o with scattered wheezes. Nebs given as scheduled. Will continue to follow and assess.    Naya Vazquez  May 3, 2017

## 2017-05-04 NOTE — PHARMACY-VANCOMYCIN DOSING SERVICE
Pharmacy Vancomycin Note  Date of Service May 4, 2017  Patient's  1975   41 year old, female    Indication: Healthcare-Associated Pneumonia  Goal Trough Level: 15-20 mg/L  Day of Therapy: 3  Current Vancomycin regimen:  1250 mg IV q8h    Current estimated CrCl = Estimated Creatinine Clearance: 84.6 mL/min (based on Cr of 0.97).    Creatinine for last 3 days  2017:  6:54 PM Creatinine 1.03 mg/dL  5/3/2017:  5:50 AM Creatinine 0.81 mg/dL;  9:45 AM Creatinine 0.79 mg/dL  2017:  4:10 AM Creatinine 0.97 mg/dL    Recent Vancomycin Levels (past 3 days)  2017:  4:10 AM Vancomycin Level 27.8 mg/L    Vancomycin IV Administrations (past 72 hours)                   vancomycin 1250 mg in 0.9% NaCl 250 mL PREMIX (mg) 1,250 mg New Bag 17 0415     1,250 mg New Bag 17 2149     1,250 mg New Bag  1337     1,250 mg New Bag  0455    vancomycin (VANCOCIN) 1500 mg in 0.9% NaCl 250 mL PREMIX (mg) 1,500 mg New Bag 17 2119                Nephrotoxins and other renal medications (Future)    Start     Dose/Rate Route Frequency Ordered Stop    17 1600  vancomycin 1250 mg in 0.9% NaCl 250 mL PREMIX      1,250 mg Intravenous EVERY 12 HOURS 17 1154      17 0200  piperacillin-tazobactam (ZOSYN) intermittent infusion 4.5 g      4.5 g  200 mL/hr over 30 Minutes Intravenous EVERY 6 HOURS 17 22017 2216  ketorolac (TORADOL) injection 15 mg      15 mg Intravenous EVERY 8 HOURS PRN 17 2216 17 2215             Contrast Orders - past 72 hours (72h ago through future)    Start     Dose/Rate Route Frequency Ordered Stop    17 2300  iopamidol (ISOVUE-370) solution 500 mL      500 mL Intravenous ONCE 17 22517 2303          Interpretation of levels and current regimen:  Trough level is  Supratherapeutic    Has serum creatinine changed > 50% in last 72 hours: No    Urine output:  good urine output    Renal Function: Stable    Plan:  1.  Decrease Dose to  1250mg iv q12h        .

## 2017-05-04 NOTE — PROGRESS NOTES
Respiratory Therapy Note        Pt has spent most of the day on BIPAP but did come off to an oxymask for a couple of hours today.  Her breath sounds have been fine crackles and currently diminished.  She continues to receive Duoneb QID.    May 4, 2017 3:26 PM  Lincoln Lawrence

## 2017-05-04 NOTE — PROGRESS NOTES
[facility]    Hospitalist Progress Note    Date of Service (when I saw the patient): 05/04/2017  Provider:  Syed Lopez MD     Initial presenting complaint/issue to hospital (Diagnosis): Shortness of breath.    Assessment & Plan   Summary of Stay: Sabina Figueroa is a 41 year old female admitted on 5/2/2017 with SOB and cough for 2 weeks along with fever. Hx of liver cirrhosis, ETOH dependence, HTN, recently Dx with organizing pneumonia with ? Superimposed ILD on steroids     Problem List:      1. Acute hypoxic respiratory failure.  - Likely infectious element such as PNA with superimposed ILD or other process. No vasculitic process per path 1/2017.  - On IV zosyn,IV vanc and PO bactrim for PCP ppx.  - Also on IV steroids.  - If respiratory status worsens may need intubation, and bronch.     2.  Borderline DM.  Cushingoid aspect.  Suspect prolonged steroid treatment has been playing a role here.  - On insulin gtt.     3. Liver cirrhosis likely from ETOH abuse.  - Hold lasix and aldactone.  - Resume rifaximin and lactulose due to risk for encephalopathy.  - On IV thiamine.  - CIWA protocol for w/d.     4. Chronic pain.  - On suboxone, will get Pain team eval.    5.  Chronic macrocytic anemia and thrombocytopenia secondary to alcohol use.       DVT Prophylaxis: Heparin SQ  Code Status: Full Code    Disposition: Expected discharge in > 3 days once clinically stable.    Interval History   She is on the BiPAP.  Reports to feel much better and her breathing though it is very painful to breathe according to patient report.    -Data reviewed today: I reviewed all new labs and imaging results over the last 24 hours. I personally reviewed the EKG tracing showing Normal sinus rhythm in monitor..    Physical Exam   Temp: 97.9  F (36.6  C) Temp src: Axillary BP: 117/72   Heart Rate: 0 Resp: (!) 47 SpO2: 95 % O2 Device: BiPAP/CPAP Oxygen Delivery: 6 LPM  Vitals:    05/02/17 2115 05/03/17 0002   Weight: 86 kg  (189 lb 9.5 oz) 84.8 kg (186 lb 15.2 oz)     Vital Signs with Ranges  Temp:  [97.2  F (36.2  C)-98.2  F (36.8  C)] 97.9  F (36.6  C)  Heart Rate:  [0-95] 0  Resp:  [10-47] 47  BP: ()/(46-82) 117/72  FiO2 (%):  [35 %] 35 %  SpO2:  [92 %-99 %] 95 %  I/O last 3 completed shifts:  In: 2163.04 [P.O.:300; I.V.:1863.04]  Out: 895 [Urine:895]    GEN:  Cushing aspect .Alert, oriented x 3, appears comfortable, NAD.  HEENT:  Normocephalic/atraumatic, no scleral icterus, no nasal discharge, mouth moist.  CV:  Regular rate and rhythm, no murmur or JVD.  S1 + S2 noted, no S3 or S4.  LUNGS:  Transmitted sounds from NIPPV to auscultation bilaterally without rales/rhonchi/wheezing/retractions.  Symmetric chest rise on inhalation noted.  ABD:  Active bowel sounds, soft, non-tender/non-distended.  No rebound/guarding/rigidity.  EXT:  No edema or cyanosis.  No joint synovitis noted.  SKIN:  Dry to touch, no exanthems noted in the visualized areas.       Medications     IV fluid REPLACEMENT ONLY       insulin (regular) 3 Units/hr (05/04/17 0652)     NaCl 75 mL/hr at 05/04/17 0556       buprenorphine HCl-naloxone HCl  3 Film Sublingual Daily     nicotine   Transdermal Q8H     nicotine   Transdermal Daily     nicotine  1 patch Transdermal Daily     lactulose  20 g Oral TID     rifaximin  550 mg Oral BID     heparin  5,000 Units Subcutaneous Q12H     sulfamethoxazole-trimethoprim  1 tablet Oral Once per day on Mon Wed Fri     gabapentin  300 mg Oral TID     menthol  1-4 each Transdermal Q8H     ketoprofen 10% in PLO   Topical TID     FLUoxetine  40 mg Oral Daily     pantoprazole  40 mg Intravenous QAM AC     methylPREDNISolone  40 mg Intravenous Q8H     piperacillin-tazobactam  4.5 g Intravenous Q6H     ipratropium - albuterol 0.5 mg/2.5 mg/3 mL  3 mL Nebulization 4x Daily     IV infusion builder WITH LARGE additive list   Intravenous Q24H     [START ON 5/7/2017] IV infusion builder WITH LARGE additive list   Intravenous Q24H      vancomycin (VANCOCIN) IV  1,250 mg Intravenous Q8H       Data     Recent Labs  Lab 05/04/17  0410 05/03/17  0945 05/03/17  0550 05/02/17  1854   WBC 7.1  --   --  16.2*   HGB 11.9  --   --  14.7   *  --   --  96   PLT 94*  --   --  130*   INR  --   --   --  1.15*    137  --  130*   POTASSIUM 4.1 4.0  --  3.6   CHLORIDE 109 103  --  92*   CO2 27 27  --  25   BUN 18 12  --  12   CR 0.97 0.79 0.81 1.03   ANIONGAP 5 7  --  13   VISHNU 8.4* 8.4*  --  8.8   * 158*  --  134*   ALBUMIN  --   --   --  3.5   PROTTOTAL  --   --   --  7.5   BILITOTAL  --   --   --  0.9   ALKPHOS  --   --   --  145   ALT  --   --   --  48   AST  --   --   --  62*   TROPI  --   --   --  <0.015The 99th percentile for upper reference range is 0.045 ug/L.  Troponin values in the range of 0.045 - 0.120 ug/L may be associated with risks of adverse clinical events.       No results found for this or any previous visit (from the past 24 hour(s)).          Disclaimer: This note consists of symbols derived from keyboarding, dictation and/or voice recognition software. As a result, there may be errors in the script that have gone undetected. Please consider this when interpreting information found in this chart.

## 2017-05-04 NOTE — PROGRESS NOTES
ICU End of Shift Summary.  For vital signs and complete assessments, please see documentation flowsheets.     Pertinent assessments:  VSS - afebrile- patient alternates between BI-PAP and oxymask  Keeping sats >  93%  Ativan given for signs and symptoms of withdrawal ( see MAR for details)   Major Shift Events:   Plan (Upcoming Events):  Continue to support through withdrawls and pneumonia.  Discharge/Transfer Needs: TBD    Bedside Shift Report Completed :   Bedside Safety Check Completed:

## 2017-05-04 NOTE — PROGRESS NOTES
Patient remains on BiPAP this shift, breath sound diminished bilaterally, received scheduled duoneb inline, RT will continue to follow.      Brenda Evangelista  May 4, 2017.5:55 AM

## 2017-05-04 NOTE — PROGRESS NOTES
[facility]  Pain Management Progress Note  Text Page     Assessment & Plan   Sabina Figueroa is a 41 year old female who was admitted on 5/2/2017.     1) Acute pain flare of chronic low back pain, exacerbated by: acute illness     2)  Patient with chronic low back pain, on suboxone dispensed daily at [facility].  [place] [facility] database review: gabapentin only     Patient's opioid use thus far: none     3) Opioid induced side-effects:  -constipation, on chronic lactulose  Hx of allergy to Butrans patch- Sudden onset HA with hypertension     4) Other/Related:   -Anxiety/PTSD  -Disease of addiction to alcohol , BAL 0.05g/dL on admission  -Asthma, COPD, recurrent admissions with respiratory failure  -Insomnia  -Alcoholic hepatitis     PLAN:   1) Suboxone at chronic dose, Remains sedated, likely related to ativan and withdrawal protocol.  2)Avoid opioids if no acute pain, may need Fentanyl if re-intubated for comfort, in that case, patient will likley need higher than normal dose due to concurrent Suboxone.  3)Multimodal Medication Therapy  Topical:ICY HOT, ketoprofen gel  NSAIDS:Toradol as previously ordered, PRN only  Muscle Relaxants:restart chronic robaxin when sedation improves  Adjuvants:tylenol PRN, no more than 2000mg/day   Antidepresants/anxiolytics:prozac restarted, gabapentin at reduced dose in setting of sedation.  Opioids:Suboxone only, avoid other opioids unless needed for acute pain or comfort intubated.  4)Non-medication interventions  Relaxation, positioning, ETOH withdrawal management  5)Constipation Prophylaxis  Lactulose as chronic  6) DC safety  -Opioids are not recommended for this patient for discharge  -Pt to follow up with PCP for non-opioid pain management, and [facility] for Suboxone therapy.  -Consider further chem dep intervention with recent ETOH use?    Grace Pierce APRN, CNS  Pain Management and Palliative Care  [facility]  Pgr: [phone]    Time  Spent on this Encounter   I spent  10 minutes is assessment of the patient and discussion with the patient and family.  Another 5 minutes in review of chart, documentation and discussion with the health care team.      Interval History   Remains sedated, ativan for symptoms of withdrawal,  Remains in ICU on Bipap/oxymask    Review of Systems   Unable, patient sedated, not responding verbally.    Physical Exam   Temp:  [97.2  F (36.2  C)-98.2  F (36.8  C)] 98  F (36.7  C)  Heart Rate:  [0-95] 78  Resp:  [10-47] 12  BP: ()/(46-82) 111/68  FiO2 (%):  [35 %] 35 %  SpO2:  [90 %-99 %] 90 %  186 lbs 15.2 oz  GEN: Somnolent,appears comfortable, NAD.  HEENT: Normocephalic/atraumatic, no scleral icterus, no nasal discharge, mouth moist.  CV: RRR, S1, S2; no murmurs or other irregularities noted. +3 DP/PT pulses bilatererally; no edema BLE.  RESP: wheezes bilaterally  Symmetric chest rise on inhalation noted. Normal respiratory effort.  ABD: Rounded, soft, non-tender/non-distended. +BS  EXT: Edema & pulses as noted above. CMS intact x 4.   SKIN: Dry to touch, no exanthems noted in the visualized areas.   NEURO: Mild myoclonic jerks, worst in upper extremities.  Psych, sedated. Nurse noted high anxiety level earlier today.          Medications     IV fluid REPLACEMENT ONLY       insulin (regular) 3 Units/hr (05/04/17 0904)     NaCl 75 mL/hr at 05/04/17 0900       buprenorphine HCl-naloxone HCl  3 Film Sublingual Daily     nicotine   Transdermal Q8H     nicotine   Transdermal Daily     nicotine  1 patch Transdermal Daily     lactulose  20 g Oral TID     rifaximin  550 mg Oral BID     heparin  5,000 Units Subcutaneous Q12H     sulfamethoxazole-trimethoprim  1 tablet Oral Once per day on Mon Wed Fri     gabapentin  300 mg Oral TID     menthol  1-4 each Transdermal Q8H     ketoprofen 10% in PLO   Topical TID     FLUoxetine  40 mg Oral Daily     pantoprazole  40 mg Intravenous QAM AC     methylPREDNISolone  40 mg Intravenous  Q8H     piperacillin-tazobactam  4.5 g Intravenous Q6H     ipratropium - albuterol 0.5 mg/2.5 mg/3 mL  3 mL Nebulization 4x Daily     IV infusion builder WITH LARGE additive list   Intravenous Q24H     [START ON 5/7/2017] IV infusion builder WITH LARGE additive list   Intravenous Q24H     vancomycin (VANCOCIN) IV  1,250 mg Intravenous Q8H       Data   Results for orders placed or performed during the hospital encounter of 05/02/17 (from the past 24 hour(s))   Glucose by meter   Result Value Ref Range    Glucose 162 (H) 70 - 99 mg/dL   Glucose by meter   Result Value Ref Range    Glucose 163 (H) 70 - 99 mg/dL   Glucose by meter   Result Value Ref Range    Glucose 158 (H) 70 - 99 mg/dL   Glucose by meter   Result Value Ref Range    Glucose 145 (H) 70 - 99 mg/dL   Glucose by meter   Result Value Ref Range    Glucose 136 (H) 70 - 99 mg/dL   Procalcitonin   Result Value Ref Range    Procalcitonin 0.82 ng/ml   Glucose by meter   Result Value Ref Range    Glucose 137 (H) 70 - 99 mg/dL   Glucose by meter   Result Value Ref Range    Glucose 122 (H) 70 - 99 mg/dL   Glucose by meter   Result Value Ref Range    Glucose 115 (H) 70 - 99 mg/dL   Glucose by meter   Result Value Ref Range    Glucose 137 (H) 70 - 99 mg/dL   Glucose by meter   Result Value Ref Range    Glucose 141 (H) 70 - 99 mg/dL   Glucose by meter   Result Value Ref Range    Glucose 163 (H) 70 - 99 mg/dL   Glucose by meter   Result Value Ref Range    Glucose 154 (H) 70 - 99 mg/dL   Glucose by meter   Result Value Ref Range    Glucose 140 (H) 70 - 99 mg/dL   Glucose by meter   Result Value Ref Range    Glucose 142 (H) 70 - 99 mg/dL   Glucose by meter   Result Value Ref Range    Glucose 139 (H) 70 - 99 mg/dL   Glucose by meter   Result Value Ref Range    Glucose 149 (H) 70 - 99 mg/dL   Glucose by meter   Result Value Ref Range    Glucose 128 (H) 70 - 99 mg/dL   Vancomycin level   Result Value Ref Range    Vancomycin Level 27.8 (HH) mg/L   Hemoglobin A1c   Result  Value Ref Range    Hemoglobin A1C 6.1 (H) 4.3 - 6.0 %   CBC with platelets differential   Result Value Ref Range    WBC 7.1 4.0 - 11.0 10e9/L    RBC Count 3.69 (L) 3.8 - 5.2 10e12/L    Hemoglobin 11.9 11.7 - 15.7 g/dL    Hematocrit 37.5 35.0 - 47.0 %     (H) 78 - 100 fl    MCH 32.2 26.5 - 33.0 pg    MCHC 31.7 31.5 - 36.5 g/dL    RDW 15.5 (H) 10.0 - 15.0 %    Platelet Count 94 (L) 150 - 450 10e9/L    Diff Method Automated Method     % Neutrophils 90.5 %    % Lymphocytes 4.3 %    % Monocytes 3.4 %    % Eosinophils 0.0 %    % Basophils 0.0 %    % Immature Granulocytes 1.8 %    Nucleated RBCs 0 0 /100    Absolute Neutrophil 6.4 1.6 - 8.3 10e9/L    Absolute Lymphocytes 0.3 (L) 0.8 - 5.3 10e9/L    Absolute Monocytes 0.2 0.0 - 1.3 10e9/L    Absolute Eosinophils 0.0 0.0 - 0.7 10e9/L    Absolute Basophils 0.0 0.0 - 0.2 10e9/L    Abs Immature Granulocytes 0.1 0 - 0.4 10e9/L    Absolute Nucleated RBC 0.0    Basic metabolic panel   Result Value Ref Range    Sodium 141 133 - 144 mmol/L    Potassium 4.1 3.4 - 5.3 mmol/L    Chloride 109 94 - 109 mmol/L    Carbon Dioxide 27 20 - 32 mmol/L    Anion Gap 5 3 - 14 mmol/L    Glucose 124 (H) 70 - 99 mg/dL    Urea Nitrogen 18 7 - 30 mg/dL    Creatinine 0.97 0.52 - 1.04 mg/dL    GFR Estimate 63 >60 mL/min/1.7m2    GFR Estimate If Black 76 >60 mL/min/1.7m2    Calcium 8.4 (L) 8.5 - 10.1 mg/dL   Glucose by meter   Result Value Ref Range    Glucose 111 (H) 70 - 99 mg/dL   Glucose by meter   Result Value Ref Range    Glucose 129 (H) 70 - 99 mg/dL   Glucose by meter   Result Value Ref Range    Glucose 135 (H) 70 - 99 mg/dL   Glucose by meter   Result Value Ref Range    Glucose 143 (H) 70 - 99 mg/dL   Glucose by meter   Result Value Ref Range    Glucose 142 (H) 70 - 99 mg/dL

## 2017-05-04 NOTE — PLAN OF CARE
Problem: Goal Outcome Summary  Goal: Goal Outcome Summary  Outcome: Improving  ICU End of Shift Summary.  For vital signs and complete assessments, please see documentation flowsheets.      Pertinent assessments: pt vss through night. Low urine output telehub notified and aware no new orders. Epic down time for ~1hr. Critical vancomycin level returned and reported to pharmacist aaron, informed they would adjust on their side in epic. Pt experienced increased amounts on anxiety and withdrawal type symptoms requiring prn medications. Pt remained on bipap through the night.  Major Shift Events: low urine output continued.  Plan (Upcoming Events): increase U/O , decrease Bipap use  Discharge/Transfer Needs:      Bedside Shift Report Completed :   Bedside Safety Check Completed:

## 2017-05-04 NOTE — PROGRESS NOTES
Request for transition from insulin drip to subq dosing.  Was not done earlier in day.  Reviewed pharmD recommendations.  Patient with steroid induced hyperglycemia.  Patient does have cirrhosis so will start with a little less lantus  -30U lantus subq daily starting this evening  -high dose SSI  -monitor blood sugars q 4hrs as is npo

## 2017-05-05 NOTE — PLAN OF CARE
Problem: Goal Outcome Summary  Goal: Goal Outcome Summary  Outcome: No Change  .ICU End of Shift Summary.  For vital signs and complete assessments, please see documentation flowsheets.      Pertinent assessments: VSS.  Remains on Bipap overnight.  LS diminished; had some expiratory wheezes.  Ativan x 1 for CIWA 7.  Toradol x 1 for pain.  Good urine output.    Major Shift Events:   Plan (Upcoming Events): wean off bipap; continue antibiotics  Discharge/Transfer Needs: TBD     Bedside Shift Report Completed :   Bedside Safety Check Completed:

## 2017-05-05 NOTE — PROGRESS NOTES
ICU End of Shift Summary.  For vital signs and complete assessments, please see documentation flowsheets.     Pertinent assessments: very sleepy and lethargic at times today, changed to highflow from bipap today. VSS. desats with activity. Ativan PRN.  Major Shift Events: See above  Plan (Upcoming Events): wean O2 as kai  Discharge/Transfer Needs: TBD    Bedside Shift Report Completed : yes  Bedside Safety Check Completed:yes

## 2017-05-05 NOTE — PROGRESS NOTES
[facility]  Pain Management Progress Note  Text Page     Assessment & Plan   Sabina Figueroa is a 41 year old female who was admitted on 5/2/2017.     1) Acute pain flare of chronic low back pain, exacerbated by: acute illness     2)  Patient with chronic low back pain, on suboxone dispensed daily at [facility].  [place] [facility] database review: gabapentin only     Patient's opioid use thus far: none     3) Opioid induced side-effects:  -constipation, on chronic lactulose  Hx of allergy to Butrans patch- Sudden onset HA with hypertension     4) Other/Related:   -Anxiety/PTSD  -Disease of addiction to alcohol , BAL 0.05g/dL on admission  -Asthma, COPD, recurrent admissions with respiratory failure  -Insomnia  -Alcoholic hepatitis     PLAN:   1) Suboxone at chronic dose, Remains sedated, likely related to ativan and withdrawal protocol.  2)Avoid opioids if no acute pain, may need Fentanyl if re-intubated for comfort, in that case, patient will likley need higher than normal dose due to concurrent Suboxone.  3)Multimodal Medication Therapy  Topical:ICY HOT, ketoprofen gel  NSAIDS:DC toradol that was ordered on admission given rise in creatinine  Muscle Relaxants:restart chronic robaxin when sedation improves  Adjuvants:tylenol PRN, no more than 2000mg/day   Antidepresants/anxiolytics:prozac restarted, gabapentin at reduced dose in setting of sedation.  Opioids:Suboxone only, avoid other opioids unless needed for acute pain or comfort intubated.  4)Non-medication interventions  Relaxation, positioning, ETOH withdrawal management, add Aqua K pad prn  5)Constipation Prophylaxis  Lactulose as chronic  6) DC safety  -Opioids are not recommended for this patient for discharge  -Pt to follow up with PCP for non-opioid pain management, and [facility] for Suboxone therapy.  -Consider further chem dep intervention with recent ETOH use?    Grace Pierce APRN, CNS  Pain Management and Palliative Care  [place]  "[facility]  Pgr: [phone]    Time Spent on this Encounter   I spent  10 minutes is assessment of the patient and discussion with the patient and family.  Another 5 minutes in review of chart, documentation and discussion with the health care team.      Interval History   Remains sedated, ativan for symptoms of withdrawal,remains tremulous  Remains in ICU on Bipap/high flow  Creatinine elevated today 1.47mg/dL, has had 5 doses Toradol since admission.      Review of Systems   Unable, patient sedated, only states, \"my back hurts\".    Physical Exam   Temp:  [97.6  F (36.4  C)-98.6  F (37  C)] 98.5  F (36.9  C)  Heart Rate:  [] 69  Resp:  [11-43] 17  BP: (106-138)/(70-99) 126/87  FiO2 (%):  [35 %-45 %] 45 %  SpO2:  [73 %-99 %] 94 %  189 lbs 6 oz  GEN: Somnolent,appears comfortable, NAD.  HEENT: Normocephalic/atraumatic, no scleral icterus, no nasal discharge, mouth moist.  CV: RRR, S1, S2; no murmurs or other irregularities noted. +3 DP/PT pulses bilatererally; no edema BLE.  RESP: wheezes bilaterally  Symmetric chest rise on inhalation noted. Normal respiratory effort.  ABD: Rounded, soft, non-tender/non-distended. +BS  EXT: Edema & pulses as noted above. CMS intact x 4.   SKIN: Dry to touch, no exanthems noted in the visualized areas.   NEURO: tremulous, worst in upper extremities.  Psych, sedated.           Medications     IV fluid REPLACEMENT ONLY         insulin aspart  1-10 Units Subcutaneous Q4H     insulin glargine  30 Units Subcutaneous QPM     buprenorphine HCl-naloxone HCl  3 Film Sublingual Daily     nicotine   Transdermal Q8H     nicotine   Transdermal Daily     nicotine  1 patch Transdermal Daily     lactulose  20 g Oral TID     rifaximin  550 mg Oral BID     heparin  5,000 Units Subcutaneous Q12H     sulfamethoxazole-trimethoprim  1 tablet Oral Once per day on Mon Wed Fri     gabapentin  300 mg Oral TID     menthol  1-4 each Transdermal Q8H     ketoprofen 10% in PLO   Topical TID     " FLUoxetine  40 mg Oral Daily     pantoprazole  40 mg Intravenous QAM AC     methylPREDNISolone  40 mg Intravenous Q8H     piperacillin-tazobactam  4.5 g Intravenous Q6H     ipratropium - albuterol 0.5 mg/2.5 mg/3 mL  3 mL Nebulization 4x Daily     IV infusion builder WITH LARGE additive list   Intravenous Q24H     [START ON 5/7/2017] IV infusion builder WITH LARGE additive list   Intravenous Q24H       Data   Results for orders placed or performed during the hospital encounter of 05/02/17 (from the past 24 hour(s))   Glucose by meter   Result Value Ref Range    Glucose 138 (H) 70 - 99 mg/dL   Glucose by meter   Result Value Ref Range    Glucose 101 (H) 70 - 99 mg/dL   Glucose by meter   Result Value Ref Range    Glucose 96 70 - 99 mg/dL   Glucose by meter   Result Value Ref Range    Glucose 190 (H) 70 - 99 mg/dL   Glucose by meter   Result Value Ref Range    Glucose 196 (H) 70 - 99 mg/dL   Glucose by meter   Result Value Ref Range    Glucose 202 (H) 70 - 99 mg/dL   Glucose by meter   Result Value Ref Range    Glucose 210 (H) 70 - 99 mg/dL   Glucose by meter   Result Value Ref Range    Glucose 153 (H) 70 - 99 mg/dL   CBC with platelets differential   Result Value Ref Range    WBC 7.3 4.0 - 11.0 10e9/L    RBC Count 3.80 3.8 - 5.2 10e12/L    Hemoglobin 12.4 11.7 - 15.7 g/dL    Hematocrit 40.1 35.0 - 47.0 %     (H) 78 - 100 fl    MCH 32.6 26.5 - 33.0 pg    MCHC 30.9 (L) 31.5 - 36.5 g/dL    RDW 15.7 (H) 10.0 - 15.0 %    Platelet Count 94 (L) 150 - 450 10e9/L    Diff Method Automated Method     % Neutrophils 88.8 %    % Lymphocytes 5.2 %    % Monocytes 3.8 %    % Eosinophils 0.0 %    % Basophils 0.1 %    % Immature Granulocytes 2.1 %    Nucleated RBCs 0 0 /100    Absolute Neutrophil 6.5 1.6 - 8.3 10e9/L    Absolute Lymphocytes 0.4 (L) 0.8 - 5.3 10e9/L    Absolute Monocytes 0.3 0.0 - 1.3 10e9/L    Absolute Eosinophils 0.0 0.0 - 0.7 10e9/L    Absolute Basophils 0.0 0.0 - 0.2 10e9/L    Abs Immature Granulocytes 0.2 0  - 0.4 10e9/L    Absolute Nucleated RBC 0.0    Comprehensive metabolic panel   Result Value Ref Range    Sodium 140 133 - 144 mmol/L    Potassium 4.7 3.4 - 5.3 mmol/L    Chloride 111 (H) 94 - 109 mmol/L    Carbon Dioxide 25 20 - 32 mmol/L    Anion Gap 4 3 - 14 mmol/L    Glucose 154 (H) 70 - 99 mg/dL    Urea Nitrogen 23 7 - 30 mg/dL    Creatinine 1.47 (H) 0.52 - 1.04 mg/dL    GFR Estimate 39 (L) >60 mL/min/1.7m2    GFR Estimate If Black 47 (L) >60 mL/min/1.7m2    Calcium 8.5 8.5 - 10.1 mg/dL    Bilirubin Total 0.5 0.2 - 1.3 mg/dL    Albumin 2.5 (L) 3.4 - 5.0 g/dL    Protein Total 6.2 (L) 6.8 - 8.8 g/dL    Alkaline Phosphatase 99 40 - 150 U/L    ALT 45 0 - 50 U/L    AST 55 (H) 0 - 45 U/L   Glucose by meter   Result Value Ref Range    Glucose 198 (H) 70 - 99 mg/dL

## 2017-05-05 NOTE — PROGRESS NOTES
Patient remains on BIPAP 14/8, 14, 35% thru out the shift tolerated well, SpO2 low to mid 90's, BS diminished. Will continue to monitor pt's respiratory status closely.    Emani Waller, RT  5/5/2017 5:49 AM

## 2017-05-05 NOTE — PROGRESS NOTES
[facility]    Hospitalist Progress Note    Date of Service (when I saw the patient): 05/05/2017  Provider:  Syed Lopez MD     Initial presenting complaint/issue to hospital (Diagnosis): Shortness of breath.    Assessment & Plan   Summary of Stay: Sabina Figueroa is a 41 year old female admitted on 5/2/2017 with SOB and cough for 2 weeks along with fever. Hx of liver cirrhosis, ETOH dependence, HTN, recently Dx with organizing pneumonia with ? Superimposed ILD on steroids.     Problem List:      1. Acute hypoxemic respiratory failure.  - Presumed an infectious event such as PNA superimpose ILD or other process. No vasculitic process per path 1/2017. Cultures negative, low PCT. Afebrile since 5/2. WBC normal in spite of high-dose of steroids.  CT of the chest 5/2/2017: Extensive bilateral infiltrates, primarily alveolar, have slightly  increased with increasing upper lobe involvement. Pattern favors  inflammatory etiology over infection or edema. UIP and alveolar  proteinosis are possibilities. Moderate mediastinal adenopathy  slightly increased and mild axillary adenopathy    - On IV zosyn,IV vanc and PO bactrim for PCP ppx.  - Also on IV steroids.  - If respiratory status worsens may need intubation, and bronch.  - Check CRP.      2.  Borderline DM.  Cushingoid aspect.  Suspect prolonged steroid treatment has been playing a role here.  - On Lantus, sliding scale high resistance.      3. Liver cirrhosis likely from ETOH abuse.  - Hold lasix and aldactone.  - Resume rifaximin and lactulose due to risk for encephalopathy.  - On IV thiamine.  - CIWA protocol for w/d.     4. Chronic pain.  - On suboxone, will get Pain team eval.     5.  Chronic macrocytic anemia and thrombocytopenia secondary to alcohol use.     6. KAITLIN.  Suspect multifactorial from medication toxicity, fluid imbalance etc.  Normal saline bolus and recheck renal function later today.  No NSAIDs..        DVT Prophylaxis: Heparin  SQ  Code Status: Full Code    Disposition: Expected discharge in > 3 days once clinically stable.    Interval History   She is on the BiPAP. Same as yesterday reports poor control of her pain.  Her  Breathing appears to be breathing better and not her main concern. .    -Data reviewed today: I reviewed all new labs and imaging results over the last 24 hours. I personally reviewed the EKG tracing showing Normal sinus rhythm in monitor..    Physical Exam   Temp: 98.5  F (36.9  C) Temp src: Oral BP: 126/87   Heart Rate: 69 Resp: 17 SpO2: 92 % O2 Device: High Flow Nasal Cannula (HFNC) Oxygen Delivery: Other (Comments) (30 LPM)  Vitals:    05/02/17 2115 05/03/17 0002 05/05/17 0030   Weight: 86 kg (189 lb 9.5 oz) 84.8 kg (186 lb 15.2 oz) 85.9 kg (189 lb 6 oz)     Vital Signs with Ranges  Temp:  [97.6  F (36.4  C)-98.6  F (37  C)] 98.5  F (36.9  C)  Heart Rate:  [] 69  Resp:  [11-43] 17  BP: (106-138)/(70-99) 126/87  FiO2 (%):  [35 %-50 %] 50 %  SpO2:  [73 %-98 %] 92 %  I/O last 3 completed shifts:  In: 2441.95 [P.O.:600; I.V.:1841.95]  Out: 2115 [Urine:2115]    GEN:  Cushing aspect .Alert, oriented x 3, appears comfortable, NAD.  HEENT:  Normocephalic/atraumatic, no scleral icterus, no nasal discharge, mouth moist.  CV:  Regular rate and rhythm, no murmur or JVD.  S1 + S2 noted, no S3 or S4.  LUNGS:  Transmitted sounds from NIPPV to auscultation bilaterally without rales/rhonchi/wheezing/retractions.  Symmetric chest rise on inhalation noted.  ABD:  Active bowel sounds, soft, non-tender/non-distended.  No rebound/guarding/rigidity.  EXT:  No edema or cyanosis.  No joint synovitis noted.  SKIN:  Dry to touch, no exanthems noted in the visualized areas.       Medications     IV fluid REPLACEMENT ONLY         insulin aspart  1-10 Units Subcutaneous Q4H     insulin glargine  30 Units Subcutaneous QPM     buprenorphine HCl-naloxone HCl  3 Film Sublingual Daily     nicotine   Transdermal Q8H     nicotine   Transdermal  Daily     nicotine  1 patch Transdermal Daily     lactulose  20 g Oral TID     rifaximin  550 mg Oral BID     heparin  5,000 Units Subcutaneous Q12H     sulfamethoxazole-trimethoprim  1 tablet Oral Once per day on Mon Wed Fri     gabapentin  300 mg Oral TID     menthol  1-4 each Transdermal Q8H     ketoprofen 10% in PLO   Topical TID     FLUoxetine  40 mg Oral Daily     pantoprazole  40 mg Intravenous QAM AC     methylPREDNISolone  40 mg Intravenous Q8H     piperacillin-tazobactam  4.5 g Intravenous Q6H     ipratropium - albuterol 0.5 mg/2.5 mg/3 mL  3 mL Nebulization 4x Daily     IV infusion builder WITH LARGE additive list   Intravenous Q24H     [START ON 5/7/2017] IV infusion builder WITH LARGE additive list   Intravenous Q24H       Data     Recent Labs  Lab 05/05/17  0518 05/04/17  0410 05/03/17  0945  05/02/17  1854   WBC 7.3 7.1  --   --  16.2*   HGB 12.4 11.9  --   --  14.7   * 102*  --   --  96   PLT 94* 94*  --   --  130*   INR  --   --   --   --  1.15*    141 137  --  130*   POTASSIUM 4.7 4.1 4.0  --  3.6   CHLORIDE 111* 109 103  --  92*   CO2 25 27 27  --  25   BUN 23 18 12  --  12   CR 1.47* 0.97 0.79  < > 1.03   ANIONGAP 4 5 7  --  13   VISHNU 8.5 8.4* 8.4*  --  8.8   * 124* 158*  --  134*   ALBUMIN 2.5*  --   --   --  3.5   PROTTOTAL 6.2*  --   --   --  7.5   BILITOTAL 0.5  --   --   --  0.9   ALKPHOS 99  --   --   --  145   ALT 45  --   --   --  48   AST 55*  --   --   --  62*   TROPI  --   --   --   --  <0.015The 99th percentile for upper reference range is 0.045 ug/L.  Troponin values in the range of 0.045 - 0.120 ug/L may be associated with risks of adverse clinical events.   < > = values in this interval not displayed.    No results found for this or any previous visit (from the past 24 hour(s)).          Disclaimer: This note consists of symbols derived from keyboarding, dictation and/or voice recognition software. As a result, there may be errors in the script that have gone  undetected. Please consider this when interpreting information found in this chart.

## 2017-05-05 NOTE — PROGRESS NOTES
Respiratory Therapy    Patient seen resting in bed on HFNC 25-30 LPM and FiO2 45-50%, SpO2 90-94%. Patient's FiO2 increased when up to the bathroom. Respiratory rate 16-20 and breath sounds diminished with occasional wheezes. Patient will continue to receive Duoneb QID. RT to follow.    Sofi Robles  May 5, 2017, 5:17 PM

## 2017-05-05 NOTE — PROGRESS NOTES
Tele-ICU cross-cover  DOS: 5/5/2017     Asked to modify insulin sliding scale to q4h prn frequency. Orders adjusted.    Denton Maxwell MD, PhD  Surgical critical care  May 5, 2017, 12:03 AM

## 2017-05-05 NOTE — PROGRESS NOTES
Resp Care:  Patient currently on oxymask 12L, bipap on standby. SpO2 low 90s, BS exp wheeze bilat.

## 2017-05-05 NOTE — PROGRESS NOTES
Patient placed on HFNC 30 LPM and 45% FiO2 per MD. Patient's SpO2 94%, respiratory rate 16, and breath sounds diminished with fine wheezes. RT to follow.

## 2017-05-06 NOTE — PLAN OF CARE
Problem: Goal Outcome Summary  Goal: Goal Outcome Summary  Outcome: Improving  ICU End of Shift Summary.  For vital signs and complete assessments, please see documentation flowsheets.      Pertinent assessments: HFNC 40% fi02 and 30L 02. Oriented, lethargic. Scored 10,10 and 8 on CIWA, PRN ativan given per protocol. C/O constant lower back pain. Refused PRN APAP. Aqua K pad ordered and applied. Scheduled suboxone. Pain team follows. Tele SR/ST with occasional PACs. Up with Ax1 to commode. Bed alarm on. L/S diminished with expiratory wheezing and scattered crackles. KOROMA. NS TKO. Facial flushing. Watters in place with good UO. , 195 and 154. Zosyn, vanco, nebs and solumedrol. x1 BM this shift. Scheduled lactulose.   Major Shift Events: Fi02 weaned from 50% to 40% and 02 weaned from 35L to 30L.   Plan (Upcoming Events): Continue to wean 02 and increase activity as tolerated. SW to consult for d/c planning.  Discharge/Transfer Needs: TBD, continue POC     Bedside Shift Report Completed :   Bedside Safety Check Completed:

## 2017-05-06 NOTE — PROGRESS NOTES
[facility]    Hospitalist Progress Note    Date of Service (when I saw the patient): 05/06/2017  Provider:  Syed Lopez MD     Initial presenting complaint/issue to hospital (Diagnosis): Shortness of breath.    Assessment & Plan   Summary of Stay: Sabina Figueroa is a 41 year old female admitted on 5/2/2017 with SOB and cough for 2 weeks along with fever. Hx of liver cirrhosis, ETOH dependence, HTN, recently Dx with organizing pneumonia with ? Superimposed ILD on steroids.     Problem List:      1. Acute hypoxemic respiratory failure.  - Presumed an infectious event such as PNA superimpose ILD or other process. No vasculitic process per path 1/2017. Cultures negative, low PCT. Afebrile since 5/2. WBC normal in spite of high-dose of steroids.  CT of the chest 5/2/2017: Extensive bilateral infiltrates, primarily alveolar, have slightly  increased with increasing upper lobe involvement. Pattern favors  inflammatory etiology over infection or edema. UIP and alveolar  proteinosis are possibilities. Moderate mediastinal adenopathy  slightly increased and mild axillary adenopathy    - On IV zosyn and PO bactrim for PCP ppx, discontinue vanc today.  - Also on IV steroids.  - If respiratory status worsens may need intubation, and bronch.  - CRP  69 on 5/5.      2.  Borderline DM.  Cushingoid aspect.  Suspect prolonged steroid treatment has been playing a role here.  - On Lantus, sliding scale high resistance.      3. Liver cirrhosis likely from ETOH abuse.  - Hold lasix and aldactone.  - Resume rifaximin and lactulose due to risk for encephalopathy.  - On IV thiamine.  - CIWA protocol for w/d though chances are lower now. Will dc tomorrow.  .     4. Chronic pain.  - On suboxone, will get Pain team eval.     5.  Chronic macrocytic anemia and thrombocytopenia secondary to alcohol use.     6. KAITLIN.  Suspect multifactorial from medication toxicity, fluid imbalance etc. Renal function slightly better today.  No  NSAIDs.   I will discontinue vancomycin, continue Zosyn and Bactrim.  Follow-up renal function.        DVT Prophylaxis: Heparin SQ  Code Status: Full Code    Disposition: Expected discharge in > 3 days once clinically stable.    Interval History   She is on high oxygen flow at 15 L FiO2 of 45%. Her breathing appears to be better.  She is not complaining of pain today.  She is asking when she can go home.    -Data reviewed today: I reviewed all new labs and imaging results over the last 24 hours. I personally reviewed the EKG tracing showing Normal sinus rhythm in monitor..    Physical Exam   Temp: 98.4  F (36.9  C) Temp src: Oral BP: 141/89   Heart Rate: 112 Resp: 11 SpO2: (!) 80 % O2 Device: High Flow Nasal Cannula (HFNC) Oxygen Delivery: Other (Comments) (30 LPM)  Vitals:    05/02/17 2115 05/03/17 0002 05/05/17 0030   Weight: 86 kg (189 lb 9.5 oz) 84.8 kg (186 lb 15.2 oz) 85.9 kg (189 lb 6 oz)     Vital Signs with Ranges  Temp:  [97.7  F (36.5  C)-98.4  F (36.9  C)] 98.4  F (36.9  C)  Heart Rate:  [] 112  Resp:  [11-24] 11  BP: (104-148)/(63-95) 141/89  FiO2 (%):  [40 %-50 %] 45 %  SpO2:  [80 %-98 %] 80 %  I/O last 3 completed shifts:  In: 1438.33 [P.O.:490; I.V.:948.33]  Out: 3555 [Urine:3055; Stool:500]    GEN:  Cushing aspect .Alert, oriented x 3, appears comfortable, NAD.  HEENT:  Normocephalic/atraumatic, no scleral icterus, no nasal discharge, mouth moist.  CV:  Regular rate and rhythm, no murmur or JVD.  S1 + S2 noted, no S3 or S4.  LUNGS:  Breath sounds out of the pool to auscultation bilaterally without rales/rhonchi/wheezing/retractions.  Symmetric chest rise on inhalation noted.  ABD:  Active bowel sounds, soft, non-tender/non-distended.  No rebound/guarding/rigidity.  EXT:  No edema or cyanosis.  No joint synovitis noted.  SKIN:  Dry to touch, no exanthems noted in the visualized areas.       Medications     IV fluid REPLACEMENT ONLY         vancomycin (VANCOCIN) IV  1,500 mg Intravenous Q24H      insulin aspart  1-10 Units Subcutaneous Q4H     insulin glargine  30 Units Subcutaneous QPM     buprenorphine HCl-naloxone HCl  3 Film Sublingual Daily     nicotine   Transdermal Q8H     nicotine   Transdermal Daily     nicotine  1 patch Transdermal Daily     lactulose  20 g Oral TID     rifaximin  550 mg Oral BID     heparin  5,000 Units Subcutaneous Q12H     sulfamethoxazole-trimethoprim  1 tablet Oral Once per day on Mon Wed Fri     gabapentin  300 mg Oral TID     menthol  1-4 each Transdermal Q8H     ketoprofen 10% in PLO   Topical TID     FLUoxetine  40 mg Oral Daily     pantoprazole  40 mg Intravenous QAM AC     methylPREDNISolone  40 mg Intravenous Q8H     piperacillin-tazobactam  4.5 g Intravenous Q6H     ipratropium - albuterol 0.5 mg/2.5 mg/3 mL  3 mL Nebulization 4x Daily     IV infusion builder WITH LARGE additive list   Intravenous Q24H     [START ON 5/7/2017] IV infusion builder WITH LARGE additive list   Intravenous Q24H       Data     Recent Labs  Lab 05/06/17  0550 05/05/17  1802 05/05/17  0518 05/04/17  0410  05/02/17  1854   WBC 6.6  --  7.3 7.1  --  16.2*   HGB 11.8  --  12.4 11.9  --  14.7   *  --  106* 102*  --  96   PLT 92*  --  94* 94*  --  130*   INR  --   --   --   --   --  1.15*    140 140 141  < > 130*   POTASSIUM 4.7 4.4 4.7 4.1  < > 3.6   CHLORIDE 108 111* 111* 109  < > 92*   CO2 26 25 25 27  < > 25   BUN 25 25 23 18  < > 12   CR 1.22* 1.31* 1.47* 0.97  < > 1.03   ANIONGAP 5 4 4 5  < > 13   VISHNU 8.6 8.5 8.5 8.4*  < > 8.8   * 160* 154* 124*  < > 134*   ALBUMIN 2.4* 2.5* 2.5*  --   --  3.5   PROTTOTAL 5.8*  --  6.2*  --   --  7.5   BILITOTAL 0.4  --  0.5  --   --  0.9   ALKPHOS 97  --  99  --   --  145   ALT 47  --  45  --   --  48   AST 56*  --  55*  --   --  62*   TROPI  --   --   --   --   --  <0.015The 99th percentile for upper reference range is 0.045 ug/L.  Troponin values in the range of 0.045 - 0.120 ug/L may be associated with risks of adverse clinical events.    < > = values in this interval not displayed.    No results found for this or any previous visit (from the past 24 hour(s)).          Disclaimer: This note consists of symbols derived from keyboarding, dictation and/or voice recognition software. As a result, there may be errors in the script that have gone undetected. Please consider this when interpreting information found in this chart.

## 2017-05-06 NOTE — PHARMACY-VANCOMYCIN DOSING SERVICE
Pharmacy Vancomycin Note  Date of Service May 6, 2017  Patient's  1975   41 year old, female    Indication: Healthcare-Associated Pneumonia  Goal Trough Level: 15-20 mg/L  Day of Therapy: 4  Current Vancomycin regimen:  1250 mg IV q12h which was on hold  Current estimated CrCl = Estimated Creatinine Clearance: 67.6 mL/min (based on Cr of 1.22).    Creatinine for last 3 days  5/3/2017:  9:45 AM Creatinine 0.79 mg/dL  2017:  4:10 AM Creatinine 0.97 mg/dL  2017:  5:18 AM Creatinine 1.47 mg/dL;  6:02 PM Creatinine 1.31 mg/dL  2017:  5:50 AM Creatinine 1.22 mg/dL      Recent Vancomycin Levels (past 3 days)  2017:  4:10 AM Vancomycin Level 27.8 mg/L  2017:  2:59 PM Vancomycin Level 27.5 mg/L  2017:  3:10 AM Vancomycin Level 13.9 mg/L    Vancomycin IV Administrations (past 72 hours)                   vancomycin 1250 mg in 0.9% NaCl 250 mL PREMIX (mg) 1,250 mg New Bag 17 0401     1,250 mg New Bag 17 1735    vancomycin 1250 mg in 0.9% NaCl 250 mL PREMIX (mg) 1,250 mg New Bag 17 0415     1,250 mg New Bag 17 2149     1,250 mg New Bag  1337                Nephrotoxins and other renal medications (Future)    Start     Dose/Rate Route Frequency Ordered Stop    17 0600  vancomycin (VANCOCIN) 1500 mg in 0.9% NaCl 250 mL PREMIX      1,500 mg Intravenous EVERY 24 HOURS 17 0444      17 0200  piperacillin-tazobactam (ZOSYN) intermittent infusion 4.5 g      4.5 g  200 mL/hr over 30 Minutes Intravenous EVERY 6 HOURS 17 2202               Contrast Orders - past 72 hours     None          Interpretation of levels and current regimen:  Trough level is  Subtherapeutic, previously had been supratherapeutic and dose held. This level was drawn 23 hours after the last dose    Has serum creatinine changed > 50% in last 72 hours: No    Urine output:  unable to determine    Renal Function: Improving    Plan:  1.  dose will be changed to 1500mg q 24 hours  2.  Pharmacy  will check trough levels as appropriate in 1-3 Days.    3. Serum creatinine levels will be ordered daily for the first week of therapy and at least twice weekly for subsequent weeks.      Moni Finn        .

## 2017-05-06 NOTE — PROGRESS NOTES
ICU End of Shift Summary.  For vital signs and complete assessments, please see documentation flowsheets.     Pertinent assessments: Weaned Highflow, kai po- falls asleep and forgets to eat. UOP large.   Major Shift Events: see above.  Plan (Upcoming Events): dc callaway in am and increase activity while cont to wean o2.   Discharge/Transfer Needs: TBD    Bedside Shift Report Completed : yes  Bedside Safety Check Completed:yes

## 2017-05-06 NOTE — PROGRESS NOTES
Patient remained on HFNC throughout night, slowly weaning. Currently on 30 LPM 40%. BS diminished with occasional expiratory wheezes. Patient receiving scheduled nebulizers. RT will continue to follow.    Clover Haddad, RT 5/6/2017, 6:02 AM

## 2017-05-06 NOTE — PROGRESS NOTES
Respiratory Therapy    Patient seen resting in bed on high flow nasal cannula 25-30 LPM and FiO2 35-45%, SpO2 98%. Respiratory rate 18-22 and breath sounds clear/diminished. Patient will continue to receive Duoneb QID. RT to follow.    Sofi Robles  May 6, 2017, 5:09 PM

## 2017-05-06 NOTE — CONSULTS
Care Transition Initial Assessment - RN        Met with: Patient.    DATA   Active Problems:    Pneumonia       Cognitive Status: awake and alert.        Contact information and PCP information verified: Yes    Lives With: significant other who is also drinking as much as her, he does have a job and works daily.      He is unable to help her with anything because he is 64 and unable to lift or carry heavy things, so she relies on her daughter and her Sumeet her daughters boyfriend and her brother Sam.      They recently on the  helped her move her items from her old SW basement to a storage unit.  This is the day that she got ill and had to go in to the ER, which is why she missed her appointment to go to [facility].  Patient is very tearful and upset that this opportunity was missed as she was all packed and ready to go.     Insurance concerns: YES she has [employer] MA but needs a Rule 25 to go inpatient for ETOH abuse and addiction. She claims that she had someone come into the hospital last admission and fill out the assessment for rule 25 and gave her 45 days to make an inpatient decision, which she did but then got ill and has been unable to go now that she is in the ICU.    ASSESSMENT  Patient currently receives the following services:  Has had [facility] [phone]  Ext 109.  Identified issues/concerns regarding health management: ETOH and Treatment plan that she missed on May 3rd at 6PM, due to hospitalization on 2017 into the ICU. She will need Rule 25 again as it has .      PLAN  Financial costs for the patient include YES .  Patient given options and choices for discharge to inpatient right from her discharge from the hospita .  Patient/family is agreeable to the plan?  Yes: but she needs to stop at there house to pickup her bags as she is all packed she needs to grab the little things from her home to go to [facility] if she is allowed to go on her discharge day.    Patient anticipates discharging to hopefully inpatient treatment, she wants to go to [facility], as this is the only one she is will go to because she needs to be able to take her medications for her back pain and this is according to her the only place that will let her take that medication with her.  She doesn't want to go back to [facility] due to the male and female-co-ed .        Patient anticipates needs for home equipment: No    Plan/Disposition: Home and then to inpatient treatment.   Appointments:   Your next 10 appointments already scheduled      Jul 24, 2017 11:00 AM CDT   FULL PULMONARY FUNCTION with  PFL A   [facility] Pulmonary Function Testing ([facility])    [address]  3rd Floor  [place] [address] [phone]              Jul 24, 2017 12:00 PM CDT   (Arrive by 11:45 AM)   Return Visit with Jennifer Mane MD   [facility] ([facility])         NEEDS:    Rule 25-SW working on details of if she is eligible for this assessment again.    CD consult-not sure if it is needed last assessment 3/27/2017  RECOMMENDATIONS:  1. Abstain from using all mood altering chemicals and substances. Take all medications as prescribed and directed by licensed physicians.  2. Follow all recommendations and referrals made by hospital providers during admission and at discharge.  3. Attend a residential treatment program such as a [facility], [facility].  4. Start attending sober support group meetings until admitted to program.  5. Continue care with other providers such as , therapist, and follow recommendations and referrals made by them.      RATIONALE: Pt reported her use has negatively impacted multiple jones of her life and yet she continues to use. Pt reported her use has negatively impacted her physical/mental health, and relationships. Pt reported she is  not sure if she will follow through with recommendations at this time due to needed to get housing in place. These are the reasons for the above recommendations. Pt appears to be high risk for continued use and does not have structured sober routine or living environment    SW-(consult in) help for [facility] below is the number  [phone]    Care  (CTS) will continue to follow as needed.    Minerva Alcazar RN BSN CTS  [facility]   Care Management Coordinator  [email or web address]   [phone]

## 2017-05-07 NOTE — CONSULTS
5/7/17 cd consult.  Patient was seen on 3/24/17 by Rebekah Osorio, at this time patient's evaluation is within 45 days; however, she had a bed a [facility] residential treatment but missed her admission appointment due to coming into the hospital.  I called unit and spoke wit RN who stated patient was alert and conversive.  I then spoke to patient by phone and updated her Rule 25.  She hopes to be able to get into [facility] following the hospital stay.  Her evaluation has most current information, I will reach out to hospital  tomorrow to discuss getting patient placed back on wait list for [facility].

## 2017-05-07 NOTE — PROGRESS NOTES
[facility]    Hospitalist Progress Note    Date of Service (when I saw the patient): 05/07/2017  Provider:  Syed Lopez MD     Initial presenting complaint/issue to hospital (Diagnosis): Shortness of breath.    Assessment & Plan   Summary of Stay: Sabina Figueroa is a 41 year old female admitted on 5/2/2017 with SOB and cough for 2 weeks along with fever. Hx of liver cirrhosis, ETOH dependence, HTN, recently Dx with organizing pneumonia with ? Superimposed ILD on steroids.     Problem List:      1. Acute hypoxemic respiratory failure.  - Presumed an infectious event such as PNA superimpose ILD or other process. No vasculitic process per path 1/2017. Cultures negative, low PCT. Afebrile since 5/2. WBC normal in spite of high-dose of steroids.  CT of the chest 5/2/2017: Extensive bilateral infiltrates, primarily alveolar, have slightly  increased with increasing upper lobe involvement. Pattern favors  inflammatory etiology over infection or edema. UIP and alveolar  proteinosis are possibilities. Moderate mediastinal adenopathy  slightly increased and mild axillary adenopathy    - On IV zosyn and PO bactrim for PCP ppx, discontinue vanc 5/6.  Recheck pro-calcitonin, if normal, discontinue Zosyn today or tomorrow.  - Discontinued IV steroids today.  Start oral prednisone.  - CRP  69 on 5/5.      2.  Borderline DM.  Cushingoid aspect.  Suspect prolonged steroid treatment has been playing a role here.  - On Lantus, sliding scale high resistance.  She is eating more, probably the change in prednisone dose will not impact the glycemia to the point that we need to modify the dose of Lantus.  Close follow-up.\      3. Liver cirrhosis likely from ETOH abuse.  - Hold lasix and aldactone.  - On rifaximin and lactulose due to risk for encephalopathy.  - Oral  MVA per protocol. .  - Not further need CIWA protocol.  Discontinued.     4. Chronic pain.  - On suboxone, Pain team on board.     5.  Chronic macrocytic  anemia and thrombocytopenia secondary to alcohol use.     6. KAITLIN.  Suspect multifactorial from medication toxicity, fluid imbalance etc. Renal function continues to improve.  No NSAIDs.   I discontinued vancomycin yst, continue Zosyn and Bactrim.  Follow-up renal function.         DVT Prophylaxis: Heparin SQ  Code Status: Full Code    Disposition: Expected discharge in > 3 days once clinically stable.    Interval History   She is having a good appetite.  Still on high oxygen flow at 15 L FiO2 of 45%. Her breathing appears to be better.      -Data reviewed today: I reviewed all new labs and imaging results over the last 24 hours. I personally reviewed the EKG tracing showing Normal sinus rhythm in monitor..    Physical Exam   Temp: 97.8  F (36.6  C) Temp src: Oral BP: (!) 140/92   Heart Rate: 53 Resp: 12 SpO2: 92 % O2 Device: Oxymask Oxygen Delivery: 3 LPM  Vitals:    05/03/17 0002 05/05/17 0030 05/07/17 0400   Weight: 84.8 kg (186 lb 15.2 oz) 85.9 kg (189 lb 6 oz) 84.2 kg (185 lb 10 oz)     Vital Signs with Ranges  Temp:  [97.8  F (36.6  C)-98.8  F (37.1  C)] 97.8  F (36.6  C)  Heart Rate:  [] 53  Resp:  [12-14] 12  BP: (136-147)/(78-92) 140/92  FiO2 (%):  [21 %-45 %] 30 %  SpO2:  [80 %-100 %] 92 %  I/O last 3 completed shifts:  In: 1320 [P.O.:240; I.V.:1080]  Out: 2950 [Urine:2950]    GEN:  Cushing aspect .Alert, oriented x 3, appears comfortable, NAD.  HEENT:  Normocephalic/atraumatic, no scleral icterus, no nasal discharge, mouth moist.  CV:  Regular rate and rhythm, no murmur or JVD.  S1 + S2 noted, no S3 or S4.  LUNGS:  Breath sounds out of the pool to auscultation bilaterally without rales/rhonchi/wheezing/retractions.  Symmetric chest rise on inhalation noted.  ABD:  Active bowel sounds, soft, non-tender/non-distended.  No rebound/guarding/rigidity.  EXT:  No edema or cyanosis.  No joint synovitis noted.  SKIN:  Dry to touch, no exanthems noted in the visualized areas.       Medications     NaCl Stopped  (05/07/17 0000)     IV fluid REPLACEMENT ONLY         thiamine  100 mg Oral Daily     folic acid  1 mg Oral Daily     multivitamin, therapeutic with minerals  1 tablet Oral Daily     insulin aspart  1-10 Units Subcutaneous Q4H     insulin glargine  30 Units Subcutaneous QPM     buprenorphine HCl-naloxone HCl  3 Film Sublingual Daily     nicotine   Transdermal Q8H     nicotine   Transdermal Daily     nicotine  1 patch Transdermal Daily     lactulose  20 g Oral TID     rifaximin  550 mg Oral BID     heparin  5,000 Units Subcutaneous Q12H     sulfamethoxazole-trimethoprim  1 tablet Oral Once per day on Mon Wed Fri     gabapentin  300 mg Oral TID     menthol  1-4 each Transdermal Q8H     ketoprofen 10% in PLO   Topical TID     FLUoxetine  40 mg Oral Daily     pantoprazole  40 mg Intravenous QAM AC     methylPREDNISolone  40 mg Intravenous Q8H     piperacillin-tazobactam  4.5 g Intravenous Q6H     ipratropium - albuterol 0.5 mg/2.5 mg/3 mL  3 mL Nebulization 4x Daily       Data     Recent Labs  Lab 05/07/17  0600 05/06/17  0550 05/05/17  1802 05/05/17  0518 05/04/17  0410  05/02/17  1854   WBC  --  6.6  --  7.3 7.1  --  16.2*   HGB  --  11.8  --  12.4 11.9  --  14.7   MCV  --  104*  --  106* 102*  --  96   * 92*  --  94* 94*  --  130*   INR  --   --   --   --   --   --  1.15*    139 140 140 141  < > 130*   POTASSIUM 4.2 4.7 4.4 4.7 4.1  < > 3.6   CHLORIDE 106 108 111* 111* 109  < > 92*   CO2 28 26 25 25 27  < > 25   BUN 28 25 25 23 18  < > 12   CR 1.11* 1.22* 1.31* 1.47* 0.97  < > 1.03   ANIONGAP 5 5 4 4 5  < > 13   VISHNU 9.0 8.6 8.5 8.5 8.4*  < > 8.8   * 153* 160* 154* 124*  < > 134*   ALBUMIN  --  2.4* 2.5* 2.5*  --   --  3.5   PROTTOTAL  --  5.8*  --  6.2*  --   --  7.5   BILITOTAL  --  0.4  --  0.5  --   --  0.9   ALKPHOS  --  97  --  99  --   --  145   ALT  --  47  --  45  --   --  48   AST  --  56*  --  55*  --   --  62*   TROPI  --   --   --   --   --   --  <0.015The 99th percentile for upper  reference range is 0.045 ug/L.  Troponin values in the range of 0.045 - 0.120 ug/L may be associated with risks of adverse clinical events.   < > = values in this interval not displayed.    No results found for this or any previous visit (from the past 24 hour(s)).          Disclaimer: This note consists of symbols derived from keyboarding, dictation and/or voice recognition software. As a result, there may be errors in the script that have gone undetected. Please consider this when interpreting information found in this chart.

## 2017-05-07 NOTE — PROGRESS NOTES
ICU End of Shift Summary.  For vital signs and complete assessments, please see documentation flowsheets.     Pertinent assessments: O2 at 3L NC (chronic on 2L NC). VSS. kai PO. Void adequate. 2BMs today. Cont to be tearful at times.   Major Shift Events: change steroids to PO. Dc'd zosyn. Dc'd callaway. HUMA dc'd.   Plan (Upcoming Events): increase activity and offer emotional support while setting appropriate limits. Pt to transfer to medical unit when bed available.   Discharge/Transfer Needs: TBD    Bedside Shift Report Completed : yes  Bedside Safety Check Completed:yes

## 2017-05-07 NOTE — PROGRESS NOTES
Respiratory Therapy    Patient seen resting in bed on nasal cannula 3 LPM, SpO2 94-99%. Respiratory rate 18-23 and breath sounds clear/diminished. Patient will continue to receive Duoneb QID. RT to follow.    Sofi Rolbes  May 7, 2017, 4:46 PM

## 2017-05-07 NOTE — PROGRESS NOTES
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 you count on the following people when you need someone?   Partner / Spouse N/A   Parent(s)/Aunt(s)/Uncle(s)/Grandparents N/A   Sibling(s)/Cousin(s) Always supportive   Child(yessica) Always supportive   Other relative(s) N/A   Friend(s)/neighbor(s) Never supportive   Child(yessica) s father(s)/mother(s) Usually supportive   Support group member(s) N/A   Community of rinku members N/A   /counselor/therapist/healer Usually supportive   Other (specify) N/A      8A. What is your current living situation?      Lives with boyfriend in apartment     8B. What is your long term plan for where you will be living?      I have to find a new place to live, that is my goal, I need to find a place to live.     8C. Tell me about your living environment/neighborhood? Ask enough follow up questions to determine safety, criminal activity, availability of alcohol and drugs, supportive or antagonistic to the person making changes.      n/a     9. Criminal justice history: Gather current/recent history and any significant history related to substance use--Arrests? Convictions? Circumstances? Alcohol or drug involvement? Sentences? Still on probation or parole? Expectations of the court? Current court order? Any sex offenses - lifetime? What level?  (DSM)     Denies.     10. What obstacles exist to participating in treatment? (Time off work, childcare, funding, transportation, pending [facility] time, living situation)      None     Dimension VI Ratings   Recovery environment - The placing authority must use the criteria in Dimension VI to determine a client s recovery environment.   RISK DESCRIPTIONS - Severity ratin Client has (A) Chronically antagonistic significant other, living environment, family, peer group or long-term criminal justice involvement that is harmful to recovery or treatment progress, or (B) Client has an actively antagonistic significant other, family, work, or living environment with immediate threat to the client s safety and well-being.     REASONS SEVERITY WAS ASSIGNED - (What support does the person have for making changes? What structure/stability does the person have in his or her daily life that will increase the likelihood that changes can be sustained? What problems exist in the person s environment that will jeopardize getting/staying clean and sober?)      Patient reports she has been staying with friend but that is a temporary living situation. She is unemployed.  She has minimal support.  She lacks any meaningful daily activity.  She denies any legal issues.         Client Choice/Exceptions   Would you like services specific to language, age, gender, culture, Congregational preference, race, ethnicity, sexual orientation or disability? No     What particular treatment choices and options would you like to have? Inpatient treatment     Do you have a preference for a particular treatment program? [facility]     Criteria for Diagnosis      Criteria for Diagnosis  DSM-5 Criteria for Substance Use Disorder  Instructions: Determine whether the client currently meets the criteria for Substance Use Disorder using the diagnostic criteria in the DSM-V [id number].[phone]. Current means during the most recent 12 months outside a facility that controls  access to substances     Category of Substance Severity (ICD-10 Code / DSM 5 Code)   Alcohol Use Disorder Severe (10.20) (303.90)   Cannabis Use Disorder NA   Hallucinogen Use Disorder NA   Inhalant Use Disorder NA   Opioid Use Disorder NA   Sedative, Hypnotic, or Anxiolytic Use Disorder NA   Stimulant Related Disorder NA   Tobacco Use Disorder Moderate (F17.200) (305.1)   Other (or unknown) Substance Use Disorder NA         Collateral Contact Summary   Number of contacts made: 1     Contact with referring person:  Yes.     If court related records were reviewed, summarize here: NA     Information from collateral contacts supported/largely agreed with information from the client and associated risk ratings.        Rule 25 Assessment Summary and Plan   's Recommendation     Patient is recommended to attend a residential treatment, she is requesting [facility].        Collateral Contacts      Name:     [facility] Relationship:     Medical Records  Phone Number:     SHIRLEY Releases:     n/a      EHR was reviewed, discussed care plan with staff.  Reviewed and updated previous rule 25 evaluation.        Collateral Contacts      Name:     NA Relationship:     NA Phone Number:     NA Releases:         NA     ollateral Contacts       A problematic pattern of alcohol/drug use leading to clinically significant impairment or distress, as manifested by at least two of the following, occurring within a 12-month period:     Alcohol/drug is often taken in larger amounts or over a longer period than was intended.  There is a persistent desire or unsuccessful efforts to cut down or control alcohol/drug use  A great deal of time is spent in activities necessary to obtain alcohol, use alcohol, or recover from its effects.  Continued alcohol use despite having persistent or recurrent social or interpersonal problems caused or exacerbated by the effects of alcohol/drug.  Alcohol/drug use is continued despite knowledge of  having a persistent or recurrent physical or psychological problem that is likely to have been caused or exacerbated by alcohol.  Tolerance, as defined by either of the following: A need for markedly increased amounts of alcohol/drug to achieve intoxication or desired effect. and A markedly diminished effect with continued use of the same amount of alcohol/drug.  Withdrawal, as manifested by either of the following: The characteristic withdrawal syndrome for alcohol/drug (refer to Criteria A and B of the criteria set for alcohol/drug withdrawal). and Alcohol/drug (or a closely related substance, such as a benzodiazepine) is taken to relieve or avoid withdrawal symptoms.        Specify if: In early remission:  After full criteria for alcohol/drug use disorder were previously met, none of the criteria for alcohol/drug use disorder have been met for at least 3 months but for less than 12 months (with the exception that Criterion A4,  Craving or a strong desire or urge to use alcohol/drug  may be met).      In sustained remission:   After full criteria for alcohol use disorder were previously met, non of the criteria for alcohol/drug use disorder have been met at any time during a period of 12 months or longer (with the exception that Criterion A4,  Craving or strong desire or urge to use alcohol/drug  may be met).   Specify if:   This additional specifier is used if the individual is in an environment where access to alcohol is restricted.     Mild: Presence of 2-3 symptoms     Moderate: Presence of 4-5 symptoms     Severe: Presence of 6 or more symptoms
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
## 2017-05-07 NOTE — PLAN OF CARE
Problem: Goal Outcome Summary  Goal: Goal Outcome Summary  Outcome: Improving  ICU End of Shift Summary.  For vital signs and complete assessments, please see documentation flowsheets.      Pertinent assessments: A&O, intermittent anxiety and tearfulness. Scored 11, 11 and 8 on CIWA, PRN ativan given per protocol. Forgetful. Bed alarm on. Up with Ax1 to bedside commode. Tele SB/SR with occasional PACs. C/O lower back pain, PRN APAP given x1, repositioned, and heat applied. Scheduled suboxone. x2 loose BM this shift, scheduled lactulose. L/S clear/diminished with fine crackles in the bases. KOROMA. Need sputum sample when able. NS at 20 mL/hr. New PIV placed. Watters in place, to be pulled today.  Major Shift Events: Switched from HFNC at 30% fi02 and 25L to oxymask at 3L.   Plan (Upcoming Events): CD to consult. Continue to wean 02 and increase activity as tolerated.   Discharge/Transfer Needs: Possible transfer to medical floor today. Continue POC.      Bedside Shift Report Completed : yes  Bedside Safety Check Completed: yes

## 2017-05-07 NOTE — PROGRESS NOTES
"Patient on HFNC 30% 25 LPM until 0205.  Patient complaining that HFNC not staying in her nose and making it difficult for her to sleep. Patient placed on Oxymask 5 LPM and stating \"much better.\" Patient resting comfortably on oxymask. RR 18-20 SPO2 97%. RT will continue to follow.    Clover Haddad, RT 5/7/2017, 2:08 AM      "

## 2017-05-08 NOTE — PLAN OF CARE
Problem: Goal Outcome Summary  Goal: Goal Outcome Summary  Outcome: Improving  Ambulatory Status:  Pt up SBA.  Pain:  Back pain  Resp: LS diminished    GI:  denies nausea.  Mod cho diet.  BS active.  Passing flatus.  Last BM 5/8.  Tx: bactrim        Consults:  Pain team and social work  Disposition:  TBD

## 2017-05-08 NOTE — PLAN OF CARE
"Problem: Goal Outcome Summary  Goal: Goal Outcome Summary  Outcome: No Change  VSS.  Pt anxious at times.  C.o of being \"confused\" but answers questions appropriately.   Takes subaxone for pain. Lungs diminished/clear, HR reg. Dry cough, pt states productive at times.  Off oxygen and sats mid 90's.  Bowel sounds active.  Plan for discharge tomorrow.  SW following.       "

## 2017-05-08 NOTE — PROGRESS NOTES
"[facility] RCAT     Date: 5/7/17    Admission Dx: 5/2/17    Pulmonary History: Asthma, organizing PNA    Home Nebulizer/MDI Use: None    Home Oxygen: None    Acuity Level (RCAT flow sheet): 3    Aerosol Therapy initiated: Duoneb QID, Albuterol prn     Pulmonary Hygiene initiated: cough and deep breathing     Volume Expansion initiated: incentive spirometry     Current Oxygen Requirements: 2 LPM nasal cannula     Current SpO2: 97%    Re-evaluation date: 5/10/17    Patient Education: Patient has been educated on nebulizers, coughing and deep breathing and IS.     See \"RT Assessments\" flow sheet for patient assessment scoring and Acuity Level Details.                  "

## 2017-05-08 NOTE — CONSULTS
CHEMICAL DEPENDENCY EVALUATION      EVALUATION COUNSELOR:  MICHAEL Archibald.   PATIENT'S ADDRESS:  [address] [place] [address], [address] , [place] [address].   TELEPHONE NUMBER:  [phone].   STATISTICS:  YOB: 1975.  Age:  41.  Sex:  Female.  Marital Status:  Single.   INSURANCE:  [facility].   REFERRAL SOURCE:  [facility], ICU.      REFERRING PROVIDER:  Dr. Syed Lopez.      REASON FOR EVALUATION:  Sabina Figueroa is a 41-year-old female admitted on 05/02/2017 with SOB and a cough for 2 weeks along with a fever.  She has a history of liver cirrhosis, alcohol dependence, HTN and recently diagnosed with organizing pneumonia.  She had positive BAL on admission and began scoring on CIWA scale.  History was obtained from reviewing medical records and staff ordered CD consult.      HEALTH HISTORY AND MEDICATIONS:  See medical record for complete medical history and medications administered.      HISTORY OF PREVIOUS TREATMENT AND COUNSELING:  Per EMR this is the patient's 10th ED and/or hospital visit to [facility] since 04/2015.  She was most recently admitted on 03/17/2017.  She was then seen by Marilynn Lincoln for evaluation and she was recommended for residential treatment.  Reports she has been to 3 treatment programs and most recently was in the fall of 2016 at [facility], which she did complete and prior to that she had been at [facility] along with [facility] in 2014.  She denies any current support group meeting attendance.  Reports she had been meeting with a therapist through [facility], but unspecified exactly when her last meeting was.  She has been to detox, she reports approximately 5 times and most recent was in 03/2017.      HISTORY OF ALCOHOL AND DRUG USE:  The patient reports alcohol use, age of first use 15.  She reports for the past 2 months she has been drinking daily, 1.75 liter of vodka per 2 days in a mixed-type drink.  Reports prior to her  hospitalization in March, she was drinking daily at that time, approximately the same amount.  Reports her longest period of sobriety has been 63 days following any treatment programs.  Last use 05/02/2017.      SUMMARY OF CHEMICAL DEPENDENCY SYMPTOMS ACKNOWLEDGED BY PATIENT:  The patient identifies with 7 out of the 11 DSM-V criteria for impression of a substance use disorder.      SUMMARY OF COLLATERAL DATA:  [facility] electronic health record was reviewed; discussed care plan with staff and also reviewed and updated previous Rule 25.      MENTAL STATUS ASSESSMENT:  Physical appearance and attire not observed, hygiene, not observed eye contact, not observed.  Speech regular, volume regular, quality fluid.  Cognitive perceptual reality based.  Cognition:  Memory intact, judgment intact, insight intact.  Orientation to person and situation.  Thought logical.  Hallucinations:  None reported.  General behavioral tone:  Cooperative.  Psychomotor:  Activity was not observed.  Gait unobserved.  Mood appropriate.  Affect is not observed. COUNSELOR NOTES:  The patient's Rule 25 was within 45-day time limit; however, previous evaluation counselor is out so needed to update for current information as the patient wants to get into [facility]; she had missed her bed over there.  I spoke with the patient over the phone versus seeing her face to face; spent 20-30 minutes updating her Rule 25 with her by phone.  Please refer to the nursing assessment and notes for further evaluation on her presentation.      VULNERABLE ADULT ASSESSMENT:  This patient is currently admitted to the hospital; therefore, is a categorized vulnerable adult according to [place] statute.      IMPRESSION:  F10.20, alcohol use disorder, severe.  F17.200, tobacco use disorder, moderate.      [facility] PLACEMENT CRITERIA:   DIMENSION 1:  Intoxication/withdrawal:  Risk level 1.  The patient was admitted to the hospital due to alcohol use and withdrawal.  She had a  BAL of 0.05 on admission.  She did require medication per [facility] protocol and please refer to nursing and MD notes for current symptoms and concerns.   DIMENSION 2:  Biomedical conditions:  Risk level 2.  See physician H&P for full medical history and current medications administered.   DIMENSION 3:  Emotional/behavioral:  Risk level 2.  The patient reports depression and anxiety diagnosis.  She reports she has a therapist with whom she has been meeting with and is prescribed medications by her primary MD.  She denied any suicidal ideation and Psychiatry had not been consulted during this hospital stay.   DIMENSION 4:  Readiness to change:  Risk level 2.  The patient is seeking treatment and reports she desires a sober lifestyle; however, she has not followed through with past recommendations and recovery plans.   DIMENSION 5:  Relapse and continued use potential:  Risk level 4.  The patient has had previous treatment with some sobriety.  She does not attend support group meetings.  She reports multiple stressors and lacks coping skills of daily sober living skills.   DIMENSION 6:  Recovery environment:  Risk level 4.  The patient reports she has been staying with a friend, but that is a temporary living situation.  She is unemployed.  She has minimal support.  She lacks meaningful daily activity and she denies any legal issues.      INITIAL PROBLEM LIST:  The patient lacks relapse prevention and has unstable housing.      RECOMMENDATIONS:  The patient is recommended to attend a residential treatment and she is requesting [facility].      RATIONALE:  The patient reports that she had a scheduled admission at [facility] on 05/03/2017; however, due to being in the hospital, she missed that date.  I updated Rule 25 in order to submit back at [facility] to get her placed back the list as she is interested in seeking treatment there.  Left voicemail for Bernice Bocanegra, unit  to aid in discharge planning and  assistance.         JIA AREVALO [facility]             D: 2017 10:24   T: 2017 11:00   MT: TIARA#[id number]      Name:     SYDNI GLEZ   MRN:      [id number]        Account:       [id number]   :      1975           Consult Date:  2017      Document: [id number]       cc: Syed Lopez MD

## 2017-05-08 NOTE — CONSULTS
Care Transition Initial Assessment -   Reason For Consult: discharge planning, substance use concerns  Met with: Patient    Active Problems:    Pneumonia         DATA  Lives With: significant other  Living Arrangements: apartment  Description of Support System: Involved  Who is your support system?: Significant Other  Support Assessment: Adequate family and caregiver support, Adequate social supports.   Identified issues/concerns regarding health management: PT is hoping to be able to transfer to [facility] for CD treatment once stable.          Other Resources: [facility]  Quality Of Family Relationships: supportive  Transportation Available: Medicaid transportation      ASSESSMENT  Cognitive Status:  awake, alert and oriented  Concerns to be addressed: Pt admitted from home due respiratory failure. Pt was ICU and on high flow o2. CD did call pt and conduct a phone interview with pt and updated her RUle 25 assessment' Pt is still interested and wants to go into treatment  Called Intake at [facility] women's program [phone] and request phone call back  Pt signed a LUZ for sws to send updated assessment and clinicals to them.  .       PLAN  Waiting for return call from [facility]  Will fax clinicals to them @ [phone]  PT is aware that she may need to dc to home and be back on a waiting list.

## 2017-05-08 NOTE — PROGRESS NOTES
[facility]  Hospitalist Progress Note  Benedict Da Silva MD 05/08/2017    Reason for Stay (Diagnosis): acute on chronic resp failure         Assessment and Plan:      Summary of Stay: Sabina Figueroa is a 41 year old female admitted on 5/2/2017 with SOB and cough for 2 weeks along with fever.  She has been treated with broad spectrum ab and steroids this admission with clinical improvement.  Notable Hx of liver cirrhosis, ETOH dependence, HTN, smoking, and recently Dx with organizing pneumonia with ? Superimposed ILD maintained on chronic on steroids.      Problem List:       1. Acute hypoxemic respiratory failure.  - suspect likely exacerbation of underlying BOOP/.  Was empirically treated with IV ab while hospitalized; all now stopped as no e/o infection and low procalcitonin level.  Blood cx neg; no sputum cx. No vasculitic process per path obtained during bronch 1/2017. Cultures negative, low PCT. Afebrile since 5/2. WBC normal in spite of high-dose of steroids. CT of the chest 5/2/2017: Extensive bilateral infiltrates     - On IV zosyn and vanco this admit; both now stopped  - continue PO bactrim for PCP ppx in setting of steroid use  - Discontinued IV steroids today.  Start oral prednisone.  - CRP 69 on 5/5.   - plan to wean off O2 today and mobilize  - follows with Dr. Mane of pulmonary      2.  Borderline DM. Cushingoid aspect. Suspect prolonged steroid treatment has been playing a role here.  - On Lantus, sliding scale high resistance.        3. Liver cirrhosis from ETOH abuse.  - resume lasix and aldactone.  - On rifaximin and lactulose due to risk for encephalopathy.  - Oral MVA per protocol. .  - chem dep consult - rule 25 done; pt planning CD treatment     4. Chronic pain.  - On suboxone, Pain team on board.      5. Chronic macrocytic anemia and thrombocytopenia secondary to alcohol use.      6. KAITLIN. Suspect multifactorial from medication toxicity, fluid imbalance etc.  Resolved         DVT Prophylaxis: Heparin SQ  Code Status: Full Code     Disposition: Expected discharge tomorrow        Interval History (Subjective):      Feels better since admit.  tired                  Physical Exam:      Last Vital Signs:  /78 (BP Location: Left arm)  Pulse 64  Temp 95.2  F (35.1  C) (Axillary)  Resp 16  Wt 84.2 kg (185 lb 10 oz)  SpO2 96%  BMI 29.07 kg/m2      Intake/Output Summary (Last 24 hours) at 05/08/17 1231  Last data filed at 05/08/17 0800   Gross per 24 hour   Intake              120 ml   Output              350 ml   Net             -230 ml       Constitutional: Awake, alert, cooperative, no apparent distress.  Nurse present during exam   Respiratory: Clear to auscultation bilaterally, no crackles or wheezing   Cardiovascular: Regular rate and rhythm, normal S1 and S2, and no murmur noted   Abdomen: Normal bowel sounds, soft, non-distended, non-tender   Skin: No rashes, no cyanosis, dry to touch   Neuro: Alert and oriented x3, no weakness, numbness, memory loss   Extremities: No edema, normal range of motion   Other(s):        All other systems: Negative          Medications:      All current medications were reviewed with changes reflected in problem list.         Data:      All new lab and imaging data was reviewed.   Labs:    Recent Labs  Lab 05/08/17  0605 05/07/17  0600 05/06/17  0550 05/05/17  0518   WBC 6.7  --  6.6 7.3   HGB 12.7  --  11.8 12.4   HCT 38.9  --  38.1 40.1   MCV 98  --  104* 106*   * 106* 92* 94*      Imaging:   No results found for this or any previous visit (from the past 24 hour(s)).

## 2017-05-09 NOTE — PROGRESS NOTES
Doing well.  Off oxygen.  ambualting in halls.  Feels like she can go home today.  Has been off antibiotics x several days      Last 24 hours Vitals signs,imaging,microbiology;laboratory results were reviewed by me in EPIC  GENERAL:  Comfortable.  PSYCH: pleasant, oriented, No acute distress.  HEART:  Normal S1, S2 with no edema.  LUNGS:  Clear to auscultation, normal Respiratory effort.  ABDOMEN:  Soft, no hepatosplenomegaly, normal bowel sounds.  SKIN:  Dry to touch, No rash.     A/p:  resp failure  - recommend she continue prednisone 60 mg daily until seen by her pulm MD.  Advised she make appt and get seen within next 2 weeks.  Pt had been on 40 mg daily prednisone PTA after recently being on 60 mg for past 45 days.      Home today    >30 min spent on discharge today

## 2017-05-09 NOTE — PLAN OF CARE
Problem: Goal Outcome Summary  Goal: Goal Outcome Summary  Outcome: No Change  Pt slept well during the night. VSS Ao x4. Sha any pain. Blood sugar 191. Transfers ind. Voiding adequately. Has nicotine patch in place. Anticipating dc today. Will continue to monitor.

## 2017-05-09 NOTE — DISCHARGE INSTRUCTIONS
[facility] will pick [facility] up on Thursday May 11 at 2pm.  Please Call Crystal at [phone] with any changes.

## 2017-05-09 NOTE — PLAN OF CARE
Problem: Goal Outcome Summary  Goal: Goal Outcome Summary  Outcome: Improving  Vital signs stable. O2 sats stable on room air.  BG checks 373, 146 and 162. MD notified of increased BG and glucose checks changed from q4hrs to QID. Carb coverage ordered as well. Lantus given.   Chronic pain controlled with scheduled meds.   LS clear, dry cough with occasional sputum production. Denies SOB. On nebs and prednisone.  Up independently, steady gait. Ambulated in halls.  Anticipating D/C home tomorrow.

## 2017-05-09 NOTE — PROGRESS NOTES
CM: Received call from [facility] from [facility]. [phone] Fax [phone]. Faxed updated info to them again today. They will call pt and placed her in the list for readmission again.. PT will be a priority due to Hospitalization

## 2017-05-09 NOTE — PROGRESS NOTES
[facility] [phone] from [facility] can accept pt on Thursday May 11 at 2pm.  They will provide transport.  They are aware of her address.  Pt is aware she needs to call [facility] if anything changes.  Pt is aware she will still dc from hospital today.  [facility] did receive fax with updated clinicals fax [phone] DC AVS updated.  Estella PARMAR CTS [id number]

## 2017-05-09 NOTE — PLAN OF CARE
Problem: Discharge Planning  Goal: Discharge Planning (Adult, OB, Behavioral, Peds)  Outcome: Adequate for Discharge Date Met:  05/09/17  Discharge education provided to patient and patient verbalized understanding of medications and orders. Patient discharging to home, but will go to [facility] on Thursday.  And transportation provided by cab.  Prednisone filled at [facility] pharmacy.  Plan for pt to follow up with primary.   No further questions at this time.

## 2017-05-10 NOTE — DISCHARGE SUMMARY
"DATE OF ADMISSION:  05/02/2017.      DATE OF DISCHARGE:  05/09/2017.      PRINCIPAL FINAL DIAGNOSES:   1.  Acute on chronic hypoxic respiratory failure, suspect secondary to exacerbation of underlying organizing pneumonia.  The patient has a recent diagnosis of presumed cryptogenic organizing pneumonia with multiple admissions and hospitalizations this year for recurrent pneumonias with no clear bacterial infection source found.  Workup included a bronchoscopy in 01/2017 with biopsy showing evidence of \"organizing pneumonia\", but no evidence of vasculitis.  She is now on chronic prednisone with plans for a gradual slow taper through her pulmonologist, Dr. Mane.      PAST MEDICAL HISTORY:   1.  Multiple recent hospitalizations for respiratory failure as noted above.  She has had bronchoscopy with biopsies performed as noted above showing evidence of \"organizing pneumonia\" but no evidence of vasculitis on 01/2017.   2.  Currently being treated with slow prednisone taper.   3.  Opiate abuse.  She is maintained on Suboxone   4.  Alcohol use.   5.  Tobacco use.   6.  Hypertension.   7.  Depression.   8.  Chronic pain syndrome.   9.  Asthma.   10.  Cirrhosis.   11.  Ascites.   12.  History of mitral regurgitation.      PRINCIPAL PROCEDURES:   1.  Empiric IV antibiotic therapy.   2.  Blood cultures were drawn and without growth.   3.  IV steroids.   4.  Chest CT scan showing extensive bilateral infiltrates primarily alveolar, pattern favors inflammatory etiology or infection or edema, UIP alveolar analysis are possibilities.      REASON FOR ADMISSION:  Please see dictated history and physical by Dr. Marco Bloom.  In brief, Ms. Figueroa is a 41-year-old female who had recurrent hospitalizations this year for acute hypoxic respiratory failure.  She was initially treated for presumed infectious etiologies, but despite that treatment continued to have recurrent episodes of pneumonia.  Workup has included bronchoscopy and " chest CT as detailed above.  Ultimately, it is suspected she likely has underlying cryptogenic organizing pneumonia.  She is currently on chronic prednisone with a slow taper.  She presented to the hospital again with complaints of shortness of breath and evidence of pneumonia on CT imaging.      HOSPITAL COURSE:   1.  Acute on chronic hypoxic respiratory failure:  The patient was empirically treated with IV antibiotic therapy on presentation along with IV steroids.  Her course was one of gradual improvement while hospitalized.  There was no infection source found and antibiotics were discontinued while hospitalized.  Her procalcitonin level remained low, also consistent with no infection as a cause.  She improved while hospitalized with the use of IV steroids and was discharged on oral prednisone.  She is currently in the midst of a very slow taper over several months with her pulmonologist.  The patient had been taking 40 mg of prednisone daily prior to presentation, with a recent dose decrease from 60 mg daily about 2 weeks prior to this presentation.  Prior to that, she states she had been on prednisone 60 mg daily for a little over 1 month.      On discharge, I have instructed her to increase her prednisone dose to 60 mg daily, which she had been taking previously until she sees her pulmonologist within the next several weeks.      The patient was discharged home.  She did not require home oxygen on discharge.      DISCHARGE MEDICATIONS:   1.  Bactrim 1 tablet 3 times a week for prophylaxis while on prednisone.   2.  Albuterol as needed.   3.  Maalox.   4.  Vitamin C.   5.  Aspirin 81 mg daily.   6.  Simvastatin 20 mg daily.   7.  Suboxone 3 tablets on the tongue daily.   4.  Ferrous sulfate.   5.  Fish oil.   6.  Fluoxetine 40 mg daily.   7.  Folate.   8.  Lasix 20 mg daily.   9.  Gabapentin 600 mg 3 times daily.   10.  Hydroxyzine 10 mg every 8 hours as needed for itching.   11.  NovoLog insulin 1 unit per 15  g carbohydrates.   12.  NovoLog sliding scale.   13.  Glargine insulin 20 units every morning.   14.  Lactulose 20 grams 3 times a day.   15.  Multivitamin.   16.  Omeprazole 20 mg daily.   17.  Prednisone 60 mg daily.   18.  Rifaximin 50 mg twice a day.   19.  Robaxin 1000 mg 4 times a day.   20.  Spironolactone 50 mg daily.   21.  Thiamine 100 mg daily.   22.  Trazodone 100 mg at bedtime.      FOLLOWUP INSTRUCTIONS:   1.  See Dr. Mane of pulmonary within the next 2 weeks.  Call to make an appointment.   2.  Continue Prednisone 60 mg daily until you see pulmonologist in the clinic.   3.  No smoking.   4.  No drinking alcohol.     5.  Continue to take Bactrim 3 times a week as you had been for infection prevention while taking prednisone.      I personally saw and examined the patient on day of discharge.      TOTAL TIME SPENT:  I spent greater than 30 minutes discharging this patient on day of discharge.         JEOVANNY DO MD             D: 05/10/2017 15:36   T: 05/10/2017 15:55   MT: TIARA#[id number]      Name:     SYDNI GLEZ   MRN:      [id number]        Account:        [id number]   :      1975           Admit Date:                                       Discharge Date: 2017      Document: [id number]       cc: Jennifer RUSSELL MD

## 2017-05-10 NOTE — PROGRESS NOTES
Transition Communication Hand-off for Care Transitions to Next Level of Care Provider    Name: Sabina Figueroa  MRN #: [id number]  Primary Care Provider: Aidee Hemphill  Primary Care MD Name: Joby  Primary Clinic: [facility] [address] [place] [address]  Primary Care Clinic Name: [facility]  Reason for Hospitalization:  Pneumonia [J18.9]  Admit Date/Time: 5/2/2017  6:42 PM  Discharge Date: 5/9/17  Payor Source: Payor: [facility] / Plan: ARE MA / Product Type: HMO /     Readmission Assessment Measure (KENNEDY) Risk Score/category: HIGH         Reason for Communication Hand-off Referral: Admission diagnoses: PN  No support or lacking a support system  Non-adherence to plan of care related to:  Other 5 ed/ip this year  Avoidable readmission within 30 days    Discharge Plan:  Discharge Plan:       Most Recent Value    Concerns To Be Addressed care coordination/care conferences, compliance issue concerns           Concern for non-adherence with plan of care:   YES  Discharge Needs Assessment:  Needs       Most Recent Value    Anticipated Changes Related to Illness none    Equipment Currently Used at Home none    Transportation Available Medicaid transportation    Current Discharge Risk substance abuse    # of Referrals Placed by [facility]    Follow up plan Community resource assistance    Other Resources [facility]          Already enrolled in Tele-monitoring program and name of program:  NA  Follow-up specialty is recommended: Yes    Follow-up plan:  Future Appointments  Date Time Provider Department Center   7/24/2017 11:00 AM  [facility]   7/24/2017 12:00 PM Jennifer Mane MD [facility]       Any outstanding tests or procedures:         Follow-up and recommended labs and tests        See Dr. Mane within the next 2 weeks.  Call to make an appointment   Continue prednisone 60 mg daily until you see Dr. Mane in the clinic   No smoking   No drinking alcohol    Continue to take Bactrim (antibiotic) at home three times a week as you have been for infection prevention while taking prednisone                     Your next 10 appointments already scheduled      Jul 24, 2017 11:00 AM CDT   FULL PULMONARY FUNCTION with [facility] PFL BANDAR   [facility] Pulmonary Function Testing ([facility])    [address] [place] [id number] [phone]              Jul 24, 2017 12:00 PM CDT   (Arrive by 11:45 AM)   Return Visit with Jennifer Mane MD   [facility] ([facility])    [address] [place] [id number] [phone]                  Further instructions from your care team      [facility] will [facility] up on Thursday May 11 at 2pm. Please Call [facility] at [phone] with any changes.              Key Recommendations:   Readmit, 5 ed/ip in 2017, Missed [facility] CD admit d/t hospitalization, but they can get her in now on Thursday.     Sandra Walls [phone]  Sent via [facility] to Dr Hemphill's RN CTS  [facility]    AVS/Discharge Summary is the source of truth; this is a helpful guide for improved communication of patient story

## 2017-07-24 NOTE — MR AVS SNAPSHOT
After Visit Summary   7/24/2017    Sabina Figueroa    MRN: [id number]           Patient Information     Date Of Birth          1975        Visit Information        Provider Department      7/24/2017 12:00 PM Jennifer Mane MD [facility]        Today's Diagnoses     Organizing pneumonia (H)    -  1      Care Instructions    You do not have COPD. If you can cut back on smoking even a little would be helpful. I don't want you to relapse but if you can smoke a little less would help.    Let's try to reduce prednisone slowly. Decrease to 35mg every day. I have sent prescription for 5 mg pills to make up the total dose.           Follow-ups after your visit        Follow-up notes from your care team     Return in about 6 weeks (around 9/4/2017).      Your next 10 appointments already scheduled     Jul 24, 2017  1:00 PM CDT   (Arrive by 12:45 PM)   XR CHEST 2 VIEWS with [email or web address]   [facility] ([facility])    [address]  [address] [facility] [id number] [phone]           Please bring a list of your current medicines to your exam. (Include vitamins, minerals and over-thecounter medicines.) Leave your valuables at home.  Tell your doctor if there is a chance you may be pregnant.  You do not need to do anything special for this exam.            Aug 28, 2017  2:40 PM CDT   XR CHEST 2 VIEWS with [email or web address]   [facility] XRay ([facility])    [address]  [address] [facility] [id number] [phone]           Please bring a list of your current medicines to your exam. (Include vitamins, minerals and over-thecounter medicines.) Leave your valuables at home.  Tell your doctor if there is a chance you may be pregnant.  You do not need to do anything special for this exam.            Aug 28, 2017  3:00 PM CDT   (Arrive by 2:45 PM)   Return Visit with Jennifer Gamez  "MD Alhaji   [facility] ([facility])    [address]  [address] [facility] [address] [phone]              Future tests that were ordered for you today     Open Future Orders        Priority Expected Expires Ordered    XR Chest 2 Views Routine 2017            Who to contact     If you have questions or need follow up information about today's clinic visit or your schedule please contact [facility] directly at [phone].  Normal or non-critical lab and imaging results will be communicated to you by TouchMailhart, letter or phone within 4 business days after the clinic has received the results. If you do not hear from us within 7 days, please contact the clinic through Raint or phone. If you have a critical or abnormal lab result, we will notify you by phone as soon as possible.  Submit refill requests through Hopper or call your pharmacy and they will forward the refill request to us. Please allow 3 business days for your refill to be completed.          Additional Information About Your Visit        MyChart Information     Hopper lets you send messages to your doctor, view your test results, renew your prescriptions, schedule appointments and more. To sign up, go to [email or web address] . Click on \"Log in\" on the left side of the screen, which will take you to the Welcome page. Then click on \"Sign up Now\" on the right side of the page.     You will be asked to enter the access code listed below, as well as some personal information. Please follow the directions to create your username and password.     Your access code is: [email or web address]  Expires: 10/22/2017 12:25 PM     Your access code will  in 90 days. If you need help or a new code, please call your [place] clinic or [phone].        Care EveryWhere ID     This is your Care EveryWhere ID. This could be used by other " organizations to access your [facility] medical records  [id number]        Your Vitals Were     Pulse Respirations Pulse Oximetry BMI (Body Mass Index)          72 16 88% 29.44 kg/m2         Blood Pressure from Last 3 Encounters:   07/24/17 106/70   05/09/17 107/67   04/10/17 106/68    Weight from Last 3 Encounters:   07/24/17 85.3 kg (188 lb)   05/09/17 79.2 kg (174 lb 8 oz)   04/10/17 77.1 kg (170 lb)                 Today's Medication Changes          These changes are accurate as of: 7/24/17 12:31 PM.  If you have any questions, ask your nurse or doctor.               These medicines have changed or have updated prescriptions.        Dose/Directions    * predniSONE 1 MG tablet   Commonly known as:  DELTASONE   This may have changed:  Another medication with the same name was added. Make sure you understand how and when to take each.   Used for:  HCAP (healthcare-associated pneumonia)        Dose:  60 mg   Take 60 tablets (60 mg) by mouth daily   Quantity:  30 tablet   Refills:  0       * predniSONE 5 MG tablet   Commonly known as:  DELTASONE   This may have changed:  You were already taking a medication with the same name, and this prescription was added. Make sure you understand how and when to take each.   Used for:  Organizing pneumonia (H)   Changed by:  Jennifer Mane MD        Dose:  5 mg   Take 1 tablet (5 mg) by mouth daily In combination with 20mg pills to make up total daily dose as instructed by Dr Mane   Quantity:  90 tablet   Refills:  3       sulfamethoxazole-trimethoprim 800-160 MG per tablet   Commonly known as:  BACTRIM DS/SEPTRA DS   Indication:  pjp ppx   This may have changed:  additional instructions   Used for:  Bronchiolitis        Dose:  1 tablet   Take 1 tablet by mouth three times a week   Quantity:  30 tablet   Refills:  0       * Notice:  This list has 2 medication(s) that are the same as other medications prescribed for you. Read the directions carefully, and ask your doctor  or other care provider to review them with you.         Where to get your medicines      These medications were sent to [id number] - [place], [place] - [address]  [address], [place] [address]     Phone:  [phone]     predniSONE 5 MG tablet                Primary Care Provider Office Phone # Fax #    Aidee Hemphill [phone]       [facility] [address]   [place] [address]        Equal Access to Services     [facility] : Hadii aad ku hadasho Soomaali, waaxda luqadaha, qaybta kaalmada adeegyada, waxay idiin hayaan adeeg kharania lavernon . So [facility] [phone].    ATENCIÓN: Si habla español, tiene a louie disposición servicios gratuitos de asistencia lingüística. [facility] [phone].    We comply with applicable federal civil rights laws and [place] laws. We do not discriminate on the basis of race, color, national origin, age, disability sex, sexual orientation or gender identity.            Thank you!     Thank you for choosing [facility]  for your care. Our goal is always to provide you with excellent care. Hearing back from our patients is one way we can continue to improve our services. Please take a few minutes to complete the written survey that you may receive in the mail after your visit with us. Thank you!             Your Updated Medication List - Protect others around you: Learn how to safely use, store and throw away your medicines at www.disposemymeds.org.          This list is accurate as of: 7/24/17 12:31 PM.  Always use your most recent med list.                   Brand Name Dispense Instructions for use Diagnosis    * albuterol 108 (90 BASE) MCG/ACT Inhaler    PROAIR HFA/PROVENTIL HFA/VENTOLIN HFA     Inhale 2 puffs into the lungs every 4 hours as needed for shortness of breath / dyspnea or wheezing Reported on 2/23/2017        * albuterol (2.5 MG/3ML) 0.083% neb solution      Take 1 vial by nebulization every 6  hours as needed for shortness of breath / dyspnea or wheezing        alum & mag hydroxide-simethicone 400-400-40 MG/5ML Susp suspension    MYLANTA ES/MAALOX  ES     Take 30 mLs by mouth every 4 hours as needed for indigestion Reported on 2/23/2017        ascorbic acid 500 MG tablet    VITAMIN C     Take 500 mg by mouth daily        aspirin 81 MG EC tablet     30 tablet    Take 1 tablet (81 mg) by mouth daily    Elevated troponin I level       atorvastatin 20 MG tablet    LIPITOR    30 tablet    Take 1 tablet (20 mg) by mouth daily    Elevated troponin I level       blood glucose monitoring meter device kit     1 kit    Use to test blood sugars BID times daily or as directed.    Hyperglycemia       buprenorphine-naloxone 8-2 MG Subl sublingual tablet    SUBOXONE     Place 3 tablets under the tongue daily        ferrous sulfate 325 (65 FE) MG tablet    IRON     Take 325 mg by mouth daily (with breakfast)        Fish Oil 1200 MG Caps      Take 1 capsule by mouth daily        FLUoxetine 40 MG capsule    PROzac     Take 40 mg by mouth daily        folic acid 1 MG tablet    FOLVITE    30 tablet    Take 1 tablet (1 mg) by mouth daily    Alcoholic hepatitis, Alcohol dependence (H)       furosemide 20 MG tablet    LASIX     Take 20 mg by mouth daily        gabapentin 600 MG tablet    NEURONTIN    9 tablet    Take 1 tablet (600 mg) by mouth 3 times daily        hydrOXYzine 10 MG tablet    ATARAX     Take 10 mg by mouth every 8 hours as needed for itching        * insulin aspart 100 UNIT/ML injection    NovoLOG PEN    50 mL    1 U per 15 g carbs    Hyperglycemia       * insulin aspart 100 UNIT/ML injection    NovoLOG PEN    50 mL    For Pre-Meal  - 189 give 1 unit.  For Pre-Meal  - 239 give 2 units.  For Pre-Meal  - 289 give 3 units.  For Pre-Meal  - 339 give 4 units.  For Pre-Meal -399 give 5 units. For Pre-Meal -449 give 6 units. For Pre-Meal BG = or > 450 give 7 units.    Hyperglycemia        * insulin aspart 100 UNIT/ML injection    NovoLOG PEN    50 mL    Bedtime Blood Glucose (BG) For  - 249 give 1 units.  For  - 299 give 2 units.  For  - 349 give 3 units. For - 399 give 4 units.  For BG = or > 400 give 5 units.    Hyperglycemia       insulin glargine 100 UNIT/ML injection    LANTUS    50 mL    Inject 20 Units Subcutaneous every morning (before breakfast)    Hyperglycemia       lactulose 10 GM/15ML solution    CHRONULAC     Take 20 g by mouth 3 times daily        MULTIPLE VITAMINS PO      Take 1 tablet by mouth daily    Alcoholic hepatitis       omeprazole 20 MG CR capsule    priLOSEC     Take 20 mg by mouth daily        * predniSONE 1 MG tablet    DELTASONE    30 tablet    Take 60 tablets (60 mg) by mouth daily    HCAP (healthcare-associated pneumonia)       * predniSONE 5 MG tablet    DELTASONE    90 tablet    Take 1 tablet (5 mg) by mouth daily In combination with 20mg pills to make up total daily dose as instructed by Dr Mane    Organizing pneumonia (H)       rifaximin 550 MG Tabs tablet    XIFAXAN    60 tablet    Take 1 tablet (550 mg) by mouth 2 times daily    Other ascites       ROBAXIN 500 MG tablet   Generic drug:  methocarbamol      Take 1,000 mg by mouth 4 times daily        spironolactone 50 MG tablet    ALDACTONE    3 tablet    Take 1 tablet (50 mg) by mouth daily        sulfamethoxazole-trimethoprim 800-160 MG per tablet    BACTRIM DS/SEPTRA DS    30 tablet    Take 1 tablet by mouth three times a week    Bronchiolitis       thiamine 100 MG tablet      Take 100 mg by mouth daily        traZODone 100 MG tablet    DESYREL     Take 100 mg by mouth At Bedtime        * Notice:  This list has 7 medication(s) that are the same as other medications prescribed for you. Read the directions carefully, and ask your doctor or other care provider to review them with you.

## 2017-07-24 NOTE — PATIENT INSTRUCTIONS
You do not have COPD. If you can cut back on smoking even a little would be helpful. I don't want you to relapse but if you can smoke a little less would help.    Let's try to reduce prednisone slowly. Decrease to 35mg every day. I have sent prescription for 5 mg pills to make up the total dose.

## 2017-07-24 NOTE — PROGRESS NOTES
Reason for Visit  Sabina Figueroa is a 41 year old female who is seen for follow up of recurrent respiratory failure  Pulmonary HPI  Since our last visit, Sabina was again hospitalized, July 5-8 at [facility], then again July 12 - 21 also at [facility] the second time she reportedly had pneumonia. She was discharged on 4/1/17. She has been on prednisone 60mg since 3/22/17. She is sleepy again today, reports medications. She has completed substance abuse treatment and after care. She is taking Bactrim three times a week. She was discharged prematurely due to missing a lot of treatment time while hospitalized. She is smoking 1.5 ppd. Bronchoscopy done at [facility], no growth on culture but she reports being told she had pneumonia.     The patient was seen and examined by Jennifer Mane MD   Current Outpatient Prescriptions   Medication     predniSONE (DELTASONE) 1 MG tablet     insulin glargine (LANTUS) 100 UNIT/ML injection     insulin aspart (NOVOLOG PEN) 100 UNIT/ML injection     insulin aspart (NOVOLOG PEN) 100 UNIT/ML injection     insulin aspart (NOVOLOG PEN) 100 UNIT/ML injection     spironolactone (ALDACTONE) 50 MG tablet     gabapentin (NEURONTIN) 600 MG tablet     sulfamethoxazole-trimethoprim (BACTRIM DS/SEPTRA DS) 800-160 MG per tablet     blood glucose monitoring (ACCU-CHEK TONIA PLUS) meter device kit     ascorbic acid (VITAMIN C) 500 MG tablet     FLUoxetine (PROZAC) 40 MG capsule     furosemide (LASIX) 20 MG tablet     hydrOXYzine (ATARAX) 10 MG tablet     lactulose (CHRONULAC) 10 GM/15ML solution     omeprazole (PRILOSEC) 20 MG CR capsule     thiamine 100 MG tablet     traZODone (DESYREL) 100 MG tablet     Omega-3 Fatty Acids (FISH OIL) 1200 MG CAPS     methocarbamol (ROBAXIN) 500 MG tablet     aspirin EC 81 MG EC tablet     atorvastatin (LIPITOR) 20 MG tablet     rifaximin (XIFAXAN) 550 MG TABS tablet     ferrous sulfate (IRON) 325 (65 FE) MG tablet     albuterol (2.5 MG/3ML) 0.083% neb solution      buprenorphine-naloxone (SUBOXONE) 8-2 MG SUBL sublingual tablet     alum & mag hydroxide-simethicone (MYLANTA ES/MAALOX  ES) 400-400-40 MG/5ML SUSP     albuterol (PROAIR HFA, PROVENTIL HFA, VENTOLIN HFA) 108 (90 BASE) MCG/ACT inhaler     MULTIPLE VITAMINS PO     folic acid (FOLVITE) 1 MG tablet     No current facility-administered medications for this visit.      Allergies   Allergen Reactions     No Clinical Screening - See Comments      Bupronide patch allergy     Social History     Social History     Marital status: Single     Spouse name: N/A     Number of children: N/A     Years of education: N/A     Occupational History     Not on file.     Social History Main Topics     Smoking status: Current Every Day Smoker     Packs/day: 1.50     Years: 18.00     Types: Cigarettes     Smokeless tobacco: Former User     Quit date: 2016     Alcohol use 0.0 oz/week     0 Standard drinks or equivalent per week      Comment: 8 drinks or more each day, abstinent since 4/2/15. History of CD treatment in past x1     Drug use: No     Sexual activity: Not on file     Other Topics Concern     Not on file     Social History Narrative    Denies illicit IV or intranasal drug use    No tattoos or piercings     Past Medical History:   Diagnosis Date     ALC (alcoholic liver cirrhosis) (H)      Alcohol abuse      Anxiety      Arthritis      Ascites      Asthma      Bunion, left foot      Chronic low back pain     Narcotic agreement signed in [facility] system     Depression      Hypertension     current     Ovarian cyst, left      Past Surgical History:   Procedure Laterality Date     BRONCHOSCOPY FLEXIBLE N/A 1/10/2017    Procedure: BRONCHOSCOPY FLEXIBLE;  Surgeon: Jennifer Mane MD;  Location:  GI     BRONCHOSCOPY FLEXIBLE N/A 2017    Procedure: BRONCHOSCOPY FLEXIBLE;  Surgeon: Jennifer Mane MD;  Location: [facility] OR      SECTION       ESOPHAGOSCOPY, GASTROSCOPY, DUODENOSCOPY (EGD), COMBINED  2014     Procedure: COMBINED ESOPHAGOSCOPY, GASTROSCOPY, DUODENOSCOPY (EGD);  Surgeon: Brittany Lowe MD;  Location:  GI     ESOPHAGOSCOPY, GASTROSCOPY, DUODENOSCOPY (EGD), COMBINED N/A 1/23/2015    Procedure: COMBINED ESOPHAGOSCOPY, GASTROSCOPY, DUODENOSCOPY (EGD);  Surgeon: London Lenz MD;  Location:  GI     ESOPHAGOSCOPY, GASTROSCOPY, DUODENOSCOPY (EGD), COMBINED N/A 11/11/2015    Procedure: COMBINED ESOPHAGOSCOPY, GASTROSCOPY, DUODENOSCOPY (EGD);  Surgeon: Barry Chavez MD;  Location:  GI     KNEE SURGERY      x3     Family History   Problem Relation Age of Onset     Depression Mother      Anxiety Disorder Mother      MENTAL ILLNESS Mother      Substance Abuse Mother      Depression Father      Anxiety Disorder Father      Substance Abuse Father      MENTAL ILLNESS Father      Depression Daughter      Depression Brother      Schizophrenia Brother      Depression Brother      Anxiety Disorder Brother      Substance Abuse Brother      ROS Pulmonary  Dyspnea: No, Cough: No, Chest pain: No, Wheezing: No, Sputum Production: No, Hemoptysis: No  A complete ROS was otherwise negative except as noted in the HPI.  /70 (BP Location: Right arm, Patient Position: Chair, Cuff Size: Adult Large)  Pulse 72  Resp 16  Wt 85.3 kg (188 lb)  SpO2 (!) 88%  BMI 29.44 kg/m2  Exam:   GENERAL APPEARANCE: Cushingoid.  EYES: PERRL, EOMI  HENT: Nasal mucosa with no edema and no hyperemia. No nasal polyps.  EARS: Canals clear, TMs normal  MOUTH: Oral mucosa is moist, without any lesions, no tonsillar enlargement, no oropharyngeal exudate.  NECK: supple, no masses, no thyromegaly.  LYMPHATICS: No significant axillary, cervical, or supraclavicular nodes.  RESP: normal percussion, good air flow throughout.  No crackles. No rhonchi. Few inspiratory rales.  CV: Normal S1, S2, regular rhythm, normal rate. No murmur.  No rub. No gallop. No LE edema.   ABDOMEN:  Bowel sounds normal, soft, nontender, no HSM or masses.   MS:  extremities normal. No clubbing. No cyanosis.  SKIN: no rash on limited exam  NEURO: Mentation intact, speech normal, normal strength and tone, normal gait and stance  PSYCH: mentation appears normal. and affect normal/bright  Results:  PFTs 3/31/17 mildly abnormal, nonspecific pattern  CXR today shows improved bilateral alveolar infiltrates    Assessment and plan: Sabina is a 41-year-old female who is a recovering addict (alcohol and opioid ).  She has had multiple episodes of respiratory failure, over the last 9-12 months.  She had transbronchial lung biopsies after an episode of respiratory failure requiring endotracheal intubation in 01/2017 showing organizing pneumonia.  She has been moderately high doses of steroids since then.     Organizing Pneumonia - recent hospitalization (unclear if this represented infection, or exacerbation?) Her medications have been reviewed and do not appear to be contributory to this organizing pneumonia.     Continue prednisone, reduce from 40mg to 35 mg daily.  She is also taking Bactrim for Pneumocystis prophylaxis. I anticipate she will be on prednisone continuously for at least 6, possibly 12-month.    -Obtain chest imaging from [place]   - Discuss at ILD conference    Nicotine Dependence - Her alcohol abstinence is too fragile to focus on smoking cessation now. Will continue to address, I have asked her to cut back a little bit if she thinks she can manage to do so.      I will plan to see her back in approximately six weeks with chest x-ray

## 2017-07-24 NOTE — NURSING NOTE
Chief Complaint   Patient presents with     Breathing Problem     Patient is audreyeng seen for follow up of breathing issues      Maryjane Sifuentes CMA at 11:56 AM on 7/24/2017

## 2017-08-28 NOTE — LETTER
"8/28/2017       RE: Sabina Figueroa  [address]  [place] [address]     Dear Colleague,    Thank you for referring your patient, Sabina Figueroa, to the [facility] at [facility]. Please see a copy of my visit note below.    Reason for Visit  Sabina Figueroa is a 41 year old female who is seen for follow up of recurrent respiratory failure  Pulmonary HPI  Since our last visit, Sabina was not hospitalized. She hasn't been drinking lately and her boyfriend is also sober so they are getting out of the house and doing other hobbies (bead work). Still smoking 1 ppd/day and ENDS. She has quit smoking in the house because of falling asleep while smoking. She felt cruddy one night, took pulse oximetry and it was 86%; it was a \"close call\" where she felt like just prior to previous hospitalizations     The patient was seen and examined by Jennifer Mane MD   Current Outpatient Prescriptions   Medication     predniSONE (DELTASONE) 5 MG tablet     predniSONE (DELTASONE) 1 MG tablet     insulin glargine (LANTUS) 100 UNIT/ML injection     insulin aspart (NOVOLOG PEN) 100 UNIT/ML injection     insulin aspart (NOVOLOG PEN) 100 UNIT/ML injection     insulin aspart (NOVOLOG PEN) 100 UNIT/ML injection     spironolactone (ALDACTONE) 50 MG tablet     gabapentin (NEURONTIN) 600 MG tablet     sulfamethoxazole-trimethoprim (BACTRIM DS/SEPTRA DS) 800-160 MG per tablet     blood glucose monitoring (ACCU-CHEK TONIA PLUS) meter device kit     ascorbic acid (VITAMIN C) 500 MG tablet     FLUoxetine (PROZAC) 40 MG capsule     furosemide (LASIX) 20 MG tablet     hydrOXYzine (ATARAX) 10 MG tablet     lactulose (CHRONULAC) 10 GM/15ML solution     omeprazole (PRILOSEC) 20 MG CR capsule     thiamine 100 MG tablet     traZODone (DESYREL) 100 MG tablet     Omega-3 Fatty Acids (FISH OIL) 1200 MG CAPS     methocarbamol (ROBAXIN) 500 MG tablet     aspirin EC 81 MG EC " tablet     atorvastatin (LIPITOR) 20 MG tablet     rifaximin (XIFAXAN) 550 MG TABS tablet     ferrous sulfate (IRON) 325 (65 FE) MG tablet     albuterol (2.5 MG/3ML) 0.083% neb solution     buprenorphine-naloxone (SUBOXONE) 8-2 MG SUBL sublingual tablet     alum & mag hydroxide-simethicone (MYLANTA ES/MAALOX  ES) 400-400-40 MG/5ML SUSP     albuterol (PROAIR HFA, PROVENTIL HFA, VENTOLIN HFA) 108 (90 BASE) MCG/ACT inhaler     MULTIPLE VITAMINS PO     folic acid (FOLVITE) 1 MG tablet     No current facility-administered medications for this visit.      Allergies   Allergen Reactions     No Clinical Screening - See Comments      Bupronide patch allergy     Social History     Social History     Marital status: Single     Spouse name: N/A     Number of children: N/A     Years of education: N/A     Occupational History     Not on file.     Social History Main Topics     Smoking status: Current Every Day Smoker     Packs/day: 1.50     Years: 18.00     Types: Cigarettes     Smokeless tobacco: Former User     Quit date: 5/11/2016     Alcohol use 0.0 oz/week     0 Standard drinks or equivalent per week      Comment: 8 drinks or more each day, abstinent since 4/2/15. History of CD treatment in past x1     Drug use: No     Sexual activity: Not on file     Other Topics Concern     Not on file     Social History Narrative    Denies illicit IV or intranasal drug use    No tattoos or piercings     Past Medical History:   Diagnosis Date     ALC (alcoholic liver cirrhosis) (H)      Alcohol abuse      Anxiety      Arthritis      Ascites      Asthma      Bunion, left foot      Chronic low back pain     Narcotic agreement signed in [facility] system     Depression      Hypertension     current     Ovarian cyst, left      Past Surgical History:   Procedure Laterality Date     BRONCHOSCOPY FLEXIBLE N/A 1/10/2017    Procedure: BRONCHOSCOPY FLEXIBLE;  Surgeon: Jennifer Mane MD;  Location:  GI     BRONCHOSCOPY FLEXIBLE N/A 1/16/2017     "Procedure: BRONCHOSCOPY FLEXIBLE;  Surgeon: Jennifer Mane MD;  Location: [facility] OR      SECTION       ESOPHAGOSCOPY, GASTROSCOPY, DUODENOSCOPY (EGD), COMBINED  2014    Procedure: COMBINED ESOPHAGOSCOPY, GASTROSCOPY, DUODENOSCOPY (EGD);  Surgeon: Brittany Lowe MD;  Location: [facility] GI     ESOPHAGOSCOPY, GASTROSCOPY, DUODENOSCOPY (EGD), COMBINED N/A 2015    Procedure: COMBINED ESOPHAGOSCOPY, GASTROSCOPY, DUODENOSCOPY (EGD);  Surgeon: London Lenz MD;  Location: [facility] GI     ESOPHAGOSCOPY, GASTROSCOPY, DUODENOSCOPY (EGD), COMBINED N/A 2015    Procedure: COMBINED ESOPHAGOSCOPY, GASTROSCOPY, DUODENOSCOPY (EGD);  Surgeon: Barry Chavez MD;  Location:  GI     KNEE SURGERY      x3     Family History   Problem Relation Age of Onset     Depression Mother      Anxiety Disorder Mother      MENTAL ILLNESS Mother      Substance Abuse Mother      Depression Father      Anxiety Disorder Father      Substance Abuse Father      MENTAL ILLNESS Father      Depression Daughter      Depression Brother      Schizophrenia Brother      Depression Brother      Anxiety Disorder Brother      Substance Abuse Brother      ROS Pulmonary  Dyspnea: No, Cough: No, Chest pain: No, Wheezing: No, Sputum Production: No, Hemoptysis: No  A complete ROS was otherwise negative except as noted in the HPI.  /82  Pulse 85  Temp 98.8  F (37.1  C) (Oral)  Resp 18  Ht 1.702 m (5' 7\")  Wt 85.3 kg (188 lb)  SpO2 95%  BMI 29.44 kg/m2  Exam:   GENERAL APPEARANCE: Cushingoid.  EYES: PERRL, EOMI  HENT: Nasal mucosa with no edema and no hyperemia. No nasal polyps.  EARS: Canals clear, TMs normal  MOUTH: Oral mucosa is moist, without any lesions, no tonsillar enlargement, no oropharyngeal exudate.  NECK: supple, no masses, no thyromegaly.  LYMPHATICS: No significant axillary, cervical, or supraclavicular nodes.  RESP: normal percussion, good air flow throughout.  No crackles. No rhonchi. Few inspiratory rales.  CV: " Normal S1, S2, regular rhythm, normal rate. No murmur.  No rub. No gallop. No LE edema.   ABDOMEN:  Bowel sounds normal, soft, nontender, no HSM or masses.   MS: extremities normal. No clubbing. No cyanosis.  SKIN: no rash on limited exam  NEURO: Mentation intact, speech normal, normal strength and tone, normal gait and stance  PSYCH: mentation appears normal. and affect normal/bright  Results:  PFTs 3/31/17 mildly abnormal, nonspecific pattern  CXR today shows improved bilateral alveolar infiltrates    Assessment and plan: Sabina is a 41-year-old female who is a recovering addict (alcohol and opioid ).  She has had multiple episodes of respiratory failure, over the last 9-12 months.  She had transbronchial lung biopsies after an episode of respiratory failure requiring endotracheal intubation in 01/2017 showing organizing pneumonia.  She has been moderately high doses of steroids since then.     Organizing Pneumonia -  Her medications have been reviewed and do not appear to be contributory to this organizing pneumonia.  Although she has had some lapses in compliance due to her substance abuse problem, she has also had more hospitalizations with documented flares of lung disease than can be explained by medication noncompliance.   Continue prednisone, reduce from 35 mg daily by 5 mg every two weeks.     She is also taking Bactrim for Pneumocystis prophylaxis.    -Obtain chest imaging from [facility]   - Discuss at ILD conference on 8/14, recommended surgical lung biopsy for more definitive diagnosis   - Plastic surgery referral to evaluate for VATS lung biopsy, she does take Suboxone for chronic pain and narcotic abuse, she is under the impression that this is going to be a problem for anesthesia.  I'm hoping to have her evaluated for lung biopsy, but delay a while so she contact her pain doctors about coming off of Suboxone potentially and also trying to taper her steroids down a little bit more as well as giving her some  time with a pretty stable sobriety to see if steroid compliance prevent any additional flares of disease    Nicotine Dependence - Her alcohol abstinence is too fragile to focus on smoking cessation now. Will continue to address, I have asked her to cut back a little bit if she thinks she can manage to do so.      I will plan to see her back in approximately six weeks with chest x-ray      Again, thank you for allowing me to participate in the care of your patient.      Sincerely,    Jennifer Mane MD

## 2017-08-28 NOTE — MR AVS SNAPSHOT
After Visit Summary   8/28/2017    Sabina Figueroa    MRN: [id number]           Patient Information     Date Of Birth          1975        Visit Information        Provider Department      8/28/2017 3:00 PM Jennifer Mane MD [facility]        Today's Diagnoses     Organizing pneumonia (H)          Care Instructions    Continue to reduce steroid dose:  Start taking 30 mg every day (tomorrow morning)  In two weeks, September 11, reduce to 25mg every day  On September 25, reduce dose to 20mg every day.   If you start to feel cruddy like you felt before the hospital in the past, please call our office (if it's an emergency, call 9-1-1).            Follow-ups after your visit        Additional Services     THORACIC SURGERY REFERRAL       Your provider has referred you to:  [facility]: [facility] [phone]   [email or web address]    Please be aware that coverage of these services is subject to the terms and limitations of your health insurance plan.  Call member services at your health plan with any benefit or coverage questions.      Please bring the following to your appointment:    >>   Any x-rays, CTs or MRIs which have been performed.  Contact the facility where they were done to arrange for  prior to your scheduled appointment.    >>   List of current medications   >>   This referral request   >>   Any documents/labs given to you for this referral                  Follow-up notes from your care team     Return in about 6 weeks (around 10/9/2017).      Your next 10 appointments already scheduled     Oct 09, 2017 10:15 AM CDT   (Arrive by 10:00 AM)   Return Visit with MD UMANG Quezada [facility] ([facility])    [address]  3rd [address]  [facility] [address] [phone]              Who to contact     If you have  "questions or need follow up information about today's clinic visit or your schedule please contact [facility] FOR LUNG SCIENCE AND HE[facility] directly at [phone].  Normal or non-critical lab and imaging results will be communicated to you by PHHHOTO Inchart, letter or phone within 4 business days after the clinic has received the results. If you do not hear from us within 7 days, please contact the clinic through PHHHOTO Inchart or phone. If you have a critical or abnormal lab result, we will notify you by phone as soon as possible.  Submit refill requests through [facility] or call your pharmacy and they will forward the refill request to us. Please allow 3 business days for your refill to be completed.          Additional Information About Your Visit        PHHHOTO IncharKidos Information     [facility] lets you send messages to your doctor, view your test results, renew your prescriptions, schedule appointments and more. To sign up, go to [email or web address] . Click on \"Log in\" on the left side of the screen, which will take you to the Welcome page. Then click on \"Sign up Now\" on the right side of the page.     You will be asked to enter the access code listed below, as well as some personal information. Please follow the directions to create your username and password.     Your access code is: [email or web address]  Expires: 10/22/2017 12:25 PM     Your access code will  in 90 days. If you need help or a new code, please call your [facility] clinic or [phone].        Care EveryWhere ID     This is your Care EveryWhere ID. This could be used by other organizations to access your [facility] medical records  [id number]        Your Vitals Were     Pulse Temperature Respirations Height Pulse Oximetry BMI (Body Mass Index)    85 98.8  F (37.1  C) (Oral) 18 1.702 m (5' 7\") 95% 29.44 kg/m2       Blood Pressure from Last 3 Encounters:   17 120/82   17 106/70   17 107/67    Weight from Last 3 Encounters:   17 85.3 kg (188 " lb)   07/24/17 85.3 kg (188 lb)   05/09/17 79.2 kg (174 lb 8 oz)              We Performed the Following     THORACIC SURGERY REFERRAL          Today's Medication Changes          These changes are accurate as of: 8/28/17  3:29 PM.  If you have any questions, ask your nurse or doctor.               These medicines have changed or have updated prescriptions.        Dose/Directions    sulfamethoxazole-trimethoprim 800-160 MG per tablet   Commonly known as:  BACTRIM DS/SEPTRA DS   Indication:  pjp ppx   This may have changed:  additional instructions   Used for:  Bronchiolitis        Dose:  1 tablet   Take 1 tablet by mouth three times a week   Quantity:  30 tablet   Refills:  0            Where to get your medicines      These medications were sent to [id number] - [place], [place] - [address]  [address], [facility] [place] [facility] [address]     Phone:  [phone]     furosemide 20 MG tablet    predniSONE 5 MG tablet                Primary Care Provider Office Phone # Fax #    Aidee Hemphill [phone]       [facility] [address] [place] [address]        Equal Access to Services     [facility] AH: Hadii aad ku hadasho Soomaali, waaxda luqadaha, qaybta kaalmada adeegyabridgette, juan villanueva . So [facility] [phone].    ATENCIÓN: Si habla español, tiene a louie disposición servicios gratuitos de asistencia lingüística. [facility] [phone].    We comply with applicable federal civil rights laws and [place] laws. We do not discriminate on the basis of race, color, national origin, age, disability sex, sexual orientation or gender identity.            Thank you!     Thank you for choosing [facility] FOR LUNG SCIENCE AND HEALTH  for your care. Our goal is always to provide you with excellent care. Hearing back from our patients is one way we can continue to improve our services. Please take a few minutes to complete the written survey that you may receive in the  mail after your visit with us. Thank you!             Your Updated Medication List - Protect others around you: Learn how to safely use, store and throw away your medicines at www.disposemymeds.org.          This list is accurate as of: 8/28/17  3:29 PM.  Always use your most recent med list.                   Brand Name Dispense Instructions for use Diagnosis    * albuterol 108 (90 BASE) MCG/ACT Inhaler    PROAIR HFA/PROVENTIL HFA/VENTOLIN HFA     Inhale 2 puffs into the lungs every 4 hours as needed for shortness of breath / dyspnea or wheezing Reported on 2/23/2017        * albuterol (2.5 MG/3ML) 0.083% neb solution      Take 1 vial by nebulization every 6 hours as needed for shortness of breath / dyspnea or wheezing        alum & mag hydroxide-simethicone 400-400-40 MG/5ML Susp suspension    MYLANTA ES/MAALOX  ES     Take 30 mLs by mouth every 4 hours as needed for indigestion Reported on 2/23/2017        ascorbic acid 500 MG tablet    VITAMIN C     Take 500 mg by mouth daily        aspirin 81 MG EC tablet     30 tablet    Take 1 tablet (81 mg) by mouth daily    Elevated troponin I level       atorvastatin 20 MG tablet    LIPITOR    30 tablet    Take 1 tablet (20 mg) by mouth daily    Elevated troponin I level       blood glucose monitoring meter device kit     1 kit    Use to test blood sugars BID times daily or as directed.    Hyperglycemia       buprenorphine-naloxone 8-2 MG Subl sublingual tablet    SUBOXONE     Place 3 tablets under the tongue daily        ferrous sulfate 325 (65 FE) MG tablet    IRON     Take 325 mg by mouth daily (with breakfast)        Fish Oil 1200 MG Caps      Take 1 capsule by mouth daily        FLUoxetine 40 MG capsule    PROzac     Take 40 mg by mouth daily        folic acid 1 MG tablet    FOLVITE    30 tablet    Take 1 tablet (1 mg) by mouth daily    Alcoholic hepatitis, Alcohol dependence (H)       furosemide 20 MG tablet    LASIX    30 tablet    Take 1 tablet (20 mg) by mouth  daily    Organizing pneumonia (H)       gabapentin 600 MG tablet    NEURONTIN    9 tablet    Take 1 tablet (600 mg) by mouth 3 times daily        hydrOXYzine 10 MG tablet    ATARAX     Take 10 mg by mouth every 8 hours as needed for itching        * insulin aspart 100 UNIT/ML injection    NovoLOG PEN    50 mL    1 U per 15 g carbs    Hyperglycemia       * insulin aspart 100 UNIT/ML injection    NovoLOG PEN    50 mL    For Pre-Meal  - 189 give 1 unit.  For Pre-Meal  - 239 give 2 units.  For Pre-Meal  - 289 give 3 units.  For Pre-Meal  - 339 give 4 units.  For Pre-Meal -399 give 5 units. For Pre-Meal -449 give 6 units. For Pre-Meal BG = or > 450 give 7 units.    Hyperglycemia       * insulin aspart 100 UNIT/ML injection    NovoLOG PEN    50 mL    Bedtime Blood Glucose (BG) For  - 249 give 1 units.  For  - 299 give 2 units.  For  - 349 give 3 units. For - 399 give 4 units.  For BG = or > 400 give 5 units.    Hyperglycemia       insulin glargine 100 UNIT/ML injection    LANTUS    50 mL    Inject 20 Units Subcutaneous every morning (before breakfast)    Hyperglycemia       lactulose 10 GM/15ML solution    CHRONULAC     Take 20 g by mouth 3 times daily        MULTIPLE VITAMINS PO      Take 1 tablet by mouth daily    Alcoholic hepatitis       omeprazole 20 MG CR capsule    priLOSEC     Take 20 mg by mouth daily        * predniSONE 1 MG tablet    DELTASONE    30 tablet    Take 60 tablets (60 mg) by mouth daily    HCAP (healthcare-associated pneumonia)       * predniSONE 5 MG tablet    DELTASONE    90 tablet    Take 1 tablet (5 mg) by mouth daily In combination with 20mg pills to make up total daily dose as instructed by Dr Mane    Organizing pneumonia (H)       rifaximin 550 MG Tabs tablet    XIFAXAN    60 tablet    Take 1 tablet (550 mg) by mouth 2 times daily    Other ascites       ROBAXIN 500 MG tablet   Generic drug:  methocarbamol      Take 1,000 mg by  mouth 4 times daily        spironolactone 50 MG tablet    ALDACTONE    3 tablet    Take 1 tablet (50 mg) by mouth daily        sulfamethoxazole-trimethoprim 800-160 MG per tablet    BACTRIM DS/SEPTRA DS    30 tablet    Take 1 tablet by mouth three times a week    Bronchiolitis       thiamine 100 MG tablet      Take 100 mg by mouth daily        traZODone 100 MG tablet    DESYREL     Take 100 mg by mouth At Bedtime        * Notice:  This list has 7 medication(s) that are the same as other medications prescribed for you. Read the directions carefully, and ask your doctor or other care provider to review them with you.

## 2017-08-28 NOTE — PATIENT INSTRUCTIONS
Continue to reduce steroid dose:  Start taking 30 mg every day (tomorrow morning)  In two weeks, September 11, reduce to 25mg every day  On September 25, reduce dose to 20mg every day.   If you start to feel cruddy like you felt before the hospital in the past, please call our office (if it's an emergency, call 9-1-1).

## 2017-08-28 NOTE — NURSING NOTE
Chief Complaint   Patient presents with     RECHECK     2 month follow up on Sabina and her lung issues     Olvin Dwyer CMA at 2:54 PM on 8/28/2017

## 2017-08-28 NOTE — PROGRESS NOTES
"Reason for Visit  Sabina Figueroa is a 41 year old female who is seen for follow up of recurrent respiratory failure  Pulmonary HPI  Since our last visit, Sabina was not hospitalized. She hasn't been drinking lately and her boyfriend is also sober so they are getting out of the house and doing other hobbies (bead work). Still smoking 1 ppd/day and ENDS. She has quit smoking in the house because of falling asleep while smoking. She felt cruddy one night, took pulse oximetry and it was 86%; it was a \"close call\" where she felt like just prior to previous hospitalizations     The patient was seen and examined by Jennifer Mane MD   Current Outpatient Prescriptions   Medication     predniSONE (DELTASONE) 5 MG tablet     predniSONE (DELTASONE) 1 MG tablet     insulin glargine (LANTUS) 100 UNIT/ML injection     insulin aspart (NOVOLOG PEN) 100 UNIT/ML injection     insulin aspart (NOVOLOG PEN) 100 UNIT/ML injection     insulin aspart (NOVOLOG PEN) 100 UNIT/ML injection     spironolactone (ALDACTONE) 50 MG tablet     gabapentin (NEURONTIN) 600 MG tablet     sulfamethoxazole-trimethoprim (BACTRIM DS/SEPTRA DS) 800-160 MG per tablet     blood glucose monitoring (ACCU-CHEK TONIA PLUS) meter device kit     ascorbic acid (VITAMIN C) 500 MG tablet     FLUoxetine (PROZAC) 40 MG capsule     furosemide (LASIX) 20 MG tablet     hydrOXYzine (ATARAX) 10 MG tablet     lactulose (CHRONULAC) 10 GM/15ML solution     omeprazole (PRILOSEC) 20 MG CR capsule     thiamine 100 MG tablet     traZODone (DESYREL) 100 MG tablet     Omega-3 Fatty Acids (FISH OIL) 1200 MG CAPS     methocarbamol (ROBAXIN) 500 MG tablet     aspirin EC 81 MG EC tablet     atorvastatin (LIPITOR) 20 MG tablet     rifaximin (XIFAXAN) 550 MG TABS tablet     ferrous sulfate (IRON) 325 (65 FE) MG tablet     albuterol (2.5 MG/3ML) 0.083% neb solution     buprenorphine-naloxone (SUBOXONE) 8-2 MG SUBL sublingual tablet     alum & mag hydroxide-simethicone (MYLANTA ES/MAALOX  " ES) 400-400-40 MG/5ML SUSP     albuterol (PROAIR HFA, PROVENTIL HFA, VENTOLIN HFA) 108 (90 BASE) MCG/ACT inhaler     MULTIPLE VITAMINS PO     folic acid (FOLVITE) 1 MG tablet     No current facility-administered medications for this visit.      Allergies   Allergen Reactions     No Clinical Screening - See Comments      Bupronide patch allergy     Social History     Social History     Marital status: Single     Spouse name: N/A     Number of children: N/A     Years of education: N/A     Occupational History     Not on file.     Social History Main Topics     Smoking status: Current Every Day Smoker     Packs/day: 1.50     Years: 18.00     Types: Cigarettes     Smokeless tobacco: Former User     Quit date: 2016     Alcohol use 0.0 oz/week     0 Standard drinks or equivalent per week      Comment: 8 drinks or more each day, abstinent since 4/2/15. History of CD treatment in past x1     Drug use: No     Sexual activity: Not on file     Other Topics Concern     Not on file     Social History Narrative    Denies illicit IV or intranasal drug use    No tattoos or piercings     Past Medical History:   Diagnosis Date     ALC (alcoholic liver cirrhosis) (H)      Alcohol abuse      Anxiety      Arthritis      Ascites      Asthma      Bunion, left foot      Chronic low back pain     Narcotic agreement signed in [facility] system     Depression      Hypertension     current     Ovarian cyst, left      Past Surgical History:   Procedure Laterality Date     BRONCHOSCOPY FLEXIBLE N/A 1/10/2017    Procedure: BRONCHOSCOPY FLEXIBLE;  Surgeon: Jennifer Mane MD;  Location:  GI     BRONCHOSCOPY FLEXIBLE N/A 2017    Procedure: BRONCHOSCOPY FLEXIBLE;  Surgeon: Jennifer Mane MD;  Location:  OR      SECTION       ESOPHAGOSCOPY, GASTROSCOPY, DUODENOSCOPY (EGD), COMBINED  2014    Procedure: COMBINED ESOPHAGOSCOPY, GASTROSCOPY, DUODENOSCOPY (EGD);  Surgeon: Brittany Lowe MD;  Location:  GI      "ESOPHAGOSCOPY, GASTROSCOPY, DUODENOSCOPY (EGD), COMBINED N/A 1/23/2015    Procedure: COMBINED ESOPHAGOSCOPY, GASTROSCOPY, DUODENOSCOPY (EGD);  Surgeon: London Lenz MD;  Location:  GI     ESOPHAGOSCOPY, GASTROSCOPY, DUODENOSCOPY (EGD), COMBINED N/A 11/11/2015    Procedure: COMBINED ESOPHAGOSCOPY, GASTROSCOPY, DUODENOSCOPY (EGD);  Surgeon: Barry Chavez MD;  Location:  GI     KNEE SURGERY      x3     Family History   Problem Relation Age of Onset     Depression Mother      Anxiety Disorder Mother      MENTAL ILLNESS Mother      Substance Abuse Mother      Depression Father      Anxiety Disorder Father      Substance Abuse Father      MENTAL ILLNESS Father      Depression Daughter      Depression Brother      Schizophrenia Brother      Depression Brother      Anxiety Disorder Brother      Substance Abuse Brother      ROS Pulmonary  Dyspnea: No, Cough: No, Chest pain: No, Wheezing: No, Sputum Production: No, Hemoptysis: No  A complete ROS was otherwise negative except as noted in the HPI.  /82  Pulse 85  Temp 98.8  F (37.1  C) (Oral)  Resp 18  Ht 1.702 m (5' 7\")  Wt 85.3 kg (188 lb)  SpO2 95%  BMI 29.44 kg/m2  Exam:   GENERAL APPEARANCE: Cushingoid.  EYES: PERRL, EOMI  HENT: Nasal mucosa with no edema and no hyperemia. No nasal polyps.  EARS: Canals clear, TMs normal  MOUTH: Oral mucosa is moist, without any lesions, no tonsillar enlargement, no oropharyngeal exudate.  NECK: supple, no masses, no thyromegaly.  LYMPHATICS: No significant axillary, cervical, or supraclavicular nodes.  RESP: normal percussion, good air flow throughout.  No crackles. No rhonchi. Few inspiratory rales.  CV: Normal S1, S2, regular rhythm, normal rate. No murmur.  No rub. No gallop. No LE edema.   ABDOMEN:  Bowel sounds normal, soft, nontender, no HSM or masses.   MS: extremities normal. No clubbing. No cyanosis.  SKIN: no rash on limited exam  NEURO: Mentation intact, speech normal, normal strength and tone, normal " gait and stance  PSYCH: mentation appears normal. and affect normal/bright  Results:  PFTs 3/31/17 mildly abnormal, nonspecific pattern  CXR today shows improved bilateral alveolar infiltrates    Assessment and plan: Sabina is a 41-year-old female who is a recovering addict (alcohol and opioid ).  She has had multiple episodes of respiratory failure, over the last 9-12 months.  She had transbronchial lung biopsies after an episode of respiratory failure requiring endotracheal intubation in 01/2017 showing organizing pneumonia.  She has been moderately high doses of steroids since then.     Organizing Pneumonia -  Her medications have been reviewed and do not appear to be contributory to this organizing pneumonia.  Although she has had some lapses in compliance due to her substance abuse problem, she has also had more hospitalizations with documented flares of lung disease than can be explained by medication noncompliance.   Continue prednisone, reduce from 35 mg daily by 5 mg every two weeks.     She is also taking Bactrim for Pneumocystis prophylaxis.    -Obtain chest imaging from [facility]   - Discuss at ILD conference on 8/14, recommended surgical lung biopsy for more definitive diagnosis   - Plastic surgery referral to evaluate for VATS lung biopsy, she does take Suboxone for chronic pain and narcotic abuse, she is under the impression that this is going to be a problem for anesthesia.  I'm hoping to have her evaluated for lung biopsy, but delay a while so she contact her pain doctors about coming off of Suboxone potentially and also trying to taper her steroids down a little bit more as well as giving her some time with a pretty stable sobriety to see if steroid compliance prevent any additional flares of disease    Nicotine Dependence - Her alcohol abstinence is too fragile to focus on smoking cessation now. Will continue to address, I have asked her to cut back a little bit if she thinks she can manage to do so.       I will plan to see her back in approximately six weeks with chest x-ray

## 2017-09-12 NOTE — LETTER
Transition Communication Hand-off for Care Transitions to Next Level of Care Provider    Name: Sabina Figueroa  MRN #: [id number]  Primary Care Provider: Aidee Hemphill  Primary Care MD Name: Dr Hemphill  Primary Clinic: [facility] [address]  [place] [address]  Primary Care Clinic Name: [facility]  Reason for Hospitalization:  SOB (shortness of breath) [R06.02]  Hypoxia [R09.02]  CAP (community acquired pneumonia) [J18.9]  Admit Date/Time: 9/12/2017 10:34 PM  Discharge Date: ***  Payor Source: Payor: MEDICARE / Plan: MEDICARE / Product Type: Medicare /     Readmission Assessment Measure (KENNEDY) Risk Score/category: ***    Plan of Care Goals/Milestone Events:   Patient Concerns: She said she needs a biopsy for her organized PNA, but needs to taper off of prednisone and wean off of subaxone   Patient Goals:   Short-term: she has been sober for 4 months   Long-term  She is down to 1 ppd from 1 1/2 ppd.  Continue to cut back           Reason for Communication Hand-off Referral: Admission diagnoses: PN  Multiple providers/specialties  Other +5 hosp/ed 2017 at [facility].  1 admit at [facility]    Discharge Plan:       Concern for non-adherence with plan of care:   YES  Discharge Needs Assessment:  Needs       Most Recent Value    Transportation Available Medicaid transportation    # of Referrals Placed by [facility] External Care Coordination, Specialty Providers, County          Already enrolled in Tele-monitoring program and name of program:  na  Follow-up specialty is recommended: Yes    Follow-up plan:  Future Appointments  Date Time Provider Department Center   10/9/2017 9:30 AM Jennifer Mane MD [facility]       Any outstanding tests or procedures:              Key Recommendations:  PNA admit.  1 ppd smoker.  Sober 4 months.  5+ hosp/ed in 2017.  F/u with Dr Mane for her organized pna.  Possible need of biopsy of lung, but not good surgical candidate-would need to taper off of steroids and methadone.   Pt requesting ARMS worker through [facility]-pt given number to call since it is voluntary.  .  a1c trending upward to 7.0 now.      Sandra Walls    AVS/Discharge Summary is the source of truth; this is a helpful guide for improved communication of patient story

## 2017-09-13 NOTE — PROGRESS NOTES
Pt being transferred to ICU to fulfill BiPap orders.     Pt's daughter notified of transfer to ICU.

## 2017-09-13 NOTE — PHARMACY
"Pharmacy contacted Suboxone clinic. Suboxone dose confirmed with [facility]  Maintenance dose: Gets 3 tablets of the 8/2mg strength daily  Patient receives (daily dose dispensed or take out dose every \"__\" days): daily dose except takeout on the weekend(Friday, Saturday, Sunday)  Patient consistently gets medication.      "

## 2017-09-13 NOTE — PROVIDER NOTIFICATION
Dr Spear and Admitting doctor paged. Pt up to bathroom, sats dropped to 40%, RT gave a neb, currently getting 15L on mask satting 85%. Cpap, bipap?     Respiratory also paged.

## 2017-09-13 NOTE — PROGRESS NOTES
Patient seen and examined. H&P reviewed. 40 y/o female patient with liver cirrhosis, cryptogenic organizing pneumonia on po steroids, admitted for suspected pneumonia.she was started on IV Ceftriaxone and azithromycin. Also started on IV solumedrol. Will decrease solumedrol to 40 mg IV q8. BS elevated. Will increase insulin sliding scale to high resistance.      Addendum: discussed with Dr. Mane who recommended transfer to [facility] for lung biopsy by Dr. Baird. Will transfer to hospitalist team at [facility] in am.

## 2017-09-13 NOTE — ED NOTES
Arrives to ED with low O2 sats reports she has organized pneumonia and frequent hospitalizations reports symptoms started earlier today when she was cleaning. RA sats 83% O2 applied pt brought to room immediately.

## 2017-09-13 NOTE — H&P
[facility]    Hospitalist History and Physical    Name: Sabina Figueroa    MRN: [id number]  YOB: 1975    Age: 41 year old  Date of Admission:  9/12/2017  Date of Service (when I saw the patient): 09/13/17    Assessment & Plan   Sabina Figueroa is a 41 year old female with PMD sig for CLD, cirrhosis and recent diagnosis of organizing pneumonia chronically on oral steroids presented with acute respiratory failure with hypoxia.     Acute respiratory failure with hypoxia sats 83 % on RA   -- patient with diagnosis of cryptogenic organizing pneumonia   -- followed by pulmonologist Dr. Mane  -- presented with shortness of breath, bodyache, myalgias and fever similar to prior admissions for pneumonia  -- cultures sent in ED  -- procalcitonin ordered  -- started on Zosyn in ED but will switch to ceftriaxone and Azithromycin(some data for efficacy in organizing pneumonia)  -- Increased steroids to 125mg IV q 8h ( at 60mg po at baseline)  -- requiring 4-5 liters to keep sats >90%  -- wean O2 as able  -- prn Nebs    Leukocytosis  -- infection Vs steroids  -- cultures pending  -- on antibiotics    DM  --added sliding scale  -- glucose will be higher while on IV steroids    Acute renal failure  -- renal insufficiency  -- careful hydration has h/o cirrhosis for 10hrs  -- follow creatinine    Hypokalemia  -- replace per protocol      Ch Pain syndrome  -- ON subaxone    CLD/Cirrhosis  -- stable. No signs of SBP on exam  -- continue PTA meds        Continue other home meds will need to reconcile in am    Care plan d/w ED provider      DVT Prophylaxis: Pneumatic Compression Devices  Code Status: Full Code    Disposition: Admitted for >2 midnight stay    Primary Care Physician   Aidee Hemphill    Chief Complaint   Shortness of breath  2 days    History is obtained from the patient    History of Present Illness   Sabina Figueroa is a 41 year old female with PMH sig for CLD, cirrhosis, organizing  pneumonia presented with shortness of breath. Patient said for last 2 days she felt that she has flu like symptoms with myalgias, bodyache, malaise, back pain, sweats, felt warm and was progressively more short of breath. NO Cp, no noted temp but felt warm. No abd pain, no dysuria. Patient was sleepy in ED H&P was limited. Patient said this was similar to how she has felt with prior admissions and so yesterday checked her oxygen level and it was down to 60s at home so came to ED. In ED her sats was 83% on RA. She was started on supplemental O2, started on antibiotics. Admitted for further eval and treatment.    Past Medical History    Past Medical History:   Diagnosis Date     ALC (alcoholic liver cirrhosis) (H)      Alcohol abuse      Anxiety      Arthritis      Ascites      Asthma      Bunion, left foot      Chronic low back pain     Narcotic agreement signed in [facility] system     Depression      Hypertension     current     Ovarian cyst, left          Past Surgical History   Past Surgical History:   Procedure Laterality Date     BRONCHOSCOPY FLEXIBLE N/A 1/10/2017    Procedure: BRONCHOSCOPY FLEXIBLE;  Surgeon: Jennifer Mane MD;  Location:  GI     BRONCHOSCOPY FLEXIBLE N/A 2017    Procedure: BRONCHOSCOPY FLEXIBLE;  Surgeon: Jennifer Mane MD;  Location:  OR      SECTION       ESOPHAGOSCOPY, GASTROSCOPY, DUODENOSCOPY (EGD), COMBINED  2014    Procedure: COMBINED ESOPHAGOSCOPY, GASTROSCOPY, DUODENOSCOPY (EGD);  Surgeon: Brittany Lowe MD;  Location:  GI     ESOPHAGOSCOPY, GASTROSCOPY, DUODENOSCOPY (EGD), COMBINED N/A 2015    Procedure: COMBINED ESOPHAGOSCOPY, GASTROSCOPY, DUODENOSCOPY (EGD);  Surgeon: London Lenz MD;  Location:  GI     ESOPHAGOSCOPY, GASTROSCOPY, DUODENOSCOPY (EGD), COMBINED N/A 2015    Procedure: COMBINED ESOPHAGOSCOPY, GASTROSCOPY, DUODENOSCOPY (EGD);  Surgeon: Barry Chavez MD;  Location:  GI     KNEE SURGERY      x3       Prior to  Admission Medications   Prior to Admission Medications   Prescriptions Last Dose Informant Patient Reported? Taking?   FLUoxetine (PROZAC) 40 MG capsule  Self Yes No   Sig: Take 40 mg by mouth daily   MULTIPLE VITAMINS PO  Self Yes No   Sig: Take 1 tablet by mouth daily   Omega-3 Fatty Acids (FISH OIL) 1200 MG CAPS  Self Yes No   Sig: Take 1 capsule by mouth daily   albuterol (2.5 MG/3ML) 0.083% neb solution  Self Yes No   Sig: Take 1 vial by nebulization every 6 hours as needed for shortness of breath / dyspnea or wheezing   albuterol (PROAIR HFA, PROVENTIL HFA, VENTOLIN HFA) 108 (90 BASE) MCG/ACT inhaler  Self Yes No   Sig: Inhale 2 puffs into the lungs every 4 hours as needed for shortness of breath / dyspnea or wheezing Reported on 2/23/2017   alum & mag hydroxide-simethicone (MYLANTA ES/MAALOX  ES) 400-400-40 MG/5ML SUSP  Self Yes No   Sig: Take 30 mLs by mouth every 4 hours as needed for indigestion Reported on 2/23/2017   ascorbic acid (VITAMIN C) 500 MG tablet  Self Yes No   Sig: Take 500 mg by mouth daily   aspirin EC 81 MG EC tablet  Self No No   Sig: Take 1 tablet (81 mg) by mouth daily   atorvastatin (LIPITOR) 20 MG tablet  Self No No   Sig: Take 1 tablet (20 mg) by mouth daily   blood glucose monitoring (ACCU-CHEK TONIA PLUS) meter device kit   No No   Sig: Use to test blood sugars BID times daily or as directed.   buprenorphine-naloxone (SUBOXONE) 8-2 MG SUBL sublingual tablet  Self Yes No   Sig: Place 3 tablets under the tongue daily   ferrous sulfate (IRON) 325 (65 FE) MG tablet  Self Yes No   Sig: Take 325 mg by mouth daily (with breakfast)   folic acid (FOLVITE) 1 MG tablet  Self No No   Sig: Take 1 tablet (1 mg) by mouth daily   furosemide (LASIX) 20 MG tablet   No No   Sig: Take 1 tablet (20 mg) by mouth daily   gabapentin (NEURONTIN) 600 MG tablet   No No   Sig: Take 1 tablet (600 mg) by mouth 3 times daily   hydrOXYzine (ATARAX) 10 MG tablet  Self Yes No   Sig: Take 10 mg by mouth every 8 hours  as needed for itching   insulin aspart (NOVOLOG PEN) 100 UNIT/ML injection   No No   Si U per 15 g carbs   insulin aspart (NOVOLOG PEN) 100 UNIT/ML injection   No No   Sig: For Pre-Meal  - 189 give 1 unit.   For Pre-Meal  - 239 give 2 units.   For Pre-Meal  - 289 give 3 units.   For Pre-Meal  - 339 give 4 units.   For Pre-Meal -399 give 5 units.  For Pre-Meal -449 give 6 units.  For Pre-Meal BG = or > 450 give 7 units.   insulin aspart (NOVOLOG PEN) 100 UNIT/ML injection   No No   Sig: Bedtime Blood Glucose (BG)  For  - 249 give 1 units.   For  - 299 give 2 units.   For  - 349 give 3 units.  For - 399 give 4 units.   For BG = or > 400 give 5 units.   insulin glargine (LANTUS) 100 UNIT/ML injection   No No   Sig: Inject 20 Units Subcutaneous every morning (before breakfast)   lactulose (CHRONULAC) 10 GM/15ML solution  Self Yes No   Sig: Take 20 g by mouth 3 times daily   methocarbamol (ROBAXIN) 500 MG tablet  Self Yes No   Sig: Take 1,000 mg by mouth 4 times daily   omeprazole (PRILOSEC) 20 MG CR capsule  Self Yes No   Sig: Take 20 mg by mouth daily   predniSONE (DELTASONE) 1 MG tablet   No No   Sig: Take 60 tablets (60 mg) by mouth daily   predniSONE (DELTASONE) 5 MG tablet   No No   Sig: Take 1 tablet (5 mg) by mouth daily In combination with 20mg pills to make up total daily dose as instructed by Dr Mane   rifaximin (XIFAXAN) 550 MG TABS tablet  Self No No   Sig: Take 1 tablet (550 mg) by mouth 2 times daily   spironolactone (ALDACTONE) 50 MG tablet   No No   Sig: Take 1 tablet (50 mg) by mouth daily   sulfamethoxazole-trimethoprim (BACTRIM DS/SEPTRA DS) 800-160 MG per tablet   No No   Sig: Take 1 tablet by mouth three times a week   Patient taking differently: Take 1 tablet by mouth three times a week on Mon/Wed/Friday   thiamine 100 MG tablet  Self Yes No   Sig: Take 100 mg by mouth daily   traZODone (DESYREL) 100 MG tablet  Self Yes No   Sig:  Take 100 mg by mouth At Bedtime      Facility-Administered Medications: None     Allergies   Allergies   Allergen Reactions     No Clinical Screening - See Comments      Bupronide patch allergy       Social History   Social History   Substance Use Topics     Smoking status: Current Every Day Smoker     Packs/day: 1.50     Years: 18.00     Types: Cigarettes     Smokeless tobacco: Former User     Quit date: 5/11/2016     Alcohol use 0.0 oz/week     0 Standard drinks or equivalent per week      Comment: 8 drinks or more each day, abstinent since 4/2/15. History of CD treatment in past x1     Social History     Social History Narrative    Denies illicit IV or intranasal drug use    No tattoos or piercings     Smokes 1ppd, no alcohol since May 2017. Lives with significant other    Family History   Father with DM    Review of Systems   A Comprehensive greater than 10 system review of systems was carried out.  Pertinent positives and negatives are noted above.  Otherwise negative for contributory information.    Physical Exam   Temp: 98.1  F (36.7  C) Temp src: Oral BP: 111/77 Pulse: 129 Heart Rate: 103 Resp: 20 SpO2: 97 % O2 Device: Nasal cannula Oxygen Delivery: 6 LPM  Vital Signs with Ranges  Temp:  [97  F (36.1  C)-98.1  F (36.7  C)] 98.1  F (36.7  C)  Pulse:  [129] 129  Heart Rate:  [] 103  Resp:  [20] 20  BP: (101-124)/(68-81) 111/77  SpO2:  [83 %-98 %] 97 %  0 lbs 0 oz    GEN:  Alert, oriented x 3, appears flushed  Mildly dyspneic  HEENT:  Flushed face with ? Cushingoid appearance Normocephalic/atraumatic, no scleral icterus, no nasal discharge, mouth dry.  CV: tachycardic no murmur or JVD.  S1 + S2 noted, no S3 or S4.  LUNGS:  Bibasilar crackles, poor inspiratory effort, scattered wheezing  Symmetric chest rise on inhalation noted.  ABD:  Active bowel sounds, soft, DISTENDED non tender.  No rebound/guarding/rigidity.  EXT:  No edema.  No cyanosis.  SKIN:  Dry to touch, no exanthems noted in the visualized  areas.  NEURO:  Symmetric muscle strength,   No new focal deficits appreciated.    Data   Data reviewed today:  I personally reviewed the EKG tracing showing sinus tachycardia.      Recent Labs  Lab 09/12/17 2318   WBC 17.2*   HGB 15.7   HCT 45.8   MCV 90   *       Recent Labs  Lab 09/12/17 2318      POTASSIUM 3.2*   CHLORIDE 97   CO2 26   ANIONGAP 10   *   BUN 25   CR 1.31*   GFRESTIMATED 44*   GFRESTBLACK 54*   VISHNU 8.8       Recent Labs  Lab 09/13/17  0052 09/12/17 2318 09/12/17 2313   CULT PENDING No growth after 4 hours No growth after 4 hours       Recent Labs  Lab 09/12/17 2318   NTBNPI 88       Recent Labs  Lab 09/12/17 2318   AST 64*   ALT 72*   ALKPHOS 108   BILITOTAL 0.4     No results for input(s): INR in the last 168 hours.    Recent Labs  Lab 09/12/17 2318   LACT 1.5     No results for input(s): LIPASE in the last 168 hours.  No results for input(s): TSH in the last 168 hours.  No results for input(s): TROPONIN, TROPI, TROPR in the last 168 hours.    Invalid input(s): TROP, TROPONINIES    Recent Labs  Lab 09/13/17 0052   COLOR Straw   APPEARANCE Clear   URINEGLC Negative   URINEBILI Negative   URINEKETONE Negative   SG 1.004   UBLD Small*   URINEPH 6.0   PROTEIN Negative   NITRITE Negative   LEUKEST Negative   RBCU 2   WBCU <1       Recent Results (from the past 24 hour(s))   Chest XR,  PA & LAT    Narrative    XR CHEST 2 VW  9/13/2017 2:05 AM      HISTORY: Shortness of breath.     COMPARISON: 8/28/2017.    FINDINGS: The heart is at the upper limits of normal in size. There  are alveolar infiltrates in the mid and lower lungs bilaterally. No  pneumothorax or pleural effusions.      Impression    IMPRESSION: Bilateral pulmonary infiltrates may be pulmonary edema.

## 2017-09-13 NOTE — PLAN OF CARE
Problem: Goal Outcome Summary  Goal: Goal Outcome Summary  Outcome: Improving  VSS, afebrile. Up with SBA in room. 5L O2 NC. LS diminished, infrequent cough. BG's very elevated, additional novolog given. MD d/c'd IV steroids and started PO steroids for tomorrow. Voiding adequate amounts. Denies nausea. Scheduled saboxone for chronic pain. Continuing rocephin/zithromax.

## 2017-09-13 NOTE — PROGRESS NOTES
Care Transition Initial Assessment - RN    Reason For Consult: care coordination/care conference, discharge planning   Met with: Patient.    DATA   Active Problems:    Pneumonia       Cognitive Status: awake, alert and oriented.  [facility]: [facility]  Primary Care MD Name: Dr Hemphill  Contact information and PCP information verified: Yes, [facility]    Lives With: significant other  Living Arrangements: apartment     Insurance concerns: No Insurance issues identified    ASSESSMENT  Patient currently receives the following services:  None.  Her primary is looking into getting her SW support/ARMS worker through [facility]        Identified issues/concerns regarding health management: Pt is admitted again with PNA.  She has had +5 hosp/ed 2017.  She has been out of [facility] for 4 1/2 months.  However, she did mention she did have a hospitalization at [facility] for PNA around 3 months ago.  Since pt was dc'd from us last time she did go to [employer] and has now been sober for 4 months since yesterday.  Pt's SO/roommate also has been sober, which she is excited about.  She has started making jewelry, which she said helps her sobriety.  She last f/u with Dr Mane pulmonary 8/28.  Pt mentioned that she thinks the Dr wants her to have a biopsy in the future because of her organized pna.  I talked with Dr Mane's RN and she said they talked about pt tapering off steroids and seeing if she could be weaned of subaxone.  Pt follows with [facility] in [place] for subaxone. She goes mon-Thursday currently to get her script.  Pt still smokes 1 PPD.  However, this is down from 1 1/2 PPD.  She feels right now isn't a good time to quit because she doesn't want to affect her sobriety.  She isn't on home 02 currently.  She follows with [facility] in [place].  She recently saw her and will continue this support.  Pt did have a CM SW through her insurance, but it was dc'd since they only  offer it for a year.  Pt is interested in  support.  Pt given contact for [facility] for assessment for ARMS worker.  This is a voluntary program so pt must call herself [phone].  Pt does have a hx of DM.  She checks it qhs.  Her last a1c was 6.1 as of 5/4/17.  Her one from today is still pending.        PLAN  Patient given options and choices for discharge YES .  Patient/family is agreeable to the plan?  Yes:   Patient anticipates discharging to home with her SO/roommate .        Patient anticipates needs for home equipment: No  Discharge Planner   Discharge Plans in progress: yes  Barriers to discharge plan: no  Plan/Disposition: Home   Appointments: She is scheduled with Dr Mane on 10/9/17.         Care  (CTS) will continue to follow as needed.    Estella PARMAR CTS [id number]

## 2017-09-13 NOTE — PROGRESS NOTES
"Critical access hospital RCAT     Date: 9/13/17  Admission Dx: PNA  Pulmonary History: Asthma, current smoker  Home Nebulizer/MDI Use: Alb 2p Q4 prn, Alb Q6 prn  Home Oxygen:  none  Acuity Level (RCAT flow sheet): 2  Aerosol Therapy initiated: Duo Q4      Pulmonary Hygiene initiated: cough and deep breathe      Volume Expansion initiated: IS      Current Oxygen Requirements: 5lpm  Current SpO2: 92%    Re-evaluation date: 9/16/17    Patient Education: Patient aware of medication benefits and side affects      See \"RT Assessments\" flow sheet for patient assessment scoring and Acuity Level Details.             "

## 2017-09-13 NOTE — ED PROVIDER NOTES
History     Chief Complaint:  Shortness of Breath    HPI   Sabina Figueroa is a 41 year old female who presents with shortness of breath. In the last year the patient was hospitalized for pneumonia 10 times, each time requiring her to be in the the ICU for a week. Due to her pnuemonia problems she is on long term Prednisone, and recently her doctor has decerased her prescripion amount. Yesterday the pateint states that she began to have trouble breathing, started having headaches, and a cough like she has the flu. She then put her home oxygen mask on which caused her oxsgen level to hover around 75. The patient also reports that she had a heart rate of 130 during this period of time, even when she was sitting. Then tonight the patient states that her breathing became more painful, which caused her to come to the emergency department. Currenty the patient endorses shortness of breath, back pain, light headedness, a headache, and a cought. There are no other compliants at this time.     Of note, the paitent is on Lantus for chronic use of Prednisone.      Allergies:  Bupronide patch     Medications:    Deltasone  Lasix  Lantus  Aldactone  Neurontin  Bacgtrim DS/Septra   Vitamin C   Prozac  Atarax  Chronulac  Prilosec  Desyrell  Robaxin  Aspirin  Lipitor  Xifaxan  Albuterol (2.5 MG/3ML) 0.083% neb solution  Suboxone  Folic acid    Past Medical History:    ALC  Pneumonia  Sepsis  Adjustment disorder with depressed mood  Respiratory failure with hypoxia  Ascites  Abdominal pain  Bronchospasm  Chemical dependency  Alcoholic hepatitis  Jaundice  Smoking addiction  Alcoholic peripheral neuropathy  Insomnia  Degenerative disc disease  Hypertension  Hyponatremia  GI bleeding  Alcohol abuse   Anxiety   Arthritis   Asceites  Asthma  Bunion, left foot  Choronic low back pain  Depression  Hypertsension  Overian cyst, left  Degenerative    Past Surgical History:    Bronchoscopy flexible   section  ESOPHAGOSCOPY,  GASTROSCOPY, DUODENOSCOPY (EGD), COMBINED x3  Knee surgery x3    Family History:    Depressioni  Anxiety disorder  Mintal ilness  Substance abuse   Depression  Anxiety disorder  Depression  Schizophrenia    Social History:  Presents by herslef   Tobacco use: Current everyday smoker, 1.5 ppd for 18 years, former smokless tobacco user, quit on 5/11/16,   Alcohol use: Abstinent since 4/2/15  PCP: Aidee Hemphill    Marital Status:  Single      Review of Systems   Respiratory: Positive for cough and shortness of breath.    Musculoskeletal: Positive for back pain.   Neurological: Positive for light-headedness and headaches.   All other systems reviewed and are negative.      Physical Exam     Patient Vitals for the past 24 hrs:   BP Temp Temp src Pulse SpO2   09/12/17 2231 124/76 97  F (36.1  C) Temporal 129 (!) 83 %      Physical Exam    General: Appears stated age and is in mild distress.  HENT: Atraumatic.   Eyes: No scleral injection, conjunctivitis, or drainage.    Neck: Supple with normal ROM.  No meningeal signs  Cardio: Regular rate and rhythm, no murmurs, rubs, or gallops  Pulmonary/Chest: (Hypoxia). Diminished lung sounds. Placed on oxygen NC.   Abdomen: Soft, non-tender, normal bowel sounds, no rebound or guarding  Musculoskeletal: No pedal edema, normal gait.    Lymph: No anterior or posterior lymphadenopathy.   Neuro: Alert and oriented, no focal deficits noted.   Skin: Ecchymotic areas on lower legs (she is on Prednisone)  Psych: Mood and affect normal.       Emergency Department Course   ECG (22:46:23):  Rate 117 bpm. WI interval 124. QRS duration 74. QT/QTc 308/429. P-R-T axes 54 -15 53. Sinus tachycardia. Possible left atrial enlargement. Borderline ECG.  Interpreted at 2246.     Imaging:  Radiographic findings were communicated with the patient who voiced understanding of the findings.    Chest XR, PA and LAT:     IMPRESSION: Bilateral pulmonary infiltrates may be pulmonary edema.    Preliminary result  per radiology.    Laboratory:    Procalcitonin: pending    Nt probnp inpatient: 88     CBC: WBC 17.2 (H) ,  (L) o/w  WNL ( HGB 15.7)     CMP: (Creatinine 1.31 (H)), Glucose 176 (H), GFR Estimate 44 (L), ALT 72 (H), AST 94 (H)    Lactic acid whole blood: 1.5     Blood culture: Pending     Blood culture: Pending     UA with microscopic: Urine Blood small (A), Mucous Urine present (A)     Urine culutre aerobic: Pending     Interventions:  Medications   lactated ringers infusion (150 mL/hr Intravenous New Bag 9/13/17 0136)   piperacillin-tazobactam (ZOSYN) infusion 3.375 g (not administered)   furosemide (LASIX) tablet 20 mg (not administered)   HYDROmorphone (PF) (DILAUDID) injection 0.5 mg (not administered)   lactated ringers BOLUS 2,559 mL (0 mL/kg × 85.3 kg Intravenous Stopped 9/13/17 0119)   HYDROmorphone (PF) (DILAUDID) injection 0.5 mg (0.5 mg Intravenous Given 9/13/17 0136)   ondansetron (ZOFRAN) injection 4 mg (4 mg Intravenous Given 9/12/17 2309)   ipratropium - albuterol 0.5 mg/2.5 mg/3 mL (DUONEB) neb solution 6 mL (6 mLs Nebulization Given 9/13/17 0257)     2306: Lactated ringers NS 1L IV Bolus 2559 mls IV Injection  2309: Zofran 4 mg IV Injection  2311: Dilaudid 0.5 mg IV Injection  0136: Lacted ringers infusion IV  0257: Albuterol 0.5mg/2.5mg/3 mL neb solution, 6 mLs Nebulization      The patient's symptoms were improved with parenteral narcotics.    Emergency Department Course:    Past medical records, nursing notes, and vitals reviewed.  2242: I performed an exam of the patient and obtained history, as documented above.  IV inserted and blood drawn.   Above interventions provided.   The patient was sent for a Chest XR while in the emergency department, findings above.   I personally reviewed the laboratory results with the Patient and answered all related questions prior to admission.    Findings and plan explained to the Patient who consents to admission.     0253: Discussed the patient with  Dr. Harley, who will admit the patient to a ED bed for further monitoring, evaluation, and treatment.        Impression & Plan      Medical Decision Making:    Sabina 41 year old female presents with shortness of breath and hypoxia. Patient has history of pneumonia  With hospitalization. Based on presentation sepsis work up initiated. There is no evidence of sepsis the lactic acid is normal. Blood cultures are pending. The urinalysis is negative for infection, the CXR shows bilateral pulmonary infiltrates may be pulmonary edema. Patient was given a dose of antibiotic for pneumonia after blood cultures were obtained. Patient has leukocytosis due to pneumonia but she is also on chronic prednisone. There is no evidence of heart failure has a normal BNP will hold off giving lasix at this time. Patient is on chronic lasix. Will be admitted for close monitoring and further work up as needed. Patient is amenable to plans.     Diagnosis:    ICD-10-CM    1. Hypoxia R09.02 UA with Microscopic     Urine Culture Aerobic Bacterial     Blood culture     Blood culture     CBC with platelets differential     Comprehensive metabolic panel     Lactic acid whole blood     Nt probnp inpatient     Nt probnp inpatient     CANCELED: BNP   2. SOB (shortness of breath) R06.02    3. CAP (community acquired pneumonia) J18.9        Disposition:  Admitted to Dr. Harley of the hospitalists service.    Discharge Medications:  New Prescriptions    No medications on file       Anant Thomas  9/12/2017   [facility] EMERGENCY DEPARTMENT    I, Anant Thomas, am serving as a scribe at 10:42 PM on 9/12/2017 to document services personally performed by Bryanna Harley, ANDRE based on my observations and the provider's statements to me.         Bryanna Harley, ISABELL POWELL  09/13/17 0525

## 2017-09-13 NOTE — PHARMACY-ADMISSION MEDICATION HISTORY
Admission medication history interview status for this patient is complete. See [facility] admission navigator for allergy information, prior to admission medications and immunization status.     Medication history interview source(s):Patient  Medication history resources (including written lists, pill bottles, clinic record): [facility]/Care Everywhere  Primary pharmacy:[facility]    Changes made to PTA medication list:  Added: loratadine, mirtazapine, melatonin, ibuprofen, protonix  Deleted: albuterol neb, all novolog, omeprazole,  Changed: prednisone    Actions taken by pharmacist (provider contacted, etc):None     Additional medication history information:None    Medication reconciliation/reorder completed by provider prior to medication history? Yes    Do you take OTC medications (eg tylenol, ibuprofen, fish oil, eye/ear drops, etc)? Y(Y/N)    For patients on insulin therapy: y (Y/N)  Lantus/levemir/NPH/Mix 70/30 dose:   Y(Y/N) (see Med list for doses)   Sliding scale Novolog N  If Yes, do you have a baseline novolog pre-meal dose:  units with meals  Patients eat three meals a day:   N, usually twice   How many episodes of hypoglycemia do you have per week: ___0____  How many missed doses do you have per week: __2____  How many times do you check your blood glucose per day: __at HS_____   Any Barriers to therapy - Be specific :  cost of medications, comfortable with giving injections (if applicable), comfortable and confident with current diabetes regimen: Y/N ______N________      Prior to Admission medications    Medication Sig Last Dose Taking? Auth Provider   Pantoprazole Sodium (PROTONIX PO) Take 40 mg by mouth every morning (before breakfast) 9/12/2017 at Unknown time Yes Unknown, Entered By History   PREDNISONE PO Take 25 mg by mouth daily 9/12/2017 at Unknown time Yes Unknown, Entered By History   loratadine (CLARITIN) 10 MG tablet Take 10 mg by mouth daily 9/12/2017 at Unknown time Yes Unknown, Entered By  History   MIRTAZAPINE PO Take 30 mg by mouth At Bedtime 9/11/2017 at Unknown time Yes Unknown, Entered By History   MELATONIN PO Take 5 mg by mouth At Bedtime 9/11/2017 at Unknown time Yes Unknown, Entered By History   IBUPROFEN PO Take 600 mg by mouth daily 9/12/2017 at Unknown time Yes Unknown, Entered By History   THIAMINE HCL PO Take 50 mg by mouth daily 9/12/2017 at Unknown time Yes Unknown, Entered By History   furosemide (LASIX) 20 MG tablet Take 1 tablet (20 mg) by mouth daily 9/12/2017 at Unknown time Yes Jennifer Mane MD   insulin glargine (LANTUS) 100 UNIT/ML injection Inject 20 Units Subcutaneous every morning (before breakfast) 9/12/2017 at Unknown time Yes Anatoliy Dos Santos DO   spironolactone (ALDACTONE) 50 MG tablet Take 1 tablet (50 mg) by mouth daily 9/12/2017 at Unknown time Yes Josemanuel Damon MD   gabapentin (NEURONTIN) 600 MG tablet Take 1 tablet (600 mg) by mouth 3 times daily 9/12/2017 at Unknown time Yes Josemanuel Damon MD   sulfamethoxazole-trimethoprim (BACTRIM DS/SEPTRA DS) 800-160 MG per tablet Take 1 tablet by mouth three times a week  Patient taking differently: Take 1 tablet by mouth three times a week on Mon/Wed/Friday 9/12/2017 at Unknown time Yes Yisel Henry MD   ascorbic acid (VITAMIN C) 500 MG tablet Take 500 mg by mouth daily 9/12/2017 at Unknown time Yes Unknown, Entered By History   FLUoxetine (PROZAC) 40 MG capsule Take 40 mg by mouth daily 9/11/2017 at Unknown time Yes Unknown, Entered By History   hydrOXYzine (ATARAX) 10 MG tablet Take 10 mg by mouth every 8 hours as needed for itching  Yes Unknown, Entered By History   lactulose (CHRONULAC) 10 GM/15ML solution Take 20 g by mouth 3 times daily 9/12/2017 at Unknown time Yes Unknown, Entered By History   traZODone (DESYREL) 100 MG tablet Take 100 mg by mouth At Bedtime 9/12/2017 at Unknown time Yes Unknown, Entered By History   Omega-3 Fatty Acids (FISH OIL) 1200 MG CAPS Take 1 capsule by mouth daily  9/12/2017 at Unknown time Yes Unknown, Entered By History   methocarbamol (ROBAXIN) 500 MG tablet Take 1,000 mg by mouth 4 times daily 9/12/2017 at Unknown time Yes Unknown, Entered By History   aspirin EC 81 MG EC tablet Take 1 tablet (81 mg) by mouth daily 9/12/2017 at Unknown time Yes Anatoliy Dos Santos,    atorvastatin (LIPITOR) 20 MG tablet Take 1 tablet (20 mg) by mouth daily 9/12/2017 at Unknown time Yes Anatoliy Dos Santos,    rifaximin (XIFAXAN) 550 MG TABS tablet Take 1 tablet (550 mg) by mouth 2 times daily 9/12/2017 at Unknown time Yes Anatoliy Dos Santos,    ferrous sulfate (IRON) 325 (65 FE) MG tablet Take 325 mg by mouth daily (with breakfast) 9/12/2017 at Unknown time Yes Unknown, Entered By History   buprenorphine-naloxone (SUBOXONE) 8-2 MG SUBL sublingual tablet Place 3 tablets under the tongue daily 9/12/2017 at Unknown time Yes Unknown, Entered By History   alum & mag hydroxide-simethicone (MYLANTA ES/MAALOX  ES) 400-400-40 MG/5ML SUSP Take 30 mLs by mouth every 4 hours as needed for indigestion Reported on 2/23/2017  Yes Reported, Patient   albuterol (PROAIR HFA, PROVENTIL HFA, VENTOLIN HFA) 108 (90 BASE) MCG/ACT inhaler Inhale 2 puffs into the lungs every 4 hours as needed for shortness of breath / dyspnea or wheezing Reported on 2/23/2017  Yes Reported, Patient   MULTIPLE VITAMINS PO Take 1 tablet by mouth daily 9/12/2017 at Unknown time Yes Reported, Patient   folic acid (FOLVITE) 1 MG tablet Take 1 tablet (1 mg) by mouth daily 9/12/2017 at Unknown time Yes Devin Peace MD   blood glucose monitoring (ACCU-CHEK TONIA PLUS) meter device kit Use to test blood sugars BID times daily or as directed.   Yisel Henry MD

## 2017-09-13 NOTE — ED NOTES
.  [facility]  ED Nurse Handoff Report    Sabina Figueroa is a 41 year old female   ED Chief complaint: Shortness of Breath  . ED Diagnosis:   Final diagnoses:   Hypoxia   SOB (shortness of breath)   CAP (community acquired pneumonia)     Allergies:   Allergies   Allergen Reactions     No Clinical Screening - See Comments      Bupronide patch allergy       Code Status: Full Code  Activity level - Baseline/Home:  Independent. Activity Level - Current:   Stand with Assist. Lift room needed: No. Bariatric: No   Needed: No   Isolation: No. Infection: Not Applicable.     Vital Signs:   Vitals:    09/13/17 0215 09/13/17 0230 09/13/17 0245 09/13/17 0257   BP: 102/73 108/78 113/75    Pulse:       Resp:       Temp:       TempSrc:       SpO2: 94% 94% 93% 94%       Cardiac Rhythm:  ,      Pain level: 0-10 Pain Scale: 6  Patient confused: No. Patient Falls Risk: Yes.   Elimination Status: Has voided   Patient Report - Initial Complaint: Shortness of breath and hypoxia. Focused Assessment: Shortness of breath increasing throughout the day.  Patient checked her SPO2 at home at it was in the 70% range.  On arrival SPO2 84 %, lung sounds diminished.   Patient reports numerous episodes of pneumonia this year. Patient complains of headache and back pain (chronic).  Tests Performed: labs, chest xray. Abnormal Results:   Labs Ordered and Resulted from Time of ED Arrival Up to the Time of Departure from the ED   ROUTINE UA WITH MICROSCOPIC - Abnormal; Notable for the following:        Result Value    Blood Urine Small (*)     Mucous Urine Present (*)     All other components within normal limits   CBC WITH PLATELETS DIFFERENTIAL - Abnormal; Notable for the following:     WBC 17.2 (*)     Platelet Count 111 (*)     Absolute Neutrophil 14.8 (*)     All other components within normal limits   COMPREHENSIVE METABOLIC PANEL - Abnormal; Notable for the following:     Potassium 3.2 (*)     Glucose 176 (*)      Creatinine 1.31 (*)     GFR Estimate 44 (*)     GFR Estimate If Black 54 (*)     ALT 72 (*)     AST 64 (*)     All other components within normal limits   LACTIC ACID WHOLE BLOOD   NT PROBNP INPATIENT   PROCALCITONIN   PULSE OXIMETRY NURSING   CARDIAC CONTINUOUS MONITORING   MEASURE URINE OUTPUT   PATIENT CARE ORDER   STRICT INTAKE AND OUTPUT   URINE CULTURE AEROBIC BACTERIAL   BLOOD CULTURE   BLOOD CULTURE     Chest XR,  PA & LAT   Preliminary Result   IMPRESSION: Bilateral pulmonary infiltrates may be pulmonary edema.          .   Treatments provided: Fluids, neb, zosyn, pain meds  Family Comments: Patient is here alone.  OBS brochure/video discussed/provided to patient:  N/A  ED Medications:   Medications   lactated ringers infusion (150 mL/hr Intravenous New Bag 9/13/17 0136)   piperacillin-tazobactam (ZOSYN) infusion 3.375 g (not administered)   furosemide (LASIX) tablet 20 mg (not administered)   HYDROmorphone (PF) (DILAUDID) injection 0.5 mg (not administered)   lactated ringers BOLUS 2,559 mL (0 mL/kg × 85.3 kg Intravenous Stopped 9/13/17 0119)   HYDROmorphone (PF) (DILAUDID) injection 0.5 mg (0.5 mg Intravenous Given 9/13/17 0136)   ondansetron (ZOFRAN) injection 4 mg (4 mg Intravenous Given 9/12/17 2309)   ipratropium - albuterol 0.5 mg/2.5 mg/3 mL (DUONEB) neb solution 6 mL (6 mLs Nebulization Given 9/13/17 0257)     Drips infusing:  Yes  For the majority of the shift, the patient's behavior Green. Interventions performed were .     Severe Sepsis OR Septic Shock Diagnosis Present: No      ED Nurse Name/Phone Number: Tali MARIJA Lennon,   3:01 AM    RECEIVING UNIT ED HANDOFF REVIEW    Above ED Nurse Handoff Report was reviewed: Yes  Reviewed by: Rebeka Grewal on September 13, 2017 at 3:34 AM

## 2017-09-13 NOTE — PROGRESS NOTES
I received word that Sabina is admitted again with respiratory failure. We were in the process of tapering steroids to allow disease to be better elucidated with surgical lung biopsy. I have spoken with Dr Baird and he is willing to see her and offer thoracoscopic lung biopsy. Therefore, she needs transfer to [facility] for thoracic surgery consultation and procedure.    Please contact me with questions  Jennifer Mane  [phone]

## 2017-09-14 PROBLEM — J96.01 ACUTE RESPIRATORY FAILURE WITH HYPOXIA (H): Status: ACTIVE | Noted: 2017-01-01

## 2017-09-14 NOTE — PROGRESS NOTES
Patient required BIPAP and moved to ICU for close monitoring.  Insulin gtt stopped and lantus 10 units given and to continue SQ insulin per Q4H ssi and to resume home lantus in am

## 2017-09-14 NOTE — PLAN OF CARE
Problem: Goal Outcome Summary  Goal: Goal Outcome Summary  Outcome: No Change  Tried to take pt off BIPAP and place on nasal canula, O2 sats dropped to lower 80's, also tried oxymizer and O2 sats remained in 80's. resp therapy put pt back on BIPAP

## 2017-09-14 NOTE — DISCHARGE SUMMARY
[facility]    Discharge Summary  Hospitalist    Date of Admission:  9/12/2017  Date of Discharge:  9/14/2017  Discharging Provider: Frida Spear MD  Date of Service (when I saw the patient): 09/14/17    Discharge Diagnoses      1. Acute hypoxic respirator failure secondary to possible pneumonia, pulm edema    2. Leukocytosis: infection vs steroids: improved    3. Recent diagnosis of organizing pneumonia     4. DM type 2     5. Acute kidney injury: improved     6. Chronic pain syndrome    7. CLD/Cirrhosis    History of Present Illness   Sabina Figueroa is an 41 year old female who presented with worsening shortness of breath and hypoxia. Please see H&P for details.     Hospital Course   Sabina Figueroa is a 41 year old female with PMD sig for CLD, cirrhosis and recent diagnosis of organizing pneumonia on oral steroids presented with acute respiratory failure with hypoxia. Patient had aches, fever and worsening shortness of breath over the last two days. She denies chest pain. Her oxygen saturation was down to 60% at home. She was brought to emergency room for evaluation. Her O2 sat was checked in the ER which was 83% on RA. Patient was started on supplemental oxygen. Lab workup showed elevated white cell count at 17k. Chest x-ray was done and showed extensive bilateral infiltrates. She was started on IV Ceftriaxone and Azithromycin for suspected pneumonia. Patient  follows with Dr. Jennifer Mane (pulmonologist) for suspected cryptogenic organizing pneumonia. She was on a tapering dose of prednisone. I discussed with Dr. Mane yesterday and she recommended transfer to [facility] for further evaluation with lung biopsy. Dr. Mane already discussed with Dr. Baird from Thoracic surgery and he is planning to do lung biopsy. Last evening patient developed worsening respiratory symptoms. Her oxygen saturation dropped to low 80's. She was put on BIPAP and transferred to ICU  for further management.      1. Acute respiratory failure secondary to pneumonia vs pulm edema  --Patient with diagnosis of cryptogenic organizing pneumonia. She is followed by pulmonologist Dr. Mane. She presented with shortness of breath, bodyache, myalgias and fever similar to prior admissions for pneumonia. She was placed on ceftriaxone and Azithromycin.  --Will continue antibiotics. White cell count improving but she is now requiring BIPAP.  --CXR shows extensive bilateral infiltrates(chronic). She is already on oral lasix and spironolactone. She got lasix 20 mg po and 20 mg IV this am. Lasix changed to 20 mg iv daily for diuresis.  --Discussed with Dr. Smith at [facility] for transfer for further evaluation with lung biopsy. He graciously accepted patient for transfer.   --Thoracic surgery consult at [facility] for lung biopsy(Dr. Baird already aware)  --She was given IV solumedrol on admission. Switched to po prednisone today.  --Wean off BIPAP as able.      2. Leukocytosis: wbc improved  -- infection vs steroids  -- Blood cultures negative so far.  -- Will continue Rocephin and Zithromax.      3. DM, not well controlled due to IV steroids  --Will continue sliding scale  hours and Lantus insulin 20 units every morning. This will be adjusted as needed. Steroids changed to po. Will need steroid taper per pulmonary.      4. Acute renal failure: resolved. Will monitor renal function while on IV lasix     5. Hypokalemia: Had hyperkalemia at 5.4 yesterday after correction. Electrolytes are stable now  -- Will monitor     6. Chronic pain syndrome  -- On subaxone     7. CLD/Cirrhosis. No evidence of encephalopathy at this moment.   -- Continue lactulose, lasix, spironolactone.     Patient transferred to [facility] for further evaluation with lung biopsy.     Frida Spear MD    Significant Results and Procedures   Results for orders placed or performed during the hospital encounter of 09/12/17   Chest XR,  PA & LAT     Narrative    XR CHEST 2 VW  9/13/2017 2:05 AM      HISTORY: Shortness of breath.     COMPARISON: 8/28/2017.    FINDINGS: The heart is at the upper limits of normal in size. There  are alveolar infiltrates in the mid and lower lungs bilaterally. No  pneumothorax or pleural effusions.      Impression    IMPRESSION: Bilateral pulmonary infiltrates may be pulmonary edema.    ANNA CORNEJO MD         Pending Results   These results will be followed up by Dr. Smith  Unresulted Labs Ordered in the Past 30 Days of this Admission     Date and Time Order Name Status Description    9/12/2017 2258 Blood culture Preliminary     9/12/2017 2236 Blood culture Preliminary           Code Status   Full Code       Primary Care Physician   Aidee Hemphill        Discharge Disposition   Transferred to [facility]  Condition at discharge: Stable    Consultations This Hospital Stay   CARE COORDINATOR IP CONSULT  PHARMACY IP CONSULT    Time Spent on this Encounter   I, Frida Spear MD, personally saw the patient today and spent greater than 30 minutes discharging this patient.    Discharge Orders   No discharge procedures on file.  Discharge Medications   Current Discharge Medication List      CONTINUE these medications which have NOT CHANGED    Details   Pantoprazole Sodium (PROTONIX PO) Take 40 mg by mouth every morning (before breakfast)      PREDNISONE PO Take 25 mg by mouth daily      loratadine (CLARITIN) 10 MG tablet Take 10 mg by mouth daily      MIRTAZAPINE PO Take 30 mg by mouth At Bedtime      MELATONIN PO Take 5 mg by mouth At Bedtime      IBUPROFEN PO Take 600 mg by mouth daily      THIAMINE HCL PO Take 50 mg by mouth daily      furosemide (LASIX) 20 MG tablet Take 1 tablet (20 mg) by mouth daily  Qty: 30 tablet, Refills: 3    Associated Diagnoses: Organizing pneumonia (H)      insulin glargine (LANTUS) 100 UNIT/ML injection Inject 20 Units Subcutaneous every morning (before breakfast)  Qty: 50  mL, Refills: 0    Associated Diagnoses: Hyperglycemia      spironolactone (ALDACTONE) 50 MG tablet Take 1 tablet (50 mg) by mouth daily  Qty: 3 tablet, Refills: 0      gabapentin (NEURONTIN) 600 MG tablet Take 1 tablet (600 mg) by mouth 3 times daily  Qty: 9 tablet, Refills: 0      sulfamethoxazole-trimethoprim (BACTRIM DS/SEPTRA DS) 800-160 MG per tablet Take 1 tablet by mouth three times a week  Qty: 30 tablet, Refills: 0    Associated Diagnoses: Bronchiolitis      ascorbic acid (VITAMIN C) 500 MG tablet Take 500 mg by mouth daily      FLUoxetine (PROZAC) 40 MG capsule Take 40 mg by mouth daily      hydrOXYzine (ATARAX) 10 MG tablet Take 10 mg by mouth every 8 hours as needed for itching      lactulose (CHRONULAC) 10 GM/15ML solution Take 20 g by mouth 3 times daily      traZODone (DESYREL) 100 MG tablet Take 100 mg by mouth At Bedtime      Omega-3 Fatty Acids (FISH OIL) 1200 MG CAPS Take 1 capsule by mouth daily      methocarbamol (ROBAXIN) 500 MG tablet Take 1,000 mg by mouth 4 times daily      aspirin EC 81 MG EC tablet Take 1 tablet (81 mg) by mouth daily  Qty: 30 tablet, Refills: 1    Associated Diagnoses: Elevated troponin I level      atorvastatin (LIPITOR) 20 MG tablet Take 1 tablet (20 mg) by mouth daily  Qty: 30 tablet, Refills: 1    Associated Diagnoses: Elevated troponin I level      rifaximin (XIFAXAN) 550 MG TABS tablet Take 1 tablet (550 mg) by mouth 2 times daily  Qty: 60 tablet, Refills: 0    Associated Diagnoses: Other ascites      ferrous sulfate (IRON) 325 (65 FE) MG tablet Take 325 mg by mouth daily (with breakfast)      buprenorphine-naloxone (SUBOXONE) 8-2 MG SUBL sublingual tablet Place 3 tablets under the tongue daily      alum & mag hydroxide-simethicone (MYLANTA ES/MAALOX  ES) 400-400-40 MG/5ML SUSP Take 30 mLs by mouth every 4 hours as needed for indigestion Reported on 2/23/2017      albuterol (PROAIR HFA, PROVENTIL HFA, VENTOLIN HFA) 108 (90 BASE) MCG/ACT inhaler Inhale 2 puffs into  the lungs every 4 hours as needed for shortness of breath / dyspnea or wheezing Reported on 2/23/2017      MULTIPLE VITAMINS PO Take 1 tablet by mouth daily    Associated Diagnoses: Alcoholic hepatitis      folic acid (FOLVITE) 1 MG tablet Take 1 tablet (1 mg) by mouth daily  Qty: 30 tablet    Associated Diagnoses: Alcoholic hepatitis; Alcohol dependence (H)      blood glucose monitoring (ACCU-CHEK TONIA PLUS) meter device kit Use to test blood sugars BID times daily or as directed.  Qty: 1 kit, Refills: 0    Associated Diagnoses: Hyperglycemia         STOP taking these medications       omeprazole (PRILOSEC) 20 MG CR capsule Comments:   Reason for Stopping:         albuterol (2.5 MG/3ML) 0.083% neb solution Comments:   Reason for Stopping:             Allergies   Allergies   Allergen Reactions     No Clinical Screening - See Comments      Bupronide patch allergy     Data   Most Recent 3 CBC's:  Recent Labs   Lab Test  09/14/17   0906 09/12/17 2318 05/08/17   0605   WBC  10.6  17.2*  6.7   HGB  14.0  15.7  12.7   MCV  95  90  98   PLT  91*  111*  122*      Most Recent 3 BMP's:  Recent Labs   Lab Test  09/14/17   0906 09/13/17   1047  09/12/17   2318   NA  137  136  133   POTASSIUM  4.1  5.4*  3.2*   CHLORIDE  101  100  97   CO2  31  33*  26   BUN  14  18  25   CR  0.92  1.03  1.31*   ANIONGAP  5  3  10   VISHNU  9.4  9.1  8.8   GLC  176*  435*  176*     Most Recent 2 LFT's:  Recent Labs   Lab Test  09/12/17 2318 05/06/17   0550   AST  64*  56*   ALT  72*  47   ALKPHOS  108  97   BILITOTAL  0.4  0.4     Most Recent INR's and Anticoagulation Dosing History:  Anticoagulation Dose History     Recent Dosing and Labs Latest Ref Rng & Units 9/11/2015 3/25/2016 6/23/2016 12/6/2016 1/7/2017 1/10/2017 5/2/2017    INR 0.86 - 1.14 1.05 1.11 1.32(H) 1.19(H) 1.14 1.37(H) 1.15(H)        Most Recent 3 Troponin's:  Recent Labs   Lab Test  05/02/17   1854  02/16/17   0943  01/07/17   1814   TROPI  <0.015  The 99th percentile for  upper reference range is 0.045 ug/L.  Troponin values in   the range of 0.045 - 0.120 ug/L may be associated with risks of adverse   clinical events.    0.018  <0.015  The 99th percentile for upper reference range is 0.045 ug/L.  Troponin values in   the range of 0.045 - 0.120 ug/L may be associated with risks of adverse   clinical events.       Most Recent Cholesterol Panel:  Recent Labs   Lab Test  01/12/17   0715  12/07/16   0620   CHOL   --   145   LDL   --   95   HDL   --   18*   TRIG  160*  161*     Most Recent 6 Bacteria Isolates From Any Culture (See EPIC Reports for Culture Details):  Recent Labs   Lab Test  09/13/17   0052  09/12/17   2318  09/12/17   2313  05/02/17   1946  05/02/17   1924  03/23/17   1500   CULT  <10,000 colonies/mL  mixed urogenital maikol    No growth after 1 day  No growth after 1 day  No growth  No growth  Moderate growth Candida albicans / dubliniensis Candida albicans and Candida   dubliniensis are not routinely speciated Susceptibility testing not routinely   done  Light growth Staphylococcus aureus  *     Most Recent TSH, T4 and A1c Labs:  Recent Labs   Lab Test  09/14/17   0549   01/07/17   1814   TSH   --    --   1.04   A1C  7.0*   < >   --     < > = values in this interval not displayed.

## 2017-09-14 NOTE — PLAN OF CARE
Problem: Pneumonia (Adult)  Goal: Signs and Symptoms of Listed Potential Problems Will be Absent or Manageable (Pneumonia)  Signs and symptoms of listed potential problems will be absent or manageable by discharge/transition of care (reference Pneumonia (Adult) CPG).  Outcome: Improving  ICU End of Shift Summary.  For vital signs and complete assessments, please see documentation flowsheets.      Pertinent assessments: Has been on the bipap all night, lungs are slightly coarse with IN and EX WZ, VSS, temp 98.9 ax,  Slept., Dr Mane note states poss transfer to [facility] for lung bx.  Major Shift Events: none  Plan (Upcoming Events): poss trans to [facility] for lung bx  Discharge/Transfer Needs: TBD     Bedside Shift Report Completed : yes  Bedside Safety Check Completed: yes

## 2017-09-14 NOTE — PLAN OF CARE
Problem: Goal Outcome Summary  Goal: Goal Outcome Summary  Outcome: No Change  Pt direct admit from [facility], on BIPAP, O2 sats mid 90's, encouraged IS, lungs coarse, complains of back pain, robaxin scheduled, NPO while on BIPAP, need sputum culture yet, up in room with stand by assist, good urine output

## 2017-09-14 NOTE — PROGRESS NOTES
Pt transported from 5th floor and placed on BIPAP 12/6 65% R-12. Pt is tolerating well with  sPo2 in the mid to high 90's and RR in the low  20'S.     Tali Malhotra

## 2017-09-14 NOTE — CONSULTS
Pt seen see note. Pt to obtain Open Lung Biopsy. Has acute respiratory failure with ILD. Plans well outlined by her [facility] Pulmonologist.    Grupo Espinosa MD  [facility]  [phone]

## 2017-09-14 NOTE — IP AVS SNAPSHOT
MRN:[id number]                      After Visit Summary   9/14/2017    Sabina Figueroa    MRN: [id number]           Thank you!     Thank you for choosing [facility] for your care. Our goal is always to provide you with excellent care. Hearing back from our patients is one way we can continue to improve our services. Please take a few minutes to complete the written survey that you may receive in the mail after you visit with us. Thank you!        Patient Information     Date Of Birth          1975        Designated Caregiver       Most Recent Value    Caregiver    Will someone help with your care after discharge? yes    Name of designated caregiver Anatoliy Broderick    Phone number of caregiver [phone]    Caregiver address -      About your hospital stay     You were admitted on:  September 14, 2017 You last received care in the:  [facility] [address] [facility]    You were discharged on:  September 20, 2017        Reason for your hospital stay       Status post open lung biopsy                  Who to Call     For medical emergencies, please call 911.  For non-urgent questions about your medical care, please call your primary care provider or clinic, [phone]  For questions related to your surgery, please call your surgery clinic        Attending Provider     Provider Mandeep Granda DO Internal Medicine    Amor Nelson MD Internal Medicine       Primary Care Provider Office Phone # Fax #    Aidee Hemphill [phone]      After Care Instructions     Activity       Your activity upon discharge: activity as tolerated            Diet       Follow this diet upon discharge: Orders Placed This Encounter      Moderate Consistent CHO Diet                    Follow-up Appointments     Follow-up and recommended labs and tests       Follow up with primary care provider, Aidee Hemphill, within 7 days for hospital follow- up.    Dr Baird in  "1 week  Dr Harper; pain clinic within 1 week  Dr Rodriguez, primary pulmonologist at [facility] in 1 week                  Your next 10 appointments already scheduled     Oct 09, 2017  9:30 AM CDT   Return Visit with Jennifer Mane MD   [facility] ([facility])    [address]  [address] [facility] [address] [phone]              Further instructions from your care team       [facility]  Discharge Orders & Follow-up Care  Video-Assisted: Thoracoscopy - Thoracotomy    Call [place] at Dr. Baird  office at [phone] to make a return appointment with a chest x-ray on Wednesday 09-28.  Your appointment will be with either Leslee Alcantara PA-C or Amina Horan PA-C, or Dr. Baird.      MAKE AN APPOINTMENT WITH     Our office is located at:  [facility], [address] [place] [address], [address], [place] [address].  [place] will explain where to park when you call for an appointment.    A. Patient Care  Call Dr Baird  office @ [phone] if:  ? Severe chills or a fever or 100.4 F.  temperature on two occasions  ? Increased incisional pain and/or redness  ? Presence of unusual incisional or chest tube site drainage  ? Coughing up bright red blood or greenish-yellow secretions  ? Significant increase in shortness of breath    Pain Relief  You have been given a prescription for narcotic pain medicine.  You may also take ibuprofen and acetaminophen over the counter.  Recommended dosages are:  600 mg Ibuprofen every 6 hours as needed and 650 mg Acetaminophen every 4 hours as needed.  Many patients get good pain relief by \"staggering\" these medications.     No driving while on narcotics.     Activity  XXX No activity restrictions for a scope procedure/thoracoscopy  ___ No heavy lifting greater than 8-10 pounds on the operative side for 4-6 weeks for a thoracotomy    Wound Care  Remove all dressings in 48 hours and then " you may shower.  Wash the incision and chest tube site(s) daily with soap and water. No bathing or immersing incision underwater for approximately 2 weeks or until the chest tube sites are completely healed.     Place a dry gauze dressing over the chest tube site(s) because it(they) will drain a few days.  Do not be alarmed if there is drainage of a large amount of fluid (should be pink or yellow-- or somewhat bloody) either spontaneously or with coughing or exertion. If this happens, just place a large dry gauze dressing over the chest tube site-- it will stop and scab over in about a week or so. Once the drainage stops, then leave the chest tube site(s) open to air.     White steri strips will be present on the incision(s). They will peel off within about 10 days-- otherwise, they will be removed at your post-op appointment.     B. Respiratory  xxx_ Utilize Incentive spirometer 10 times in a row every few hours while awake for a few weeks after discharging home from the hospital    .          Pending Results     Date and Time Order Name Status Description    9/16/2017 0931 Tissue Culture Aerobic Bacterial Preliminary     9/16/2017 0931 Nocardia culture Preliminary     9/16/2017 0931 Legionella culture Preliminary     9/16/2017 0931 Fungus Culture, non-blood Preliminary     9/16/2017 0931 Anaerobic bacterial culture Preliminary     9/16/2017 0931 AFB Culture Non Blood Preliminary     9/16/2017 0913 Surgical pathology exam In process             Statement of Approval     Ordered          09/20/17 0915  I have reviewed and agree with all the recommendations and orders detailed in this document.  EFFECTIVE NOW     Approved and electronically signed by:  Amor Nelson MD             Admission Information     Date & Time Provider Department Dept. Phone    9/14/2017 Amor Nelsno MD [place] [address] [facility] [phone]      Your Vitals Were     Blood Pressure Pulse Temperature Respirations  "Height Weight    116/78 (BP Location: Right arm) 96 98.9  F (37.2  C) (Oral) 16 1.702 m (5' 7\") 84.6 kg (186 lb 8.2 oz)    Pulse Oximetry BMI (Body Mass Index)                97% 29.21 kg/m2          KIWATCH Information     KIWATCH lets you send messages to your doctor, view your test results, renew your prescriptions, schedule appointments and more. To sign up, go to [email or web address] . Click on \"Log in\" on the left side of the screen, which will take you to the Welcome page. Then click on \"Sign up Now\" on the right side of the page.     You will be asked to enter the access code listed below, as well as some personal information. Please follow the directions to create your username and password.     Your access code is: [email or web address]  Expires: 10/22/2017 12:25 PM     Your access code will  in 90 days. If you need help or a new code, please call your [facility] clinic or [phone].        Care EveryWhere ID     This is your Care EveryWhere ID. This could be used by other organizations to access your [facility] medical records  [id number]        Equal Access to Services     LANEY VELASCO AH: Rylee Mercado, waaxda luqadaha, qaybta kaalmada adeegyada, juan segundo. So [facility] [phone].    ATENCIÓN: Si habla español, tiene a louie disposición servicios gratuitos de asistencia lingüística. Llame al [phone].    We comply with applicable federal civil rights laws and [place] laws. We do not discriminate on the basis of race, color, national origin, age, disability sex, sexual orientation or gender identity.               Review of your medicines      START taking        Dose / Directions    acetaminophen 325 MG tablet   Commonly known as:  TYLENOL   Used for:  Organizing pneumonia (H)        Dose:  650 mg   Take 2 tablets (650 mg) by mouth every 6 hours as needed for mild pain   Quantity:  100 tablet   Refills:  0       oxyCODONE 10 MG IR tablet   Commonly known " as:  ROXICODONE   Used for:  Organizing pneumonia (H)        Dose:  10 mg   Take 1 tablet (10 mg) by mouth every 4 hours as needed for moderate to severe pain   Quantity:  30 tablet   Refills:  0         CONTINUE these medicines which may have CHANGED, or have new prescriptions. If we are uncertain of the size of tablets/capsules you have at home, strength may be listed as something that might have changed.        Dose / Directions    atorvastatin 20 MG tablet   Commonly known as:  LIPITOR   This may have changed:  when to take this   Used for:  Elevated troponin I level        Dose:  20 mg   Take 1 tablet (20 mg) by mouth daily   Quantity:  30 tablet   Refills:  1       insulin glargine 100 UNIT/ML injection   Commonly known as:  LANTUS   This may have changed:  how much to take   Used for:  Hyperglycemia        Dose:  25 Units   Inject 25 Units Subcutaneous every morning (before breakfast)   Quantity:  50 mL   Refills:  0       predniSONE 50 MG tablet   Commonly known as:  DELTASONE   This may have changed:    - medication strength  - how much to take  - Another medication with the same name was removed. Continue taking this medication, and follow the directions you see here.   Used for:  Organizing pneumonia (H)        Dose:  50 mg   Take 1 tablet (50 mg) by mouth daily   Quantity:  30 tablet   Refills:  0       sulfamethoxazole-trimethoprim 800-160 MG per tablet   Commonly known as:  BACTRIM DS/SEPTRA DS   Indication:  pjp ppx   This may have changed:  additional instructions   Used for:  Bronchiolitis        Dose:  1 tablet   Take 1 tablet by mouth three times a week   Quantity:  30 tablet   Refills:  0         CONTINUE these medicines which have NOT CHANGED        Dose / Directions    albuterol 108 (90 BASE) MCG/ACT Inhaler   Commonly known as:  PROAIR HFA/PROVENTIL HFA/VENTOLIN HFA        Dose:  2 puff   Inhale 2 puffs into the lungs every 4 hours as needed for shortness of breath / dyspnea or wheezing  Reported on 2/23/2017   Refills:  0       alum & mag hydroxide-simethicone 400-400-40 MG/5ML Susp suspension   Commonly known as:  MYLANTA ES/MAALOX  ES        Dose:  30 mL   Take 30 mLs by mouth every 4 hours as needed for indigestion Reported on 2/23/2017   Refills:  0       ascorbic acid 500 MG tablet   Commonly known as:  VITAMIN C        Dose:  500 mg   Take 500 mg by mouth daily   Refills:  0       aspirin 81 MG EC tablet   Used for:  Elevated troponin I level        Dose:  81 mg   Take 1 tablet (81 mg) by mouth daily   Quantity:  30 tablet   Refills:  1       blood glucose monitoring meter device kit   Used for:  Hyperglycemia        Use to test blood sugars BID times daily or as directed.   Quantity:  1 kit   Refills:  0       buprenorphine-naloxone 8-2 MG Subl sublingual tablet   Commonly known as:  SUBOXONE   Used for:  Other chronic pain        Dose:  3 tablet   Place 3 tablets under the tongue daily   Quantity:  60 each   Refills:  0       ferrous sulfate 325 (65 FE) MG tablet   Commonly known as:  IRON        Dose:  325 mg   Take 325 mg by mouth daily (with breakfast)   Refills:  0       Fish Oil 1200 MG Caps        Dose:  1 capsule   Take 1 capsule by mouth daily   Refills:  0       FLUoxetine 40 MG capsule   Commonly known as:  PROzac        Dose:  40 mg   Take 40 mg by mouth daily   Refills:  0       folic acid 1 MG tablet   Commonly known as:  FOLVITE   Used for:  Alcoholic hepatitis, Alcohol dependence (H)        Dose:  1 mg   Take 1 tablet (1 mg) by mouth daily   Quantity:  30 tablet   Refills:  0       furosemide 20 MG tablet   Commonly known as:  LASIX   Used for:  Organizing pneumonia (H)        Dose:  20 mg   Take 1 tablet (20 mg) by mouth daily   Quantity:  30 tablet   Refills:  3       gabapentin 600 MG tablet   Commonly known as:  NEURONTIN        Dose:  600 mg   Take 1 tablet (600 mg) by mouth 3 times daily   Quantity:  9 tablet   Refills:  0       hydrOXYzine 10 MG tablet   Commonly known  as:  ATARAX        Dose:  10 mg   Take 10 mg by mouth every 8 hours as needed for itching   Refills:  0       lactulose 10 GM/15ML solution   Commonly known as:  CHRONULAC        Dose:  20 g   Take 20 g by mouth 3 times daily   Refills:  0       loratadine 10 MG tablet   Commonly known as:  CLARITIN        Dose:  10 mg   Take 10 mg by mouth daily   Refills:  0       MELATONIN PO        Dose:  5 mg   Take 5 mg by mouth At Bedtime   Refills:  0       MIRTAZAPINE PO        Dose:  30 mg   Take 30 mg by mouth At Bedtime   Refills:  0       MULTIPLE VITAMINS PO   Used for:  Alcoholic hepatitis        Dose:  1 tablet   Take 1 tablet by mouth daily   Refills:  0       PROTONIX PO        Dose:  40 mg   Take 40 mg by mouth every morning (before breakfast)   Refills:  0       rifaximin 550 MG Tabs tablet   Commonly known as:  XIFAXAN   Used for:  Other ascites        Dose:  550 mg   Take 1 tablet (550 mg) by mouth 2 times daily   Quantity:  60 tablet   Refills:  0       ROBAXIN 500 MG tablet   Generic drug:  methocarbamol        Dose:  1000 mg   Take 1,000 mg by mouth 4 times daily   Refills:  0       spironolactone 50 MG tablet   Commonly known as:  ALDACTONE        Dose:  50 mg   Take 1 tablet (50 mg) by mouth daily   Quantity:  3 tablet   Refills:  0       THIAMINE HCL PO        Dose:  50 mg   Take 50 mg by mouth daily   Refills:  0       traZODone 100 MG tablet   Commonly known as:  DESYREL        Dose:  100 mg   Take 100 mg by mouth At Bedtime   Refills:  0         STOP taking     IBUPROFEN PO                Where to get your medicines      These medications were sent to [id number] - [place], [place] - [address]  [address] [address]     Phone:  [phone]     insulin glargine 100 UNIT/ML injection    predniSONE 50 MG tablet         Some of these will need a paper prescription and others can be bought over the counter. Ask your nurse if you have questions.     Bring a paper prescription for  each of these medications     buprenorphine-naloxone 8-2 MG Subl sublingual tablet    oxyCODONE 10 MG IR tablet       You don't need a prescription for these medications     acetaminophen 325 MG tablet                Protect others around you: Learn how to safely use, store and throw away your medicines at www.disposemymeds.org.             Medication List: This is a list of all your medications and when to take them. Check marks below indicate your daily home schedule. Keep this list as a reference.      Medications           Morning Afternoon Evening Bedtime As Needed    acetaminophen 325 MG tablet   Commonly known as:  TYLENOL   Take 2 tablets (650 mg) by mouth every 6 hours as needed for mild pain   Last time this was given:  650 mg on 9/20/2017 10:36 AM                                   albuterol 108 (90 BASE) MCG/ACT Inhaler   Commonly known as:  PROAIR HFA/PROVENTIL HFA/VENTOLIN HFA   Inhale 2 puffs into the lungs every 4 hours as needed for shortness of breath / dyspnea or wheezing Reported on 2/23/2017                                   alum & mag hydroxide-simethicone 400-400-40 MG/5ML Susp suspension   Commonly known as:  MYLANTA ES/MAALOX  ES   Take 30 mLs by mouth every 4 hours as needed for indigestion Reported on 2/23/2017                                   ascorbic acid 500 MG tablet   Commonly known as:  VITAMIN C   Take 500 mg by mouth daily                                   aspirin 81 MG EC tablet   Take 1 tablet (81 mg) by mouth daily   Last time this was given:  81 mg on 9/15/2017  8:39 AM                                   atorvastatin 20 MG tablet   Commonly known as:  LIPITOR   Take 1 tablet (20 mg) by mouth daily   Last time this was given:  20 mg on 9/19/2017  9:23 PM                                   blood glucose monitoring meter device kit   Use to test blood sugars BID times daily or as directed.                                      buprenorphine-naloxone 8-2 MG Subl sublingual tablet    Commonly known as:  SUBOXONE   Place 3 tablets under the tongue daily   Next Dose Due:  Resume this in one week after your visit to your clinic and all narcotics have stopped as discussed today with your doctor.                                ferrous sulfate 325 (65 FE) MG tablet   Commonly known as:  IRON   Take 325 mg by mouth daily (with breakfast)                                   Fish Oil 1200 MG Caps   Take 1 capsule by mouth daily                                   FLUoxetine 40 MG capsule   Commonly known as:  PROzac   Take 40 mg by mouth daily   Last time this was given:  40 mg on 9/20/2017  9:05 AM                                   folic acid 1 MG tablet   Commonly known as:  FOLVITE   Take 1 tablet (1 mg) by mouth daily   Last time this was given:  1 mg on 9/20/2017  9:05 AM                                   furosemide 20 MG tablet   Commonly known as:  LASIX   Take 1 tablet (20 mg) by mouth daily   Last time this was given:  20 mg on 9/20/2017  9:05 AM                                   gabapentin 600 MG tablet   Commonly known as:  NEURONTIN   Take 1 tablet (600 mg) by mouth 3 times daily   Last time this was given:  600 mg on 9/20/2017  9:05 AM                                         hydrOXYzine 10 MG tablet   Commonly known as:  ATARAX   Take 10 mg by mouth every 8 hours as needed for itching                                   insulin glargine 100 UNIT/ML injection   Commonly known as:  LANTUS   Inject 25 Units Subcutaneous every morning (before breakfast)   Last time this was given:  25 Units on 9/19/2017  9:37 PM                                   lactulose 10 GM/15ML solution   Commonly known as:  CHRONULAC   Take 20 g by mouth 3 times daily   Last time this was given:  20 g on 9/20/2017  9:05 AM                                         loratadine 10 MG tablet   Commonly known as:  CLARITIN   Take 10 mg by mouth daily                                   MELATONIN PO   Take 5 mg by mouth At Bedtime                                    MIRTAZAPINE PO   Take 30 mg by mouth At Bedtime   Last time this was given:  30 mg on 9/19/2017  9:27 PM                                   MULTIPLE VITAMINS PO   Take 1 tablet by mouth daily                                   oxyCODONE 10 MG IR tablet   Commonly known as:  ROXICODONE   Take 1 tablet (10 mg) by mouth every 4 hours as needed for moderate to severe pain   Last time this was given:  10 mg on 9/20/2017  9:13 AM                                   predniSONE 50 MG tablet   Commonly known as:  DELTASONE   Take 1 tablet (50 mg) by mouth daily   Last time this was given:  50 mg on 9/20/2017  9:05 AM                                   PROTONIX PO   Take 40 mg by mouth every morning (before breakfast)   Last time this was given:  40 mg on 9/20/2017  7:05 AM                                   rifaximin 550 MG Tabs tablet   Commonly known as:  XIFAXAN   Take 1 tablet (550 mg) by mouth 2 times daily   Last time this was given:  550 mg on 9/20/2017  9:05 AM                                      ROBAXIN 500 MG tablet   Take 1,000 mg by mouth 4 times daily   Last time this was given:  1,000 mg on 9/20/2017  9:05 AM   Generic drug:  methocarbamol                                            spironolactone 50 MG tablet   Commonly known as:  ALDACTONE   Take 1 tablet (50 mg) by mouth daily   Last time this was given:  50 mg on 9/20/2017 10:36 AM                                   sulfamethoxazole-trimethoprim 800-160 MG per tablet   Commonly known as:  BACTRIM DS/SEPTRA DS   Take 1 tablet by mouth three times a week   Last time this was given:  1 tablet on 9/20/2017  9:05 AM   Next Dose Due:  Next dose on Friday.    Take Monday, Wednesday, Friday.                                THIAMINE HCL PO   Take 50 mg by mouth daily   Last time this was given:  50 mg on 9/20/2017  9:05 AM                                   traZODone 100 MG tablet   Commonly known as:  DESYREL   Take 100 mg by mouth At Bedtime    Last time this was given:  150 mg on 9/19/2017  9:32 PM

## 2017-09-14 NOTE — H&P
PRIMARY CARE PHYSICIAN:  Dr. Hemphill.      PRIMARY PULMONOLOGIST:  Dr. Mane.      CHIEF COMPLAINT:  Sabina Rosenthal is a direct admit from [facility] due to acute hypoxic respiratory failure thought to be secondary to cryptogenic organizing pneumonia with plans to proceed with a lung biopsy.      HISTORY OF PRESENT ILLNESS Sabina Figueroa is a 41-year-old female with a past medical history significant for chronic liver disease secondary to alcohol abuse, alcohol-induced cirrhosis with ascites, alcohol induced neuropathy, major depressive disorder with anxiety, hyperlipidemia, chronic pain syndrome with chronic back pain, opioid abuse on Suboxone, tobacco dependence, steroid-induced insulin-dependent diabetes, asthma, chronic kidney disease stage II-III, obesity, history of C. diff and previous and multiple pneumonias with cryptogenic organizing pneumonia as well as Mycoplasma pneumoniae who again is directly admitted to [facility] due to acute hypoxic respiratory failure thought to be secondary to cryptogenic organizing pneumonia with plans to proceed with a lung biopsy.      Patient was admitted to [facility] on 09/13/2017 due to acute hypoxic respiratory failure.  She initially required supplemental oxygen at 4-5 liters continuously (patient is usually on room air) and was evaluated by Pulmonology and treated with ceftriaxone and azithromycin and initiation of Solu-Medrol.  Following a Pulmonology assessment and discussion with Thoracic Surgery, patient was transferred to [facility] to undergo lung biopsy and continued management.      Upon discussion with the patient, she had indicated that she has at least 2 days of fevers and chills and flu-like symptoms which usually are indicators of developing pneumonia.  She initially attempted to manage at home, however, became overly stressed with several things that occurred at home requiring a lot of activity  which pushed her into developing shortness of breath and dyspnea on exertion.  She had checked her O2 saturations at home with her home machine and had low readings in the 60s.  Patient then presented to [facility] and was admitted at that point.      Patient had indicated that again she has had some fevers, body aches and chills for the last 2 days, had frequent night sweats and worsening fatigue.  She had ongoing headache today and yesterday that is a stabbing like sensation on the top of her head that is worse with cough.  She also states that she had some chest pressure twice yesterday that only lasted about 30 seconds and then resolved.  Again, she is having significant shortness of breath and dyspnea on exertion with minimal activity.  She has been having issues with ongoing constipation.  She states that she bruises and bleeds quite easily and has ongoing pins and needles neuropathy in her feet.  Patient has had a history of 2 seizures, one occurring at a very young age of 12 due to drug use and the other one occurring later in life due to alcohol.  When asked about her mood, she stated that this is relatively good.      PAST MEDICAL HISTORY:   1.  Chronic liver disease secondary to alcohol abuse.   2.  Alcohol-induced cirrhosis with ascites.   3.  Alcohol-induced neuropathy.   4.  Hyperlipidemia.   5.  Major depressive disorder with anxiety.   6.  Chronic pain syndrome with chronic back pain.   7.  Opioid abuse for which the patient is on high-dose Suboxone.   8.  Ongoing tobacco dependence, currently smoking 1-1/2 packs per day.   9.  Obesity.   10.  Suspected chronic kidney disease, stage II-III, with a baseline creatinine of 0.9-1.1.   11.  Mild persistent asthma.   12.  Steroid-induced insulin-dependent diabetes with an A1c noted to be 7 from yesterday.   13.  Recurrent acute hypoxic respiratory failure secondary to multiple pneumonia infections, most recently being a cryptogenic organizing  pneumonia for which the patient is on chronic steroid therapy and every other day Bactrim.      PAST SURGICAL HISTORY:   1.  .   2.  Multiple knee procedures 2 performed on the right knee due to ligament injury and 1 on the left due to ligament injury.      PRIOR TO ADMISSION MEDICATIONS:    Prior to Admission medications    Medication Sig Last Dose Taking? Auth Provider   Pantoprazole Sodium (PROTONIX PO) Take 40 mg by mouth every morning (before breakfast)   Unknown, Entered By History   PREDNISONE PO Take 25 mg by mouth daily   Unknown, Entered By History   loratadine (CLARITIN) 10 MG tablet Take 10 mg by mouth daily   Unknown, Entered By History   MIRTAZAPINE PO Take 30 mg by mouth At Bedtime   Unknown, Entered By History   MELATONIN PO Take 5 mg by mouth At Bedtime   Unknown, Entered By History   IBUPROFEN PO Take 600 mg by mouth daily   Unknown, Entered By History   THIAMINE HCL PO Take 50 mg by mouth daily   Unknown, Entered By History   furosemide (LASIX) 20 MG tablet Take 1 tablet (20 mg) by mouth daily   Jennifer Mane MD   insulin glargine (LANTUS) 100 UNIT/ML injection Inject 20 Units Subcutaneous every morning (before breakfast)   Anatoliy Dos Santos DO   spironolactone (ALDACTONE) 50 MG tablet Take 1 tablet (50 mg) by mouth daily   Josemanuel Damon MD   gabapentin (NEURONTIN) 600 MG tablet Take 1 tablet (600 mg) by mouth 3 times daily   Josemanuel Damon MD   sulfamethoxazole-trimethoprim (BACTRIM DS/SEPTRA DS) 800-160 MG per tablet Take 1 tablet by mouth three times a week  Patient taking differently: Take 1 tablet by mouth three times a week on Mon/Wed/Friday   Yisel Henry MD   blood glucose monitoring (ACCU-CHEK TONIA PLUS) meter device kit Use to test blood sugars BID times daily or as directed.   Yisel Henry MD   ascorbic acid (VITAMIN C) 500 MG tablet Take 500 mg by mouth daily   Unknown, Entered By History   FLUoxetine (PROZAC) 40 MG capsule Take 40 mg by mouth  daily   Unknown, Entered By History   hydrOXYzine (ATARAX) 10 MG tablet Take 10 mg by mouth every 8 hours as needed for itching   Unknown, Entered By History   lactulose (CHRONULAC) 10 GM/15ML solution Take 20 g by mouth 3 times daily   Unknown, Entered By History   traZODone (DESYREL) 100 MG tablet Take 100 mg by mouth At Bedtime   Unknown, Entered By History   Omega-3 Fatty Acids (FISH OIL) 1200 MG CAPS Take 1 capsule by mouth daily   Unknown, Entered By History   methocarbamol (ROBAXIN) 500 MG tablet Take 1,000 mg by mouth 4 times daily   Unknown, Entered By History   aspirin EC 81 MG EC tablet Take 1 tablet (81 mg) by mouth daily   Anatoliy Dos Santos DO   atorvastatin (LIPITOR) 20 MG tablet Take 1 tablet (20 mg) by mouth daily   Anatoliy Dos Santos DO   rifaximin (XIFAXAN) 550 MG TABS tablet Take 1 tablet (550 mg) by mouth 2 times daily   Anatoliy Dos Santos DO   ferrous sulfate (IRON) 325 (65 FE) MG tablet Take 325 mg by mouth daily (with breakfast)   Unknown, Entered By History   buprenorphine-naloxone (SUBOXONE) 8-2 MG SUBL sublingual tablet Place 3 tablets under the tongue daily   Unknown, Entered By History   alum & mag hydroxide-simethicone (MYLANTA ES/MAALOX  ES) 400-400-40 MG/5ML SUSP Take 30 mLs by mouth every 4 hours as needed for indigestion Reported on 2/23/2017   Reported, Patient   albuterol (PROAIR HFA, PROVENTIL HFA, VENTOLIN HFA) 108 (90 BASE) MCG/ACT inhaler Inhale 2 puffs into the lungs every 4 hours as needed for shortness of breath / dyspnea or wheezing Reported on 2/23/2017   Reported, Patient   MULTIPLE VITAMINS PO Take 1 tablet by mouth daily   Reported, Patient   folic acid (FOLVITE) 1 MG tablet Take 1 tablet (1 mg) by mouth daily   Devin Peace MD       ALLERGIES:   Bupronide patch causing redness and elevated blood pressure.      SOCIAL HISTORY:  Patient resides in an apartment in [place] with her boyfriend.  She states she is smoking 1-1/2 packs per day of cigarettes and has been  smoking since the age of 15.  Patient does state that she utilizes alcohol, but this last occurred on 05/11/2017.  The patient has a very remote history of street and illicit drug use, that was over 20 years ago and none recently.  She denies any use of a cane or a walker and does not utilize home oxygen.      FAMILY MEDICAL HISTORY:   1.  Father passed away due to liver cancer and had issues with alcohol and drugs.   2.  Patient has 2 brothers that have issues with alcohol and drugs.   3.  Mother passed away and had known skin cancer and lung cancer.   4.  Maternal grandmother passed away due to a brain hemorrhage.      REVIEW OF SYSTEMS:  A 10-point review of systems was performed.  All pertinent positives are listed in the history of present illness, otherwise is negative.      PHYSICAL EXAMINATION:   VITAL SIGNS:  Temperature is 98.8 degrees Fahrenheit with a blood pressure of 118/76, heart rate of 87 beats per minute, respiratory rate of 14, O2 saturation 97% on continuous BiPAP.  Patient had indicated her pain was a 7/10, but she appears comfortable.     GENERAL:  Patient is awake, alert and cooperative in no apparent distress.  Alert and oriented x3.   HEENT:  BiPAP is in place.  Normocephalic, atraumatic.  Mouth:  Mucous membranes deferred.  Eyes:  Pupils are equal, round, reactive to light.  Extraocular movements are intact.  Normal sclerae.   NECK:  Supple, normal range of motion, no tracheal deviation, no cervical lymphadenopathy present.   CARDIAC:  Regular rate and rhythm, no rubs, murmurs or gallops appreciated.   PULMONARY:  Diffuse coarse rhonchi throughout the lung fields is appreciated.   GASTROINTESTINAL:  Obese.  Bowel sounds are present in all 4 quadrants, soft, nontender, nondistended.   NEUROLOGIC:  Cranial nerves II-XII appear grossly intact.  Patient demonstrates equal sensation, coordination and strength in the upper and lower extremities bilaterally.   EXTREMITIES:  No lower extremity  edema noted bilaterally.  Calves are nontender to palpation and dorsal pedal pulses are palpable bilaterally.      ASSESSMENT AND PLAN:  Adela Figueroa is a 41-year-old female with a past medical history significant for chronic liver disease secondary to alcohol abuse, alcohol-induced cirrhosis with ascites, alcohol-induced neuropathy, hyperlipidemia, major depressive disorder with anxiety, obesity, chronic pain syndrome with chronic low back pain, opioid abuse, tobacco dependence, steroid-induced insulin-dependent diabetes, mild persistent asthma, history of Clostridium difficile, suspected chronic kidney disease stage II-III and recurrent acute hypoxic respiratory failure secondary to recurrent pneumonia who was directly admitted to [facility] due to acute hypoxic respiratory failure thought to be secondary to cryptogenic organizing pneumonia and fluid overload with plans to proceed with a lung biopsy.   1.  Acute hypoxic respiratory failure thought to be secondary to a cryptogenic organizing pneumonia and a fluid overload.  Patient has been evaluated by pulmonology at [facility] and have formally consulted Pulmonology service here at [facility].  Patient will continue on continuous BiPAP with respiratory therapy assistance with managing and aggressive pulmonary hygiene.  I have ordered for a sputum culture and gram stain.  Will continue with intravenous antibiotics with ceftriaxone 2 grams daily and azithromycin 500 mg daily.  I have ordered for Mucinex 1200 mg 2 times daily.  I have also requested Acapella valve to be performed 4 times daily.  Patient will have DuoNebs scheduled every 4 hours while awake and as needed every 2 hours.  Will attempt to wean patient off BiPAP and supplemental oxygen with attempts to get back on room air, as the patient is not on supplemental oxygen at home.  Have formally consulted Thoracic Surgery to proceed with a lung biopsy for which Dr. Rosado  Oli is aware of this procedure and plan.  The patient will have continuous O2 sats monitored as well.  Will also hold oral Lasix 20 mg and initiate IV Lasix at 40 mg 2 times daily and monitor strict inputs and outputs and daily weights.   2.  Chronic liver disease secondary to alcohol abuse with associated cirrhosis with ascites and associated alcohol-induced neuropathy:  This appears to be stable at this point.  Patient has not followed up with gastroenterologist in 5 years.  Will plan to resume prior to admit folic acid 1 mg daily, thiamine 50 mg daily, spironolactone 50 mg daily, rifaximin 550 mg daily, lactulose 20 mg (will make this daily, though was previously prescribed as 3 times daily), gabapentin 600 mg 3 times daily.  Will hold oral Lasix in lieu of intravenous dosing.  Again, will monitor strict inputs and outs and daily weights and follow renal function closely.   3.  Major depressive disorder with anxiety:  Will plan to resume fluoxetine 40 mg daily for which the patient indicated that she has been out of this medication for 1 week.  Will also resume mirtazapine 30 mg every evening at bedtime and will review patient's sleep aid medications which include melatonin and trazodone and will plan to order trazodone as needed.   4.  Chronic pain syndrome with ongoing back pain with noted history of opioid abuse:  The patient has previously been on a pain contract and is currently on Robaxin 1000 mg 4 times daily, Suboxone 8/2 mg sublingual tablet 3 tablets daily.  Will plan to continue with both medications and have as needed Tylenol made available as well as needed prior to admit Atarax 10 mg every 8 hours.  Will hold prior to admit as needed ibuprofen in preparation for possible surgery as well as a baby aspirin.   50.  Hyperlipidemia:  Patient's prior to admit atorvastatin 20 mg daily will be continued at this point.   6.  Tobacco dependence.  Patient again is smoking 1-1/2 packs per day.  Will place a  Nicoderm patch order to start this afternoon.   7.  Suspected steroid-induced insulin-dependent diabetes:  Patient's A1c is noted to be 7.  The patient indicated that she has been taking 15 units of Lantus every afternoon.  At this point, patient is nothing by mouth and will have Accu-Cheks performed every 4 hours with high insulin sliding scale made available.  Will initiate Lantus but at 10 units every morning.  Will adjust on a daily basis as patient is on intravenous Solu-Medrol at 60 mg 3 times daily.   8.  Chronic kidney disease, stage II-III.  This is a suspected diagnosis based off of low GFRs.  Creatinine, baseline, appears to be between 0.9 and 1.1 and seems stable at this point.  Will continue with diuresis as stated above with close monitoring of renal function.   9.  Obesity:  Patient's body mass index is calculated at 29.07.  Will defer ongoing management and assessment to primary care provider.   10.  Deep venous thrombosis prophylaxis:  Will place the patient on pressure compression devices.     Code status:  Discussed with the patient, she elected to be full code.     Disposition:  The patient is being admitted under inpatient status due to acute hypoxic respiratory failure thought to be secondary to cryptogenic organizing pneumonia and fluid overload.  Patient will require, at minimum, 2 evening stay for ongoing evaluation and management and likely proceed with a surgical biopsy.      Patient was discussed with Dr. Tommy Smith who agrees with the assessment and plan as stated above.  Dr. Smith will evaluate the patient independently.         MARIAMA SMITH DO       As dictated by AVA JONES PA-C            D: 2017 12:49   T: 2017 14:04   MT: TD      Name:     SYDNI GLEZ   MRN:      [id number]        Account:      [id number]   :      1975           Admitted:     [id number]      Document: [id number]       cc: DALE RUSSELL MD

## 2017-09-14 NOTE — CONSULTS
THORACIC SURGERY  Consult Note    DATE OF ADMISSION:  2017      REASON FOR CONSULT:  Persistent respiratory failure; need biopsy.     REQUESTING PROVIDER: Carlos Xie PA-C    HISTORY OF PRESENT ILLNESS: Ms Sabina Figueroa is a 40 yo female with alcoholic cirrhosis, history of substance abuse, and recent diagnosis of cryptogenic organizing pneumonia followed by Dr Mane who represented to [facility] on 2017 in acute on chronic hypoxic respiratory failure, having failing outpatient steroid down-taper.  She has had frequent pneumonias, each requiring a prolonged ICU stay.  On 2017, she developed increasing dyspnea, headaches, and cough prompting her to try her home O2, however, it was not adequate to improve her dyspnea.      In the [facility] ED, blood cultures drawn (NGTD).  CBC showed leukocytosis of 17.2, platelets 111, hgb 15.7, and left shift with ANC 14.8.  CMP significant for creatinine 1.31, ALT 72, AST 64.  Lactate, BNP, and procalcitonin within normal limits.      PAST MEDICAL HISTORY:   1. Alcoholic cirrhosis with ascites, on rifaximin.   2. History of polysubstance abuse, on Suboxone.  3. Recurring pneumonia.    4. Possible interstitial lung disease.  PFTs 2017 showed FVC 2.47, FEV1 2.10, FEV1/FVC 85, DLCO 11.55.  5. Presumed , on chronic steroids.   6. Steroid-induced hyperglycemia.  HgbA1c 7.0.  On insulin.    7. Chronic low back pain.  8. Hypertension.    SURGICAL HISTORY:  Past Surgical History:   Procedure Laterality Date     BRONCHOSCOPY FLEXIBLE N/A 1/10/2017    Procedure: BRONCHOSCOPY FLEXIBLE;  Surgeon: Jennifer Mane MD;  Location:  GI     BRONCHOSCOPY FLEXIBLE N/A 2017    Procedure: BRONCHOSCOPY FLEXIBLE;  Surgeon: Jennifer Mane MD;  Location:  OR      SECTION       ESOPHAGOSCOPY, GASTROSCOPY, DUODENOSCOPY (EGD), COMBINED  2014    Procedure: COMBINED ESOPHAGOSCOPY, GASTROSCOPY, DUODENOSCOPY (EGD);  Surgeon: Brittany Lowe MD;   Location:  GI     ESOPHAGOSCOPY, GASTROSCOPY, DUODENOSCOPY (EGD), COMBINED N/A 1/23/2015    Procedure: COMBINED ESOPHAGOSCOPY, GASTROSCOPY, DUODENOSCOPY (EGD);  Surgeon: London Lenz MD;  Location:  GI     ESOPHAGOSCOPY, GASTROSCOPY, DUODENOSCOPY (EGD), COMBINED N/A 11/11/2015    Procedure: COMBINED ESOPHAGOSCOPY, GASTROSCOPY, DUODENOSCOPY (EGD);  Surgeon: Barry Chavez MD;  Location:  GI     KNEE SURGERY      x3     FAMILY HISTORY:    Family History   Problem Relation Age of Onset     Depression Mother      Anxiety Disorder Mother      MENTAL ILLNESS Mother      Substance Abuse Mother      Depression Father      Anxiety Disorder Father      Substance Abuse Father      MENTAL ILLNESS Father      Depression Daughter      Depression Brother      Schizophrenia Brother      Depression Brother      Anxiety Disorder Brother      Substance Abuse Brother      SOCIAL HISTORY:   Social History     Social History     Marital status: Single     Spouse name: N/A     Number of children: N/A     Years of education: N/A     Occupational History     Not on file.     Social History Main Topics     Smoking status: Current Every Day Smoker     Packs/day: 1.50     Years: 18.00     Types: Cigarettes     Smokeless tobacco: Former User     Quit date: 5/11/2016     Alcohol use 0.0 oz/week     0 Standard drinks or equivalent per week      Comment: 8 drinks or more each day, abstinent since 4/2/15. History of CD treatment in past x1     Drug use: No     Sexual activity: Not on file     Other Topics Concern     Not on file     Social History Narrative    Denies illicit IV or intranasal drug use    No tattoos or piercings       ALLERGIES:     Allergies   Allergen Reactions     No Clinical Screening - See Comments      Bupronide patch allergy       MEDICATIONS:  Prescriptions Prior to Admission   Medication Sig Dispense Refill Last Dose     Pantoprazole Sodium (PROTONIX PO) Take 40 mg by mouth every morning (before breakfast)    9/12/2017 at Unknown time     PREDNISONE PO Take 25 mg by mouth daily   9/12/2017 at Unknown time     loratadine (CLARITIN) 10 MG tablet Take 10 mg by mouth daily   9/12/2017 at Unknown time     MIRTAZAPINE PO Take 30 mg by mouth At Bedtime   9/11/2017 at Unknown time     MELATONIN PO Take 5 mg by mouth At Bedtime   9/11/2017 at Unknown time     IBUPROFEN PO Take 600 mg by mouth daily   9/12/2017 at Unknown time     THIAMINE HCL PO Take 50 mg by mouth daily   9/12/2017 at Unknown time     furosemide (LASIX) 20 MG tablet Take 1 tablet (20 mg) by mouth daily 30 tablet 3 9/12/2017 at Unknown time     insulin glargine (LANTUS) 100 UNIT/ML injection Inject 20 Units Subcutaneous every morning (before breakfast) 50 mL 0 9/12/2017 at Unknown time     spironolactone (ALDACTONE) 50 MG tablet Take 1 tablet (50 mg) by mouth daily 3 tablet 0 9/12/2017 at Unknown time     gabapentin (NEURONTIN) 600 MG tablet Take 1 tablet (600 mg) by mouth 3 times daily 9 tablet 0 9/12/2017 at Unknown time     sulfamethoxazole-trimethoprim (BACTRIM DS/SEPTRA DS) 800-160 MG per tablet Take 1 tablet by mouth three times a week (Patient taking differently: Take 1 tablet by mouth three times a week on Mon/Wed/Friday) 30 tablet 0 9/12/2017 at Unknown time     blood glucose monitoring (ACCU-CHEK TONIA PLUS) meter device kit Use to test blood sugars BID times daily or as directed. 1 kit 0 Taking     ascorbic acid (VITAMIN C) 500 MG tablet Take 500 mg by mouth daily   9/12/2017 at Unknown time     FLUoxetine (PROZAC) 40 MG capsule Take 40 mg by mouth daily   9/11/2017 at Unknown time     hydrOXYzine (ATARAX) 10 MG tablet Take 10 mg by mouth every 8 hours as needed for itching   Taking     lactulose (CHRONULAC) 10 GM/15ML solution Take 20 g by mouth 3 times daily   9/12/2017 at Unknown time     traZODone (DESYREL) 100 MG tablet Take 100 mg by mouth At Bedtime   9/12/2017 at Unknown time     Omega-3 Fatty Acids (FISH OIL) 1200 MG CAPS Take 1 capsule by  "mouth daily   9/12/2017 at Unknown time     methocarbamol (ROBAXIN) 500 MG tablet Take 1,000 mg by mouth 4 times daily   9/12/2017 at Unknown time     aspirin EC 81 MG EC tablet Take 1 tablet (81 mg) by mouth daily 30 tablet 1 9/12/2017 at Unknown time     atorvastatin (LIPITOR) 20 MG tablet Take 1 tablet (20 mg) by mouth daily 30 tablet 1 9/12/2017 at Unknown time     rifaximin (XIFAXAN) 550 MG TABS tablet Take 1 tablet (550 mg) by mouth 2 times daily 60 tablet 0 9/12/2017 at Unknown time     ferrous sulfate (IRON) 325 (65 FE) MG tablet Take 325 mg by mouth daily (with breakfast)   9/12/2017 at Unknown time     buprenorphine-naloxone (SUBOXONE) 8-2 MG SUBL sublingual tablet Place 3 tablets under the tongue daily   9/12/2017 at Unknown time     alum & mag hydroxide-simethicone (MYLANTA ES/MAALOX  ES) 400-400-40 MG/5ML SUSP Take 30 mLs by mouth every 4 hours as needed for indigestion Reported on 2/23/2017   Taking     albuterol (PROAIR HFA, PROVENTIL HFA, VENTOLIN HFA) 108 (90 BASE) MCG/ACT inhaler Inhale 2 puffs into the lungs every 4 hours as needed for shortness of breath / dyspnea or wheezing Reported on 2/23/2017   Taking     MULTIPLE VITAMINS PO Take 1 tablet by mouth daily   9/12/2017 at Unknown time     folic acid (FOLVITE) 1 MG tablet Take 1 tablet (1 mg) by mouth daily 30 tablet  9/12/2017 at Unknown time       REVIEW OF SYSTEMS:  14 point comprehensive review of systems undertaken with pertinent positives and negatives as above and otherwise unremarkable.    PHYSICAL EXAM:  VITALS: BP 99/63  Temp 98.8  F (37.1  C) (Axillary)  Resp 12  Ht 1.702 m (5' 7\")  Wt 84.2 kg (185 lb 10 oz)  SpO2 97%  BMI 29.07 kg/m2  General Appearance:  Pleasant, quite sleepy - falling asleep during exam. On BiPAP  HEENT: Normocephalic, atraumatic.  Cheeks flushed.  Unable to assess further 2/2 BIPAP   Lungs: Coarse bilaterally.   Heart: Regular rate and rhythm.    Musculoskeletal:  Moving x 4 spontaneously with CMS intact " x4.     Neuro: Alert and oriented.  CN II-XII grossly intact and symmetric.     LABORATORY DATA:  Recent Labs   Lab Test  09/14/17   0906  09/13/17   1047  09/12/17   2318   NA  137  136  133   POTASSIUM  4.1  5.4*  3.2*   CHLORIDE  101  100  97   CO2  31  33*  26   BUN  14  18  25   CR  0.92  1.03  1.31*     Recent Labs   Lab Test  09/14/17   0906  09/12/17   2318  05/08/17   0605   WBC  10.6  17.2*  6.7   HGB  14.0  15.7  12.7   PLT  91*  111*  122*     Recent Labs   Lab Test  05/02/17   1854  01/10/17   0520  01/07/17   1814   INR  1.15*  1.37*  1.14       IMAGING:  Recent Results (from the past 24 hour(s))   XR Chest Port 1 View    Narrative    CHEST ONE VIEW UPRIGHT September 14, 2017 9:48 AM     HISTORY: Shortness of breath.    COMPARISON: 9/13/2017.      Impression    IMPRESSION: Slight improvement in bilateral infiltrates in both lower  lobes. Continued bilateral infiltrates probably slightly worse in the  upper lobes compared to previous.    MALCOLM BLANCA MD       IMPRESSION: Ms Sabina Figueroa is a 40 yo female with alcoholic cirrhosis, history of polysubstance abuse, and recent diagnosis of cryptogenic organizing pneumonia followed by Dr Mane who represented to [facility] on 9/12/2017 in acute on chronic hypoxic respiratory failure.  Transferred to [facility] for consideration of thoracoscopy with lung biopsy.     PLAN:   Acute hypoxic respiratory failure /  / ILD / Frequent pneumonias.  Follows with Dr Mane.  Etiology for exacerbation unclear.  Azithro/ceftriaxone empirically started.    --Plan will be for VATS with lung biopsy, hopefully tomorrow, to help determine/confirm underlying disease.    Steroid-induced hyperglycemia.  On insulin.  Further cares per S.    Recent Labs  Lab 09/14/17  1244 09/14/17  0906 09/14/17  0743 09/13/17  2149 09/13/17  2025 09/13/17  1827 09/13/17  1719  09/13/17  1047  09/12/17  2318   GLC  --  176*  --   --   --   --   --   --  435*  --  176*   *  --   190* 215* 219* 278* 331*  < >  --   < >  --    < > = values in this interval not displayed.    Alcoholic cirrhosis.   Rifaximin restarted.  Further cares per [facility].    History of polysubstance abuse.  Suboxone restarted.  Further cares per [facility].    Hypertension.  Further cares per [facility].    TOTAL TIME SPENT:  Greater than 70 min spent on floor, chart review, and in consultation and coordination of care.     LIMA BECERRA, MS, PA-C with Dr Baird

## 2017-09-14 NOTE — IP AVS SNAPSHOT
[place] [address] [facility]    [address] [place] [address]    Phone:  [phone]                                       After Visit Summary   9/14/2017    Sabina Figueroa    MRN: [id number]           After Visit Summary Signature Page     I have received my discharge instructions, and my questions have been answered. I have discussed any challenges I see with this plan with the nurse or doctor.    ..........................................................................................................................................  Patient/Patient Representative Signature      ..........................................................................................................................................  Patient Representative Print Name and Relationship to Patient    ..................................................               ................................................  Date                                            Time    ..........................................................................................................................................  Reviewed by Signature/Title    ...................................................              ..............................................  Date                                                            Time

## 2017-09-14 NOTE — PROGRESS NOTES
09:19am called report to AGATA Edmond at [facility]. Also called pt's daughter Peggy [phone] and notified that pt will be transferring to [facility] today, informed of pt's room # CICU # 272.  10:27am report called to Nara charge RN [facility] charge floor 3300. Also called pt's friend Anatoliy [phone] as per pt request to notify regarding pt's transfer. Also called pt's daughter Peggy [phone] and left a voice message, updated regarding new floor location 3300.  10:48am pt transferred to [facility] with EMS on BIPAP.

## 2017-09-15 NOTE — PLAN OF CARE
Problem: Goal Outcome Summary  Goal: Goal Outcome Summary  Outcome: No Change  A&O. O2 sats dipped to 86-88% on 60% FIO2, IS and acapella encouraged but sats did not improve much after; FIO2 increased to 70% now up to 94%. Tele SR. C/o 10/10 back pain, decreased to 5/10 w/ dilaudid. Up w/ SBA to BSC. Adequate UO. Strict NPO. Oral cares performed. IV abx, steroids. Plan for biopsy 9/16.

## 2017-09-15 NOTE — PLAN OF CARE
Problem: Individualization  Goal: Patient Preferences  Outcome: No Change  VSS, however HR=sinus rohit in the 50's.  Pt is on high flow O2 @ 70%.  Lungs remain coarse though out all lung fields.  Pt is scheduled to have surgery tomorrow.  Pt c/o pain, pain controlled with dilaudid and scheduled pain medication.

## 2017-09-15 NOTE — PROGRESS NOTES
Patient on HFNC 30LPM 70% throughout the day. LS crackles and diminished. SpO2 in the mid 90's. Neb tx given as ordered. Will continue to follow.    Torsten Chen RT

## 2017-09-15 NOTE — PROGRESS NOTES
THORACIC SURGERY    reviewed and discused    Plan for R vats wedge resection in am    Operation, goals and risks discussed  Possibility of mechanical ventilation post op discussed    Orders written    MARY JO REA MD [facility] ONCOLOGY THORACIC SURGERY  CELL:  [phone]  OFFICE: [phone]

## 2017-09-15 NOTE — PLAN OF CARE
Problem: Goal Outcome Summary  Goal: Goal Outcome Summary  Outcome: Improving  VSS, Bipap on @ 70%, sats drop quickly when off bipap for oral cares overnight - pt doesn't feel anyh more SOB though. Pain controlled w/ dilaudid IV 0.2mg, blood sugars remain increased from steroid use, up Ax1 to bedside commode. Plan for VATS and lung biopsy w/ Oli on Sat 9/16.

## 2017-09-15 NOTE — PROGRESS NOTES
Pt stable on Bipap 10/5 70% all shift. BBS diminished. Skin intact with gelpad and mepilex in place. Will continue to follow.     Matthew Cheney RT

## 2017-09-15 NOTE — PROGRESS NOTES
[facility]  Hospitalist Progress Note        Mandeep Smith, DO  09/15/2017        Interval History:      Patient states that she continues to feel short of breath. Anticipating lung biopsy for further elucidation of previously diagnosed cryptogenic organizing pneumonia.          Assessment and Plan:        41-year-old female with a past medical history significant for chronic liver disease secondary to alcohol abuse, alcohol-induced cirrhosis with ascites, alcohol-induced neuropathy, hyperlipidemia, major depressive disorder with anxiety, obesity, chronic pain syndrome with chronic low back pain, opioid abuse, tobacco dependence, steroid-induced insulin-dependent diabetes, mild persistent asthma, history of Clostridium difficile, suspected chronic kidney disease stage II-III and recurrent acute hypoxic respiratory failure secondary to recurrent pneumonia who was directly admitted to [facility] due to acute hypoxic respiratory failure thought to be secondary to cryptogenic organizing pneumonia and fluid overload with plans to proceed with a lung biopsy.     Acute hypoxic respiratory failure thought to be secondary to a cryptogenic organizing pneumonia and a fluid overload.  Patient has been evaluated by pulmonology at [facility]. Had followed with pulmonary medicine from [facility].  - Bipap prn.   - Plan for lung biopsy.   - Thoracic surgery following.   - Pulmonary hygiene.  - Sputum culture pending.   - Ceftriaxone.   - Azithromycin.   - Acapella.   - Duonebs.    - Lasix IV 40mg BID.   - Continued on 62.5mg Solu Medrol q6h.     Chronic liver disease secondary to alcohol abuse with associated cirrhosis with ascites and associated alcohol-induced neuropathy:  This appears to be stable at this point.  Patient has not followed up with gastroenterologist in 5 years.    - Continue prior to admit folic acid 1 mg daily, thiamine 50 mg daily, spironolactone 50 mg daily, rifaximin 550  mg daily, lactulose 20 mg (made this daily, though was previously prescribed as 3 times daily), gabapentin 600 mg 3 times daily.    - Will hold oral Lasix in lieu of intravenous dosing.      Major depressive disorder with anxiety: Stable.   - Continue fluoxetine 40 mg daily.    - Continue mirtazapine 30 mg every evening at bedtime   - PTA trazodone as needed.     Chronic pain syndrome with ongoing back pain with noted history of opioid abuse:  The patient has previously been on a pain contract and is currently on Robaxin 1000 mg 4 times daily, Suboxone 8/2 mg sublingual tablet 3 tablets daily.    - Continue with robaxin and suboxone.   - as needed Tylenol available   - as needed prior to admit Atarax 10 mg every 8 hours.    - Hold prior to admit as needed ibuprofen in preparation for possible surgery as well as a baby aspirin.     Hyperlipidemia: Stable.   - Continue prior to admit atorvastatin 20 mg daily will be continued at this point.     Tobacco dependence.  Patient again is smoking 1-1/2 packs per day.  Will place a Nicoderm patch order to start this afternoon.     Suspected steroid-induced insulin-dependent diabetes:  Patient's A1c is noted to be 7.  The patient indicated that she has been taking 15 units of Lantus every afternoon.    - Accu-Cheks performed every 4 hours   - high insulin sliding scale available.    - Continue Lantus but at reduced dose of 10 units every morning.    - Will adjust on a daily basis as patient is on intravenous Solu-Medrol at 60 mg 3 times daily.     Chronic kidney disease, stage II-III.  This is a suspected diagnosis based off of low GFRs.  Creatinine, baseline, appears to be between 0.9 and 1.1.    - Will continue with diuresis as stated above with close monitoring of renal function.     Obesity:  Patient's body mass index is calculated at 29.07.    - Will defer ongoing management and assessment to primary care provider.     Deep venous thrombosis prophylaxis:  Will place the  "patient on pressure compression devices.      Code: full code.      Disposition: Admitted under inpatient status due to acute hypoxic respiratory failure thought to be secondary to cryptogenic organizing pneumonia and fluid overload.  Patient will require several days for ongoing evaluation and management and likely proceed with a surgical lung biopsy.                    Physical Exam:      Heart Rate: 56    Blood pressure 107/70, temperature 97.8  F (36.6  C), resp. rate 16, height 1.702 m (5' 7\"), weight 84.6 kg (186 lb 8.2 oz), SpO2 96 %, not currently breastfeeding.    Vitals:    17 1130 09/15/17 0615   Weight: 84.2 kg (185 lb 10 oz) 84.6 kg (186 lb 8.2 oz)       Vital Sign Ranges  Temperature Temp  Av.4  F (36.9  C)  Min: 97.8  F (36.6  C)  Max: 98.8  F (37.1  C)   Blood pressure Systolic (24hrs), Av , Min:96 , Max:123        Diastolic (24hrs), Av, Min:53, Max:91      Pulse No Data Recorded   Respirations Resp  Avg: 15.5  Min: 10  Max: 26   Pulse oximetry SpO2  Av.7 %  Min: 84 %  Max: 98 %     Vital Signs with Ranges  Temp:  [97.8  F (36.6  C)-98.8  F (37.1  C)] 97.8  F (36.6  C)  Heart Rate:  [49-93] 56  Resp:  [10-26] 16  BP: ()/(53-91) 107/70  FiO2 (%):  [55 %-70 %] 70 %  SpO2:  [84 %-98 %] 96 %    I/O Last 3 Shifts:   I/O last 3 completed shifts:  In: 0   Out: 2350 [Urine:2350]    I/O past 24 hours:     Intake/Output Summary (Last 24 hours) at 09/15/17 0831  Last data filed at 17 2200   Gross per 24 hour   Intake                0 ml   Output             2350 ml   Net            -2350 ml     GENERAL: Alert and oriented. NAD. Conversational, appropriate.   HEENT: Normocephalic. No icterus or injection. Nares normal. Nasal BIPAP in place.   LUNGS: Coarse bilaterally. No dyspnea at rest.   HEART: Regular rate. Extremities perfused.   ABDOMEN: Soft, nontender, and nondistended. Positive bowel sounds.   EXTREMITIES: No LE edema noted.   NEUROLOGIC: Moves extremities x4 on " command. No acute focal neurologic abnormalities noted.          Prior to Admission Medications:        Prescriptions Prior to Admission   Medication Sig Dispense Refill Last Dose     PREDNISONE PO Take 60 mg by mouth every morning   9/14/2017 at 0824     Pantoprazole Sodium (PROTONIX PO) Take 40 mg by mouth every morning (before breakfast)   9/14/2017 at 0825     loratadine (CLARITIN) 10 MG tablet Take 10 mg by mouth daily   9/14/2017 at 0825     MIRTAZAPINE PO Take 30 mg by mouth At Bedtime   9/13/2017 at 2211     MELATONIN PO Take 5 mg by mouth At Bedtime   9/13/2017 at 2211     THIAMINE HCL PO Take 50 mg by mouth daily   9/14/2017 at 0825     furosemide (LASIX) 20 MG tablet Take 1 tablet (20 mg) by mouth daily 30 tablet 3 9/14/2017 at 0825     insulin glargine (LANTUS) 100 UNIT/ML injection Inject 20 Units Subcutaneous every morning (before breakfast) 50 mL 0 9/14/2017 at 0826     spironolactone (ALDACTONE) 50 MG tablet Take 1 tablet (50 mg) by mouth daily 3 tablet 0 9/14/2017 at 0825     gabapentin (NEURONTIN) 600 MG tablet Take 1 tablet (600 mg) by mouth 3 times daily 9 tablet 0 9/14/2017 at 0824     sulfamethoxazole-trimethoprim (BACTRIM DS/SEPTRA DS) 800-160 MG per tablet Take 1 tablet by mouth three times a week (Patient taking differently: Take 1 tablet by mouth three times a week on Mon/Wed/Friday) 30 tablet 0 9/13/2017 at 1041     ascorbic acid (VITAMIN C) 500 MG tablet Take 500 mg by mouth daily   9/12/2017 at am     FLUoxetine (PROZAC) 40 MG capsule Take 40 mg by mouth daily   9/14/2017 at 0824     lactulose (CHRONULAC) 10 GM/15ML solution Take 20 g by mouth 3 times daily   9/14/2017 at 0824     traZODone (DESYREL) 100 MG tablet Take 100 mg by mouth At Bedtime   9/13/2017 at 2212     Omega-3 Fatty Acids (FISH OIL) 1200 MG CAPS Take 1 capsule by mouth daily   9/14/2017 at 0825     methocarbamol (ROBAXIN) 500 MG tablet Take 1,000 mg by mouth 4 times daily   9/14/2017 at 0825     aspirin EC 81 MG EC  tablet Take 1 tablet (81 mg) by mouth daily 30 tablet 1 9/14/2017 at am     atorvastatin (LIPITOR) 20 MG tablet Take 1 tablet (20 mg) by mouth daily (Patient taking differently: Take 20 mg by mouth At Bedtime ) 30 tablet 1 9/14/2017 at am     rifaximin (XIFAXAN) 550 MG TABS tablet Take 1 tablet (550 mg) by mouth 2 times daily 60 tablet 0 9/14/2017 at 0824     ferrous sulfate (IRON) 325 (65 FE) MG tablet Take 325 mg by mouth daily (with breakfast)   9/14/2017 at 0825     buprenorphine-naloxone (SUBOXONE) 8-2 MG SUBL sublingual tablet Place 3 tablets under the tongue daily   9/14/2017 at 0823     MULTIPLE VITAMINS PO Take 1 tablet by mouth daily   9/14/2017 at 0825     folic acid (FOLVITE) 1 MG tablet Take 1 tablet (1 mg) by mouth daily 30 tablet  9/14/2017 at 0825     PREDNISONE PO Take 25 mg by mouth daily   PTA dose 9/12/2017 at am     IBUPROFEN PO Take 600 mg by mouth daily   9/12/2017 at am     blood glucose monitoring (ACCU-CHEK TONIA PLUS) meter device kit Use to test blood sugars BID times daily or as directed. 1 kit 0 Taking     hydrOXYzine (ATARAX) 10 MG tablet Take 10 mg by mouth every 8 hours as needed for itching   prn     alum & mag hydroxide-simethicone (MYLANTA ES/MAALOX  ES) 400-400-40 MG/5ML SUSP Take 30 mLs by mouth every 4 hours as needed for indigestion Reported on 2/23/2017   prn     albuterol (PROAIR HFA, PROVENTIL HFA, VENTOLIN HFA) 108 (90 BASE) MCG/ACT inhaler Inhale 2 puffs into the lungs every 4 hours as needed for shortness of breath / dyspnea or wheezing Reported on 2/23/2017   prn            Medications:        Current Facility-Administered Medications   Medication Last Rate     Current Facility-Administered Medications   Medication Dose Route Frequency     aspirin  81 mg Oral Daily     atorvastatin  20 mg Oral Daily     buprenorphine HCl-naloxone HCl  3 Film Sublingual Daily     FLUoxetine  40 mg Oral Daily     folic acid  1 mg Oral Daily     gabapentin  600 mg Oral TID     lactulose   20 g Oral Daily     methocarbamol  1,000 mg Oral 4x Daily     mirtazapine (REMERON) tablet 30 mg  30 mg Oral At Bedtime     pantoprazole (PROTONIX) EC tablet 40 mg  40 mg Oral QAM AC     rifaximin  550 mg Oral BID     spironolactone  50 mg Oral Daily     thiamine tablet 50 mg  50 mg Oral Daily     cefTRIAXone  2 g Intravenous Q24H     azithromycin  500 mg Intravenous Q24H     methylPREDNISolone  62.5 mg Intravenous Q6H     furosemide  40 mg Intravenous BID     guaiFENesin  1,200 mg Oral BID     ipratropium - albuterol 0.5 mg/2.5 mg/3 mL  3 mL Nebulization Q4H While awake     insulin aspart  1-10 Units Subcutaneous TID AC     insulin aspart  1-7 Units Subcutaneous At Bedtime     insulin glargine  10 Units Subcutaneous QAM AC     sodium chloride (PF)  3 mL Intracatheter Q8H     traZODone  100 mg Oral At Bedtime    Or     traZODone  150 mg Oral At Bedtime     Current Facility-Administered Medications   Medication Dose Route Frequency     hydrOXYzine  10 mg Oral Q8H PRN     ipratropium - albuterol 0.5 mg/2.5 mg/3 mL  3 mL Nebulization Q2H PRN     glucose  15-30 g Oral Q15 Min PRN    Or     dextrose  25-50 mL Intravenous Q15 Min PRN    Or     glucagon  1 mg Subcutaneous Q15 Min PRN     naloxone  0.1-0.4 mg Intravenous Q2 Min PRN     nitroGLYcerin  0.4 mg Sublingual Q5 Min PRN     lidocaine (buffered or not buffered)  1 mL Other Q1H PRN     lidocaine 4%   Topical Q1H PRN     sodium chloride (PF)  3 mL Intracatheter Q1H PRN     acetaminophen  650 mg Oral Q4H PRN     senna-docusate  1-2 tablet Oral BID PRN     polyethylene glycol  17 g Oral Daily PRN     bisacodyl  10 mg Rectal Daily PRN     ondansetron  4 mg Oral Q6H PRN    Or     ondansetron  4 mg Intravenous Q6H PRN     prochlorperazine  5-10 mg Intravenous Q6H PRN    Or     prochlorperazine  5-10 mg Oral Q6H PRN    Or     prochlorperazine  25 mg Rectal Q12H PRN     HYDROmorphone  0.2 mg Intravenous Q3H PRN            Data:      Lab data reviewed.     Recent Labs  Lab  09/15/17  0650 09/14/17  0906 09/13/17  1047 09/12/17  2318   HGB 13.9 14.0  --  15.7   MCV 94 95  --  90   * 91*  --  111*    137 136 133   POTASSIUM 4.2 4.1 5.4* 3.2*   CHLORIDE 97 101 100 97   CO2 33* 31 33* 26   BUN 24 14 18 25   CR 0.99 0.92 1.03 1.31*   ANIONGAP 8 5 3 10   VISHNU 9.9 9.4 9.1 8.8   * 176* 435* 176*           Imaging:      Imaging data reviewed.     FRIDA LozanoO.  [facility]  Pager [phone]

## 2017-09-15 NOTE — CONSULTS
PULMONARY CONSULTATION       HISTORY OF PRESENT ILLNESS:  Sabina Figueroa is a 41-year-old obese woman with a significant complex medical history which includes acute hypoxic respiratory failure superimposed on a progressive interstitial lung disease possibly cryptogenic organizing pneumonitis.  Her pulmonologist has followed her closely and she and her colleagues at the [facility] have recommended an open lung biopsy and the patient is here at this hospital to obtain that.  The patient has a significant history of chronic liver disease secondary to alcohol abuse, alcoholic- induced cirrhosis with ascites, alcohol-induced neuropathy, major depressive disorder with anxiety, hyperlipidemia, chronic pain syndrome with chronic back pain, opiate use on Suboxone, tobacco abuse disorder, steroid-induced insulin-dependent diabetes, history of asthma, chronic kidney disease, obesity, C. diff infection in the past, multiple pneumonias as well as mycoplasma pneumonia.      The patient is awake and alert, obese, very pleasant, she is utilizing BiPAP, tidal volume 600, IPAP of 10, EPAP of 5 on 60% with O2 sat 99%.  The patient was evidently in [facility]  and is usually on room air but required 4 to 5 liters of oxygen, was evaluated by pulmonary and treated with ceftriaxone, azithromycin and steroids and subsequent transfer here.  The patient indicated that she had several days of fevers and chills and flu-like symptoms prior to her worsening.  She had low SaO2 readings on her home oximeter.  The patient noted she had some fevers, body aches and chills and night sweats and fatigue.  She had a headache which currently is not present and she right now denies cough.  She has had a history of seizures as well.      PAST MEDICAL HISTORY:  Outlined in the history of present illness.      SURGICAL HISTORY:  , knee operations.      PRIOR TO ADMISSION:  She was on Protonix, Claritin, melatonin,  ibuprofen, thiamine, Lasix, insulin, spironolactone, Neurontin, Septra, vitamin C, Prozac, Atarax, lactulose, Desyrel, Omega 3 fatty acids, Robaxin, aspirin, Lipitor, rifaximin, Suboxone, HFA Ventolin inhaler, prednisone 25 mg, mirtazapine 30 mg at bedtime.      ALLERGIES:  To* patch.      SOCIAL HISTORY:  Lives in an apartment in [place] with her boyfriend, continues to smoke 1-1/2 packs per day, been smoking since age 15, she last drank in 2017, remote history of street and illicit drug use.      FAMILY HISTORY:  Father  of liver cancer.  She has 2 brothers who have issues with alcohol and drugs.  Mother passed away from skin and lung cancer.  Maternal grandmother had a brain hemorrhage.      REVIEW OF SYSTEMS:  10-point review is otherwise negative.      PHYSICAL EXAMINATION:   VITAL SIGNS:  Afebrile, pulse 67, blood pressure 99/65, respiratory rate is 10 to 16, O2 sats were 99% when I was in the room.   GENERAL:  The patient is obese, pleasant, appears to have insight and judgment in regards to her current situation, she has a jagdish complexion, she is not in distress.   HEENT:  Normocephalic, atraumatic.  Normal eyes, pupils equal and round, normal sclerae.   NECK:  Normal range of motion, no tracheal deviation.   HEART:  Normal rate and rhythm, I did not hear a murmur but she has mitral regurgitation on echo.   CHEST:  She has coarse rhonchi throughout both lung fields.   ABDOMEN:  Positive bowel sounds, nontender, is obese.  She has no edema and no cyanosis, clubbing.        PULMONARY DIAGNOSIS:   1.  Acute hypoxic respiratory failure.     2.  Unspecified interstitial pneumonitis.     3.  Pneumonitis.   4.  Obesity.     5.  Possible obstructive sleep apnea.        ASSESSMENT:  I am asked to see this patient I assume to follow along as the patient obtains an open biopsy; I suspect the patient may end up in the ICU and followed there postoperatively.        The differential been well established by her  pulmonologist at the [facility] includes organizing pneumonia, cryptogenic, RB-ILD, other interstitial lung disease, possibilities are lengthy.  Patient may have an acute infection on top of this as well given the steroids consider PCP although she has been on prophylaxis.  The patient likely has underlying sleep disorder; breathing has never been studied and should have this evaluated as well.  The patient denies aspiration as well.  The patient also has tobacco abuse disorder.        We will follow with you.         CASI TAO MD             D: 2017 17:11   T: 2017 19:05   MT: [id number]      Name:     SYDNI GLEZ   MRN:      [id number]        Account:       [id number]   :      1975           Consult Date:  2017      Document: [id number]

## 2017-09-16 NOTE — CONSULTS
[facility]    History and Physical/ Consult  Critical Care Service     Date of Admission:  9/14/2017  Date of Service (when I saw the patient): 09/16/17    Assessment & Plan   Sabina Figueroa is a 42 year old female with PMHx of alcoholic cirrhosis, obesity, chronic pain, substance abuse, type II DM, CKD stage II/III and recurrent acute hypoxic respiratory failure secondary to recurrent pneumonia admitted to [facility] 9/12/17 with acute hypoxic respiratory failure, transferred to [facility] on 9/14/17 for evaluation by thoracic surgery and lung biopsy. Now s/p R VATS and wedge resection of R upper, middle and lower lobes. Will remain intubated post-procedure.      Neuro  1. Acute on chronic pain  2. Sedation  3. Hx of anxiety/depression   4. Hx of alcohol abuse   5. Hx of substance abuse on suboxone   Plan:  -- Fentanyl gtt   -- Propofol gtt needed until extubation   -- okay to hold PTA suboxone, prozac, gabapentin, robaxin, Remeron, trazadone     CV  1. Irregular rate post-op  2. Pulmonary HTN  Plan:  -- Irregular rates post-op, HR varying between , appears to be SVT  -- Most recent Echo 1/2017: EF 70%, elevated PA pressures, RV chamber size appears normal   -- EKG  -- Echo for surveillance     Resp:  1. Acute hypoxic respiratory failure, multiple episodes in past, possibly secondary to cryptogenic organizing pneumonia   2. S/p R VATs and R upper,middle, lower lung wedge resection on 9/16/17  3. Post-operative ventilator management   Plan:  -- recurrent admissions since 12/2016 for acute respiratory failure. Transbronchial biopsy 1/2017 suggestive of cryptogenic organizing PNA. Follows with Dr. Mane for pulmonary care. Treated with high dose steroids   -- follow up on wedge resection cytology and pathology   -- will remain intubated post-wedge resection for next day  -- CXR  -- ABG  -- Duonebs   -- continue current methylpred dose of 40mg Q6    GI/Nutrition  1. Alcoholic cirrhosis c/b ascites    Plan:  -- NPO  -- PPI  -- PTA spironolactone, rifaximin and lactulose; okay to hold currently until extubated   -- mildly elevated ALT and AST  -- place OG, no history or varices     Renal  1. No prior hx.  Baseline appears to be 0.7-0.8  Plan:  -- Monitor Cr and UOP  -- hold lasix     ID  1. Concern for acute respiratory infection   Plan:  -- admission labs with unremarkable procalc and normal WBC  -- started on ceftriaxone and azithromycin on admission, continue for now   -- previosuly on bactrim prophylaxis given high dose steroid use     Endocrine  1. Type II DM 2/2 high dose steroid use   Plan:  --  Insulin gtt given uncontrolled blood sugars   -- Keep BG  <180 for optimal healing    Heme:  1. Thrombocytopenia   Plan:  -- Platelets 107 today   -- Transfuse to keep > 7.0    General cares:  DVT Prophylaxis: Pneumatic Compression Devices  GI Prophylaxis: PPI  Restraints: Restraints for medical healing needed: YES  Family update by me today: No  Current lines are required for patient management  Access:  Peripheral IVs  R chest tube     [place] [place]    Time Spent on this Encounter   Billing:  I spent 45 minutes bedside and on the inpatient unit today managing the critical care of Sabina Figueroa in relation to the issues listed in this note.    Code Status   Full Code    Primary Care Physician   Aidee Hemphill    Chief Complaint   Acute respiratory failure     History is obtained from the patient and electronic health record    History of Present Illness   Sabina Figueroa is a 42 year old female with PMHx of alcoholic cirrhosis, obesity, chronic pain, substance abuse, type II DM, CKD stage II/III and recurrent acute hypoxic respiratory failure secondary to recurrent pneumonia admitted to [facility] 9/12/17 with acute hypoxic respiratory failure, transferred to [facility] on 9/14/17 for evaluation by thoracic surgery and lung biopsy. Now s/p R VATS and wedge resection of R upper, middle and lower lobes. Will remain  intubated post-procedure.      Patient admitted 2017, 2017, 3/2017 and 2017 for acute respiratory failure. Original transbronchial biopsy in January was concerning for cryptogenic pneumonia. Patient was being followed by outpatient pulmonary and started on high dose steroids.     The patient underwent an uncomplicated R VATs and R upper, middle and lower wedge resection on 17. EBl was 50 cc. One chest tube in place on R side. The patient came up to the ICU intubated. ROS unable to assess as patient intubated and sedated.        Past Medical History    I have reviewed this patient's medical history and updated it with pertinent information if needed.   Past Medical History:   Diagnosis Date     ALC (alcoholic liver cirrhosis) (H)      Alcohol abuse      Anxiety      Arthritis      Ascites      Asthma      Bunion, left foot      Chronic low back pain     Narcotic agreement signed in [facility] system     Depression      Hypertension     current     Ovarian cyst, left        Past Surgical History   I have reviewed this patient's surgical history and updated it with pertinent information if needed.  Past Surgical History:   Procedure Laterality Date     BRONCHOSCOPY FLEXIBLE N/A 1/10/2017    Procedure: BRONCHOSCOPY FLEXIBLE;  Surgeon: Jennifer Mane MD;  Location:  GI     BRONCHOSCOPY FLEXIBLE N/A 2017    Procedure: BRONCHOSCOPY FLEXIBLE;  Surgeon: Jennifer Mane MD;  Location: [facility] OR      SECTION       ESOPHAGOSCOPY, GASTROSCOPY, DUODENOSCOPY (EGD), COMBINED  2014    Procedure: COMBINED ESOPHAGOSCOPY, GASTROSCOPY, DUODENOSCOPY (EGD);  Surgeon: Brittany Lowe MD;  Location:  GI     ESOPHAGOSCOPY, GASTROSCOPY, DUODENOSCOPY (EGD), COMBINED N/A 2015    Procedure: COMBINED ESOPHAGOSCOPY, GASTROSCOPY, DUODENOSCOPY (EGD);  Surgeon: London Lenz MD;  Location:  GI     ESOPHAGOSCOPY, GASTROSCOPY, DUODENOSCOPY (EGD), COMBINED N/A 2015    Procedure: COMBINED  ESOPHAGOSCOPY, GASTROSCOPY, DUODENOSCOPY (EGD);  Surgeon: Barry Chavez MD;  Location: [facility]     KNEE SURGERY      x3       Prior to Admission Medications   Prior to Admission Medications   Prescriptions Last Dose Informant Patient Reported? Taking?   FLUoxetine (PROZAC) 40 MG capsule 9/14/2017 at 0824 Pharmacy Yes Yes   Sig: Take 40 mg by mouth daily   IBUPROFEN PO 9/12/2017 at am Pharmacy Yes No   Sig: Take 600 mg by mouth daily   MELATONIN PO 9/13/2017 at [facility] Pharmacy Yes Yes   Sig: Take 5 mg by mouth At Bedtime   MIRTAZAPINE PO 9/13/2017 at [facility] Pharm[facility] Yes Yes   Sig: Take 30 mg by mouth At Bedtime   MULTIPLE VITAMINS PO 9/14/2017 at [facility] Yes Yes   Sig: Take 1 tablet by mouth daily   Omega-3 Fatty Acids (FISH OIL) 1200 MG CAPS 9/14/2017 at [facility] Yes Yes   Sig: Take 1 capsule by mouth daily   PREDNISONE PO PTA dose 9/12/2017 at am Pharmacy Yes No   Sig: Take 25 mg by mouth daily   PREDNISONE PO 9/14/2017 at [facility] Yes Yes   Sig: Take 60 mg by mouth every morning   Pantoprazole Sodium (PROTONIX PO) 9/14/2017 at [facility] Yes Yes   Sig: Take 40 mg by mouth every morning (before breakfast)   THIAMINE HCL PO 9/14/2017 at [facility] Yes Yes   Sig: Take 50 mg by mouth daily   albuterol (PROAIR HFA, PROVENTIL HFA, VENTOLIN HFA) 108 (90 BASE) MCG/ACT inhaler prn Pharmacy Yes No   Sig: Inhale 2 puffs into the lungs every 4 hours as needed for shortness of breath / dyspnea or wheezing Reported on 2/23/2017   alum & mag hydroxide-simethicone (MYLANTA ES/MAALOX  ES) 400-400-40 MG/5ML SUSP prn Pharmacy Yes No   Sig: Take 30 mLs by mouth every 4 hours as needed for indigestion Reported on 2/23/2017   ascorbic acid (VITAMIN C) 500 MG tablet 9/12/2017 at am Pharmacy Yes Yes   Sig: Take 500 mg by mouth daily   aspirin EC 81 MG EC tablet 9/14/2017 at am Pharmacy No Yes   Sig: Take 1 tablet (81 mg) by mouth daily   atorvastatin (LIPITOR) 20 MG tablet 9/14/2017 at am Pharmacy No Yes   Sig: Take 1  tablet (20 mg) by mouth daily   Patient taking differently: Take 20 mg by mouth At Bedtime    blood glucose monitoring (ACCU-CHEK TONIA PLUS) meter device kit  Pharmacy No No   Sig: Use to test blood sugars BID times daily or as directed.   buprenorphine-naloxone (SUBOXONE) 8-2 MG SUBL sublingual tablet 9/14/2017 at [facility] Yes Yes   Sig: Place 3 tablets under the tongue daily   ferrous sulfate (IRON) 325 (65 FE) MG tablet 9/14/2017 at [facility] Yes Yes   Sig: Take 325 mg by mouth daily (with breakfast)   folic acid (FOLVITE) 1 MG tablet 9/14/2017 at [facility] Pharm[facility] No Yes   Sig: Take 1 tablet (1 mg) by mouth daily   furosemide (LASIX) 20 MG tablet 9/14/2017 at [facility] No Yes   Sig: Take 1 tablet (20 mg) by mouth daily   gabapentin (NEURONTIN) 600 MG tablet 9/14/2017 at [facility] Pharm[facility] No Yes   Sig: Take 1 tablet (600 mg) by mouth 3 times daily   hydrOXYzine (ATARAX) 10 MG tablet prn Pharmacy Yes No   Sig: Take 10 mg by mouth every 8 hours as needed for itching   insulin glargine (LANTUS) 100 UNIT/ML injection 9/14/2017 at [facility] Pharm[facility] No Yes   Sig: Inject 20 Units Subcutaneous every morning (before breakfast)   lactulose (CHRONULAC) 10 GM/15ML solution 9/14/2017 at [facility] Yes Yes   Sig: Take 20 g by mouth 3 times daily   loratadine (CLARITIN) 10 MG tablet 9/14/2017 at [facility] Yes Yes   Sig: Take 10 mg by mouth daily   methocarbamol (ROBAXIN) 500 MG tablet 9/14/2017 at [facility] Yes Yes   Sig: Take 1,000 mg by mouth 4 times daily   rifaximin (XIFAXAN) 550 MG TABS tablet 9/14/2017 at [facility] No Yes   Sig: Take 1 tablet (550 mg) by mouth 2 times daily   spironolactone (ALDACTONE) 50 MG tablet 9/14/2017 at [facility] No Yes   Sig: Take 1 tablet (50 mg) by mouth daily   sulfamethoxazole-trimethoprim (BACTRIM DS/SEPTRA DS) 800-160 MG per tablet 9/13/2017 at [facility] Pharm[facility] No Yes   Sig: Take 1 tablet by mouth three times a week   Patient taking differently: Take 1 tablet by mouth  three times a week on Mon/Wed/Friday   traZODone (DESYREL) 100 MG tablet 9/13/2017 at 2212 Pharmacy Yes Yes   Sig: Take 100 mg by mouth At Bedtime      Facility-Administered Medications: None     Allergies   Allergies   Allergen Reactions     No Clinical Screening - See Comments      Bupronide patch allergy       Social History   I have reviewed this patient's social history and updated it with pertinent information if needed. Sabina Figueroa  reports that she has been smoking Cigarettes.  She has a 27.00 pack-year smoking history. She quit smokeless tobacco use about 16 months ago. She reports that she drinks alcohol. She reports that she does not use illicit drugs.    Family History   I have reviewed this patient's family history and updated it with pertinent information if needed.   Family History   Problem Relation Age of Onset     Depression Mother      Anxiety Disorder Mother      MENTAL ILLNESS Mother      Substance Abuse Mother      Depression Father      Anxiety Disorder Father      Substance Abuse Father      MENTAL ILLNESS Father      Depression Daughter      Depression Brother      Schizophrenia Brother      Depression Brother      Anxiety Disorder Brother      Substance Abuse Brother        Review of Systems   Unable to assess ROS as patient is intubated and sedated.     Physical Exam   Temp: 98.9  F (37.2  C) Temp src: Oral Temp  Min: 98.6  F (37  C)  Max: 98.9  F (37.2  C) BP: 106/69 Pulse: 52 Heart Rate: 51 Resp: 10 SpO2: 97 % O2 Device: Oxymask Oxygen Delivery: 10 LPM  Vital Signs with Ranges  Temp:  [98.6  F (37  C)-98.9  F (37.2  C)] 98.9  F (37.2  C)  Pulse:  [52] 52  Heart Rate:  [50-83] 51  Resp:  [10-23] 10  BP: ()/(59-84) 106/69  FiO2 (%):  [35 %-70 %] 35 %  SpO2:  [92 %-99 %] 97 %  186 lbs 1.09 oz    Constitutional:  female, resting in bed   HEENT: atraumatic, normocephalic, PERRL, ETT in place   Respiratory/Chest : course upper airway sounds, no wheezes or rales; right VAT  incision with chest tube in place to suction with serosangenous output   Cardiovascular: irregular rhythm, no murmurs   GI: abdomen is soft, non-tender   Genitourinary: callaway in place, urine is clear and yellow   Skin: warm and dry  Musculoskeletal: no lower extremity edema   Neurologic: sedated on exam     Data   Results for orders placed or performed during the hospital encounter of 09/14/17 (from the past 24 hour(s))   Glucose by meter   Result Value Ref Range    Glucose 273 (H) 70 - 99 mg/dL   Gram stain   Result Value Ref Range    Specimen Description Sputum     Special Requests Screen     Gram Stain (A)      >10 Squamous epithelial cells/low power field indicates oral contamination. Please   recollect.      Gram Stain <25 PMNs/low power field     Gram Stain       Few  Mixed gram positive and gram negative bacteria present.      Gram Stain Few  Yeast seen   (A)    Sputum Culture Aerobic Bacterial   Result Value Ref Range    Specimen Description Sputum     Culture Micro Canceled, Test credited     Culture Micro (A)      >10 Squamous epithelial cells/low power field indicates oral contamination. Please   recollect.      Culture Micro       Notification of test cancellation was given to   david archibald rn @2496 9/15/17. ct     Glucose by meter   Result Value Ref Range    Glucose 217 (H) 70 - 99 mg/dL   Glucose by meter   Result Value Ref Range    Glucose 355 (H) 70 - 99 mg/dL   INR   Result Value Ref Range    INR 1.02 0.86 - 1.14   ABO/Rh type and screen   Result Value Ref Range    ABO A     RH(D) Neg     Antibody Screen Neg     Test Valid Only At [facility]        Specimen Expires 09/18/2017    Glucose by meter   Result Value Ref Range    Glucose 436 (H) 70 - 99 mg/dL   Glucose by meter   Result Value Ref Range    Glucose 524 (HH) 70 - 99 mg/dL   Glucose by meter   Result Value Ref Range    Glucose 471 (H) 70 - 99 mg/dL   Glucose by meter   Result Value Ref Range    Glucose 288 (H) 70 - 99 mg/dL    Basic metabolic panel   Result Value Ref Range    Sodium 137 133 - 144 mmol/L    Potassium 3.8 3.4 - 5.3 mmol/L    Chloride 95 94 - 109 mmol/L    Carbon Dioxide 35 (H) 20 - 32 mmol/L    Anion Gap 7 3 - 14 mmol/L    Glucose 200 (H) 70 - 99 mg/dL    Urea Nitrogen 30 7 - 30 mg/dL    Creatinine 0.85 0.52 - 1.04 mg/dL    GFR Estimate 73 >60 mL/min/1.7m2    GFR Estimate If Black 88 >60 mL/min/1.7m2    Calcium 9.6 8.5 - 10.1 mg/dL   CBC with platelets   Result Value Ref Range    WBC 11.3 (H) 4.0 - 11.0 10e9/L    RBC Count 4.41 3.8 - 5.2 10e12/L    Hemoglobin 13.7 11.7 - 15.7 g/dL    Hematocrit 41.2 35.0 - 47.0 %    MCV 93 78 - 100 fl    MCH 31.1 26.5 - 33.0 pg    MCHC 33.3 31.5 - 36.5 g/dL    RDW 13.9 10.0 - 15.0 %    Platelet Count 116 (L) 150 - 450 10e9/L   Glucose by meter   Result Value Ref Range    Glucose 201 (H) 70 - 99 mg/dL

## 2017-09-16 NOTE — PROGRESS NOTES
Events noted; patient now on the vent after surgery.  Discussed with Dr. Pacheco; likely would be extubated.  Will assume care tomorrow morning if extubated.

## 2017-09-16 NOTE — PROGRESS NOTES
[facility] ICU RESPIRATORY NOTE  Date of Admission: 9/14/2017  Date of Intubation (most recent): 9/16/2017  Reason for Mechanical Ventilation: Post op acute respiratory failure  Number of Days on Mechanical Ventilation: 1  Met Criteria for Pressure Support Trial: No  Length of Pressure Support Trial: None  Reason for No Pressure Support Trial: Per MD    Significant Events Today: OR VAT wedge resections  Ventilation Mode: CMV/AC  FiO2 (%): 35 %  Rate Set (breaths/minute): 12 breaths/min  Tidal Volume Set (mL): 500 mL  PEEP (cm H2O): 5 cmH2O  Oxygen Concentration (%): 60 %  Resp: 11    ABG Results:     Recent Labs  Lab 09/16/17  1210 09/13/17  1835   PH 7.36 7.35   PCO2 62* 53*   PO2 128* 62*   HCO3 35* 29*   O2PER 60% Oximizer     ETT appearance on chest x-ray: ET tube in good position    Plan:  Continue full mechanical ventilatory support

## 2017-09-16 NOTE — PROGRESS NOTES
Cross-Cover Note    Called regarding high BS of 520 in a diabetic patient on IV solumedrol 60 mg q 6 hours. Hold further doses of solumedrol tonight.   Given lantus 10 units now (lower dose as she will be NPO tomorrow)\  Started on high sliding scale insulin every 4 hours.

## 2017-09-16 NOTE — ANESTHESIA POSTPROCEDURE EVALUATION
Patient: Sabina Figueroa    Procedure(s):  RIGHT VIDEO ASSISTED THORACOSCOPY; WEDGE RESECTIONS RIGHT UPPER LOBE LUNG, RIGHT MIDDLE LOBE LUNG, AND RIGHT LOWER LOBE LUNG - Wound Class: I-Clean    Diagnosis:BILATERAL PULMONARY INFULTRATES  Diagnosis Additional Information: No value filed.    Anesthesia Type:  General, ETT    Note:  Anesthesia Post Evaluation    Patient location during evaluation: ICU  Patient participation: Unable to evaluate secondary to administered sedation  Post-procedure mental status: sedated.  Pain management: adequate  Airway patency: patent  Cardiovascular status: hemodynamically stable  Respiratory status: ventilator and ETT  Hydration status: acceptable  PONV: none     Anesthetic complications: None    Comments: Full report given to intensivist and RN.          Last vitals:  Vitals:    09/16/17 1130 09/16/17 1145 09/16/17 1200   BP: (!) 150/91 153/87    Pulse:      Resp: 8 15 14   Temp:  36.7  C (98.1  F) 37.2  C (99  F)   SpO2: 99% 100% 100%         Electronically Signed By: Jarad Wei MD  September 16, 2017  12:38 PM

## 2017-09-16 NOTE — PROVIDER NOTIFICATION
On call hospitalist paged regarding pt's glucose of 524 post hs glucose coverage of 7 units for bs of 436. Plan for npo after midnight for 8am VATs procedure, awaiting call back.

## 2017-09-16 NOTE — ANESTHESIA PREPROCEDURE EVALUATION
Procedure: Procedure(s):  THORACOSCOPIC BIOPSY LUNG  Preop diagnosis: BILATERAL PULMONARY INFULTRATES    Allergies   Allergen Reactions     No Clinical Screening - See Comments      Bupronide patch allergy     Past Medical History:   Diagnosis Date     ALC (alcoholic liver cirrhosis) (H)      Alcohol abuse      Anxiety      Arthritis      Ascites      Asthma      Bunion, left foot      Chronic low back pain     Narcotic agreement signed in [facility] system     Depression      Hypertension     current     Ovarian cyst, left      Past Surgical History:   Procedure Laterality Date     BRONCHOSCOPY FLEXIBLE N/A 1/10/2017    Procedure: BRONCHOSCOPY FLEXIBLE;  Surgeon: Jennifer Mane MD;  Location:  GI     BRONCHOSCOPY FLEXIBLE N/A 2017    Procedure: BRONCHOSCOPY FLEXIBLE;  Surgeon: Jennifer Mane MD;  Location:  OR      SECTION       ESOPHAGOSCOPY, GASTROSCOPY, DUODENOSCOPY (EGD), COMBINED  2014    Procedure: COMBINED ESOPHAGOSCOPY, GASTROSCOPY, DUODENOSCOPY (EGD);  Surgeon: Brittany Lowe MD;  Location:  GI     ESOPHAGOSCOPY, GASTROSCOPY, DUODENOSCOPY (EGD), COMBINED N/A 2015    Procedure: COMBINED ESOPHAGOSCOPY, GASTROSCOPY, DUODENOSCOPY (EGD);  Surgeon: London Lenz MD;  Location:  GI     ESOPHAGOSCOPY, GASTROSCOPY, DUODENOSCOPY (EGD), COMBINED N/A 2015    Procedure: COMBINED ESOPHAGOSCOPY, GASTROSCOPY, DUODENOSCOPY (EGD);  Surgeon: Barry Chavez MD;  Location: [facility]     KNEE SURGERY      x3     Prior to Admission medications    Medication Sig Start Date End Date Taking? Authorizing Provider   PREDNISONE PO Take 60 mg by mouth every morning   Yes Unknown, Entered By History   Pantoprazole Sodium (PROTONIX PO) Take 40 mg by mouth every morning (before breakfast)   Yes Unknown, Entered By History   loratadine (CLARITIN) 10 MG tablet Take 10 mg by mouth daily   Yes Unknown, Entered By History   MIRTAZAPINE PO Take 30 mg by mouth At Bedtime   Yes Unknown,  Entered By History   MELATONIN PO Take 5 mg by mouth At Bedtime   Yes Unknown, Entered By History   THIAMINE HCL PO Take 50 mg by mouth daily   Yes Unknown, Entered By History   furosemide (LASIX) 20 MG tablet Take 1 tablet (20 mg) by mouth daily 8/28/17  Yes Jennifer Mane MD   insulin glargine (LANTUS) 100 UNIT/ML injection Inject 20 Units Subcutaneous every morning (before breakfast) 3/31/17  Yes Anatoliy Dos Santos DO   spironolactone (ALDACTONE) 50 MG tablet Take 1 tablet (50 mg) by mouth daily 3/17/17  Yes Josemanuel Damon MD   gabapentin (NEURONTIN) 600 MG tablet Take 1 tablet (600 mg) by mouth 3 times daily 3/17/17  Yes Josemanuel Damon MD   sulfamethoxazole-trimethoprim (BACTRIM DS/SEPTRA DS) 800-160 MG per tablet Take 1 tablet by mouth three times a week  Patient taking differently: Take 1 tablet by mouth three times a week on Mon/Wed/Friday 2/22/17  Yes Yisel Henry MD   ascorbic acid (VITAMIN C) 500 MG tablet Take 500 mg by mouth daily   Yes Unknown, Entered By History   FLUoxetine (PROZAC) 40 MG capsule Take 40 mg by mouth daily   Yes Unknown, Entered By History   lactulose (CHRONULAC) 10 GM/15ML solution Take 20 g by mouth 3 times daily   Yes Unknown, Entered By History   traZODone (DESYREL) 100 MG tablet Take 100 mg by mouth At Bedtime   Yes Unknown, Entered By History   Omega-3 Fatty Acids (FISH OIL) 1200 MG CAPS Take 1 capsule by mouth daily   Yes Unknown, Entered By History   methocarbamol (ROBAXIN) 500 MG tablet Take 1,000 mg by mouth 4 times daily   Yes Unknown, Entered By History   aspirin EC 81 MG EC tablet Take 1 tablet (81 mg) by mouth daily 12/10/16  Yes Anatoliy Dos Santos DO   atorvastatin (LIPITOR) 20 MG tablet Take 1 tablet (20 mg) by mouth daily  Patient taking differently: Take 20 mg by mouth At Bedtime  12/10/16  Yes Anatoliy Dos Santos DO   rifaximin (XIFAXAN) 550 MG TABS tablet Take 1 tablet (550 mg) by mouth 2 times daily 12/10/16  Yes Anatoliy Dos Santos DO    ferrous sulfate (IRON) 325 (65 FE) MG tablet Take 325 mg by mouth daily (with breakfast)   Yes Unknown, Entered By History   buprenorphine-naloxone (SUBOXONE) 8-2 MG SUBL sublingual tablet Place 3 tablets under the tongue daily   Yes Unknown, Entered By History   MULTIPLE VITAMINS PO Take 1 tablet by mouth daily   Yes Reported, Patient   folic acid (FOLVITE) 1 MG tablet Take 1 tablet (1 mg) by mouth daily 4/15/15  Yes Devin Peace MD   PREDNISONE PO Take 25 mg by mouth daily    Unknown, Entered By History   IBUPROFEN PO Take 600 mg by mouth daily    Unknown, Entered By History   blood glucose monitoring (ACCU-CHEK TONIA PLUS) meter device kit Use to test blood sugars BID times daily or as directed. 2/22/17   Yisel Henry MD   hydrOXYzine (ATARAX) 10 MG tablet Take 10 mg by mouth every 8 hours as needed for itching    Unknown, Entered By History   alum & mag hydroxide-simethicone (MYLANTA ES/MAALOX  ES) 400-400-40 MG/5ML SUSP Take 30 mLs by mouth every 4 hours as needed for indigestion Reported on 2/23/2017    Reported, Patient   albuterol (PROAIR HFA, PROVENTIL HFA, VENTOLIN HFA) 108 (90 BASE) MCG/ACT inhaler Inhale 2 puffs into the lungs every 4 hours as needed for shortness of breath / dyspnea or wheezing Reported on 2/23/2017    Reported, Patient     Current Facility-Administered Medications Ordered in Epic   Medication Dose Route Frequency Last Rate Last Dose     ceFAZolin sodium-dextrose (ANCEF) infusion 2 g  2 g Intravenous Pre-Op/Pre-procedure x 1 dose         ceFAZolin (ANCEF) 1 g vial to attach to  ml bag for ADULT or 50 ml bag for PEDS  1 g Intravenous See Admin Instructions         lidocaine 1 % 1 mL  1 mL Other Q1H PRN         sodium chloride (PF) 0.9% PF flush 3 mL  3 mL Intracatheter Q8H         lactated ringers infusion   Intravenous Continuous         [Auto Hold] nicotine Patch in Place   Transdermal Q8H         [Auto Hold] nicotine patch REMOVAL   Transdermal Daily         [Auto Hold]  nicotine (NICODERM CQ) 21 MG/24HR 24 hr patch 1 patch  1 patch Transdermal Daily   1 patch at 09/15/17 2130     [Auto Hold] insulin glargine (LANTUS) injection 10 Units  10 Units Subcutaneous At Bedtime   10 Units at 09/15/17 2329     [Auto Hold] methylPREDNISolone sodium succinate (solu-MEDROL) injection 40 mg  40 mg Intravenous Q6H   40 mg at 09/16/17 0557     [Auto Hold] insulin aspart (NovoLOG) inj (RAPID ACTING)  1-12 Units Subcutaneous Q4H   6 Units at 09/16/17 0303     [Auto Hold] aspirin EC EC tablet 81 mg  81 mg Oral Daily   81 mg at 09/15/17 0839     [Auto Hold] atorvastatin (LIPITOR) tablet 20 mg  20 mg Oral Daily   20 mg at 09/15/17 2129     [Auto Hold] buprenorphine HCl-naloxone HCl (SUBOXONE) 8-2 MG per film 3 Film  3 Film Sublingual Daily   3 Film at 09/15/17 0841     [Auto Hold] FLUoxetine (PROzac) capsule 40 mg  40 mg Oral Daily   40 mg at 09/15/17 0840     [Auto Hold] folic acid (FOLVITE) tablet 1 mg  1 mg Oral Daily   1 mg at 09/15/17 0840     [Auto Hold] gabapentin (NEURONTIN) tablet 600 mg  600 mg Oral TID   600 mg at 09/15/17 2130     [Auto Hold] hydrOXYzine (ATARAX) tablet 10 mg  10 mg Oral Q8H PRN         [Auto Hold] lactulose (CHRONULAC) solution 20 g  20 g Oral Daily   20 g at 09/15/17 0841     [Auto Hold] methocarbamol (ROBAXIN) tablet 1,000 mg  1,000 mg Oral 4x Daily   1,000 mg at 09/15/17 2130     [Auto Hold] mirtazapine (REMERON) tablet 30 mg  30 mg Oral At Bedtime   30 mg at 09/15/17 2130     [Auto Hold] pantoprazole (PROTONIX) EC tablet 40 mg  40 mg Oral QAM AC   40 mg at 09/15/17 0841     [Auto Hold] rifaximin (XIFAXAN) tablet 550 mg  550 mg Oral BID   550 mg at 09/15/17 2130     [Auto Hold] spironolactone (ALDACTONE) tablet 50 mg  50 mg Oral Daily   50 mg at 09/15/17 0841     [Auto Hold] thiamine tablet 50 mg  50 mg Oral Daily   50 mg at 09/15/17 0839     [Auto Hold] cefTRIAXone (ROCEPHIN) 2 g vial to attach to  ml bag for ADULTS or NS 50 ml bag for PEDS  2 g Intravenous Q24H    2 g at 09/16/17 0506     [Auto Hold] azithromycin (ZITHROMAX) 500 mg in NaCl 0.9 % 250 mL intermittent infusion  500 mg Intravenous Q24H 250 mL/hr at 09/15/17 1426 500 mg at 09/15/17 1426     [Auto Hold] furosemide (LASIX) injection 40 mg  40 mg Intravenous BID   40 mg at 09/15/17 1517     [Auto Hold] guaiFENesin (MUCINEX) 12 hr tablet 1,200 mg  1,200 mg Oral BID   1,200 mg at 09/15/17 2129     [Auto Hold] ipratropium - albuterol 0.5 mg/2.5 mg/3 mL (DUONEB) neb solution 3 mL  3 mL Nebulization Q4H While awake   3 mL at 09/15/17 2334     [Auto Hold] ipratropium - albuterol 0.5 mg/2.5 mg/3 mL (DUONEB) neb solution 3 mL  3 mL Nebulization Q2H PRN         [Auto Hold] glucose 40 % gel 15-30 g  15-30 g Oral Q15 Min PRN        Or     [Auto Hold] dextrose 50 % injection 25-50 mL  25-50 mL Intravenous Q15 Min PRN        Or     [Auto Hold] glucagon injection 1 mg  1 mg Subcutaneous Q15 Min PRN         [Auto Hold] naloxone (NARCAN) injection 0.1-0.4 mg  0.1-0.4 mg Intravenous Q2 Min PRN         [Auto Hold] nitroGLYcerin (NITROSTAT) sublingual tablet 0.4 mg  0.4 mg Sublingual Q5 Min PRN         [Auto Hold] insulin glargine (LANTUS) injection 10 Units  10 Units Subcutaneous QAM AC   10 Units at 09/15/17 0841     [Auto Hold] lidocaine 1 % 1 mL  1 mL Other Q1H PRN         [Auto Hold] lidocaine (LMX4) cream   Topical Q1H PRN         [Auto Hold] sodium chloride (PF) 0.9% PF flush 3 mL  3 mL Intracatheter Q1H PRN   3 mL at 09/16/17 0050     [Auto Hold] sodium chloride (PF) 0.9% PF flush 3 mL  3 mL Intracatheter Q8H   3 mL at 09/16/17 0506     [Auto Hold] acetaminophen (TYLENOL) tablet 650 mg  650 mg Oral Q4H PRN         [Auto Hold] senna-docusate (SENOKOT-S;PERICOLACE) 8.6-50 MG per tablet 1-2 tablet  1-2 tablet Oral BID PRN         [Auto Hold] polyethylene glycol (MIRALAX/GLYCOLAX) Packet 17 g  17 g Oral Daily PRN         [Auto Hold] bisacodyl (DULCOLAX) Suppository 10 mg  10 mg Rectal Daily PRN         [Auto Hold] ondansetron  (ZOFRAN-ODT) ODT tab 4 mg  4 mg Oral Q6H PRN        Or     [Auto Hold] ondansetron (ZOFRAN) injection 4 mg  4 mg Intravenous Q6H PRN         [Auto Hold] prochlorperazine (COMPAZINE) injection 5-10 mg  5-10 mg Intravenous Q6H PRN        Or     [Auto Hold] prochlorperazine (COMPAZINE) tablet 5-10 mg  5-10 mg Oral Q6H PRN        Or     [Auto Hold] prochlorperazine (COMPAZINE) Suppository 25 mg  25 mg Rectal Q12H PRN         [Auto Hold] HYDROmorphone (PF) (DILAUDID) injection 0.2 mg  0.2 mg Intravenous Q3H PRN   0.2 mg at 09/16/17 0050     [Auto Hold] traZODone (DESYREL) tablet 100 mg  100 mg Oral At Bedtime        Or     [Auto Hold] traZODone (DESYREL) tablet 150 mg  150 mg Oral At Bedtime   150 mg at 09/15/17 2130     No current [facility]-ordered outpatient prescriptions on file.     Wt Readings from Last 1 Encounters:   09/16/17 84.4 kg (186 lb 1.1 oz)     Temp Readings from Last 1 Encounters:   09/16/17 37  C (98.6  F) (Oral)     BP Readings from Last 6 Encounters:   09/16/17 106/69   09/14/17 (!) 111/91   08/28/17 120/82   07/24/17 106/70   05/09/17 107/67   04/10/17 106/68     Pulse Readings from Last 4 Encounters:   09/16/17 52   09/13/17 78   08/28/17 85   07/24/17 72     Resp Readings from Last 1 Encounters:   09/16/17 10     SpO2 Readings from Last 1 Encounters:   09/16/17 97%     Recent Labs   Lab Test  09/16/17   0530  09/15/17   0650   NA  137  138   POTASSIUM  3.8  4.2   CHLORIDE  95  97   CO2  35*  33*   ANIONGAP  7  8   GLC  200*  248*   BUN  30  24   CR  0.85  0.99   VISHNU  9.6  9.9     Recent Labs   Lab Test  09/12/17   2318  05/06/17   0550   AST  64*  56*   ALT  72*  47     Recent Labs   Lab Test  09/16/17   0530  09/15/17   0650   WBC  11.3*  9.8   HGB  13.7  13.9   PLT  116*  107*     Recent Labs   Lab Test  09/15/17   2005  05/02/17   1854   INR  1.02  1.15*      Recent Labs   Lab Test  05/02/17   1854  02/16/17   0943  01/07/17   1814   TROPI  <0.015  The 99th percentile for upper reference range is  0.045 ug/L.  Troponin values in   the range of 0.045 - 0.120 ug/L may be associated with risks of adverse   clinical events.    0.018  <0.015  The 99th percentile for upper reference range is 0.045 ug/L.  Troponin values in   the range of 0.045 - 0.120 ug/L may be associated with risks of adverse   clinical events.       RECENT LABS:     ECG: Sinus tachy, ? LAE, no acute abnormalities    ECHO: 1/2017  Interpretation Summary     Tecnically difficult imaging  Hyperdynamic left ventricular function  A contrast injection (Bubble Study) was performed that was negative for flow  across the interatrial septum.  There is mild (1+) mitral regurgitation.        Imaging quality limits assessment of RV systolic perforamnce, howver, grossly  RV chamber size appears normal. The TR envelopes are not quite satisfactory  to accurately measure PA systolic pressure, but PA pressures appeart to be  elevated. Likewise the degree of MR is difficult to assess, but grossly  appears to be mild.All in all, there appears to be little change since  12/5/2016.    CXR:    IMPRESSION: Slight improvement in bilateral infiltrates in both lower  lobes. Continued bilateral infiltrates probably slightly worse in the  upper lobes compared to previous.    Anesthesia Evaluation     . Pt has had prior anesthetic. Type: General    No history of anesthetic complications          ROS/MED HX    ENT/Pulmonary:     (+)tobacco use, Current use Mild Persistent asthma , recent URI unresolved Cryptogenic Organizing Pneumonia: . Other pulmonary disease Restrictive Lung Disease per last PFTs.   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 : . . . :. valvular problems/murmurs hx of innocent heart murmur:.      (-) angina, CAD, orthopnea/PND, syncope, arrhythmias, irregular heartbeat/palpitations and angina   METS/Exercise Tolerance:      Hematologic:        (-) History of Transfusion   Musculoskeletal:         GI/Hepatic:     (+) GERD hepatitis type Alcoholic, liver disease (Cirrhosis with ascites (no known varices) ),       Renal/Genitourinary:     (+) chronic renal disease, type: CRI,       Endo:     (+) type II DM (steroid-induced) Chronic steroid usage for .   (-) thyroid disease   Psychiatric:     (+) psychiatric history anxiety and depression      Infectious Disease:         Malignancy:         Other: Comment: Hx of PSA  Last EtOH 5/11/17  Hx of EtOH Abuse & Withdrawal (w/ seizures)  Chronic Pain - on Suboxone - none for last 2 days                     Physical Exam      Airway   Mallampati: I  TM distance: >3 FB  Neck ROM: full    Dental   (+) chipped    Cardiovascular   Rhythm and rate: regular and normal  (-) no murmur    Pulmonary (+) decreased breath sounds and wheezes                       Anesthesia Plan      History & Physical Review  History and physical reviewed and following examination; no interval change.    ASA Status:  3 .    NPO Status:  > 8 hours    Plan for General and ETT with Propofol and Intravenous induction. Maintenance will be Balanced.    PONV prophylaxis:  Ondansetron (or other 5HT-3) and Dexamethasone or Solumedrol  Additional equipment: Difficult Airway Cart, Fiberoptic bronchoscope and Double Lumen ETT Cisatracurium for muscle relaxation (after RSI wiith succinylcholine)   Nothing per esophagus  37 & 35 ALBINO available  Will need to continue stress-dose steroids (currently being given)   Watch blood glucose perioperatively   May need to remain intubated postoperatively.  Discussed with patient.   Zofran 8 mg (divided)   Dunoeb in preop       Postoperative Care  Postoperative pain management:  Multi-modal analgesia.      Consents  Anesthetic plan, risks, benefits and alternatives discussed with:  Patient..

## 2017-09-16 NOTE — OP NOTE
DATE OF PROCEDURE:  09/16/2017      SURGEON:  Alonzo Baird MD      ASSISTANT:   LIMA RIBERA      PREOPERATIVE DIAGNOSIS:  Interstitial lung disease.        POSTOPERATIVE DIAGNOSIS:  Interstitial lung disease.      PROCEDURE:  Right video-assisted thoracoscopy and wedge resection of portion of right upper, right middle and right lower lobe lung.      ANESTHESIA:  General with double endotracheal tube.      INDICATIONS:  Sabina Fgiueroa is a 42-year-old woman with interstitial lung disease.  Based on the findings, a video-assisted thoracoscopy with wedge resection is indicated for diagnosis.      DESCRIPTION OF PROCEDURE:  The patient was brought in and placed in supine position.  Under general anesthesia with double-lumen endotracheal tube, the patient was placed in a left lateral decubitus position.  The right chest was prepared and draped in the usual fashion using ChloraPrep.  Ventilation of the right lung was discontinued.  Three thoracoscopic incisions were made in the usual fashion.  The video thoracoscope was introduced.  Examination of the lung appears diffusely abnormal.  There was no pleural fluid or pleural nodule.  Using multiple applications of [place] 60 gold stapling device, a wedge resection of portion of the right upper, right middle and right lower lobe lung were performed.  Frozen section on the first specimen showed abnormal lung.  The other 2 specimens were similar with some diffuse thickening of the lung.  Integrative portion of each specimen was sent for stains and cultures.  Staple lines were hemostatic.  Hemostasis was verified and was excellent.  30 mL Marcaine 0.5% without epinephrine was injected as intercostal blocks.  Through a separate stab wound, a 28 straight chest tube was placed with the tip directed toward the apex posteriorly and sutured to the skin with #2 silk suture.  Three thoracoscopic incisions were closed in the usual fashion.      ESTIMATED BLOOD LOSS:   Minimal.      Needle and sponge counts correct.        Amina Horan PA-C, was the first assistant during the procedure.  Her role as first assistant was essential and necessary accomplishing the step of the procedure as described above,  providing handling of the scope, exposure and retraction.         MARY JO REA MD             D: 2017 17:23   T: 2017 17:51   MT: [id number]      Name:     SYDNI GLEZ   MRN:      [id number]        Account:        [id number]   :      1975           Procedure Date: 2017      Document: [id number]

## 2017-09-16 NOTE — BRIEF OP NOTE
[facility] Brief Operative Note    Pre-operative diagnosis: BILATERAL PULMONARY INFILTRATES   Post-operative diagnosis Bilateral pulmonary infiltrates concerning for ILD vs infection; acute hypoxic respiratory failure.    Procedure: Procedure(s):  RIGHT VIDEO ASSISTED THORACOSCOPY; WEDGE RESECTIONS RIGHT UPPER LOBE LUNG, RIGHT MIDDLE LOBE LUNG, AND RIGHT LOWER LOBE LUNG - Wound Class: I-Clean   Surgeon(s): Surgeon(s) and Role:     * Alonzo Baird MD - Primary  Assist - VENANCIO Pascal   Estimated blood loss: 50 mL    Specimens:   ID Type Source Tests Collected by Time Destination   1 : PORTION RIGHT LUNG Tissue Lung AFB CULTURE NON BLOOD, AFB STAIN NON BLOOD, ANAEROBIC BACTERIAL CULTURE, FUNGUS CULTURE, GRAM STAIN, YVONNE PREP, LEGIONELLA CULTURE, NOCARDIA CULTURE, TISSUE CULTURE AEROBIC BACTERIAL Alonzo Baird MD 9/16/2017  9:23 AM    A : PORTION RIGHT UPPER LOBE LUNG Tissue Lung, Right Upper Lobe SURGICAL PATHOLOGY EXAM Alonzo Baird MD 9/16/2017  9:10 AM    B : PORTION RIGHT LOWER LOBE LUNG Tissue Lung, Right Lower Lobe SURGICAL PATHOLOGY EXAM Alonzo Baird MD 9/16/2017  9:19 AM    C : PORTION RIGHT MIDDLE LOBE LUNG Tissue Lung, Right Middle Lobe SURGICAL PATHOLOGY EXAM Alonzo Baird MD 9/16/2017  9:28 AM       Findings: Thick lungs; did not necessarily appear abnormal but palpably abnormal.  Frozen confirms abnormal lung tissue.  Biopsies of RUL, RML, and RLL for diagnosis.  Right chest tube in apex.      Elected to leave patient intubated given ongoing issues with hypoxia and sedation.  Patient to go directly to ICU from OR.

## 2017-09-16 NOTE — ANESTHESIA CARE TRANSFER NOTE
Patient: Sabina Figueroa    Procedure(s):  RIGHT VIDEO ASSISTED THORACOSCOPY; WEDGE RESECTIONS RIGHT UPPER LOBE LUNG, RIGHT MIDDLE LOBE LUNG, AND RIGHT LOWER LOBE LUNG - Wound Class: I-Clean    Diagnosis: BILATERAL PULMONARY INFULTRATES  Diagnosis Additional Information: No value filed.    Anesthesia Type:   General, ETT     Note:  Airway :ETT  Patient transferred to:ICU  Comments: Transfer on Monitor.  Pt. Sleeping,  VSS,   Manual respirations with Ambu bag.    In ICU  370  Stable.  Report to Iesha PARMAR.    Respirator placed by RT      Vitals: (Last set prior to Anesthesia Care Transfer)    CRNA VITALS  9/16/2017 0953 - 9/16/2017 1034      9/16/2017             Resp Rate (set): 10                Electronically Signed By: ISABELL Stevenson [facility]  September 16, 2017  10:34 AM

## 2017-09-17 NOTE — PROGRESS NOTES
THORACIC SURGERY POD #1    On Vent.  FiO2 40%  VSS  No air leak, no bleeding    CXR pending    CT suction for now  As per intensivist    MARY JO REA MD [facility] ONCOLOGY THORACIC SURGERY  CELL:  [phone]  OFFICE: [phone]

## 2017-09-17 NOTE — PLAN OF CARE
Problem: Individualization  Goal: Patient Preferences  Outcome: Therapy, progress toward functional goals is gradual  Patient arrival here at 1030 this am, heart rate  upon arrival waking from anesthesia, settled down after propofol and fentanyl drips started. Lung sounds course. Chest tube right times 1, patent. Incisional sites C/D/I.  Face is jagdish and red complexion.  Watters and OG placed per order. Nods appropriately. See assessment, vitals and charting as noted. Tolerating cares and ventilator well.

## 2017-09-17 NOTE — PLAN OF CARE
Problem: Restraint for Non-Violent/Non-Self-Destructive Behavior  Goal: Prevent/Manage Potential Problems  Maintain safety of patient and others during period of restraint.  Promote psychological and physical wellbeing.  Prevent injury to skin and involved body parts.  Promote nutrition, hydration, and elimination.   Outcome: Completed Date Met:  09/17/17  Patient is extubated and alert and oriented. Restraints discontinued per same.

## 2017-09-17 NOTE — PROGRESS NOTES
Patient extubated to 50% aerosol mask without any complications. Suctioned pre and post extubation. No stridor. SpO2 94%. Will continue to follow.    9/17/2017  Andrei Guevara, RT

## 2017-09-17 NOTE — PROGRESS NOTES
Patient extubated this Am. Currently on 6 Lpm Oxymizer cannula and SpO2 high 90`s. Scheduled neb tx given and tolerated well. BS clear. Will continue to follow.

## 2017-09-17 NOTE — PROGRESS NOTES
[facility]    Hospitalist Progress Note    Assessment & Plan   41-year-old female with a past medical history significant for chronic liver disease secondary to alcohol abuse, alcohol-induced cirrhosis with ascites, alcohol-induced neuropathy, hyperlipidemia, major depressive disorder with anxiety, obesity, chronic pain syndrome with chronic low back pain, opioid abuse, tobacco dependence, steroid-induced insulin-dependent diabetes, mild persistent asthma, history of Clostridium difficile, and recurrent acute hypoxic respiratory failure secondary to recurrent pneumonia who was directly admitted to [facility] due to acute hypoxic respiratory failure thought to be secondary to cryptogenic organizing pneumonia and fluid overload with plans to proceed with a lung biopsy.      Acute hypoxic respiratory failure thought to be secondary to a cryptogenic organizing pneumonia s/p Right video-assisted thoracoscopy and wedge resection of portion of right upper, right middle and right lower lobe lung.    -Patient evaluated by pulmonology at [facility]. Had followed with pulmonary medicine from [facility].  -Lung bx performed by Dr deutsch on 9/16  -Was on vent overnight; extubated earlier today  -Currently on 6LNC; wean o2 as tolerated  -Continue Ceftriaxone and Azithromycin for now.   -Continue IV solumedrol  -TTF in AM if stable  -Await pathology  -Will ask Pulmonary to follow once on the floor.  -Dilaudid PCA for pain control     Chronic liver disease secondary to alcohol abuse with associated cirrhosis with ascites and associated alcohol-induced neuropathy:  This appears to be stable at this point.  Patient has not followed up with gastroenterologist in 5 years.    - Continue prior to admit folic acid 1 mg daily, thiamine 50 mg daily, spironolactone 50 mg daily, rifaximin 550 mg daily  - Will hold oral Lasix for now       Major depressive disorder with anxiety:   - Continue fluoxetine 40 mg  daily.    - Continue mirtazapine 30 mg every evening at bedtime   - PTA trazodone as needed.      Chronic pain syndrome with ongoing back pain with noted history of opioid abuse:  The patient has previously been on a pain contract and is currently on Robaxin 1000 mg 4 times daily, Suboxone 8/2 mg sublingual tablet 3 tablets daily.    -hold suboxone in the post-op period  -dilaudid PCA     Hyperlipidemia:   - Continue prior to admit atorvastatin 20 mg daily      Steroid-induced insulin-dependent diabetes:  Patient's A1c is noted to be 7.  The patient indicated that she has been taking 15 units of Lantus every afternoon.    - high insulin sliding scale available.    - Continue Lantus but at reduced dose of 8 units every evening       D/W: RN  DVT Prophylaxis: Pneumatic Compression Devices  Code Status: Full Code    Disposition: Expected discharge in 2+ days; continue ICU care today    Amor Nelson MD    Interval History   Extubated earlier today. Denies significant dyspnea; pain at CT site    -Data reviewed today: I reviewed all new labs and imaging results over the last 24 hours. I personally reviewed the EKG tracing showing nsr.    Physical Exam   Temp: 98.6  F (37  C)   BP: 102/70   Heart Rate: 51 Resp: 9 SpO2: 98 % O2 Device: Aerosol mask Oxygen Delivery: 10 LPM  Vitals:    09/15/17 0615 09/16/17 0430 09/16/17 1045   Weight: 84.6 kg (186 lb 8.2 oz) 84.4 kg (186 lb 1.1 oz) 84.6 kg (186 lb 8.2 oz)     Vital Signs with Ranges  Temp:  [98.1  F (36.7  C)-99  F (37.2  C)] 98.6  F (37  C)  Heart Rate:  [45-91] 51  Resp:  [6-39] 9  BP: (101-146)/(62-90) 102/70  FiO2 (%):  [40 %-50 %] 50 %  SpO2:  [92 %-100 %] 98 %  I/O last 3 completed shifts:  In: 3147.18 [I.V.:3117.18; NG/GT:30]  Out: 2380 [Urine:2200; Blood:20; Chest Tube:160]    Constitutional: AAOX3, NAD, Appears comfortable, jadgish complexion  HEENT: Moist oral mucosa, no oral lesions, No pallor or icterus  Neck- Supple, Good ROM, No JVD  Respiratory:   Diminished at rt base, CT in place; Normal WOB  Cardiovascular: RRR, No murmur  GI: Soft, Non- tender, BS- normoactive, No Guarding/rebound/rigidity  Skin/Integument: Warm and dry, no rashes  MSK: No joint deformity or swelling, no edema  Neuro: CN- grossly intact, Motor strength 5/5 on all 4 extremities.      Medications     propofol (DIPRIVAN) infusion Stopped (09/17/17 1000)     fentaNYL 50 mcg/hr (09/17/17 0700)     IV fluid REPLACEMENT ONLY       insulin (regular) 1 Units/hr (09/17/17 1243)     NaCl 75 mL/hr at 09/17/17 1000       aspirin  81 mg Oral Daily     chlorhexidine  15 mL Mouth/Throat Q12H     pantoprazole  40 mg Intravenous QAM AC     nicotine   Transdermal Q8H     nicotine   Transdermal Daily     nicotine  1 patch Transdermal Daily     methylPREDNISolone  40 mg Intravenous Q6H     atorvastatin  20 mg Oral Daily     buprenorphine HCl-naloxone HCl  3 Film Sublingual Daily     FLUoxetine  40 mg Oral Daily     folic acid  1 mg Oral Daily     gabapentin  600 mg Oral TID     lactulose  20 g Oral Daily     methocarbamol  1,000 mg Oral 4x Daily     mirtazapine (REMERON) tablet 30 mg  30 mg Oral At Bedtime     rifaximin  550 mg Oral BID     spironolactone  50 mg Oral Daily     thiamine tablet 50 mg  50 mg Oral Daily     cefTRIAXone  2 g Intravenous Q24H     azithromycin  500 mg Intravenous Q24H     guaiFENesin  1,200 mg Oral BID     ipratropium - albuterol 0.5 mg/2.5 mg/3 mL  3 mL Nebulization Q4H While awake     sodium chloride (PF)  3 mL Intracatheter Q8H     traZODone  100 mg Oral At Bedtime    Or     traZODone  150 mg Oral At Bedtime       Data     Recent Labs  Lab 09/17/17  0420 09/16/17  1115 09/16/17  0530 09/15/17  2005  09/12/17  2318   WBC 8.2 8.4 11.3*  --   < > 17.2*   HGB 14.0 15.0 13.7  --   < > 15.7   MCV 96 94 93  --   < > 90   PLT 99* 134* 116*  --   < > 111*   INR  --   --   --  1.02  --   --     131* 137  --   < > 133   POTASSIUM 4.2 4.7 3.8  --   < > 3.2*   CHLORIDE 101 91* 95  --    < > 97   CO2 34* 32 35*  --   < > 26   BUN 20 25 30  --   < > 25   CR 0.80 0.78 0.85  --   < > 1.31*   ANIONGAP 4 8 7  --   < > 10   VISHNU 9.5 8.4* 9.6  --   < > 8.8   * 311* 200*  --   < > 176*   ALBUMIN  --  2.7*  --   --   --  3.4   PROTTOTAL  --  6.6*  --   --   --  7.0   BILITOTAL  --  0.6  --   --   --  0.4   ALKPHOS  --  114  --   --   --  108   ALT  --  87*  --   --   --  72*   AST  --  51*  --   --   --  64*   < > = values in this interval not displayed.    Recent Results (from the past 24 hour(s))   XR Chest Port 1 View    Narrative    XR CHEST PORT 1 VW  9/17/2017 7:35 AM     HISTORY:  post op    COMPARISON: Film dated 9/16/2017    FINDINGS:  ET tube and NG tube are in place. Right chest tube is in  place. No pneumothorax. Extensive bilateral infiltrate is again noted.      Impression    IMPRESSION: Right chest tube. No pneumothorax. No significant change.    DAYSI LORENZO MD

## 2017-09-17 NOTE — PROGRESS NOTES
[facility] ICU RESPIRATORY NOTE  Date of Admission: 9/14/17  Date of Intubation (most recent): 9/16/17  Reason for Mechanical Ventilation: Post Op respiratory failure   Number of Days on Mechanical Ventilation: 2  Met Criteria for Pressure Support Trial: No  Reason for No Pressure Support Trial: Per MD    Significant Events Today: None     Ventilation Mode: CMV/AC  FiO2 (%): 40 %  Rate Set (breaths/minute): 12 breaths/min  Tidal Volume Set (mL): 500 mL  PEEP (cm H2O): 5 cmH2O  Oxygen Concentration (%): 60 %  Resp: 10    ABG Results:    Recent Labs  Lab 09/16/17  1210 09/13/17  1835   PH 7.36 7.35   PCO2 62* 53*   PO2 128* 62*   HCO3 35* 29*   O2PER 60% Oximizer     ETT appearance on chest x-ray: In good position     Plan:  Patient remains full vent support and reassess for weaning.    Asher Nagy RT  9/17/2017

## 2017-09-17 NOTE — PROGRESS NOTES
heart rate 49 at rest but patient is tachycardic with movement up to 130. Fentanyl drip increased from 25mcg to 50 mcg.  Propofol at 25 mcg.   CMV 40%   Lung sounds course. Chest tube right times 1, patent without air leak. Incisional sites WNL.  Face is jagdish and red complexion.  Watters and OG in place. Urine output >50/HR. Nods appropriately. See assessment, vitals and charting as noted. Tolerating cares and ventilator well.  Report given to oncoming nurse. Will continue to monitor.

## 2017-09-17 NOTE — PROGRESS NOTES
"ICU Staff  09/17/2017     Last 24 hours:  Remains intubated, vent requirements are minimal, chest tube is place w no air leak, hemodynamically stable     Intake/Output Summary (Last 24 hours) at 09/17/17 1019  Last data filed at 09/17/17 0700   Gross per 24 hour   Intake          3427.42 ml   Output             2430 ml   Net           997.42 ml     Ventilation Mode: CPAP/PS  FiO2 (%): 50 %  Rate Set (breaths/minute): 12 breaths/min  Tidal Volume Set (mL): 500 mL  PEEP (cm H2O): 5 cmH2O  Pressure Support (cm H2O): 5 cmH2O  Oxygen Concentration (%): 40 %  Resp: 11    Assessment and Plan:  1. Resp failure with multiple episodes in the past, s/p bronch w transbronchial biopsies showing changes of organizing pneumonia, has been treated with high dose steroids with no significant improvement as outpatient, no evidence of infection. S/p right VATS/ wedge biopsies of all 3 lobes.   -Wean to extubate if able  -IV methylpred, nebs  -Chest tube management per thoracic  -Resume empiric antibiotic coverage for now (rocephin/zithromax)  -Track path result     2. ETOH cirrhosis  -Resume home meds (rifaximin, lactulose)     3. Thrombocytopenia likely due to cirrhosis/hypersplenism  -Monitor     ICU care: SCDs, head of the bed elevated at 30 degrees, oral, IV PPI and sedation with propofol/fentanyl (daily vacation)     Restrain needed: Yes   Lines needed: Yes    Code: Full  Billing: Critical care time 35 min excluding procedure time.     Medications:  Reviewed     Physical examination:  General: Sedated, following simple commands  /74  Pulse 85  Temp 98.6  F (37  C)  Resp 12  Ht 1.702 m (5' 7\")  Wt 84.6 kg (186 lb 8.2 oz)  SpO2 94%  BMI 29.21 kg/m2  HEENT: neck supple, symmetrical  Lungs: Crackles, no wheezing, right sided chest tube is in place w minimal drainage  CVS: Normal S1 & S2, no murmur  Abdomen: Bowel sounds present, soft, non tender  Extremities/musculoskeletal: no edema  Neurology: sedated  Skin: no " rash  Exam of Line sites: No erythema or discharge.     Data:    Recent Labs  Lab 09/17/17  0420 09/16/17  1115 09/16/17  0530   WBC 8.2 8.4 11.3*   RBC 4.50 4.60 4.41   HGB 14.0 15.0 13.7   HCT 43.1 43.3 41.2   PLT 99* 134* 116*       Recent Labs  Lab 09/17/17  0420 09/16/17  1115 09/16/17  0530    131* 137   POTASSIUM 4.2 4.7 3.8   CHLORIDE 101 91* 95   CO2 34* 32 35*   BUN 20 25 30   CR 0.80 0.78 0.85   * 311* 200*   VISHNU 9.5 8.4* 9.6       Recent Labs  Lab 09/16/17  1210 09/13/17  1835   PH 7.36 7.35   PCO2 62* 53*   PO2 128* 62*   HCO3 35* 29*   O2PER 60% Oximizer       Recent Labs  Lab 09/17/17  0840 09/17/17  0655 09/17/17  0551 09/17/17  0420 09/17/17  0415 09/17/17  0200 09/17/17  0020  09/16/17  1115  09/16/17  0530  09/15/17  0650  09/14/17  0906  09/13/17  1047   GLC  --   --   --  179*  --   --   --   --  311*  --  200*  --  248*  --  176*  --  435*   * 159* 173*  --  169* 169* 164*  < >  --   < >  --   < >  --   < >  --   < >  --    < > = values in this interval not displayed.    CXR: 9/17 bilateral infiltrates, not changed     HReuben Pacheco MD

## 2017-09-18 NOTE — PROGRESS NOTES
"  Pulmonary Medicine Progress Note        Date of Admission: 2017  Primary Attending:  Amor Nelson MD  Consulting Physician: Arnaldo Lange MD      History:      SUBJECTIVE: No new events overnight,  Afebrile. No worsening respiratory complaints at this time. Oxygen with oxymizer      OBJECTIVE:    Heart Rate: 59, Blood pressure 113/69, pulse 85, temperature 98.3  F (36.8  C), temperature source Oral, resp. rate 16, height 1.702 m (5' 7\"), weight 84.6 kg (186 lb 8.2 oz), SpO2 98 %, not currently breastfeeding.  Body mass index is 29.21 kg/(m^2).  Temp (24hrs), Av.4  F (36.9  C), Min:98.2  F (36.8  C), Max:99.3  F (37.4  C)        Neck- Supple, Good ROM, No JVD  Respiratory:  Diminished  But clear, CT in place; Normal WOB  Cardiovascular: RRR, No murmur  GI: Soft, Non- tender, BS- normoactive, No Guarding/rebound/rigidity  Skin/Integument: Warm and dry, no rashes    Medications  Current Facility-Administered Medications Ordered in Epic   Medication Dose Route Frequency Last Rate Last Dose     bacitracin ointment   Topical TID         enoxaparin (LOVENOX) injection 40 mg  40 mg Subcutaneous Q24H   40 mg at 17 1353     diphenhydrAMINE (BENADRYL) capsule 25 mg  25 mg Oral Q6H PRN        Or     diphenhydrAMINE (BENADRYL) injection 25 mg  25 mg Intravenous Q6H PRN         insulin glargine (LANTUS) injection 15 Units  15 Units Subcutaneous At Bedtime         furosemide (LASIX) tablet 20 mg  20 mg Oral Daily   20 mg at 17 1006     HYDROmorphone (PF) (DILAUDID) injection 0.3-0.5 mg  0.3-0.5 mg Intravenous Q2H PRN         oxyCODONE (ROXICODONE) IR tablet 10 mg  10 mg Oral Q4H PRN   10 mg at 17 1352     predniSONE (DELTASONE) tablet 50 mg  50 mg Oral Daily   50 mg at 17 1009     [START ON 2017] pantoprazole (PROTONIX) EC tablet 40 mg  40 mg Oral QAM AC         aspirin chewable tablet 81 mg  81 mg Oral Daily   81 mg at 17 0812     insulin aspart (NovoLOG) inj (RAPID ACTING)  " 1-10 Units Subcutaneous TID AC   5 Units at 09/18/17 1227     insulin aspart (NovoLOG) inj (RAPID ACTING)  1-7 Units Subcutaneous At Bedtime         senna-docusate (SENOKOT-S;PERICOLACE) 8.6-50 MG per tablet 1-2 tablet  1-2 tablet Oral BID   1 tablet at 09/18/17 0812     chlorhexidine (PERIDEX) 0.12 % solution 15 mL  15 mL Mouth/Throat Q12H   15 mL at 09/17/17 0823     dextrose 10 % 1,000 mL infusion   Intravenous Continuous PRN         nicotine Patch in Place   Transdermal Q8H         nicotine patch REMOVAL   Transdermal Daily   Stopped at 09/17/17 0800     nicotine (NICODERM CQ) 21 MG/24HR 24 hr patch 1 patch  1 patch Transdermal Daily   1 patch at 09/18/17 0814     atorvastatin (LIPITOR) tablet 20 mg  20 mg Oral Daily   20 mg at 09/17/17 2051     FLUoxetine (PROzac) capsule 40 mg  40 mg Oral Daily   40 mg at 09/18/17 0812     folic acid (FOLVITE) tablet 1 mg  1 mg Oral Daily   1 mg at 09/18/17 0812     gabapentin (NEURONTIN) tablet 600 mg  600 mg Oral TID   600 mg at 09/18/17 0812     hydrOXYzine (ATARAX) tablet 10 mg  10 mg Oral Q8H PRN         lactulose (CHRONULAC) solution 20 g  20 g Oral Daily   20 g at 09/18/17 0814     methocarbamol (ROBAXIN) tablet 1,000 mg  1,000 mg Oral 4x Daily   1,000 mg at 09/18/17 1346     mirtazapine (REMERON) tablet 30 mg  30 mg Oral At Bedtime   30 mg at 09/17/17 2053     rifaximin (XIFAXAN) tablet 550 mg  550 mg Oral BID   550 mg at 09/18/17 0812     spironolactone (ALDACTONE) tablet 50 mg  50 mg Oral Daily   50 mg at 09/18/17 0812     thiamine tablet 50 mg  50 mg Oral Daily   50 mg at 09/18/17 0812     cefTRIAXone (ROCEPHIN) 2 g vial to attach to  ml bag for ADULTS or NS 50 ml bag for PEDS  2 g Intravenous Q24H   2 g at 09/18/17 0501     azithromycin (ZITHROMAX) 500 mg in NaCl 0.9 % 250 mL intermittent infusion  500 mg Intravenous Q24H 250 mL/hr at 09/18/17 1151 500 mg at 09/18/17 1151     guaiFENesin (MUCINEX) 12 hr tablet 1,200 mg  1,200 mg Oral BID   1,200 mg at  09/18/17 0813     ipratropium - albuterol 0.5 mg/2.5 mg/3 mL (DUONEB) neb solution 3 mL  3 mL Nebulization Q4H While awake   3 mL at 09/18/17 1156     ipratropium - albuterol 0.5 mg/2.5 mg/3 mL (DUONEB) neb solution 3 mL  3 mL Nebulization Q2H PRN   3 mL at 09/18/17 0743     glucose 40 % gel 15-30 g  15-30 g Oral Q15 Min PRN        Or     dextrose 50 % injection 25-50 mL  25-50 mL Intravenous Q15 Min PRN        Or     glucagon injection 1 mg  1 mg Subcutaneous Q15 Min PRN         naloxone (NARCAN) injection 0.1-0.4 mg  0.1-0.4 mg Intravenous Q2 Min PRN         nitroGLYcerin (NITROSTAT) sublingual tablet 0.4 mg  0.4 mg Sublingual Q5 Min PRN         lidocaine 1 % 1 mL  1 mL Other Q1H PRN         lidocaine (LMX4) cream   Topical Q1H PRN         sodium chloride (PF) 0.9% PF flush 3 mL  3 mL Intracatheter Q1H PRN   3 mL at 09/16/17 0050     sodium chloride (PF) 0.9% PF flush 3 mL  3 mL Intracatheter Q8H   3 mL at 09/16/17 0506     acetaminophen (TYLENOL) tablet 650 mg  650 mg Oral Q4H PRN   650 mg at 09/18/17 1346     senna-docusate (SENOKOT-S;PERICOLACE) 8.6-50 MG per tablet 1-2 tablet  1-2 tablet Oral BID PRN         polyethylene glycol (MIRALAX/GLYCOLAX) Packet 17 g  17 g Oral Daily PRN         bisacodyl (DULCOLAX) Suppository 10 mg  10 mg Rectal Daily PRN         ondansetron (ZOFRAN-ODT) ODT tab 4 mg  4 mg Oral Q6H PRN        Or     ondansetron (ZOFRAN) injection 4 mg  4 mg Intravenous Q6H PRN         prochlorperazine (COMPAZINE) injection 5-10 mg  5-10 mg Intravenous Q6H PRN        Or     prochlorperazine (COMPAZINE) tablet 5-10 mg  5-10 mg Oral Q6H PRN        Or     prochlorperazine (COMPAZINE) Suppository 25 mg  25 mg Rectal Q12H PRN         traZODone (DESYREL) tablet 100 mg  100 mg Oral At Bedtime        Or     traZODone (DESYREL) tablet 150 mg  150 mg Oral At Bedtime   150 mg at 09/17/17 2052     No current [facility]-ordered outpatient prescriptions on file.           CMP  Recent Labs  Lab 09/18/17  0500  09/17/17  0420 09/16/17  1115 09/16/17  0530  09/12/17  2318    139 131* 137  < > 133   POTASSIUM 4.8 4.2 4.7 3.8  < > 3.2*   CHLORIDE 100 101 91* 95  < > 97   CO2 33* 34* 32 35*  < > 26   ANIONGAP 4 4 8 7  < > 10   * 179* 311* 200*  < > 176*   BUN 25 20 25 30  < > 25   CR 0.68 0.80 0.78 0.85  < > 1.31*   GFRESTIMATED >90 78 81 73  < > 44*   GFRESTBLACK >90 >90 >90 88  < > 54*   VISHNU 8.9 9.5 8.4* 9.6  < > 8.8   PROTTOTAL  --   --  6.6*  --   --  7.0   ALBUMIN  --   --  2.7*  --   --  3.4   BILITOTAL  --   --  0.6  --   --  0.4   ALKPHOS  --   --  114  --   --  108   AST  --   --  51*  --   --  64*   ALT  --   --  87*  --   --  72*   < > = values in this interval not displayed.  CBC  Recent Labs  Lab 09/18/17  0500 09/17/17  0420 09/16/17  1115 09/16/17  0530   WBC 6.8 8.2 8.4 11.3*   RBC 4.22 4.50 4.60 4.41   HGB 13.1 14.0 15.0 13.7   HCT 40.6 43.1 43.3 41.2   MCV 96 96 94 93   MCH 31.0 31.1 32.6 31.1   MCHC 32.3 32.5 34.6 33.3   RDW 13.8 13.8 13.9 13.9   * 99* 134* 116*     INR  Recent Labs  Lab 09/15/17  2005   INR 1.02     Arterial Blood Gas  Recent Labs  Lab 09/16/17  1210 09/13/17  1835   PH 7.36 7.35   PCO2 62* 53*   PO2 128* 62*   HCO3 35* 29*   O2PER 60% Oximizer       Recent Labs  Lab 09/16/17  0923 09/15/17  1245 09/14/17 2000 09/13/17  0052 09/12/17 2318 09/12/17  2313   CULT No growth after 2 days  Culture negative after 2 days  Culture received and in progress.  Positive AFB results are called as soon as detected.  Final report to follow in 7 to 8 weeks.  Culture negative monitoring continues  Culture negative monitoring continues  Culture negative monitoring continues Canceled, Test credited  >10 Squamous epithelial cells/low power field indicates oral contamination. Please recollect.*  Notification of test cancellation was given to david archibald rn @5999 9/15/17. ct Canceled, Test credited  Unsatisfactory specimen - too old for testing  SPECIMEN RECEIVED 12 HOURS AFTER  COLLECTION UNREFRIGERATED. NURSING NOTIFIED AND WILL RECOLLECT. <10,000 colonies/mLmixed urogenital maikol No growth after 5 days No growth after 5 days             Diagnostic Studies:  Chest Radiology:       CHEST PORTABLE ONE VIEW   9/18/2017 5:29 AM      HISTORY: Postoperative.     COMPARISON: 9/17/2017.         IMPRESSION: ET tube and enteric tube have been removed. Right apical  chest tube remains in place.     Diffuse micronodular opacities again noted representing interstitial  edema versus pneumonia. More focal opacity seen in the right midlung  and right lower lung which could represent atelectasis or superimposed  pneumonia are slightly more prominent.     MAKENZIE TO MD      Assessment:     41-year-old obese woman with a significant complex medical history which includes acute hypoxic respiratory failure superimposed on a progressive interstitial lung disease possibly cryptogenic organizing pneumonitis.  Her pulmonologist has followed her closely and she and her colleagues at the [facility] have recommended an open lung biopsy and the patient is here at this hospital to obtain that.  The patient has a significant history of chronic liver disease secondary to alcohol abuse, alcoholic- induced cirrhosis with ascites, alcohol-induced neuropathy, major depressive disorder with anxiety, hyperlipidemia, chronic pain syndrome with chronic back pain, opiate use on Suboxone, tobacco abuse disorder, steroid-induced insulin-dependent diabetes, history of asthma, chronic kidney disease, obesity, C. diff infection in the past, multiple pneumonias as well as mycoplasma pneumonia.       The patient is awake and alert, obese, very pleasant, she is utilizing BiPAP, tidal volume 600, IPAP of 10, EPAP of 5 on 60% with O2 sat 99%.  The patient was evidently in [facility]  and is usually on room air but required 4 to 5 liters of oxygen, was evaluated by pulmonary and treated with ceftriaxone,  azithromycin and steroids and subsequent transfer here.  The patient indicated that she had several days of fevers and chills and flu-like symptoms prior to her worsening.  She had low SaO2 readings on her home oximeter.  The patient noted she had some fevers, body aches and chills and night sweats and fatigue.  She had a headache which currently is not present and she right now denies cough.  She has had a history of seizures as well.           Pulmonary Diagnoses: Abnl CT/CXR R91.8, KOROMA R06.09, Hpoxemia R09.02 and Interstitial pneum J84.111    Recommendations      Duonebs every four hours as needed   Supplemental oxygen target pulse oxymetry >89%  Results of Biopsy pending  Chest tube care per CT surg  Extensively counseled on smoking cessation  Encourage IS  Physical activity as tolerated  Will need outpatient PFTs  Follow up after DC with primary pulmonologist          Arnaldo Washington M.D.  Pulmonary, Critical Care and Sleep Medicine  [facility]  Pager: [phone]  Office:[phone]

## 2017-09-18 NOTE — PROGRESS NOTES
Patient alert and oriented x 3, vital signs stable. Transferred to station 33, report called to RN, all belonging with patient.

## 2017-09-18 NOTE — PLAN OF CARE
Problem: Respiratory Insufficiency (Adult)  Goal: Identify Related Risk Factors and Signs and Symptoms  Related risk factors and signs and symptoms are identified upon initiation of Human Response Clinical Practice Guideline (CPG)   Outcome: Improving  PT is alert and resting in bed with head of bed at 60 degree, VVS with HR in the stable bradycardia.  CT in good position with 25 cc output.  Pain better with reminders of PCA.  Repositioned as able- -200cc/hour.    Possible transfer 9/18.

## 2017-09-18 NOTE — PROGRESS NOTES
ICU Multi-Disciplinary Note  Sabina Figueroa is a 42 year old female with PMHx of alcoholic cirrhosis, obesity, chronic pain, substance abuse, type II DM, CKD stage II/III and recurrent acute hypoxic respiratory failure secondary to recurrent pneumonia admitted to [facility] 9/12/17 with acute hypoxic respiratory failure, transferred to [facility] on 9/14/17 for evaluation by thoracic surgery and lung biopsy. Now s/p R VATS and wedge resection of R upper, middle and lower lobes. Remained intubated post-procedure, extubated 9/17.   Patient condition reviewed and discussed while on multidisciplinary rounds today.   Please note these minor interventions that were initiated:  1. Discussed case with patient's Pulmonologist, Dr. Mane. She recommends discharging on prednisone 50mg QD as well as prophylactic bactrim. Will follow up with her in Pulm clinic 10/9.      The Critical Care service will continue to follow peripherally while patient is within the ICU. We are readily available should issues arise.  Please feel free to contact us for anything with which we may be of assistance.    Emerald [place]

## 2017-09-18 NOTE — PROGRESS NOTES
[facility]    Hospitalist Progress Note    Assessment & Plan   41-year-old female with a past medical history significant for chronic liver disease secondary to alcohol abuse, alcohol-induced cirrhosis with ascites, alcohol-induced neuropathy, hyperlipidemia, major depressive disorder with anxiety, obesity, chronic pain syndrome with chronic low back pain, opioid abuse, tobacco dependence, steroid-induced insulin-dependent diabetes, mild persistent asthma, history of Clostridium difficile, and recurrent acute hypoxic respiratory failure secondary to recurrent pneumonia who was directly admitted to [facility] due to acute hypoxic respiratory failure thought to be secondary to cryptogenic organizing pneumonia and fluid overload with plans to proceed with a lung biopsy.      Acute hypoxic respiratory failure thought to be secondary to a cryptogenic organizing pneumonia s/p Right video-assisted thoracoscopy and wedge resection of portion of right upper, right middle and right lower lobe lung.    -Patient evaluated by pulmonology at [facility]. Had followed with pulmonary medicine from [facility].  -Lung bx performed by Dr deutsch on 9/16  -Currently on 6LNC; wean O2 as tolerated  -Continue Ceftriaxone and Azithromycin for now.   -Continue IV solumedrol; decrease to 40 mg IV b.i.d.  -Await pathology  -Will ask Pulmonary to follow once on the floor.  -Dilaudid PCA for pain control     Chronic liver disease secondary to alcohol abuse with associated cirrhosis with ascites and associated alcohol-induced neuropathy:    -This appears to be stable at this point.  Patient has not followed up with gastroenterologist in 5 years.    - Continue prior to admit folic acid 1 mg daily, thiamine 50 mg daily, spironolactone 50 mg daily, rifaximin 550 mg daily  - Resume Lasix       Major depressive disorder with anxiety:   - Continue fluoxetine 40 mg daily.    - Continue mirtazapine 30 mg every evening at  bedtime   - PTA trazodone as needed.      Chronic pain syndrome with ongoing back pain with noted history of opioid abuse:  The patient has previously been on a pain contract and is currently on Robaxin 1000 mg 4 times daily, Suboxone 8/2 mg sublingual tablet 3 tablets daily.    -hold suboxone in the post-op period  -dilaudid PCA     Hyperlipidemia:   - Continue prior to admit atorvastatin 20 mg daily      Steroid-induced insulin-dependent diabetes:  Patient's A1c is 7.    -PTA on 15 units of Lantus every afternoon.    -Continue Lantus at 15 units at h.s.  -High-intensity sliding scale  -Blood sugar uncontrolled at the moment  -Hopefully decreasing the dose of steroid will help     D/W: RN  DVT Prophylaxis: Pneumatic Compression Devices  Code Status: Full Code    Disposition: Expected discharge in 2+ days; TTF today    Amor Nelson MD    Interval History       -Data reviewed today: I reviewed all new labs and imaging results over the last 24 hours. I personally reviewed the EKG tracing showing nsr.    Physical Exam   Temp: 98.6  F (37  C) Temp src: Bladder BP: 121/73   Heart Rate: 56 Resp: 13 SpO2: 97 % O2 Device: Oxymizer cannula Oxygen Delivery: 6 LPM  Vitals:    09/15/17 0615 09/16/17 0430 09/16/17 1045   Weight: 84.6 kg (186 lb 8.2 oz) 84.4 kg (186 lb 1.1 oz) 84.6 kg (186 lb 8.2 oz)     Vital Signs with Ranges  Temp:  [98.2  F (36.8  C)-99.3  F (37.4  C)] 98.6  F (37  C)  Heart Rate:  [] 56  Resp:  [0-37] 13  BP: (102-122)/(55-79) 121/73  FiO2 (%):  [40 %-50 %] 50 %  SpO2:  [55 %-100 %] 97 %  I/O last 3 completed shifts:  In: 866.17 [I.V.:806.17; NG/GT:60]  Out: 2815 [Urine:2790; Chest Tube:25]    Constitutional: AAOX3, NAD, Appears comfortable, jagdish complexion  HEENT: Moist oral mucosa, no oral lesions, No pallor or icterus  Neck- Supple, Good ROM, No JVD  Respiratory:  Diminished at rt base, CT in place; Normal WOB  Cardiovascular: RRR, No murmur  GI: Soft, Non- tender, BS- normoactive, No  Guarding/rebound/rigidity  Skin/Integument: Warm and dry, no rashes  MSK: No joint deformity or swelling, no edema  Neuro: CN- grossly intact, Motor strength 5/5 on all 4 extremities.      Medications     IV fluid REPLACEMENT ONLY         aspirin  81 mg Oral Daily     insulin aspart  1-10 Units Subcutaneous TID AC     insulin aspart  1-7 Units Subcutaneous At Bedtime     insulin glargine  8 Units Subcutaneous At Bedtime     senna-docusate  1-2 tablet Oral BID     HYDROmorphone   Intravenous PCA     chlorhexidine  15 mL Mouth/Throat Q12H     pantoprazole  40 mg Intravenous QAM AC     nicotine   Transdermal Q8H     nicotine   Transdermal Daily     nicotine  1 patch Transdermal Daily     methylPREDNISolone  40 mg Intravenous Q6H     atorvastatin  20 mg Oral Daily     FLUoxetine  40 mg Oral Daily     folic acid  1 mg Oral Daily     gabapentin  600 mg Oral TID     lactulose  20 g Oral Daily     methocarbamol  1,000 mg Oral 4x Daily     mirtazapine (REMERON) tablet 30 mg  30 mg Oral At Bedtime     rifaximin  550 mg Oral BID     spironolactone  50 mg Oral Daily     thiamine tablet 50 mg  50 mg Oral Daily     cefTRIAXone  2 g Intravenous Q24H     azithromycin  500 mg Intravenous Q24H     guaiFENesin  1,200 mg Oral BID     ipratropium - albuterol 0.5 mg/2.5 mg/3 mL  3 mL Nebulization Q4H While awake     sodium chloride (PF)  3 mL Intracatheter Q8H     traZODone  100 mg Oral At Bedtime    Or     traZODone  150 mg Oral At Bedtime       Data     Recent Labs  Lab 09/18/17  0500 09/17/17  0420 09/16/17  1115  09/15/17  2005  09/12/17  2318   WBC 6.8 8.2 8.4  < >  --   < > 17.2*   HGB 13.1 14.0 15.0  < >  --   < > 15.7   MCV 96 96 94  < >  --   < > 90   * 99* 134*  < >  --   < > 111*   INR  --   --   --   --  1.02  --   --     139 131*  < >  --   < > 133   POTASSIUM 4.8 4.2 4.7  < >  --   < > 3.2*   CHLORIDE 100 101 91*  < >  --   < > 97   CO2 33* 34* 32  < >  --   < > 26   BUN 25 20 25  < >  --   < > 25   CR 0.68  0.80 0.78  < >  --   < > 1.31*   ANIONGAP 4 4 8  < >  --   < > 10   VISHNU 8.9 9.5 8.4*  < >  --   < > 8.8   * 179* 311*  < >  --   < > 176*   ALBUMIN  --   --  2.7*  --   --   --  3.4   PROTTOTAL  --   --  6.6*  --   --   --  7.0   BILITOTAL  --   --  0.6  --   --   --  0.4   ALKPHOS  --   --  114  --   --   --  108   ALT  --   --  87*  --   --   --  72*   AST  --   --  51*  --   --   --  64*   < > = values in this interval not displayed.    Recent Results (from the past 24 hour(s))   XR Chest Port 1 View    Narrative    CHEST PORTABLE ONE VIEW   9/18/2017 5:29 AM     HISTORY: Postoperative.    COMPARISON: 9/17/2017.      Impression    IMPRESSION: ET tube and enteric tube have been removed. Right apical  chest tube remains in place.    Diffuse micronodular opacities again noted representing interstitial  edema versus pneumonia. More focal opacity seen in the right midlung  and right lower lung which could represent atelectasis or superimposed  pneumonia are slightly more prominent.     [facility]    Hospitalist Progress Note    Assessment & Plan   41-year-old female with a past medical history significant for chronic liver disease secondary to alcohol abuse, alcohol-induced cirrhosis with ascites, alcohol-induced neuropathy, hyperlipidemia, major depressive disorder with anxiety, obesity, chronic pain syndrome with chronic low back pain, opioid abuse, tobacco dependence, steroid-induced insulin-dependent diabetes, mild persistent asthma, history of Clostridium difficile, and recurrent acute hypoxic respiratory failure secondary to recurrent pneumonia who was directly admitted to [facility] due to acute hypoxic respiratory failure thought to be secondary to cryptogenic organizing pneumonia and fluid overload with plans to proceed with a lung biopsy.      Acute hypoxic respiratory failure thought to be secondary to a cryptogenic organizing pneumonia s/p Right video-assisted  thoracoscopy and wedge resection of portion of right upper, right middle and right lower lobe lung.    -Patient evaluated by pulmonology at [facility]. Had followed with pulmonary medicine from [facility].  -Lung bx performed by Dr deutsch on 9/16  -Was on vent overnight; extubated earlier today  -Currently on 6LNC; wean o2 as tolerated  -Continue Ceftriaxone and Azithromycin for now.   -Continue IV solumedrol  -TTF in AM if stable  -Await pathology  -Will reconsult pulmonary today  -Dilaudid PCA for pain control     Chronic liver disease secondary to alcohol abuse with associated cirrhosis with ascites and associated alcohol-induced neuropathy:  This appears to be stable at this point.  Patient has not followed up with gastroenterologist in 5 years.    - Continue prior to admit folic acid 1 mg daily, thiamine 50 mg daily, spironolactone 50 mg daily, rifaximin 550 mg daily  - Will hold oral Lasix for now       Major depressive disorder with anxiety:   - Continue fluoxetine 40 mg daily.    - Continue mirtazapine 30 mg every evening at bedtime   - PTA trazodone as needed.      Chronic pain syndrome with ongoing back pain with noted history of opioid abuse:  The patient has previously been on a pain contract and is currently on Robaxin 1000 mg 4 times daily, Suboxone 8/2 mg sublingual tablet 3 tablets daily.    -hold suboxone in the post-op period  -dilaudid PCA     Hyperlipidemia:   - Continue prior to admit atorvastatin 20 mg daily      Steroid-induced insulin-dependent diabetes:  Patient's A1c is noted to be 7.  The patient indicated that she has been taking 15 units of Lantus every afternoon.    - high insulin sliding scale available.    - Continue Lantus but at reduced dose of 8 units every evening       D/W: RN  DVT Prophylaxis: Pneumatic Compression Devices  Code Status: Full Code    Disposition: Expected discharge in 2+ days; continue ICU care today    Amor Nelson MD    Interval History   Complaints of  ongoing pain at the chest tube site despite Dilaudid PCA.  Rates the pain 10/10.  Per overnight nursing reports patient requesting more pain medicine despite being heavily sleepy and drowsy.  Oxygen requirements stable at 6 L    -Data reviewed today: I reviewed all new labs and imaging results over the last 24 hours. I personally reviewed the EKG tracing showing nsr.    Physical Exam   Temp: 98.6  F (37  C) Temp src: Bladder BP: 121/73   Heart Rate: 56 Resp: 13 SpO2: 97 % O2 Device: Oxymizer cannula Oxygen Delivery: 6 LPM  Vitals:    09/15/17 0615 09/16/17 0430 09/16/17 1045   Weight: 84.6 kg (186 lb 8.2 oz) 84.4 kg (186 lb 1.1 oz) 84.6 kg (186 lb 8.2 oz)     Vital Signs with Ranges  Temp:  [98.2  F (36.8  C)-99.3  F (37.4  C)] 98.6  F (37  C)  Heart Rate:  [] 56  Resp:  [0-37] 13  BP: (102-122)/(55-79) 121/73  FiO2 (%):  [40 %-50 %] 50 %  SpO2:  [55 %-100 %] 97 %  I/O last 3 completed shifts:  In: 866.17 [I.V.:806.17; NG/GT:60]  Out: 2815 [Urine:2790; Chest Tube:25]    Constitutional: AAOX3, not in distress, Appears comfortable, jagdish complexion  HEENT: Moist oral mucosa, no oral lesions, No pallor or icterus  Neck- Supple, Good ROM, No JVD  Respiratory:  Diminished at rt base, CT in place; Normal WOB  Cardiovascular: RRR, No murmur  GI: Soft, Non- tender, BS- normoactive, No Guarding/rebound/rigidity  Skin/Integument: Warm and dry, no rashes  MSK: No joint deformity or swelling, no edema  Neuro: CN- grossly intact, Motor strength 5/5 on all 4 extremities.      Medications     IV fluid REPLACEMENT ONLY         insulin glargine  15 Units Subcutaneous At Bedtime     methylPREDNISolone  40 mg Intravenous Q12H     aspirin  81 mg Oral Daily     insulin aspart  1-10 Units Subcutaneous TID AC     insulin aspart  1-7 Units Subcutaneous At Bedtime     senna-docusate  1-2 tablet Oral BID     HYDROmorphone   Intravenous PCA     chlorhexidine  15 mL Mouth/Throat Q12H     pantoprazole  40 mg Intravenous QAM AC      nicotine   Transdermal Q8H     nicotine   Transdermal Daily     nicotine  1 patch Transdermal Daily     atorvastatin  20 mg Oral Daily     FLUoxetine  40 mg Oral Daily     folic acid  1 mg Oral Daily     gabapentin  600 mg Oral TID     lactulose  20 g Oral Daily     methocarbamol  1,000 mg Oral 4x Daily     mirtazapine (REMERON) tablet 30 mg  30 mg Oral At Bedtime     rifaximin  550 mg Oral BID     spironolactone  50 mg Oral Daily     thiamine tablet 50 mg  50 mg Oral Daily     cefTRIAXone  2 g Intravenous Q24H     azithromycin  500 mg Intravenous Q24H     guaiFENesin  1,200 mg Oral BID     ipratropium - albuterol 0.5 mg/2.5 mg/3 mL  3 mL Nebulization Q4H While awake     sodium chloride (PF)  3 mL Intracatheter Q8H     traZODone  100 mg Oral At Bedtime    Or     traZODone  150 mg Oral At Bedtime       Data     Recent Labs  Lab 09/18/17  0500 09/17/17  0420 09/16/17  1115  09/15/17  2005  09/12/17  2318   WBC 6.8 8.2 8.4  < >  --   < > 17.2*   HGB 13.1 14.0 15.0  < >  --   < > 15.7   MCV 96 96 94  < >  --   < > 90   * 99* 134*  < >  --   < > 111*   INR  --   --   --   --  1.02  --   --     139 131*  < >  --   < > 133   POTASSIUM 4.8 4.2 4.7  < >  --   < > 3.2*   CHLORIDE 100 101 91*  < >  --   < > 97   CO2 33* 34* 32  < >  --   < > 26   BUN 25 20 25  < >  --   < > 25   CR 0.68 0.80 0.78  < >  --   < > 1.31*   ANIONGAP 4 4 8  < >  --   < > 10   VISHNU 8.9 9.5 8.4*  < >  --   < > 8.8   * 179* 311*  < >  --   < > 176*   ALBUMIN  --   --  2.7*  --   --   --  3.4   PROTTOTAL  --   --  6.6*  --   --   --  7.0   BILITOTAL  --   --  0.6  --   --   --  0.4   ALKPHOS  --   --  114  --   --   --  108   ALT  --   --  87*  --   --   --  72*   AST  --   --  51*  --   --   --  64*   < > = values in this interval not displayed.    Recent Results (from the past 24 hour(s))   XR Chest Port 1 View    Narrative    CHEST PORTABLE ONE VIEW   9/18/2017 5:29 AM     HISTORY: Postoperative.    COMPARISON: 9/17/2017.       Impression    IMPRESSION: ET tube and enteric tube have been removed. Right apical  chest tube remains in place.    Diffuse micronodular opacities again noted representing interstitial  edema versus pneumonia. More focal opacity seen in the right midlung  and right lower lung which could represent atelectasis or superimposed  pneumonia are slightly more prominent.

## 2017-09-18 NOTE — PROGRESS NOTES
Patient remains on 6L oxymizer with SpO2 94%, BBS clear and all nebs is given as ordered. Will continue to monitor.    Asher Nagy RT  9/18/2017

## 2017-09-18 NOTE — PLAN OF CARE
Problem: Individualization  Goal: Patient Preferences  Outcome: Therapy, progress toward functional goals as expected  Patient was extubated this am at 1007. Tolerating well. Oxymiser 6plm. Alert and oriented, reminded of day/date.  Watters, Chest tube and 2 PIV intact. Daughter called in to talk with patient, noted lifted spirits.  Cooperative and accepting.  Good appetite.  Insulin gtt stopped and sliding scale started. Restraints off at time of extubation. Moves self in bed. Will continue to monitor closely.

## 2017-09-18 NOTE — PROGRESS NOTES
Report given to oncoming nurse. Vital signs WNL. Patient requests more pain medication but continues to be drowsy and has been sleeping heavily. Patient also complains that her purse is missing. She states that it was with her at her bedside before surgery, but she has not seen it since she woke up.   Watters in place. Urine output >100/HR  Chest tube output <50 for my shift.   oxymizer mask on 6 L.  Will continue with plan of care.

## 2017-09-18 NOTE — PROGRESS NOTES
THORACIC SURGERY POD # 2    Extubated  No air leak  No bleeding    CXR no ptx    Clamp CT tonight    MARY JO REA MD [facility] ONCOLOGY THORACIC SURGERY  CELL:  [phone]  OFFICE: [phone]

## 2017-09-18 NOTE — PROVIDER NOTIFICATION
Pageimtiaz hospitalist, , 10 units novolog given, recheck 510. Would you like more insulin given? Awaiting new orders

## 2017-09-19 NOTE — PLAN OF CARE
Problem: Goal Outcome Summary  Goal: Goal Outcome Summary  Outcome: Improving  VSS, LS dim on R, weaned from O2 but placed back on 1L for remainder of night d/t desatting with shallow breathing/periodic apnea while sleeping. While awake sats mid 90's on RA. Tolerated CT clamped overnight, no AL or crepitus, minimal SS output.  Trazadone at bedtime, rested most of night.  Oxy and Tylenol for pain, IS/acapella with encouragement.  Adequate UP per max callaway 0530.  Encouraged OOB more often, up SBA.

## 2017-09-19 NOTE — PROGRESS NOTES
HOSPITALIST CROSS COVER NOTE    Called about blood sugars >500 despite receiving Novolog per ISS.      Discussed with RN. Chart reviewed.    Will give 12 units novolog now.    SHAHEEN ARIAS MD

## 2017-09-19 NOTE — PROGRESS NOTES
09/19/17 1552   Quick Adds   Type of Visit Initial PT Evaluation   Living Environment   Lives With significant other   Living Arrangements apartment   Home Accessibility no concerns   Number of Stairs to Enter Home 0   Number of Stairs Within Home 0   Transportation Available car   Living Environment Comment Elevator access, no stairs    Self-Care   Dominant Hand right   Usual Activity Tolerance good   Current Activity Tolerance moderate   Regular Exercise no   Equipment Currently Used at Home none   Activity/Exercise/Self-Care Comment Reports no exercise, does not work. States she is independent at baseline   Functional Level Prior   Ambulation 0-->independent   Transferring 0-->independent   Toileting 0-->independent   Bathing 0-->independent   Dressing 0-->independent   Eating 0-->independent   Communication 0-->understands/communicates without difficulty   Swallowing 0-->swallows foods/liquids without difficulty   Cognition 0 - no cognition issues reported   Fall history within last six months no   Which of the above functional risks had a recent onset or change? none   Prior Functional Level Comment Reports I prior to admit   General Information   Onset of Illness/Injury or Date of Surgery - Date 09/14/17   Referring Physician Amor Nelson MD   Patient/Family Goals Statement None Stated   Pertinent History of Current Problem (include personal factors and/or comorbidities that impact the POC) 43 yo female adm from [facility] with acute hypoxic respiratory failure, pt now POD # 3 R VATS with wedge resection secondary to Interstitial lung disease. PMH includes liver disease induced by ETOH.   Cognitive Status Examination   Orientation orientation to person, place and time   Level of Consciousness alert   Follows Commands and Answers Questions 100% of the time;able to follow multistep instructions   Personal Safety and Judgment intact   Pain Assessment   Patient Currently in Pain Yes, see Vital Sign  "flowsheet  (States her incision hurts with deep breaths)   Integumentary/Edema   Integumentary/Edema Comments pt has a \"moon face\" very round and puffy in apearance   Posture    Posture Forward head position;Protracted shoulders   Range of Motion (ROM)   ROM Comment B LEs and UES WFL, R UE somewhat sore, but demonstrates full ROM   Strength   Strength Comments R UE somewhat limited secondary to pain   Bed Mobility   Bed Mobility Comments independnet   Transfer Skills   Transfer Comments independent   Gait   Gait Comments independent wtih ambujlation in room   Balance   Balance Comments Slight sway with standign   Sensory Examination   Sensory Perception Comments reports numbness/tingling/itching in the feet-PMHincludes etoh induced neuropathy   General Therapy Interventions   Planned Therapy Interventions gait training;transfer training;progressive activity/exercise;home program guidelines;ROM;strengthening;stretching   Clinical Impression   Criteria for Skilled Therapeutic Intervention yes, treatment indicated   PT Diagnosis Decreased functional activity tolerance   Influenced by the following impairments Fatigue, slight SOB, post op weakness   Functional limitations due to impairments Decreased functional independence   Clinical Presentation Stable/Uncomplicated   Clinical Decision Making (Complexity) Low complexity   Predicted Duration of Therapy Intervention (days/wks) Eval and single treat   Anticipated Discharge Disposition Home   Risk & Benefits of therapy have been explained Yes   Patient, Family & other staff in agreement with plan of care Yes   [facility] [employer] \"6 Clicks\"   2016, Trustees of [facility], under license to [employer].  All rights reserved.   6 Clicks Short Forms Basic Mobility Inpatient Short Form   [facility] AM-PAC  \"6 Clicks\" V.2 Basic Mobility Inpatient Short Form   1. Turning from your back to your side while in a flat bed without using bedrails? 4 - None   2. " Moving from lying on your back to sitting on the side of a flat bed without using bedrails? 4 - None   3. Moving to and from a bed to a chair (including a wheelchair)? 4 - None   4. Standing up from a chair using your arms (e.g., wheelchair, or bedside chair)? 4 - None   5. To walk in hospital room? 4 - None   6. Climbing 3-5 steps with a railing? 4 - None   Basic Mobility Raw Score (Score out of 24.Lower scores equate to lower levels of function) 24   Total Evaluation Time   Total Evaluation Time (Minutes) 8

## 2017-09-19 NOTE — DISCHARGE INSTRUCTIONS
"[facility]  Discharge Orders & Follow-up Care  Video-Assisted: Thoracoscopy - Thoracotomy    Call Chayito at Dr. Baird  office at [phone] to make a return appointment with a chest x-ray on Wednesday 09-28.  Your appointment will be with either Leslee Alcantara PA-C or Amina Horan PA-C, or Dr. Baird.      MAKE AN APPOINTMENT WITH     Our office is located at:  [facility], [address] [place] [address], [address], [place] [address].  Chayito will explain where to park when you call for an appointment.    A. Patient Care  Call Dr Baird  office @ [phone] if:  ? Severe chills or a fever or 100.4 F.  temperature on two occasions  ? Increased incisional pain and/or redness  ? Presence of unusual incisional or chest tube site drainage  ? Coughing up bright red blood or greenish-yellow secretions  ? Significant increase in shortness of breath    Pain Relief  You have been given a prescription for narcotic pain medicine.  You may also take ibuprofen and acetaminophen over the counter.  Recommended dosages are:  600 mg Ibuprofen every 6 hours as needed and 650 mg Acetaminophen every 4 hours as needed.  Many patients get good pain relief by \"staggering\" these medications.     No driving while on narcotics.     Activity  XXX No activity restrictions for a scope procedure/thoracoscopy  ___ No heavy lifting greater than 8-10 pounds on the operative side for 4-6 weeks for a thoracotomy    Wound Care  Remove all dressings in 48 hours and then you may shower.  Wash the incision and chest tube site(s) daily with soap and water. No bathing or immersing incision underwater for approximately 2 weeks or until the chest tube sites are completely healed.     Place a dry gauze dressing over the chest tube site(s) because it(they) will drain a few days.  Do not be alarmed if there is drainage of a large amount of fluid (should be pink or yellow-- or somewhat bloody) either spontaneously or with " coughing or exertion. If this happens, just place a large dry gauze dressing over the chest tube site-- it will stop and scab over in about a week or so. Once the drainage stops, then leave the chest tube site(s) open to air.     White steri strips will be present on the incision(s). They will peel off within about 10 days-- otherwise, they will be removed at your post-op appointment.     B. Respiratory  xxx_ Utilize Incentive spirometer 10 times in a row every few hours while awake for a few weeks after discharging home from the hospital    .

## 2017-09-19 NOTE — PLAN OF CARE
Problem: Goal Outcome Summary  Goal: Goal Outcome Summary  Discharge Planner PT    Evaluation completed, treatment provided. 41 yo female adm from [facility] with acute hypoxic respiratory failure, pt now POD # 3 R VATS with wedge resection secondary to Interstitial lung disease.  She resides in an apt with her boyfriend, beth higgins. Independent with all functional mobility prior to admit. Drives, manages meds. States she does not work or exercise.  Patient plan for discharge: Home.  Current status: independent with bed mobility, transfers and gait. Negotiated 3 stairs with single rail, ambulated 200+feet, no LOB with mild balance challenges provided. Educated pt on UE exercises post thoracic surgery, also on importance of frequent ambulation for airway clearance, strength and activity tolerance benefits.   Barriers to return to prior living situation: None.  Recommendations for discharge: Home.  Rationale for recommendations: No further skilled therapy needs, pt independent with mobility and transfers. Orders for PT and OT completed.        Entered by: Ludivina Glynn 09/19/2017 3:41 PM           Physical Therapy Discharge Summary    Reason for therapy discharge:    All goals and outcomes met, no further needs identified.    Progress towards therapy goal(s). See goals on Care Plan in [facility] electronic health record for goal details.  Goals met    Therapy recommendation(s):    No further therapy is recommended. Needs to walk with nursing during LOS and continue frequent ambulation at home for ongoing airway clearance, strength and activity tolerance benefits.

## 2017-09-19 NOTE — PLAN OF CARE
Problem: Goal Outcome Summary  Goal: Goal Outcome Summary  Outcome: Improving  Pt A&Ox4. C/o 9-10/10 pain throughout the day but sleepy. PRN oxy and tylenol given and pt resting comfortably.  VSS, AF. R CT and incision site dressings CDI. Pt on RA with O2 sats in mid 90s.  LS coarse to dim. IS to 750- encouraged frequent use and taught pt how to use. Encouraged coughing.  Elevated BGs today, covered with SSI.   BS+. Pt reported BM today. Voiding in restroom.  Ambulated in room; encouraged walk in hallway.  Pt denies any outstanding needs or concerns.

## 2017-09-19 NOTE — PROGRESS NOTES
Pt was discovered smoking an E-cig in her room. It was taken and placed in a personal belongings bag, and then locked in our d/c medication drawer.

## 2017-09-19 NOTE — PROGRESS NOTES
[facility]    Hospitalist Progress Note    Assessment & Plan   41-year-old female with a past medical history significant for chronic liver disease secondary to alcohol abuse, alcohol-induced cirrhosis with ascites, alcohol-induced neuropathy, hyperlipidemia, major depressive disorder with anxiety, obesity, chronic pain syndrome with chronic low back pain, opioid abuse, tobacco dependence, steroid-induced insulin-dependent diabetes, mild persistent asthma, history of Clostridium difficile, and recurrent acute hypoxic respiratory failure secondary to recurrent pneumonia who was directly admitted to [facility] due to acute hypoxic respiratory failure thought to be secondary to cryptogenic organizing pneumonia and fluid overload with plans to proceed with a lung biopsy.      Acute hypoxic respiratory failure secondary to progressive interstitial lung disease and possibly cryptogenic organizing pneumonia s/p Right video-assisted thoracoscopy and wedge resection of portion of right upper, right middle and right lower lobe lung.    -patient followed up very closely by pulmonologist at [facility]  -per their recommendation open lung biopsy performed by Dr. Baird on 9/16  -weaned off oxygen to room air  -likely can discontinue Rocephin and azithromycin, will await pulmonary opinion  -continue prednisone 50 mg daily  -Await pathology  -patient started on Bactrim DS by Dr. Baird  -Mobilize  -discontinue IV pain medicine  -If doing well likely discharge 9/20     Chronic liver disease secondary to alcohol abuse with associated cirrhosis with ascites and associated alcohol-induced neuropathy:    -stable at this point.      -Continue  folic acid 1 mg daily, thiamine 50 mg daily, spironolactone 50 mg daily, rifaximin 550 mg daily and Lasix       Major depressive disorder with anxiety:   - Continue fluoxetine 40 mg daily.    - Continue mirtazapine 30 mg every evening at bedtime   -  PTA trazodone as needed.      Chronic pain syndrome with ongoing back pain with noted history of opioid abuse:  The patient has previously been on a pain contract and is currently on Robaxin 1000 mg 4 times daily, Suboxone 8/2 mg sublingual tablet 3 tablets daily.    -hold suboxone in the post-op period as she is getting narcotics  -discussed with Joel, her counselor at [facility], I'm expecting a call back from her providers to discuss Suboxone/narcotics plan at discussed     Hyperlipidemia:   - Continue prior to admit atorvastatin 20 mg daily      Steroid-induced insulin-dependent diabetes:  Patient's A1c is 7.    -PTA on 15 units of Lantus every afternoon.    -blood sugar is uncontrolled  -Increase Lantus to 25 units at h.s.  -High-intensity sliding scale    D/W: RN  DVT Prophylaxis: Pneumatic Compression Devices  Code Status: Full Code    Disposition: Expected discharge 9/20  Amor Nelson MD    Interval History   Dyspnea much better, oxygen weaned off. Pain much better controlled today.  No nausea or vomiting    -Data reviewed today: I reviewed all new labs and imaging results over the last 24 hours. I personally reviewed the EKG tracing showing nsr.    Physical Exam   Temp: 97.9  F (36.6  C) Temp src: Oral BP: 109/76 Pulse: 65 Heart Rate: 55 Resp: 14 SpO2: 100 % O2 Device: Nasal cannula Oxygen Delivery: 1 LPM  Vitals:    09/15/17 0615 09/16/17 0430 09/16/17 1045   Weight: 84.6 kg (186 lb 8.2 oz) 84.4 kg (186 lb 1.1 oz) 84.6 kg (186 lb 8.2 oz)     Vital Signs with Ranges  Temp:  [97.9  F (36.6  C)-98.4  F (36.9  C)] 97.9  F (36.6  C)  Pulse:  [65] 65  Heart Rate:  [48-70] 55  Resp:  [10-18] 14  BP: ()/(59-76) 109/76  SpO2:  [97 %-100 %] 100 %  I/O last 3 completed shifts:  In: 500 [P.O.:250; I.V.:250]  Out: 4125 [Urine:4075; Chest Tube:50]    Constitutional: AAOX3, NAD, Appears comfortable, jagdish complexion  HEENT: Moist oral mucosa, no oral lesions, No pallor or icterus  Neck- Supple, Good ROM,  No JVD  Respiratory:  Diminished at rt base, crackles bilaterally,Normal WOB  Cardiovascular: RRR, No murmur  GI: Soft, Non- tender, BS- normoactive  Skin/Integument: Warm and dry, no rashes  MSK: No joint deformity or swelling, no edema  Neuro: CN- grossly intact, Motor strength 5/5 on all 4 extremities.      Medications     IV fluid REPLACEMENT ONLY         sulfamethoxazole-trimethoprim  1 tablet Oral BID     insulin glargine  25 Units Subcutaneous At Bedtime     bacitracin   Topical TID     enoxaparin  40 mg Subcutaneous Q24H     furosemide  20 mg Oral Daily     predniSONE  50 mg Oral Daily     pantoprazole  40 mg Oral QAM AC     aspirin  81 mg Oral Daily     insulin aspart  1-10 Units Subcutaneous TID AC     insulin aspart  1-7 Units Subcutaneous At Bedtime     senna-docusate  1-2 tablet Oral BID     nicotine   Transdermal Q8H     nicotine   Transdermal Daily     nicotine  1 patch Transdermal Daily     atorvastatin  20 mg Oral Daily     FLUoxetine  40 mg Oral Daily     folic acid  1 mg Oral Daily     gabapentin  600 mg Oral TID     lactulose  20 g Oral Daily     methocarbamol  1,000 mg Oral 4x Daily     mirtazapine (REMERON) tablet 30 mg  30 mg Oral At Bedtime     rifaximin  550 mg Oral BID     spironolactone  50 mg Oral Daily     thiamine tablet 50 mg  50 mg Oral Daily     cefTRIAXone  2 g Intravenous Q24H     guaiFENesin  1,200 mg Oral BID     ipratropium - albuterol 0.5 mg/2.5 mg/3 mL  3 mL Nebulization Q4H While awake     sodium chloride (PF)  3 mL Intracatheter Q8H     traZODone  100 mg Oral At Bedtime    Or     traZODone  150 mg Oral At Bedtime       Data     Recent Labs  Lab 09/19/17  0813 09/18/17  0500 09/17/17  0420 09/16/17  1115  09/15/17  2005  09/12/17  2318   WBC 7.4 6.8 8.2 8.4  < >  --   < > 17.2*   HGB 13.3 13.1 14.0 15.0  < >  --   < > 15.7   MCV 95 96 96 94  < >  --   < > 90   * 109* 99* 134*  < >  --   < > 111*   INR  --   --   --   --   --  1.02  --   --     137 139 131*  < >   --   < > 133   POTASSIUM 3.6 4.8 4.2 4.7  < >  --   < > 3.2*   CHLORIDE 100 100 101 91*  < >  --   < > 97   CO2 32 33* 34* 32  < >  --   < > 26   BUN 24 25 20 25  < >  --   < > 25   CR 0.73 0.68 0.80 0.78  < >  --   < > 1.31*   ANIONGAP 6 4 4 8  < >  --   < > 10   VISHNU 9.0 8.9 9.5 8.4*  < >  --   < > 8.8   * 288* 179* 311*  < >  --   < > 176*   ALBUMIN  --   --   --  2.7*  --   --   --  3.4   PROTTOTAL  --   --   --  6.6*  --   --   --  7.0   BILITOTAL  --   --   --  0.6  --   --   --  0.4   ALKPHOS  --   --   --  114  --   --   --  108   ALT  --   --   --  87*  --   --   --  72*   AST  --   --   --  51*  --   --   --  64*   < > = values in this interval not displayed.    No results found for this or any previous visit (from the past 24 hour(s)).  [facility]    Hospitalist Progress Note    Assessment & Plan   41-year-old female with a past medical history significant for chronic liver disease secondary to alcohol abuse, alcohol-induced cirrhosis with ascites, alcohol-induced neuropathy, hyperlipidemia, major depressive disorder with anxiety, obesity, chronic pain syndrome with chronic low back pain, opioid abuse, tobacco dependence, steroid-induced insulin-dependent diabetes, mild persistent asthma, history of Clostridium difficile, and recurrent acute hypoxic respiratory failure secondary to recurrent pneumonia who was directly admitted to [facility] due to acute hypoxic respiratory failure thought to be secondary to cryptogenic organizing pneumonia and fluid overload with plans to proceed with a lung biopsy.      Acute hypoxic respiratory failure thought to be secondary to a cryptogenic organizing pneumonia s/p Right video-assisted thoracoscopy and wedge resection of portion of right upper, right middle and right lower lobe lung.    -Patient evaluated by pulmonology at [facility]. Had followed with pulmonary medicine from [facility].  -Lung bx performed by Dr deutsch on  9/16  -Was on vent overnight; extubated earlier today  -Currently on 6LNC; wean o2 as tolerated  -Continue Ceftriaxone and Azithromycin for now.   -Continue IV solumedrol  -TTF in AM if stable  -Await pathology  -Will reconsult pulmonary today  -Dilaudid PCA for pain control     Chronic liver disease secondary to alcohol abuse with associated cirrhosis with ascites and associated alcohol-induced neuropathy:  This appears to be stable at this point.  Patient has not followed up with gastroenterologist in 5 years.    - Continue prior to admit folic acid 1 mg daily, thiamine 50 mg daily, spironolactone 50 mg daily, rifaximin 550 mg daily  - Will hold oral Lasix for now       Major depressive disorder with anxiety:   - Continue fluoxetine 40 mg daily.    - Continue mirtazapine 30 mg every evening at bedtime   - PTA trazodone as needed.      Chronic pain syndrome with ongoing back pain with noted history of opioid abuse:  The patient has previously been on a pain contract and is currently on Robaxin 1000 mg 4 times daily, Suboxone 8/2 mg sublingual tablet 3 tablets daily.    -hold suboxone in the post-op period  -dilaudid PCA     Hyperlipidemia:   - Continue prior to admit atorvastatin 20 mg daily      Steroid-induced insulin-dependent diabetes:  Patient's A1c is noted to be 7.  The patient indicated that she has been taking 15 units of Lantus every afternoon.    - high insulin sliding scale available.    - Continue Lantus but at reduced dose of 8 units every evening       D/W: RN  DVT Prophylaxis: Pneumatic Compression Devices  Code Status: Full Code    Disposition: Expected discharge in 2+ days; continue ICU care today    Amor Nelson MD    Interval History   Complaints of ongoing pain at the chest tube site despite Dilaudid PCA.  Rates the pain 10/10.  Per overnight nursing reports patient requesting more pain medicine despite being heavily sleepy and drowsy.  Oxygen requirements stable at 6 L    -Data reviewed  today: I reviewed all new labs and imaging results over the last 24 hours. I personally reviewed the EKG tracing showing nsr.    Physical Exam   Temp: 97.9  F (36.6  C) Temp src: Oral BP: 109/76 Pulse: 65 Heart Rate: 55 Resp: 14 SpO2: 100 % O2 Device: Nasal cannula Oxygen Delivery: 1 LPM  Vitals:    09/15/17 0615 09/16/17 0430 09/16/17 1045   Weight: 84.6 kg (186 lb 8.2 oz) 84.4 kg (186 lb 1.1 oz) 84.6 kg (186 lb 8.2 oz)     Vital Signs with Ranges  Temp:  [97.9  F (36.6  C)-98.4  F (36.9  C)] 97.9  F (36.6  C)  Pulse:  [65] 65  Heart Rate:  [48-70] 55  Resp:  [10-18] 14  BP: ()/(59-76) 109/76  SpO2:  [97 %-100 %] 100 %  I/O last 3 completed shifts:  In: 500 [P.O.:250; I.V.:250]  Out: 4125 [Urine:4075; Chest Tube:50]    Constitutional: AAOX3, not in distress, Appears comfortable, jagdish complexion  HEENT: Moist oral mucosa, no oral lesions, No pallor or icterus  Neck- Supple, Good ROM, No JVD  Respiratory:  Diminished at rt base, CT in place; Normal WOB  Cardiovascular: RRR, No murmur  GI: Soft, Non- tender, BS- normoactive, No Guarding/rebound/rigidity  Skin/Integument: Warm and dry, no rashes  MSK: No joint deformity or swelling, no edema  Neuro: CN- grossly intact, Motor strength 5/5 on all 4 extremities.      Medications     IV fluid REPLACEMENT ONLY         sulfamethoxazole-trimethoprim  1 tablet Oral BID     insulin glargine  25 Units Subcutaneous At Bedtime     bacitracin   Topical TID     enoxaparin  40 mg Subcutaneous Q24H     furosemide  20 mg Oral Daily     predniSONE  50 mg Oral Daily     pantoprazole  40 mg Oral QAM AC     aspirin  81 mg Oral Daily     insulin aspart  1-10 Units Subcutaneous TID AC     insulin aspart  1-7 Units Subcutaneous At Bedtime     senna-docusate  1-2 tablet Oral BID     nicotine   Transdermal Q8H     nicotine   Transdermal Daily     nicotine  1 patch Transdermal Daily     atorvastatin  20 mg Oral Daily     FLUoxetine  40 mg Oral Daily     folic acid  1 mg Oral Daily      gabapentin  600 mg Oral TID     lactulose  20 g Oral Daily     methocarbamol  1,000 mg Oral 4x Daily     mirtazapine (REMERON) tablet 30 mg  30 mg Oral At Bedtime     rifaximin  550 mg Oral BID     spironolactone  50 mg Oral Daily     thiamine tablet 50 mg  50 mg Oral Daily     cefTRIAXone  2 g Intravenous Q24H     guaiFENesin  1,200 mg Oral BID     ipratropium - albuterol 0.5 mg/2.5 mg/3 mL  3 mL Nebulization Q4H While awake     sodium chloride (PF)  3 mL Intracatheter Q8H     traZODone  100 mg Oral At Bedtime    Or     traZODone  150 mg Oral At Bedtime       Data     Recent Labs  Lab 09/19/17  0813 09/18/17  0500 09/17/17  0420 09/16/17  1115  09/15/17  2005  09/12/17  2318   WBC 7.4 6.8 8.2 8.4  < >  --   < > 17.2*   HGB 13.3 13.1 14.0 15.0  < >  --   < > 15.7   MCV 95 96 96 94  < >  --   < > 90   * 109* 99* 134*  < >  --   < > 111*   INR  --   --   --   --   --  1.02  --   --     137 139 131*  < >  --   < > 133   POTASSIUM 3.6 4.8 4.2 4.7  < >  --   < > 3.2*   CHLORIDE 100 100 101 91*  < >  --   < > 97   CO2 32 33* 34* 32  < >  --   < > 26   BUN 24 25 20 25  < >  --   < > 25   CR 0.73 0.68 0.80 0.78  < >  --   < > 1.31*   ANIONGAP 6 4 4 8  < >  --   < > 10   VISHNU 9.0 8.9 9.5 8.4*  < >  --   < > 8.8   * 288* 179* 311*  < >  --   < > 176*   ALBUMIN  --   --   --  2.7*  --   --   --  3.4   PROTTOTAL  --   --   --  6.6*  --   --   --  7.0   BILITOTAL  --   --   --  0.6  --   --   --  0.4   ALKPHOS  --   --   --  114  --   --   --  108   ALT  --   --   --  87*  --   --   --  72*   AST  --   --   --  51*  --   --   --  64*   < > = values in this interval not displayed.    No results found for this or any previous visit (from the past 24 hour(s)).

## 2017-09-19 NOTE — PLAN OF CARE
Problem: Goal Outcome Summary  Goal: Goal Outcome Summary  OT: Attempted to see patient this AM however she can't stay awake long enough to finish a sentence. Reports CT out this AM.    Addendum: Discussed performance with PT and patient without a need for OT services at this time. Discontinue consult.

## 2017-09-19 NOTE — PROGRESS NOTES
THORACIC SURGERY    Down to 1 l/min NC  VSS  No air leak, no bleeding    CXR no ptx with CT clamped    CT out    D/c as per hospitalist    NEEDS PREDNISONE 50 MG DAILY, BACTRIM BID AND FOLLOW UP WITH DR BAUMAN AT D/C    MARY JO REA MD [facility] ONCOLOGY THORACIC SURGERY  CELL:  [phone]  OFFICE: [phone]

## 2017-09-20 NOTE — PLAN OF CARE
Problem: Goal Outcome Summary  Goal: Goal Outcome Summary  Outcome: Improving  A&O x4. CMS intact except baseline tingling bilateral feet due to neuropathy. Lung sound clear. VSS. modecarb diet. Up with SBA. Ambulated at the [address] x 2. Bedtime blood sugar 367, novolog sliding scale and bedtime lantus given. Incisions on right side of back CDI. incision pain decreased with oxycodone and tylenol and scheduled robaxin. Plan possible discharge tomorrow.

## 2017-09-20 NOTE — DISCHARGE SUMMARY
[facility]    Discharge Summary  Hospitalist    Date of Admission:  9/14/2017  Date of Discharge:  9/20/2017  Discharging Provider: Amor Nelson MD    Discharge Diagnoses       Acute hypoxic respiratory failure secondary to progressive interstitial lung disease and possibly cryptogenic organizing pneumonia s/p Right video-assisted thoracoscopy and wedge resection of portion of right upper, right middle and right lower lobe lung.       Chronic liver disease secondary to alcohol abuse with associated cirrhosis with ascites and associated alcohol-induced neuropathy        Major depressive disorder with anxiety      Chronic pain syndrome with ongoing back pain with history of opioid abuse currently on Suboxone      Hyperlipidemia      Steroid-induced insulin-dependent diabetes    Hospital Course   41-year-old female with a past medical history significant for chronic liver disease secondary to alcohol abuse, alcohol-induced cirrhosis with ascites, alcohol-induced neuropathy, hyperlipidemia, major depressive disorder with anxiety, obesity, chronic pain syndrome with chronic low back pain, opioid abuse, tobacco dependence, steroid-induced insulin-dependent diabetes, mild persistent asthma, history of Clostridium difficile, and recurrent acute hypoxic respiratory failure secondary to recurrent pneumonia who was directly admitted to [facility] due to acute hypoxic respiratory failure thought to be secondary to cryptogenic organizing pneumonia and fluid overload with plans to proceed with a lung biopsy.     Acute hypoxic respiratory failure secondary to progressive interstitial lung disease and possibly cryptogenic organizing pneumonia s/p Right video-assisted thoracoscopy and wedge resection of portion of right upper, right middle and right lower lobe lung.    -patient followed up very closely by pulmonologist at [facility]  -per their recommendation open lung biopsy  performed by Dr. Baird on 9/16  -weaned off oxygen to room air  -was on empiric Rocephin and azithromycin, which was discontinued by discharge  -continue prednisone 50 mg daily  -Await pathology  -continue prophylaxis with Bactrim  -outpatient follow-up with Dr. Baird and primary pulmonologist at the       Chronic liver disease secondary to alcohol abuse with associated cirrhosis with ascites and associated alcohol-induced neuropathy:    -stable at this point.      -Continue prior to admission regimen        Major depressive disorder with anxiety:   - continue prior to admission regimen       Chronic pain syndrome with ongoing back pain with noted history of opioid abuse:  The patient has previously been on a pain contract and is currently on Robaxin 1000 mg 4 times daily, Suboxone 8/2 mg sublingual tablet 3 tablets daily.    -suboxone held in the post-op period  -discussed with Dr. Haprer her pain MD at [facility],at this time we'll discharge her on immediate release oxycodone, for about a week,continue to hold Suboxone.  The patient will follow-up with Dr. Harper and resume Suboxone in about a week.  This was discussed with the patient, she understands the plan.      Hyperlipidemia:   - Continue prior to admit atorvastatin 20 mg daily       Steroid-induced insulin-dependent diabetes:  Patient's A1c is 7.    -PTA on 15 units of Lantus every afternoon.    -blood sugar uncontrolled  -Increase Lantus to 25 units at discharge      Amor Nelson MD    Significant Results and Procedures   See below    Pending Results   These results will be followed up by Dr Baird and Dr Bruce  Unresulted Labs Ordered in the Past 30 Days of this Admission     Date and Time Order Name Status Description    9/16/2017 0931 Tissue Culture Aerobic Bacterial Preliminary     9/16/2017 0931 Nocardia culture Preliminary     9/16/2017 0931 Legionella culture Preliminary     9/16/2017 0931 Fungus Culture, non-blood Preliminary      9/16/2017 0931 Anaerobic bacterial culture Preliminary     9/16/2017 0931 AFB Culture Non Blood Preliminary     9/16/2017 0913 Surgical pathology exam In process           Code Status   Full Code       Primary Care Physician   Aidee Hemphill    Physical Exam   Temp: 97.4  F (36.3  C) Temp src: Axillary BP: 98/57 Pulse: 76 Heart Rate: 57 Resp: 16 SpO2: 97 % O2 Device: None (Room air)      Constitutional: AAOX3, NAD, Appears comfortable  Neck- Supple, Good ROM, No JVD  Respiratory: few crackles bilaterally, normal effort of breathing  Cardiovascular: RRR, No murmur  GI: Soft, Non- tender, BS- normoactive  Skin/Integument: Warm and dry, no rashes  MSK: No joint deformity or swelling, no edema  Neuro: CN- grossly intact, Motor strength 5/5 on all 4 extremities.     Discharge Disposition   Discharged to home  Condition at discharge: Stable    Consultations This Hospital Stay   PULMONARY IP CONSULT  THORACIC SURGERY IP CONSULT  PULMONARY IP CONSULT  PHYSICAL THERAPY ADULT IP CONSULT  OCCUPATIONAL THERAPY ADULT IP CONSULT    Time Spent on this Encounter   IAmor, personally saw the patient today and spent greater than 30 minutes discharging this patient.    Discharge Orders     Reason for your hospital stay   Status post open lung biopsy     Follow-up and recommended labs and tests   Follow up with primary care provider, Aidee Hemphill, within 7 days for hospital follow- up.    Dr Baird in 1 week  Dr Harper; pain clinic within 1 week  Dr Rodriguez, primary pulmonologist at [facility] in 1 week     Activity   Your activity upon discharge: activity as tolerated     Full Code     Diet   Follow this diet upon discharge: Orders Placed This Encounter     Moderate Consistent CHO Diet         Discharge Medications   Current Discharge Medication List      START taking these medications    Details   oxyCODONE (ROXICODONE) 10 MG IR tablet Take 1 tablet (10 mg) by mouth every 4 hours as needed for moderate to severe  pain  Qty: 30 tablet, Refills: 0    Associated Diagnoses: Organizing pneumonia (H)      acetaminophen (TYLENOL) 325 MG tablet Take 2 tablets (650 mg) by mouth every 6 hours as needed for mild pain  Qty: 100 tablet, Refills: 0    Associated Diagnoses: Organizing pneumonia (H)         CONTINUE these medications which have CHANGED    Details   buprenorphine-naloxone (SUBOXONE) 8-2 MG SUBL sublingual tablet Place 3 tablets under the tongue daily  Qty: 60 each, Refills: 0    Comments: Resume Suboxone after discussing with Dr. Harper in one week  Associated Diagnoses: Other chronic pain      predniSONE (DELTASONE) 50 MG tablet Take 1 tablet (50 mg) by mouth daily  Qty: 30 tablet, Refills: 0    Associated Diagnoses: Organizing pneumonia (H)      insulin glargine (LANTUS) 100 UNIT/ML injection Inject 25 Units Subcutaneous every morning (before breakfast)  Qty: 50 mL, Refills: 0    Associated Diagnoses: Hyperglycemia         CONTINUE these medications which have NOT CHANGED    Details   Pantoprazole Sodium (PROTONIX PO) Take 40 mg by mouth every morning (before breakfast)      loratadine (CLARITIN) 10 MG tablet Take 10 mg by mouth daily      MIRTAZAPINE PO Take 30 mg by mouth At Bedtime      MELATONIN PO Take 5 mg by mouth At Bedtime      THIAMINE HCL PO Take 50 mg by mouth daily      furosemide (LASIX) 20 MG tablet Take 1 tablet (20 mg) by mouth daily  Qty: 30 tablet, Refills: 3    Associated Diagnoses: Organizing pneumonia (H)      spironolactone (ALDACTONE) 50 MG tablet Take 1 tablet (50 mg) by mouth daily  Qty: 3 tablet, Refills: 0      gabapentin (NEURONTIN) 600 MG tablet Take 1 tablet (600 mg) by mouth 3 times daily  Qty: 9 tablet, Refills: 0      sulfamethoxazole-trimethoprim (BACTRIM DS/SEPTRA DS) 800-160 MG per tablet Take 1 tablet by mouth three times a week  Qty: 30 tablet, Refills: 0    Associated Diagnoses: Bronchiolitis      ascorbic acid (VITAMIN C) 500 MG tablet Take 500 mg by mouth daily      FLUoxetine  (PROZAC) 40 MG capsule Take 40 mg by mouth daily      lactulose (CHRONULAC) 10 GM/15ML solution Take 20 g by mouth 3 times daily      traZODone (DESYREL) 100 MG tablet Take 100 mg by mouth At Bedtime      Omega-3 Fatty Acids (FISH OIL) 1200 MG CAPS Take 1 capsule by mouth daily      methocarbamol (ROBAXIN) 500 MG tablet Take 1,000 mg by mouth 4 times daily      aspirin EC 81 MG EC tablet Take 1 tablet (81 mg) by mouth daily  Qty: 30 tablet, Refills: 1    Associated Diagnoses: Elevated troponin I level      atorvastatin (LIPITOR) 20 MG tablet Take 1 tablet (20 mg) by mouth daily  Qty: 30 tablet, Refills: 1    Associated Diagnoses: Elevated troponin I level      rifaximin (XIFAXAN) 550 MG TABS tablet Take 1 tablet (550 mg) by mouth 2 times daily  Qty: 60 tablet, Refills: 0    Associated Diagnoses: Other ascites      ferrous sulfate (IRON) 325 (65 FE) MG tablet Take 325 mg by mouth daily (with breakfast)      MULTIPLE VITAMINS PO Take 1 tablet by mouth daily    Associated Diagnoses: Alcoholic hepatitis      folic acid (FOLVITE) 1 MG tablet Take 1 tablet (1 mg) by mouth daily  Qty: 30 tablet    Associated Diagnoses: Alcoholic hepatitis; Alcohol dependence (H)      blood glucose monitoring (ACCU-CHEK TONIA PLUS) meter device kit Use to test blood sugars BID times daily or as directed.  Qty: 1 kit, Refills: 0    Associated Diagnoses: Hyperglycemia      hydrOXYzine (ATARAX) 10 MG tablet Take 10 mg by mouth every 8 hours as needed for itching      alum & mag hydroxide-simethicone (MYLANTA ES/MAALOX  ES) 400-400-40 MG/5ML SUSP Take 30 mLs by mouth every 4 hours as needed for indigestion Reported on 2/23/2017      albuterol (PROAIR HFA, PROVENTIL HFA, VENTOLIN HFA) 108 (90 BASE) MCG/ACT inhaler Inhale 2 puffs into the lungs every 4 hours as needed for shortness of breath / dyspnea or wheezing Reported on 2/23/2017         STOP taking these medications       IBUPROFEN PO Comments:   Reason for Stopping:             Allergies    Allergies   Allergen Reactions     No Clinical Screening - See Comments      Bupronide patch allergy     Data   Most Recent 3 CBC's:  Recent Labs   Lab Test  09/19/17   0813  09/18/17   0500  09/17/17   0420   WBC  7.4  6.8  8.2   HGB  13.3  13.1  14.0   MCV  95  96  96   PLT  100*  109*  99*      Most Recent 3 BMP's:  Recent Labs   Lab Test  09/19/17   0813  09/18/17   0500  09/17/17   0420   NA  138  137  139   POTASSIUM  3.6  4.8  4.2   CHLORIDE  100  100  101   CO2  32  33*  34*   BUN  24  25  20   CR  0.73  0.68  0.80   ANIONGAP  6  4  4   VISHNU  9.0  8.9  9.5   GLC  196*  288*  179*     Most Recent 2 LFT's:  Recent Labs   Lab Test  09/16/17   1115  09/12/17   2318   AST  51*  64*   ALT  87*  72*   ALKPHOS  114  108   BILITOTAL  0.6  0.4     Most Recent INR's and Anticoagulation Dosing History:  Anticoagulation Dose History     Recent Dosing and Labs Latest Ref Rng & Units 3/25/2016 6/23/2016 12/6/2016 1/7/2017 1/10/2017 5/2/2017 9/15/2017    INR 0.86 - 1.14 1.11 1.32(H) 1.19(H) 1.14 1.37(H) 1.15(H) 1.02        Most Recent 3 Troponin's:  Recent Labs   Lab Test  05/02/17   1854  02/16/17   0943  01/07/17   1814   TROPI  <0.015  The 99th percentile for upper reference range is 0.045 ug/L.  Troponin values in   the range of 0.045 - 0.120 ug/L may be associated with risks of adverse   clinical events.    0.018  <0.015  The 99th percentile for upper reference range is 0.045 ug/L.  Troponin values in   the range of 0.045 - 0.120 ug/L may be associated with risks of adverse   clinical events.       Most Recent Cholesterol Panel:  Recent Labs   Lab Test  01/12/17   0715  12/07/16   0620   CHOL   --   145   LDL   --   95   HDL   --   18*   TRIG  160*  161*     Most Recent 6 Bacteria Isolates From Any Culture (See EPIC Reports for Culture Details):  Recent Labs   Lab Test  09/16/17   0923  09/15/17   1245  09/14/17   2000  09/13/17   0052  09/12/17   2318  09/12/17   2313   CULT  No growth after 3 days  Culture negative  after 3 days  Culture received and in progress.  Positive AFB results are called as soon as detected.    Final report to follow in 7 to 8 weeks.    Culture negative monitoring continues  Culture negative monitoring continues  Culture negative monitoring continues  Canceled, Test credited  >10 Squamous epithelial cells/low power field indicates oral contamination. Please   recollect.  *  Notification of test cancellation was given to   david archibald rn @2101 9/15/17. ct    Canceled, Test credited  Unsatisfactory specimen - too old for testing  SPECIMEN RECEIVED 12 HOURS AFTER COLLECTION UNREFRIGERATED. NURSING NOTIFIED AND WILL   RECOLLECT.    <10,000 colonies/mL  mixed urogenital maikol    No growth  No growth     Most Recent TSH, T4 and A1c Labs:  Recent Labs   Lab Test  09/14/17   0549   01/07/17   1814   TSH   --    --   1.04   A1C  7.0*   < >   --     < > = values in this interval not displayed.       Results for orders placed or performed during the hospital encounter of 09/14/17   XR Chest Port 1 View    Narrative    XR CHEST PORT 1 VW  9/16/2017 11:50 AM     HISTORY:  ETT placement    COMPARISON: Film dated 9/14    FINDINGS:  ET tube and NG tube are in place. Right chest tube is in  place. No pneumothorax. No change in the extensive bilateral  infiltrate.      Impression    IMPRESSION: ET tube in good position. No pneumothorax identified.    DAYSI LORENZO MD   XR Chest Port 1 View    Narrative    XR CHEST PORT 1 VW  9/17/2017 7:35 AM     HISTORY:  post op    COMPARISON: Film dated 9/16/2017    FINDINGS:  ET tube and NG tube are in place. Right chest tube is in  place. No pneumothorax. Extensive bilateral infiltrate is again noted.      Impression    IMPRESSION: Right chest tube. No pneumothorax. No significant change.    DAYSI LORENZO MD   XR Chest Port 1 View    Narrative    CHEST PORTABLE ONE VIEW   9/18/2017 5:29 AM     HISTORY: Postoperative.    COMPARISON: 9/17/2017.      Impression    IMPRESSION: ET  tube and enteric tube have been removed. Right apical  chest tube remains in place.    Diffuse micronodular opacities again noted representing interstitial  edema versus pneumonia. More focal opacity seen in the right midlung  and right lower lung which could represent atelectasis or superimposed  pneumonia are slightly more prominent.    MAKENZIE TO MD   XR Chest Port 1 View    Narrative    CHEST PORTABLE ONE VIEW September 19, 2017 5:33 AM     HISTORY: Postoperative.    COMPARISON: 9/18/2017.    FINDINGS: Right chest tube again seen. The degree of cardiac  enlargement appears similar. Postsurgical change on the right.      Impression    IMPRESSION: No pneumothorax. Decreased pulmonary edema.    MARCIA AJ MD   XR Chest 2 Views    Narrative    XR CHEST 2 VW   9/20/2017 6:56 AM     HISTORY: post op    COMPARISON: None.    FINDINGS: Right chest tube has been removed. Tiny right apical  pneumothorax. Opacities are again seen in the right mid lung zone and  at the right lung base. Left lung is clear.  Cardiac silhouette is  mildly prominent.. The pulmonary vasculature is normal.  The bones and  soft tissues are unremarkable.      Impression    IMPRESSION: Right chest tube removed. Tiny right apical pneumothorax.         DAYSI LORENZO MD

## 2017-09-20 NOTE — PROGRESS NOTES
THORACIC SURGERY    CXR this am no effusion no ptx    Ok to d/c from my standpoint    MARY JO REA MD [facility] ONCOLOGY THORACIC SURGERY  CELL:  [phone]  OFFICE: [phone]

## 2017-09-20 NOTE — PLAN OF CARE
Problem: Goal Outcome Summary  Goal: Goal Outcome Summary  Outcome: Adequate for Discharge Date Met:  09/20/17  Pt ready for DC and agrees with plan.   Pain under control with medications.  VSS, AF. Pt ambulating around room independently comfortably.  Friend taking pt home.  DC instructions thoroughly reviewed with pt, including narcotic timing and safety instructions. Pt's belongings and meds sent with pt.  Pt denies any outstanding needs, Qs, or concerns and states understanding of plan.

## 2017-09-20 NOTE — PLAN OF CARE
Problem: Goal Outcome Summary  Goal: Goal Outcome Summary  Outcome: No Change  VSS. Pain managed with PRN tylenol and oxycodone. Incisions intact with steri strips. CT removal site dressing with dried drainage. LS diminished, IS encouraged. +BS, +Flatus, abdomen distended d/t ascites. Voiding adequately. Tolerating PO. Ambulating independently in room. Nicotine PIP. Bilateral tingling in feet, pt baseline. Likely d/c later today.

## 2017-09-20 NOTE — PROGRESS NOTES
"  Pulmonary Medicine Progress Note        Date of Admission: 2017  Primary Attending:  Amor Nelson MD  Consulting Physician: Arnaldo Lange MD      History:      SUBJECTIVE: No new events overnight,  Afebrile. No worsening respiratory complaints at this time. No complaints with ambulation, on room air    OBJECTIVE:    Heart Rate: 57, Blood pressure 98/57, pulse 76, temperature 97.4  F (36.3  C), temperature source Axillary, resp. rate 16, height 1.702 m (5' 7\"), weight 84.6 kg (186 lb 8.2 oz), SpO2 97 %, not currently breastfeeding.  Body mass index is 29.21 kg/(m^2).  Temp (24hrs), Av.4  F (36.9  C), Min:98.2  F (36.8  C), Max:99.3  F (37.4  C)        Neck- Supple, Good ROM, No JVD  Respiratory:  Diminished  But clear, CT in place; Normal WOB  Cardiovascular: RRR, No murmur  GI: Soft, Non- tender, BS- normoactive, No Guarding/rebound/rigidity  Skin/Integument: Warm and dry, no rashes    Medications  Current Facility-Administered Medications Ordered in Epic   Medication Dose Route Frequency Last Rate Last Dose     sulfamethoxazole-trimethoprim (BACTRIM DS/SEPTRA DS) 800-160 MG per tablet 1 tablet  1 tablet Oral BID   1 tablet at 17     insulin glargine (LANTUS) injection 25 Units  25 Units Subcutaneous At Bedtime   25 Units at 17     oxyCODONE (ROXICODONE) IR tablet 10 mg  10 mg Oral Q3H PRN   10 mg at 17 0616     bacitracin ointment   Topical TID         enoxaparin (LOVENOX) injection 40 mg  40 mg Subcutaneous Q24H   40 mg at 17 1330     diphenhydrAMINE (BENADRYL) capsule 25 mg  25 mg Oral Q6H PRN        Or     diphenhydrAMINE (BENADRYL) injection 25 mg  25 mg Intravenous Q6H PRN         furosemide (LASIX) tablet 20 mg  20 mg Oral Daily   20 mg at 17 09     predniSONE (DELTASONE) tablet 50 mg  50 mg Oral Daily   50 mg at 17 09     pantoprazole (PROTONIX) EC tablet 40 mg  40 mg Oral QAM AC   40 mg at 17 07     aspirin chewable tablet 81 mg  " 81 mg Oral Daily   81 mg at 09/19/17 0927     insulin aspart (NovoLOG) inj (RAPID ACTING)  1-10 Units Subcutaneous TID AC   1 Units at 09/20/17 0746     insulin aspart (NovoLOG) inj (RAPID ACTING)  1-7 Units Subcutaneous At Bedtime   7 Units at 09/19/17 2136     senna-docusate (SENOKOT-S;PERICOLACE) 8.6-50 MG per tablet 1-2 tablet  1-2 tablet Oral BID   1 tablet at 09/19/17 2127     dextrose 10 % 1,000 mL infusion   Intravenous Continuous PRN         nicotine Patch in Place   Transdermal Q8H         nicotine patch REMOVAL   Transdermal Daily   Stopped at 09/17/17 0800     nicotine (NICODERM CQ) 21 MG/24HR 24 hr patch 1 patch  1 patch Transdermal Daily   1 patch at 09/19/17 0928     atorvastatin (LIPITOR) tablet 20 mg  20 mg Oral Daily   20 mg at 09/19/17 2123     FLUoxetine (PROzac) capsule 40 mg  40 mg Oral Daily   40 mg at 09/19/17 0927     folic acid (FOLVITE) tablet 1 mg  1 mg Oral Daily   1 mg at 09/19/17 0927     gabapentin (NEURONTIN) tablet 600 mg  600 mg Oral TID   600 mg at 09/19/17 2127     hydrOXYzine (ATARAX) tablet 10 mg  10 mg Oral Q8H PRN         lactulose (CHRONULAC) solution 20 g  20 g Oral Daily   20 g at 09/19/17 0928     methocarbamol (ROBAXIN) tablet 1,000 mg  1,000 mg Oral 4x Daily   1,000 mg at 09/19/17 2127     mirtazapine (REMERON) tablet 30 mg  30 mg Oral At Bedtime   30 mg at 09/19/17 2127     rifaximin (XIFAXAN) tablet 550 mg  550 mg Oral BID   550 mg at 09/19/17 2127     spironolactone (ALDACTONE) tablet 50 mg  50 mg Oral Daily   50 mg at 09/19/17 0926     thiamine tablet 50 mg  50 mg Oral Daily   50 mg at 09/19/17 0926     guaiFENesin (MUCINEX) 12 hr tablet 1,200 mg  1,200 mg Oral BID   1,200 mg at 09/19/17 2127     ipratropium - albuterol 0.5 mg/2.5 mg/3 mL (DUONEB) neb solution 3 mL  3 mL Nebulization Q4H While awake   3 mL at 09/19/17 1940     ipratropium - albuterol 0.5 mg/2.5 mg/3 mL (DUONEB) neb solution 3 mL  3 mL Nebulization Q2H PRN   3 mL at 09/18/17 0743     glucose 40 % gel  15-30 g  15-30 g Oral Q15 Min PRN        Or     dextrose 50 % injection 25-50 mL  25-50 mL Intravenous Q15 Min PRN        Or     glucagon injection 1 mg  1 mg Subcutaneous Q15 Min PRN         naloxone (NARCAN) injection 0.1-0.4 mg  0.1-0.4 mg Intravenous Q2 Min PRN         nitroGLYcerin (NITROSTAT) sublingual tablet 0.4 mg  0.4 mg Sublingual Q5 Min PRN         lidocaine 1 % 1 mL  1 mL Other Q1H PRN         lidocaine (LMX4) cream   Topical Q1H PRN         sodium chloride (PF) 0.9% PF flush 3 mL  3 mL Intracatheter Q1H PRN   3 mL at 09/16/17 0050     sodium chloride (PF) 0.9% PF flush 3 mL  3 mL Intracatheter Q8H   3 mL at 09/20/17 0505     acetaminophen (TYLENOL) tablet 650 mg  650 mg Oral Q4H PRN   650 mg at 09/20/17 0616     senna-docusate (SENOKOT-S;PERICOLACE) 8.6-50 MG per tablet 1-2 tablet  1-2 tablet Oral BID PRN         polyethylene glycol (MIRALAX/GLYCOLAX) Packet 17 g  17 g Oral Daily PRN         bisacodyl (DULCOLAX) Suppository 10 mg  10 mg Rectal Daily PRN         ondansetron (ZOFRAN-ODT) ODT tab 4 mg  4 mg Oral Q6H PRN        Or     ondansetron (ZOFRAN) injection 4 mg  4 mg Intravenous Q6H PRN         prochlorperazine (COMPAZINE) injection 5-10 mg  5-10 mg Intravenous Q6H PRN        Or     prochlorperazine (COMPAZINE) tablet 5-10 mg  5-10 mg Oral Q6H PRN        Or     prochlorperazine (COMPAZINE) Suppository 25 mg  25 mg Rectal Q12H PRN         traZODone (DESYREL) tablet 100 mg  100 mg Oral At Bedtime        Or     traZODone (DESYREL) tablet 150 mg  150 mg Oral At Bedtime   150 mg at 09/19/17 1422     Current Outpatient Prescriptions Ordered in Epic   Medication     oxyCODONE (ROXICODONE) 10 MG IR tablet           CMP    Recent Labs  Lab 09/19/17  0813 09/18/17  0500 09/17/17  0420 09/16/17  1115    137 139 131*   POTASSIUM 3.6 4.8 4.2 4.7   CHLORIDE 100 100 101 91*   CO2 32 33* 34* 32   ANIONGAP 6 4 4 8   * 288* 179* 311*   BUN 24 25 20 25   CR 0.73 0.68 0.80 0.78   GFRESTIMATED 87 >90 78  81   GFRESTBLACK >90 >90 >90 >90   VISHNU 9.0 8.9 9.5 8.4*   PROTTOTAL  --   --   --  6.6*   ALBUMIN  --   --   --  2.7*   BILITOTAL  --   --   --  0.6   ALKPHOS  --   --   --  114   AST  --   --   --  51*   ALT  --   --   --  87*     CBC    Recent Labs  Lab 09/19/17  0813 09/18/17  0500 09/17/17  0420 09/16/17  1115   WBC 7.4 6.8 8.2 8.4   RBC 4.34 4.22 4.50 4.60   HGB 13.3 13.1 14.0 15.0   HCT 41.0 40.6 43.1 43.3   MCV 95 96 96 94   MCH 30.6 31.0 31.1 32.6   MCHC 32.4 32.3 32.5 34.6   RDW 13.7 13.8 13.8 13.9   * 109* 99* 134*     INR    Recent Labs  Lab 09/15/17  2005   INR 1.02     Arterial Blood Gas    Recent Labs  Lab 09/16/17  1210 09/13/17  1835   PH 7.36 7.35   PCO2 62* 53*   PO2 128* 62*   HCO3 35* 29*   O2PER 60% Oximizer       Recent Labs  Lab 09/16/17  0923 09/15/17  1245 09/14/17 2000   CULT No growth after 3 days  Culture negative after 3 days  Culture received and in progress.  Positive AFB results are called as soon as detected.  Final report to follow in 7 to 8 weeks.  Culture negative monitoring continues  Culture negative monitoring continues  Culture negative monitoring continues Canceled, Test credited  >10 Squamous epithelial cells/low power field indicates oral contamination. Please recollect.*  Notification of test cancellation was given to david archibald rn @2102 9/15/17. ct Canceled, Test credited  Unsatisfactory specimen - too old for testing  SPECIMEN RECEIVED 12 HOURS AFTER COLLECTION UNREFRIGERATED. NURSING NOTIFIED AND WILL RECOLLECT.             Diagnostic Studies:  Chest Radiology:       CHEST PORTABLE ONE VIEW   9/18/2017 5:29 AM      HISTORY: Postoperative.     COMPARISON: 9/17/2017.         IMPRESSION: ET tube and enteric tube have been removed. Right apical  chest tube remains in place.     Diffuse micronodular opacities again noted representing interstitial  edema versus pneumonia. More focal opacity seen in the right midlung  and right lower lung which could  represent atelectasis or superimposed  pneumonia are slightly more prominent.     MAKENZIE TO MD      Assessment:     41-year-old obese woman with a significant complex medical history which includes acute hypoxic respiratory failure superimposed on a progressive interstitial lung disease possibly cryptogenic organizing pneumonitis.  Her pulmonologist has followed her closely and she and her colleagues at the [facility] have recommended an open lung biopsy and the patient is here at this hospital to obtain that.  The patient has a significant history of chronic liver disease secondary to alcohol abuse, alcoholic- induced cirrhosis with ascites, alcohol-induced neuropathy, major depressive disorder with anxiety, hyperlipidemia, chronic pain syndrome with chronic back pain, opiate use on Suboxone, tobacco abuse disorder, steroid-induced insulin-dependent diabetes, history of asthma, chronic kidney disease, obesity, C. diff infection in the past, multiple pneumonias as well as mycoplasma pneumonia.         Pulmonary Diagnoses: Abnl CT/CXR R91.8, KOROMA R06.09, Hpoxemia R09.02 and Interstitial pneum J84.111    Recommendations        Results of Biopsy pending  Continue Bactrim  Agree with discharge  Continue Prednisone at current dose until she sees primary pulmonologist  Extensively counseled on smoking cessation  Encourage IS  Physical activity as tolerated  Will need outpatient PFTs  Follow up after DC with primary pulmonologist who will follow biopsy results          Arnaldo Washington M.D.  Pulmonary, Critical Care and Sleep Medicine  [facility]  Pager: [phone]  Office:[phone]

## 2017-10-18 PROBLEM — R09.02 HYPOXIA: Status: ACTIVE | Noted: 2017-01-01

## 2017-10-18 NOTE — ED PROVIDER NOTES
History     Chief Complaint:  Respiratory Distress    HPI   Sabina Figueroa is a 42 year old female with a history of asthma and frequent pneumonia, among others, who presents to the emergency department today for evaluation of respiratory distress. The patient reports that she was admitted approximately one month ago and was later transferred to [facility] because her lung doctor wanted her to get biopsy to investigate as to why she is getting pneumonia every month or so. She was started on Prednisone and Bactrim daily since her last admission, but notes that she stopped taking the bactrim within the last couple of weeks due to insurance coverage issues.  She has not received the results for this biopsy yet. The patient states that she became short of breath this afternoon. She took her Spiriva inhaler with minimal to no improvement of her shortness of breath. The patient has an albuterol inhaler at home, but reports that she does not use this regularly and did not use this evening. She has been coughing up sputum, feeling nauseated, and feels very warm. No urinary or bowel changes or appetite changes noted.     Allergies:  Bupronide patch      Medications:    Deltasone  Lasix  Lantus  Aldactone  Neurontin  Bacgtrim DS/Septra   Prozac  Atarax  Chronulac  Prilosec  Desyrell  Robaxin  Aspirin  Lipitor  Xifaxan  Albuterol (2.5 MG/3ML) 0.083% neb solution  Suboxone     Past Medical History:    ALC  Pneumonia  Sepsis  Adjustment disorder with depressed mood  Respiratory failure with hypoxia  Ascites  Abdominal pain  Bronchospasm  Chemical dependency  Alcoholic hepatitis  Jaundice  Smoking addiction  Alcoholic peripheral neuropathy  Insomnia  Degenerative disc disease  Hypertension  Hyponatremia  GI bleeding  Alcohol abuse   Anxiety   Arthritis   Asceites  Asthma  Bunion, left foot  Choronic low back pain  Depression  Hypertsension  Overian cyst, left  Degenerative     Past Surgical History:    Bronchoscopy  flexible   section  ESOPHAGOSCOPY, GASTROSCOPY, DUODENOSCOPY (EGD), COMBINED x3  Knee surgery x3  Thoracoscopic biopsy right lung (2017)     Family History:    Depressioni  Anxiety disorder  Mintal ilness  Substance abuse   Depression  Anxiety disorder  Depression  Schizophrenia     Social History:  Presents by herslef   Tobacco use: Current everyday smoker, 1.5 ppd for 18 years, former smokless tobacco user, quit on 16,   Alcohol use: Abstinent since 4/2/15  PCP: Aidee Hemphill    Marital Status:  Single      Review of Systems   Constitutional: Negative.    HENT: Negative.    Respiratory: Positive for cough, chest tightness and shortness of breath. Negative for choking, wheezing and stridor.    Cardiovascular: Positive for chest pain. Negative for palpitations and leg swelling.   Gastrointestinal: Negative.    Genitourinary: Negative.    Musculoskeletal: Negative.    All other systems reviewed and are negative.    Physical Exam     Patient Vitals for the past 24 hrs:   BP Temp Temp src Pulse Heart Rate Resp SpO2   10/17/17 2346 - - - - 75 - 98 %   10/17/17 2345 105/64 - - - - - -   10/17/17 2333 114/70 - - - 78 - 97 %   10/17/17 2330 - - - - - - 97 %   10/17/17 2315 - - - - - - 96 %   10/17/17 2300 111/62 - - - 91 - 96 %   10/17/17 2245 111/72 - - - 90 - 97 %   10/17/17 2230 107/75 - - - - - -   10/17/17 2215 115/71 - - - 98 10 -   10/17/17 2200 116/69 - - - - - -   10/17/17 2156 - - - - 112 - 94 %   10/17/17 2145 - - - - 112 - 97 %   10/17/17 2130 123/74 - - - - - -   10/17/17 2115 126/70 - - - 114 - 96 %   10/17/17 2100 124/71 - - - - - -   10/17/17 2045 121/80 - - - 117 - 96 %   10/17/17 2032 - - - - - - 95 %   10/17/17 2030 128/87 - - - - - 97 %   10/17/17 2024 - 99.7  F (37.6  C) Temporal - - - -   10/17/17 2016 (!) 128/91 - - - - - 90 %   10/17/17 2011 (!) 128/91 - - 125 - (!) 36 (!) 65 %      Physical Exam  General: Alert, in acute respiratory distress, appears ill. Cooperative.    HEENT:  Head:  Atraumatic. Bilateral cheeks are erythematous.  Ears:  External ears are normal  Mouth/Throat:  Oropharynx is without erythema or exudate and mucous membranes are moist.   Eyes:   Conjunctivae normal and EOM are normal. No scleral icterus.    Pupils are equal, round, and reactive to light.   CV:  Tachycardic rate, regular rhythm, normal heart sounds and radial and dorsalis pedis pulses are 2+ and symmetric.  No murmur.  Resp:  Breath sounds are coarse bilaterally. No expiratory wheezing.     Labored sub-costal retractions. Respiratory rate greater 30.  GI:  Abdomen is soft, no distension, no tenderness. No rebound or guarding.  MS:  Normal range of motion. Trace edema.    Normal strength in all 4 extremities.     Back atraumatic.    No CVA tenderness bilaterally  Skin:  Warm and dry.  No rash or lesions noted.  Neuro:   Alert. Normal strength.  Sensation intact in all 4 extremities. GCS: 15  Psych: Normal mood and affect.  Lymph: No anterior or posterior cervical lymphadenopathy noted.     Emergency Department Course     ECG:  ECG taken at 2023, ECG read at 2024  Sinus tachycardia  Possible left atrial enlargement   Abnormal ECG  No significant changes from previous on 9/18/17  Rate 121 bpm. AK interval 118. QRS duration 72. QT/QTc 304/431. P-R-T axes 49,-12,54.     Imaging:  Radiology findings were communicated with the patient who voiced understanding of the findings.  Chest  XR, 1 view PORTABLE  Mild cardiomegaly and pulmonary vascular congestion.  Suspect fluid overload or mild congestive heart failure.  DAYSI LORENZO MD    Laboratory:  Laboratory findings were communicated with the patient who voiced understanding of the findings.  Lactic Acid (2202): 0.9   UA with micro: Squamous epithelial 2 (H), Albumin 30 (A), mucous present (A) o/w negative   Urine Culture: Pending   Influenza A/B: negative , negative   CBC: WBC 17.8 (H), HGB 15.7,   CMP: Creatinine 1.23 (H), GFR 48 (L), Glucose 128  (H), Na 131 (L), Cl 93 (L), o/w WNL  Troponin (Collected 2015): <0.015  D Dimer (Collected 2015): 1.3 (H)   Troponin POCT (Collected 2025): 0.01    Lactic Acid (2015): 2.5 (H)  Procalcitonin: 0.13  Lactate Dehydrogenase: 508 (H)  BNP: 152 (H)  Venous Blood Gas: pH: 7.43, PCO2: 45, PO2: 23 (L), Bicarbonate: 29 (H), FIO2: 16  Blood Culture 1: pending  Blood Culture 2: pending     Interventions:  2028 solumedrol 125 mg IV  2028 LR 2,559 mL IV  2030 Zosyn 4.5 mg IV  2032 Duoneb 6 mL neb  2100 Levaquin 750 mg IV  2122 Vancocin 1,750 mg IV  2324  mL/hr IV     Emergency Department Course:  Nursing notes and vitals reviewed.  I entered the room.  I performed an exam of the patient as documented above.   IV was inserted and blood was drawn for laboratory testing, results above.  The patient received the above intervention(s).   The patient was sent for a x-ray while in the emergency department, results above.    The patient provided a urine sample here in the emergency department. This was sent for laboratory testing, findings above.   2218 the patient was rechecked and she was updated on the results of her laboratory and imaging studies.    2221 I spoke with Dr. Chiang of the hospitalist service from [facility] regarding patient's presentation, findings, and plan of care.   Rechecked the patient, findings and plan explained to the patient, who consents to transfer and admission. Discussed the patient with Dr. Chiang at [facility], who will admit the patient to a bed for further monitoring, evaluation, and treatment. The patient was transferred via EMS to [facility].     Impression & Plan      Medical Decision Making:  Sabina Figueroa is a 42 year old female with a complex past medical history pertinent for alcohol dependency, interstitial lung disease, and recent admission and subsequent discharge for acute respiratory failure secondary to progressive interstitial lung  disease  and possible cryptogenic organizing pneumonia. The patient is followed by a pulmonologist at the [facility] and recently had a biopsy performed by Dr. Baird at [facility] during prior admission. She was found to have a new diagnosis of interstitial lung disease during this admission and was placed on 50 mg daily prednisone along with daily bactrim. The patient's insurance apparently prevented her from obtaining her bactrim prescription for the past two weeks and so she has been unable to take this. She has been taking her steroids. The patient initially had no wheezing, but acute respiratory distress on arrival. O2 stats were in the 60s initially, which improved once O2 was supplied by facemask and her work of breathing improved significantly after subsequent application of BiPAP. The patient's portable chest x-ray was concerning for bilateral infiltrates, although the radiology read seems to be concerned for cardiomegaly and pulmonary vascular congestion; I am much less suspicious for a CHF picture and more concerned for a sepsis presentation. Bedside Echo with no evidence of pericardial effusion and hyperdynamic cardiac activity consistent with septic presentation.  The patient's initial lactate was 2.5 and thus we followed our sepsis protocol with broad spectrum antibiotics as noted above, including 30cc/kg LR and monitored patient after every liter administration to ensure no worsening suspicion for fluid overload or a CHF exacerbation picture.  She had a white count of 17.8, which is significantly elevated from a prior CBC from one month ago. She has some mild KAITLIN with creatinine of 1.23. D-dimer is 1.3. Although I cannot fully exclude a pulmonary embolism at this time, I feel that a pulmonary embolism is much less likely in the setting of acute sepsis. Supposing patient does not improve overnight and has worsening hypoxia in the AM, I'd consider a CT pulmonary angiogram vs just starting  heparin if there were a higher concern for acute PE.      The patient's EKG with sinus tachycardia and no changes from the previous EKG's. She does have a negative troponin and I have a low concern for ACS at this time. BNP is 152, another reassuring finding that this is less likely a CHF exacerbation. After 2 L of LR, a repeat lactate returned at 0.9. Due to the patient's acute respiratory distress and concern for possible HCAP versus atypical pneumonia, I feel the patient warrants ICU 2admission.  Unfortunately without pulmonology present here, we will transfer to [facility] where they have a pulmonologist on staff and her pulmonologist resides at the [facility] so this will allow for continuity of care. [facility] did not have any beds available and thus [facility] was closest available facility. The patient remained vitally stable while under my care, with the exception of mild resolving tachycardia. Her lactate improved while under my care. The patient did receive a full 30 cc/kg bolus prior to discharge to the [facility]. She was given methylprednisolone 125 mg and Duonebs through BiPAP with respiratory therapy at bedside. Dr. Chiang who agreed to accept the patient at his institution, the patient was transferred by ambulance to the [facility].    Critical Care time was 65 minutes for this patient excluding procedures.     Diagnosis:    ICD-10-CM    1. Acute respiratory failure with hypoxia (H) J96.01    2. SOB (shortness of breath) R06.02    3. Sepsis, due to unspecified organism (H) A41.9        Disposition:   The patient was transferred to the [facility] hospital for further evaluation and care.     CMS Diagnoses:   The patient has signs of Severe Sepsis as evidenced by:    1. 2 SIRS criteria, AND  2. Suspected infection, AND   3. Organ dysfunction: Lactic Acid >2    Time severe sepsis diagnosis confirmed = 2015 as this was the time when Lactate  resulted, and the level was >2      3 Hour Severe Sepsis Bundle Completion:  1. Initial Lactic Acid Result:   Recent Labs   Lab Test  10/17/17   2202  10/17/17   2015  09/12/17   2318   LACT  0.9  2.5*  1.5     2. Blood Cultures before Antibiotics: Yes  3. Broad Spectrum Antibiotics Administered: Yes     Anti-infectives     None        4. 2559 ml of IV fluids.      6 Hour Severe Sepsis Bundle Completion:  1. Repeat Lactic Acid Level: 0.9  2. MAP>65 after initial IVF bolus, will continue to monitor fluid status and vital signs  I attest to having performed a repeat sepsis exam and assessment of perfusion at 2210 and the results demonstrate improved perfusion.     Scribe Disclosure:  I, Kevan Quick, am serving as a scribe at 8:13 PM on 10/17/2017 to document services personally performed by No att. providers found, based on my observations and the provider's statements to me.   [facility] EMERGENCY DEPARTMENT       Benedict Bocanegra MD  10/18/17 3645

## 2017-10-18 NOTE — IP AVS SNAPSHOT
[address] [address]    Phone:  [phone]                                       After Visit Summary   10/18/2017    Sabina Figueroa    MRN: [id number]           After Visit Summary Signature Page     I have received my discharge instructions, and my questions have been answered. I have discussed any challenges I see with this plan with the nurse or doctor.    ..........................................................................................................................................  Patient/Patient Representative Signature      ..........................................................................................................................................  Patient Representative Print Name and Relationship to Patient    ..................................................               ................................................  Date                                            Time    ..........................................................................................................................................  Reviewed by Signature/Title    ...................................................              ..............................................  Date                                                            Time

## 2017-10-18 NOTE — LETTER
Transition Communication Hand-off for Care Transitions to Next Level of Care Provider    Name: Sabina Figueroa  MRN #: [id number]  Primary Care Provider: Aidee Hemphill     Primary Clinic: [facility] [address] [place] [address]     Reason for Hospitalization:  Acute respiratory failure with hypoxia (H) [J96.01]  Admit Date/Time: 10/18/2017  1:02 AM  Discharge Date: 10/23/17  Payor Source: Payor: MEDICARE / Plan: MEDICARE / Product Type: Medicare /            Reason for Communication Hand-off Referral: continuation of care    Discharge Plan: home. Walk test performed at the hospital but patient did not qualify for home oxygen       Concern for non-adherence with plan of care:   Y/N n  Discharge Needs Assessment:      Already enrolled in Tele-monitoring program and name of program:    Follow-up specialty is recommended: Yes      Follow-up plan:  Future Appointments  Date Time Provider Department Center   10/26/2017 2:00 PM Jennifer Mane MD [facility]       Any outstanding tests or procedures:        Referrals     Future Labs/Procedures    Medication Therapy Management Referral     Comments:    Reason for referral:  on more than 10 medications and hospitalized or in the ED in the past 6 months  and takes rifaxamin or lactulose     This service is designed to help you get the most from your medications.  A specially trained pharmacist will work closely with you and your doctors  to solve any problems related to your medications and to help you get the   best results from taking them.      The Medication Therapy Management staff will call you to schedule an appointment.            Key Recommendations:      Barbara Delacruz    AVS/Discharge Summary is the source of truth; this is a helpful guide for improved communication of patient story

## 2017-10-18 NOTE — PHARMACY-ADMISSION MEDICATION HISTORY
Admission medication history interview status for this patient is complete. See [facility] admission navigator for allergy information, prior to admission medications and immunization status.     Medication history interview source(s):Patient  Medication history resources (including written lists, pill bottles, clinic record):None    Changes made to PTA medication list:  Added: none  Deleted: oxycodone, thiamine  Changed: none    Actions taken by pharmacist (provider contacted, etc):None     Additional medication history information:None    Medication reconciliation/reorder completed by provider prior to medication history? No    For patients on insulin therapy: no (Yes/No)   Lantus/levemir/NPH/Mix 70/30 dose: ___ in AM/PM or twice daily   Sliding scale Novolog Y/N   If Yes, do you have a baseline novolog pre-meal dose: ______units with meals   Patients eat three meals a day: Y/N ---  How many episodes of hypoglycemia (low blood glucose) do you have weekly: ---   How many missed doses do you have a week: ---  How many times do you check your blood glucose per day: ---  Any Barriers to therapy: cost of medications/comfortable with giving injections (if applicable)/ comfortable and confident with current diabetes regimen ---      Prior to Admission medications    Medication Sig Last Dose Taking? Auth Provider   Atorvastatin Calcium (LIPITOR PO) Take 20 mg by mouth At Bedtime 10/16/2017 at Unknown time Yes Unknown, Entered By History   Sulfamethoxazole-Trimethoprim (BACTRIM DS PO) Take by mouth three times a week Mon-Wed-Fri 10/16/2017 at Unknown time Yes Unknown, Entered By History   buprenorphine-naloxone (SUBOXONE) 8-2 MG SUBL sublingual tablet Place 3 tablets under the tongue daily 10/17/2017 at am Yes Amor Nelson MD   acetaminophen (TYLENOL) 325 MG tablet Take 2 tablets (650 mg) by mouth every 6 hours as needed for mild pain  Yes Amor Nelson MD   predniSONE (DELTASONE) 50 MG tablet Take 1 tablet (50 mg) by  mouth daily 10/17/2017 at am Yes Amor Nelson MD   insulin glargine (LANTUS) 100 UNIT/ML injection Inject 25 Units Subcutaneous every morning (before breakfast) Past Week at Unknown time Yes Amor Nelson MD   Pantoprazole Sodium (PROTONIX PO) Take 40 mg by mouth every morning (before breakfast) 10/17/2017 at am Yes Unknown, Entered By History   loratadine (CLARITIN) 10 MG tablet Take 10 mg by mouth daily 10/17/2017 at am Yes Unknown, Entered By History   MIRTAZAPINE PO Take 30 mg by mouth At Bedtime 10/16/2017 at Unknown time Yes Unknown, Entered By History   MELATONIN PO Take 5 mg by mouth At Bedtime 10/16/2017 at Unknown time Yes Unknown, Entered By History   furosemide (LASIX) 20 MG tablet Take 1 tablet (20 mg) by mouth daily 10/17/2017 at am Yes Jennifer Mane MD   spironolactone (ALDACTONE) 50 MG tablet Take 1 tablet (50 mg) by mouth daily 10/17/2017 at am Yes Josemanuel Damon MD   gabapentin (NEURONTIN) 600 MG tablet Take 1 tablet (600 mg) by mouth 3 times daily 10/17/2017 at am Yes Josemanuel Damon MD   ascorbic acid (VITAMIN C) 500 MG tablet Take 500 mg by mouth daily 10/17/2017 at am Yes Unknown, Entered By History   FLUoxetine (PROZAC) 40 MG capsule Take 40 mg by mouth daily 10/17/2017 at am Yes Unknown, Entered By History   hydrOXYzine (ATARAX) 10 MG tablet Take 10 mg by mouth every 8 hours as needed for itching  Yes Unknown, Entered By History   lactulose (CHRONULAC) 10 GM/15ML solution Take 20 g by mouth 3 times daily Past Month at Unknown time Yes Unknown, Entered By History   traZODone (DESYREL) 100 MG tablet Take 100 mg by mouth At Bedtime 10/16/2017 at Unknown time Yes Unknown, Entered By History   Omega-3 Fatty Acids (FISH OIL) 1200 MG CAPS Take 1 capsule by mouth daily Past Month at Unknown time Yes Unknown, Entered By History   methocarbamol (ROBAXIN) 500 MG tablet Take 1,000 mg by mouth 4 times daily 10/17/2017 at am Yes Unknown, Entered By History   aspirin EC 81 MG  EC tablet Take 1 tablet (81 mg) by mouth daily 10/17/2017 at am Yes Anatoliy Dos Santos,    rifaximin (XIFAXAN) 550 MG TABS tablet Take 1 tablet (550 mg) by mouth 2 times daily Past Week at Unknown time Yes Anatoliy Dos Santos,    ferrous sulfate (IRON) 325 (65 FE) MG tablet Take 325 mg by mouth daily (with breakfast) 10/17/2017 at am Yes Unknown, Entered By History   alum & mag hydroxide-simethicone (MYLANTA ES/MAALOX  ES) 400-400-40 MG/5ML SUSP Take 30 mLs by mouth every 4 hours as needed for indigestion Reported on 2/23/2017  Yes Reported, Patient   MULTIPLE VITAMINS PO Take 1 tablet by mouth daily 10/17/2017 at am Yes Reported, Patient   folic acid (FOLVITE) 1 MG tablet Take 1 tablet (1 mg) by mouth daily 10/17/2017 at am Yes Devin Peace MD   albuterol (PROAIR HFA, PROVENTIL HFA, VENTOLIN HFA) 108 (90 BASE) MCG/ACT inhaler Inhale 2 puffs into the lungs every 4 hours as needed for shortness of breath / dyspnea or wheezing Reported on 2/23/2017 More than a month at Unknown time  Reported, Patient

## 2017-10-18 NOTE — PLAN OF CARE
Problem: Patient Care Overview  Goal: Plan of Care/Patient Progress Review  Outcome: No Change  Admitted from [facility] for hypoxia/pneumonia.      Neuro- A&Ox4. PERRL. Lethargic but arouses easily.   CV- Afebrile. Sinus rohit 40s-60s. Occasional PACs. BP stable.  Resp- On 40% BiPAP. Lungs diminished throughout. Denies shortness of breath. Viral cultures sent. Needs sputum sample.  GI- Clear liquid diet. Stated she had been having diarrhea but no BM since admission.   - Voids to bathroom. Urine output adequate.   Ambulates with 1 assist to bathroom.     Plan for IV antibiotics. Will continue to monitor respiratory status.

## 2017-10-18 NOTE — CONSULTS
"[facility]    Pulmonary, Allergy, Critical Care and Sleep Medicine    Initial Consultation  10/18/2017    Sabina Figueroa MRN# [id number]   Age: 42 year old YOB: 1975     Date of Admission: 10/18/2017  Reason for Consultation: ILD and hypoxia  Requesting Team: Medicine    Primary care provider: Aidee Hemphill     Assessment and Recommendations:    Sabina Figueroa is a 42 year old female with a history of newly diagnosed ILD, asthma, alcoholic liver cirrhosis, ad HTN who presented on 10/18/2017 with hypoxia.      1. Interstitial Lung Disease: Unclear cause. VATS biopsies consistent with DIP, which is possible. She did have enlarged lymph nodes, and processes such as sarcoidosis could be considered as well. With rash on face, autoimmune processes should also be considered. I am concerned given time line of disease that this could be related to one of the chemicals in the \"banana split\" flavored electronic cigarettes she uses, which she started to use about 2 years ago. Could also be another ingestion or exposure (patient denies other drug use at this time)  -repeat vasculitis panel, ANCA, AMISHA  -check TSH  -Please check CRP, ESR, CK, compliment levels  -check a comprehensive (send out) toxicology screen    2. Acute hypoxic respiratory failure, concern for pneumonia, including PJP pneumonia: With time period off bactrim and distribution of infiltrates, PJP is a concern. She could also have a more typical bacterial pneumonia, with elevated WBC count and procalcitonin.   -Continue Vancomycin and Zosyn  -continue treatment dose bactrim with prednisone   -I have ordered sputum cytology (if able to produce sample) to test for PCP and malignancy    -follow sputum cultures (needs to be collected)  -May need bronchoscopy in next day or so if diagnosis remains elusive  -follow up 1,3 Beta-D glucan    Seen and discussed with Dr Capo Frost MD  Pulmonary and Critical " "Care Fellow   Pager [phone]    Chief Complaint and History of Present Illness:    CC:  Shortness of breath   HPI:   Ms. Figueroa is a 42 year old female with a history of newly diagnosed ILD, asthma, alcoholic liver cirrhosis, ad HTN who presented on 10/18/2017 with hypoxia. At presentation, she could only provide limited history, but reportedly was coughing with sputum production for about 1 day prior to admission, and was more short of breath. It is unclear how long she has been short of breath. She also complained of fever prior to admission. At admission, she was started on bactrim for possible PCP and vancomycin and zosyn for possible HCAP. Pulmonary was consulted for assistance.     She was admitted at [facility] from 9/14 to 9/20 for VATS biopsy of RUL, RML, and RLL done on 9/16. Prior to this she was at [facility] from 9/12 to 9/14. Biopsy showed ALI in background of DIP and NSIP like pattern. Prior to this, she had an episode of respiratory failure in January 2017 requiring intubation, and had transbronchial biopsies then, which showed organizing pneumonia. She has been on moderate doses of steroids since then. She has been hospitalized several times for respiratory failure since then as well. After the VATS biopsy, prednisone was increased from 35 mg daily to 50 mg daily. Unfortunately, she has not been taking ppx Bactrim due to insurance regions.     Ms. Figueroa states she developed a productive cough a few days before admission, associated with worsening of her chronic shortness of breath. She did have a fever, but no sweats or chills. She denies headache, weakness, skin changes, joint pain. He has no nausea, vomiting, dysuria, or diarrhea. No abdominal pain. She is tired. She states she continues to smoke cigarettes (about 1 ppd) and also uses \"banana split\" flavored e-cigs and has for about 2 years. She denies other exposures. She missed about 2 weeks of bactrim (until a few days before admission) " due to an insurance problem, but believes she has taken it since January consistently until these couple weeks off.     Review of Systems:  Complete 12 point ROS negative unless mentioned in HPI  Histories, Prior to Admission Medications, Allergies:    Past Medical History:  Past Medical History:   Diagnosis Date     ALC (alcoholic liver cirrhosis) (H)      Alcohol abuse      Anxiety      Arthritis      Ascites      Asthma      Bunion, left foot      Chronic low back pain     Narcotic agreement signed in [facility] system     Depression      Hypertension     current     ILD (interstitial lung disease) (H)      Ovarian cyst, left        Past Surgical History:  Past Surgical History:   Procedure Laterality Date     BRONCHOSCOPY FLEXIBLE N/A 1/10/2017    Procedure: BRONCHOSCOPY FLEXIBLE;  Surgeon: Jennifer Mane MD;  Location:  GI     BRONCHOSCOPY FLEXIBLE N/A 2017    Procedure: BRONCHOSCOPY FLEXIBLE;  Surgeon: Jennifer Mane MD;  Location:  OR      SECTION       ESOPHAGOSCOPY, GASTROSCOPY, DUODENOSCOPY (EGD), COMBINED  2014    Procedure: COMBINED ESOPHAGOSCOPY, GASTROSCOPY, DUODENOSCOPY (EGD);  Surgeon: Brittany Lowe MD;  Location:  GI     ESOPHAGOSCOPY, GASTROSCOPY, DUODENOSCOPY (EGD), COMBINED N/A 2015    Procedure: COMBINED ESOPHAGOSCOPY, GASTROSCOPY, DUODENOSCOPY (EGD);  Surgeon: London Lenz MD;  Location:  GI     ESOPHAGOSCOPY, GASTROSCOPY, DUODENOSCOPY (EGD), COMBINED N/A 2015    Procedure: COMBINED ESOPHAGOSCOPY, GASTROSCOPY, DUODENOSCOPY (EGD);  Surgeon: Barry Chavez MD;  Location:  GI     KNEE SURGERY      x3     THORACOSCOPIC BIOPSY LUNG Right 2017    Procedure: THORACOSCOPIC BIOPSY LUNG;  RIGHT VIDEO ASSISTED THORACOSCOPY; WEDGE RESECTIONS RIGHT UPPER LOBE LUNG, RIGHT MIDDLE LOBE LUNG, AND RIGHT LOWER LOBE LUNG;  Surgeon: Alonzo Baird MD;  Location:  OR       Past Social History:  Social History     Social History      Marital status: Single     Spouse name: N/A     Number of children: N/A     Years of education: N/A     Occupational History     Not on file.     Social History Main Topics     Smoking status: Current Every Day Smoker     Packs/day: 1.50     Years: 18.00     Types: Cigarettes     Smokeless tobacco: Former User     Quit date: 5/11/2016     Alcohol use 0.0 oz/week     0 Standard drinks or equivalent per week      Comment: 8 drinks or more each day, abstinent since 4/2/15. History of CD treatment in past x1     Drug use: No     Sexual activity: Not on file     Other Topics Concern     Not on file     Social History Narrative    Denies illicit IV or intranasal drug use    No tattoos or piercings     Family History:  Family History   Problem Relation Age of Onset     Depression Mother      Anxiety Disorder Mother      MENTAL ILLNESS Mother      Substance Abuse Mother      Depression Father      Anxiety Disorder Father      Substance Abuse Father      MENTAL ILLNESS Father      Depression Daughter      Depression Brother      Schizophrenia Brother      Depression Brother      Anxiety Disorder Brother      Substance Abuse Brother      Medications:    sulfamethoxazole-trimethoprim (BACTRIM/SEPTRA) intermittent infusion  400 mg Intravenous Q6H     piperacillin-tazobactam  4.5 g Intravenous Q6H     ascorbic acid  500 mg Oral Daily     aspirin  81 mg Oral Daily     atorvastatin (LIPITOR) tablet 20 mg  20 mg Oral At Bedtime     FLUoxetine  40 mg Oral Daily     folic acid  1 mg Oral Daily     ferrous sulfate  325 mg Oral Daily with breakfast     gabapentin  600 mg Oral TID     insulin glargine  15 Units Subcutaneous QAM AC     melatonin tablet 5 mg  5 mg Oral At Bedtime     multivitamin, therapeutic  1 tablet Oral Daily     predniSONE  50 mg Oral Daily     rifaximin  550 mg Oral BID     insulin aspart  1-7 Units Subcutaneous TID AC     insulin aspart  1-5 Units Subcutaneous At Bedtime     buprenorphine-naloxone  3 tablet  Sublingual Daily     lactulose  20 g Oral TID     [START ON 10/19/2017] pantoprazole (PROTONIX) EC tablet 40 mg  40 mg Oral QAM AC     naloxone, albuterol, glucose **OR** dextrose **OR** glucagon    Allergies:     Allergies   Allergen Reactions     Buprenorphine Other (See Comments)     Local reaction from patch  Patch-redness and itchiness     No Clinical Screening - See Comments Rash     Bupronide patch allergy       Physical Exam:    Temp:  [97.5  F (36.4  C)-99.7  F (37.6  C)] 97.5  F (36.4  C)  Pulse:  [125] 125  Heart Rate:  [] 43  Resp:  [7-36] 8  BP: ()/(59-91) 102/61  FiO2 (%):  [40 %-60 %] (P) 40 %  SpO2:  [65 %-100 %] 97 %    Intake/Output Summary (Last 24 hours) at 10/18/17 1046  Last data filed at 10/18/17 1000   Gross per 24 hour   Intake              655 ml   Output              800 ml   Net             -145 ml     General: laying in bed, initially very lethargic, on re-exam, much more awake and interactive.   HEENT: anicteric, dry mucosa. Erythematous rash on cheeks bilaterally and chin.   Neck: no palpable lymphadenopathy, no JVD noted  Chest: initially on high flow, later on oxymask at 4 LPM. Crackles bilaterally, worse at bases. Mild expiratory squeak on right. Mildly tender at right VATS surgical sites  Cardiac: RRR no murmurs  Abdomen: obese, soft, non tender, active BS  Extremities: No LE Edema  Neuro: initially very lethargic, not answering questions consistently. Later, A&Ox3, no focal deficits   Skin: erythematous rash on cheeks and chin. Scabs at VATS surgical sites on right chest.     Laboratory, imaging, and microbiologic data:    All laboratory and imaging data reviewed.    Notable for:   K 3.5 Cr 1.23. . Procal 0.13-->1.21. Na 131.   Trop negative.   ABG 7.34/54/110/29    WBC 17.8. Hgb 15.7. Plts 158.   D dimer 1.3    UA negative for infection.     13 BD Glucan in process  Strep pneumo and legionella antigens negative   Influenza swab negative.   RVP negative      10/18 CXR; Mild cardiomegaly and pulmonary vascular congestion. Suspect fluid overload or mild congestive heart failure.    10/18 US LE doppler: No evidence of deep venous thrombosis in either lower extremity    9/16/17 Echo: The visual ejection fraction is estimated at 60-65%. The right ventricle is normal in size and function.  Right ventricle systolic pressure estimate normal    5/2/17 CT chest PE study: 1. No visualized pulmonary embolism. 2. Extensive bilateral infiltrates, primarily alveolar,  have slightly increased with increasing upper lobe involvement. Pattern favors inflammatory etiology over infection or edema. UIP and alveolar proteinosis are possibilities. Moderate mediastinal adenopathy slightly increased and mild axillary adenopathy. 3. Mild emphysema.

## 2017-10-18 NOTE — PLAN OF CARE
Problem: Pneumonia (Adult)  Goal: Signs and Symptoms of Listed Potential Problems Will be Absent, Minimized or Managed (Pneumonia)  Signs and symptoms of listed potential problems will be absent, minimized or managed by discharge/transition of care (reference Pneumonia (Adult) CPG).   Outcome: Improving  Shift durration: 0700-1930     LOC: lethargic, oriented x4.    Neuro: Grossly intact. No noted deficits besides lethargy.  Pt appears to have slight twitching when moving, this has not affected transfer/ambulation.  Cards: Sinus rohit at rest, NSR with activity.  Pt not on vasoactive medications.  No change in condition with rohit rate.  BP's ~100's/60's, MAP's ~70's.  Pulm: Pt weaned from CPAP 10/5 40% to HFNC 40% (35L) then to oxi plus mask 5L today, pt de-sats slightly with slightly slow return to baseline but always returns to ~95%. LS clear, diminished in bases.  Per [place] 1, possible bronch tomorrow for lung/sputum specimen collection, pulm consulted.  GI/: adequate UOP using commode.  Pt has had no BM today, pt needs stool sample for C.Dif R/O.  Pt on full liquid diet d/t lethargy, will advance as lethargy clears.  Skin: intact, blotchy/flushed.  Mobility: A1  Vasc access: PIV x2  Special/Event:  Chest CT today.  Continue to monitor pt status and treat as indicated.

## 2017-10-18 NOTE — H&P
[facility], [place]    Internal Medicine History and Physical - [facility] Service       Date of Admission:  10/18/2017    Chief Complaint   SOB    History is obtained from chart review, patient able to provide only limited information due to somnolence.    History of Present Illness   Sabina Figueroa is a 42 year old female PMH newly diagnosed ILD, asthma, alcoholic liver cirrhosis,&  HTN who presented to the ED with hypoxic respiratory failure. Patient was lucid and answered yes or no questions but could not give a history to the admitting medical team upon her arrival the the unit. Per ED documentation, patient reported coughing & sputum production and one day of SOB. However, she told medical team on floor she had been having SOB for longer. She also said she had a fever the day of admission, no night sweats.     Per chart review, the patient was recently discharged from the [facility] and underwent wedge biopsy that revealed ILD. She has been on 50 mg prednisone for a number of weeks since her discharge but, due to lack of insurance coverage, has not been able to take the prophylactic Bactrim as prescribed. She also has not been taking rifaximin or lactulose. In addition to her prednisone, she has been taking suboxone and lasix.     Review of Systems   The 10 point Review of Systems is negative other than noted in the HPI or here.     Past Medical History    I have reviewed this patient's medical history and updated it with pertinent information if needed.   Past Medical History:   Diagnosis Date     ALC (alcoholic liver cirrhosis) (H)      Alcohol abuse      Anxiety      Arthritis      Ascites      Asthma      Bunion, left foot      Chronic low back pain     Narcotic agreement signed in [facility] system     Depression      Hypertension     current     ILD (interstitial lung disease) (H)      Ovarian cyst, left         Past Surgical History   I have reviewed this patient's surgical history  and updated it with pertinent information if needed.  Past Surgical History:   Procedure Laterality Date     BRONCHOSCOPY FLEXIBLE N/A 1/10/2017    Procedure: BRONCHOSCOPY FLEXIBLE;  Surgeon: Jennifer Mane MD;  Location:  GI     BRONCHOSCOPY FLEXIBLE N/A 2017    Procedure: BRONCHOSCOPY FLEXIBLE;  Surgeon: Jennifer Mane MD;  Location:  OR      SECTION       ESOPHAGOSCOPY, GASTROSCOPY, DUODENOSCOPY (EGD), COMBINED  2014    Procedure: COMBINED ESOPHAGOSCOPY, GASTROSCOPY, DUODENOSCOPY (EGD);  Surgeon: Brittany Lowe MD;  Location:  GI     ESOPHAGOSCOPY, GASTROSCOPY, DUODENOSCOPY (EGD), COMBINED N/A 2015    Procedure: COMBINED ESOPHAGOSCOPY, GASTROSCOPY, DUODENOSCOPY (EGD);  Surgeon: London Lenz MD;  Location:  GI     ESOPHAGOSCOPY, GASTROSCOPY, DUODENOSCOPY (EGD), COMBINED N/A 2015    Procedure: COMBINED ESOPHAGOSCOPY, GASTROSCOPY, DUODENOSCOPY (EGD);  Surgeon: Barry Chavez MD;  Location:  GI     KNEE SURGERY      x3     THORACOSCOPIC BIOPSY LUNG Right 2017    Procedure: THORACOSCOPIC BIOPSY LUNG;  RIGHT VIDEO ASSISTED THORACOSCOPY; WEDGE RESECTIONS RIGHT UPPER LOBE LUNG, RIGHT MIDDLE LOBE LUNG, AND RIGHT LOWER LOBE LUNG;  Surgeon: Alonzo Baird MD;  Location:  OR        Social History   Social History   Substance Use Topics     Smoking status: Current Every Day Smoker     Packs/day: 1.50     Years: 18.00     Types: Cigarettes     Smokeless tobacco: Former User     Quit date: 2016     Alcohol use 0.0 oz/week     0 Standard drinks or equivalent per week      Comment: 8 drinks or more each day, abstinent since 4/2/15. History of CD treatment in past x1       Family History   I have reviewed this patient's family history and updated it with pertinent information if needed.   Family History   Problem Relation Age of Onset     Depression Mother      Anxiety Disorder Mother      MENTAL ILLNESS Mother      Substance Abuse Mother       Depression Father      Anxiety Disorder Father      Substance Abuse Father      MENTAL ILLNESS Father      Depression Daughter      Depression Brother      Schizophrenia Brother      Depression Brother      Anxiety Disorder Brother      Substance Abuse Brother        Prior to Admission Medications   Prior to Admission Medications   Prescriptions Last Dose Informant Patient Reported? Taking?   Atorvastatin Calcium (LIPITOR PO)   Yes No   Sig: Take 20 mg by mouth At Bedtime   FLUoxetine (PROZAC) 40 MG capsule  Pharmacy Yes No   Sig: Take 40 mg by mouth daily   MELATONIN PO  Pharmacy Yes No   Sig: Take 5 mg by mouth At Bedtime   MIRTAZAPINE PO  Pharmacy Yes No   Sig: Take 30 mg by mouth At Bedtime   MULTIPLE VITAMINS PO  Pharmacy Yes No   Sig: Take 1 tablet by mouth daily   Omega-3 Fatty Acids (FISH OIL) 1200 MG CAPS  Pharmacy Yes No   Sig: Take 1 capsule by mouth daily   Pantoprazole Sodium (PROTONIX PO)  Pharmacy Yes No   Sig: Take 40 mg by mouth every morning (before breakfast)   Sulfamethoxazole-Trimethoprim (BACTRIM DS PO) Unknown at Unknown time  Yes No   Sig: Take by mouth three times a week Mon-Wed-Fri   acetaminophen (TYLENOL) 325 MG tablet More than a month at Unknown time  No No   Sig: Take 2 tablets (650 mg) by mouth every 6 hours as needed for mild pain   albuterol (PROAIR HFA, PROVENTIL HFA, VENTOLIN HFA) 108 (90 BASE) MCG/ACT inhaler Unknown at Unknown time Pharmacy Yes No   Sig: Inhale 2 puffs into the lungs every 4 hours as needed for shortness of breath / dyspnea or wheezing Reported on 2/23/2017   alum & mag hydroxide-simethicone (MYLANTA ES/MAALOX  ES) 400-400-40 MG/5ML SUSP Past Month at Unknown time Pharmacy Yes Yes   Sig: Take 30 mLs by mouth every 4 hours as needed for indigestion Reported on 2/23/2017   ascorbic acid (VITAMIN C) 500 MG tablet 10/17/2017 at 0800 Pharmacy Yes Yes   Sig: Take 500 mg by mouth daily   aspirin EC 81 MG EC tablet  Pharmacy No No   Sig: Take 1 tablet (81 mg) by mouth  daily   buprenorphine-naloxone (SUBOXONE) 8-2 MG SUBL sublingual tablet 10/17/2017 at 0800  No Yes   Sig: Place 3 tablets under the tongue daily   ferrous sulfate (IRON) 325 (65 FE) MG tablet  Pharmacy Yes No   Sig: Take 325 mg by mouth daily (with breakfast)   folic acid (FOLVITE) 1 MG tablet  Pharmacy No No   Sig: Take 1 tablet (1 mg) by mouth daily   furosemide (LASIX) 20 MG tablet 10/17/2017 at Unknown time Pharmacy No Yes   Sig: Take 1 tablet (20 mg) by mouth daily   gabapentin (NEURONTIN) 600 MG tablet  Pharmacy No No   Sig: Take 1 tablet (600 mg) by mouth 3 times daily   hydrOXYzine (ATARAX) 10 MG tablet  Pharmacy Yes No   Sig: Take 10 mg by mouth every 8 hours as needed for itching   insulin glargine (LANTUS) 100 UNIT/ML injection   No No   Sig: Inject 25 Units Subcutaneous every morning (before breakfast)   lactulose (CHRONULAC) 10 GM/15ML solution  Pharmacy Yes No   Sig: Take 20 g by mouth 3 times daily   loratadine (CLARITIN) 10 MG tablet  Pharmacy Yes No   Sig: Take 10 mg by mouth daily   methocarbamol (ROBAXIN) 500 MG tablet  Pharmacy Yes No   Sig: Take 1,000 mg by mouth 4 times daily   predniSONE (DELTASONE) 50 MG tablet 10/17/2017 at Unknown time  No Yes   Sig: Take 1 tablet (50 mg) by mouth daily   rifaximin (XIFAXAN) 550 MG TABS tablet Past Month at Unknown time Pharmacy No Yes   Sig: Take 1 tablet (550 mg) by mouth 2 times daily   spironolactone (ALDACTONE) 50 MG tablet  Pharmacy No No   Sig: Take 1 tablet (50 mg) by mouth daily   traZODone (DESYREL) 100 MG tablet  Pharmacy Yes No   Sig: Take 100 mg by mouth At Bedtime      Facility-Administered Medications: None     Allergies   Allergies   Allergen Reactions     Buprenorphine Other (See Comments)     Local reaction from patch  Patch-redness and itchiness     No Clinical Screening - See Comments Rash     Bupronide patch allergy       Physical Exam   Vital Signs: Temp: 97.9  F (36.6  C) Temp src: Axillary BP: 117/79   Heart Rate: 66 Resp: 18 SpO2:  100 % O2 Device: BiPAP/CPAP    Weight: 185 lbs 8 oz  General: female lying in bed with CPAP, arousable but only able to answer simple questions or yes/no questions  Head: Atraumatic.  Eyes: Pupils pinpoint on admisison, slightly larger on recheck several hours later, EMOI.  Mouth: Covered by CPAP  Neck: Supple  CVR: RRR, S1,2, murmur. Extremities wwp, cap refill <2 seconds.   Lungs: Diffuse decreased air movement, transmitted breath sounds from CPAP  Abdominal: BS +, no tenderness, guarding or rebound with palpation.   Skin: Warm and dry, no rashes or lesions.  Extremities: No peripheral edema.  Neuro: AOx4, but falling asleep within seconds of waking  Psych: Normal affect, answering questions appropriately. Calm, pleasant.    Assessment & Plan   Sabina Figueroa is a 42 year old female PMH newly diagnosed ILD with immunosuppression on prednisone, asthma, alcoholic liver cirrhosis,&  HTN who presented to the ED with hypoxic respiratory failure, and admitted for suspected PCP PNA with bat-winging on CXR and elevated LDH. Also concern for substance abuse component of somnolence and respiratory depression. Multiple co-morbidities to be managed as well.     PCP PNA  Immunosuppression  Acute Respiratory Hypoxia  Hypercapnea  Procal only 1.21. Some mild bat-winging on CXR. LDH was elevated, also characteristic for PCP. Patient is immunosuppressed but not taking ppx Bactrim to prevent PCP. Could also consider Legionella antigen in immunosuppressed patient with PNA and mild hyponatremia. Patient being treated for HCAP, consider recent hospital stay, but could consider narrowing now or after culture returns. There's also a concern for component of respiratory depression from substance use in patient with pin-point pupils on admission and somnolence. UDS was negative, but suboxone detection not included in that panel. Negative RVP.   -Bactrim 400 mg q6h, for PCP tx  -Vanc  -Zosyn 4.5g q6h  -Sputum cx and gram stain  -Trend  CBC  -US LE, bilateral  -could consider suboxone testing  -F/u Fungal studies  -Pulmonology consult    ILD  -Prednisone 50 mg daily    Hyponatremia, mild  -BMP, recheck    Hepatic Cirrhosis  -Rifaximin  -Holding Lactulose until after C. Diff results    Somnolence  -holding suboxone   -monitor for signs of withdrawal & restart   -holding sedating medications- mirtazapine, hydroxyzine      Depression  Not sure if patient is taking this medication. Monitor for negative outcome of giving this medication.   -PTA fluoxetine    Asthma  -PTA Albuterol    DM  -Lantus 15U    HTN  -holding PTA lasix and spironolactone for concern for sepsis & HTN    C. Diff infection  -Enteric Isolation  -C. Diff testing    Neuropathy  -PTA Gabapentin    Diet: Advance Diet as Tolerated: Clear Liquid Diet  Fluids: none currently  DVT Prophylaxis: Pneumatic Compression Devices  Code Status: Full Code    Disposition Plan   Expected discharge: 4 - 7 days; recommended to prior living arrangement once on RA and returned to baseline.      Sarah THOMPSON [facility]    Pager: [phone].     Please see sticky note for cross cover information.      Data   Data     Recent Labs  Lab 10/17/17  2025 10/17/17  2015   WBC  --  17.8*   HGB  --  15.7   MCV  --  90   PLT  --  158   NA  --  131*   POTASSIUM  --  3.5   CHLORIDE  --  93*   CO2  --  29   BUN  --  19   CR  --  1.23*   ANIONGAP  --  9   VISHNU  --  9.3   GLC  --  128*   ALBUMIN  --  3.9   PROTTOTAL  --  7.9   BILITOTAL  --  0.5   ALKPHOS  --  119   ALT  --  42   AST  --  35   TROPI  --  <0.015   TROPONIN 0.01  --      Recent Results (from the past 24 hour(s))   Chest  XR, 1 view PORTABLE    Narrative    XR CHEST PORT 1 VW  10/17/2017 8:25 PM     HISTORY:  Low Stats    COMPARISON: Film dated 9/20/2017    FINDINGS:  Cardiac silhouette is mildly prominent. There is mild  pulmonary vascular congestion. Surgical suture line is seen in the  right midlung  zone.      Impression    IMPRESSION: Mild cardiomegaly and pulmonary vascular congestion.  Suspect fluid overload or mild congestive heart failure.    DAYSI LORENZO MD   POC US ECHO LIMITED    Narrative    PROCEDURE: Point of care bedside Cardiac US  PERFORMED BY: Dr. Benedict Bocanegra  INDICATIONS/SYMPTOM: Chest pain.  Concern for pericardial fluid.  PROBE: Phased array cardiac transducer.   BODY LOCATION: The US was performed in the sub-xiphoid location and/or parasternal views.  FINDINGS: No evidence of pericardial effusion.  INTERPRETATION: There was no pericardial effusion.    IMAGE DOCUMENTATION: Images were archived to the US hard drive, and archived to PACS system.           ATTENDING ATTESTATION  Patient seen, examined and discussed with housestaff or advanced practice provider. I agree with the key elements of findings and plan described above, with pertinent additions, amendments and/or updates as included in text below.     Ms. Figueroa presented with progressive cough, sputum production and shortness of breath over the last 2 weeks, per history provided this morning. She required BiPAP 10/5 with FiO2 as high as 0.6 overnight. Oxygen has now been weaned to HFNC at 0.4. Chart review reveals admission in September for acute respiratory failure attributed to unspecified ULD and possible . Pathology from VATS wedge biopsy showed changes consistent with desquamative interstitial pneumonitis, NSIP. Discharge summary from Feb 2017 shows that she has had recurrent hospitalizations (3x in 8 months as of Feb) for hypoxic resp failure. It appears she has been on prednisone at relatively high doses since that time.     Pertinent lab eval of ILD:  AMISHA negative  RF negative  SSa abd SSb negative.  Wilda-1 negative  Scl Abs negative  HIV last checked 2015    Exam:  Cushingoid faces and fat pad. Drowsy but awakens to voice. Answers questions appropriately and provides linear history. Pupils symmetric, not pinpoint, sluggishly  reactive. Cor distant, regular, bradycardic, without murmurs. Coarse crackles over anterior lung naidu, R>L. Rhonchi at BL bases. Hands without stigmata of RA or scleroderma or antisynthetase syndrome.     WBC 18k  Procal 1.2    Strep pneumo and legionella negative    CXR shows mixed alveolar and interstitial infils, most predominant in RML and RLL. (my read)  EKG shows sinus bradycardia with PAC.    - Acute hypercarbic and hypoxemic resp failure: Based on exam and stable hemodynamics at presentation, she's been taking prednisone at home. Recurrent non-infectious pneumonia in the absence of auto-immune markers raises suspicion for hypersensitivity pneumonitis. (Trigger unclear). Unclear if she was taking TMP-SMX, so PCP pneumonia is also on ddx. Cough and lung sounds point to alveolar pathology, but d-dimer is high and new PE can't be excluded based on negative LE ultrasound. HCAP (given recent hospitalizations) also plausible. Active plans for today  - Recheck VBG off BiPAP. Oxygenation adequate on HFNC.  - Wean oxygen as tolerated  - HCAP coverage with pip-tazo  - Levofloxacin to provide 2nd anti-pseudomonal agent, as well as atypical coverage.   - TMP-SMX for PCP coverage  - CTA of pulm arteries. Will also help define lung parenchyma pathology  - Pulm consult for management of immunosuppression and possible bronch with BAL    - Drowsiness: Tox screen negative and ammonia normal. May be due to mild increase in pCO2. Her home buprenorphine is combined with naloxone (Suboxone), so less likely that this would be contributing. Will monitor as pCO2 improves.    - Sinus bradycardia: HR augments when awake and when moving. All EKGs show sinus rhythm without ischemic change. Suspect high vagal tone, though buprenorphine can also contribute. BP has been stable throughout.     Francisco Santillan MD  [facility]

## 2017-10-18 NOTE — IP AVS SNAPSHOT
MRN:[id number]                      After Visit Summary   10/18/2017    Sabina Figueroa    MRN: [id number]           Thank you!     Thank you for choosing [facility] for your care. Our goal is always to provide you with excellent care. Hearing back from our patients is one way we can continue to improve our services. Please take a few minutes to complete the written survey that you may receive in the mail after you visit with us. Thank you!        Patient Information     Date Of Birth          1975        Designated Caregiver       Most Recent Value    Caregiver    Will someone help with your care after discharge? no      About your hospital stay     You were admitted on:  October 18, 2017 You last received care in the:  [address] [facility]    You were discharged on:  October 23, 2017        Reason for your hospital stay       You were admitted with acute respiratory failure thought to be related to your recent e-cigarette use. You should stop smoking cigarettes and use the nicotine replacement therapies prescribed. You should continue taking bactrim and prednisone as prescribed. You should follow up with your primary care physician within 1 week. You should follow up with pulmonary in clinic in 2-4 weeks and follow up with endocrinology in 1 week.                  Who to Call     For medical emergencies, please call 911.  For non-urgent questions about your medical care, please call your primary care provider or clinic, [phone]          Attending Provider     Provider Specialty    Lalo Baird MD Internal Medicine       Primary Care Provider Office Phone # Fax #    Aidee Hemphill [phone]       When to contact your care team       Call your primary doctor if you have any of the following: fever, chest pain, shortness of breath, dizziness, lightheadedness.                  After Care Instructions     Activity       Your activity upon discharge: activity as  tolerated            Diet       Follow this diet upon discharge:       Consistent Carbohydrate Diet 2089-2127 Calories: Moderate Consistent CHO (4-6 CHO units/meal)                  Follow-up Appointments     Adult [facility] Follow-up and recommended labs and tests       Follow up with primary care provider, Aidee Hemphill, within 7 days to evaluate medication change and for hospital follow- up.  No follow up labs or test are needed.    Follow up with Pulmonary in clinic, Dr. Mane, within 2-4 weeks.  No follow up labs or test are needed.   Follow up with outpatient endocrinology for insulin management within 5-10 days for insulin adjustments during prednisone taper.    Appointments on [place] [facility] (with [facility] or [facility] provider or service). Call [phone] if you haven't heard regarding these appointments within 7 days of discharge.                  Your next 10 appointments already scheduled     Oct 26, 2017  2:00 PM CDT   (Arrive by 1:45 PM)   Return Visit with Jennifer Mane MD   [facility]    [address] [place] [id number] [phone]              Additional Services     Medication Therapy Management Referral       Reason for referral:  on more than 10 medications and hospitalized or in the ED in the past 6 months  and takes rifaxamin or lactulose     This service is designed to help you get the most from your medications.  A specially trained pharmacist will work closely with you and your doctors  to solve any problems related to your medications and to help you get the   best results from taking them.      The Medication Therapy Management staff will call you to schedule an appointment.                  Further instructions from your care team       You were already scheduled for an appointment Tomorrow, Tuesday 10/24 at 2:40 pm with your Primary Care Provider, Dr. Aidee Hemphill.  Please bring your insurance card, ID, and these discharge papers with you to this appointment.  [facility]  [address] [place] [address]  Phone #: [phone]      Insulin plan for home:  While on prednisone 40mg daily:  - Do not take Basaglar insulin  - Take NPH insulin (Humulin N or Novolin N) 20 units every morning with prednisone dose (around 8am)  - Take NPH insulin 5 units every evening (12 hours after morning dose, ~8pm)  - Novolog with meals: 6 units at start of meal.  Novolog for 12oz can of Dr. Pepper: 5 units.  -Novolog for correction based on the following sliding scales (this can be added to your meal Novolog dose at lunch and supper):    Sliding Scale for blood sugars checked in the morning and before lunch and supper:  Do Not give Correction Insulin if Pre-Meal BG less than 140.   For Pre-Meal  - 164 give 1 unit.   For Pre-Meal  - 189 give 2 units.   For Pre-Meal  - 214 give 3 units.   For Pre-Meal  - 239 give 4 units.   For Pre-Meal  - 264 give 5 units.   For Pre-Meal  - 289 give 6 units.   For Pre-Meal  - 314 give 7 units.   For Pre-Meal  - 339 give 8 units.   For Pre-Meal  - 364 give 9 units.   For Pre-Meal BG greater than or equal to 365 give 10 units    Sliding Scale for blood sugars checked at bedtime:  Do Not give Bedtime Correction Insulin if BG less than 200.   For  - 224 give 1 units.   For  - 249 give 2 units.   For  - 274 give 3 units.   For  - 299 give 4 units.   For  - 324 give 5 units.   For  - 349 give 6 units.   For BG greater than or equal to 350 give 7 units.     -Check your blood sugar in the morning, before lunch, before supper, at bedtime, and for tonight please set alarm and check blood sugar at 2am given that we are starting a new dose of insulin tonight.    Insulin plan while on prednisone 30mg daily  -Morning NPH dose: decrease to 16 units every morning-  "take with prednisone dose  -Evening NPH dose: continue 5 units every evening  -Novolog with meals: 5 units with each meal. For can of Dr. Pepper: 4 units.  -Continue same sliding scales as listed above.    Recommend you be seen by an endocrinologist to help adjust insulin doses with your steroid taper within 5-10 days. For [facility] nurse: [phone], to schedule your outpatient diabetes appointment.      Pending Results     No orders found from 10/16/2017 to 10/19/2017.            Statement of Approval     Ordered          10/23/17 1351  I have reviewed and agree with all the recommendations and orders detailed in this document.  EFFECTIVE NOW     Approved and electronically signed by:  Mireya Rosa MD           10/20/17 2260  I have reviewed and agree with all the recommendations and orders detailed in this document.  EFFECTIVE NOW     Approved and electronically signed by:  Candido Snow MD             Admission Information     Date & Time Department Dept. Phone    10/18/2017 [address] [facility] [phone]      Your Vitals Were     Blood Pressure Pulse Temperature Respirations Weight Pulse Oximetry    101/66 (BP Location: Left arm) 71 96.3  F (35.7  C) (Oral) 18 83.4 kg (183 lb 12.8 oz) 95%    BMI (Body Mass Index)                   28.79 kg/m2           [employer] Information     [employer] lets you send messages to your doctor, view your test results, renew your prescriptions, schedule appointments and more. To sign up, go to [email or web address] . Click on \"Log in\" on the left side of the screen, which will take you to the Welcome page. Then click on \"Sign up Now\" on the right side of the page.     You will be asked to enter the access code listed below, as well as some personal information. Please follow the directions to create your username and password.     Your access code is: [email or web address]  Expires: 2018  6:30 AM     Your access code will  in 90 " days. If you need help or a new code, please call your [facility] clinic or [phone].        Care EveryWhere ID     This is your Care EveryWhere ID. This could be used by other organizations to access your [facility] medical records  [id number]        Equal Access to Services     GURINDERTIFFANY ROD : Hadii sam curry apushaista Sopennyali, waaxda luqadaha, qaybta kaalmada adeegyada, waxay idiin hayjosefaraudel earl susanania segundo. So [facility] [phone].    ATENCIÓN: Si habla español, tiene a louie disposición servicios gratuitos de asistencia lingüística. Llame al [phone].    We comply with applicable federal civil rights laws and [place] laws. We do not discriminate on the basis of race, color, national origin, age, disability, sex, sexual orientation, or gender identity.               Review of your medicines      START taking        Dose / Directions    insulin aspart 100 UNIT/ML injection   Commonly known as:  NovoLOG PEN        Dose:  6 Units   Inject 6 Units Subcutaneous 3 times daily (with meals) . Inject 5u with can of Dr. Pepper.   Quantity:  3 mL   Refills:  1       * insulin isophane human 100 UNIT/ML injection   Commonly known as:  HumuLIN N PEN        Dose:  5 Units   Inject 5 Units Subcutaneous every evening   Quantity:  3 mL   Refills:  1       * insulin isophane human 100 UNIT/ML injection   Commonly known as:  HumuLIN N PEN        Dose:  20 Units   Start taking on:  10/24/2017   Inject 20 Units Subcutaneous every morning Decrease to 16 units every morning when prednisone tapers to 30mg per day   Quantity:  3 mL   Refills:  1       * nicotine 21 MG/24HR 24 hr patch   Commonly known as:  NICODERM CQ   Used for:  Cigarette nicotine dependence with other nicotine-induced disorder        Dose:  1 patch   Place 1 patch onto the skin daily   Quantity:  30 patch   Refills:  1       * nicotine 10 MG Inhaler   Commonly known as:  NICOTROL   Used for:  Cigarette nicotine dependence with other nicotine-induced disorder         Dose:  1 Cartridge   Inhale 1 Cartridge into the lungs every hour as needed for smoking cessation   Quantity:  180 each   Refills:  1       nicotine polacrilex 2 MG gum   Commonly known as:  NICORETTE   Used for:  Cigarette nicotine dependence with other nicotine-induced disorder        Dose:  2 mg   Place 1 each (2 mg) inside cheek every hour as needed for smoking cessation   Quantity:  30 tablet   Refills:  1       * Notice:  This list has 4 medication(s) that are the same as other medications prescribed for you. Read the directions carefully, and ask your doctor or other care provider to review them with you.      CONTINUE these medicines which may have CHANGED, or have new prescriptions. If we are uncertain of the size of tablets/capsules you have at home, strength may be listed as something that might have changed.        Dose / Directions    * predniSONE 20 MG tablet   Commonly known as:  DELTASONE   This may have changed:    - medication strength  - how much to take   Used for:  Pneumonia of both lungs due to infectious organism, unspecified part of lung        Dose:  40 mg   Start taking on:  10/24/2017   Take 2 tablets (40 mg) by mouth daily   Quantity:  8 tablet   Refills:  0       * predniSONE 10 MG tablet   Commonly known as:  DELTASONE   This may have changed:  You were already taking a medication with the same name, and this prescription was added. Make sure you understand how and when to take each.   Used for:  Pneumonia of both lungs due to infectious organism, unspecified part of lung        Dose:  30 mg   Start taking on:  10/28/2017   Take 3 tablets (30 mg) by mouth daily   Quantity:  15 tablet   Refills:  0       * predniSONE 20 MG tablet   Commonly known as:  DELTASONE   This may have changed:  You were already taking a medication with the same name, and this prescription was added. Make sure you understand how and when to take each.   Used for:  Pneumonia of both lungs due to infectious  organism, unspecified part of lung        Dose:  20 mg   Start taking on:  11/2/2017   Take 1 tablet (20 mg) by mouth daily   Quantity:  30 tablet   Refills:  1       sulfamethoxazole-trimethoprim 800-160 MG per tablet   Commonly known as:  BACTRIM DS/SEPTRA DS   Indication:  PJP treatment   This may have changed:    - how much to take  - when to take this  - additional instructions   Used for:  Pneumonia of both lungs due to infectious organism, unspecified part of lung        Dose:  2 tablet   Take 2 tablets by mouth 3 times daily   Quantity:  108 tablet   Refills:  0       * Notice:  This list has 3 medication(s) that are the same as other medications prescribed for you. Read the directions carefully, and ask your doctor or other care provider to review them with you.      CONTINUE these medicines which have NOT CHANGED        Dose / Directions    albuterol 108 (90 BASE) MCG/ACT Inhaler   Commonly known as:  PROAIR HFA/PROVENTIL HFA/VENTOLIN HFA        Dose:  2 puff   Inhale 2 puffs into the lungs every 4 hours as needed for shortness of breath / dyspnea or wheezing Reported on 2/23/2017   Refills:  0       alum & mag hydroxide-simethicone 400-400-40 MG/5ML Susp suspension   Commonly known as:  MYLANTA ES/MAALOX  ES        Dose:  30 mL   Take 30 mLs by mouth every 4 hours as needed for indigestion Reported on 2/23/2017   Refills:  0       ascorbic acid 500 MG tablet   Commonly known as:  VITAMIN C        Dose:  500 mg   Take 500 mg by mouth daily   Refills:  0       aspirin 81 MG EC tablet   Used for:  Elevated troponin I level        Dose:  81 mg   Take 1 tablet (81 mg) by mouth daily   Quantity:  30 tablet   Refills:  1       buprenorphine-naloxone 8-2 MG Subl sublingual tablet   Commonly known as:  SUBOXONE   Used for:  Other chronic pain        Dose:  3 tablet   Place 3 tablets under the tongue daily   Quantity:  60 each   Refills:  0       ferrous sulfate 325 (65 FE) MG tablet   Commonly known as:  IRON         Dose:  325 mg   Take 325 mg by mouth daily (with breakfast)   Refills:  0       fish Oil 1200 MG capsule        Dose:  1 capsule   Take 1 capsule by mouth daily   Refills:  0       FLUoxetine 40 MG capsule   Commonly known as:  PROzac        Dose:  40 mg   Take 40 mg by mouth daily   Refills:  0       folic acid 1 MG tablet   Commonly known as:  FOLVITE   Used for:  Alcoholic hepatitis, Alcohol dependence (H)        Dose:  1 mg   Take 1 tablet (1 mg) by mouth daily   Quantity:  30 tablet   Refills:  0       furosemide 20 MG tablet   Commonly known as:  LASIX   Used for:  Organizing pneumonia (H)        Dose:  20 mg   Take 1 tablet (20 mg) by mouth daily   Quantity:  30 tablet   Refills:  3       gabapentin 600 MG tablet   Commonly known as:  NEURONTIN        Dose:  600 mg   Take 1 tablet (600 mg) by mouth 3 times daily   Quantity:  9 tablet   Refills:  0       hydrOXYzine 10 MG tablet   Commonly known as:  ATARAX        Dose:  10 mg   Take 10 mg by mouth every 8 hours as needed for itching   Refills:  0       lactulose 10 GM/15ML solution   Commonly known as:  CHRONULAC        Dose:  20 g   Take 20 g by mouth 3 times daily   Refills:  0       LIPITOR PO        Dose:  20 mg   Take 20 mg by mouth At Bedtime   Refills:  0       loratadine 10 MG tablet   Commonly known as:  CLARITIN        Dose:  10 mg   Take 10 mg by mouth daily   Refills:  0       MELATONIN PO        Dose:  5 mg   Take 5 mg by mouth At Bedtime   Refills:  0       MIRTAZAPINE PO        Dose:  30 mg   Take 30 mg by mouth At Bedtime   Refills:  0       MULTIPLE VITAMINS PO   Used for:  Alcoholic hepatitis        Dose:  1 tablet   Take 1 tablet by mouth daily   Refills:  0       PROTONIX PO        Dose:  40 mg   Take 40 mg by mouth every morning (before breakfast)   Refills:  0       rifaximin 550 MG Tabs tablet   Commonly known as:  XIFAXAN   Used for:  Other ascites        Dose:  550 mg   Take 1 tablet (550 mg) by mouth 2 times daily   Quantity:  60  tablet   Refills:  0       ROBAXIN 500 MG tablet   Generic drug:  methocarbamol        Dose:  1000 mg   Take 1,000 mg by mouth 4 times daily   Refills:  0       spironolactone 50 MG tablet   Commonly known as:  ALDACTONE        Dose:  50 mg   Take 1 tablet (50 mg) by mouth daily   Quantity:  3 tablet   Refills:  0       traZODone 100 MG tablet   Commonly known as:  DESYREL        Dose:  100 mg   Take 100 mg by mouth At Bedtime   Refills:  0         STOP taking     insulin glargine 100 UNIT/ML injection   Commonly known as:  LANTUS                Where to get your medicines      These medications were sent to [id number] - [place], [place] - [address] [address]  [address] [facility] [address]     Phone:  [phone]     insulin aspart 100 UNIT/ML injection    insulin isophane human 100 UNIT/ML injection    nicotine 10 MG Inhaler    nicotine 21 MG/24HR 24 hr patch    nicotine polacrilex 2 MG gum    predniSONE 10 MG tablet    predniSONE 20 MG tablet    predniSONE 20 MG tablet    sulfamethoxazole-trimethoprim 800-160 MG per tablet         Some of these will need a paper prescription and others can be bought over the counter. Ask your nurse if you have questions.     Bring a paper prescription for each of these medications     insulin isophane human 100 UNIT/ML injection               ANTIBIOTIC INSTRUCTION     You've Been Prescribed an Antibiotic - Now What?  Your healthcare team thinks that you or your loved one might have an infection. Some infections can be treated with antibiotics, which are powerful, life-saving drugs. Like all medications, antibiotics have side effects and should only be used when necessary. There are some important things you should know about your antibiotic treatment.      Your healthcare team may run tests before you start taking an antibiotic.    Your team may take samples (e.g., from your blood, urine or other areas) to run tests to look for bacteria. These test can be important to  determine if you need an antibiotic at all and, if you do, which antibiotic will work best.      Within a few days, your healthcare team might change or even stop your antibiotic.    Your team may start you on an antibiotic while they are working to find out what is making you sick.    Your team might change your antibiotic because test results show that a different antibiotic would be better to treat your infection.    In some cases, once your team has more information, they learn that you do not need an antibiotic at all. They may find out that you don't have an infection, or that the antibiotic you're taking won't work against your infection. For example, an infection caused by a virus can't be treated with antibiotics. Staying on an antibiotic when you don't need it is more likely to be harmful than helpful.      You may experience side effects from your antibiotic.    Like all medications, antibiotics have side effects. Some of these can be serious.    Let you healthcare team know if you have any known allergies when you are admitted to the hospital.    One significant side effect of nearly all antibiotics is the risk of severe and sometimes deadly diarrhea caused by Clostridium difficile (C. Difficile). This occurs when a person takes antibiotics because some good germs are destroyed. Antibiotic use allows C. diificile to take over, putting patients at high risk for this serious infection.    As a patient or caregiver, it is important to understand your or your loved one's antibiotic treatment. It is especially important for caregivers to speak up when patients can't speak for themselves. Here are some important questions to ask your healthcare team.    What infection is this antibiotic treating and how do you know I have that infection?    What side effects might occur from this antibiotic?    How long will I need to take this antibiotic?    Is it safe to take this antibiotic with other medications or  supplements (e.g., vitamins) that I am taking?     Are there any special directions I need to know about taking this antibiotic? For example, should I take it with food?    How will I be monitored to know whether my infection is responding to the antibiotic?    What tests may help to make sure the right antibiotic is prescribed for me?      Information provided by:  www.cdc.gov/getsmart  U.S. Department of Health and Human Services  Centers for disease Control and Prevention  National Center for Emerging and Zoonotic Infectious Diseases  Division of Healthcare Quality Promotion         Protect others around you: Learn how to safely use, store and throw away your medicines at www.disposemymeds.org.             Medication List: This is a list of all your medications and when to take them. Check marks below indicate your daily home schedule. Keep this list as a reference.      Medications           Morning Afternoon Evening Bedtime As Needed    albuterol 108 (90 BASE) MCG/ACT Inhaler   Commonly known as:  PROAIR HFA/PROVENTIL HFA/VENTOLIN HFA   Inhale 2 puffs into the lungs every 4 hours as needed for shortness of breath / dyspnea or wheezing Reported on 2/23/2017                                alum & mag hydroxide-simethicone 400-400-40 MG/5ML Susp suspension   Commonly known as:  MYLANTA ES/MAALOX  ES   Take 30 mLs by mouth every 4 hours as needed for indigestion Reported on 2/23/2017                                ascorbic acid 500 MG tablet   Commonly known as:  VITAMIN C   Take 500 mg by mouth daily   Last time this was given:  500 mg on 10/23/2017  7:48 AM                                aspirin 81 MG EC tablet   Take 1 tablet (81 mg) by mouth daily   Last time this was given:  81 mg on 10/23/2017  7:47 AM                                buprenorphine-naloxone 8-2 MG Subl sublingual tablet   Commonly known as:  SUBOXONE   Place 3 tablets under the tongue daily                                ferrous sulfate 325 (65  FE) MG tablet   Commonly known as:  IRON   Take 325 mg by mouth daily (with breakfast)   Last time this was given:  325 mg on 10/23/2017  7:48 AM                                fish Oil 1200 MG capsule   Take 1 capsule by mouth daily                                FLUoxetine 40 MG capsule   Commonly known as:  PROzac   Take 40 mg by mouth daily   Last time this was given:  40 mg on 10/23/2017  7:48 AM                                folic acid 1 MG tablet   Commonly known as:  FOLVITE   Take 1 tablet (1 mg) by mouth daily   Last time this was given:  1 mg on 10/23/2017  7:47 AM                                furosemide 20 MG tablet   Commonly known as:  LASIX   Take 1 tablet (20 mg) by mouth daily                                gabapentin 600 MG tablet   Commonly known as:  NEURONTIN   Take 1 tablet (600 mg) by mouth 3 times daily   Last time this was given:  600 mg on 10/23/2017  7:48 AM                                hydrOXYzine 10 MG tablet   Commonly known as:  ATARAX   Take 10 mg by mouth every 8 hours as needed for itching                                insulin aspart 100 UNIT/ML injection   Commonly known as:  NovoLOG PEN   Inject 6 Units Subcutaneous 3 times daily (with meals) . Inject 5u with can of Dr. Pepper.   Last time this was given:  3 Units on 10/23/2017 11:58 AM                                * insulin isophane human 100 UNIT/ML injection   Commonly known as:  HumuLIN N PEN   Inject 5 Units Subcutaneous every evening   Last time this was given:  20 Units on 10/23/2017  7:49 AM                                * insulin isophane human 100 UNIT/ML injection   Commonly known as:  HumuLIN N PEN   Inject 20 Units Subcutaneous every morning Decrease to 16 units every morning when prednisone tapers to 30mg per day   Start taking on:  10/24/2017   Last time this was given:  20 Units on 10/23/2017  7:49 AM                                lactulose 10 GM/15ML solution   Commonly known as:  CHRONULAC   Take 20 g  by mouth 3 times daily   Last time this was given:  20 g on 10/23/2017  7:48 AM                                LIPITOR PO   Take 20 mg by mouth At Bedtime   Last time this was given:  20 mg on 10/22/2017  9:48 PM                                loratadine 10 MG tablet   Commonly known as:  CLARITIN   Take 10 mg by mouth daily                                MELATONIN PO   Take 5 mg by mouth At Bedtime   Last time this was given:  5 mg on 10/22/2017  9:48 PM                                MIRTAZAPINE PO   Take 30 mg by mouth At Bedtime   Last time this was given:  30 mg on 10/22/2017  9:48 PM                                MULTIPLE VITAMINS PO   Take 1 tablet by mouth daily   Last time this was given:  1 tablet on 10/23/2017  7:48 AM                                * nicotine 21 MG/24HR 24 hr patch   Commonly known as:  NICODERM CQ   Place 1 patch onto the skin daily   Last time this was given:  1 patch on 10/23/2017  8:04 AM                                * nicotine 10 MG Inhaler   Commonly known as:  NICOTROL   Inhale 1 Cartridge into the lungs every hour as needed for smoking cessation   Last time this was given:  1 Cartridge on 10/22/2017  2:29 PM                                nicotine polacrilex 2 MG gum   Commonly known as:  NICORETTE   Place 1 each (2 mg) inside cheek every hour as needed for smoking cessation   Last time this was given:  2 mg on 10/23/2017  1:12 PM                                * predniSONE 20 MG tablet   Commonly known as:  DELTASONE   Take 2 tablets (40 mg) by mouth daily   Start taking on:  10/24/2017   Last time this was given:  40 mg on 10/23/2017  7:47 AM                                * predniSONE 10 MG tablet   Commonly known as:  DELTASONE   Take 3 tablets (30 mg) by mouth daily   Start taking on:  10/28/2017   Last time this was given:  40 mg on 10/23/2017  7:47 AM                                * predniSONE 20 MG tablet   Commonly known as:  DELTASONE   Take 1 tablet (20 mg) by  mouth daily   Start taking on:  11/2/2017   Last time this was given:  40 mg on 10/23/2017  7:47 AM                                PROTONIX PO   Take 40 mg by mouth every morning (before breakfast)   Last time this was given:  40 mg on 10/23/2017  7:48 AM                                rifaximin 550 MG Tabs tablet   Commonly known as:  XIFAXAN   Take 1 tablet (550 mg) by mouth 2 times daily   Last time this was given:  550 mg on 10/23/2017  7:48 AM                                ROBAXIN 500 MG tablet   Take 1,000 mg by mouth 4 times daily   Last time this was given:  500 mg on 10/23/2017 12:00 PM   Generic drug:  methocarbamol                                spironolactone 50 MG tablet   Commonly known as:  ALDACTONE   Take 1 tablet (50 mg) by mouth daily                                sulfamethoxazole-trimethoprim 800-160 MG per tablet   Commonly known as:  BACTRIM DS/SEPTRA DS   Take 2 tablets by mouth 3 times daily   Last time this was given:  2 tablets on 10/23/2017  7:48 AM                                traZODone 100 MG tablet   Commonly known as:  DESYREL   Take 100 mg by mouth At Bedtime   Last time this was given:  100 mg on 10/22/2017  9:48 PM                                * Notice:  This list has 7 medication(s) that are the same as other medications prescribed for you. Read the directions carefully, and ask your doctor or other care provider to review them with you.

## 2017-10-18 NOTE — ED NOTES
MD at bedside to tell pt that she will be transferred to the [facility] for further care by a pulmonologist.  Pt does report decreased work of breathing being on the bi-pap machine.

## 2017-10-18 NOTE — ED NOTES
Pt arrived in the ED from home with respiratory distress; pulsox 65% on RA, pt working hard on breathing.  Pt's skin flushed.  Pt is awake, alert, and oriented x4; pt is normally ambulatory.  Pt recently hospitalized for respiratory issues.

## 2017-10-18 NOTE — PROGRESS NOTES
Admission          10/18/2017  1:02 AM  -----------------------------------------------------------  Reason for admission: Hypoxia  Primary team notified of pt arrival.  Admitted from: [facility] ED  Via: stretcher and ambulance  Accompanied by: self  Belongings: Placed in closet; valuables sent home with family  Admission Profile: in progress, pt lethargic  Teaching: orientation to unit and call light- call light within reach, call don't fall, use of console, meal times, when to call for the RN, and enforced importance of safety   Access: PIV x2  Telemetry:Placed on pt  Ht./Wt.: complete  2 RN Skin Assessment Completed By: (pt transferred off unit prior to completion)  Pt status:    Temp: 97.5  F (36.4  C) Temp src: Axillary BP: 106/67   Heart Rate: 60 Resp: 16 SpO2: 98 % O2 Device: BiPAP/CPAP

## 2017-10-18 NOTE — PROGRESS NOTES
Patient placed on BIPAP 10/5 100% initially, now on 60% FiO2, SpO2 96%. BS crackles and diminished. 2 Duonebs given through BIPAP. RT will continue to follow.

## 2017-10-18 NOTE — PHARMACY-VANCOMYCIN DOSING SERVICE
Pharmacy Vancomycin Initial Note  Date of Service 2017  Patient's  1975  42 year old, female    Indication: Healthcare-Associated Pneumonia    Current estimated CrCl = Estimated Creatinine Clearance: 66.3 mL/min (based on Cr of 1.23).    Creatinine for last 3 days  10/17/2017:  8:15 PM Creatinine 1.23 mg/dL    Recent Vancomycin Level(s) for last 3 days  No results found for requested labs within last 72 hours.      Vancomycin IV Administrations (past 72 hours)                   vancomycin (VANCOCIN) 1,750 mg in NaCl 0.9 % 500 mL intermittent infusion (mg) 1,750 mg New Bag 10/17/17 2122                Nephrotoxins and other renal medications (Future)    Start     Dose/Rate Route Frequency Ordered Stop    10/18/17 0900  vancomycin (VANCOCIN) 1,750 mg in NaCl 0.9 % 500 mL intermittent infusion      1,750 mg  over 2 Hours Intravenous EVERY 12 HOURS 10/18/17 0601      10/18/17 0600  piperacillin-tazobactam (ZOSYN) 4.5 g vial to attach to  mL bag      4.5 g  over 30 Minutes Intravenous EVERY 6 HOURS 10/18/17 0552            Contrast Orders - past 72 hours     None                Plan:  1.  Start vancomycin  1750 mg IV q12h.  1750 mg X1 received PTA.  2.  Goal Trough Level: 15-20 mg/L   3.  Pharmacy will check trough levels as appropriate in 1-3 Days.    4. Serum creatinine levels will be ordered daily for the first week of therapy and at least twice weekly for subsequent weeks.    5. Rutherford method utilized to dose vancomycin therapy: Method 1    Tiago Vega

## 2017-10-18 NOTE — PROGRESS NOTES
Transfer  Transferred to: 4A  Via: bed  Reason for transfer: need for isolation unit, unavailable on this unit  Family: Aware of transfer  Belongings: Packed and sent with pt  Chart: Delivered with pt to next unit  Report given to: Nelsy PARMAR  Pt status:  Temp: 97.5  F (36.4  C) Temp src: Axillary BP: 106/67   Heart Rate: 60 Resp: 16 SpO2: 98 % O2 Device: BiPAP/CPAP

## 2017-10-19 NOTE — PROGRESS NOTES
[facility]    Pulmonary, Allergy, Critical Care and Sleep Medicine    Follow-up Note  10/19/2017    Assessment and Recommendations:    Sabina Figueroa is a 42 year old female with a history of newly diagnosed ILD, asthma, alcoholic liver cirrhosis, ad HTN who presented on 10/18/2017 with hypoxia.       1. Interstitial Lung Disease: VATS biopsies consistent with DIP, which is possible. She did have enlarged lymph nodes, and processes such as sarcoidosis could be considered as well. With rash on face, autoimmune processes should also be considered. At this point, we are most concerned that her lung process is due to the chemicals and flavors in her E-cigarettes, which has been reported, usually due to diacetyl. The time line of her use of E-cigs and when lung problems started makes this likely, as does her rapid improvement here while away from e-cigarettes. Unfortunately, she also continues to smoke combustible cigarettes and uses burning oils and scented candles at home frequently as well, which may be irritating.   -Follow up vasculitis panel, ANCA, AMISHA  -Follow up compliment level  -Follow up comprehensive (send out) toxicology screen  -Counseled extensively on stopping electronic cigarette use, with goal to cut out combustible cigarettes as well, and stop using candles/oils at home too.   -She is pre-contemplative for nicotine cessation, but seems willing to give up E cigarettes  -Would give her a nicotrol inhaler here and at discharge (ordered for here).      2. Acute hypoxic respiratory failure, concern for pneumonia, including PJP pneumonia: With time period off bactrim and distribution of infiltrates, PJP is a concern, however rapid improvement makes this somewhat less likely. -antibiotics can likely be narrowed  -would try to get sputum sample for culture and cytology (PCP testing) (both are ordered).   -continue treatment dose bactrim with prednisone empirically  -follow up 1,3  Beta-D glucan  -wean o2 as able.      Seen and discussed with Dr Scanlon.      Rajeev Frost MD  Pulmonary and Critical Care Fellow   Pager [phone]     Subjective, Interval history:   Doing much better today. Can breath deeply without issue, mild cough which is productive. No chest pain, fevers, chills, night sweats, rhinorrhea, or congestion.   Objective:   Medications:    sulfamethoxazole-trimethoprim (BACTRIM/SEPTRA) intermittent infusion  400 mg Intravenous Q6H     piperacillin-tazobactam  4.5 g Intravenous Q6H     ascorbic acid  500 mg Oral Daily     aspirin  81 mg Oral Daily     atorvastatin (LIPITOR) tablet 20 mg  20 mg Oral At Bedtime     FLUoxetine  40 mg Oral Daily     folic acid  1 mg Oral Daily     ferrous sulfate  325 mg Oral Daily with breakfast     gabapentin  600 mg Oral TID     insulin glargine  15 Units Subcutaneous QAM AC     melatonin tablet 5 mg  5 mg Oral At Bedtime     multivitamin, therapeutic  1 tablet Oral Daily     predniSONE  50 mg Oral Daily     rifaximin  550 mg Oral BID     insulin aspart  1-7 Units Subcutaneous TID AC     insulin aspart  1-5 Units Subcutaneous At Bedtime     buprenorphine HCl-naloxone HCl  3 Film Sublingual Daily     lactulose  20 g Oral TID     pantoprazole (PROTONIX) EC tablet 40 mg  40 mg Oral QAM AC     levofloxacin  750 mg Oral Q24H     nicotine   Transdermal Q8H     nicotine   Transdermal Daily     nicotine  1 patch Transdermal Daily     lidocaine  1 patch Transdermal Q24h    And     lidocaine   Transdermal Q24H    And     lidocaine   Transdermal Q8H     naloxone, albuterol, glucose **OR** dextrose **OR** glucagon    Physical Exam:  Temp:  [97.3  F (36.3  C)-98  F (36.7  C)] 98  F (36.7  C)  Heart Rate:  [37-77] 61  Resp:  [7-51] 15  BP: ()/(53-96) 106/64  FiO2 (%):  [40 %] 40 %  SpO2:  [91 %-100 %] 96 %    Intake/Output Summary (Last 24 hours) at 10/19/17 0709  Last data filed at 10/19/17 0700   Gross per 24 hour   Intake             3905 ml    Output             3000 ml   Net              905 ml     General: Sitting up in bed, awake, NAD   HEENT: anicteric, dry mucosa. Erythematous rash on cheeks bilaterally and chin.   Neck: no palpable lymphadenopathy, no JVD noted  Chest: on NC at 3 LPM. Very mild basilar crackles bilaterally. Expiratory wheeze throughout.   Cardiac: RRR no murmurs  Abdomen: obese, soft, non tender, active BS  Extremities: No LE Edema  Neuro: A&Ox3, no focal deficits   Skin: erythematous rash on cheeks and chin. Scabs at VATS surgical sites on right chest.     Labs and imaging: All laboratory and imaging data personally reviewed.    Notable for:  K 4.0 Cr 0.89.   CK 27.   , ESR 23  TSH 0.5  AMISHA in process    WBC 8.5. Hgb 12.2. Plts 85 (from 158)     INR 1.19    C diff negative.   Strep and legionella antigens negative.   1,3 Beta-D glucan in process  Sputum culture canceled x1, in process now.   RVP negative.

## 2017-10-19 NOTE — PLAN OF CARE
Problem: Patient Care Overview  Goal: Plan of Care/Patient Progress Review  Outcome: Improving  Neuro- A&Ox4. PERRL.    CV- Afebrile. Sinus rohit 40s-60s. Occasional PACs. BP stable.  Resp- On 5L NC. Lungs clear/diminished to bases. Denies shortness of breath. Sputum sample sent  GI- Full liquid diet. Had multiple stools over night. C-diff negative. On lactulose.  - Voids to bathroom. Urine output adequate.   Ambulates with stand by assist to commode.      Plan for IV antibiotics and bronch. Work up for ILD pending. Will continue to monitor.

## 2017-10-19 NOTE — PLAN OF CARE
"Problem: Patient Care Overview  Goal: Plan of Care/Patient Progress Review  Outcome: Improving                                                                                                                                      Nursing Progress Note     D/I/A: Pt A&Ox4. Requiring 2L supp O2 via NC to maintain O2 sat >92%. Dyspnea upon exertion. Sinus bradycardia to normal sinus with rare to occasional PACs. Other VSS. LS clear to inspiratory/expiratory wheezes throughout, diminished at bases. CADB encouraged. Afebrile. Continues to c/o lower back pain, Bengay applied with no relief, one-time dose Toradol ordered and to be given. Face flushed. Up to commode frequently with stand-by assist (see I/O Flowsheets), adequate UOP, +BM. Advanced from full liquid to regular diet. BG monitored, insulin given per MAR. Pt states she \"feels 100%\" and would like to discharge home.   P: Continue to monitor and assess. Update POC as needed.     BP 98/75  Temp 98.2  F (36.8  C) (Oral)  Resp 20  Wt 85.7 kg (188 lb 15 oz)  SpO2 91%  BMI 29.59 kg/m2  Demar Florez  October 19, 2017  4:52 PM      "

## 2017-10-19 NOTE — PROGRESS NOTES
[facility] [place]    Internal Medicine Progress Note - [facility] Service    Main Plans for Today   -  Continue workup of ILD and acute hypoxic respiratory failure  -  Evaluate need for possible BAL per pulm  - Discontinue levofloxaxin, zosyn  - Continue bactrim and prednisone     Assessment & Plan   Sabina Figueroa is a 42 year old female admitted on 10/18/2017. She has a history of newly diagnosed ILD, asthma, alcoholic liver cirrhosis, and HTN who presented with hypoxic respiratory failure.     # Acute hypercarbic and hypoxemic respiratory failure  # ILD  Pt with previously diagnosed ILD (s/p lung biopsy), previously responsive to steroids. Immunosuppressed on high doses of prednisone. Patietn was reportedly not taking ppx bactrim and ddx of this acute respiratory failure includes PJP pneumonia vs hypersensitivity pneumonitis vs lung disease induced by e-cigarette use (recently changed flavor/type of e-cigarette).   - pulm consulted, appreciate recs  - continue bactrim (treatment dose) and prednisone 50 mg qday   - sputum cx pending   - discontinue vanc/zosyn/levofloxacin, low concern for bacterial PNA   - Labs pending: vasculitis panel, ANCA, AMISHA, compliment level, comp toxicology screen, beta D glucan  -  on tobacco cessation. Nicotrol inhaler started today.   - wean O2 as able    #Hepatic Cirrhosis  No signs of HE.   Continue PTA Rifaximin and lactulose     # Chronic low back pain  - restarted PTA suboxone   - one time PRN toradol, lidocaine patches and bengay PRN     # Somnolence- resolved  Unclear etiology.   -continue to monitor  - restart PTA mirtazapine, hydroxyzine     # Depression   - Continue PTA fluoxetine     # Asthma   - Continue PTA albuterol     # T2DM   - Continue PTA insulin    # HTN   - Holding PTA furosemide, spironolactone in setting of low BPs    Diet: Advance Diet as Tolerated: Full Liquid Diet  Fluids: no mIVF  DVT Prophylaxis: Pneumatic Compression  Devices  Code Status: Full Code    Disposition Plan   Expected discharge: 2 - 3 days, recommended to prior living arrangement once antibiotic plan established and respiratory failure improved.     Entered: Mireya Rosa 10/19/2017, 11:21 AM   Information in the above section will display in the discharge planner report.      The patient's care was discussed with the Attending Physician, Dr. Francisco Santillan MD.    Mireya Rosa  [facility]  Maroon: 1  Pager: [phone]  Please see sticky note for cross cover information    Interval History   RN notes reviewed. VSS. No acute events overnight. Pt reports low back pain this AM. Breathing comfortable on 2L nc.     4 point ROS otherwise negative.     Physical Exam   Vital Signs: Temp: 98.2  F (36.8  C) Temp src: Oral BP: 126/81   Heart Rate: 77 Resp: 16 SpO2: 97 % O2 Device: Nasal cannula Oxygen Delivery: 5 LPM  Weight: 188 lbs 14.95 oz  Constitutional: awake, alert, lying in bed   Head: normalmocephalic, atraumatic  Eyes: anicteric sclera   ENT: oropharynx is moist without exudates or ulcers  Neck: supple  Cardiovascular: RRR, normal S1/S2, no murmurs/rubs/gallops   Respiratory: course breath sounds throughout, scattered wheezing   Gastrointestinal: soft, nontender, + mildly distended, normal bowel sounds  Ext: warm and well perfused, no edema   Skin: redness on face, lines in place without any surrounding erythema or exudate  Neurologic: alert and oriented, moving all extremities   Psychiatric: appropriate affect      Data   Medications     NaCl 100 mL/hr at 10/19/17 0800       sulfamethoxazole-trimethoprim (BACTRIM/SEPTRA) intermittent infusion  400 mg Intravenous Q6H     piperacillin-tazobactam  4.5 g Intravenous Q6H     ascorbic acid  500 mg Oral Daily     aspirin  81 mg Oral Daily     atorvastatin (LIPITOR) tablet 20 mg  20 mg Oral At Bedtime     FLUoxetine  40 mg Oral Daily     folic acid  1 mg Oral Daily     ferrous sulfate  325 mg Oral  Daily with breakfast     gabapentin  600 mg Oral TID     insulin glargine  15 Units Subcutaneous QAM AC     melatonin tablet 5 mg  5 mg Oral At Bedtime     multivitamin, therapeutic  1 tablet Oral Daily     predniSONE  50 mg Oral Daily     rifaximin  550 mg Oral BID     insulin aspart  1-7 Units Subcutaneous TID AC     insulin aspart  1-5 Units Subcutaneous At Bedtime     buprenorphine HCl-naloxone HCl  3 Film Sublingual Daily     lactulose  20 g Oral TID     pantoprazole (PROTONIX) EC tablet 40 mg  40 mg Oral QAM AC     levofloxacin  750 mg Oral Q24H     nicotine   Transdermal Q8H     nicotine   Transdermal Daily     nicotine  1 patch Transdermal Daily     lidocaine  1 patch Transdermal Q24h    And     lidocaine   Transdermal Q24H    And     lidocaine   Transdermal Q8H     Data     Recent Labs  Lab 10/19/17  0503 10/18/17  1540 10/18/17  1206 10/18/17  0150 10/17/17  2025 10/17/17  2015   WBC 8.5  --   --   --   --  17.8*   HGB 12.2  --   --   --   --  15.7   MCV 94  --   --   --   --  90   PLT 85*  --   --   --   --  158   INR  --   --  1.19*  --   --   --      --   --  135  --  131*   POTASSIUM 4.0  --   --   --   --  3.5   CHLORIDE 105  --   --   --   --  93*   CO2 26  --   --   --   --  29   BUN 12  --   --   --   --  19   CR 0.89 0.80  --  1.00  --  1.23*   ANIONGAP 8  --   --   --   --  9   VISHNU 8.4*  --   --   --   --  9.3   *  --   --   --   --  128*   ALBUMIN  --   --   --   --   --  3.9   PROTTOTAL  --   --   --   --   --  7.9   BILITOTAL  --   --   --   --   --  0.5   ALKPHOS  --   --   --   --   --  119   ALT  --   --   --   --   --  42   AST  --   --   --   --   --  35   TROPI  --   --   --   --   --  <0.015   TROPONIN  --   --   --   --  0.01  --      Recent Results (from the past 24 hour(s))   CT Chest Pulmonary Embolism w Contrast    Narrative    EXAMINATION: CT CHEST PULMONARY EMBOLISM W CONTRAST, 10/18/2017 12:42  PM    TECHNIQUE:  Helical CT images of the chest were obtained with  "IV  contrast. Contrast dose: isovue 370    COMPARISON: CT 5/2/2017, chest radiograph 10/17/2017    HISTORY: hypoxia. Wedge resection 9/16/2017 showed \"Acute lung injury  in background of desquamative interstitial pneumonitis (DIP) and  nonspecific interstitial pneumonitis (NSIP)-like   pattern\"    FINDINGS:    Chest: There is good contrast opacification of the pulmonary arteries.  No filling defect to suggest pulmonary embolism. Pulmonary artery and  aorta are normal in caliber. Mild coronary artery calcification.  Visualized thyroid is unremarkable. Multiple enlarged mediastinal and  bilateral hilar lymph nodes are not significantly changed from  5/2/2017, for example 1.5 cm short axis diameter paratracheal node  (series 4 image 32), 1.1 cm short axis diameter right hilar lymph node  (series 4 image 42), 1.2 cm left para-aortic lymph node (series 4  image 32). The central tracheobronchial tree is patent. Postoperative  changes of multiple right lung wedge resections. Diffuse centrilobular  groundglass opacities, improved compared with 5/2/2017. There is  superimposed moderate apical predominant centrilobular and paraseptal  emphysematous change.    Upper abdomen: Spleen is enlarged measuring up to 15.1 cm in largest  dimension. Limited evaluation of the upper abdomen is otherwise  unremarkable.    Bones and soft tissues: Posterior left eighth rib fracture is new  since 5/2/2017 but appears subacute. No acute appearing fracture or  suspicious bone lesion.       Impression    IMPRESSION:   1. No evidence of pulmonary embolism.  2. Diffuse centrilobular groundglass opacities, improved compared with  5/2/2017. Findings are superimposed upon moderate apical predominant  centrilobular and paraseptal emphysematous changes.  3. Splenomegaly.  4. Subacute posterior left eighth rib fracture.  5. Stable mediastinal and bilateral hilar lymphadenopathy.    I have personally reviewed the examination and initial " interpretation  and I agree with the findings.    KATIE GUERRERO MD     ATTENDING ATTESTATION  Patient seen, examined and discussed with housestaff or advanced practice provider. I agree with the key elements of findings and plan described above, with pertinent additions, amendments and/or updates as included in text below.     We are continuing treatment-dose TMP-SMX and prednisone, to address both exacerbation of ILD and possible PCP pneumonia Will continue to attempt to collect adequate sputum sample for PCP testing. Pulm continues to follow for possible bronchoscopy with BAL.     Weaning oxygen.    Clinical course, including multiple recent exacerbations, argues against HCAP. We are therefore holding vancomycin and Zosyn.     Francisco Santillan MD  [facility]

## 2017-10-20 NOTE — PLAN OF CARE
Problem: Patient Care Overview  Goal: Plan of Care/Patient Progress Review  Outcome: Improving    10/20/17 1621   OTHER   Plan Of Care Reviewed With patient   Plan of Care Review   Progress improving   Outcome Summary (pt voiding, ambulated in hallway with assist of one)   Pt with orders to be transferred up to the medicine floor.  Lungs are diminished with expiratory wheezing noted.  Pt ambulating in the hallway with assist of one on 6 LNC while up walking otherwise on 2 LNC.  Pt with dypnea on exertion.  Pt transferred from [facility] to  medicine floor at 16:00.  Continue with current plan of care on 5B.  Pt voiding using the hat in the bathroom and has been calling for help appropriately.    Problem: Pneumonia (Adult)  Goal: Signs and Symptoms of Listed Potential Problems Will be Absent, Minimized or Managed (Pneumonia)  Signs and symptoms of listed potential problems will be absent, minimized or managed by discharge/transition of care (reference Pneumonia (Adult) CPG).   Outcome: Improving    10/20/17 1621   Pneumonia   Problems Assessed (Pneumonia) all   Problems Present (Pneumonia) fluid/electrolyte imbalance;respiratory compromise

## 2017-10-20 NOTE — PLAN OF CARE
Problem: Patient Care Overview  Goal: Plan of Care/Patient Progress Review  Outcome: No Change  Neuro: A&Ox4.   Cardiac: Sinus rohit. HR in low 60s, intermittently drops to 40s. Team is aware. Soft .  Respiratory: Sating 94 on 2L NC. Coarse lung sounds in bases. SOB with activity.   GI/: Adequate urine output. No BM this shift.   Diet/appetite: Tolerating mod CHO diet.   Activity:  Assist of 1, up to chair and in halls.  Pain: At acceptable level on current regimen.   Skin: Intact, no new deficits noted. Scabbed area on flank from biopsy, open to air.   LDA's: Right and left PIV.      Plan: Continue with POC. Notify primary team with changes.

## 2017-10-20 NOTE — PROGRESS NOTES
[facility]    Pulmonary, Allergy, Critical Care and Sleep Medicine    Brief Pulmonary Follow-up Note  10/20/2017    Patient not seen today. Summary of thoughts and recommendations follows    Assessment and Recommendations:    Sabina Figueroa is a 42 year old female with a history of newly diagnosed ILD, asthma, alcoholic liver cirrhosis, ad HTN who presented on 10/18/2017 with hypoxia.       1. Interstitial Lung Disease: VATS biopsies consistent with DIP, but chronicity of lung changes fits very well with ILD related to e-cigarette use, as does improvement here while admitted and not using them. There have been reports of diacetyl induced lung disease from flavored e-cigarettes. Vasculitis panel, ANCA, and compliment levels still in process to evaluate for autoimmune lung disease as well. Unfortunately, she also continues to smoke combustible cigarettes and uses burning oils and scented candles at home frequently as well, which may be contributing   -Follow up vasculitis panel, ANCA, compliment levels (still in process)   -Continue to  on stopping electronic cigarette use, with goal to cut out combustible cigarettes as well, and stop using candles/oils at home too.   -She is pre-contemplative for nicotine cessation, but seems willing to give up E cigarettes  -Continue nicotine replacement therapies including nicotrol inhaler   -should follow up with Dr. Mane in pulmonary clinic in 2-4 weeks.   -Prednisone as below.      2. Acute hypoxic respiratory failure, concern for pneumonia, including PJP pneumonia: With time period off bactrim and distribution of infiltrates, PJP is a concern, however rapid improvement makes this somewhat less likely. -Empirically complete course of treatment dose bactrim for PJP (can convert to PO, total of 21 days of treatment needed) today is day 3.   -Continue prednisone, would given 50 mg daily (as you are) for 2 more days, then wean to 40 mg daily for 5  days, then wean to 30 mg daily for 5 days, then to 20 mg daily until follow up with pulmonary.   -wean o2 as able.      Discussed with Dr Scanlon. Pulmonary will sign off at this time, please call if questions arise.      Rajeev Frost MD  Pulmonary and Critical Care Fellow   Pager [phone]     Labs and imaging: All laboratory and imaging data personally reviewed.    Notable for:  K 3.4 Cr 0.82.   WBC 5.8 Hgb 12.3, plts 81    Sputum contaminated    Comprehensive drug screen negative.     AMISHA 1:40 (borderline positive)    1,3 Beta-D glucan negative     Vasculitis panel, ANCA, and compliment levels in process

## 2017-10-20 NOTE — PLAN OF CARE
Problem: Patient Care Overview  Goal: Plan of Care/Patient Progress Review  Outcome: Improving  Patient arrived to unit approx 1600. Transfer from 6b. Up stand by assist. VSS on 2L NC. Dyspnea upon exertion, flushed face;  6L O2 when ambulating. A&Ox4. Voiding adequately. Loose stool; on lactulose.  Denies pain. Will report to oncoming staff.

## 2017-10-20 NOTE — PROGRESS NOTES
Pt arrived to unit 6B at this time. VSS. Oriented to the unit. Pt has her purse with her at her bedside.

## 2017-10-20 NOTE — PROGRESS NOTES
[facility], [place]    Internal Medicine Progress Note - [facility] Service    Main Plans for Today   - Awaiting ILD work up   - Transition to PO bactrim today for 21 day treatment course   - Prednisone taper per pulm in place  - Continue to wean O2, currently on 2L NC    Assessment & Plan   Sabina Figueroa is a 42 year old female admitted on 10/18/2017. She has a history of newly diagnosed ILD, asthma, alcoholic liver cirrhosis, and HTN who presented with hypoxic respiratory failure.     # Acute hypercarbic and hypoxemic respiratory failure  # ILD  Pt with previously diagnosed ILD (s/p lung biopsy), previously responsive to steroids. Immunosuppressed on high doses of prednisone. Patient was reportedly not taking ppx bactrim and ddx of this acute respiratory failure includes PJP pneumonia vs hypersensitivity pneumonitis vs lung disease induced by e-cigarette use (recently changed flavor/type of e-cigarette).   - pulm consulted, appreciate recs  - continue bactrim (treatment dose) for 21 days  - continue prednisone with plan to taper    - discontinued vanc/zosyn/levofloxacin 10/19, low concern for bacterial PNA   - Labs pending: vasculitis panel, ANCA, AMISHA, compliment level, comp toxicology screen, beta D glucan  -  on tobacco cessation  - wean O2 as able, still requiring 2L today     # EtOH cirrhosis  No signs of HE. Continue PTA Rifaximin and lactulose. Holding diuretics.     # Chronic low back pain  - restarted PTA suboxone   - one time PRN toradol, lidocaine patches and bengay PRN     # Somnolence - resolved  Unclear etiology.   - continue to monitor  - restart PTA mirtazapine, hydroxyzine     # Depression   - Continue PTA fluoxetine     # Asthma   - Continue PTA albuterol     # T2DM   - Continue PTA insulin    # HTN   - Holding PTA furosemide, spironolactone in setting of low BPs, likely able to resume before dc     Diet: Advance Diet as Tolerated: Regular Diet Adult; 5017-7265  Calories: Moderate Consistent CHO (4-6 CHO units/meal)  Fluids: no mIVF  DVT Prophylaxis: Pneumatic Compression Devices  Code Status: Full Code    Disposition Plan   Expected discharge: Tomorrow, recommended to prior living arrangement once antibiotic plan established and respiratory failure improved.     Entered: Remi Tim 10/20/2017, 12:18 PM   Information in the above section will display in the discharge planner report.      The patient's care was discussed with the Attending Physician, Dr. Snow.    Remi Tim  [facility]  [place]: 1  Pager: [phone]  Please see sticky note for cross cover information    Interval History   RN notes reviewed. VSS. No acute events overnight. Improving respiratory status. No new concerns.     4 point ROS otherwise negative.     Physical Exam   Vital Signs: Temp: 98.5  F (36.9  C) Temp src: Oral BP: 125/80   Heart Rate: 66 Resp: 16 SpO2: 95 % O2 Device: Nasal cannula Oxygen Delivery: 2 LPM  Weight: 187 lbs 1.6 oz  Constitutional: awake, alert, lying in bed   Eyes: anicteric sclera   Cardiovascular: RRR, normal S1/S2, no murmurs/rubs/gallops   Respiratory: course breath sounds bibasilar  Gastrointestinal: soft, nontender, normal bowel sounds  Ext: warm and well perfused, no edema   Skin: redness on face, lines in place without any surrounding erythema or exudate  Neurologic: alert and oriented, moving all extremities   Psychiatric: appropriate affect      Data   Medications        mirtazapine  30 mg Oral At Bedtime     sulfamethoxazole-trimethoprim (BACTRIM/SEPTRA) intermittent infusion  400 mg Intravenous Q6H     ascorbic acid  500 mg Oral Daily     aspirin  81 mg Oral Daily     atorvastatin (LIPITOR) tablet 20 mg  20 mg Oral At Bedtime     FLUoxetine  40 mg Oral Daily     folic acid  1 mg Oral Daily     ferrous sulfate  325 mg Oral Daily with breakfast     gabapentin  600 mg Oral TID     insulin glargine  15 Units Subcutaneous QAM AC      melatonin tablet 5 mg  5 mg Oral At Bedtime     multivitamin, therapeutic  1 tablet Oral Daily     predniSONE  50 mg Oral Daily     rifaximin  550 mg Oral BID     insulin aspart  1-7 Units Subcutaneous TID AC     insulin aspart  1-5 Units Subcutaneous At Bedtime     buprenorphine HCl-naloxone HCl  3 Film Sublingual Daily     lactulose  20 g Oral TID     pantoprazole (PROTONIX) EC tablet 40 mg  40 mg Oral QAM AC     nicotine   Transdermal Q8H     nicotine   Transdermal Daily     nicotine  1 patch Transdermal Daily     lidocaine  1 patch Transdermal Q24h    And     lidocaine   Transdermal Q24H    And     lidocaine   Transdermal Q8H     Data     Recent Labs  Lab 10/20/17  0617 10/19/17  0503 10/18/17  1540 10/18/17  1206 10/18/17  0150 10/17/17  2025 10/17/17  2015   WBC 5.8 8.5  --   --   --   --  17.8*   HGB 12.3 12.2  --   --   --   --  15.7   MCV 96 94  --   --   --   --  90   PLT 81* 85*  --   --   --   --  158   INR  --   --   --  1.19*  --   --   --     138  --   --  135  --  131*   POTASSIUM 3.4 4.0  --   --   --   --  3.5   CHLORIDE 104 105  --   --   --   --  93*   CO2 27 26  --   --   --   --  29   BUN 10 12  --   --   --   --  19   CR 0.82 0.89 0.80  --  1.00  --  1.23*   ANIONGAP 7 8  --   --   --   --  9   VISHNU 8.7 8.4*  --   --   --   --  9.3   * 171*  --   --   --   --  128*   ALBUMIN  --   --   --   --   --   --  3.9   PROTTOTAL  --   --   --   --   --   --  7.9   BILITOTAL  --   --   --   --   --   --  0.5   ALKPHOS  --   --   --   --   --   --  119   ALT  --   --   --   --   --   --  42   AST  --   --   --   --   --   --  35   TROPI  --   --   --   --   --   --  <0.015   TROPONIN  --   --   --   --   --  0.01  --      No results found for this or any previous visit (from the past 24 hour(s)).  Internal Medicine Staff Addendum  Date of Service: 10/21/2017  I have seen and examined Ms Figueroa, reviewed the data and discussed the plan of care with the care team on rounds.  I agree with the  above documentation with the additions/changes to the ROS, HPI, Exam or data (including my edits in italics):    I discussed pt's care with bedside RN, case management/social work today.  I personally reviewed, labs, medications and past 24 hr notes.    Assessment/Plan/Diagnoses: plan/dx as above, which contains my edits and reflects our joint medical decision-making.     Candido Snow MD PhD  Internal Medicine Hospitalist & Staff Physician   of Internal Medicine   [facility]  Pager: [phone]

## 2017-10-20 NOTE — SUMMARY OF CARE
Belongings: 3 purses, blue notebook, 2 rings, cell phone + , make up, 4 movies, sneakers, pajama bottoms, and a green tank top. All belongings with patient in room.

## 2017-10-21 NOTE — PLAN OF CARE
Care from 6073-2042.     Pt a&o x 4. VSS. , administered insulin as ordered. Administered other meds as ordered. Pt had BM x 1. Up independently in room. Will continue plan of care.

## 2017-10-21 NOTE — PROGRESS NOTES
SpO2 91% sitting at side of bed on room air.  SpO2 78% after walking 20 feet on room air.    SpO2 96% sitting at side of bed on 3L nc.  SpO2 83% after walking 40 feet on 3L.    After resting on on 3L O2 nc, SpO2 does return to 96%. SpO2 then steadily drops with resumed activity.

## 2017-10-21 NOTE — PROGRESS NOTES
[facility], [place]    Internal Medicine Progress [facility]    Main Plans for Today   - Wean oxygen as tolerated  - Check ambulatory O2 saturation  - Work with PT and OT   - Continue treatment-dose Bactrim PO    Assessment & Plan   Sabina Figueroa is a 42 year old female admitted on 10/18/2017. She has a history of ILD (biopsy equivocal, possible NSIP cs  vs RB-ILD) and heavy tobacco and e-cigarette use and is admitted for hypoxic respiratory failure requiring NIPPV.    # Acute hypoxemia and hypercarbic RF and chronic ILD: Recurrent course supports toxic exposure, possible due to diacetyl-induced lung injury per recent pulm eval.   - CAP/HCAP treatment stopped  - Continuing treatment-dose bactrim  - Continue prednisone at tapering dose  - Wean oxygen    # Cirrhosis: Continuing lactulose and rifaximin    # Tobacco use: Counseled extensively on need for complete cessation. Nicotine replacement ordered.     # Chronic pain: Home buprenorphine and gabapentin    Diet: Advance Diet as Tolerated: Regular Diet Adult; 5798-4676 Calories: Moderate Consistent CHO (4-6 CHO units/meal)  DVT Prophylaxis: Pneumatic Compression Devices  Code Status: Full Code    Disposition Plan   Expected discharge: 2 - 3 days, recommended to prior living arrangement once O2 use less than 6 liters/minute.     Entered: Francisco Santillan 10/21/2017, 3:22 PM   Information in the above section will display in the discharge planner report.      The patient's care was discussed with the Bedside Nurse, Patient and [facility] [place] Team.    Francisco Santillan  [facility]  [place]: 1  Pager: [phone]    Please see sticky note for cross cover information    Interval History   - Feels that breathing is slowly getting better  - Is eager to get home  - Still feels short of breath when walking.   - No cough, fever, sputum, leg swelling.  4 point ROS reviewed as is negative unless above noted      Physical Exam    Vital Signs: Temp: 97.4  F (36.3  C) Temp src: Oral BP: 111/62   Heart Rate: 90 Resp: 22 SpO2: 98 % O2 Device: Nasal cannula Oxygen Delivery: 3 LPM  Weight: 186 lbs 15.2 oz  Sitting up in bed in NAD  Cushingoid faces  Cor regular and without murmurs.   Normal resp rate and effort. Soft posterior wheezing withuot significant prolongation of expiration. Crackles improved from admission  Abdomen soft NT

## 2017-10-21 NOTE — PLAN OF CARE
Problem: Patient Care Overview  Goal: Plan of Care/Patient Progress Review  Outcome: No Change  Pt a&o x 4. VSS. O2 sat 90-92% on 2LPM via NC. Pt's O2 desaturates significantly (down to low 80s-high 70s) when off O2 and moving about room.  at 0200. Pt denies pain or nausea. Pt up to bathroom independently, voiding spontaneously. PIV saline locked. Pt requested no one in room from 0200 until the morning. Completed safety checks but nothing more. No acute changes or requests. Continue plan of care.

## 2017-10-21 NOTE — PLAN OF CARE
Problem: Patient Care Overview  Goal: Plan of Care/Patient Progress Review  Outcome: No Change  /62 (BP Location: Left arm)  Temp 97.4  F (36.3  C) (Oral)  Resp 22  Wt 84.8 kg (186 lb 15.2 oz)  SpO2 98%  BMI 29.28 kg/m2  9804-8085:  Afebrile. VSS on 2-3L O2 via NC. LS diminished. Walk Test done by previous RN (see note) to determine if pt qualifies for home oxygen. Pt dyspneic on exertion. Denies pain and nausea. No Lidocaine patch in place. Nicotine patch on Left deltoid. Nicorette gum order placed. PIV x2 SL'd. Up with SBA. Voiding spontaneously good amounts straw-yellow urine. Loose BM x2 today (on lactulose). BG monitoring and carb counting initiated. (see provider notification note for ). Possible d/c tomorrow. Continue with POC.

## 2017-10-21 NOTE — PROVIDER NOTIFICATION
1700 BG level 522. Administered 7 units Novolog per sliding scale and notified Dr. Tres Henson ([id number]). Pt states that about 45 min prior to glucose check she had snack of 2 Rice Krispy bars and cup of regular jello. MD adjusted insulin orders (Novolog, increased Lantus), and added carb counting. Diet order adjusted to not include Regular diet just MOD CHO diet. Plan to recheck BG before dinner and cover per new sliding scale. Pt may need additional diabetic education.

## 2017-10-22 NOTE — PROGRESS NOTES
[facility] [place]    Internal Medicine Progress Note - [facility] Service    Main Plans for Today   - Wean oxygen as tolerated  - Continue to monitor ambulatory O2 saturation  - Work with PT and OT   - Continue treatment-dose Bactrim PO  - Endocrinology consult for hyperglycemia     Assessment & Plan   Sabina Figueroa is a 42 year old female admitted on 10/18/2017. She has a history of ILD (biopsy equivocal, possible NSIP cs  vs RB-ILD) and heavy tobacco and e-cigarette use and is admitted for hypoxic respiratory failure requiring NIPPV.    # Acute hypoxemia and hypercarbic RF and chronic ILD:   Recurrent course supports toxic exposure, possible due to diacetyl-induced lung injury per recent pulm eval. Ddx also includes PJP pneumonia as pt was immunosuppressed on high doses of prednisone prior to admission an dreportedly not taking ppx bactrim.   - CAP/HCAP treatment stopped  - Continuing treatment-dose bactrim  - Continue prednisone at tapering dose  - Wean oxygen  - Evaluate need for home oxygen   -  on tobacco cessation   - Nystatin swish and spit for signs of yeast in oropharynx (+candida on sputum cx)    # Cirrhosis  - Continuing lactulose and rifaximin    # Tobacco use  - Counseled extensively on need for complete cessation. Nicotine replacement ordered.     # Chronic pain  - Continuing home buprenorphine and gabapentin    # Asthma   - Continue PTA albuterol      # Hyperglycemia   # T2DM  - Continue PTA insulin 25u qAM  - Endocrinology consulted today for assistance with hyperglycemia (they will place insulin orders)     Diet: Consistent Carbohydrate Diet 2825-1490 Calories: Moderate Consistent CHO (4-6 CHO units/meal)  DVT Prophylaxis: Pneumatic Compression Devices  Code Status: Full Code    Disposition Plan    Expected discharge: 2 - 3 days, recommended to prior living arrangement once O2 use less than 6 liters/minute.     Entered: Mireya Rosa 10/22/2017, 4:04  PM   Information in the above section will display in the discharge planner report.      The patient's care was discussed with the Bedside Nurse, Patient and [place] [id number] Team.    Mireya Rosa  [facility]: 1  Pager: [phone]    Please see sticky note for cross cover information    Interval History    - Breathing feels improved; pt walking around the room without supplemental O2 comfortably   - Walking test yesterday showed pt required >3L O2 to keep sats >mid-80s  - Hyperglycemic with variable blood sugars as high as 400s   - Eager to go home  - No new cough, chest pain, fevers, abdominal pain    4 point ROS reviewed as is negative unless above noted       Physical Exam   Vital Signs: Temp: 98  F (36.7  C) Temp src: Oral BP: 122/62   Heart Rate: 88 Resp: 20 SpO2: 97 % O2 Device: Nasal cannula Oxygen Delivery: 2 LPM  Weight: 188 lbs 11.42 oz  Constitutional: awake, alert, walking around room   Head: normalmocephalic, atraumatic, cushingoid facies   Eyes: anicteric sclera without conjunctival injection  ENT: oropharynx is moist, + small white plaques on tongue   Neck: supple  Cardiovascular: RRR, normal S1/S2, no murmurs/rubs/gallops   Respiratory: clear to auscultation bilaterally, no wheezes  Gastrointestinal: soft, nontender, nondistended, normal bowel sounds  Ext: warm and well perfused, no edema   Skin: redness over face, lines in place without any surrounding erythema or exudate  Neurologic: alert and oriented,  moving all extremities   Psychiatric: appropriate affect    ATTENDING ATTESTATION  Patient seen, examined and discussed with housestaff or advanced practice provider. I agree with the key elements of findings and plan described above, with pertinent additions, amendments and/or updates as included in text below.     Oxygenation improving; adventitious sounds on chest auscultation decreasing.  Hyperglycemia worsened today.  Starting nutritional insulin. Will discuss  management of steroid-induced hyperglycemia with DM service in the morning.   Formal ambulatory O2 saturation check.    Francisco Santillan MD  [facility]

## 2017-10-22 NOTE — PROGRESS NOTES
Patient has been assessed for Home Oxygen needs.  Oxygen readings:   *   RA - at rest  Pulse oximetry SPO2 95%  *   RA - during activity/with exercise SPO2  85%  *   O2 at  0 liters/minute (at rest) ...SPO2 96%   *   O2 at  2 liters/minute (during activity/with exercise) ...SPO2 88%

## 2017-10-22 NOTE — PLAN OF CARE
Care from 1322-0483:     Pt a&o x 4. VSS. O2 @ 2LPM via NC. , administered insulin as ordered. Pt requesting PRN nicotrol cartridges and nicorette frequently. Pt up independently in room. Voiding spontaneously. Will continue plan of care.

## 2017-10-22 NOTE — PLAN OF CARE
"Problem: Patient Care Overview  Goal: Plan of Care/Patient Progress Review  Discharge Planner OT   Patient plan for discharge: home with assist from boyfriend           Current status: OT orders received, after consult with patient and chart review- patient declines therapy services during this inpatient stay. Reports she has been here \"over 15 times\" and has no concerns for OT or PT. Patient has assist at home from boyfriend as needed, no home accessibility concerns, ind. With ADLS and IADLs, with only concern being O2 desaturation. Patient has pulse ox and monitors self at home. Aware of pacing and energy conservation strategies. Ind. In room and with ADLS at this time. OT order completed.        Barriers to return to prior living situation: respiratory concerns    Recommendations for discharge: home with assist from SO  Rationale for recommendations: no concerns for OT, recommend home with assist as needed from SO. Order completed.        Entered by: Liseth Marquez 10/22/2017 9:41 AM             "

## 2017-10-22 NOTE — PLAN OF CARE
Problem: Patient Care Overview  Goal: Plan of Care/Patient Progress Review  PT 5B: Cancel/Defer - Following discussion with OT and chart review, pt has been up IND. Pt declining IP PT/OT services and limited most by saturations. Per OT, pt with no concerns for home access and pt has assistance from SO as needed. Anticipate pt to discharge to home when medically appropriate. Will complete orders at this time. Thank you for your referral.

## 2017-10-22 NOTE — PLAN OF CARE
Problem: Patient Care Overview  Goal: Plan of Care/Patient Progress Review  Outcome: No Change  Pt a&o x 4. VSS on O2 @ 2LPM via NC. Pt rec'd PO abx as ordered. Pt requested PRN nicotine gum x 2, nicotrol inhaler x 1. Nicotine patch in place.  and 180. Pt up independently, voiding spontaneously. Pt denies pain or nausea. PIV saline locked. No acute changes overnight. Per provider note d/c possible in 2-3 days pending improvement in respiratory sufficiency. Continue plan of care.

## 2017-10-22 NOTE — PLAN OF CARE
"Problem: Patient Care Overview  Goal: Plan of Care/Patient Progress Review  [id number]     Patient A/O, VSS. Walking test performed on unit, see progress notes for results. Pt now with sliding scale for snacks/supplements. 1800 , coverage given. Pt anxious about potential orders for O2 at home, repeatedly stating that there's no room in her SO's apartment, where she is living \"off lease\", and is also afraid the manager will catch her. Consistent carb diet, appetite good. NC at 2L while at rest, sats drop slightly with activity, but rebound appropriately. Will continue to monitor and alert MDs to any changes.       "

## 2017-10-22 NOTE — PLAN OF CARE
Problem: Patient Care Overview  Goal: Plan of Care/Patient Progress Review  Outcome: No Change  Patient is alert and oriented. VSS on 2L. Denies any nausea tolerating a regular diet. Pt blood sugars are running high. Patient did not eat breakfast and noon sugar was 302. She received 7 units. A couple hours after eating lunch the patient said she was starving so she ordered more food. Blood sugar checked at this time and was 455. Discussed with MD and she received an additional dose of sliding scale 10 units. Denies any pain. Up independently in room. She has been very hunger, thirsty and hyperactive this shift. She said she feels cooped up. Prior to admission was a smoker has a nicotine pt on her right arm and has been using PRN nicotine inhaler and gum almost hourly. Sputum culture came back positive MD updated. Patient would like to roam halls and building up self if possible. Walked with staff multiple times this shift. Will continue to monitor and plan of care.

## 2017-10-23 PROBLEM — T38.0X5A STEROID-INDUCED DIABETES MELLITUS (H): Status: ACTIVE | Noted: 2017-01-01

## 2017-10-23 PROBLEM — E09.9 STEROID-INDUCED DIABETES MELLITUS (H): Status: ACTIVE | Noted: 2017-01-01

## 2017-10-23 NOTE — PLAN OF CARE
"Care from 5952-9866:  Pt a&o x 4. VSS on room air. , administered insulin as ordered. Pt requested nicorette gum x 2. Rec'd order for home dose of robaxin at pt request. Pt is having uncomfortable hand \"cramping\" unrelieved by any medications currently available. Offered hot packs for hands and aromatherapy to encourage slow, deep breathing as pt is intermittently very anxious. Will continue to monitor.   "

## 2017-10-23 NOTE — PROGRESS NOTES
Pt discharged to home at 1500. Pt stable and ambulatory to front door to call a cab. All prescriptions sent to home pharmacy in [place]. Pt has all belongings with her.

## 2017-10-23 NOTE — CONSULTS
Diabetes/Hyperglycemia Management Consult    Chief Complaint steroid induced diabetes, uncontrolled  Consult requested by: Dr. Mireya Rosa, attending: Dr. Francisco Santillan  History of Present Illness Ms. Sabina Figueroa is a 41 yo woman with a history of steroid induced diabetes, ILD and heavy tobacco use, who was admitted on 10/18/17 with hypoxic respiratory failure.  She has been on steroids for several months (appears was discharged from hospital in 2/2017 on steroids).  She reports that she has been on prednisone 50mg for at least a month PTA.  She was kept on this dose during this admission (got an initial dose of MP 125mg in ED on 10/17, then resumed pred 50mg daily on 10/18), but today tapered to 40mg daily.  Plan is to continue tapering dose my 10mg q5days until she is down to 20mg daily.  Sabina told me she wanted to discuss this plan with her pulmonologist before tapering steroid.    Sabina reports that at home she has been taking basaglar 25 units daily along with aspart SSI (doesn't recall interval).  Glucoses at home usually spike to the 200s-300s in the evening, then come down overnight.  She has had a similar pattern during this admission.  Initially she was put on a lower dose of glargine (15 units), then increased to 25 units yesterday morning.  A fixed dose of aspart was ordered yesterday, but we changed order before this was given. She has been on high intensity correction.  Glucoses have usually been coming down overnight to the high 100s by the morning while on glargine 15 units, but this morning with the higher dose of glargine on board (25 units) morning glucose was down to 130s.  We also started an insulin to carb ratio, 1unit/7g CHO, with supper last night.  It appears that during her admission in February she was start on NPH insulin, uncertain when this was switched to basaglar.  In March and May of this year pt's hemoglobin A1cs were in the range of prediabetes (low 6%), prior to this A1cs  "were <5.7%.  In September her A1c was up to 7% (around the time pred dose was increased to 50mg daily).    Sabina reports that at home she skips bfast, has a sandwich for lunch, and stew or TV dinner for supper.  Her snacks include meats and cheeses, but she also will have 1 can of Dr. Pepper (regular) about every other day (does not want to switch to diet).    Sabina is feeling \"wonderful\" today.  Reports that breathing is much improved.  Appetite is \"always very good\".          Recent Labs  Lab 10/23/17  0741 10/23/17  0206 10/22/17  2152 10/22/17  1828 10/22/17  1443 10/22/17  1226  10/21/17  0613  10/20/17  0617  10/19/17  0503  10/17/17  2015   GLC  --   --   --   --   --   --   --  126*  --  153*  --  171*  --  128*   * 122* 265* 344* 455* 302*  < >  --   < >  --   < >  --   < >  --    < > = values in this interval not displayed.      Diabetes Type:   Steroid-induced Diabetes  Diabetes Duration: ~8months   Usual Diabetes Regimen:   When on prednisone 50mg at home: basaglar 25 units daily, aspart SSI  Ability to [place] Prescribed Regimen: unknown- reports taking doses regularly  Diabetes Control:   Lab Results   Component Value Date    A1C 7.0 09/14/2017    A1C 6.1 05/04/2017    A1C 6.2 03/23/2017    A1C 5.6 01/11/2017    A1C 5.2 06/25/2016     Diabetes Complications: none known  History of DKA: none  Usual Diabetes Care Provider: PCP at [facility] in [place]: Dr. Aidee Hemphill  Factors Impacting Glucose Control: high dose steroids, now tapering.      Review of Systems  10 point ROS completed with pertinent positives and negatives noted in the HPI    Past medical, family and social histories are reviewed and updated.    Past Medical History  Past Medical History:   Diagnosis Date     ALC (alcoholic liver cirrhosis) (H)      Alcohol abuse      Anxiety      Arthritis      Ascites      Asthma      Bunion, left foot      Chronic low back pain     Narcotic agreement signed in [employer] system     Depression  "     Hypertension     current     ILD (interstitial lung disease) (H)      Ovarian cyst, left    Steroid induced diabetes    Family History  Family History   Problem Relation Age of Onset     Depression Mother      Anxiety Disorder Mother      MENTAL ILLNESS Mother      Substance Abuse Mother      Depression Father      Anxiety Disorder Father      Substance Abuse Father      MENTAL ILLNESS Father      Depression Daughter      Depression Brother      Schizophrenia Brother      Depression Brother      Anxiety Disorder Brother      Substance Abuse Brother      Type 2 diabetes- father (diagnosed age 50 years)  Social History  Social History     Social History     Marital status: Single     Spouse name: N/A     Number of children: N/A     Years of education: N/A     Social History Main Topics     Smoking status: Current Every Day Smoker     Packs/day: 1.50     Years: 18.00     Types: Cigarettes     Smokeless tobacco: Former User     Quit date: 5/11/2016     Alcohol use 0.0 oz/week     0 Standard drinks or equivalent per week      Comment: 8 drinks or more each day, abstinent since 4/2/15. History of CD treatment in past x1     Drug use: No     Sexual activity: Not on file     Other Topics Concern     Not on file     Social History Narrative    Denies illicit IV or intranasal drug use    No tattoos or piercings   Sabina lives in an apartment with her significant other in [place], no children.    Physical Exam  /74  Pulse 79  Temp 97  F (36.1  C) (Oral)  Resp 16  Wt 83.4 kg (183 lb 12.8 oz)  SpO2 97%  BMI 28.79 kg/m2    General:  pleasant woman, resting in bed, in no distress.   HEENT: NC/AT, PER and anicteric, non-injected, oral mucous membranes moist.   Lungs: unlabored respiration at rest on RA, no cough  Skin: warm and dry, no obvious lesions  MSK:  fluid movement of all extremities  Lymp:  no LE edema   Mental status:  alert, oriented x3, communicating clearly  Psych:  calm, even mood    Laboratory  Recent  Labs   Lab Test  10/21/17   0613  10/20/17   0617   NA  137  138   POTASSIUM  3.6  3.4   CHLORIDE  104  104   CO2  26  27   ANIONGAP  8  7   GLC  126*  153*   BUN  13  10   CR  0.79  0.82   VISHNU  8.9  8.7     CBC RESULTS:   Recent Labs   Lab Test  10/21/17   0613   WBC  6.5   RBC  4.17   HGB  12.6   HCT  39.4   MCV  95   MCH  30.2   MCHC  32.0   RDW  14.9   PLT  104*       Liver Function Studies -   Recent Labs   Lab Test  10/17/17   2015   PROTTOTAL  7.9   ALBUMIN  3.9   BILITOTAL  0.5   ALKPHOS  119   AST  35   ALT  42       Active Medications  Current Facility-Administered Medications   Medication     nicotine (NICODERM CQ) 21 MG/24HR 24 hr patch 1 patch     insulin isophane human (HumuLIN N PEN) injection 20 Units     insulin aspart (NovoLOG) inj (RAPID ACTING)     insulin aspart (NovoLOG) inj (RAPID ACTING)     nystatin (MYCOSTATIN) suspension 500,000 Units     methocarbamol (ROBAXIN) tablet 500 mg     ipratropium - albuterol 0.5 mg/2.5 mg/3 mL (DUONEB) neb solution 3 mL     nicotine polacrilex (NICORETTE) gum 2 mg     insulin aspart (NovoLOG) inj (RAPID ACTING)     insulin aspart (NovoLOG) inj (RAPID ACTING)     traZODone (DESYREL) tablet 100 mg     predniSONE (DELTASONE) tablet 40 mg    Followed by     [START ON 10/28/2017] predniSONE (DELTASONE) tablet 30 mg    Followed by     [START ON 11/2/2017] predniSONE (DELTASONE) tablet 20 mg     sulfamethoxazole-trimethoprim (BACTRIM DS/SEPTRA DS) 800-160 MG per tablet 2 tablet     methyl salicylate-menthol (ARASELI-BEAR) ointment     nicotine (NICOTROL) Inhaler 1 Cartridge     mirtazapine (REMERON) tablet 30 mg     hydrOXYzine (ATARAX) tablet 10 mg     naloxone (NARCAN) injection 0.1-0.4 mg     albuterol (PROAIR HFA/PROVENTIL HFA/VENTOLIN HFA) Inhaler 2 puff     ascorbic acid (VITAMIN C) tablet 500 mg     aspirin EC EC tablet 81 mg     atorvastatin (LIPITOR) tablet 20 mg     FLUoxetine (PROzac) capsule 40 mg     folic acid (FOLVITE) tablet 1 mg     ferrous sulfate (IRON)  tablet 325 mg     gabapentin (NEURONTIN) tablet 600 mg     melatonin tablet 5 mg     multivitamin, therapeutic (THERA-VIT) tablet 1 tablet     rifaximin (XIFAXAN) tablet 550 mg     glucose 40 % gel 15-30 g    Or     dextrose 50 % injection 25-50 mL    Or     glucagon injection 1 mg     buprenorphine HCl-naloxone HCl (SUBOXONE) 8-2 MG per film 3 Film     lactulose (CHRONULAC) solution 20 g     pantoprazole (PROTONIX) EC tablet 40 mg     nicotine Patch in Place     nicotine patch REMOVAL     lidocaine (LIDODERM) 5 % Patch 1 patch    And     lidocaine (LIDODERM) patch REMOVAL    And     lidocaine (LIDODERM) patch in PLACE     No current outpatient prescriptions on file.       Current Diet    Active Diet Order      Consistent Carbohydrate Diet 9370-3601 Calories: Moderate Consistent CHO (4-6 CHO units/meal)      Assessment  Ms. Sabina Figueroa is a 41 yo woman with a history of steroid induced diabetes, ILD and heavy tobacco use, who was admitted on 10/18/17 with hypoxic respiratory failure.  Hyperglycemia mostly later in the day related to needing more daytime basal insulin and meal insulin.  Discharging later today. Prednisone tapered from 50 to 40mg daily today.      Plan  -Glargine discontinue  -NPH 20 units qAM started today.  If glucoses are in fair control with this dose while on prednisone 40mg, would recommend that pt decrease dose to 16 units qAM when pred tapers to 30mg daily.  -Evening NPH 5 units qPM starting tonight.  -Meal aspart: 1unit/7g CHO with meals and snacks while in hospital.  Plan for home: 6 units with meals, 5 units with can of Dr. Pepper.  -continue aspart correction: high intensity ac and hs  -monitor glucose ac, hs, and 0200    Plan was reviewed with Sabina and will write it out in AVS.  Recommend scheduling appointment with outpatient endocrinologist within 5-10 days to help with adjusting insulin as steroid tapers.    Sabina will need prescription for NPH (Humulin N/Novolin N) at  discharge.    Celsa Horan PA-C [phone]    Diabetes Management Team job code: [phone]

## 2017-10-23 NOTE — PROGRESS NOTES
Patient has been assessed for Home Oxygen needs.  Oxygen readings:   *   RA - at rest  Pulse oximetry SPO2 95 %  *   RA - during activity/with exercise SPO2 92-90 %  *   O2 at  0 liters/minute (at rest) ...SPO2 0 %  *   O2 at  0 liters/minute (during activity/with exercise) ...SPO2 0 %

## 2017-10-23 NOTE — PLAN OF CARE
Problem: Patient Care Overview  Goal: Plan of Care/Patient Progress Review  Outcome: Improving  Pt a&o x 4. VSS on room air but pt requested 2LPM O2 via NC during sleep.  at 0200. Plan: continued evaluation of O2 needs and respiratory status as well as labile BGs. Endocrine to see patient next. Pt up and ambulating independently in room. Voiding spontaneously. Pt rested between cares with few requests after 2300. No acute changes overnight. Continue plan of care.

## 2017-10-23 NOTE — DISCHARGE SUMMARY
[facility], [place]    Internal Medicine Discharge Summary- [facility]    Date of Admission:  10/18/2017  Date of Discharge:  10/23/2017  Discharging Attending Provider: Dr. Francisco Santillan MD  Discharge Team: [facility] 1    Discharge Diagnoses   Acute hypoxemia and hypercarbic respiratory failure  ILD  Nicotine dependence  Steroid induced diabetes mellitus     Follow-ups Needed After Discharge   -Follow up with Primary care physician within 1 week.   -Follow up with Dr. Mane of Pulmonology within 2-4 weeks. Follow up pending studies: vasculitis panel, ANCA, compliment levels.   -Follow up with Endocrinology for insulin management within 5-10 days for insulin adjustments during prednisone taper.     Hospital Course   Sabina Figueroa is a 42 year old female admitted on 10/18/2017. She has a history of ILD (biopsy equivocal, possible NSIP cs  vs RB-ILD) and heavy tobacco and e-cigarette use and is admitted for hypoxic respiratory failure requiring NIPPV. The following problems were addressed during her hospitalization:    # Acute hypoxemia and hypercarbic RF and chronic ILD:   Pt with previously diagnosed ILD (s/p lung biopsy), previously responsive to steroids. Immunosuppressed on high doses of prednisone. Recurrent course supports toxic exposure, possible due to diacetyl-induced lung injury per recent pulm eval (2/2 patient's e-cigarette use, Blue Ox banana split flavor). Ddx also includes PJP pneumonia as pt was immunosuppressed on high doses of prednisone prior to admission and reportedly not taking ppx bactrim. CXR on admission showed possible bat-winging and LDH was elevated. Pt was started on vancomycin, zosyn, levofloxacin and bactrim. Pulmonary was consulted. She was narrowed to bactrim with high dose prednisone taper per pulmonary and CAP/HCAP treatment was stopped.She did not require supplemental O2 at time of discharge. Patient was discharged on treatment-dose bactrim  for 21 day course (starting 10/20) and prednisone taper, on day 1 of 5 on 40mg at time of discharge, followed by 30mg daily for 5 days, then 20mg daily until follow up with pulm in 2-4 weeks.     # Tobacco Use, Nicotine dependence  Patient was counseled extensively on need for complete cessation. Nicotine replacement was ordered.     # Steroid induced diabetes with hyperglycemia  Endocrinology was consulted for management of hyperglycemia in the setting of high dose prednisone use. At time of discharge, patient was prescribed NPH 20 units qAM with pln to decrease dose to 16 units qAM when prednisone tapers to 30mg qday. In addition, she was recommended to take evening NPH 5 units, meal aspart (6 u with meals, 5 u with cans of Dr. Pepper). She should follow up with endocrinology in 5-10 days for ongoing insulin adjustments.     Other chronic conditions were stable during hospitalization.     Consultations This Hospital Stay   PULMONARY GENERAL ADULT IP CONSULT  PHARMACY TO DOSE VANCO  PHYSICAL THERAPY ADULT IP CONSULT  OCCUPATIONAL THERAPY ADULT IP CONSULT  ENDOCRINE DIABETES ADULT IP CONSULT     Code Status   Full Code       The patient was discussed with MD Mireya Beckham [facility]  Pager: [phone]  ______________________________________________________________________    Physical Exam   Vital Signs: Temp: 97  F (36.1  C) Temp src: Oral BP: 127/74 Pulse: 79 Heart Rate: 90 Resp: 16 SpO2: 97 % O2 Device: None (Room air) Oxygen Delivery: 2 LPM  Weight: 183 lbs 12.8 oz    Constitutional: awake, alert, sitting in chair   Head: normalmocephalic, atraumatic  Eyes: anicteric sclera without conjunctival injection  ENT: oropharynx is moist  Neck: supple  Cardiovascular: RRR, normal S1/S2, no murmurs  Respiratory: clear to auscultation bilaterally, no wheezes  Gastrointestinal: soft, nontender, nondistended, normal bowel sounds  Ext: warm and well perfused, no edema   Skin: no  "rashes noted, lines in place without any surrounding erythema or exudate  Neurologic: alert and oriented, moving all extremities   Psychiatric: appropriate affect      Significant Results and Procedures   Results for orders placed or performed during the hospital encounter of 10/18/17   US Lower Extremity Venous Duplex Bilateral    Narrative    EXAMINATION: US LOWER EXTREMITY VENOUS DUPLEX BILATERAL, 10/18/2017  6:45 AM     COMPARISON: None.    HISTORY: Worsening respiratory status    TECHNIQUE:  Gray-scale evaluation with compression, spectral flow and  color Doppler assessment of the deep venous system of both legs from  groin to knee, and then at the ankles.    FINDINGS:  In the both lower extremities, the common femoral, femoral, popliteal  and posterior tibial veins demonstrate normal compressibility and  blood flow.        Impression    IMPRESSION:  No evidence of deep venous thrombosis in either lower extremity.    I have personally reviewed the examination and initial interpretation  and I agree with the findings.    BINTA HICKS MD   CT Chest Pulmonary Embolism w Contrast    Narrative    EXAMINATION: CT CHEST PULMONARY EMBOLISM W CONTRAST, 10/18/2017 12:42  PM    TECHNIQUE:  Helical CT images of the chest were obtained with IV  contrast. Contrast dose: isovue 370    COMPARISON: CT 5/2/2017, chest radiograph 10/17/2017    HISTORY: hypoxia. Wedge resection 9/16/2017 showed \"Acute lung injury  in background of desquamative interstitial pneumonitis (DIP) and  nonspecific interstitial pneumonitis (NSIP)-like   pattern\"    FINDINGS:    Chest: There is good contrast opacification of the pulmonary arteries.  No filling defect to suggest pulmonary embolism. Pulmonary artery and  aorta are normal in caliber. Mild coronary artery calcification.  Visualized thyroid is unremarkable. Multiple enlarged mediastinal and  bilateral hilar lymph nodes are not significantly changed from  5/2/2017, for example 1.5 cm short " axis diameter paratracheal node  (series 4 image 32), 1.1 cm short axis diameter right hilar lymph node  (series 4 image 42), 1.2 cm left para-aortic lymph node (series 4  image 32). The central tracheobronchial tree is patent. Postoperative  changes of multiple right lung wedge resections. Diffuse centrilobular  groundglass opacities, improved compared with 5/2/2017. There is  superimposed moderate apical predominant centrilobular and paraseptal  emphysematous change.    Upper abdomen: Spleen is enlarged measuring up to 15.1 cm in largest  dimension. Limited evaluation of the upper abdomen is otherwise  unremarkable.    Bones and soft tissues: Posterior left eighth rib fracture is new  since 5/2/2017 but appears subacute. No acute appearing fracture or  suspicious bone lesion.       Impression    IMPRESSION:   1. No evidence of pulmonary embolism.  2. Diffuse centrilobular groundglass opacities, improved compared with  5/2/2017. Findings are superimposed upon moderate apical predominant  centrilobular and paraseptal emphysematous changes.  3. Splenomegaly.  4. Subacute posterior left eighth rib fracture.  5. Stable mediastinal and bilateral hilar lymphadenopathy.    I have personally reviewed the examination and initial interpretation  and I agree with the findings.    KATIE GUERRERO MD       Pending Results   These results will be followed up by Aidee Hemphill    Unresulted Labs Ordered in the Past 30 Days of this Admission     Date and Time Order Name Status Description    9/16/2017 0931 AFB Culture Non Blood Preliminary              Primary Care Physician   Aidee Hemphill    Discharge Disposition   Discharged to home  Condition at discharge: Stable    Discharge Orders     Medication Therapy Management Referral       Discharge Medications   Discharge Medication List as of 10/23/2017  2:08 PM      CONTINUE these medications which have CHANGED    Details   !! insulin isophane human (HUMULIN N PEN) 100 UNIT/ML  injection Inject 5 Units Subcutaneous every evening, Disp-3 mL, R-1, Local Print      insulin aspart (NOVOLOG PEN) 100 UNIT/ML injection Inject 6 Units Subcutaneous 3 times daily (with meals) . Inject 5u with can of Dr. Pepper., Disp-3 mL, R-1, E-Prescribe      !! insulin isophane human (HUMULIN N PEN) 100 UNIT/ML injection Inject 20 Units Subcutaneous every morning Decrease to 16 units every morning when prednisone tapers to 30mg per day, Disp-3 mL, R-1, E-Prescribe      nicotine (NICODERM CQ) 21 MG/24HR 24 hr patch Place 1 patch onto the skin daily, Disp-30 patch, R-1, E-Prescribe      nicotine (NICOTROL) 10 MG Inhaler Inhale 1 Cartridge into the lungs every hour as needed for smoking cessation, Disp-180 each, R-1, E-Prescribe      nicotine polacrilex (NICORETTE) 2 MG gum Place 1 each (2 mg) inside cheek every hour as needed for smoking cessation, Disp-30 tablet, R-1, E-Prescribe      !! predniSONE (DELTASONE) 20 MG tablet Take 2 tablets (40 mg) by mouth daily, Disp-8 tablet, R-0, E-Prescribe      !! predniSONE (DELTASONE) 10 MG tablet Take 3 tablets (30 mg) by mouth daily, Disp-15 tablet, R-0, E-Prescribe      !! predniSONE (DELTASONE) 20 MG tablet Take 1 tablet (20 mg) by mouth daily, Disp-30 tablet, R-1, E-Prescribe      sulfamethoxazole-trimethoprim (BACTRIM DS/SEPTRA DS) 800-160 MG per tablet Take 2 tablets by mouth 3 times daily, Disp-108 tablet, R-0, E-Prescribe       !! - Potential duplicate medications found. Please discuss with provider.      CONTINUE these medications which have NOT CHANGED    Details   Atorvastatin Calcium (LIPITOR PO) Take 20 mg by mouth At Bedtime, Historical      buprenorphine-naloxone (SUBOXONE) 8-2 MG SUBL sublingual tablet Place 3 tablets under the tongue daily, Disp-60 each, R-0, Local PrintResume Suboxone after discussing with Dr. Harper in one week      Pantoprazole Sodium (PROTONIX PO) Take 40 mg by mouth every morning (before breakfast), Historical      loratadine (CLARITIN)  10 MG tablet Take 10 mg by mouth daily, Historical      MIRTAZAPINE PO Take 30 mg by mouth At Bedtime, Historical      MELATONIN PO Take 5 mg by mouth At Bedtime, Historical      furosemide (LASIX) 20 MG tablet Take 1 tablet (20 mg) by mouth daily, Disp-30 tablet, R-3, E-Prescribe      spironolactone (ALDACTONE) 50 MG tablet Take 1 tablet (50 mg) by mouth daily, Disp-3 tablet, R-0, Local Print      gabapentin (NEURONTIN) 600 MG tablet Take 1 tablet (600 mg) by mouth 3 times daily, Disp-9 tablet, R-0, Local Print      ascorbic acid (VITAMIN C) 500 MG tablet Take 500 mg by mouth daily, Historical      FLUoxetine (PROZAC) 40 MG capsule Take 40 mg by mouth daily, Historical      hydrOXYzine (ATARAX) 10 MG tablet Take 10 mg by mouth every 8 hours as needed for itching, Historical      lactulose (CHRONULAC) 10 GM/15ML solution Take 20 g by mouth 3 times daily, Historical      traZODone (DESYREL) 100 MG tablet Take 100 mg by mouth At Bedtime, Historical      Omega-3 Fatty Acids (FISH OIL) 1200 MG CAPS Take 1 capsule by mouth daily, Historical      methocarbamol (ROBAXIN) 500 MG tablet Take 1,000 mg by mouth 4 times daily, Historical      aspirin EC 81 MG EC tablet Take 1 tablet (81 mg) by mouth daily, Disp-30 tablet, R-1, E-Prescribe      rifaximin (XIFAXAN) 550 MG TABS tablet Take 1 tablet (550 mg) by mouth 2 times daily, Disp-60 tablet, R-0, E-Prescribe      ferrous sulfate (IRON) 325 (65 FE) MG tablet Take 325 mg by mouth daily (with breakfast), Historical      alum & mag hydroxide-simethicone (MYLANTA ES/MAALOX  ES) 400-400-40 MG/5ML SUSP Take 30 mLs by mouth every 4 hours as needed for indigestion Reported on 2/23/2017, Historical      albuterol (PROAIR HFA, PROVENTIL HFA, VENTOLIN HFA) 108 (90 BASE) MCG/ACT inhaler Inhale 2 puffs into the lungs every 4 hours as needed for shortness of breath / dyspnea or wheezing Reported on 2/23/2017, Historical      MULTIPLE VITAMINS PO Take 1 tablet by mouth daily, Historical       folic acid (FOLVITE) 1 MG tablet Take 1 tablet (1 mg) by mouth daily, Disp-30 tablet, Transitional         STOP taking these medications       insulin glargine (LANTUS) 100 UNIT/ML injection Comments:   Reason for Stopping:             Allergies   Allergies   Allergen Reactions     Buprenorphine Other (See Comments)     Local reaction from patch  Patch-redness and itchiness     No Clinical Screening - See Comments Rash     Bupronide patch allergy     ATTENDING ATTESTATION  Patient seen, examined and discussed with housestaff or advanced practice provider. I agree with the key elements of findings and plan described above, with pertinent additions, amendments and/or updates as included in text below.     No longer requiring ambulatory oxygen.   To return home with PCP, endocrine and pulmonary follow up.  Communicated need for cessation of all tobacco products. Patient understands risks associated with them.     Francisco Santillan MD  [facility]

## 2017-10-23 NOTE — DISCHARGE INSTRUCTIONS
You were already scheduled for an appointment Tomorrow, Tuesday 10/24 at 2:40 pm with your Primary Care Provider, Dr. Aidee Hemphill. Please bring your insurance card, ID, and these discharge papers with you to this appointment.  [facility]  [address] [place] [address]  Phone #: [phone]      Insulin plan for home:  While on prednisone 40mg daily:  - Do not take Basaglar insulin  - Take NPH insulin (Humulin N or Novolin N) 20 units every morning with prednisone dose (around 8am)  - Take NPH insulin 5 units every evening (12 hours after morning dose, ~8pm)  - Novolog with meals: 6 units at start of meal.  Novolog for 12oz can of Dr. Pepper: 5 units.  -Novolog for correction based on the following sliding scales (this can be added to your meal Novolog dose at lunch and supper):    Sliding Scale for blood sugars checked in the morning and before lunch and supper:  Do Not give Correction Insulin if Pre-Meal BG less than 140.   For Pre-Meal  - 164 give 1 unit.   For Pre-Meal  - 189 give 2 units.   For Pre-Meal  - 214 give 3 units.   For Pre-Meal  - 239 give 4 units.   For Pre-Meal  - 264 give 5 units.   For Pre-Meal  - 289 give 6 units.   For Pre-Meal  - 314 give 7 units.   For Pre-Meal  - 339 give 8 units.   For Pre-Meal  - 364 give 9 units.   For Pre-Meal BG greater than or equal to 365 give 10 units    Sliding Scale for blood sugars checked at bedtime:  Do Not give Bedtime Correction Insulin if BG less than 200.   For  - 224 give 1 units.   For  - 249 give 2 units.   For  - 274 give 3 units.   For  - 299 give 4 units.   For  - 324 give 5 units.   For  - 349 give 6 units.   For BG greater than or equal to 350 give 7 units.     -Check your blood sugar in the morning, before lunch, before supper, at bedtime, and for tonight please set alarm and check blood sugar at 2am given that we are starting a  new dose of insulin tonight.    Insulin plan while on prednisone 30mg daily  -Morning NPH dose: decrease to 16 units every morning- take with prednisone dose  -Evening NPH dose: continue 5 units every evening  -Novolog with meals: 5 units with each meal. For can of Dr. Pepper: 4 units.  -Continue same sliding scales as listed above.    Recommend you be seen by an endocrinologist to help adjust insulin doses with your steroid taper within 5-10 days. [facility] nurse: [phone], to schedule your outpatient diabetes appointment.

## 2017-10-24 NOTE — PROGRESS NOTES
Patient has clinic visit within 24-48 hours of Discharge so no post DC follow up call is needed          Oct 26, 2017  2:00 PM CDT   (Arrive by 1:45 PM)   Return Visit with Jennifer Mane MD   [facility] ([facility])     [address] [place] [id number] [phone]

## 2017-10-27 NOTE — TELEPHONE ENCOUNTER
Called pt for scheduled MTM appointment today. She is unavailable as she has many things up in the air that she is trying to finish quickly.     Rescheduled for Monday at 1pm per patient request.    Annalise BergmanD   MTM Pharmacist   Phone: [phone]

## 2017-11-01 NOTE — TELEPHONE ENCOUNTER
Called pt for re-schedule MTM visit on Monday, she was not available due to possible maintenance coming it. Preferred to be called today at 1pm. Left message for return call as patient was unavailable.     Annalise BergmanD  MTM Pharmacist

## 2017-11-04 NOTE — ED PROVIDER NOTES
History     Chief Complaint:  Shortness of Breath    HPI   Sabina Figueroa is a 42 year old female with a pulmonary history including diagnosis of interstitial lung disease who presents to the emergency department today for evaluation of increasing shortness of breath. The patient has had pneumonia about 15 times over the last 1.5 years with recent discharge from the ICU. 24 hours ago, the patient experienced onset of shortness of breath when talking or moving. She states that she felt feverish today. She has baseline redness to the face, but has an abnormal bluish tone to her face and fingers while here in the emergency department. Upon triage, the patient was found to be 51% on RA. The patient was immediately placed on 15 L via oxymask.       Allergies:  Buprenorphine    Medications:    insulin isophane human (HUMULIN N PEN) 100 UNIT/ML injection  insulin aspart (NOVOLOG PEN) 100 UNIT/ML injection  nicotine (NICODERM CQ) 21 MG/24HR 24 hr patch  nicotine (NICOTROL) 10 MG Inhaler  nicotine polacrilex (NICORETTE) 2 MG gum  predniSONE (DELTASONE) 20 MG tablet  predniSONE (DELTASONE) 10 MG tablet  sulfamethoxazole-trimethoprim (BACTRIM DS/SEPTRA DS) 800-160 MG per tablet  Atorvastatin Calcium (LIPITOR PO)  buprenorphine-naloxone (SUBOXONE) 8-2 MG SUBL sublingual tablet  Pantoprazole Sodium (PROTONIX PO)  loratadine (CLARITIN) 10 MG tablet  MIRTAZAPINE PO  MELATONIN PO  furosemide (LASIX) 20 MG tablet  spironolactone (ALDACTONE) 50 MG tablet  gabapentin (NEURONTIN) 600 MG tablet  ascorbic acid (VITAMIN C) 500 MG tablet  FLUoxetine (PROZAC) 40 MG capsule  hydrOXYzine (ATARAX) 10 MG tablet  lactulose (CHRONULAC) 10 GM/15ML solution  traZODone (DESYREL) 100 MG tablet  methocarbamol (ROBAXIN) 500 MG tablet  aspirin EC 81 MG EC tablet  rifaximin (XIFAXAN) 550 MG TABS tablet  ferrous sulfate (IRON) 325 (65 FE) MG tablet  alum & mag hydroxide-simethicone (MYLANTA ES/MAALOX  ES) 400-400-40 MG/5ML SUSP  albuterol (PROAIR HFA,  "PROVENTIL HFA, VENTOLIN HFA) 108 (90 BASE) MCG/ACT inhaler    Past Medical History:    ALC (alcoholic liver cirrhosis) (H)   Alcohol abuse   Anxiety   Arthritis   Ascites   Asthma   Bunion, left foot   Chronic low back pain   Depression   Hypertension   ILD (interstitial lung disease) (H)   Ovarian cyst, left     Past Surgical History:    BRONCHOSCOPY FLEXIBLE    SECTION    ESOPHAGOSCOPY, GASTROSCOPY, DUODENOSCOPY (EGD), COMBINED  KNEE SURGERY x3  THORACOSCOPIC BIOPSY LUNG - Right    Family History:    Depression  Mother   Anxiety Disorder Mother   MENTAL ILLNESS Mother   Substance Abuse Mother   Depression  Father   Anxiety Disorder Father   Substance Abuse Father   MENTAL ILLNESS Father   Depression  Daughter   Schizophrenia  Brother   Depression  Brother   Anxiety Disorder Brother   Substance Abuse Brother     Social History:  Smoking Status: Current Every Day Smoker; 1.50 packs per day  Smokeless Tobacco: Former User  Alcohol Use: Negative   Marital Status:  Single     Review of Systems   Constitutional: Positive for fever.   Respiratory: Positive for shortness of breath.    All other systems reviewed and are negative.    Physical Exam     Patient Vitals for the past 24 hrs:   BP Temp Temp src Pulse Heart Rate Resp SpO2 Height Weight   175 114/80 - - - 108 28 (!) 87 % - -   17 031 117/87 - - - 109 - 98 % - -   17 0245 97/75 - - - 86 15 97 % - -   17 0215 98/66 98.4  F (36.9  C) Oral - - - - - -   17 0200 (!) 87/51 - - - 101 13 97 % - -   17 0145 92/62 - - - 98 13 98 % - -   17 0130 94/61 - - - - - - - -   17 0118 - - - - 111 19 95 % - -   17 0116 - - - - 109 17 97 % - -   17 0115 103/66 - - - 110 18 93 % - -   17 0100 102/62 - - - 121 13 - - -   17 0056 - - - - - - (!) 87 % - -   17 0054 - - - - - - (!) 87 % - -   17 0049 115/73 102  F (38.9  C) Oral 134 - 24 (!) 62 % 1.689 m (5' 6.5\") 77.1 kg (170 lb)   17 " 0045 115/73 - - - - - - - -   11/04/17 0041 - - - - 131 28 (!) 51 % - -         Physical Exam   Constitutional: She appears well-developed and well-nourished. She appears distressed.   HENT:   Right Ear: External ear normal.   Left Ear: External ear normal.   Mouth/Throat: Oropharynx is clear and moist. No oropharyngeal exudate.   TM's clear bilaterally   Eyes: Conjunctivae are normal. Pupils are equal, round, and reactive to light. No scleral icterus.   Neck: Normal range of motion. Neck supple.   Cardiovascular: Regular rhythm, normal heart sounds and intact distal pulses.  Exam reveals no gallop and no friction rub.    No murmur heard.  tachycardic   Pulmonary/Chest: Effort normal. No respiratory distress. She has wheezes. She has no rales.   Mild expiratory wheezing at the bases   Abdominal: Soft. Bowel sounds are normal. She exhibits no distension and no mass. There is no tenderness.   Musculoskeletal: She exhibits no edema.   Lymphadenopathy:     She has no cervical adenopathy.   Neurological: She is alert.   Skin: Skin is warm and dry. No rash noted.   Psychiatric: She has a normal mood and affect.     Emergency Department Course     ECG:  ECG taken at 0050, ECG read at 0050  Sinus tachycardia  Otherwise normal ECG  Rate 128 bpm. NM interval 126 ms. QRS duration 74 ms. QT/QTc 316/461 ms. P-R-T axes 54 -12 52.    Imaging:  Radiology findings were communicated with the patient who voiced understanding of the findings.    XR Chest Port 1 View  IMPRESSION: Basilar pneumonia versus interstitial edema.  Reading per radiology     Laboratory:  Laboratory findings were communicated with the patient who voiced understanding of the findings.    Lactic acid whole blood (Collected 0050) 6.3 (HH)  Lactic acid (Collected 0247) 2.1 (H)  Blood gas venous and oxyhgb: PO2 98 (H) o/w WNL  Ketone Beta-Hydroxybutyrate Quantitative: 0.6  Influenza A/B antigen: Negative   Blood culture: No growth after 1 hour   CBC: WBC 15.4 (H),  HGB 13.8,   CMP:  (L), Glucose (Collected 0050) 174 (H), Albumin 3.3 (L), GFR Estimate 40 (L), GFR Estimate if Black 49 (L), Carbon dioxide 19 (l), Anion gap 16 (H), AST 47 (H) o/w WNL (Creatinine 1.42 (H))    Interventions:  0056 Duoneb 3 mL Neb   0057 Zofran 4 mg IV  0059 solu-Medrol 125 mg IV  0104 Lactated ringers bolus 2559 mLs IV  0107 Tylenol 975 mg PO  0111 Magnesium sulfate 2 g IV    0145 Zosyn infusion 4.5 g IV   0150 Levaquin infusion 750 mg IV  0246 Vancocin 1500 mg IV    Emergency Department Course:    Nursing notes and vitals reviewed.    0045 I performed an exam of the patient as documented above.     IV was inserted and blood was drawn for laboratory testing, results above.     The patient was sent for a chest x-ray while in the emergency department, results above.     0120 I rechecked the patient.    0156 I discussed the patient with Dr. Owen Brunson who will admit the patient to a monitored bed for further evaluation and treatment.    I discussed the treatment plan with the patient. They expressed understanding of this plan and consented to admission. I discussed the patient with Dr. Owen Brunson, who will admit the patient to a monitored bed for further evaluation and treatment.    Impression & Plan      Medical Decision Making:  Sabina Figueroa is a 42 year old female who presents to the emergency department today for evaluation of hypoxia and shortness of breath. Patient does have a long pulmonary history including diagnosis of interstitial lung disease and a recent hospitalization where she had presented an extremis as well. Initially oxygen was 51%, respiratory rate was 28, temperature was 102 F, and was tachycardic. She was given 1 Duoneb and placed on high Flonase canula and given Tylenol to bring down her fever. She does have what appears to be bilateral pneumonia versus infiltrate. She shows some suspicion of pulmonary edema, however I think in light of her  elevated white count and a fever, this is most likely infectious. Since she was just recently in the hospital, she is going to be treated for hospital acquired pneumonia. She is given Levaquin, Vancocin, and Zosyn for now. She is now sleeping and feeling much better. Her fever is down. Her repeat lactate after 1 L of fluid is now 2.1. She is going to need to be admitted to ICU for now until she feels better. I discussed the findings with her. She is comfortable with the plan. She is admitted by Dr. Brunson from ICU.    Diagnosis:    ICD-10-CM    1. Pneumonia of both lower lobes due to infectious organism J18.9 UA with Microscopic     Urine Culture Aerobic Bacterial     Blood culture     Blood culture     Blood gas venous and oxyhgb     Ketone Beta-Hydroxybutyrate Quantitative     Lactic acid   2. Acute respiratory failure with hypoxia (H) J96.01      Disposition:   The patient is admitted into the care of Dr. Owen Brunson.     CMS Diagnoses:   The patient has signs of Severe Sepsis as evidenced by:    1. 2 SIRS criteria, AND  2. Suspected infection, AND   3. Organ dysfunction: Lactic Acid >2 and new respiratory failure and renal insufficiency.    Time severe sepsis diagnosis confirmed = 0107 as this was the time when Lactate resulted, and the level was >2      3 Hour Severe Sepsis Bundle Completion:  1. Initial Lactic Acid Result:   Recent Labs   Lab Test  11/04/17   0247  11/04/17   0050  10/17/17   2202   LACT  2.1*  6.3*  0.9     2. Blood Cultures before Antibiotics: Yes  3. Broad Spectrum Antibiotics Administered: Yes     Anti-infectives (Future)    Start     Dose/Rate Route Frequency Ordered Stop    11/04/17 0900  rifaximin (XIFAXAN) tablet 550 mg      550 mg Oral 2 TIMES DAILY 11/04/17 0356      11/04/17 0900  sulfamethoxazole-trimethoprim (BACTRIM DS/SEPTRA DS) 800-160 MG per tablet 2 tablet      2 tablet Oral 3 TIMES DAILY 11/04/17 0356      11/04/17 0400  piperacillin-tazobactam (ZOSYN)  intermittent infusion 4.5 g     Comments:  Pharmacy can adjust dose based on renal function.    4.5 g  200 mL/hr over 30 Minutes Intravenous EVERY 6 HOURS 11/04/17 0356      11/04/17 0400  azithromycin (ZITHROMAX) 500 mg in NaCl 0.9 % 250 mL intermittent infusion      500 mg  250 mL/hr over 1 Hours Intravenous ONCE 11/04/17 0356      11/04/17 0129  vancomycin (VANCOCIN) 1500 mg in 0.9% NaCl 250 mL PREMIX      1,500 mg  166.7 mL/hr over 90 Minutes Intravenous ONCE 11/04/17 0129          4. 2559 ml of IV fluids.    the patient was transferred out of the ED prior to the 6 hour serena.     Scribe Disclosure:  Lorri CONWAY, am serving as a scribe at 12:49 AM on 11/4/2017 to document services personally performed by Hudson Villanueva MD, based on my observations and the provider's statements to me.      [facility] EMERGENCY DEPARTMENT       Hudson Villanueva MD  11/04/17 0657

## 2017-11-04 NOTE — H&P
CHIEF COMPLAINT:  Shortness of breath and fevers.      HISTORY OF PRESENT ILLNESS:  Ms. Sabina Figueroa is a 42-year-old woman with a fairly recent diagnosis of interstitial lung disease, who presents with about one day of shortness of breath and fevers.  She is very sleepy, and history is very difficult to obtain.  She was recently hospitalized at the [facility] from 10/18 to 10/23 for pneumonia.  She was initially treated with broad-spectrum antibiotics, and discharged with Bactrim and prednisone.  There was concern for Pneumocystis pneumoniae.  She had been diagnosed with interstitial lung disease by wedge biopsy in 09/2017, and had been on prednisone.  The patient says that she had stopped taking medications for a couple of days and is not sure which medications she is on.  It is not clear if she continued with the Bactrim or the prednisone since her discharge about a week and a half ago.      PAST MEDICAL HISTORY:   1.  Interstitial lung disease, diagnosed by wedge biopsy at the [facility] in 09/2017.   2.  Pneumonia, with suspicion for Pneumocystis, hospitalized from 10/18 to 10/23.  She was discharged on Bactrim and prednisone.   3.  History of cirrhosis secondary to alcohol.   4.  Hypertension.   5.  Smoking.   6.  Anxiety.   7.  Ascites.   8.  Chronic low back pain.   9.  Depression.   10.  Hypertension.   11.  Chemical dependency.   12.  Gastrointestinal bleed.   13.  Bronchoscopy in 01/2017.   14.  Three knee surgeries.      MEDICATIONS:  Have not yet been reconciled, but recently have included albuterol, aspirin, atorvastatin, Suboxone, Prozac, Lasix, gabapentin, Atarax, aspart insulin, NPH insulin, lactulose, Robaxin, mirtazapine, prednisone, rifaximin, Aldactone, Bactrim, trazodone.      ALLERGIES:  Buprenorphine, although she is on Suboxone.      FAMILY HISTORY:  Mental illness and chemical dependency in her parents.      SOCIAL HISTORY:  She has been smoking for about 20  years.  She reports she is still smoking.  She used to drink alcohol heavily.  She said she still drinks periodically, and had some drinks four days ago.      REVIEW OF SYSTEMS:  Unable to completely obtain due to mental status.      PHYSICAL EXAM:   VITAL SIGNS:  Blood pressure is 103/66, pulse 110, respirations 17.  Oxygen saturations were 51% on room air, 97% on high flow oxygen.  Maximum temperature is 102.0.   GENERAL:  She is very sleepy, arouses for brief episodes.   HEENT:  Face is puffy.  Pupils are round and equal.  Conjunctivae, sclerae and lids are within normal limits.  Neck is supple, with no mass, no thyromegaly.  Oropharynx is pink and moist.  Teeth and lips are within normal limits.   HEART:  Regular rhythm.  No murmurs.   LUNGS:  Clear to auscultation bilaterally with normal respiratory effort.   ABDOMEN:  Soft, nontender.  There are no masses.  Bowel sounds are active.   EXTREMITIES:  There is no edema.   NEUROLOGIC:  She moves all four extremities symmetrically.  Facial musculature is symmetric.   SKIN:  No rashes on limited exam.      LABS:  Sodium 129, potassium 3.6, chloride 94, bicarbonate 19, BUN 15, creatinine 1.4, glucose 174.  Lactate is 6.3.  Anion gap is 16.  White blood cell count is 15.4, hemoglobin 13.8, platelets 196,000.      ASSESSMENT AND PLAN:  Ms. Figueroa is a 42-year-old woman who presents with severe sepsis and lactic acidosis, presumably secondary to pneumonia.  She also has acute hypoxic respiratory failure.  She has a history of interstitial lung disease, cirrhosis, recent diagnosis of pneumonia, ongoing smoking, depression, chemical dependency, and hypertension.   1.  Pneumonia:  Differential diagnosis includes typical pathogens for pneumonia versus Pneumocystis.  There was concern for Pneumocystis based on her x-ray pattern and steroid use on recent hospitalization.  It is unclear if she has been taking the Bactrim as prescribed.  Given her sepsis and severe  respiratory failure, we will treat with broad-spectrum antibiotics for now with vancomycin, Zosyn, azithromycin and Bactrim.  Because she also will continue on high-dose steroids, we will avoid quinolones.  We will continue with steroid for now.  It is unclear if she has been taking this, or what step in the taper she may be.  She is requiring high-flow oxygen for now, and is oxygenating quite well with the supplemental oxygen.   2.  Severe sepsis:  She comes in with fever, elevated white count, elevated lactate, and respiratory failure.  Blood cultures have been drawn.  She does not appear to be having a cough, but given her fever and respiratory failure, pneumonia seems the most likely etiology of the sepsis.  We will hydrate with IV fluids and follow her lactate level.   3.  Diabetes mellitus:  We will continue with her NPH, schedule aspart, and add sliding scale insulin as needed.   4.  Smoking:  She will be given a nicotine patch.   5.  Interstitial lung disease:  She was being treated with high-dose steroids.  We will continue this, and try to determine the longer term treatment plan.   6.  Cirrhosis:  Hold Lasix and Aldactone due to sepsis and lactic acidosis.  Closely monitor volume status. Reconcile her medications.   7.  Chemical dependency:  Continue with Suboxone.         DAVID DO MD             D: 2017 03:22   T: 2017 04:17   MT: [id number]      Name:     SYDNI GLEZ   MRN:      [id number]        Account:      [id number]   :      1975           Admitted:     [id number]      Document: [id number]       cc: DALE RUSSELL MD

## 2017-11-04 NOTE — PHARMACY-VANCOMYCIN DOSING SERVICE
Pharmacy Vancomycin Initial Note  Date of Service 2017  Patient's  1975  42 year old, female    Indication: Sepsis    Current estimated CrCl = Estimated Creatinine Clearance: 56.3 mL/min (based on Cr of 1.42).    Creatinine for last 3 days  2017: 12:50 AM Creatinine 1.42 mg/dL    Recent Vancomycin Level(s) for last 3 days  No results found for requested labs within last 72 hours.      Vancomycin IV Administrations (past 72 hours)                   vancomycin (VANCOCIN) 1500 mg in 0.9% NaCl 250 mL PREMIX (mg) 1,500 mg New Bag 17 0246                Nephrotoxins and other renal medications (Future)    Start     Dose/Rate Route Frequency Ordered Stop    17 1200  vancomycin (VANCOCIN) 1,750 mg in NaCl 0.9 % 500 mL intermittent infusion      1,750 mg  over 2 Hours Intravenous EVERY 12 HOURS 17 0444      17 0800  piperacillin-tazobactam (ZOSYN) intermittent infusion 4.5 g     Comments:  Pharmacy can adjust dose based on renal function.    4.5 g  200 mL/hr over 30 Minutes Intravenous EVERY 6 HOURS 17 0356            Contrast Orders - past 72 hours     None                Plan:  1.  Start vancomycin  1750 mg IV q12h, starting at 1200 as patient received 1500mg in the ED earlier  2.  Goal Trough Level: 15-20 mg/L   3.  Pharmacy will check trough levels as appropriate in 1-3 Days.    4. Serum creatinine levels will be ordered daily for the first week of therapy and at least twice weekly for subsequent weeks.    5. [place] method utilized to dose vancomycin therapy: Method 1    [facility]

## 2017-11-04 NOTE — PLAN OF CARE
Problem: Patient Care Overview  Goal: Plan of Care/Patient Progress Review  Outcome: Improving  ICU End of Shift Summary.  For vital signs and complete assessments, please see documentation flowsheets.      Pertinent assessments: A&O. BP & HR stable.  Tolerating CL diet.  Up with SBA to BSC & voiding adequate.    Major Shift Events: T 99.8. LS diminished.  O2 sats 97% on 100% HF 10LPM. IV abx. Urine cx neg. BC pending. Influenza A&B neg. Lactic on admit 6.3.  Lactic post 2L LR 1.4. NS infusing at 75cc/hr. Rt PIV x's 2 patent and infusing.  Plan (Upcoming Events): Continue ICU cares.  Discharge/Transfer Needs: Home when stable.     Bedside Shift Report Completed : yes  Bedside Safety Check Completed: yes

## 2017-11-04 NOTE — ED NOTES
[facility]  ED Nurse Handoff Report    Sabina Figueroa is a 42 year old female   ED Chief complaint: Shortness of Breath  . ED Diagnosis:   Final diagnoses:   Pneumonia of both lower lobes due to infectious organism   Acute respiratory failure with hypoxia (H)     Allergies:   Allergies   Allergen Reactions     Buprenorphine Other (See Comments)     Local reaction from patch  Patch-redness and itchiness     No Clinical Screening - See Comments Rash     Bupronide patch allergy       Code Status: Full Code  Activity level - Baseline/Home:  Independent. Activity Level - Current:   Independent. Lift room needed: No. Bariatric: No   Needed: No   Isolation: No. Infection: Not Applicable.     Vital Signs:   Vitals:    11/04/17 0100 11/04/17 0115 11/04/17 0116 11/04/17 0118   BP: 102/62 103/66     Pulse:       Resp: 13 18 17 19   Temp:       TempSrc:       SpO2:  93% 97% 95%   Weight:       Height:           Cardiac Rhythm:  ,      Pain level: 0-10 Pain Scale: 7  Patient confused: No. Patient Falls Risk: No.   Elimination Status: Has not yet voided in ED   Patient Report - Initial Complaint: Pt presents for evaluation of increasing shortness of breath worsening since yesterday. Pt states she has had pneumonia about 15 times over the last 1.5 years with recent discharge from the ICU. Pt has felt feverish today, is baseline redness to the face, with a current bluish tone to face and fingers. Upon triage pt was found to be 51% on RA. Pt brought back to a room immediately and placed on 15 L via oxymask. Focused Assessment: Scattered wheezes and diminished breath sounds noted upon initial presentation to ED. Wheezing improved after nebulizer treatment. Currently now on HiFlo O2.   Tests Performed: labs, CXR, UA - still needs to be collect. Abnormal Results:   Labs Ordered and Resulted from Time of ED Arrival Up to the Time of Departure from the ED   CBC WITH PLATELETS DIFFERENTIAL - Abnormal; Notable  for the following:        Result Value    WBC 15.4 (*)     Absolute Neutrophil 13.0 (*)     All other components within normal limits   COMPREHENSIVE METABOLIC PANEL - Abnormal; Notable for the following:     Sodium 129 (*)     Carbon Dioxide 19 (*)     Anion Gap 16 (*)     Glucose 174 (*)     Creatinine 1.42 (*)     GFR Estimate 40 (*)     GFR Estimate If Black 49 (*)     Albumin 3.3 (*)     AST 47 (*)     All other components within normal limits   LACTIC ACID WHOLE BLOOD - Abnormal; Notable for the following:     Lactic Acid 6.3 (*)     All other components within normal limits   BLOOD GAS VENOUS AND OXYHGB   KETONE BETA-HYDROXYBUTYRATE QUANTITATIVE   ROUTINE UA WITH MICROSCOPIC   LACTIC ACID   IV ACCESS   PULSE OXIMETRY NURSING   CARDIAC CONTINUOUS MONITORING   MEASURE URINE OUTPUT   PATIENT CARE ORDER   BLOOD CULTURE   BLOOD CULTURE   INFLUENZA A/B ANTIGEN   URINE CULTURE AEROBIC BACTERIAL   CXR shows bilateral pneumonia.   Treatments provided: LR septic bolus, zosyn, levaquin, vancomycin, zofran, tylenol, duoneb, prednisone, magnesium sulfate  Family Comments: NA  OBS brochure/video discussed/provided to patient:  No  ED Medications:   Medications   lactated ringers infusion (not administered)   piperacillin-tazobactam (ZOSYN) intermittent infusion 4.5 g (4.5 g Intravenous New Bag 11/4/17 0145)   levofloxacin (LEVAQUIN) infusion 750 mg (750 mg Intravenous New Bag 11/4/17 0150)   vancomycin (VANCOCIN) 1500 mg in 0.9% NaCl 250 mL PREMIX (not administered)   lactated ringers BOLUS 2,559 mL (2,559 mLs Intravenous New Bag 11/4/17 0104)   ondansetron (ZOFRAN) injection 4 mg (4 mg Intravenous Given 11/4/17 0057)   acetaminophen (TYLENOL) tablet 975 mg (975 mg Oral Given 11/4/17 0107)   ipratropium - albuterol 0.5 mg/2.5 mg/3 mL (DUONEB) neb solution 3 mL (3 mLs Nebulization Given 11/4/17 0056)   methylPREDNISolone sodium succinate (solu-MEDROL) injection 125 mg (125 mg Intravenous Given 11/4/17 0059)   magnesium  sulfate 2 g in NS intermittent infusion ([facility] or [facility]) (0 g Intravenous Stopped 11/4/17 0151)     Drips infusing:  Yes - Zosyn and Levaquin currently infusing, will start vancomycin when zosyn is finished  For the majority of the shift, the patient's behavior Green. Interventions performed were NA.     Severe Sepsis OR Septic Shock Diagnosis Present: No      ED Nurse Name/Phone Number: Rich Justin,   2:05 AM

## 2017-11-04 NOTE — PHARMACY-ADMISSION MEDICATION HISTORY
Admission medication history interview status for this patient is complete. See [facility] admission navigator for allergy information, prior to admission medications and immunization status.     Medication history interview source(s):Patient  Medication history resources (including written lists, pill bottles, clinic record):[facility] discharge med list from 10/23, [facility]   [phone]    Changes made to PTA medication list:  Added: flonase prn, breo prn, thiamine prn, duoneb, Bactrim DS 1 tab MWF, basaglar 20 units qam, novolog SSI w/meals and qhs  Deleted: fish oil 1200mg daily, bactrim DS 2 tabs tid, NPH 20 units qam-decrease to 16 units every morning when prednisone tapers to 30mg daily, NPH 5 units qpm  Changed: lactulose from 20 gm tid to 20 gm daily per pt,    Actions taken by pharmacist (provider contacted, etc):phoned [facility]ison and confirmed doses on lipitor, prozac, furosemide, gabapentin, basaglar, mirtazapine, protonix, rifaximin, spironolactone, trazodone, breo, bactrim     Additional medication history information:pt doesn't remember to use lactulose TID only uses daily.  Pt using basaglar insulin instead of NPH ordered as insurance will not cover NPH, pt just received approval for rifaximin coverage but has not started rx yet, pt picked up bactrim DS 2 tab TID prescription but was only taking 1 tab MWF because she thought it was a typo on the Rx, pt states she uses breo prn but [facility] states pt never picked the RX up.  Pt cannot remember her Novolog sliding scale insulin directions.  Pt states on 40mg daily prednisone in taper, however per calendar this dose should have been done 10/27.  PT MISSED ALL MEDS ON 11/1 and 11/2    Medication reconciliation/reorder completed by provider prior to medication history? Yes, sticky note left for MD      For patients on insulin therapy:Y(Y/N)  Lantus/levemir/NPH/Mix 70/30 dose: Y-basalgar.  NPH ordered not covered by  insurance  Sliding scale Novolog Y-but pt cannot recall scale  If yes, do you have baseline novolog pre-meal dose:6 units with meals, 5 units w/can of dr virk  Patients eat three meals a day: unsure  Any barriers to therapy: cost of medications/comfortable with giving injections (if applicable)/comfortable and confident with current diabetes regimen. INSURANCE COVERAGE ISSUES W/NPH    Prior to Admission medications    Medication Sig Last Dose Taking? Auth Provider   insulin aspart (NOVOLOG FLEXPEN) 100 UNIT/ML injection Inject 6 Units Subcutaneous 3 times daily (with meals) 10/31/2017 Yes Unknown, Entered By History   insulin aspart (NOVOLOG FLEXPEN) 100 UNIT/ML injection Inject 5 Units Subcutaneous With can of Dr Pepper Past Week at Unknown time Yes Unknown, Entered By History   insulin aspart (NOVOLOG FLEXPEN) 100 UNIT/ML injection Inject 1-4 Units Subcutaneous 3 times daily (with meals) Per Sliding scale Past Week at Unknown time Yes Unknown, Entered By History   insulin aspart (NOVOLOG FLEXPEN) 100 UNIT/ML injection Inject 1-4 Units Subcutaneous At Bedtime Per Sliding Scale Past Week at Unknown time Yes Unknown, Entered By History   BASAGLAR 100 UNIT/ML injection Inject 20 Units Subcutaneous every morning 10/31/2017 at am Yes Unknown, Entered By History   lactulose 20 GM/30ML SOLN Take 20 g by mouth daily 10/31/2017 Yes Unknown, Entered By History   sulfamethoxazole-trimethoprim (BACTRIM DS/SEPTRA DS) 800-160 MG per tablet Take 1 tablet by mouth three times a week Monday, Wednesday, Friday 11/3/2017 Yes Unknown, Entered By History   fluticasone (FLONASE) 50 MCG/ACT spray Spray 2 sprays into both nostrils daily as needed for rhinitis or allergies no recent usage Yes Unknown, Entered By History   fluticasone-vilanterol (BREO ELLIPTA) 100-25 MCG/INH oral inhaler Inhale 1 puff into the lungs daily as needed  Yes Unknown, Entered By History   thiamine 100 MG tablet Take 100 mg by mouth daily 11/3/2017 at Unknown  time Yes Unknown, Entered By History   ipratropium - albuterol 0.5 mg/2.5 mg/3 mL (DUONEB) 0.5-2.5 (3) MG/3ML neb solution Take 1 vial by nebulization every 6 hours as needed for shortness of breath / dyspnea or wheezing no recent usage Yes Unknown, Entered By History   buprenorphine-naloxone (SUBOXONE) 8-2 MG SUBL sublingual tablet Place 3 tablets under the tongue daily 11/3/2017 at am-also received take home doses for 44/1 and 11/5 Yes Amor Nelson MD   alum & mag hydroxide-simethicone (MYLANTA ES/MAALOX  ES) 400-400-40 MG/5ML SUSP Take 30 mLs by mouth every 4 hours as needed for indigestion Reported on 2/23/2017 Past Week at Unknown time Yes Reported, Patient   nicotine (NICODERM CQ) 21 MG/24HR 24 hr patch Place 1 patch onto the skin daily has not started yet  Francisco Santillan MD   nicotine (NICOTROL) 10 MG Inhaler Inhale 1 Cartridge into the lungs every hour as needed for smoking cessation has not started yet  Francisco Santillan MD   nicotine polacrilex (NICORETTE) 2 MG gum Place 1 each (2 mg) inside cheek every hour as needed for smoking cessation has not started yet  Francisco Santillan MD   predniSONE (DELTASONE) 20 MG tablet Take 2 tablets (40 mg) by mouth daily 11/3/2017  Francisco Santillan MD   predniSONE (DELTASONE) 10 MG tablet Take 3 tablets (30 mg) by mouth daily has not started yet  Francisco Santillan MD   predniSONE (DELTASONE) 20 MG tablet Take 1 tablet (20 mg) by mouth daily has not started yet  Francisco Santillan MD   Atorvastatin Calcium (LIPITOR PO) Take 20 mg by mouth At Bedtime 10/31/2017  Unknown, Entered By History   Pantoprazole Sodium (PROTONIX PO) Take 40 mg by mouth every morning (before breakfast) 11/3/2017  Unknown, Entered By History   loratadine (CLARITIN) 10 MG tablet Take 10 mg by mouth daily 11/3/2017  Unknown, Entered By History   MIRTAZAPINE PO Take 30 mg by mouth At Bedtime 11/3/2017  Unknown, Entered By History   MELATONIN PO Take 5 mg by mouth At Bedtime 11/3/2017  Unknown,  Entered By History   furosemide (LASIX) 20 MG tablet Take 1 tablet (20 mg) by mouth daily 11/3/2017  Jennifer Mane MD   spironolactone (ALDACTONE) 50 MG tablet Take 1 tablet (50 mg) by mouth daily 11/3/2017  Josemanuel Damon MD   gabapentin (NEURONTIN) 600 MG tablet Take 1 tablet (600 mg) by mouth 3 times daily 11/3/2017  Josemanuel Damon MD   ascorbic acid (VITAMIN C) 500 MG tablet Take 500 mg by mouth daily 11/3/2017  Unknown, Entered By History   FLUoxetine (PROZAC) 40 MG capsule Take 40 mg by mouth daily 11/3/2017  Unknown, Entered By History   hydrOXYzine (ATARAX) 10 MG tablet Take 10 mg by mouth every 8 hours as needed for anxiety  11/3/2017  Unknown, Entered By History   traZODone (DESYREL) 100 MG tablet Take 100 mg by mouth At Bedtime 11/3/2017  Unknown, Entered By History   methocarbamol (ROBAXIN) 500 MG tablet Take 1,000 mg by mouth 4 times daily 11/3/2017  Unknown, Entered By History   aspirin EC 81 MG EC tablet Take 1 tablet (81 mg) by mouth daily 11/3/2017  Anatoliy Dos Santos DO   rifaximin (XIFAXAN) 550 MG TABS tablet Take 1 tablet (550 mg) by mouth 2 times daily has not started yet, just got insurance approval for this medication  Anatoliy Dos Santos DO   ferrous sulfate (IRON) 325 (65 FE) MG tablet Take 325 mg by mouth daily (with breakfast) 11/3/2017  Unknown, Entered By History   albuterol (PROAIR HFA, PROVENTIL HFA, VENTOLIN HFA) 108 (90 BASE) MCG/ACT inhaler Inhale 2 puffs into the lungs every 4 hours as needed for shortness of breath / dyspnea or wheezing Reported on 2/23/2017 no recent usage  Reported, Patient   MULTIPLE VITAMINS PO Take 1 tablet by mouth daily 11/3/2017  Reported, Patient   folic acid (FOLVITE) 1 MG tablet Take 1 tablet (1 mg) by mouth daily 11/3/2017  Devin Peace MD

## 2017-11-04 NOTE — H&P
ADDENDUM:  Will hold lasix and aldactone for now due to sepsis and elevated lactate.  Will hydrate with IVF and monitor lactate and volume status.     I discussed with ER physician and reviewed medical records in [facility].

## 2017-11-04 NOTE — IP AVS SNAPSHOT
[place] [address] [facility]    [address]    [facility] [address]    Phone:  [phone]    Fax:  [phone]                                       After Visit Summary   11/4/2017    Sabina Figueroa    MRN: [id number]           After Visit Summary Signature Page     I have received my discharge instructions, and my questions have been answered. I have discussed any challenges I see with this plan with the nurse or doctor.    ..........................................................................................................................................  Patient/Patient Representative Signature      ..........................................................................................................................................  Patient Representative Print Name and Relationship to Patient    ..................................................               ................................................  Date                                            Time    ..........................................................................................................................................  Reviewed by Signature/Title    ...................................................              ..............................................  Date                                                            Time

## 2017-11-04 NOTE — PROGRESS NOTES
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  Per pharmacy, her taking the medications is not reliable.      In ED had O2 saturations of 51% and is now on high-flow at 60% and O2 saturations 95%.  She is febrile and elevated WBC and had elevated LA which resolved quickly with IVF.  Patient  Also has cirrhosis and is on several pain/psych medications.      She is lying flat without respiratory distress on high-flow.  Awakened but remains inattentive without stimulation. Cushingoid. Few crackles bilateral but not wheezing.  H- RR, feet and hands are well perfused.      CXR:  Little change in bilateral LL infiltrates c/w 3 weeks ago.      Ammonia  39  VBG PCO2  42    Prior cytology without PCP but this was a single induced sputum, not a bronch.     A:  Worsening hypoxemia with underlying smoking related DIP and NSIP.  May have partially treated PCP as well as other organisms causing pneumonia.  This doesn't explain the lactic acidosis which may be related to lactulose hypovolemia and cirrhosis as it quickly responded to IVF.  Inattention may be drug related but her pupils are not pinpoint so probably not narcotic related.     P: Give full dose bactrim as well as other pneumonia/sepsis  Antibiotics. Would need to be intubated for bronch so will hold off now. Decrease prednisone to 30 mg daily.  Minimize sedating medications.

## 2017-11-04 NOTE — ED NOTES
Pt presents for evaluation of increasing shortness of breath worsening since yesterday. Pt states she has had pneumonia about 15 times over the last 1.5 years with recent discharge from the ICU. Pt has felt feverish today, is baseline redness to the face, with a current bluish tone to face and fingers. Upon triage pt was found to be 51% on RA. Pt brought back to a room immediately and placed on 15 L via oxymask.

## 2017-11-04 NOTE — PROGRESS NOTES
[facility]  Hospitalist Progress Note  Yisel Henry MD 11/04/2017    Reason for Stay (Diagnosis): hypoxic respiratory failure         Assessment and Plan:      Summary of Stay: Sabina Figueroa is a 42 year old female with a history of ILD (diagnosed by wedge lung bx in 9/17 at [facility]-acute lung injury thought 2/2 e-cigarettes in setting of DIP and NSIP by surg path)-at that hospitalization she was discharged on high dose steroids with PCP ppx, she then presented to the [facility] and hospitalized 10/18-10/23 for recurrent respiratory failure at which time it was felt due to PCP pna as she had not been taking her tmp-sulfa ppx.  She was discharged on high dose steroids taper and therapeutic tmp-sulfa dosing for PCP with sats 97 % range on RA and had arranged f/u with Dr. Mane ( to f/u vasculitic w/u).  She returns to our ER and admitted on 11/4/2017 with e/o recurrent hypoxic respiratory failure again/fever/KAITLIN to 102/tachycardia/leukocytosis and lactic acidosis to 6.3 consistent with sepsis in the setting of non-compliance/confusion with medications (only taking ppx dosing of tmp-sulfa), and missing of all medications due to being picked up for DUI (drank on 2 separate occassions but prior to that was sober since 5/11/17) and being held in half-way     Furthermore she has a hx of chem dep on suboxone, cirrhosis thought related to etoh on chronic spironolactone/furosemide/lactulose/rifaximin, 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  Problem List:   1. Sepsis: presumably from respiratory source- CXR with basilar infiltrates-respiratory panel negative, interestingly 1,3 B Glucan negative at hospitalization in October but cannot definitively rule out PCP pna.  Lactic acidosis resolved with IVF.  Cont broad spectrum abx  2. Hypoxic respiratory failure-complicated pulmonary situation-Hx of ALI superimposed on desquamative (smoking) pneumonitis and non-specific pneumonitis by wedge bx completed in 9/2017, s/p hospitalization at  [facility] for suspected PCP pna-discharged on steroid/therapeutic tmp-sulfa-and non-compliant with medications due to recent DUI-in detention for 2 days, and misunderstanding of tmp-sulfa dosing.  given broad spectrum abx with azith/pip-tazo/vanco in ER-azith not continued-now on pip-tazo/vanco and high dose TMP-sulfa for suspected ongoing/inadequately treated PCP and high risk for alternative respiratory infection due to immunosuppression with steroids  3. ALI on CLI (DIP and NSIP)  On pred taper-cont at 30 mg per day  4. Non-compliance with medications-? If is having HE complicating her care vs underlying psychiatric issue-check NH3, will require case-worker at discharge to help mitigate suspected social issues/med compliance  5. Fever:  Suspected respiratory source:  UA negative, BCx obtained-at risk for opportunistic infections in setting of steroid exposure-see abx as described in   6. KAITLIN:  Baseline creat wnl-currently BUN/creat 15/1.4-now normalized after IVF, presumably   7. Cirrhosis:  On spironolactone/furosemide/lactulose/rifaximin (not started rifaximin yet)-LFTs currently with minimally elevated AST, bili wnl. NH3 wnl, and on review of chart not significantly elevated except 2 prior occasions  8. Steroid induced dm: glargine 20 u (humilin insulin prescribed at last hospitalization not covered by insurance) aspart ISS, aspart 6 u tid with meals, and 5 u with a can of dr. Pinto.  Resumed glargine and on ISS, monitor off sched aspart for now-titrate back in as needed  9. Narcotic dependence:  Cont suboxone as at home  10. ? Underlying vasculitic/autoimmune process at play:  AMISHA by IFA borderline positive, and cytoplasmic IgG negative  11. Nicotine dependence with suspected ALI 2/2 e cigarettes-continues to smoke despite nicotine replacement-ATQ  DVT Prophylaxis: Enoxaparin (Lovenox) SQ  Code Status: Full Code  Discharge Dispo: ? home  Estimated Disch Date / # of Days until Disch: TBD-continues to require ICU care in  "light of severe hypoxic respiratory failure        Interval History (Subjective):      Feels ok still sob, denies pain, no n/v/diarrhea, no new skin rashes                  Physical Exam:      Last Vital Signs:  BP 90/57  Pulse 134  Temp 99.8  F (37.7  C) (Oral)  Resp 22  Ht 1.689 m (5' 6.5\")  Wt 83.8 kg (184 lb 11.9 oz)  SpO2 99%  Breastfeeding? No  BMI 29.37 kg/m2    I/O;  Net neg 1 L weights up 6.7 kg  Tele NSR    Sleeping in  Bed-awakens relatively easily to voice but drifts off to sleep, alert and oriented poor eye contact looks stated age head nc/at sclera with mild bilateral injection, lungs coarse bilateral, rare wheeze RRR no m/r/g abd obese - no obvious ascites, s/nt/nd rossi            Medications:      All current medications were reviewed with changes reflected in problem list.         Data:      All new lab and imaging data was reviewed.   Labs:    Recent Labs  Lab 11/04/17  0910 11/04/17  0050   NA  --  129*   POTASSIUM  --  3.6   CHLORIDE  --  94   CO2  --  19*   ANIONGAP  --  16*   GLC  --  174*   BUN  --  15   CR 0.98 1.42*   GFRESTIMATED 62 40*   GFRESTBLACK 75 49*   VISHNU  --  8.5       Recent Labs  Lab 11/04/17  0050   WBC 15.4*   HGB 13.8   HCT 42.5   MCV 92         Imaging:   CXR:  Bilateral interstitial infiltrate vs edema-? More prominent-per intensivist and rads read-looks similar to previous      "

## 2017-11-04 NOTE — PHARMACY
"Pharmacy contacted [facility]/Methadone clinic. Suboxone dose confirmed with [facility]- Phone number [phone], fax [phone]  Addiction counselor (if known) ---  Maintenance dose: suboxone 8/2 SL tablet 3 tabs daily  Patient receives (daily dose dispensed or take out dose every \"__\" days): daily dose.  Received 11/3 from clinic along with 2 take home doses for the weekend, 11/4 and 11/5  Patient has missed -- doses over the past month .  Per facility pt dose not miss doses.      "

## 2017-11-04 NOTE — IP AVS SNAPSHOT
MRN:[id number]                      After Visit Summary   11/4/2017    Sabina Figueroa    MRN: [id number]           Thank you!     Thank you for choosing [facility] for your care. Our goal is always to provide you with excellent care. Hearing back from our patients is one way we can continue to improve our services. Please take a few minutes to complete the written survey that you may receive in the mail after you visit. If you would like to speak to someone directly about your visit please contact Patient Relations at [phone]. Thank you!          Patient Information     Date Of Birth          1975        About your hospital stay     You were admitted on:  November 4, 2017 You last received care in the:  [place] [address] [facility]    You were discharged on:  November 23, 2017        Reason for your hospital stay       You were admitted for respiratory failure due to infectious pneumonia.  You have been treated with antibiotics and also an increase in your steroids.  You now will continue treatment with bactrim at home as discussed with close follow up with pulmonary medicine.                  Who to Call     For medical emergencies, please call 911.  For non-urgent questions about your medical care, please call your primary care provider or clinic, [phone]          Attending Provider     Provider Specialty    Hudson Villanueva MD Emergency Medicine    BoyOwen lugo MD Internal Medicine       Primary Care Provider Office Phone # Fax #    Aidee Hemphill [phone]      After Care Instructions     Activity       Your activity upon discharge: gradually resume light activity as tolerated            Diet       Follow this diet upon discharge: Orders Placed This Encounter        High Consistent CHO Diet            Oxygen Adult       [place] [facility] Order 2 liter(s) by nasal cannula continuously with use of portable tank. Expected treatment  length is indefinite (99 months).. Test on conserving device as applicable.    Patients who qualify for home O2 coverage under the CMS guidelines require ABG tests or O2 sat readings obtained closest to, but no earlier than 2 days prior to the discharge, as evidence of the need for home oxygen therapy. Testing must be performed while patient is in the chronic stable state. See notes for O2 sats.    I certify that this patient, Sabina Figueroa has been under my care and that I, or a nurse practitioner or physician's assistant working with me, had a face-to-face encounter that meets the face-to-face encounter requirements with this patient on 11/22/2017. The patient, Sabina Figueroa was evaluated or treated in whole, or in part, for the following medical condition, which necessitates the use of the ordered oxygen. Treatment Diagnosis: interstitial lung disease    Attending Provider: Allen Shah  Physician signature: See electronic signature associated with these discharge orders  Date of Order: November 22, 2017                  Follow-up Appointments     Follow-up and recommended labs and tests        Follow up with Dr. Deepa Mane (Pulmonary Clinic) as planned in December for ongoing management of your lung disease.  Also, please follow up withyour primary care doctor, Aidee Hemphill, within 7 days for hospital follow- up.  The following labs/tests are recommended: please review blood sugar control and also re-evaluate your oxygen requirement.  You may also discuss referral to pulmonary rehab at that time.    Finally, follow up with [facility] in 1-2 weeks as well for re-evaluation of your cytopenias (low cell counts) and please recheck a CBC (complete blood count) at that time as well as any additional testing recommended by your hematologist.                  Your next 10 appointments already scheduled     Dec 11, 2017  6:30 PM CST   (Arrive by 6:15 PM)   Return Visit with Jennifer Gamez  "MD Alhaji   [facility] ([facility])    [address]  [address] [facility] [address] [phone]              Further instructions from your care team       Oxygen Provider:  Arranged through [facility], contact number [phone]. If you have any questions or concerns please call the oxygen company directly.      Pending Results     No orders found from 2017 to 2017.            Statement of Approval     Ordered          17 1355  I have reviewed and agree with all the recommendations and orders detailed in this document.  EFFECTIVE NOW     Approved and electronically signed by:  Allen Shah MD             Admission Information     Date & Time Provider Department Dept. Phone    2017 Owen Brunson MD [place] [address] [facility] [phone]      Your Vitals Were     Blood Pressure Pulse Temperature Respirations Height Weight    111/69 (BP Location: Left arm) 74 96.5  F (35.8  C) (Oral) 20 1.689 m (5' 6.5\") 78.3 kg (172 lb 11.2 oz)    Pulse Oximetry BMI (Body Mass Index)                96% 27.46 kg/m2          MyChart Information     [employer] lets you send messages to your doctor, view your test results, renew your prescriptions, schedule appointments and more. To sign up, go to [email or web address] . Click on \"Log in\" on the left side of the screen, which will take you to the Welcome page. Then click on \"Sign up Now\" on the right side of the page.     You will be asked to enter the access code listed below, as well as some personal information. Please follow the directions to create your username and password.     Your access code is: [email or web address]  Expires: 2018  5:30 AM     Your access code will  in 90 days. If you need help or a new code, please call your [place] clinic or [phone].        Care EveryWhere ID     This is your Care EveryWhere ID. " This could be used by other organizations to access your [facility] medical records  [id number]        Equal Access to Services     LANEY VELASCO : Hadii aad ku hadana luisashaista Mercado, espinozabridgette carranzayaniha, kailey choi sarahlino, waxbobby kimberley kristiraudel smithbill arvindjames kay segundo. So [facility] [phone].    ATENCIÓN: Si habla español, tiene a louie disposición servicios gratuitos de asistencia lingüística. Llame al [phone].    We comply with applicable federal civil rights laws and [place] laws. We do not discriminate on the basis of race, color, national origin, age, disability, sex, sexual orientation, or gender identity.               Review of your medicines      START taking        Dose / Directions    sulfamethoxazole-trimethoprim 400-80 MG per tablet   Commonly known as:  BACTRIM/SEPTRA   Indication:  PCP   Used for:  Pneumonia of both lower lobes due to infectious organism   Replaces:  sulfamethoxazole-trimethoprim 800-160 MG per tablet        You have double strength tabs at home from your last admission, these are single strength tabs.  Please take 4 single strength tabs 4x daily on Thursday 11/23 and Friday 11/24 then after this, take one single strength tab of bactrim daily for prophylaxis/prevention.   Quantity:  60 tablet   Refills:  0         CONTINUE these medicines which may have CHANGED, or have new prescriptions. If we are uncertain of the size of tablets/capsules you have at home, strength may be listed as something that might have changed.        Dose / Directions    BASAGLAR 100 UNIT/ML injection   This may have changed:  when to take this   Used for:  Steroid-induced diabetes mellitus (H)        Dose:  20 Units   Inject 20 Units Subcutaneous At Bedtime   Refills:  0       predniSONE 10 MG tablet   Commonly known as:  DELTASONE   This may have changed:    - how much to take  - how to take this  - when to take this  - additional instructions  - Another medication with the same name was removed. Continue taking  this medication, and follow the directions you see here.   Used for:  Pneumonia of both lungs due to infectious organism, unspecified part of lung        Take 40 mg prednisone daily x 7 days, then reduce to 30 mg x 7 days then reduce to 20 mg daily.  If you have any increase in respiratory symptoms please contact your primary care doctor or pulmonologist.  Please discuss further steroid plans with Dr. Mane (Pulmonary) in December at your follow up visit.   Quantity:  15 tablet   Refills:  0         CONTINUE these medicines which have NOT CHANGED        Dose / Directions    albuterol 108 (90 BASE) MCG/ACT Inhaler   Commonly known as:  PROAIR HFA/PROVENTIL HFA/VENTOLIN HFA        Dose:  2 puff   Inhale 2 puffs into the lungs every 4 hours as needed for shortness of breath / dyspnea or wheezing Reported on 2/23/2017   Refills:  0       alum & mag hydroxide-simethicone 400-400-40 MG/5ML Susp suspension   Commonly known as:  MYLANTA ES/MAALOX  ES        Dose:  30 mL   Take 30 mLs by mouth every 4 hours as needed for indigestion Reported on 2/23/2017   Refills:  0       ascorbic acid 500 MG tablet   Commonly known as:  VITAMIN C        Dose:  500 mg   Take 500 mg by mouth daily   Refills:  0       aspirin 81 MG EC tablet   Used for:  Elevated troponin I level        Dose:  81 mg   Take 1 tablet (81 mg) by mouth daily   Quantity:  30 tablet   Refills:  1       BREO ELLIPTA 100-25 MCG/INH oral inhaler   Generic drug:  fluticasone-vilanterol        Dose:  1 puff   Inhale 1 puff into the lungs daily as needed   Refills:  0       buprenorphine-naloxone 8-2 MG Subl sublingual tablet   Commonly known as:  SUBOXONE   Used for:  Other chronic pain        Dose:  3 tablet   Place 3 tablets under the tongue daily   Quantity:  60 each   Refills:  0       ferrous sulfate 325 (65 FE) MG tablet   Commonly known as:  IRON        Dose:  325 mg   Take 325 mg by mouth daily (with breakfast)   Refills:  0       FLUoxetine 40 MG capsule    Commonly known as:  PROzac        Dose:  40 mg   Take 40 mg by mouth daily   Refills:  0       fluticasone 50 MCG/ACT spray   Commonly known as:  FLONASE        Dose:  2 spray   Spray 2 sprays into both nostrils daily as needed for rhinitis or allergies   Refills:  0       folic acid 1 MG tablet   Commonly known as:  FOLVITE   Used for:  Alcoholic hepatitis, Alcohol dependence (H)        Dose:  1 mg   Take 1 tablet (1 mg) by mouth daily   Quantity:  30 tablet   Refills:  0       furosemide 20 MG tablet   Commonly known as:  LASIX   Used for:  Organizing pneumonia (H)        Dose:  20 mg   Take 1 tablet (20 mg) by mouth daily   Quantity:  30 tablet   Refills:  3       gabapentin 600 MG tablet   Commonly known as:  NEURONTIN        Dose:  600 mg   Take 1 tablet (600 mg) by mouth 3 times daily   Quantity:  9 tablet   Refills:  0       hydrOXYzine 10 MG tablet   Commonly known as:  ATARAX        Dose:  10 mg   Take 10 mg by mouth every 8 hours as needed for anxiety   Refills:  0       ipratropium - albuterol 0.5 mg/2.5 mg/3 mL 0.5-2.5 (3) MG/3ML neb solution   Commonly known as:  DUONEB        Dose:  1 vial   Take 1 vial by nebulization every 6 hours as needed for shortness of breath / dyspnea or wheezing   Refills:  0       lactulose 20 GM/30ML Soln        Dose:  20 g   Take 20 g by mouth daily   Refills:  0       LIPITOR PO        Dose:  20 mg   Take 20 mg by mouth At Bedtime   Refills:  0       loratadine 10 MG tablet   Commonly known as:  CLARITIN        Dose:  10 mg   Take 10 mg by mouth daily   Refills:  0       MELATONIN PO        Dose:  5 mg   Take 5 mg by mouth At Bedtime   Refills:  0       MIRTAZAPINE PO        Dose:  30 mg   Take 30 mg by mouth At Bedtime   Refills:  0       MULTIPLE VITAMINS PO   Used for:  Alcoholic hepatitis        Dose:  1 tablet   Take 1 tablet by mouth daily   Refills:  0       * nicotine 21 MG/24HR 24 hr patch   Commonly known as:  NICODERM CQ   Used for:  Cigarette nicotine  dependence with other nicotine-induced disorder        Dose:  1 patch   Place 1 patch onto the skin daily   Quantity:  30 patch   Refills:  1       * nicotine 10 MG Inhaler   Commonly known as:  NICOTROL   Used for:  Cigarette nicotine dependence with other nicotine-induced disorder        Dose:  1 Cartridge   Inhale 1 Cartridge into the lungs every hour as needed for smoking cessation   Quantity:  180 each   Refills:  1       nicotine polacrilex 2 MG gum   Commonly known as:  NICORETTE   Used for:  Cigarette nicotine dependence with other nicotine-induced disorder        Dose:  2 mg   Place 1 each (2 mg) inside cheek every hour as needed for smoking cessation   Quantity:  30 tablet   Refills:  1       * NovoLOG FLEXPEN 100 UNIT/ML injection   Generic drug:  insulin aspart        Dose:  6 Units   Inject 6 Units Subcutaneous 3 times daily (with meals)   Refills:  0       * NovoLOG FLEXPEN 100 UNIT/ML injection   Generic drug:  insulin aspart   Notes to Patient:  As directed          Dose:  5 Units   Inject 5 Units Subcutaneous With can of Dr Pepper   Refills:  0       * NovoLOG FLEXPEN 100 UNIT/ML injection   Generic drug:  insulin aspart        Dose:  1-4 Units   Inject 1-4 Units Subcutaneous 3 times daily (with meals) Per Sliding scale   Refills:  0       * NovoLOG FLEXPEN 100 UNIT/ML injection   Generic drug:  insulin aspart        Dose:  1-4 Units   Inject 1-4 Units Subcutaneous At Bedtime Per Sliding Scale   Refills:  0       PROTONIX PO        Dose:  40 mg   Take 40 mg by mouth every morning (before breakfast)   Refills:  0       rifaximin 550 MG Tabs tablet   Commonly known as:  XIFAXAN   Used for:  Other ascites        Dose:  550 mg   Take 1 tablet (550 mg) by mouth 2 times daily   Quantity:  60 tablet   Refills:  0       ROBAXIN 500 MG tablet   Generic drug:  methocarbamol        Dose:  1000 mg   Take 1,000 mg by mouth 4 times daily   Refills:  0       spironolactone 50 MG tablet   Commonly known as:   ALDACTONE        Dose:  50 mg   Take 1 tablet (50 mg) by mouth daily   Quantity:  3 tablet   Refills:  0       thiamine 100 MG tablet        Dose:  100 mg   Take 100 mg by mouth daily   Refills:  0       traZODone 100 MG tablet   Commonly known as:  DESYREL        Dose:  100 mg   Take 100 mg by mouth At Bedtime   Refills:  0       * Notice:  This list has 6 medication(s) that are the same as other medications prescribed for you. Read the directions carefully, and ask your doctor or other care provider to review them with you.      STOP taking     sulfamethoxazole-trimethoprim 800-160 MG per tablet   Commonly known as:  BACTRIM DS/SEPTRA DS   Replaced by:  sulfamethoxazole-trimethoprim 400-80 MG per tablet                Where to get your medicines      Some of these will need a paper prescription and others can be bought over the counter. Ask your nurse if you have questions.     Bring a paper prescription for each of these medications     sulfamethoxazole-trimethoprim 400-80 MG per tablet       You don't need a prescription for these medications     BASAGLAR 100 UNIT/ML injection    predniSONE 10 MG tablet               ANTIBIOTIC INSTRUCTION     You've Been Prescribed an Antibiotic - Now What?  Your healthcare team thinks that you or your loved one might have an infection. Some infections can be treated with antibiotics, which are powerful, life-saving drugs. Like all medications, antibiotics have side effects and should only be used when necessary. There are some important things you should know about your antibiotic treatment.      Your healthcare team may run tests before you start taking an antibiotic.    Your team may take samples (e.g., from your blood, urine or other areas) to run tests to look for bacteria. These test can be important to determine if you need an antibiotic at all and, if you do, which antibiotic will work best.      Within a few days, your healthcare team might change or even stop your  antibiotic.    Your team may start you on an antibiotic while they are working to find out what is making you sick.    Your team might change your antibiotic because test results show that a different antibiotic would be better to treat your infection.    In some cases, once your team has more information, they learn that you do not need an antibiotic at all. They may find out that you don't have an infection, or that the antibiotic you're taking won't work against your infection. For example, an infection caused by a virus can't be treated with antibiotics. Staying on an antibiotic when you don't need it is more likely to be harmful than helpful.      You may experience side effects from your antibiotic.    Like all medications, antibiotics have side effects. Some of these can be serious.    Let you healthcare team know if you have any known allergies when you are admitted to the hospital.    One significant side effect of nearly all antibiotics is the risk of severe and sometimes deadly diarrhea caused by Clostridium difficile (C. Difficile). This occurs when a person takes antibiotics because some good germs are destroyed. Antibiotic use allows C. diificile to take over, putting patients at high risk for this serious infection.    As a patient or caregiver, it is important to understand your or your loved one's antibiotic treatment. It is especially important for caregivers to speak up when patients can't speak for themselves. Here are some important questions to ask your healthcare team.    What infection is this antibiotic treating and how do you know I have that infection?    What side effects might occur from this antibiotic?    How long will I need to take this antibiotic?    Is it safe to take this antibiotic with other medications or supplements (e.g., vitamins) that I am taking?     Are there any special directions I need to know about taking this antibiotic? For example, should I take it with  food?    How will I be monitored to know whether my infection is responding to the antibiotic?    What tests may help to make sure the right antibiotic is prescribed for me?      Information provided by:  www.cdc.gov/getsmart  U.S. Department of Health and Human Services  Centers for disease Control and Prevention  National Center for Emerging and Zoonotic Infectious Diseases  Division of Healthcare Quality Promotion         Protect others around you: Learn how to safely use, store and throw away your medicines at www.disposemymeds.org.             Medication List: This is a list of all your medications and when to take them. Check marks below indicate your daily home schedule. Keep this list as a reference.      Medications           Morning Afternoon Evening Bedtime As Needed    albuterol 108 (90 BASE) MCG/ACT Inhaler   Commonly known as:  PROAIR HFA/PROVENTIL HFA/VENTOLIN HFA   Inhale 2 puffs into the lungs every 4 hours as needed for shortness of breath / dyspnea or wheezing Reported on 2/23/2017                                   alum & mag hydroxide-simethicone 400-400-40 MG/5ML Susp suspension   Commonly known as:  MYLANTA ES/MAALOX  ES   Take 30 mLs by mouth every 4 hours as needed for indigestion Reported on 2/23/2017   Last time this was given:  30 mLs on 11/14/2017 11:08 AM                                   ascorbic acid 500 MG tablet   Commonly known as:  VITAMIN C   Take 500 mg by mouth daily            Due 11/24/17                       aspirin 81 MG EC tablet   Take 1 tablet (81 mg) by mouth daily   Last time this was given:  81 mg on 11/8/2017  8:32 AM            Due 11/24/17                       BASAGLAR 100 UNIT/ML injection   Inject 20 Units Subcutaneous At Bedtime                        Due tammi BEARDEN ELLIPTA 100-25 MCG/INH oral inhaler   Inhale 1 puff into the lungs daily as needed   Generic drug:  fluticasone-vilanterol                                   buprenorphine-naloxone 8-2  MG Subl sublingual tablet   Commonly known as:  SUBOXONE   Place 3 tablets under the tongue daily            Due 11/24/17                       ferrous sulfate 325 (65 FE) MG tablet   Commonly known as:  IRON   Take 325 mg by mouth daily (with breakfast)            Due 11/24/17                       FLUoxetine 40 MG capsule   Commonly known as:  PROzac   Take 40 mg by mouth daily   Last time this was given:  40 mg on 11/23/2017  8:21 AM            Due 11/24/17                       fluticasone 50 MCG/ACT spray   Commonly known as:  FLONASE   Spray 2 sprays into both nostrils daily as needed for rhinitis or allergies                                   folic acid 1 MG tablet   Commonly known as:  FOLVITE   Take 1 tablet (1 mg) by mouth daily   Last time this was given:  1 mg on 11/23/2017  8:20 AM            Due 11/24/17                       furosemide 20 MG tablet   Commonly known as:  LASIX   Take 1 tablet (20 mg) by mouth daily   Last time this was given:  20 mg on 11/23/2017  8:21 AM            Due 11/24/17                       gabapentin 600 MG tablet   Commonly known as:  NEURONTIN   Take 1 tablet (600 mg) by mouth 3 times daily   Last time this was given:  600 mg on 11/23/2017  8:21 AM                    Take 2 more doses today at home                  hydrOXYzine 10 MG tablet   Commonly known as:  ATARAX   Take 10 mg by mouth every 8 hours as needed for anxiety   Last time this was given:  10 mg on 11/13/2017  2:54 AM                                   ipratropium - albuterol 0.5 mg/2.5 mg/3 mL 0.5-2.5 (3) MG/3ML neb solution   Commonly known as:  DUONEB   Take 1 vial by nebulization every 6 hours as needed for shortness of breath / dyspnea or wheezing   Last time this was given:  3 mLs on 11/21/2017 11:30 AM                                   lactulose 20 GM/30ML Soln   Take 20 g by mouth daily   Last time this was given:  20 g on 11/23/2017  8:21 AM            Due 11/24                       LIPITOR PO   Take  20 mg by mouth At Bedtime   Last time this was given:  20 mg on 11/22/2017  9:47 PM                        Due tonight             loratadine 10 MG tablet   Commonly known as:  CLARITIN   Take 10 mg by mouth daily   Last time this was given:  10 mg on 11/23/2017  8:20 AM            Due 11/24/17                       MELATONIN PO   Take 5 mg by mouth At Bedtime                        Due tonight           MIRTAZAPINE PO   Take 30 mg by mouth At Bedtime   Last time this was given:  15 mg on 11/22/2017  9:47 PM                        Due tonight           MULTIPLE VITAMINS PO   Take 1 tablet by mouth daily            Start 11/24/17                       * nicotine 21 MG/24HR 24 hr patch   Commonly known as:  NICODERM CQ   Place 1 patch onto the skin daily   Last time this was given:  1 patch on 11/23/2017  8:22 AM            CHange 11/24/17                       * nicotine 10 MG Inhaler   Commonly known as:  NICOTROL   Inhale 1 Cartridge into the lungs every hour as needed for smoking cessation                                   nicotine polacrilex 2 MG gum   Commonly known as:  NICORETTE   Place 1 each (2 mg) inside cheek every hour as needed for smoking cessation   Last time this was given:  2 mg on 11/23/2017  1:44 PM                                   * NovoLOG FLEXPEN 100 UNIT/ML injection   Inject 6 Units Subcutaneous 3 times daily (with meals)   Last time this was given:  5 Units on 11/23/2017 11:53 AM   Generic drug:  insulin aspart                    Due with supper               * NovoLOG FLEXPEN 100 UNIT/ML injection   Inject 5 Units Subcutaneous With can of Dr Pepper   Last time this was given:  5 Units on 11/23/2017 11:53 AM   Generic drug:  insulin aspart   Notes to Patient:  As directed                                  * NovoLOG FLEXPEN 100 UNIT/ML injection   Inject 1-4 Units Subcutaneous 3 times daily (with meals) Per Sliding scale   Last time this was given:  5 Units on 11/23/2017 11:53 AM   Generic  drug:  insulin aspart                                   * NovoLOG FLEXPEN 100 UNIT/ML injection   Inject 1-4 Units Subcutaneous At Bedtime Per Sliding Scale   Last time this was given:  5 Units on 11/23/2017 11:53 AM   Generic drug:  insulin aspart                        Due tonight per sliding scale           predniSONE 10 MG tablet   Commonly known as:  DELTASONE   Take 40 mg prednisone daily x 7 days, then reduce to 30 mg x 7 days then reduce to 20 mg daily.  If you have any increase in respiratory symptoms please contact your primary care doctor or pulmonologist.  Please discuss further steroid plans with Dr. Mane (Pulmonary) in December at your follow up visit.   Last time this was given:  50 mg on 11/23/2017  8:21 AM            Due 11/24/17                       PROTONIX PO   Take 40 mg by mouth every morning (before breakfast)   Last time this was given:  40 mg on 11/23/2017  8:21 AM            Due 11/24/17                       rifaximin 550 MG Tabs tablet   Commonly known as:  XIFAXAN   Take 1 tablet (550 mg) by mouth 2 times daily   Last time this was given:  550 mg on 11/23/2017  8:20 AM                    Due tonight                 ROBAXIN 500 MG tablet   Take 1,000 mg by mouth 4 times daily   Last time this was given:  500 mg on 11/23/2017  8:21 AM   Generic drug:  methocarbamol                Take 3 more doses today                   spironolactone 50 MG tablet   Commonly known as:  ALDACTONE   Take 1 tablet (50 mg) by mouth daily   Last time this was given:  50 mg on 11/23/2017  8:21 AM            Due 11/24/17                       sulfamethoxazole-trimethoprim 400-80 MG per tablet   Commonly known as:  BACTRIM/SEPTRA   You have double strength tabs at home from your last admission, these are single strength tabs.  Please take 4 single strength tabs 4x daily on Thursday 11/23 and Friday 11/24 then after this, take one single strength tab of bactrim daily for prophylaxis/prevention.   Last time  this was given:  1 tablet on 11/23/2017 11:53 AM                    Due tonight               thiamine 100 MG tablet   Take 100 mg by mouth daily            Due 11/24/17                       traZODone 100 MG tablet   Commonly known as:  DESYREL   Take 100 mg by mouth At Bedtime   Last time this was given:  50 mg on 11/21/2017  9:40 PM                        Due tonight           * Notice:  This list has 6 medication(s) that are the same as other medications prescribed for you. Read the directions carefully, and ask your doctor or other care provider to review them with you.

## 2017-11-04 NOTE — PLAN OF CARE
RT note-  Patient remains on HFNC throughout the shift. Weaned fiO2 today, now at 65% with observed SPO2 95% Noted patient desats with weaned to 50%. BS-diminished, coarse.  CXR-bilat interstitial vs edema  RT will continue to follow

## 2017-11-04 NOTE — LETTER
Transition Communication Hand-off for Care Transitions to Next Level of Care Provider    Name: Sabina Figueroa  MRN #: [id number]  Primary Care Provider: Aidee Hemphill  Primary Care MD Name: Dr. Aidee Hemphill  Primary Clinic: [facility] [address] [place] [address]  Primary Care Clinic Name: [facility]  Reason for Hospitalization:  Acute respiratory failure with hypoxia (H) [J96.01]  Pneumonia of both lower lobes due to infectious organism [J18.9]  Admit Date/Time: 11/4/2017 12:42 AM  Discharge Date: 11/23/17  Payor Source: Payor: MEDICARE / Plan: MEDICARE / Product Type: Medicare /     Readmission Assessment Measure (KENNEDY) Risk Score/category: HIGH           Reason for Communication Hand-off Referral: Fragility  Multiple providers/specialties  Non-adherence to plan of care related to:  Other She needs to go to a hospital that can provide bronchoscopy    Discharge Plan:       Concern for non-adherence with plan of care:   YES  Discharge Needs Assessment:  Needs       Most Recent Value    Transportation Available family or friend will provide          Already enrolled in Tele-monitoring program and name of program:  na  Follow-up specialty is recommended: Yes    Follow-up plan:  Future Appointments  Date Time Provider Department Center   12/11/2017 6:30 PM Jennifer Mane MD [facility]       Any outstanding tests or procedures:    Procedures     Future Labs/Procedures    Oxygen Adult     Comments:    New Home Oxygen Order 2 liter(s) by nasal cannula continuously with use of portable tank. Expected treatment length is indefinite (99 months).. Test on conserving device as applicable.    Patients who qualify for home O2 coverage under the CMS guidelines require ABG tests or O2 sat readings obtained closest to, but no earlier than 2 days prior to the discharge, as evidence of the need for home oxygen therapy. Testing must be performed while patient is in the chronic stable state. See notes for O2  tika.    I certify that this patient, Sabina Figueroa has been under my care and that I, or a nurse practitioner or physician's assistant working with me, had a face-to-face encounter that meets the face-to-face encounter requirements with this patient on 11/22/2017. The patient, Sabina Figueroa was evaluated or treated in whole, or in part, for the following medical condition, which necessitates the use of the ordered oxygen. Treatment Diagnosis: interstitial lung disease    Attending Provider: Allen Shah  Physician signature: See electronic signature associated with these discharge orders  Date of Order: November 22, 2017               Follow-up and recommended labs and tests        Follow up with Dr. Deepa Mane (Pulmonary Clinic) as planned in December for ongoing management of your lung disease.  Also, please follow up withyour primary care doctor, Aidee Hemphill, within 7 days for hospital follow- up.  The following labs/tests are recommended: please review blood sugar control and also re-evaluate your oxygen requirement.  You may also discuss referral to pulmonary rehab at that time.     Finally, follow up with [facility] in 1-2 weeks as well for re-evaluation of your cytopenias (low cell counts) and please recheck a CBC (complete blood count) at that time as well as any additional testing recommended by your hematologist               Key Recommendations:  Interstiital lung disease with frequent admissions.  IF she returns with another respiratory decompensation she should be admitted to a facility with the ability to perform bronchoscopy/Pulmonary consult as treatment here has been empiric and she would require specialty care.   Relapse of ETOH use after sobriety.  Follow up with pulmonary Dr Mane.  [address].      Sandra Walls    AVS/Discharge Summary is the source of truth; this is a helpful guide for improved communication of patient story

## 2017-11-05 NOTE — PROGRESS NOTES
[facility]  Hospitalist Progress Note  Yisel Henry MD 11/05/2017    Reason for Stay (Diagnosis): hypoxic respiratory failure         Assessment and Plan:      Summary of Stay: Sabina Figueroa is a 42 year old female with a history of ILD (diagnosed by wedge lung bx in 9/17 at [facility]-acute lung injury thought 2/2 e-cigarettes in setting of DIP and NSIP by surg path)-at that hospitalization she was discharged on high dose steroids with PCP ppx, she then presented to the [facility] and hospitalized 10/18-10/23 for recurrent respiratory failure at which time it was felt due to PCP pna as she had not been taking her tmp-sulfa ppx.  She was discharged on high dose steroids taper and therapeutic tmp-sulfa dosing for PCP with sats 97 % range on RA and had arranged f/u with Dr. Mane ( to f/u vasculitic w/u).  She returns to our ER and admitted on 11/4/2017 with e/o recurrent hypoxic respiratory failure again/fever/KAITLIN to 102/tachycardia/leukocytosis and lactic acidosis to 6.3 consistent with sepsis in the setting of non-compliance/confusion with medications (only taking ppx dosing of tmp-sulfa), and missing of all medications due to being picked up for DUI (drank on 2 separate occassions but prior to that was sober since 5/11/17) and being held in [facility]     Furthermore she has a hx of chem dep on suboxone, cirrhosis thought related to etoh on chronic spironolactone/furosemide/lactulose/rifaximin, 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    Overnight into 11/5 had increasing O2 requirements, worsening tachypnea - CT PE protocol completed and shows no PE but significant worsening of yuli pulmonary infiltrates  Problem List:   1. Sepsis: presumably from respiratory source- CXR with basilar infiltrates-CT now with diffuse infltrates-respiratory panel negative,   Lactic acidosis resolved with IVF.  Cont broad spectrum abx  2. Hypoxic respiratory failure-complicated pulmonary situation-Hx of ALI superimposed on desquamative (smoking)  pneumonitis and non-specific pneumonitis by wedge bx completed in 9/2017, s/p hospitalization at [facility] for suspected PCP pna-discharged on steroid/therapeutic tmp-sulfa-and non-compliant with medications due to recent DUI-in [facility] for 2 days, and misunderstanding of tmp-sulfa dosing.  given broad spectrum abx with azith/pip-tazo/vanco in ER-azith not continued-now on pip-tazo/vanco and high dose TMP-sulfa for suspected ongoing/inadequately treated PCP and high risk for alternative respiratory infection due to immunosuppression with steroids.  If continues to worsen will likely require intubation at which time bronch s/b done to help define current infection   3. 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  Discussed with intensivist-not lung transplant candidate.    4. Non-compliance with medications-, will require case-worker at discharge to help mitigate suspected social issues/med compliance  5. Fever:  Suspected respiratory source:  UA negative, BCx obtained-at risk for opportunistic infections in setting of steroid exposure-see abx as described in   6. KAITLIN:  Baseline creat wnl-currently BUN/creat 15/1.4-now normalized after IVF  7. Cirrhosis:  On spironolactone/furosemide/lactulose/rifaximin (not started rifaximin yet)-LFTs currently with minimally elevated AST, bili wnl. NH3 wnl, and on review of chart not significantly elevated except 2 prior occasions  8. Steroid induced dm: glargine 20 u (humilin insulin prescribed at last hospitalization not covered by insurance) aspart ISS, aspart 6 u tid with meals, and 5 u with a can of dr. Pinto.  Resumed glargine and on ISS, monitor off sched aspart for now as NPO-titrate back in as needed  9. Narcotic dependence:  Cont suboxone as at home  10. ? Underlying vasculitic/autoimmune process at play:  AMISHA by IFA borderline positive, and cytoplasmic IgG negative  11. Nicotine dependence with suspected ALI 2/2 e cigarettes-continues to smoke despite  "nicotine replacement-ATQ  DVT Prophylaxis: Enoxaparin (Lovenox) SQ  Code Status: Full Code  Discharge Dispo: ? home  Estimated Disch Date / # of Days until Disch: TBD-continues to require ICU care in light of severe hypoxic respiratory failure        Interval History (Subjective):      Much worsening sob of breath today with pleuritic cp.  No n/v, and hungry. Weak and deconditioned                  Physical Exam:      Last Vital Signs:  BP (!) 86/59  Pulse 134  Temp 98.7  F (37.1  C) (Axillary)  Resp 26  Ht 1.689 m (5' 6.5\")  Wt 86.2 kg (190 lb 0.6 oz)  SpO2 98%  Breastfeeding? No  BMI 30.21 kg/m2  HR 50-80 range 's    I/O;  Net 600 cc weights up 8.9 kg  Tele NSR    alert and oriented looks stated age head nc/at sclera with mild bilateral injection appears flushed (she reports is chronic), lungs coarse bilateral but not impressive, no wheeze RRR no m/r/g abd obese - no obvious ascites, s/nt/nd rossi            Medications:      All current medications were reviewed with changes reflected in problem list.         Data:      All new lab and imaging data was reviewed.   Labs:    Recent Labs  Lab 11/05/17  0510      POTASSIUM 4.2   CHLORIDE 108   CO2 28   ANIONGAP 5   *   BUN 17   CR 0.84   GFRESTIMATED 74   GFRESTBLACK 90   VISHNU 7.8*       Recent Labs  Lab 11/05/17  0510   WBC 5.3   HGB 11.5*   HCT 35.9   MCV 96   *      Imaging:   CT chest PE protocol   IMPRESSION:  1. Suboptimal pulmonary artery contrast bolus. No pulmonary emboli are  visible.  2. Pulmonary bilateral diffuse progressive disease, dramatically  progressed since 10/18/2017.  3. Small pleural effusions, right greater than left.  4. Mediastinal and left hilar adenopathy, increased since 10/18/2017.    "

## 2017-11-05 NOTE — PROGRESS NOTES
ICU    ICU day 2 for hypoxemic respiratory failure     42 year old female, smoker,  with DIP/NSIP dx on VATS bx 1.5 mo ago has been on chronic prednisone for many months (with possible 3x/wk  PCP prophylaxis) with recent hospitalization at [facility] where she was d/c'ed on prednisone 40 mg and full dose bactrim.  Per pharmacy, her taking the medications is not reliable.      In ED had O2 saturations of 51% and admitted to ICU on high-flow at 60% and O2 saturations 95%.  She is febrile and elevated WBC and had elevated LA which resolved quickly with IVF.  Patient  Also has cirrhosis and is on several pain/psych medications.      Overnight:  More alert but still needing high O2 and changed to bipap and on 45%.    ROS:  C/o pain with respirations and low back pain (chronic for her)  No abdominal pain or edema or change in vision or rash.      Afebrile  120/70  HR 90 RR 22 on 45%   on 12/6  Can converse but drifts off without stimulation   Lungs rare crackles, some squeaks bilateral  h-RR  Abdomen soft non distended non tender  Hands and feet are warm, trace edema           CT today reviewed by me: marked increase in GGO c/w 3 weeks ago.  Radiology notes fibrosis and honeycombing but I think this is not fibrosis but more GGO on top of ILD-did not have honeycombing 3 weeks ago and fibrosis doesn't move that fast..      Last Basic Metabolic Panel:  Lab Results   Component Value Date     11/05/2017      Lab Results   Component Value Date    POTASSIUM 4.2 11/05/2017     Lab Results   Component Value Date    CHLORIDE 108 11/05/2017     Lab Results   Component Value Date    VISHNU 7.8 11/05/2017     Lab Results   Component Value Date    CO2 28 11/05/2017     Lab Results   Component Value Date    BUN 17 11/05/2017     Lab Results   Component Value Date    CR 0.84 11/05/2017     Lab Results   Component Value Date     11/05/2017     CBC RESULTS:   Recent Labs   Lab Test  11/05/17   0510   WBC  5.3   RBC  3.74*   HGB   11.5*   HCT  35.9   MCV  96   MCH  30.7   MCHC  32.0   RDW  14.6   PLT  102*       A:  ILD Patient with acute worsening and fever and more hypoxemia.  Also still sedated probably from methacarbamol.  This could be PCP or diffuse alveolar damage from other respiratory infections.  With fever and elevated WBC , an exacerbation of DIP seems less likely.  Currently does not need intubation.  Too unstable for bronch / BAL unless intubated.   P: continue current bipap or hi-flow  Continue bactrim, zosyn and vancomycin  Prednisone 40 mg  Decrease methocarbamol to 500, change trazodone to 50 mg and prn  Continue lactulose and rifaximin for cirrhosis.    Not a lung transplant candidate at this time.

## 2017-11-05 NOTE — PHARMACY-VANCOMYCIN DOSING SERVICE
Pharmacy Vancomycin Note  Date of Service 2017  Patient's  1975   42 year old, female    Indication: Healthcare-Associated Pneumonia  Goal Trough Level: 15-20 mg/L  Day of Therapy: 2  Current Vancomycin regimen:  1750 mg IV q12h    Current estimated CrCl = Estimated Creatinine Clearance: 96.5 mL/min (based on Cr of 0.84).    Creatinine for last 3 days  2017: 12:50 AM Creatinine 1.42 mg/dL;  9:10 AM Creatinine 0.98 mg/dL;  3:55 PM Creatinine 0.87 mg/dL  2017:  5:10 AM Creatinine 0.84 mg/dL    Recent Vancomycin Levels (past 3 days)  2017: 12:25 PM Vancomycin Level 14.0 mg/L    Vancomycin IV Administrations (past 72 hours)                   vancomycin (VANCOCIN) 1,750 mg in NaCl 0.9 % 500 mL intermittent infusion (mg) 1,750 mg New Bag 17 2347     1,750 mg New Bag  1120    vancomycin (VANCOCIN) 1500 mg in 0.9% NaCl 250 mL PREMIX (mg) 1,500 mg New Bag 17 0246                Nephrotoxins and other renal medications (Future)    Start     Dose/Rate Route Frequency Ordered Stop    17 1110  ketorolac (TORADOL) injection 15 mg      15 mg Intravenous EVERY 6 HOURS PRN 17 1111 11/10/17 1109    17 1200  vancomycin (VANCOCIN) 1,750 mg in NaCl 0.9 % 500 mL intermittent infusion      1,750 mg  over 2 Hours Intravenous EVERY 12 HOURS 17 0444      17 0800  piperacillin-tazobactam (ZOSYN) intermittent infusion 4.5 g     Comments:  Pharmacy can adjust dose based on renal function.    4.5 g  200 mL/hr over 30 Minutes Intravenous EVERY 6 HOURS 17 0356               Contrast Orders - past 72 hours (72h ago through future)    Start     Dose/Rate Route Frequency Ordered Stop    17 1100  iopamidol (ISOVUE-370) solution 500 mL      500 mL Intravenous ONCE 17 1054 17 1104          Interpretation of levels and current regimen:  Trough level is  Therapeutic    Has serum creatinine changed > 50% in last 72 hours: No    Renal Function:  Improving    Plan:  1.  Continue Current Dose  2.  Pharmacy will check trough levels as appropriate in 1-3 Days.    3. Serum creatinine levels will be ordered daily for the first week of therapy and at least twice weekly for subsequent weeks.      Wade Gentile        .

## 2017-11-05 NOTE — PLAN OF CARE
Problem: Patient Care Overview  Goal: Plan of Care/Patient Progress Review  ICU End of Shift Summary.  For vital signs and complete assessments, please see documentation flowsheets.      Pertinent assessments: PT A&Ox4, able to make needs known, VSS, remains on HFNC 65%, 35 LPM wean as tolerated, remains afebrile, up to the BSC with SB  Assist. Pt reports pain is 5/10 which is manageable, will continue to monitor. Questions answered, support given, continue to monitor.   Major Shift Events: N/A  Plan (Upcoming Events): Continue to wean O2, Social work consult, IV Abx, PO Bactrim.   Discharge/Transfer Needs: TBD     Bedside Shift Report Completed : yes  Bedside Safety Check Completed: yes

## 2017-11-05 NOTE — PROGRESS NOTES
RT- Patient was initially on HFNC 35L 65%, this morning turned up to 45LPM due to increased SOB. Patient had to go to for a CT scan. Patient placed on BIPAP for transport and also increased WOB. BIPAP settings 14/6 R12 45%.

## 2017-11-05 NOTE — PROGRESS NOTES
Pt assisted to use BSC, once finished helped pt back to bed. Pt reports increased SOB,SPO2 79% on 65% 45 LPM, HFNC increased to 85% (remained on 45 LPM), increased work of breathing using abdominal muscles and c/o sharp stabbing pain in lower back. Attempted to sit pt upright and rub back to alleviate pain and assist in breathing. Work of breathing improved, a long with SPo2 97%, however back pain persisted. Discussed use of BIPAP for transportation  And to assist with taking deeper breaths. Pt assisted via lift to transport cart for a CT to R/O PE per Dr. Henry.

## 2017-11-06 NOTE — PROGRESS NOTES
[facility]  Hospitalist Progress Note  Ramo Woo MD 11/06/2017    Reason for Stay (Diagnosis): hypoxic respiratory failure         Assessment and Plan:      Summary of Stay: Sabina Figueroa is a 42 year old female with a history of ILD ([facility] thought 2/2 e-cigarettes in setting of DIP and NSIP by surg path)-at that hospitalization she was discharged on high dose steroids with PCP ppx, she then presented to the [facility] and hospitalized 10/18-10/23 for recurrent respiratory failure at which time it was felt due to PCP pna as she had not been taking her tmp-sulfa ppx.  She was discharged on high dose steroids taper and therapeutic tmp-sulfa dosing for PCP with sats 97 % range on RA and had arranged f/u with Dr. Mane ( to f/u vasculitic w/u).  She returns to our ER and admitted on 11/4/2017 with e/o recurrent hypoxic respiratory failure again/fever/KAITLIN to 102/tachycardia/leukocytosis and lactic acidosis to 6.3 consistent with sepsis in the setting of non-compliance/confusion with medications (only taking ppx dosing of tmp-sulfa), and missing of all medications due to being picked up for DUI (drank on 2 separate occassions but prior to that was sober since 5/11/17) and being held in snf.    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    Overnight into 11/5 had increasing O2 requirements, worsening tachypnea - CT PE protocol completed and shows no PE but significant worsening of yuli pulmonary infiltrates    Problem List:   1. Sepsis: presumably from respiratory source- CXR with basilar infiltrates-CT now with diffuse infltrates-respiratory panel negative,   Lactic acidosis resolved with IVF.  Cont broad spectrum abx with vancomycin and Zosyn.  2. Acute Hypoxic respiratory failure-complicated pulmonary situation-Hx of ALI  superimposed on desquamative (smoking) pneumonitis and non-specific pneumonitis by wedge bx completed in 9/2017, s/p hospitalization at [facility] for suspected PCP pna-discharged on steroid/therapeutic tmp-sulfa-and non-compliant with medications due to recent DUI-in residential for 2 days, and misunderstanding of tmp-sulfa dosing.  Patient was given broad spectrum abx with azith/pip-tazo/vanco in ER-azith not continued-now on pip-tazo/vanco and high dose TMP-sulfa for suspected ongoing/inadequately treated PCP and high risk for alternative respiratory infection due to immunosuppression with steroids.  If continues to worsen will likely require intubation at which time bronch s/b done to help define current infection   3. 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  My colleague did discuss with intensivist-not lung transplant candidate.    4. Non-compliance with medications-, will require case-worker at discharge to help mitigate suspected social issues/med compliance  5. Fever:  Suspected respiratory source:  UA negative, BCx obtained-at risk for opportunistic infections in setting of steroid exposure-see abx as described above  6. KAITLIN:  Baseline creat wnl-currently BUN/creat 15/1.4-now normalized after IVF  7. Cirrhosis:  On spironolactone/furosemide/lactulose/rifaximin.  LFTs currently with minimally elevated AST, bili wnl. NH3 wnl, and on review of chart not significantly elevated except 2 prior occasions  8. Steroid induced dm: glargine 20 u (humilin insulin prescribed at last hospitalization not covered by insurance) aspart ISS, aspart 6 u tid with meals, and 5 u with a can of dr. Pinto.  Resumed glargine and on ISS, monitor off sched aspart for now as NPO-titrate back in as needed  9. Narcotic dependence:  Cont suboxone as at home  10. ? Underlying vasculitic/autoimmune process at play:  AMISHA by IFA borderline positive, and cytoplasmic IgG negative  11. Nicotine dependence with suspected ALI 2/2 e  "cigarettes-continues to smoke despite nicotine replacement-ATQ      DVT Prophylaxis: Enoxaparin (Lovenox) SQ  Code Status: Full Code  Discharge Dispo: ? home  Estimated Disch Date / # of Days until Disch: TBD-continues to require ICU care in light of severe hypoxic respiratory failure        Interval History (Subjective):      Breathing comfortable on BiPAP.  Still desats very easily and notable increased work of breathing.                  Physical Exam:      Last Vital Signs:  /68  Pulse 134  Temp 98.7  F (37.1  C) (Axillary)  Resp 28  Ht 1.689 m (5' 6.5\")  Wt 87.7 kg (193 lb 5.5 oz)  SpO2 98%  Breastfeeding? No  BMI 30.74 kg/m2  HR 50-80 range 's    I/O;  Net 600 cc weights up 8.9 kg  Tele NSR    alert and oriented looks stated age head nc/at sclera with mild bilateral injection appears flushed (she reports is chronic), lungs coarse bilateral but not impressive with notable increased work of breathing, no wheeze RRR no m/r/g abd obese - no obvious ascites, s/nt/nd rossi            Medications:      All current medications were reviewed with changes reflected in problem list.         Data:      All new lab and imaging data was reviewed.   Labs:    Recent Labs  Lab 11/06/17  0545      POTASSIUM 3.6   CHLORIDE 105   CO2 28   ANIONGAP 6   *   BUN 15   CR 1.03   GFRESTIMATED 58*   GFRESTBLACK 71   VISHNU 8.2*       Recent Labs  Lab 11/06/17  0545   WBC 3.6*   HGB 10.4*   HCT 33.5*   MCV 99   PLT 99*      Imaging:   CT chest PE protocol   IMPRESSION:  1. Suboptimal pulmonary artery contrast bolus. No pulmonary emboli are  visible.  2. Pulmonary bilateral diffuse progressive disease, dramatically  progressed since 10/18/2017.  3. Small pleural effusions, right greater than left.  4. Mediastinal and left hilar adenopathy, increased since 10/18/2017.    "

## 2017-11-06 NOTE — PLAN OF CARE
Problem: Patient Care Overview  Goal: Plan of Care/Patient Progress Review  Outcome: No Change  ICU End of Shift Summary.  For vital signs and complete assessments, please see documentation flowsheets.      Pertinent assessments: A&O x4; calls appropriately. VSS, soft BP at times. Afebrile. BG trending lower; SSI held.   Major Shift Events: Wore BiPAP most of night, taking off for short breaks. Up to bedside commode with SBA; 1 BM this shift. C/0 low back pain; Aqua-K heating pad on heating cycle, repositioned.  Plan (Upcoming Events): Continue to wean from BiPAP as able. Continue IV abx.   Discharge/Transfer Needs: TBD     Bedside Shift Report Completed :   Bedside Safety Check Completed:

## 2017-11-06 NOTE — PROGRESS NOTES
Patient wearing BIPAP for night, 14/6 50%. Goes on HF or oxymask for breaks. BS fine crackles and diminished. RT will continue to follow.

## 2017-11-07 NOTE — PLAN OF CARE
Problem: Sepsis/Septic Shock (Adult)  Goal: Signs and Symptoms of Listed Potential Problems Will be Absent, Minimized or Managed (Sepsis/Septic Shock)  Signs and symptoms of listed potential problems will be absent, minimized or managed by discharge/transition of care (reference Sepsis/Septic Shock (Adult) CPG).   Outcome: No Change  ICU End of Shift Summary.  For vital signs and complete assessments, please see documentation flowsheets.      Pertinent assessments: Alert. Up to BSC freq with dyspnea and desats on exertion. Recovery moderate. High isabella NC 40LPM % Titrated up with activity. Freq loose cough. Lungs more coarse and wheezy today. Loose stools receiving lactulose. Npo. Reports being hungry. Keofeed placed at bedside confirmed in stomach by xray. TF started. Nicotine patch in place and requests niccorett gum freq.  Major Shift Events: Increasing dyspnea with exertion.  Plan (Upcoming Events): Tube feeding started until requiring Less O2.   Discharge/Transfer Needs: TBD     Bedside Shift Report Completed :   Bedside Safety Check Completed:

## 2017-11-07 NOTE — PROGRESS NOTES
Respiratory Therapy Note        Pt has been using HFNC all day currently 45 Lpm 65%.  Breath sounds have been crackles with wheezing.  SOB with mild levels of exertion.    November 6, 2017 6:17 PM  Lincoln Lawrence

## 2017-11-07 NOTE — PROGRESS NOTES
Respiratory-    Patient was on BIPAP most of night and HFNC during the day. Started Duonebs today, BS expiratory wheezes. Still very dyspneic on exertion. Will continue to monitor patient.

## 2017-11-07 NOTE — PROGRESS NOTES
Patient going back and forth between BIPAP and HFNC throughout day/night. HFNC 65% and 45lpm, and BIPAP settings of 14/6 50%. BS coarse/crackles. RT will continue to follow.

## 2017-11-07 NOTE — CONSULTS
"CLINICAL NUTRITION SERVICES  -  ASSESSMENT NOTE    Recommendations Ordered by Registered Dietitian (RD):  Begin EN via NG  - Begin @ 10 ml/hr. After 6 hours good tolerance, advance by 20 ml Q6Hrs until goal is reached.   - 60 mL free water flush Q6Hrs while IVF running   Malnutrition: None     REASON FOR ASSESSMENT  Sabina Figueroa is a 42 year old female seen by Registered Dietitian for Provider Order - Registered Dietitian to Assess and Order TF per Medical Nutrition protocol    NUTRITION HISTORY  - Information obtained from patient, EMR  - H/o recurrent respiratory failure, chemical dependency, cirrhosis, steroid induced DM.  - admits for acute respiratory failure, fever, KAITLIN r/t noncompliance to medications   - Pt eats 1 meal/day ([employer] or gas station sandwich, hamburger)  - If she eats a home-cooked meal her boyfriend cooks and does the shopping.   - Snacks include honey roasted nuts and nicole (up to 5-6 daily).   - She denies any recent changes to her intakes or appetite.       CURRENT NUTRITION ORDERS  Diet Order:     NPO     Current Intake/Tolerance:  Pt drinking water, chewing gum. Feels hungry.     PHYSICAL FINDINGS  Observed  HFNC   TF Placed --> coiled in the stomach  Obtained from Chart/Interdisciplinary Team  Well-nourished     ANTHROPOMETRICS  Height: 5' 6.5\"  Weight: 83.8 kg  Body mass index is 29.3 kg/(m^2).  Weight Status:  Overweight BMI 25-29.9  IBW: 60.2 kg  % IBW: 139%  Weight History: Pt reports UBW of ~185#. She feels she has had some decline in weight recently, but attributes that to dehydration. Denies significant weight changes.   Wt Readings from Last 10 Encounters:   11/07/17 88.3 kg (194 lb 10.7 oz)   10/22/17 83.4 kg (183 lb 12.8 oz)   09/16/17 84.6 kg (186 lb 8.2 oz)   09/14/17 89.8 kg (197 lb 15.6 oz)   08/28/17 85.3 kg (188 lb)   07/24/17 85.3 kg (188 lb)   05/09/17 79.2 kg (174 lb 8 oz)   04/10/17 77.1 kg (170 lb)   03/31/17 80.5 kg (177 lb 8 oz)   03/17/17 77.7 kg (171 lb 4.8 " oz)       LABS  Labs reviewed    MEDICATIONS  Medications reviewed  Remeron 15 mg daily  NaCl IVF @ 75 mL/hr    Dosing Weight 66.1 kg    ASSESSED NUTRITION NEEDS PER APPROVED PRACTICE GUIDELINES:  Estimated Energy Needs: 8493-2853 kcals (25-30 Kcal/Kg)  Justification: maintenance  Estimated Protein Needs: 79-99 grams protein (1.2-1.5 g pro/Kg)  Justification: maintenance  Estimated Fluid Needs: 6327-7620 mL (1 mL/Kcal)  Justification: maintenance    MALNUTRITION:  % Weight Loss:  None noted  % Intake:  Decreased intake does not meet criteria for malnutrition  Subcutaneous Fat Loss:  None observed  Muscle Loss:  None observed  Fluid Retention:  None noted    Malnutrition Diagnosis: Patient does not meet two of the above criteria necessary for diagnosing malnutrition    NUTRITION DIAGNOSIS:  Inadequate protein-energy intake related to reduced ability for safe PO r/t high O2 needs as evidenced by NPO diet order x4 days, plan for EN to provide 100% estimated needs.       NUTRITION INTERVENTIONS  Recommendations / Nutrition Prescription  Goal EN - Isosource 1.5 @ 50 mL/hr x 24 hours (1200 mL) provides 1800 kcal (27 kcal/kg), 82 g pro (1.2 g/kg), 316 g CHO, 27 g fiber, 912 mL free H2O. Meets 100% DRI.   - Begin @ 10 ml/hr. After 6 hours good tolerance, advance by 20 ml Q6Hrs until goal is reached.   - 60 mL free water flush Q6Hrs while IVF running      Implementation  Nutrition education: Provided education on FT placement and TF for nutrition support while NPO.   EN Composition, EN Schedule, Feeding Tube Flush; as above  Collaboration and Referral of Nutrition care: pt discussed during interdisciplinary rounds, FT placed at bedside w/ RN, discussed plan for nutrition support with MD.      Nutrition Goals  Pt to tolerate TF advancement to goal in the next 36 hours.      MONITORING AND EVALUATION:  Progress towards goals will be monitored and evaluated per protocol and Practice Guidelines      Serenity Blake, RD, LD  3rd  floor/ICU: [phone]  All other floors: [phone]  Weekend/holiday: [phone]  Office: [phone]

## 2017-11-07 NOTE — PROGRESS NOTES
[facility]  Hospitalist Progress Note  Ramo Woo MD 11/07/2017    Reason for Stay (Diagnosis): hypoxic respiratory failure         Assessment and Plan:      Summary of Stay: Sabina Figueroa is a 42 year old female with a history of ILD (diagnosed by wedge lung bx in 9/17 at [facility]-acute lung injury thought 2/2 e-cigarettes in setting of DIP and NSIP by surg path)-at that hospitalization she was discharged on high dose steroids with PCP ppx, she then presented to the [facility] and hospitalized 10/18-10/23 for recurrent respiratory failure at which time it was felt due to PCP pna as she had not been taking her tmp-sulfa ppx.  She was discharged on high dose steroids taper and therapeutic tmp-sulfa dosing for PCP with sats 97 % range on RA and had arranged f/u with Dr. Mane ( to f/u vasculitic w/u).  She returns to our ER and admitted on 11/4/2017 with e/o recurrent hypoxic respiratory failure again/fever/KAITLIN to 102/tachycardia/leukocytosis and lactic acidosis to 6.3 consistent with sepsis in the setting of non-compliance/confusion with medications (only taking ppx dosing of tmp-sulfa), and missing of all medications due to being picked up for DUI (drank on 2 separate occassions but prior to that was sober since 5/11/17) and being held in prison.    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    Overnight into 11/5 had increasing O2 requirements, worsening tachypnea - CT PE protocol completed and shows no PE but significant worsening of yuli pulmonary infiltrates    Problem List:   1. Sepsis: presumably from respiratory source- CXR with basilar infiltrates-CT now with diffuse infltrates-respiratory panel negative,   Lactic acidosis resolved with IVF.  Cont broad spectrum abx with vancomycin and Zosyn.  2. Acute Hypoxic respiratory failure-complicated pulmonary situation-Hx of ALI  superimposed on desquamative (smoking) pneumonitis and non-specific pneumonitis by wedge bx completed in 9/2017, [facility]-discharged on steroid/therapeutic tmp-sulfa-and non-compliant with medications due to recent DUI-in residential for 2 days, and misunderstanding of tmp-sulfa dosing.  Patient was given broad spectrum abx with azith/pip-tazo/vanco in ER-azith not continued-now on pip-tazo/vanco and high dose TMP-sulfa for suspected ongoing/inadequately treated PCP and high risk for alternative respiratory infection due to immunosuppression with steroids.  If continues to worsen will likely require intubation at which time bronch s/b done to help define current infection.  Given poor airflow and wheezing, we'll change prednisone to IV Solu-Medrol 40 mg q.8 hours and place patient on scheduled DuoNeb's  3. 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  My colleague did discuss with intensivist-not lung transplant candidate.    4. Non-compliance with medications-, will require case-worker at discharge to help mitigate suspected social issues/med compliance  5. Fever:  Suspected respiratory source:  UA negative, BCx obtained-at risk for opportunistic infections in setting of steroid exposure-see abx as described above  6. KAITLIN:  Baseline creat wnl-currently BUN/creat 15/1.4-now normalized after IVF  7. Cirrhosis:  On spironolactone/furosemide/lactulose/rifaximin.  LFTs currently with minimally elevated AST, bili wnl. NH3 wnl, and on review of chart not significantly elevated except 2 prior occasions  8. Steroid induced dm: glargine 20 u (humilin insulin prescribed at last hospitalization not covered by insurance) aspart ISS, aspart 6 u tid with meals, and 5 u with a can of dr. Pinto.  Resumed glargine and on ISS, monitor off sched aspart for now as NPO-titrate back in as needed  9. Narcotic dependence:  Cont suboxone as at home  10. ? Underlying  "vasculitic/autoimmune process at play:  AMISHA by IFA borderline positive, and cytoplasmic IgG negative  11. Nicotine dependence with suspected ALI 2/2 e cigarettes-continues to smoke despite nicotine replacement-ATQ this admission      DVT Prophylaxis: Enoxaparin (Lovenox) SQ  Code Status: Full Code  Discharge Dispo: ? home  Estimated Disch Date / # of Days until Disch: TBD-continues to require ICU care in light of severe hypoxic respiratory failure  Time spent: Greater than 35 minutes        Interval History (Subjective):      Breathing comfortable on high flow.  Still desats very easily and notable increased work of breathing.                  Physical Exam:      Last Vital Signs:  /67  Pulse 134  Temp 98  F (36.7  C) (Oral)  Resp (!) 31  Ht 1.689 m (5' 6.5\")  Wt 88.3 kg (194 lb 10.7 oz)  SpO2 98%  Breastfeeding? No  BMI 30.95 kg/m2  HR 50-80 range 's    I/O;  Net 600 cc weights up 8.9 kg  Tele NSR    alert and oriented looks stated age head nc/at sclera with mild bilateral injection appears flushed (she reports is chronic), notable increased work of breathing.  Poor air movement with mild mid expiratory wheezing, normal S1 and S2 RRR no m/r/g abd obese - no obvious ascites, s/nt/nd rossi            Medications:      All current medications were reviewed with changes reflected in problem list.         Data:      All new lab and imaging data was reviewed.   Labs:    Recent Labs  Lab 11/07/17  0550      POTASSIUM 3.9   CHLORIDE 105   CO2 27   ANIONGAP 7   GLC 70   BUN 11   CR 0.86   GFRESTIMATED 72   GFRESTBLACK 87   VISHNU 8.0*       Recent Labs  Lab 11/07/17  0550   WBC 3.7*   HGB 9.4*   HCT 30.8*   MCV 97   *      Imaging:   CT chest PE protocol   IMPRESSION:  1. Suboptimal pulmonary artery contrast bolus. No pulmonary emboli are  visible.  2. Pulmonary bilateral diffuse progressive disease, dramatically  progressed since 10/18/2017.  3. Small pleural effusions, right greater than " left.  4. Mediastinal and left hilar adenopathy, increased since 10/18/2017.

## 2017-11-07 NOTE — CONSULTS
SMALL BOWEL FEEDING TUBE PLACEMENT ASSESSMENT      Reason for Feeding Tube Placement: BIPAP, HFNC needs    Cortrak Start Time: 11:30    Cortrak End Time:  11:50    Medicine Delivered During Procedure:  n/a    Placement Successful: XR pending    Procedure Complications:  n/a    Final Placement Olvin at exit of nare 95 (cm)     Face to Face Time With Patient: 30, assistance from bedside RNNathan.     Serenity Blake RD, LD  3rd floor/ICU: [phone]  All other floors: [phone]  Weekend/holiday: [phone]  Office: [phone]

## 2017-11-07 NOTE — PLAN OF CARE
Problem: Patient Care Overview  Goal: Plan of Care/Patient Progress Review  Outcome: No Change  ICU End of Shift Summary.  For vital signs and complete assessments, please see documentation flowsheets.      Pertinent assessments: A&O x4; lethargic at times. Able to make needs known. Soft BP when asleep; afebrile. BM x1; up to bedside commode with SBA.   Major Shift Events: O2 sats in 90s with BiPAP most of night; HFNC at times. One episode of increased desaturation on HFNC requiring switch to BiPAP with increased FiO2. Dyspnea with pivot transfer to bedside commode. LS coarse. PRN IV Morphine given x1.   Plan (Upcoming Events): Monitor O2 needs. Monitor BG.   Discharge/Transfer Needs: TBD.     Bedside Shift Report Completed :   Bedside Safety Check Completed:

## 2017-11-08 NOTE — PHARMACY
Pharmacy Tube Feeding Consult    Medication reviewed for administration by feeding tube and for potential food/drug interactions.    Recommendation:   change ASA EC to chewable form,   change Protonix, gabapentin. Loratadine, fluoxetine and bactrim from tablet to suspension, change other med routes from oral to oral/feeding tube    Pharmacy will continue to follow as new medications are ordered.

## 2017-11-08 NOTE — PROGRESS NOTES
[facility]  Hospitalist Progress Note  Ramo Woo MD 11/08/2017    Reason for Stay (Diagnosis): hypoxic respiratory failure         Assessment and Plan:      Summary of Stay: Sabina Figueroa is a 42 year old female with a history of ILD (diagnosed by wedge lung bx in 9/17 at [facility]-acute lung injury thought 2/2 e-cigarettes in setting of DIP and NSIP by surg path)-at that hospitalization she was discharged on high dose steroids with PCP ppx, she then presented to the [facility] and hospitalized 10/18-10/23 for recurrent respiratory failure at which time it was felt due to PCP pna as she had not been taking her tmp-sulfa ppx.  She was discharged on high dose steroids taper and therapeutic tmp-sulfa dosing for PCP with sats 97 % range on RA and had arranged f/u with Dr. Mane ( to f/u vasculitic w/u).  She returns to our ER and admitted on 11/4/2017 with e/o recurrent hypoxic respiratory failure again/fever/KAITLIN to 102/tachycardia/leukocytosis and lactic acidosis to 6.3 consistent with sepsis in the setting of non-compliance/confusion with medications (only taking ppx dosing of tmp-sulfa), and missing of all medications due to being picked up for DUI (drank on 2 separate occassions but prior to that was sober since 5/11/17) and being held in correction.    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    Overnight into 11/5 had increasing O2 requirements, worsening tachypnea - CT PE protocol completed and shows no PE but significant worsening of bilateral pulmonary infiltrates    Problem List:   1. Sepsis: presumably from respiratory source- CXR with basilar infiltrates-CT now with diffuse infltrates-respiratory panel negative,   Lactic acidosis resolved with IVF.  Cont broad spectrum abx with vancomycin and Zosyn.  2. Acute Hypoxic respiratory failure-complicated pulmonary situation-Hx of ALI  superimposed on desquamative (smoking) pneumonitis and non-specific pneumonitis by wedge bx completed in 9/2017, s/p hospitalization at [facility] for suspected PCP pna-discharged on steroid/therapeutic tmp-sulfa-and non-compliant with medications due to recent DUI-in prison for 2 days, and misunderstanding of tmp-sulfa dosing.  Patient was given broad spectrum abx with azith/pip-tazo/vanco in ER-azith not continued-now on pip-tazo/vanco and high dose TMP-sulfa for suspected ongoing/inadequately treated PCP and high risk for alternative respiratory infection due to immunosuppression with steroids.  If continues to worsen will likely require intubation at which time bronch s/b done to help define current infection.  Continue IV Solu-Medrol 40 mg q.8 hours and scheduled DuoNeb's.  Notable 24 pound weight gain since admission.  Will DC IV fluids and start patient on scheduled Lasix 20 mg IV q. eight hours.  Notably chronically on Lasix 20 mg p.o. daily.  Continue to monitor I's and O's and daily weights.  Repeat portable chest x-ray in the a.m.  3. 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  My colleague did discuss with pulmonary intensivist-not lung transplant candidate.    4. Non-compliance with medications-, will require case-worker at discharge to help mitigate suspected social issues/med compliance  5. Fever:  Suspected respiratory source:  UA negative, BCx obtained-at risk for opportunistic infections in setting of steroids.  Antibiotics as above.  6. KAITLIN:  Baseline creat wnl-currently BUN/creat 15/1.4-now normalized after IVF  7. Cirrhosis:  Chronically on spironolactone/furosemide/lactulose/rifaximin.  LFTs currently with minimally elevated AST, bili wnl. NH3 wnl, and on review of chart not significantly elevated except 2 prior occasions.  Lasix and spironolactone has been placed on hold but will restart IV Lasix as above.  8. Steroid induced dm: glargine 20 u (humilin insulin prescribed at  "last hospitalization not covered by insurance).  Patient becoming more hyperglycemic with tube feeds and stress dose IV steroids.  We'll increase glargine to 15 units subcutaneous q.a.m. and transition to high intensity sliding scale and p.o. protocol.  9. Narcotic dependence:  Cont suboxone as at home  10. ? Underlying vasculitic/autoimmune process at play:  AMISHA by IFA borderline positive, and cytoplasmic IgG negative.  Outpatient pulmonary follow-up at discharge.  11. Nicotine dependence with suspected ALI 2/2 e cigarettes-continues to smoke despite nicotine replacement-ATQ this admission    DVT Prophylaxis: Enoxaparin (Lovenox) SQ  Code Status: Full Code  Discharge Dispo: ? home  Estimated Disch Date / # of Days until Disch: TBD-continues to require ICU care in light of severe hypoxic respiratory failure    Time spent: Greater than 35 minutes        Interval History (Subjective):      Still desaturates very easily with minimal exertion and while on high flow.  Currently comfortable on BiPAP.       Physical Exam:      Last Vital Signs:  /65  Pulse 134  Temp 98.7  F (37.1  C) (Axillary)  Resp 24  Ht 1.689 m (5' 6.5\")  Wt 88.3 kg (194 lb 10.7 oz)  SpO2 93%  Breastfeeding? No  BMI 30.95 kg/m2  HR 50-80 range 's    I/O;  Net 600 cc weights up 8.9 kg  Tele NSR    alert and oriented looks stated age head nc/at sclera with mild bilateral injection appears flushed (she reports is chronic), notable increased work of breathing on BiPAP.  Poor air movement with mild mid expiratory wheezing, normal S1 and S2 RRR no m/r/g abd obese - no obvious ascites, s/nt/nd rossi            Medications:      All current medications were reviewed with changes reflected in problem list.         Data:      All new lab and imaging data was reviewed.   Labs:    Recent Labs  Lab 11/08/17  0545 11/07/17  0550    139   POTASSIUM 4.6 3.9   CHLORIDE 103 105   CO2 26 27   ANIONGAP  --  7   * 70   BUN 13 11   CR 0.84 " 0.86   GFRESTIMATED 74 72   GFRESTBLACK 90 87   VISHNU 8.2* 8.0*       Recent Labs  Lab 11/08/17  0545   WBC 3.8*   HGB 9.1*   HCT 28.6*   MCV 95   PLT 83*      Imaging:   CT chest PE protocol   IMPRESSION:  1. Suboptimal pulmonary artery contrast bolus. No pulmonary emboli are  visible.  2. Pulmonary bilateral diffuse progressive disease, dramatically  progressed since 10/18/2017.  3. Small pleural effusions, right greater than left.  4. Mediastinal and left hilar adenopathy, increased since 10/18/2017.

## 2017-11-08 NOTE — CONSULTS
SWS:  SW aware of consult to see RE: dc plan. Pt currently on HF oxygen or BiPap and not appropriate to see re: dc plan at this time. SW will complete consult but will continue to follow to assist with dc planning needs.

## 2017-11-08 NOTE — PLAN OF CARE
Problem: Patient Care Overview  Goal: Plan of Care/Patient Progress Review  ICU End of Shift Summary.  For vital signs and complete assessments, please see documentation flowsheets.      Pertinent assessments: pt remains in need of large amounts im O2 and pressure on High flow at 40L between % FiO2 and while sleeping on Bipap at 70-80% FiO2. Pt frequently up to bedside commode with loose stools from lactulose and large voids. Pt dose desat but recovers quickly when back in bed. Pt at goal rate of 50ml/hr on tube feed and tolerating well with no residuals.   Major Shift Events: goal rate of TF achieved  Plan (Upcoming Events): try to wean O2 needs   Discharge/Transfer Needs: TBD     Bedside Shift Report Completed :   Bedside Safety Check Completed:

## 2017-11-08 NOTE — PROGRESS NOTES
"Pt continued to be on the BiPAP throughout the day with no changes and is currently resting comfortably on it. An attempt was made to try transitioned her to the HFNC, but failed due to resp distress and SOB. Will continue to monitor and assess in hopes of liberating her from the BiPAP.     Vital signs:  Temp: 97  F (36.1  C) Temp src: Axillary BP: 120/66   Heart Rate: 88 Resp: 30 SpO2: 98 % O2 Device: BiPAP/CPAP Oxygen Delivery: Other (Comments) (40 lpm) Height: 168.9 cm (5' 6.5\") Weight: 88.3 kg (194 lb 10.7 oz)  Estimated body mass index is 30.95 kg/(m^2) as calculated from the following:    Height as of this encounter: 1.689 m (5' 6.5\").    Weight as of this encounter: 88.3 kg (194 lb 10.7 oz).      PAST MEDICAL HISTORY:   Past Medical History:   Diagnosis Date     ALC (alcoholic liver cirrhosis) (H)      Alcohol abuse      Anxiety      Arthritis      Ascites      Asthma      Bunion, left foot      Chronic low back pain     Narcotic agreement signed in [facility] system     Depression      Hypertension     current     ILD (interstitial lung disease) (H)      Ovarian cyst, left        PAST SURGICAL HISTORY:   Past Surgical History:   Procedure Laterality Date     BRONCHOSCOPY FLEXIBLE N/A 1/10/2017    Procedure: BRONCHOSCOPY FLEXIBLE;  Surgeon: Jennifer Mane MD;  Location:  GI     BRONCHOSCOPY FLEXIBLE N/A 2017    Procedure: BRONCHOSCOPY FLEXIBLE;  Surgeon: Jennifer Mane MD;  Location: [facility] OR      SECTION       ESOPHAGOSCOPY, GASTROSCOPY, DUODENOSCOPY (EGD), COMBINED  2014    Procedure: COMBINED ESOPHAGOSCOPY, GASTROSCOPY, DUODENOSCOPY (EGD);  Surgeon: Brittany Lowe MD;  Location:  GI     ESOPHAGOSCOPY, GASTROSCOPY, DUODENOSCOPY (EGD), COMBINED N/A 2015    Procedure: COMBINED ESOPHAGOSCOPY, GASTROSCOPY, DUODENOSCOPY (EGD);  Surgeon: London Lenz MD;  Location:  GI     ESOPHAGOSCOPY, GASTROSCOPY, DUODENOSCOPY (EGD), COMBINED N/A 2015    Procedure: COMBINED " ESOPHAGOSCOPY, GASTROSCOPY, DUODENOSCOPY (EGD);  Surgeon: Barry Chavez MD;  Location:  GI     KNEE SURGERY      x3     THORACOSCOPIC BIOPSY LUNG Right 9/16/2017    Procedure: THORACOSCOPIC BIOPSY LUNG;  RIGHT VIDEO ASSISTED THORACOSCOPY; WEDGE RESECTIONS RIGHT UPPER LOBE LUNG, RIGHT MIDDLE LOBE LUNG, AND RIGHT LOWER LOBE LUNG;  Surgeon: Alonzo Baird MD;  Location:  OR       FAMILY HISTORY:   Family History   Problem Relation Age of Onset     Depression Mother      Anxiety Disorder Mother      MENTAL ILLNESS Mother      Substance Abuse Mother      Depression Father      Anxiety Disorder Father      Substance Abuse Father      MENTAL ILLNESS Father      Depression Daughter      Depression Brother      Schizophrenia Brother      Depression Brother      Anxiety Disorder Brother      Substance Abuse Brother        SOCIAL HISTORY:   Social History   Substance Use Topics     Smoking status: Current Every Day Smoker     Packs/day: 1.50     Years: 18.00     Types: Cigarettes     Smokeless tobacco: Former User     Quit date: 5/11/2016     Alcohol use 0.0 oz/week     0 Standard drinks or equivalent per week      Comment: 8 drinks or more each day, abstinent since 4/2/15. History of CD treatment in past x1     Candido Jacobo RT  11/8/2017

## 2017-11-08 NOTE — PROGRESS NOTES
Patient on HFNC up to 100% 40lpm during day, on BIPAP at night, 14/6 80%. Continues to receive duoneb QID. BS expiratory wheezes. RT will continue to follow.

## 2017-11-09 NOTE — CONSULTS
ID consult dictated IMP1 43 yo female with ILD(bx proven), on high dose steroids, several weeks worsening and now further increased infiltrates, ? Infection, doubt PJP as data not suggestive    REC 1 multiple serologies to try to prove dx noninvasively, broad coverage for now and septra

## 2017-11-09 NOTE — CONSULTS
INFECTIOUS DISEASE CONSULTATION       PRIMARY CARE PHYSICIAN:  Dr. Aidee Hemphill.       REFERRING PHYSICIAN:  Dr. Ramo Woo.       IMPRESSION:   1.  A 42-year-old female with biopsy-proven interstitial lung disease and several months of worsened respiratory status, on high-dose steroids further worsening respiratory status in the last month with increased infiltrates, question still primary interstitial lung disease versus new opportunistic infection versus new hospital-acquired infection at present.   2.  Recent respiratory failure at [facility], no clear new diagnosis made, improved with steroids increased, was also being treated as Pneumocystis pneumonia although data did not really support that diagnosis, did not continue proper treatment as an outpatient; if Pneumocystis not adequately treated to date.    3.  Interstitial lung disease, question other systemic component.   4.  History of alcoholic cirrhosis.   5.  Chronic tobacco abuse including E-cigarettes, some concern this might be some type of lung damage related to that.      RECOMMENDATIONS:   1.  Continue current broad spectrum antibiotics and also Septra.   2.  Get multiple serologies and noninvasive diagnostic tests to try to prove some infectious diagnosis.   3.  May eventually, if not improving, require further intervention, obvious if she becomes intubated we should do a bronchoscopy.      HISTORY:  Sabina Figueroa is a 42-year-old female seen in consultation due to possible opportunistic lung infection.  The patient has a history of several months of worsening respiratory status this year, all in the background of chronic alcoholic cirrhosis and liver disease.  The patient was treated for pneumonia several times, never really improved and then eventually ended up with a biopsy that proved interstitial lung disease as a diagnosis.  She was initiated on high-dose steroids and had significant improvement in September.  She then  presumably still taking high dose steroids, although there some possible noncompliance, was admitted to [facility] last month, at which time she had increased infiltrates and increased respiratory failure.  Workup did not reveal clearcut cause, got a course of conventional antibiotics and also was initiated on empiric Pneumocystis screening and pneumonia treatment.  Her data, including 1-3 beta glucan did not really support that diagnosis, but on the steroids and high-dose Bactrim she improved.      She was discharged from the hospital and it appears was not at that point continuing the Bactrim in the high-dose that was prescribed and instead went back to taking it only once daily.  Of note, she had not been on Pneumocystis preventative treatment because she could not afford the Bactrim prior to the first hospital stay.  The patient was now admitted here with relatively acute worsening in the last couple of days, increasing shortness of breath, some degree of systemic symptoms, although has not had much in the way of major fever.  At admission, cultures were obtained, which have all been negative.  Imaging showed clear worsening of the respiratory process.  She had been initiated on high-dose steroids and also restarted on the high-dose Bactrim.  On this she has so far not had significant improvement.  She is not having other major symptoms or notable systemic symptoms.  She has not had any recent exposures, other than hospital contact, no animal exposures of note, no recent travel history, no one else she has been around has been ill.      PAST MEDICAL HISTORY:  History of alcoholic cirrhosis, exact details not entirely clear, the recent pneumonias that turned out to be interstitial lung disease by biopsy proven, history of some depression, prior asthma, history of diabetes.      FAMILY AND SOCIAL HISTORY:  Significant prior alcohol use, apparently not recently.  History of interstitial lung disease with  the high-dose steroids.  No recent travels or exposures.        MEDICATIONS:  Meds as listed.      REVIEW OF SYSTEMS:  Ongoing shortness of breath, does not have other focal OR localizing symptoms, not having rashes or skin lesions, no significant joint discomfort at present.      PHYSICAL EXAMINATION:   Om GENERAL:  The patient is lying in bed in the ICU with BiPAP mask on, oxygenating with that in place.   VITAL SIGNS:  Pulse of 110.  She is afebrile and has been the last several days.   HEENT:  No thrush or intraoral lesions, but difficult exam.  No facial skin rashes.  Mild steroid facies.   NECK:  Supple and nontender without lymphadenopathy.   HEART:  Regular rhythm, no significant murmur, but is tachycardic.   LUNGS:  Crackles bilaterally, but okay air movement, difficult exam due to the BiPAP.   ABDOMEN:  Soft, nontender, no significant hepatosplenomegaly that is palpable.   EXTREMITIES:  Some edema, but not major.  No significant rashes or skin lesions.  Joint exam unremarkable.      LABORATORY:  No positive cultures to date, not MRSA colonized.  Her prior 1-3 beta glucan was normal.      Thank you much for consultation.  I will follow the patient with you.         KWAN FARRIS MD             D: 2017 12:18   T: 2017 12:37   MT: [id number]      Name:     SYDNI GLEZ   MRN:      [id number]        Account:       [id number]   :      1975           Consult Date:  2017      Document: [id number]       cc: DALE RUSSELL MD

## 2017-11-09 NOTE — PROGRESS NOTES
[facility]  Hospitalist Progress Note  Gilles Forbes 11/09/17    Reason for Stay (Diagnosis):     hypoxic respiratory failure    Sepsis          Assessment and Plan:      Summary of Stay: Sabina Figueroa is a 42 year old female with a history of ILD (diagnosed by wedge lung bx in 9/17 at [facility]-acute lung injury thought 2/2 e-cigarettes in setting of DIP and NSIP by surg path)-at that hospitalization she was discharged on high dose steroids with PCP ppx, she then presented to the [facility] and hospitalized 10/18-10/23 for recurrent respiratory failure at which time it was felt due to PCP pna as she had not been taking her tmp-sulfa ppx.  She was discharged on high dose steroids taper and therapeutic tmp-sulfa dosing for PCP with sats 97 % range on RA and had arranged f/u with Dr. Mane ( to f/u vasculitic w/u).  She returns to our ER and admitted on 11/4/2017 with e/o recurrent hypoxic respiratory failure again/fever/KAITLIN to 102/tachycardia/leukocytosis and lactic acidosis to 6.3 consistent with sepsis in the setting of non-compliance/confusion with medications (only taking ppx dosing of tmp-sulfa), and missing of all medications due to being picked up for DUI (drank on 2 separate occassions but prior to that was sober since 5/11/17) and being held in skilled nursing.    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    Overnight into 11/5 had increasing O2 requirements, worsening tachypnea - CT PE protocol completed and shows no PE but significant worsening of bilateral pulmonary infiltrates    Problem List:   1. Sepsis: presumably from respiratory source- CXR with basilar infiltrates-CT now with diffuse infltrates-respiratory panel negative,   Lactic acidosis resolved with IVF.  Cont broad spectrum abx with vancomycin and Zosyn.  -- ID consulted for further recommendation     2. Acute  Hypoxic respiratory failure-complicated pulmonary situation-Hx of ALI superimposed on desquamative (smoking) pneumonitis and non-specific pneumonitis by wedge bx completed in 9/2017, s/p hospitalization at [facility] for suspected PCP pna-discharged on steroid/therapeutic tmp-sulfa-and non-compliant with medications due to recent DUI-in [facility] for 2 days, and misunderstanding of tmp-sulfa dosing.  Patient was given broad spectrum abx with azith/pip-tazo/vanco in ER-azith not continued-now on pip-tazo/vanco and high dose TMP-sulfa for suspected ongoing/inadequately treated PCP and high risk for alternative respiratory infection due to immunosuppression with steroids.   -- continue abx , IV Solu-Medrol 40 mg q.8 hours and scheduled DuoNeb's  -- on lasix due to weight gain   Wt Readings from Last 5 Encounters:   11/09/17 79.4 kg (175 lb 0.7 oz)   10/22/17 83.4 kg (183 lb 12.8 oz)   09/16/17 84.6 kg (186 lb 8.2 oz)   09/14/17 89.8 kg (197 lb 15.6 oz)   08/28/17 85.3 kg (188 lb)     -- CXR worsening with bilateral infiltrates   -- continue lasix   -- monitor daily weight and intake and out put monitor       3. 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   --  My colleague did discuss with pulmonary intensivist-not lung transplant candidate.      4. Non-compliance with medications-, will require case-worker at discharge to help mitigate suspected social issues/med compliance    5. Fever:  Suspected respiratory source:  UA negative, BCx obtained-at risk for opportunistic infections in setting of steroids.  Antibiotics as above.    6. KAITLIN:  Baseline creat wnl-currently BUN/creat 15/1.4-now normalized after IVF    Creatinine   Date Value Ref Range Status   11/09/2017 0.87 0.52 - 1.04 mg/dL Final   ]    7. Cirrhosis:  Chronically on spironolactone/furosemide/lactulose/rifaximin.  LFTs currently with minimally elevated AST, bili wnl. NH3 wnl, and on review of chart not significantly elevated except 2 prior  "occasions.  Lasix and spironolactone has been placed on hold but will restart IV Lasix as above.  -- no significant ascites       8. Steroid induced DM: glargine 20 u (humilin insulin prescribed at last hospitalization not covered by insurance).  Patient becoming more hyperglycemic with tube feeds and stress dose IV steroids.   --  We'll increase glargine to 20 units subcutaneous q.a.m. and continue  high intensity sliding scale and p.o. Protocol.    9. Narcotic dependence:  Cont suboxone as at home    10. ? Underlying vasculitic/autoimmune process at play:  AMISHA by IFA borderline positive, and cytoplasmic IgG negative.  Outpatient pulmonary follow-up at discharge.    11. Nicotine dependence with suspected ALI 2/2 e cigarettes-continues to smoke despite nicotine replacement-ATQ this admission      DVT Prophylaxis: Enoxaparin (Lovenox) SQ    Code Status: Full Code    Discharge Dispo: unclear yet     Estimated Disch Date / # of Days until Disch: unable to determine. Continue ICU care     Time spent: Greater than 35 minutes        Interval History (Subjective):        Patient is seen and examined by me today and medical record reviewed.Overnight events noted and care discussed with nursing staff.  Still sob and requiring BIPAP   No compliant of chest pain              Physical Exam:      Last Vital Signs:  /63  Pulse 134  Temp 98  F (36.7  C) (Axillary)  Resp 22  Ht 1.689 m (5' 6.5\")  Wt 79.4 kg (175 lb 0.7 oz)  SpO2 90%  Breastfeeding? No  BMI 27.83 kg/m2  HR 50-80 range 's    I/O last 3 completed shifts:  In: 3371.25 [I.V.:1451.25; NG/GT:720]  Out: 6520 [Urine:6520]     Wt Readings from Last 5 Encounters:   11/09/17 79.4 kg (175 lb 0.7 oz)   10/22/17 83.4 kg (183 lb 12.8 oz)   09/16/17 84.6 kg (186 lb 8.2 oz)   09/14/17 89.8 kg (197 lb 15.6 oz)   08/28/17 85.3 kg (188 lb)     alert and oriented looks stated age head nc/at sclera with mild bilateral injection appears flushed (she reports is " chronic), notable increased work of breathing on BiPAP.  Poor air movement with mild mid expiratory wheezing, normal S1 and S2 RRR no m/r/g abd obese - no obvious ascites, s/nt/nd rossi            Medications:      All current medications were reviewed with changes reflected in problem list.         Data:      All new lab and imaging data was reviewed.   All laboratory and imaging data in the past 24 hours reviewed       Recent Labs  Lab 11/09/17  0513 11/08/17  1910 11/08/17  0545 11/07/17  0550   WBC 4.5  --  3.8* 3.7*   HGB 9.4*  --  9.1* 9.4*   HCT 28.8*  --  28.6* 30.8*   MCV 92  --  95 97   PLT 83* 93* 83* 110*       Recent Labs  Lab 11/04/17  0343 11/04/17  0107 11/04/17  0050   CULT No growth No growth after 5 days No growth after 5 days       Recent Labs  Lab 11/09/17  0513 11/08/17  0545 11/07/17  0550 11/06/17  0545  11/04/17  0050   * 135 139 139  < > 129*   POTASSIUM 4.5 4.6 3.9 3.6  < > 3.6   CHLORIDE 93* 103 105 105  < > 94   CO2 33* 26 27 28  < > 19*   ANIONGAP 5  --  7 6  < > 16*   * 161* 70 122*  < > 174*   BUN 17 13 11 15  < > 15   CR 0.87 0.84 0.86 1.03  < > 1.42*   GFRESTIMATED 71 74 72 58*  < > 40*   GFRESTBLACK 86 90 87 71  < > 49*   VISHNU 8.3* 8.2* 8.0* 8.2*  < > 8.5   MAG  --  2.3  --   --   --   --    PHOS  --  4.0  --   --   --   --    PROTTOTAL  --   --   --   --   --  7.5   ALBUMIN  --   --   --   --   --  3.3*   BILITOTAL  --   --   --   --   --  0.4   ALKPHOS  --   --   --   --   --  109   AST  --   --   --   --   --  47*   ALT  --   --   --   --   --  34   < > = values in this interval not displayed.      Recent Labs  Lab 11/09/17  1227 11/09/17  0829 11/09/17  0513 11/09/17  0159 11/08/17  2148 11/08/17  1604  11/08/17  0545  11/07/17  0550  11/06/17  0545  11/05/17  0510   GLC  --   --  218*  --   --   --   --  161*  --  70  --  122*  --  150*   * 283*  --  200* 278* 332*  < >  --   < >  --   < >  --   < >  --    < > = values in this interval not  displayed.          Recent Labs  Lab 11/05/17  0510   INR 1.11           No results for input(s): TROPONIN, TROPI, TROPR in the last 168 hours.    Invalid input(s): TROP, TROPONINIES    Recent Results (from the past 48 hour(s))   XR Chest Port 1 View    Narrative    XR CHEST PORT 1 VW  11/9/2017 6:40 AM    HISTORY:  Persistent acute hypoxic respiratory failure;     COMPARISON:  11/4/2017      Impression    IMPRESSION:  Nasogastric tube tip not seen. Diffuse bilateral alveolar  opacities, significantly worsened since the prior exam.     LUKE PATEL MD

## 2017-11-09 NOTE — PLAN OF CARE
Problem: Patient Care Overview  Goal: Plan of Care/Patient Progress Review  ICU End of Shift Summary.  For vital signs and complete assessments, please see documentation flowsheets.      Pertinent assessments: A&Ox4, requiring 70-80% on BiPAP and desats quickly without it, SR, afebrile, complains of frequent back pain, some relief with PRN morphine IV, tolerating tube feeds with 0 residual, meds given through keofeed to reduce disruption of BiPAP, very good UOP per callaway, positions self in bed  Major Shift Events: up to commode one time but was so fatigued and air hungry afterward that pt requested callaway which was placed, fluids stopped and lasix Q8 initiated    Plan (Upcoming Events): wean off BiPAP as tolerated, diuresis for recent wt gain, continue IV abx for PNA  Discharge/Transfer Needs: ALONDRA following and will consult when appropriate to help patient with compliance in the future     Bedside Shift Report Completed : y  Bedside Safety Check Completed: yasmeen

## 2017-11-09 NOTE — PLAN OF CARE
Problem: Patient Care Overview  Goal: Plan of Care/Patient Progress Review  Outcome: No Change  ICU End of Shift Summary.  For vital signs and complete assessments, please see documentation flowsheets.      Pertinent assessments: pt continues to require constant bipap though fio2 steady at 80% over night. Lung sounds are coarse with exp wheeze and crackles in the bases, pt encouraged to cough. Pt had good urine and response to lasix. Pt able to express needs and wants.   Major Shift Events: no major events  Plan (Upcoming Events): continue to try to wean bipap and O2 needs.   Discharge/Transfer Needs: TBD     Bedside Shift Report Completed :   Bedside Safety Check Completed:

## 2017-11-09 NOTE — PROGRESS NOTES
RT note:    Patient remains on BIPAP 14/6 80% FIO2. Continue to receive duoneb QID with diminished crackles and some scattered expiratory wheezes. Will continue to follow and monitor.    Amanda Chen, RRT

## 2017-11-10 NOTE — PROGRESS NOTES
[facility]  Hospitalist Progress Note  Gilles Forbes 11/09/17    Reason for Stay (Diagnosis):     hypoxic respiratory failure- acute on chronic     Sepsis          Assessment and Plan:      Summary of Stay: Sabina Figueroa is a 42 year old female with a history of ILD (diagnosed by wedge lung bx in 9/17 at [facility]-acute lung injury thought 2/2 e-cigarettes in setting of DIP and NSIP by surg path)-at that hospitalization she was discharged on high dose steroids with PCP ppx, she then presented to the [facility] and hospitalized 10/18-10/23 for recurrent respiratory failure at which time it was felt due to PCP pna as she had not been taking her tmp-sulfa ppx.  She was discharged on high dose steroids taper and therapeutic tmp-sulfa dosing for PCP with sats 97 % range on RA and had arranged f/u with Dr. Mane ( to f/u vasculitic w/u).  She returns to our ER and admitted on 11/4/2017 with e/o recurrent hypoxic respiratory failure again/fever/KAITLIN to 102/tachycardia/leukocytosis and lactic acidosis to 6.3 consistent with sepsis in the setting of non-compliance/confusion with medications (only taking ppx dosing of tmp-sulfa), and missing of all medications due to being picked up for DUI (drank on 2 separate occassions but prior to that was sober since 5/11/17) and being held in shelter.    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    Overnight into 11/5 had increasing O2 requirements, worsening tachypnea - CT PE protocol completed and shows no PE but significant worsening of bilateral pulmonary infiltrates    Problem List:   1. Sepsis: presumably from respiratory source- CXR with basilar infiltrates-CT now with diffuse infltrates-respiratory panel negative,   Lactic acidosis resolved with IVF.   -- Cont broad spectrum abx with vancomycin and Zosyn and Septra   -- ID input appreciated and  "care discussed     2. Acute Hypoxic respiratory failure-complicated pulmonary situation-Hx of ALI superimposed on desquamative (smoking) pneumonitis and non-specific pneumonitis by wedge bx completed in 9/2017, s/p hospitalization at [facility] for suspected PCP pna-discharged on steroid/therapeutic tmp-sulfa-and non-compliant with medications due to recent DUI-in care home for 2 days, and misunderstanding of tmp-sulfa dosing.  Patient was given broad spectrum abx with azith/pip-tazo/vanco in ER-azith not continued-now on pip-tazo/vanco and high dose TMP-sulfa for suspected ongoing/inadequately treated PCP and high risk for alternative respiratory infection due to immunosuppression with steroids.   -- continue abx  --  Decrease  IV Solu-Medrol to 20 mg q.8 hours and scheduled DuoNeb's  -- on lasix due to weight gain   Wt Readings from Last 5 Encounters:   11/10/17 81.8 kg (180 lb 5.4 oz)   10/22/17 83.4 kg (183 lb 12.8 oz)   09/16/17 84.6 kg (186 lb 8.2 oz)   09/14/17 89.8 kg (197 lb 15.6 oz)   08/28/17 85.3 kg (188 lb)     -- CXR showed no change    -- continue lasix   -- monitor daily weight and intake and out put monitor       3. 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   --  My colleague did discuss with pulmonary intensivist-not lung transplant candidate.      4. Non-compliance with medications-, will require case-worker at discharge to help mitigate suspected social issues/med compliance    5. Fever:  Suspected respiratory source:  UA negative, BCx obtained-at risk for opportunistic infections in setting of steroids.  Antibiotics as above.    /63  Pulse 134  Temp 98.1  F (36.7  C) (Axillary)  Resp 14  Ht 1.689 m (5' 6.5\")  Wt 81.8 kg (180 lb 5.4 oz)  SpO2 94%  Breastfeeding? No  BMI 28.67 kg/m2     6. KAITLIN:  Baseline creat wnl-currently BUN/creat 15/1.4-now normalized after IVF    Creatinine   Date Value Ref Range Status   11/10/2017 0.93 0.52 - 1.04 mg/dL Final   ]    7. Cirrhosis: " "   --Chronically on spironolactone/furosemide/lactulose/rifaximin.  LFTs currently with minimally elevated AST, bili wnl. NH3 wnl, and on review of chart not significantly elevated except 2 prior occasions.  Po Lasix and spironolactone has been placed on hold and started  IV Lasix as above.  -- no significant ascites       8. Steroid induced DM: glargine 20 u (humilin insulin prescribed at last hospitalization not covered by insurance).  Patient becoming more hyperglycemic with tube feeds and stress dose IV steroids.   --  We'll increase glargine to 25 units subcutaneous q.a.m. and continue  high intensity sliding scale and p.o. Protocol.    9. Narcotic dependence:  Cont suboxone as at home    10. Suspected  Underlying vasculitic/autoimmune process at play:  AMISHA by IFA borderline positive, and cytoplasmic IgG negative.  Outpatient pulmonary follow-up at discharge.    11. Nicotine dependence with suspected ALI 2/2 e cigarettes-continues to smoke despite nicotine replacement-ATQ this admission      DVT Prophylaxis: Enoxaparin (Lovenox) SQ    Code Status: Full Code    Discharge Dispo: unclear yet     Estimated Disch Date / # of Days until Disch: unable to determine. Continue ICU care for now. Prognosis is guarded  -- TLC team and care coordinator consulted   -- Appreciate RT input     Time spent: Greater than 35 minutes        Interval History (Subjective):        Patient is seen and examined by me today and medical record reviewed.  Overnight events noted and care discussed with nursing staff.  No new change   Continues to require 80% oxygen and BIPAP                Physical Exam:      Last Vital Signs:  /63  Pulse 134  Temp 98.1  F (36.7  C) (Axillary)  Resp 14  Ht 1.689 m (5' 6.5\")  Wt 81.8 kg (180 lb 5.4 oz)  SpO2 96%  Breastfeeding? No  BMI 28.67 kg/m2  HR 50-80 range 's    I/O last 3 completed shifts:  In: 1940 [P.O.:120; I.V.:300; NG/GT:720]  Out: 3980 [Urine:3980]     Wt Readings from Last " 5 Encounters:   11/10/17 81.8 kg (180 lb 5.4 oz)   10/22/17 83.4 kg (183 lb 12.8 oz)   09/16/17 84.6 kg (186 lb 8.2 oz)   09/14/17 89.8 kg (197 lb 15.6 oz)   08/28/17 85.3 kg (188 lb)     alert and oriented looks stated age head nc/at sclera with mild bilateral injection appears flushed (she reports is chronic), notable increased work of breathing on BiPAP.  Poor air movement with mild mid expiratory wheezing, normal S1 and S2 RRR no m/r/g abd obese - no obvious ascites, s/nt/nd rossi            Medications:      All current medications were reviewed with changes reflected in problem list.         Data:      All new lab and imaging data was reviewed.   All laboratory and imaging data in the past 24 hours reviewed       Recent Labs  Lab 11/10/17  0520 11/09/17  0513 11/08/17  1910 11/08/17  0545   WBC 3.9* 4.5  --  3.8*   HGB 8.0* 9.4*  --  9.1*   HCT 24.2* 28.8*  --  28.6*   MCV 92 92  --  95   PLT 91* 83* 93* 83*       Recent Labs  Lab 11/04/17  0343 11/04/17  0107 11/04/17  0050   CULT No growth No growth No growth       Recent Labs  Lab 11/10/17  0520 11/09/17  0513 11/08/17  0545 11/07/17  0550  11/04/17  0050   * 131* 135 139  < > 129*   POTASSIUM 4.7 4.5 4.6 3.9  < > 3.6   CHLORIDE 92* 93* 103 105  < > 94   CO2 35* 33* 26 27  < > 19*   ANIONGAP 4 5  --  7  < > 16*   * 218* 161* 70  < > 174*   BUN 29 17 13 11  < > 15   CR 0.93 0.87 0.84 0.86  < > 1.42*   GFRESTIMATED 66 71 74 72  < > 40*   GFRESTBLACK 80 86 90 87  < > 49*   VISHNU 8.2* 8.3* 8.2* 8.0*  < > 8.5   MAG  --   --  2.3  --   --   --    PHOS  --   --  4.0  --   --   --    PROTTOTAL 6.1*  --   --   --   --  7.5   ALBUMIN 2.7*  --   --   --   --  3.3*   BILITOTAL 0.4  --   --   --   --  0.4   ALKPHOS 139  --   --   --   --  109   AST 69*  --   --   --   --  47*   ALT 36  --   --   --   --  34   < > = values in this interval not displayed.      Recent Labs  Lab 11/10/17  1214 11/10/17  0822 11/10/17  0520 11/10/17  0348 11/10/17  0018  11/09/17 2001 11/09/17 0513  11/08/17  0545  11/07/17  0550  11/06/17  0545   GLC  --   --  218*  --   --   --   --  218*  --  161*  --  70  --  122*   * 264*  --  260* 287* 254*  < >  --   < >  --   < >  --   < >  --    < > = values in this interval not displayed.          Recent Labs  Lab 11/05/17  0510   INR 1.11           No results for input(s): TROPONIN, TROPI, TROPR in the last 168 hours.    Invalid input(s): TROP, TROPONINIES    Recent Results (from the past 48 hour(s))   XR Chest Port 1 View    Narrative    XR CHEST PORT 1 VW  11/9/2017 6:40 AM    HISTORY:  Persistent acute hypoxic respiratory failure;     COMPARISON:  11/4/2017      Impression    IMPRESSION:  Nasogastric tube tip not seen. Diffuse bilateral alveolar  opacities, significantly worsened since the prior exam.     LUKE PATEL MD

## 2017-11-10 NOTE — PROGRESS NOTES
RT- arterial blood gas drawn from right radial artery with patient on 80% Bipap .    Oksana Lawton, RT  11/10/2017 9:28 AM

## 2017-11-10 NOTE — PLAN OF CARE
Problem: Sepsis/Septic Shock (Adult)  Intervention: Promote Rest/Minimize Oxygen Consumption    11/10/17 1651   Activity   Activity Management bedrest with commode;activity adjusted per tolerance   Coping/Psychosocial Interventions   Environmental Support calm environment promoted;rest periods encouraged   ICU End of Shift Summary.  For vital signs and complete assessments, please see documentation flowsheets.      Pertinent assessments: Remains bi-pap dependant. Lungs coarse/wheezes at times with fine crackles. No complaints of SOB. B.Pl. Slightly low, no interventions. Lots of back pain, requesting morphine, also has toradol. Loose stool X 1 d/t lactulose. Up to commode with assist of 1. Lasix with good uop. Pale ashen skin, flushed face.  Major Shift Events: abg okay, high flow not tried d/c CT results and high bi-pap needs, will give one more day. Lots of back pain, wants to drink all the time. Aspiration precautions and NPO while on bi-pap reviewed, pt knows risks of drinking. Palliative consult, full restorative cares at this point.   Plan (Upcoming Events): continue Lasix, steroids decreased. ID following for abx coverage. Will try HF tomorrow. Pain control  Discharge/Transfer Needs:  Unknown. May qualify for triology machine at home but ? about compliance.      Bedside Shift Report Completed   Bedside Safety Check Completed

## 2017-11-10 NOTE — PROGRESS NOTES
Respiratory Therapy Note       Pt has remained on BIPAP 14/6  70%.  She drops her SPO2 with any activity and remains SOB with any exertion.    November 9, 2017 6:00 PM  Lincoln Lawrence

## 2017-11-10 NOTE — PHARMACY-VANCOMYCIN DOSING SERVICE
Pharmacy Vancomycin Note  Date of Service November 10, 2017  Patient's  1975   42 year old, female    Indication: Sepsis  Goal Trough Level: 15-20 mg/L  Day of Therapy: 7  Current Vancomycin regimen:  1750 mg IV q12h    Current estimated CrCl = Estimated Creatinine Clearance: 85 mL/min (based on Cr of 0.93).    Creatinine for last 3 days  2017:  5:45 AM Creatinine 0.84 mg/dL  2017:  5:13 AM Creatinine 0.87 mg/dL  11/10/2017:  5:20 AM Creatinine 0.93 mg/dL    Recent Vancomycin Levels (past 3 days)  11/10/2017: 10:54 AM Vancomycin Level 24.1 mg/L    Vancomycin IV Administrations (past 72 hours)                   vancomycin (VANCOCIN) 1,750 mg in NaCl 0.9 % 500 mL intermittent infusion (mg) 1,750 mg New Bag 11/10/17 1335     1,750 mg New Bag 17 2359     1,750 mg New Bag  1303     1,750 mg New Bag  0050     1,750 mg New Bag 17 1132     1,750 mg New Bag  0036                Nephrotoxins and other renal medications (Future)    Start     Dose/Rate Route Frequency Ordered Stop    17 0200  vancomycin 1250 mg in 0.9% NaCl 250 mL PREMIX      1,250 mg  166.7 mL/hr over 90 Minutes Intravenous EVERY 12 HOURS 11/10/17 1425      11/10/17 0933  ketorolac (TORADOL) injection 30 mg      30 mg Intravenous EVERY 6 HOURS PRN 11/10/17 0933 11/15/17 0932    17 1600  furosemide (LASIX) injection 20 mg      20 mg  over 1-2 Minutes Intravenous EVERY 8 HOURS 17 0945 17 1559    17 0800  piperacillin-tazobactam (ZOSYN) intermittent infusion 4.5 g     Comments:  Pharmacy can adjust dose based on renal function.    4.5 g  200 mL/hr over 30 Minutes Intravenous EVERY 6 HOURS 17 0356               Contrast Orders - past 72 hours     None          Interpretation of levels and current regimen:  Trough level is  Supratherapeutic    Has serum creatinine changed > 50% in last 72 hours: No    Urine output:  good urine output    Renal Function: Stable    Plan:  1.  Decrease Dose to 1250mg iv  q12h           .

## 2017-11-10 NOTE — PLAN OF CARE
Problem: Sepsis/Septic Shock (Adult)  Goal: Signs and Symptoms of Listed Potential Problems Will be Absent, Minimized or Managed (Sepsis/Septic Shock)  Signs and symptoms of listed potential problems will be absent, minimized or managed by discharge/transition of care (reference Sepsis/Septic Shock (Adult) CPG).   Outcome: No Change  ICU End of Shift Summary.  For vital signs and complete assessments, please see documentation flowsheets.      Pertinent assessments: pt still requires continuos bipap. Able to stand to get to bedside commode for BM. Pt had hypotension over the night order recived for 1x albumin 250ml 5%. Pt able to express needs and wants able to turn self in bed. Good urine output through the night with lasix for diuresis.   Major Shift Events: slight hypotension   Plan (Upcoming Events): continue to wean bipap and O2 needs  Discharge/Transfer Needs: TBD     Bedside Shift Report Completed :   Bedside Safety Check Completed:

## 2017-11-10 NOTE — PROGRESS NOTES
RT note:     Patient remains on BIPAP support 14/6, R 12, and 80% FIO2. Continue to receive Duoneb QID. Breath sounds clear slightly diminished in the bases pre and post nebulizers. Will continue to follow and monitor.     Amanda Chen, RRT

## 2017-11-10 NOTE — PROGRESS NOTES
RT- patient remains on Bipap 14/7 with FIO2 80%. Patient has remained on Bipap throughout the day due to high oxygen/pressure requirements.    Will continue to monitor patient.    Oksana Lawton, RT  11/10/2017 3:56 PM

## 2017-11-10 NOTE — PLAN OF CARE
Problem: Patient Care Overview  Goal: Plan of Care/Patient Progress Review  ICU End of Shift Summary.  For vital signs and complete assessments, please see documentation flowsheets.      Pertinent assessments: A&Ox4, anxious at times, BiPAP at 70-80%, not tolerating HFNC, VSS, pain in back relieved with PRN IV morphine Q4, tolerating TF at goal, up to BSC with assist and had one loose stool, very good UOP per callaway with diuretic  Major Shift Events: consult with ID, failing HFNC trials  Plan (Upcoming Events): wean off BiPAP if able otherwise may need intubation, continue IV abx  Discharge/Transfer Needs: SW following to help patient with compliance upon d/c     Bedside Shift Report Completed : y  Bedside Safety Check Completed: yasmeen

## 2017-11-10 NOTE — PROGRESS NOTES
CLINICAL NUTRITION SERVICES - REASSESSMENT NOTE      EVALUATION OF PROGRESS TOWARD GOALS   Diet:  NPO  Intake:  Remains NPO d/t respiratory status.      Nutrition Support:  Enteral access obtained 11/7 and enteral nutrition initiated.  Reached goal rate 11/8.  Goal regimen and average intake as below:    - NGT (coiled, 11/7)  - Isosource 1.5 @ goal rate of 50 mL/hr x 24 hours   - 1200 mLs provides 1800 kcal (27 kcal/kg), 82 g pro (1.2 g/kg), 316 g CHO, 27 g fiber, 912 mL free H2O.   - Meets 100% DRI  - Standard 60 ml water flushes q 4 hrs    Average intake:  11/8: 1080 mls  11/9: 1150 mls -->?accuracy  2-day average intake meeting 62% goal total volume    Biochemical review:  - BG trending down, <300  - Na low at 131  - K WNL  - Phos and mag from 11/8 WNL    Stooling:  - Loose BMs daily as expected given use of Lactulose    Wt trending:  - Total of 14# wt loss since admit d/t use of diuretics:  Vitals:    11/06/17 0430 11/07/17 0330 11/08/17 0415 11/09/17 0708   Weight: 87.7 kg (193 lb 5.5 oz) 88.3 kg (194 lb 10.7 oz) 88.3 kg (194 lb 10.7 oz) 79.4 kg (175 lb 0.7 oz)    11/10/17 0535   Weight: 81.8 kg (180 lb 5.4 oz)          Dosing Weight 66.1 kg     ASSESSED NUTRITION NEEDS PER APPROVED PRACTICE GUIDELINES:  Estimated Energy Needs: 4001-1919 kcals (25-30 Kcal/Kg)  Justification: maintenance  Estimated Protein Needs: 79-99 grams protein (1.2-1.5 g pro/Kg)  Justification: maintenance  Estimated Fluid Needs: 6922-5157 mL (1 mL/Kcal)  Justification: maintenance      NEW FINDINGS:   - Medications reviewed:   Lasix   SSI, 20 units Lantus in AM   Lactulose  - Per discussion with team during rounds, MD will consult Palliative.    Previous Goals:   Pt to tolerate TF advancement to goal in the next 36 hours.  Evaluation: Met    Previous Nutrition Diagnosis:   Inadequate protein-energy intake related to reduced ability for safe PO r/t high O2 needs as evidenced by NPO diet order x4 days, plan for EN to provide 100% estimated  needs.   Evaluation: Completed, changed below       MALNUTRITION: (11/7/2017)  % Weight Loss:  None noted  % Intake:  Decreased intake does not meet criteria for malnutrition  Subcutaneous Fat Loss:  None observed  Muscle Loss:  None observed  Fluid Retention:  None noted     Malnutrition Diagnosis: Patient does not meet two of the above criteria necessary for diagnosing malnutrition    CURRENT NUTRITION DIAGNOSIS  Inadequate enteral nutrition infusion related to frequent interruptions to enteral regimen as evidenced by patient meeting <75% goal total volume based on 2-day average intake.     INTERVENTIONS  Recommendations / Nutrition Prescription  Continue regimen as above until able to advance diet based on respiratory status.  Minimize interruptions to enteral regimen as able.      Insulin per MD/PharmD.    Agree with Palliative consult.    Implementation  Collaboration and Referral of Nutrition care: Discussed POC with team during rounds.    Goals  EN to meet % needs daily.       MONITORING AND EVALUATION:  Progress towards goals will be monitored and evaluated per protocol and Practice Guidelines      Gabrielle Israel RD, LD  Clinical Dietitian  3rd floor/ICU: [phone]  All other floors: [phone]  Weekend/holiday: [phone]

## 2017-11-10 NOTE — PROGRESS NOTES
[facility]  Infectious Disease Progress Note          Assessment and Plan:   IMPRESSION:   1.  A 42-year-old female with biopsy-proven interstitial lung disease and several months of worsened respiratory status, on high-dose steroids further worsening respiratory status in the last month with increased infiltrates, question still primary interstitial lung disease versus new opportunistic infection versus new hospital-acquired infection at present.   2.  Recent respiratory failure at [facility], no clear new diagnosis made, improved with steroids increased, was also being treated as Pneumocystis pneumonia although data did not really support that diagnosis, did not continue proper treatment as an outpatient; if Pneumocystis not adequately treated to date.    3.  Interstitial lung disease, question other systemic component.   4.  History of alcoholic cirrhosis.   5.  Chronic tobacco abuse including E-cigarettes, some concern this might be some type of lung damage related to that.       RECOMMENDATIONS:   1.  Continue current broad spectrum antibiotics and also high dose Septra.   2.  Pending multiple serologies and noninvasive diagnostic tests to try to prove some infectious diagnosis.   3.  May eventually, if not improving, require further intervention, obvious if she becomes intubated we should do a bronchoscopy.  4 LDH very elevated despite neg PCP 10/20 and other evidence against, continue to tx possible PJP         Interval History:   no new complaints but still very hypoxic, LDH 1042, procal 1.1  No + cxs no sputum productivity              Medications:       insulin aspart  4 Units Subcutaneous Q4H     furosemide  20 mg Intravenous Q8H     insulin glargine  20 Units Subcutaneous QAM     insulin aspart  1-10 Units Subcutaneous Q4H     atorvastatin (LIPITOR) tablet 20 mg  20 mg Oral or Feeding Tube At Bedtime     rifaximin  550 mg Oral or Feeding Tube BID     folic acid  1 mg  "Oral or Feeding Tube Daily     aspirin  81 mg Oral or Feeding Tube Daily     mirtazapine (REMERON) tablet 15 mg  15 mg Oral or Feeding Tube At Bedtime     pantoprazole  40 mg Per Feeding Tube Daily     lactulose  20 g Per Feeding Tube Daily     gabapentin  600 mg Per Feeding Tube TID     methocarbamol  500 mg Oral or Feeding Tube TID     FLUoxetine  40 mg Oral or Feeding Tube Daily     loratadine  10 mg Oral or Feeding Tube Daily     sulfamethoxazole-trimethoprim  50 mL Oral 4x Daily     ipratropium - albuterol 0.5 mg/2.5 mg/3 mL  3 mL Nebulization 4x daily     methylPREDNISolone sodium succinate  40 mg Intravenous Q6H     buprenorphine HCl-naloxone HCl  3 Film Sublingual Daily     nicotine  1 patch Transdermal Daily     piperacillin-tazobactam  4.5 g Intravenous Q6H     vancomycin (VANCOCIN) IV  1,750 mg Intravenous Q12H     nicotine   Transdermal Daily     nicotine   Transdermal Q8H     enoxaparin  40 mg Subcutaneous Q24H     sodium chloride (PF)  3 mL Intracatheter Q8H                  Physical Exam:   Blood pressure 105/65, pulse 134, temperature 98.1  F (36.7  C), temperature source Axillary, resp. rate 21, height 1.689 m (5' 6.5\"), weight 81.8 kg (180 lb 5.4 oz), SpO2 98 %, not currently breastfeeding.  Wt Readings from Last 2 Encounters:   11/10/17 81.8 kg (180 lb 5.4 oz)   10/22/17 83.4 kg (183 lb 12.8 oz)     Vital Signs with Ranges  Temp:  [97.1  F (36.2  C)-98.7  F (37.1  C)] 98.1  F (36.7  C)  Heart Rate:  [] 74  Resp:  [] 21  BP: ()/(49-81) 105/65  FiO2 (%):  [70 %-80 %] 80 %  SpO2:  [90 %-98 %] 98 %    Constitutional: Awake, alert, cooperative, ongoing resp apparent distress   Lungs: Clear to auscultation bilaterally, no crackles or wheezing on high flow O2   Cardiovascular: Regular rate and rhythm, normal S1 and S2, and no murmur noted   Abdomen: Normal bowel sounds, soft, non-distended, non-tender   Skin: No rashes, no cyanosis, no edema   Other:           Data:   All microbiology " laboratory data reviewed.  Recent Labs   Lab Test  11/10/17   0520  11/09/17   0513  11/08/17   1910  11/08/17   0545   WBC  3.9*  4.5   --   3.8*   HGB  8.0*  9.4*   --   9.1*   HCT  24.2*  28.8*   --   28.6*   MCV  92  92   --   95   PLT  91*  83*  93*  83*     Recent Labs   Lab Test  11/10/17   0520  11/09/17   0513  11/08/17   0545   CR  0.93  0.87  0.84     Recent Labs   Lab Test  10/19/17   0503   SED  23*     Recent Labs   Lab Test  11/04/17   0343  11/04/17   0107  11/04/17   0050  10/20/17   0950  10/19/17   0517  10/19/17   0054  10/17/17   2143  10/17/17   2020  10/17/17   2015   CULT  No growth  No growth after 5 days  No growth after 5 days  Moderate growth  Coagulase negative Staphylococcus  Susceptibility testing not routinely done  *  Moderate growth  Candida albicans / dubliniensis  Candida albicans and Candida dubliniensis are not routinely speciated  Susceptibility testing not routinely done  *  Canceled, Test credited  >10 Squamous epithelial cells/low power field indicates oral contamination. Please   recollect.  *  Notification of test cancellation was given to  Elena Araujo RN from [facility]. 10.19.17 at 0753. GR.    Canceled, Test credited  >10 Squamous epithelial cells/low power field indicates oral contamination. Please   recollect.  *  Notification of test cancellation was given to  Nelsy Tesfaye RN [facility] @0246. 10/19/17. SCG    No growth  No growth  No growth

## 2017-11-10 NOTE — CONSULTS
[facility]    Palliative Care Consultation   Text Page    Date of Admission:  11/4/2017    Assessment & Plan   Sabina Figueroa is a 42 year old female who was admitted on 11/4/2017. I was asked to see the patient for Goals of care, patient and family support.    Recommendations:  1.Chronic low back pain/neuropathy-Currently well managed, continue suboxone, gabapentin.  2. Depression-Flares recently.  Prozac has been helpful.  Would like to re-establish care with mental health counselor and may need assistance with this.  3. Disease of Addiction- Given stated goals, further inquiry into chemical dependnacy treatment again, would be helpful.  4. Dry mouth- Patient asks if she can drink water.  We discussed ICU policy of NPO if unable to remain off BiPap for 2 hours at a time due to risk of aspiration.  She is able to make her own choice, but the recommendation to be NPO is based on her desire to improve.  Biotene spray ordered PRN.  Goal of Care:Full, Restorative.  If possible, patient would benefit from further discussion about her underlying disease and what it may mean for her life.  While she has been receiving care for some time it appears as if she was lost to follow-up after last hospitalization with biopsy. Plan for further serious illness conversation and advance care planning as able, she has not otherwise given much thought to this but would benefit from clarification of illness prior to further discussion.    Disease Process/es & Symptoms:  Sabina Figueroa is a 42 year old patient admitted with symptoms of shortness of breath and fevers. She has been treated for sepsis thought most likely from a respiratory source with Hx of ALI superimposed on desquamative (smoking) pneumonitis and non-specific pneumonitis dgn by wedge bx completed in 9/2017, planning on further follow-up in clinic next month. She was discharged recently from [facility] with suspected PJP pna on steroids and tmp-sulfa, noted  taking incorrectly and missing due to dui with retirement time on 10/31.  She is currently requiring Bipap support 100% of the time.      This is in the setting of recently diagnosed chronic lung disease DIP and NSIP by lucía strickland completed in 9/2017 .  This is her 9th admission to the hospital in the past year, most related to organizing pneumonia.  Her condition is complicated with the disease of addiction to alcohol.  She has had periods of sobriety after treatment but has had episodes of non-compliance related to drinking. She has a history of cirrhosis related to this. She is on suboxone for opioid addiction which she notes is well controlled.  In addition she smoke cigarettes and uses ENDS, lives with depression and anxiety. When she is not hospitalized and ill she notes high level of function and has weight loss or decline in function.    The following symptoms are noted by patient as concerning to her quality of life.  Pain - chronic low back pain, and neuropathy, well managed at current activity level, worse with higher activity.  Dyspnea - Denies, chronically, tolerating Bipap well.  Depressive symptoms - notes unexplained days of feeling blue, takes prozac which is helpful.  HAs not followed up with mental health practitioner recently.  xerostomia -Biggest complaint today while on Bipap.  We discussed the risk of aspiration with oral intake if unable to tolerate of Bipap > 2 hours as ICU policy.      Psychosocial/Spiritual Needs:  Patient reports she is not a Cheondoism or spiritual person.  She finds alma in doing things to keep active like beading, spending time on-line and time with her daughter.  She has been looking forward to starting a new job as a  at [employer].  Oriented to Spiritual Health and Social Work Services as part of Palliative Care team.    Decision-Making & Goals of Care:  Discussion/counseling today about goals of care/decisions:   Met with Sabina at bedside who was able to  communicate well despite being on Bipap.  She reports a relatively good understanding of her lung disease as incurable with risk of recurrent illness.  She is unable to tell me more details though and reports having to miss her last appointment with Dr. Mane who she has not seen since hospitalization with lung biopsy.  Her goals are very clear ly to improve enough to be able to start her new job at [employer], I am unclear if this is realistic or not.  We discussed her concerns as/if her disease continued to get worse and she had not given it much thought, nor had she considered if her lungs declined enough to require intubation and could not tolerate coming off if this would be a trade off she would want to make.  She does note she has shared the information that she does know with her daughter.    Patient has decision-making capacity Intact  Patient has no known legal document designating a decision maker. Per policy next of kin is the designated decision maker. See System Informed Consent policy for guidance. Spouse, Adult Children, Parent, Adult Siblings as she is not committed.    Patient has a completed health care directive available in the chart (Y/N): N  There is no POLST (Physician orders for life-sustaining treatment) form on file for this patient  Code Status: Full code     Findings & plan of care discussed with: Nursing  Follow-up plan from palliative team: Will continue to follow for support in care planning and decision making.  Thank you for involving us in the patient's care.     Grace WHYTE, CNS  Pain Management and Palliative Care  [facility]  Pgr: [phone]    Time Spent on this Encounter   I spent  45 minutes in assessment of the patient and discussion with the patient and family. Another 45 minutes in review of chart, documentation and discussion with the health care team.    Reason for Consult   Reason for consult: I was asked by Dr. Forbes  to evaluate this  patient for Goals of care  Patient and family support.    Primary Care Physician   Aidee Hemphill    Chief Complaint   thirsty    History is obtained from the patient and electronic health record    History of Present Illness   Sabina Figueroa is a 42 year old female who presents with symptoms of shortness of breath and fevers. She has been treated for sepsis thought most likely from a respiratory source with Hx of ALI superimposed on desquamative (smoking) pneumonitis and non-specific pneumonitis dgn by lucía bx completed in 9/2017, planning on further follow-up in clinic next month. She was discharged recently from [facility] with suspected PJP pna on steroids and tmp-sulfa, noted taking incorrectly and missing due to dui with halfway time on 10/31.  She is currently requiring Bipap support 100% of the time.      This is in the setting of recently diagnosed chronic lung disease DIP and NSIP by lucía bx completed in 9/2017 .  This is her 9th admission to the hospital in the past year, most related to organizing pneumonia.  Her condition is complicated with the disease of addiction to alcohol.  She has had periods of sobriety after treatment but has had episodes of non-compliance related to drinking. She has a history of cirrhosis related to this. She is on suboxone for opioid addiction which she notes is well controlled.  In addition she smoke cigarettes and uses ENDS, lives with depression and anxiety. When she is not hospitalized and ill she notes high level of function and has weight loss or decline in function.    The following symptoms are noted by patient as concerning to her quality of life.  Pain - chronic low back pain, and neuropathy, well managed at current activity level, worse with higher activity.  Dyspnea - Denies, chronically, tolerating Bipap well.  Depressive symptoms - notes unexplained days of feeling blue, takes prozac which is helpful.  HAs not followed up with mental health practitioner  recently.  xerostomia -Biggest complaint today while on Bipap.  We discussed the risk of aspiration with oral intake if unable to tolerate of Bipap > 2 hours as ICU policy.      Past Medical History   I have reviewed this patient's medical history and updated it with pertinent information if needed.   Past Medical History:   Diagnosis Date     ALC (alcoholic liver cirrhosis) (H)      Alcohol abuse      Anxiety      Arthritis      Ascites      Asthma      Bunion, left foot      Chronic low back pain     Narcotic agreement signed in [facility] system     Depression      Hypertension     current     ILD (interstitial lung disease) (H)      Ovarian cyst, left        Past Surgical History   I have reviewed this patient's surgical history and updated it with pertinent information if needed.  Past Surgical History:   Procedure Laterality Date     BRONCHOSCOPY FLEXIBLE N/A 1/10/2017    Procedure: BRONCHOSCOPY FLEXIBLE;  Surgeon: Jennifer Mane MD;  Location:  GI     BRONCHOSCOPY FLEXIBLE N/A 2017    Procedure: BRONCHOSCOPY FLEXIBLE;  Surgeon: Jennifer Mane MD;  Location:  OR      SECTION       ESOPHAGOSCOPY, GASTROSCOPY, DUODENOSCOPY (EGD), COMBINED  2014    Procedure: COMBINED ESOPHAGOSCOPY, GASTROSCOPY, DUODENOSCOPY (EGD);  Surgeon: Brittany Lowe MD;  Location:  GI     ESOPHAGOSCOPY, GASTROSCOPY, DUODENOSCOPY (EGD), COMBINED N/A 2015    Procedure: COMBINED ESOPHAGOSCOPY, GASTROSCOPY, DUODENOSCOPY (EGD);  Surgeon: London Lenz MD;  Location:  GI     ESOPHAGOSCOPY, GASTROSCOPY, DUODENOSCOPY (EGD), COMBINED N/A 2015    Procedure: COMBINED ESOPHAGOSCOPY, GASTROSCOPY, DUODENOSCOPY (EGD);  Surgeon: Barry Chavez MD;  Location:  GI     KNEE SURGERY      x3     THORACOSCOPIC BIOPSY LUNG Right 2017    Procedure: THORACOSCOPIC BIOPSY LUNG;  RIGHT VIDEO ASSISTED THORACOSCOPY; WEDGE RESECTIONS RIGHT UPPER LOBE LUNG, RIGHT MIDDLE LOBE LUNG, AND RIGHT LOWER LOBE LUNG;   Surgeon: Alonzo Baird MD;  Location:  OR       Prior to Admission Medications   Prior to Admission Medications   Prescriptions Last Dose Informant Patient Reported? Taking?   Atorvastatin Calcium (LIPITOR PO) 10/31/2017  Yes No   Sig: Take 20 mg by mouth At Bedtime   BASAGLAR 100 UNIT/ML injection 10/31/2017 at am  Yes Yes   Sig: Inject 20 Units Subcutaneous every morning   FLUoxetine (PROZAC) 40 MG capsule 11/3/2017 Pharmacy Yes No   Sig: Take 40 mg by mouth daily   MELATONIN PO 11/3/2017 Pharmacy Yes No   Sig: Take 5 mg by mouth At Bedtime   MIRTAZAPINE PO 11/3/2017 Pharmacy Yes No   Sig: Take 30 mg by mouth At Bedtime   MULTIPLE VITAMINS PO 11/3/2017 Pharmacy Yes No   Sig: Take 1 tablet by mouth daily   Pantoprazole Sodium (PROTONIX PO) 11/3/2017 Pharmacy Yes No   Sig: Take 40 mg by mouth every morning (before breakfast)   albuterol (PROAIR HFA, PROVENTIL HFA, VENTOLIN HFA) 108 (90 BASE) MCG/ACT inhaler no recent usage Pharmacy Yes No   Sig: Inhale 2 puffs into the lungs every 4 hours as needed for shortness of breath / dyspnea or wheezing Reported on 2/23/2017   alum & mag hydroxide-simethicone (MYLANTA ES/MAALOX  ES) 400-400-40 MG/5ML SUSP Past Week at Unknown time Pharmacy Yes Yes   Sig: Take 30 mLs by mouth every 4 hours as needed for indigestion Reported on 2/23/2017   ascorbic acid (VITAMIN C) 500 MG tablet 11/3/2017 Pharmacy Yes No   Sig: Take 500 mg by mouth daily   aspirin EC 81 MG EC tablet 11/3/2017 Pharmacy No No   Sig: Take 1 tablet (81 mg) by mouth daily   buprenorphine-naloxone (SUBOXONE) 8-2 MG SUBL sublingual tablet 11/3/2017 at am-also received take home doses for 11/4 and 11/5  No Yes   Sig: Place 3 tablets under the tongue daily   ferrous sulfate (IRON) 325 (65 FE) MG tablet 11/3/2017 Pharmacy Yes No   Sig: Take 325 mg by mouth daily (with breakfast)   fluticasone (FLONASE) 50 MCG/ACT spray no recent usage  Yes Yes   Sig: Spray 2 sprays into both nostrils daily as needed for  rhinitis or allergies   fluticasone-vilanterol (BREO ELLIPTA) 100-25 MCG/INH oral inhaler   Yes Yes   Sig: Inhale 1 puff into the lungs daily as needed   folic acid (FOLVITE) 1 MG tablet 11/3/2017 Pharmacy No No   Sig: Take 1 tablet (1 mg) by mouth daily   furosemide (LASIX) 20 MG tablet 11/3/2017 Pharmacy No No   Sig: Take 1 tablet (20 mg) by mouth daily   gabapentin (NEURONTIN) 600 MG tablet 11/3/2017 Pharmacy No No   Sig: Take 1 tablet (600 mg) by mouth 3 times daily   hydrOXYzine (ATARAX) 10 MG tablet 11/3/2017 Pharmacy Yes No   Sig: Take 10 mg by mouth every 8 hours as needed for anxiety    insulin aspart (NOVOLOG FLEXPEN) 100 UNIT/ML injection 10/31/2017  Yes Yes   Sig: Inject 6 Units Subcutaneous 3 times daily (with meals)   insulin aspart (NOVOLOG FLEXPEN) 100 UNIT/ML injection Past Week at Unknown time  Yes Yes   Sig: Inject 5 Units Subcutaneous With can of Dr Pepper   insulin aspart (NOVOLOG FLEXPEN) 100 UNIT/ML injection Past Week at Unknown time  Yes Yes   Sig: Inject 1-4 Units Subcutaneous 3 times daily (with meals) Per Sliding scale   insulin aspart (NOVOLOG FLEXPEN) 100 UNIT/ML injection Past Week at Unknown time  Yes Yes   Sig: Inject 1-4 Units Subcutaneous At Bedtime Per Sliding Scale   ipratropium - albuterol 0.5 mg/2.5 mg/3 mL (DUONEB) 0.5-2.5 (3) MG/3ML neb solution no recent usage  Yes Yes   Sig: Take 1 vial by nebulization every 6 hours as needed for shortness of breath / dyspnea or wheezing   lactulose 20 GM/30ML SOLN 10/31/2017  Yes Yes   Sig: Take 20 g by mouth daily   loratadine (CLARITIN) 10 MG tablet 11/3/2017 Pharmacy Yes No   Sig: Take 10 mg by mouth daily   methocarbamol (ROBAXIN) 500 MG tablet 11/3/2017 Pharmacy Yes No   Sig: Take 1,000 mg by mouth 4 times daily   nicotine (NICODERM CQ) 21 MG/24HR 24 hr patch has not started yet  No No   Sig: Place 1 patch onto the skin daily   nicotine (NICOTROL) 10 MG Inhaler has not started yet  No No   Sig: Inhale 1 Cartridge into the lungs every  hour as needed for smoking cessation   nicotine polacrilex (NICORETTE) 2 MG gum has not started yet  No No   Sig: Place 1 each (2 mg) inside cheek every hour as needed for smoking cessation   predniSONE (DELTASONE) 10 MG tablet has not started yet  No No   Sig: Take 3 tablets (30 mg) by mouth daily   predniSONE (DELTASONE) 20 MG tablet 11/3/2017  No No   Sig: Take 2 tablets (40 mg) by mouth daily   predniSONE (DELTASONE) 20 MG tablet has not started yet  No No   Sig: Take 1 tablet (20 mg) by mouth daily   rifaximin (XIFAXAN) 550 MG TABS tablet has not started yet, just got insurance approval for this medication Pharmacy No No   Sig: Take 1 tablet (550 mg) by mouth 2 times daily   spironolactone (ALDACTONE) 50 MG tablet 11/3/2017 Pharmacy No No   Sig: Take 1 tablet (50 mg) by mouth daily   sulfamethoxazole-trimethoprim (BACTRIM DS/SEPTRA DS) 800-160 MG per tablet 11/3/2017  Yes Yes   Sig: Take 1 tablet by mouth three times a week Monday, Wednesday, Friday   thiamine 100 MG tablet 11/3/2017 at Unknown time  Yes Yes   Sig: Take 100 mg by mouth daily   traZODone (DESYREL) 100 MG tablet 11/3/2017 Pharmacy Yes No   Sig: Take 100 mg by mouth At Bedtime      Facility-Administered Medications: None     Allergies   Allergies   Allergen Reactions     No Clinical Screening - See Comments Rash     Bupronide patch allergy       Social History   I have updated and reviewed the following Social History Narrative:   Social History     Social History Narrative    Denies illicit IV or intranasal drug use    No tattoos or piercings     Living situation: Home with boyfriend  Family system: Boyfriend, adult daughter (21y.o.)  Functional status (needs help with ADLs or IADLs): independent at baseline  Employment/education: has yet to start new job at [employer]  Use of community resources: unknown  Activities/interests: beading, internet  History of substance use/abuse: alcohol, tobacco, opioids  Yazidi affiliation: none  Involvement in  [place] community: no    Family History   I have reviewed this patient's family history and updated it with pertinent information if needed.   Family History   Problem Relation Age of Onset     Depression Mother      Anxiety Disorder Mother      MENTAL ILLNESS Mother      Substance Abuse Mother      Depression Father      Anxiety Disorder Father      Substance Abuse Father      MENTAL ILLNESS Father      Depression Daughter      Depression Brother      Schizophrenia Brother      Depression Brother      Anxiety Disorder Brother      Substance Abuse Brother        Review of Systems   The 10 point Review of Systems is negative other than noted in the HPI or here.     Palliative Symptom Review (0=no symptom/no concern, 1=mild, 2=moderate, 3=severe):      Pain: 1-mild      Fatigue: 1-mild      Nausea: 0-none      Constipation: 0-none      Diarrhea: 0-none      Depressive Symptoms: 2-moderate      Anxiety: 0-none      Drowsiness: 0-none      Poor Appetite: 0-none      Shortness of Breath: 0-none      Insomnia: 0-none    Physical Exam   Temp:  [97.1  F (36.2  C)-98.7  F (37.1  C)] 98.1  F (36.7  C)  Heart Rate:  [] 80  Resp:  [] 15  BP: ()/(49-81) 98/61  FiO2 (%):  [70 %-80 %] 80 %  SpO2:  [90 %-98 %] 98 %  180 lbs 5.38 oz  GEN:  Alert, oriented x 3, appears comfortable, NAD.  HEENT:  Normocephalic/atraumatic, no scleral icterus, no nasal discharge, mouth dry.  CV:  RRR, S1, S2; no murmurs or other irregularities noted.  +3 DP/PT pulses bilatererally; no edema BLE.  RESP:  diminished to auscultation bilaterally with wheezes at B bases.  Mild labored resp effort on Bipap.  ABD:  Rounded, soft, non-tender/non-distended.  +BS  EXT:  Edema & pulses as noted above.  CMS intact x 4.       SKIN:  Dry to touch, no exanthems noted in the visualized areas.    NEURO: Symmetric strength +5/5.  Sensation to touch intact all extremities.   There is no area of allodynia or hyperesthesia.  Psych:  Normal affect.  Calm,  cooperative, conversant appropriately.     Delirium Screen/CAM:  Delirium = (#1 and #2 = YES) + (#3 and/or #4)   1) Acute onset and fluctuating course:   No   (acute change in mental status from baseline over last 24 hours)  2) Inattention:   No   (difficulty focusing, distractible, can't follow conversation)  3) Disorganized thinking:   Not applicable   (score only if #1 and #2 are YES)  (rambling/irrelevant conversation, unclear/illogical thoughts, inconsistency)  4) Altered level of consciousness:   Not applicable   (score only if #1 and #2 are YES)  (other than alert, calm, cooperative)    Delirium/CAM score: 2/4  Interpretation:  1)  Delirium:  Absent      Data   Results for orders placed or performed during the hospital encounter of 11/04/17 (from the past 24 hour(s))   Glucose by meter   Result Value Ref Range    Glucose 301 (H) 70 - 99 mg/dL   Glucose by meter   Result Value Ref Range    Glucose 254 (H) 70 - 99 mg/dL   Glucose by meter   Result Value Ref Range    Glucose 287 (H) 70 - 99 mg/dL   Glucose by meter   Result Value Ref Range    Glucose 260 (H) 70 - 99 mg/dL   CBC with platelets differential   Result Value Ref Range    WBC 3.9 (L) 4.0 - 11.0 10e9/L    RBC Count 2.63 (L) 3.8 - 5.2 10e12/L    Hemoglobin 8.0 (L) 11.7 - 15.7 g/dL    Hematocrit 24.2 (L) 35.0 - 47.0 %    MCV 92 78 - 100 fl    MCH 30.4 26.5 - 33.0 pg    MCHC 33.1 31.5 - 36.5 g/dL    RDW 14.3 10.0 - 15.0 %    Platelet Count 91 (L) 150 - 450 10e9/L    Diff Method Manual Differential     % Neutrophils 84.0 %    % Lymphocytes 13.0 %    % Monocytes 0.0 %    % Eosinophils 1.0 %    % Basophils 0.0 %    % Metamyelocytes 2.0 %    Absolute Neutrophil 3.3 1.6 - 8.3 10e9/L    Absolute Lymphocytes 0.5 (L) 0.8 - 5.3 10e9/L    Absolute Monocytes 0.0 0.0 - 1.3 10e9/L    Absolute Eosinophils 0.0 0.0 - 0.7 10e9/L    Absolute Basophils 0.0 0.0 - 0.2 10e9/L    Absolute Metamyelocytes 0.1 (H) 0 10e9/L    RBC Morphology Consistent with reported results      Platelet Estimate Decreased    Comprehensive metabolic panel   Result Value Ref Range    Sodium 131 (L) 133 - 144 mmol/L    Potassium 4.7 3.4 - 5.3 mmol/L    Chloride 92 (L) 94 - 109 mmol/L    Carbon Dioxide 35 (H) 20 - 32 mmol/L    Anion Gap 4 3 - 14 mmol/L    Glucose 218 (H) 70 - 99 mg/dL    Urea Nitrogen 29 7 - 30 mg/dL    Creatinine 0.93 0.52 - 1.04 mg/dL    GFR Estimate 66 >60 mL/min/1.7m2    GFR Estimate If Black 80 >60 mL/min/1.7m2    Calcium 8.2 (L) 8.5 - 10.1 mg/dL    Bilirubin Total 0.4 0.2 - 1.3 mg/dL    Albumin 2.7 (L) 3.4 - 5.0 g/dL    Protein Total 6.1 (L) 6.8 - 8.8 g/dL    Alkaline Phosphatase 139 40 - 150 U/L    ALT 36 0 - 50 U/L    AST 69 (H) 0 - 45 U/L   Procalcitonin   Result Value Ref Range    Procalcitonin 1.10 ng/ml   XR Chest Port 1 View    Narrative    CHEST ONE VIEW PORTABLE  11/10/2017 6:31 AM     HISTORY: Hypoxia.     COMPARISON: 11/9/2017.      Impression    IMPRESSION: Enteric tube remains in place. Diffuse micronodular  opacities throughout the lungs are not significantly changed since  prior. No pneumothorax. Cardiac silhouette remains enlarged.    MAKENZIE TO MD   Glucose by meter   Result Value Ref Range    Glucose 264 (H) 70 - 99 mg/dL   Blood gas arterial with oxyhemoglobin (1200)   Result Value Ref Range    pH Arterial 7.40 7.35 - 7.45 pH    pCO2 Arterial 60 (H) 35 - 45 mm Hg    pO2 Arterial 102 80 - 105 mm Hg    Bicarbonate Arterial 37 (H) 21 - 28 mmol/L    FIO2 80%     Oxyhemoglobin Arterial 92 92 - 100 %    Base Excess Art 11.0 mmol/L   Vancomycin level   Result Value Ref Range    Vancomycin Level 24.1 mg/L   Glucose by meter   Result Value Ref Range    Glucose 351 (H) 70 - 99 mg/dL

## 2017-11-11 NOTE — PROGRESS NOTES
RT Note:    Patient remained on BIPAP 14/8 overnight, FIO2 weaned to 60%. BS coarse/diminished. Plan to trial patient on HFNC this morning as patient tolerates. RT will continue to follow.    Clover Haddad, RT 11/11/2017, 5:38 AM

## 2017-11-11 NOTE — PROGRESS NOTES
RT- patient placed on high flow nasal cannula with flow at 40 LPM and FIO2 initially 100% and weaned to 90% per SpO2.    Will continue to monitor patient.    Oksana Lawton, RT  11/11/2017 12:52 PM

## 2017-11-11 NOTE — PLAN OF CARE
Problem: Patient Care Overview  Goal: Plan of Care/Patient Progress Review  ICU End of Shift Summary.  For vital signs and complete assessments, please see documentation flowsheets.      Pertinent assessments: Pt able to make needs know, requesting lots of water, reviewed aspiration precautions, good oral cares and provided pt with chap stick for lips. VSS, requesting pain morphine and Toradol for back pain. Up to BSC, +BM x2, callaway with good UOP. Questions answered, support given, will continue to monitor.   Major Shift Events: N/A  Plan (Upcoming Events): 1 unit PRBC for Hgb of 6.7, will recheck Hgb and potassium 1 hour after PRBc. Continue to monitor respiratory status and oxygen need.   Discharge/Transfer Needs: TBD     Bedside Shift Report Completed : yes  Bedside Safety Check Completed: yes

## 2017-11-11 NOTE — PROGRESS NOTES
Hgb drop from 8.0 (on 11/10) to 6.7 this morning. Will give 1UPRBC. She is already on Lasix. Repeat Hgb and K check 1 hour after unit completed

## 2017-11-11 NOTE — PROGRESS NOTES
RT- patient remains on high flow nasal cannula (for Peep/CPAP support) at 40 LPM with FIO2 at 75%.     Breath sounds diminished with crackle on the right side. Patient getting Duoneb in line as ordered.    Will continue to monitor patient.    Oksana Lawton, RT  11/11/2017 4:20 PM

## 2017-11-11 NOTE — PLAN OF CARE
Problem: Sepsis/Septic Shock (Adult)  Goal: Signs and Symptoms of Listed Potential Problems Will be Absent, Minimized or Managed (Sepsis/Septic Shock)  Signs and symptoms of listed potential problems will be absent, minimized or managed by discharge/transition of care (reference Sepsis/Septic Shock (Adult) CPG).   Outcome: No Change  Patient trialed on oxplus mask at 6 L  For 3-5 minutes,  Didn't tolerate, placed back on  bipap.    Comments:   Patient cont on bipap, tube feedings, supportive care, good uo, up to commode with stand by assistance, thick liquid brown stool, has very little reserves, stamina, tolerating hi flow.  Lung sounds diminished, with rhonchi and wheezes.

## 2017-11-11 NOTE — PROGRESS NOTES
[facility]  Hospitalist Progress Note  Gilles Forbes 11/09/17    Reason for Stay (Diagnosis):     hypoxic respiratory failure- acute on chronic     Sepsis          Assessment and Plan:      Summary of Stay: Sabina Figueroa is a 42 year old female with a history of ILD (diagnosed by wedge lung bx in 9/17 at [facility]-acute lung injury thought 2/2 e-cigarettes in setting of DIP and NSIP by surg path)-at that hospitalization she was discharged on high dose steroids with PCP ppx, she then presented to the [facility] and hospitalized 10/18-10/23 for recurrent respiratory failure at which time it was felt due to PCP pna as she had not been taking her tmp-sulfa ppx.  She was discharged on high dose steroids taper and therapeutic tmp-sulfa dosing for PCP with sats 97 % range on RA and had arranged f/u with Dr. Mane ( to f/u vasculitic w/u).  She returns to our ER and admitted on 11/4/2017 with e/o recurrent hypoxic respiratory failure again/fever/KAITLIN to 102/tachycardia/leukocytosis and lactic acidosis to 6.3 consistent with sepsis in the setting of non-compliance/confusion with medications (only taking ppx dosing of tmp-sulfa), and missing of all medications due to being picked up for DUI (drank on 2 separate occassions but prior to that was sober since 5/11/17) and [facility]    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    Overnight into 11/5 had increasing O2 requirements, worsening tachypnea - CT PE protocol completed and shows no PE but significant worsening of bilateral pulmonary infiltrates    Problem List:   1. Sepsis: presumably from respiratory source- CXR with basilar infiltrates-CT now with diffuse infltrates-respiratory panel negative,   Lactic acidosis resolved with IVF.   -- Cont broad spectrum abx with vancomycin and Zosyn and Septra   -- ID input appreciated and  "care discussed     2. Acute Hypoxic respiratory failure-complicated pulmonary situation-Hx of ALI superimposed on desquamative (smoking) pneumonitis and non-specific pneumonitis by wedge bx completed in 9/2017, s/p hospitalization at [facility] for suspected PCP pna-discharged on steroid/therapeutic tmp-sulfa-and non-compliant with medications due to recent DUI-in [facility] for 2 days, and misunderstanding of tmp-sulfa dosing.  Patient was given broad spectrum abx with azith/pip-tazo/vanco in ER-azith not continued-now on pip-tazo/vanco and high dose TMP-sulfa for suspected ongoing/inadequately treated PCP and high risk for alternative respiratory infection due to immunosuppression with steroids.   -- continue abx  --  Decreased  IV Solu-Medrol to 20 mg q.8 hours and scheduled DuoNeb's  -- on lasix due to weight gain   Wt Readings from Last 5 Encounters:   11/11/17 81.8 kg (180 lb 5.4 oz)   10/22/17 83.4 kg (183 lb 12.8 oz)   09/16/17 84.6 kg (186 lb 8.2 oz)   09/14/17 89.8 kg (197 lb 15.6 oz)   08/28/17 85.3 kg (188 lb)     -- CXR showed no change    -- continue lasix   -- monitor daily weight and intake and out put monitor   -- oxygen requirement better today but needs continuous bipap     3. 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   --  My colleague did discuss with pulmonary intensivist-not lung transplant candidate.      4. Non-compliance with medications-, will require case-worker at discharge to help mitigate suspected social issues/med compliance    5. Fever:  Suspected respiratory source:  UA negative, BCx obtained-at risk for opportunistic infections in setting of steroids.  Antibiotics as above.    /74  Pulse 134  Temp 97.9  F (36.6  C) (Axillary)  Resp 13  Ht 1.689 m (5' 6.5\")  Wt 81.8 kg (180 lb 5.4 oz)  SpO2 95%  Breastfeeding? No  BMI 28.67 kg/m2     6. KAITLIN:  Baseline creat wnl-currently BUN/creat 15/1.4-now normalized after IVF    Creatinine   Date Value Ref Range Status " "  11/11/2017 1.04 0.52 - 1.04 mg/dL Final   ]    7. Cirrhosis:    --Chronically on spironolactone/furosemide/lactulose/rifaximin.  LFTs currently with minimally elevated AST, bili wnl. NH3 wnl, and on review of chart not significantly elevated except 2 prior occasions.  Po Lasix and spironolactone has been placed on hold and started  IV Lasix as above.  -- no significant ascites       8. Steroid induced DM: glargine 20 u (humilin insulin prescribed at last hospitalization not covered by insurance).  Patient becoming more hyperglycemic with tube feeds and stress dose IV steroids.   --  We'll increase glargine to 25 units subcutaneous q.a.m. and continue  high intensity sliding scale and p.o. Protocol.    9. Narcotic dependence:  Cont suboxone as at home    10. Suspected  Underlying vasculitic/autoimmune process at play:  AMISHA by IFA borderline positive, and cytoplasmic IgG negative.  Outpatient pulmonary follow-up at discharge.    11. Nicotine dependence with suspected ALI 2/2 e cigarettes-continues to smoke despite nicotine replacement-ATQ this admission      DVT Prophylaxis: Enoxaparin (Lovenox) SQ    Code Status: Full Code    Discharge Dispo: unclear yet     Estimated Disch Date / # of Days until Disch: unable to determine. Continue ICU care for now. Prognosis is guarded  -- TLC team and care coordinator consulted   -- Appreciate RT input     Time spent: Greater than 35 minutes        Interval History (Subjective):        Patient is seen and examined by me today and medical record reviewed.  Overnight events noted and care discussed with nursing staff.  No new change   Continues to require 80% oxygen and BIPAP                Physical Exam:      Last Vital Signs:  /74  Pulse 134  Temp 97.9  F (36.6  C) (Axillary)  Resp 13  Ht 1.689 m (5' 6.5\")  Wt 81.8 kg (180 lb 5.4 oz)  SpO2 95%  Breastfeeding? No  BMI 28.67 kg/m2  HR 50-80 range 's    I/O last 3 completed shifts:  In: 3095.5 [P.O.:160; " I.V.:1015.5; NG/GT:720]  Out: 3075 [Urine:3075]     Wt Readings from Last 5 Encounters:   11/11/17 81.8 kg (180 lb 5.4 oz)   10/22/17 83.4 kg (183 lb 12.8 oz)   09/16/17 84.6 kg (186 lb 8.2 oz)   09/14/17 89.8 kg (197 lb 15.6 oz)   08/28/17 85.3 kg (188 lb)     alert and oriented looks stated age head nc/at sclera with mild bilateral injection appears flushed (she reports is chronic), notable increased work of breathing on BiPAP.  Poor air movement with mild mid expiratory wheezing, normal S1 and S2 RRR no m/r/g abd obese - no obvious ascites, s/nt/nd rossi            Medications:      All current medications were reviewed with changes reflected in problem list.         Data:      All new lab and imaging data was reviewed.   All laboratory and imaging data in the past 24 hours reviewed       Recent Labs  Lab 11/11/17  1040 11/11/17  0515 11/10/17  0520 11/09/17  0513   WBC  --  5.2 3.9* 4.5   HGB 7.3* 6.7* 8.0* 9.4*   HCT  --  20.9* 24.2* 28.8*   MCV  --  93 92 92   PLT  --  103* 91* 83*     No results for input(s): CULT in the last 168 hours.    Recent Labs  Lab 11/11/17  1040 11/11/17  0515 11/10/17  0520 11/09/17  0513 11/08/17  0545   NA  --  131* 131* 131* 135   POTASSIUM 5.0 5.4* 4.7 4.5 4.6   CHLORIDE  --  92* 92* 93* 103   CO2  --  34* 35* 33* 26   ANIONGAP  --  5 4 5  --    GLC  --  216* 218* 218* 161*   BUN  --  39* 29 17 13   CR  --  1.04 0.93 0.87 0.84   GFRESTIMATED  --  58* 66 71 74   GFRESTBLACK  --  70 80 86 90   VISHNU  --  8.1* 8.2* 8.3* 8.2*   MAG  --   --   --   --  2.3   PHOS  --   --   --   --  4.0   PROTTOTAL  --   --  6.1*  --   --    ALBUMIN  --   --  2.7*  --   --    BILITOTAL  --   --  0.4  --   --    ALKPHOS  --   --  139  --   --    AST  --   --  69*  --   --    ALT  --   --  36  --   --          Recent Labs  Lab 11/11/17  1207 11/11/17  0841 11/11/17  0515 11/11/17  0414 11/11/17  0003 11/10/17  2040  11/10/17  0520  11/09/17  0513  11/08/17  0545  11/07/17  0550   GLC  --   --  216*  --    --   --   --  218*  --  218*  --  161*  --  70   * 263*  --  194* 294* 377*  < >  --   < >  --   < >  --   < >  --    < > = values in this interval not displayed.          Recent Labs  Lab 11/05/17  0510   INR 1.11           No results for input(s): TROPONIN, TROPI, TROPR in the last 168 hours.    Invalid input(s): TROP, TROPONINIES    Recent Results (from the past 48 hour(s))   XR Chest Port 1 View    Narrative    XR CHEST PORT 1 VW  11/9/2017 6:40 AM    HISTORY:  Persistent acute hypoxic respiratory failure;     COMPARISON:  11/4/2017      Impression    IMPRESSION:  Nasogastric tube tip not seen. Diffuse bilateral alveolar  opacities, significantly worsened since the prior exam.     LUKE PATEL MD

## 2017-11-12 NOTE — PLAN OF CARE
Problem: Patient Care Overview  Goal: Plan of Care/Patient Progress Review  Outcome: No Change  363  ICU End of Shift Summary.  For vital signs and complete assessments, please see documentation flowsheets.      Pertinent assessments: A&Ox4. VSS. Afebrile. Tolerated bipap all shift 45% FIO2, LS: dim/ Crcakles. KOROMA. +BS. Watters WDL in place good UOP. Skin bruised throughout. IV TKO to left arm.   Major Shift Events: rested throughout the night, received IV morphine for legs pain with relief  Plan (Upcoming Events): continue Pulmonary toileting, Steroids, Lasix & IV Abx, Follow up labs and Cx, wean off Bipap as able, SW consult at D/C  Discharge/Transfer Needs: continue ICU cares for now     Bedside Shift Report Completed : yes  Bedside Safety Check Completed: yes  Addendum 1: Hgb this am 5.8. One unit of Blood to be given  Now, tele hub notified  Pt didn't have a blood consent, MD paged to come up and sign consent  Addendum 2: 0850 consent signed and blood transfusion started.

## 2017-11-12 NOTE — PLAN OF CARE
5982 am:  Blood bank informed the writer that pts blood ready to transfuse, order is to transfuse blood if pt's hgb less 7. Confirmed with tele hub, plan to recheck hgb in am and transfuse if less than 7.  Tara Carlos RN

## 2017-11-12 NOTE — PHARMACY-VANCOMYCIN DOSING SERVICE
Pharmacy Vancomycin Note  Date of Service 2017  Patient's  1975   42 year old, female    Indication: Sepsis  Goal Trough Level: 15-20 mg/L  Day of Therapy: 9  Current Vancomycin regimen:  1,250 mg IV q12h    Current estimated CrCl = Estimated Creatinine Clearance: 69.8 mL/min (based on Cr of 1.12).    Creatinine for last 3 days  11/10/2017:  5:20 AM Creatinine 0.93 mg/dL  2017:  5:15 AM Creatinine 1.04 mg/dL  2017:  5:37 AM Creatinine 1.12 mg/dL    Recent Vancomycin Levels (past 3 days)  11/10/2017: 10:54 AM Vancomycin Level 24.1 mg/L  2017:  1:23 PM Vancomycin Level 18.8 mg/L    Vancomycin IV Administrations (past 72 hours)                   vancomycin 1250 mg in 0.9% NaCl 250 mL PREMIX (mg) 1,250 mg New Bag 17 1344     1,250 mg New Bag  0157     1,250 mg New Bag 17 1405     1,250 mg New Bag  0244                Nephrotoxins and other renal medications (Future)    Start     Dose/Rate Route Frequency Ordered Stop    17 1600  furosemide (LASIX) injection 20 mg      20 mg  over 1-2 Minutes Intravenous EVERY 8 HOURS 17 1035 17 1559    17 0200  vancomycin 1250 mg in 0.9% NaCl 250 mL PREMIX      1,250 mg  166.7 mL/hr over 90 Minutes Intravenous EVERY 12 HOURS 11/10/17 1425      11/10/17 0933  ketorolac (TORADOL) injection 30 mg      30 mg Intravenous EVERY 6 HOURS PRN 11/10/17 0933 11/15/17 0932    17 0800  piperacillin-tazobactam (ZOSYN) intermittent infusion 4.5 g     Comments:  Pharmacy can adjust dose based on renal function.    4.5 g  200 mL/hr over 30 Minutes Intravenous EVERY 6 HOURS 17 0356               Contrast Orders - past 72 hours     None          Interpretation of levels and current regimen:  Trough level is  Therapeutic    Has serum creatinine changed > 50% in last 72 hours: No    Urine output:  good urine output    Renal Function: Stable    Plan:  1.  Continue Current Dose  2.  Pharmacy will check trough levels as  appropriate in 1-3 Days.    3. Serum creatinine levels will be ordered daily for the first week of therapy and at least twice weekly for subsequent weeks.      Grace Gentile        .

## 2017-11-12 NOTE — PROGRESS NOTES
RT Note:    Patient wore BIPAP overnight, 14/8 FIO2 weaned to 45%. SPO2 mid 90's. BS crackles/diminished. Plan for patient to go back on HFNC during day. RT will continue to follow.     Clover Haddad, RT 11/12/2017, 6:26 AM

## 2017-11-12 NOTE — PROGRESS NOTES
ICU End of Shift Summary.  For vital signs and complete assessments, please see documentation flowsheets.     Pertinent assessments: LS diminished with fine crackles. HR SR.  Major Shift Events: Improving respiratory status. HFNC tolerated well weaned from 70-50% O2 and 40 Liters.  Placed back on BIPAP for bedtime.  Plan (Upcoming Events): Continue to monitor respiratory status, VSS, and labs  Discharge/Transfer Needs:     Bedside Shift Report Completed :   Bedside Safety Check Completed:

## 2017-11-12 NOTE — PLAN OF CARE
Problem: Patient Care Overview  Goal: Plan of Care/Patient Progress Review  Outcome: No Change  Pt condition haven't changed since last night. VSS. Afebrile. Received morphine for pain. Switched from Bipap to high flow @ 50% FIO2 with 40 Lpm. LS: positive for crackles. Received one unit of RBRC. Plan to continue Abx, steroids, recheck hgb, transfuse as needed, wean FIO2.

## 2017-11-12 NOTE — PROGRESS NOTES
RT- patient remains on Bipap 14/8 after wearing high flow 40 LPM/ 50% FIO2 for several hours throughout the day. Patient resting comfortably on Bipap which has been weaned to 40% FIO2. Patient is getting Duoneb in line with BS auscultating diminished/crackles throughout greater on right than left.    Will continue to monitor patient.    Oksana Lawton, RT  11/12/2017 5:31 PM

## 2017-11-12 NOTE — PROGRESS NOTES
[facility]  Hospitalist Progress Note  Gilles Forbes 11/09/17    Reason for Stay (Diagnosis):     hypoxic respiratory failure- acute on chronic     Sepsis          Assessment and Plan:      Summary of Stay: Sabina Figueroa is a 42 year old female with a history of ILD (diagnosed by wedge lung bx in 9/17 at [facility]-acute lung injury thought 2/2 e-cigarettes in setting of DIP and NSIP by surg path)-at that hospitalization she was discharged on high dose steroids with PCP ppx, she then presented to the [facility] and hospitalized 10/18-10/23 for recurrent respiratory failure at which time it was felt due to PCP pna as she had not been taking her tmp-sulfa ppx.  She was discharged on high dose steroids taper and therapeutic tmp-sulfa dosing for PCP with sats 97 % range on RA and had arranged f/u with Dr. Mane ( to f/u vasculitic w/u).  She returns to our ER and admitted on 11/4/2017 with e/o recurrent hypoxic respiratory failure again/fever/KAITLIN to 102/tachycardia/leukocytosis and lactic acidosis to 6.3 consistent with sepsis in the setting of non-compliance/confusion with medications (only taking ppx dosing of tmp-sulfa), and missing of all medications due to being picked up for DUI (drank on 2 separate occassions but prior to that was sober since 5/11/17) and being held in long-term.    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    Overnight into 11/5 had increasing O2 requirements, worsening tachypnea - CT PE protocol completed and shows no PE but significant worsening of bilateral pulmonary infiltrates    Problem List:   1. Sepsis: presumably from respiratory source- CXR with basilar infiltrates-CT showed diffuse infltrates-respiratory panel negative,   Lactic acidosis resolved with IVF.   -- Cont broad spectrum abx with vancomycin(Day #8) and Zosyn(DAY#8) and IV Septra per ID (Day  "#4)  -- Afebrile , cultures nagative to date   -- ID input appreciated and further abx plan deferred to ID       2. Acute Hypoxic respiratory failure-complicated pulmonary situation-Hx of ALI superimposed on desquamative (smoking) pneumonitis and non-specific pneumonitis by wedge bx completed in 9/2017, s/p hospitalization at [facility] for suspected PCP pna-discharged on steroid/therapeutic tmp-sulfa-and non-compliant with medications due to recent DUI-in [facility] for 2 days, and misunderstanding of tmp-sulfa dosing.  Patient was given broad spectrum abx with azith/pip-tazo/vanco in ER-azith not continued-now on pip-tazo/vanco and high dose TMP-sulfa for suspected ongoing/inadequately treated PCP and high risk for alternative respiratory infection due to immunosuppression with steroids.   -- continue abx  --  On iv steroid Solu-Medrol to 20 mg q.8 hours and scheduled DuoNeb's  -- on lasix due to weight gain   Wt Readings from Last 5 Encounters:   11/12/17 80.1 kg (176 lb 10.1 oz)   10/22/17 83.4 kg (183 lb 12.8 oz)   09/16/17 84.6 kg (186 lb 8.2 oz)   09/14/17 89.8 kg (197 lb 15.6 oz)   08/28/17 85.3 kg (188 lb)     -- CXR showed no change    -- continue lasix   -- monitor daily weight and intake and out put monitor   -- oxygen requirement better at 45% on BIPAP but needs continuous bipap     3. ALI on CLI (DIP and NSIP)  Transitioned to IV Solu-Medrol on 11/7/17.   -- care d/w  pulmonary intensivist-not lung transplant candidate.        4. Non-compliance with medications-, will require case-worker at discharge to help mitigate suspected social issues/med compliance    5. Fever:  Suspected respiratory source:  UA negative, BCx obtained-at risk for opportunistic infections in setting of steroids.  Antibiotics as above.    /68  Pulse 134  Temp 98  F (36.7  C) (Axillary)  Resp 12  Ht 1.689 m (5' 6.5\")  Wt 80.1 kg (176 lb 10.1 oz)  SpO2 91%  Breastfeeding? No  BMI 28.08 kg/m2     6. KAITLIN:  Baseline creat " wnl-currently BUN/creat 15/1.4-now normalized after IVF    Creatinine   Date Value Ref Range Status   11/12/2017 1.12 (H) 0.52 - 1.04 mg/dL Final   ]    7. Cirrhosis:    --Chronically on spironolactone/furosemide/lactulose/rifaximin.  LFTs currently with minimally elevated AST, bili wnl. NH3 wnl, and on review of chart not significantly elevated except 2 prior occasions.  Po Lasix and spironolactone has been placed on hold and started  IV Lasix as above.  -- no significant ascites       8. Steroid induced DM: uncontrolled blood glucose   glargine titrated to 28 u and novolog to 6 units q4h    (humilin insulin prescribed at last hospitalization not covered by insurance).  Patient becoming more hyperglycemic with tube feeds and stress dose IV steroids.       9. Narcotic dependence:  Cont suboxone as at home    10. Suspected  Underlying vasculitic/autoimmune process at play:  AMISHA by IFA borderline positive, and cytoplasmic IgG negative.  Outpatient pulmonary follow-up at discharge.    11. Nicotine dependence with suspected ALI 2/2 e cigarettes-continues to smoke despite nicotine replacement-ATQ this admission    12.Anemia   -- progressive decline in hgb , no bleeding noted   -- 13.8 on adm >>5.8 this morning , etiology unclear   -- will send iron work up   -- two units of PRBC ordered , will monitor hgb and transfuse as needed . May need to keep hgb over 8     DVT Prophylaxis: Enoxaparin (Lovenox) SQ    Code Status: Full Code    Discharge Dispo: unclear yet     Estimated Disch Date / # of Days until Disch:   unable to determine. Continue ICU care for now. Prognosis is guarded  -- TLC team and care coordinator consulted   -- Appreciate RT input   -- double patient discharge in next few days     Time spent: Greater than 35 minutes        Interval History (Subjective):        Patient is seen and examined by me today and medical record reviewed.  Overnight events noted and care discussed with nursing staff.  No new change  "  Continues to require  BIPAP                Physical Exam:      Last Vital Signs:  /68  Pulse 134  Temp 98  F (36.7  C) (Axillary)  Resp 12  Ht 1.689 m (5' 6.5\")  Wt 80.1 kg (176 lb 10.1 oz)  SpO2 91%  Breastfeeding? No  BMI 28.08 kg/m2  HR 50-80 range 's    I/O last 3 completed shifts:  In: 2029.83 [I.V.:579.83; NG/GT:650]  Out: 4275 [Urine:3275; Stool:1000]     Wt Readings from Last 5 Encounters:   11/12/17 80.1 kg (176 lb 10.1 oz)   10/22/17 83.4 kg (183 lb 12.8 oz)   09/16/17 84.6 kg (186 lb 8.2 oz)   09/14/17 89.8 kg (197 lb 15.6 oz)   08/28/17 85.3 kg (188 lb)       Constitutional: Awake, alert, cooperative, ongoing resp apparent distress on BIPAP    Lungs: Reduced air enty , diffuse rhonchi ,bilaterally   Cardiovascular: Regular rate and rhythm, normal S1 and S2, and no murmur noted   Abdomen: Normal bowel sounds, soft, non-distended, non-tender   Skin: No rashes, no cyanosis, no edema   Neuro   non- focal               Medications:      All current medications were reviewed with changes reflected in problem list.         Data:      All new lab and imaging data was reviewed.   All laboratory and imaging data in the past 24 hours reviewed       Recent Labs  Lab 11/12/17 0537 11/11/17  1040 11/11/17  0515 11/10/17  0520   WBC 4.0  --  5.2 3.9*   HGB 5.8* 7.3* 6.7* 8.0*   HCT 17.5*  --  20.9* 24.2*   MCV 92  --  93 92   *  --  103* 91*     No results for input(s): CULT in the last 168 hours.    Recent Labs  Lab 11/12/17 0537 11/11/17  1040 11/11/17 0515 11/10/17  0520  11/08/17  0545   *  --  131* 131*  < > 135   POTASSIUM 5.1 5.0 5.4* 4.7  < > 4.6   CHLORIDE 91*  --  92* 92*  < > 103   CO2 35*  --  34* 35*  < > 26   ANIONGAP 5  --  5 4  < >  --    *  --  216* 218*  < > 161*   BUN 41*  --  39* 29  < > 13   CR 1.12*  --  1.04 0.93  < > 0.84   GFRESTIMATED 53*  --  58* 66  < > 74   GFRESTBLACK 64  --  70 80  < > 90   VISHNU 8.3*  --  8.1* 8.2*  < > 8.2*   MAG  --   --   -- "   --   --  2.3   PHOS  --   --   --   --   --  4.0   PROTTOTAL 6.0*  --   --  6.1*  --   --    ALBUMIN 2.5*  --   --  2.7*  --   --    BILITOTAL 0.2  --   --  0.4  --   --    ALKPHOS 120  --   --  139  --   --    AST 98*  --   --  69*  --   --    ALT 28  --   --  36  --   --    < > = values in this interval not displayed.      Recent Labs  Lab 11/12/17  1238 11/12/17  0815 11/12/17  0537 11/12/17  0356 11/11/17  2353 11/11/17  1953  11/11/17  0515  11/10/17  0520  11/09/17  0513  11/08/17  0545   GLC  --   --  221*  --   --   --   --  216*  --  218*  --  218*  --  161*   * 218*  --  231* 266* 222*  < >  --   < >  --   < >  --   < >  --    < > = values in this interval not displayed.        No results for input(s): INR in the last 168 hours.        No results for input(s): TROPONIN, TROPI, TROPR in the last 168 hours.    Invalid input(s): TROP, TROPONINIES    Recent Results (from the past 48 hour(s))   XR Chest Port 1 View    Narrative    XR CHEST PORT 1 VW  11/9/2017 6:40 AM    HISTORY:  Persistent acute hypoxic respiratory failure;     COMPARISON:  11/4/2017      Impression    IMPRESSION:  Nasogastric tube tip not seen. Diffuse bilateral alveolar  opacities, significantly worsened since the prior exam.     LUKE PATEL MD

## 2017-11-13 NOTE — PROGRESS NOTES
[facility]    Hospitalist Progress Note  Name: Sabina Figueroa    MRN: [id number]  Provider:  Anatoliy Dos Santos DO, MPH  Date of Service: 11/13/2017    Summary of Stay: Sabina Figueroa is a 42 year old female with a history of ILD (diagnosed by wedge lung bx in 9/17 at [facility]-acute lung injury thought 2/2 e-cigarettes in setting of DIP and NSIP by surg path)-at that hospitalization she was discharged on high dose steroids with PCP ppx, she then presented to the [facility] and hospitalized 10/18-10/23 for recurrent respiratory failure at which time it was felt due to PCP pna as she had not been taking her tmp-sulfa ppx.  She was discharged on high dose steroids taper and therapeutic tmp-sulfa dosing for PCP with sats 97 % range on RA and had arranged f/u with Dr. Mane ( to f/u vasculitic w/u).  She returns to our ER and admitted on 11/4/2017 with e/o recurrent hypoxic respiratory failure again/fever/KAITLIN to 102/tachycardia/leukocytosis and lactic acidosis to 6.3 consistent with sepsis in the setting of non-compliance/confusion with medications (only taking ppx dosing of tmp-sulfa), and missing of all medications due to being picked up for DUI (drank on 2 separate occassions but prior to that was sober since 5/11/17) and being held in senior living.     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  Overnight into 11/5 had increasing O2 requirements, worsening tachypnea - CT PE protocol completed and shows no PE but significant worsening of bilateral pulmonary infiltrates     Problem List:   1.  Sepsis: Presumably from respiratory source. CXR with basilar infiltrates. CT showed diffuse infltrates. Respiratory panel negative. Lactic acidosis resolved with IVF. She is afebrile with negative cultures.  -- Continue broad spectrum abx with vancomycin, zosyn, and Bactrim per ID    2.  Acute hypoxic  respiratory failure: Complicated pulmonary situation/history. 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 Had prolonged hospitalization at [facility] for suspected PCP PNA, was discharged on steroid/therapeutic Bactrim and was non-compliant with medications due to recent DUI. She was in prison for 2 days and misunderstanding of Bactrim dosing. Patient was given broad spectrum abx with azith/pip-tazo/vanco in ER. Has continued now on zosyn/vanco and high dose Bactrim for suspected ongoing/inadequately treated PCP and high risk for alternative respiratory infection due to immunosuppression with steroids. She has been nearly BiPAP dependent since admission, only coming off to [facility] for short periods.  -- Continue abx  -- On IV steroid Solu-Medrol to 20 mg q.6 hours and scheduled DuoNeb's  -- On lasix IV 20 mg q8h  -- Monitor daily weight and intake and out put monitor      3.  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  -- Case discussed with pulmonary intensivist (Dr Charles)   -- Not lung transplant candidate.       4.  Non-compliance with medications: Will require case-worker at discharge to help mitigate suspected social issues/med compliance.    5.  Fever:  Suspected respiratory source:  UA negative, BCx NGTD. At risk for opportunistic infections in setting of steroids.  Antibiotics as above.     6.  KAITLIN:  Baseline creatinine normal. Renal function normalized after IVF. Now need to watch closely while on diuretics.    7.  Cirrhosis:  Chronically on spironolactone/furosemide/lactulose/rifaximin. LFTs currently with minimally elevated AST, bili normal. NH3 normal and on review of chart not significantly elevated except 2 prior occasions. PO Lasix and spironolactone has been placed on hold and started  IV Lasix as above. No significant ascites on exam.     8.  Steroid induced DM: Uncontrolled blood glucose for many days. Patient becoming more  hyperglycemic with tube feeds and stress dose IV steroids.   -- Increase glargine to 35 U daily and novolog to 8 units q4h   -- High intensity SSI  -- Humilin insulin prescribed at last hospitalization not covered by insurance     9.  Narcotic dependence:  Continue suboxone as at home.  -- Pain team following  -- Stop morphine and allow low dose PRN dilaudid     10.  Suspected  Underlying vasculitic/autoimmune process at play:  AMISHA by IFA borderline positive, and cytoplasmic IgG negative.  Outpatient pulmonary/rheumatology follow-up at discharge.    11.  Nicotine dependence with suspected ALI 2/2 e cigarettes: Continues to smoke despite nicotine replacement. ATQ this admission     12.  Anemia: Progressive decline in Hb, no bleeding noted. Was 13.8 on adm then down to 5.8 before receiving 2 U PRBC 11/12 with current Hb 7.2. More specifically, had 2.5 g drop in Hb over 2 day period. Etiology unclear.   -- Daily Hb  -- Monitor for bleeding  -- Stop Lovenox  -- Transfuse for Hb <7    13.  Severe protein calorie malnutrition:  Had TF placed and RD managing TF.    14.  Prerenal azotemia: Slight increase in creatinine.  -- Monitor closely with diuretics.  -- Stop NSAIDs    DVT Prophylaxis: Enoxaparin (Lovenox) SQ stopped for now, PCDs  Code Status: Full Code  Disposition: Expected discharge in 4+ days to TBD. Goals prior to discharge include continue aggressive pulmonary cares as outlined above. Keep in ICU today and likely several more days.   Family updated today: No     Interval History   Assumed care from previous hospitalist. The history was fully reviewed.  The patient reports doing well. No chest pain or shortness of breath. No nausea, vomiting, diarrhea, constipation. No fevers. No other specific complaints identified.     -Data reviewed today: I reviewed all new labs and imaging results over the last 24 hours.     Physical Exam   Temp: 98.1  F (36.7  C) Temp src: Axillary BP: (!) 83/57 Pulse: 74 Heart Rate: 74  Resp: 14 SpO2: 96 % O2 Device: High Flow Nasal Cannula (HFNC) Oxygen Delivery: Other (Comments) (30 LPM)  Vitals:    11/11/17 0500 11/12/17 0600 11/13/17 0400   Weight: 81.8 kg (180 lb 5.4 oz) 80.1 kg (176 lb 10.1 oz) 79.3 kg (174 lb 13.2 oz)     Vital Signs with Ranges  Temp:  [97.4  F (36.3  C)-99  F (37.2  C)] 98.1  F (36.7  C)  Pulse:  [74] 74  Heart Rate:  [] 74  Resp:  [8-58] 14  BP: ()/(54-79) 83/57  FiO2 (%):  [30 %-50 %] 40 %  SpO2:  [87 %-100 %] 96 %  I/O last 3 completed shifts:  In: 3268.33 [P.O.:35; I.V.:570; NG/GT:870]  Out: 5315 [Urine:4315; Stool:1000]    GENERAL: No apparent distress. Awake, alert, and fully oriented.  HEENT: Normocephalic, atraumatic. Extraocular movements intact.  CARDIOVASCULAR: Regular rate and rhythm without murmurs or rubs. No S3.  PULMONARY: Coarse bilaterally.  GASTROINTESTINAL: Soft, non-tender, non-distended. Bowel sounds normoactive.   EXTREMITIES: No cyanosis or clubbing. Trace BL LE edema.  NEUROLOGICAL: CN 2-12 grossly intact, no focal neurological deficits.  DERMATOLOGICAL: No rash, ulcer, bruising, nor jaundice.     Medications     NaCl 10 mL/hr at 11/13/17 0600     IV fluid REPLACEMENT ONLY         insulin glargine  35 Units Subcutaneous QAM     insulin aspart  8 Units Subcutaneous Q4H     protein modular  1 packet Per Feeding Tube Daily     methylPREDNISolone sodium succinate  20 mg Intravenous Q6H     vancomycin (VANCOCIN) IV  1,250 mg Intravenous Q12H     insulin aspart  1-10 Units Subcutaneous Q4H     atorvastatin (LIPITOR) tablet 20 mg  20 mg Oral or Feeding Tube At Bedtime     rifaximin  550 mg Oral or Feeding Tube BID     folic acid  1 mg Oral or Feeding Tube Daily     aspirin  81 mg Oral or Feeding Tube Daily     mirtazapine (REMERON) tablet 15 mg  15 mg Oral or Feeding Tube At Bedtime     pantoprazole  40 mg Per Feeding Tube Daily     lactulose  20 g Per Feeding Tube Daily     gabapentin  600 mg Per Feeding Tube TID     methocarbamol  500 mg  Oral or Feeding Tube TID     FLUoxetine  40 mg Oral or Feeding Tube Daily     loratadine  10 mg Oral or Feeding Tube Daily     sulfamethoxazole-trimethoprim  50 mL Oral 4x Daily     ipratropium - albuterol 0.5 mg/2.5 mg/3 mL  3 mL Nebulization 4x daily     buprenorphine HCl-naloxone HCl  3 Film Sublingual Daily     nicotine  1 patch Transdermal Daily     piperacillin-tazobactam  4.5 g Intravenous Q6H     nicotine   Transdermal Daily     nicotine   Transdermal Q8H     enoxaparin  40 mg Subcutaneous Q24H     sodium chloride (PF)  3 mL Intracatheter Q8H     Data     Laboratory:    Recent Labs  Lab 11/13/17  0539 11/13/17  0056 11/12/17  1550 11/12/17  0537  11/11/17  0515   WBC 6.4  --   --  4.0  --  5.2   HGB 7.2* 7.7* 6.7* 5.8*  < > 6.7*   HCT 21.6*  --   --  17.5*  --  20.9*   MCV 89  --   --  92  --  93   *  --   --  113*  --  103*   < > = values in this interval not displayed.    Recent Labs  Lab 11/13/17  0539 11/12/17  0537 11/11/17  1040 11/11/17  0515   * 131*  --  131*   POTASSIUM 4.7 5.1 5.0 5.4*   CHLORIDE 90* 91*  --  92*   CO2 34* 35*  --  34*   ANIONGAP 6 5  --  5   * 221*  --  216*   BUN 39* 41*  --  39*   CR 1.13* 1.12*  --  1.04   GFRESTIMATED 53* 53*  --  58*   GFRESTBLACK 64 64  --  70   VISHNU 8.1* 8.3*  --  8.1*     No results for input(s): CULT in the last 168 hours.    Imaging:  Recent Results (from the past 24 hour(s))   XR Chest Port 1 View    Narrative    CHEST PORTABLE ONE VIEW November 13, 2017 6:19 AM     HISTORY: Hypoxia.    COMPARISON: 11/11/2017.      Impression    IMPRESSION: Diffuse micronodular and interstitial pulmonary opacities  do not appear significantly changed since prior. No significant  pleural effusion. No pneumothorax identified. Surgical staple line in  the right lung again noted. Enteric tube remains in place.    MD Anatoliy CHAPIN DO MPH  [facility] Hospitalist  [facility] [place] [address]  Pager: [phone]  11/13/2017

## 2017-11-13 NOTE — PROGRESS NOTES
[facility]  Infectious Disease Progress Note          Assessment and Plan:   IMPRESSION:   1.  A 42-year-old female with biopsy-proven interstitial lung disease and several months of worsened respiratory status, on high-dose steroids further worsening respiratory status in the last month with increased infiltrates, question still primary interstitial lung disease versus new opportunistic infection versus new hospital-acquired infection at present.   2.  Recent respiratory failure at [facility], no clear new diagnosis made, improved with steroids increased, was also being treated as Pneumocystis pneumonia although data did not really support that diagnosis, did not continue proper treatment as an outpatient; if Pneumocystis not adequately treated to date.    3.  Interstitial lung disease, question other systemic component.   4.  History of alcoholic cirrhosis.   5.  Chronic tobacco abuse including E-cigarettes, some concern this might be some type of lung damage related to that.       RECOMMENDATIONS:   1.  Continue current broad spectrum antibiotics and also high dose Septra. Likely Dc vanco soon  2.  So far unremarkable serologies and noninvasive diagnostic tests to try to prove some infectious diagnosis.   3.  May eventually, if not improving, require further intervention, obvious if she becomes intubated we should do a bronchoscopy.  4 LDH very elevated despite neg PCP 10/20 and other evidence against, continue to tx possible PJP, fugitel neg against this dx         Interval History:   no new complaints but still very hypoxic, LDH 1042, procal 1.1  No + cxs no sputum productivity resp virus neg, procal 1.1              Medications:       insulin glargine  35 Units Subcutaneous QAM     insulin aspart  8 Units Subcutaneous Q4H     protein modular  1 packet Per Feeding Tube Daily     methylPREDNISolone sodium succinate  20 mg Intravenous Q6H     vancomycin (VANCOCIN) IV  1,250 mg  "Intravenous Q12H     insulin aspart  1-10 Units Subcutaneous Q4H     atorvastatin (LIPITOR) tablet 20 mg  20 mg Oral or Feeding Tube At Bedtime     rifaximin  550 mg Oral or Feeding Tube BID     folic acid  1 mg Oral or Feeding Tube Daily     aspirin  81 mg Oral or Feeding Tube Daily     mirtazapine (REMERON) tablet 15 mg  15 mg Oral or Feeding Tube At Bedtime     pantoprazole  40 mg Per Feeding Tube Daily     lactulose  20 g Per Feeding Tube Daily     gabapentin  600 mg Per Feeding Tube TID     methocarbamol  500 mg Oral or Feeding Tube TID     FLUoxetine  40 mg Oral or Feeding Tube Daily     loratadine  10 mg Oral or Feeding Tube Daily     sulfamethoxazole-trimethoprim  50 mL Oral 4x Daily     ipratropium - albuterol 0.5 mg/2.5 mg/3 mL  3 mL Nebulization 4x daily     buprenorphine HCl-naloxone HCl  3 Film Sublingual Daily     nicotine  1 patch Transdermal Daily     piperacillin-tazobactam  4.5 g Intravenous Q6H     nicotine   Transdermal Daily     nicotine   Transdermal Q8H     sodium chloride (PF)  3 mL Intracatheter Q8H                  Physical Exam:   Blood pressure 97/60, pulse 74, temperature 98  F (36.7  C), temperature source Axillary, resp. rate (!) 32, height 1.689 m (5' 6.5\"), weight 79.3 kg (174 lb 13.2 oz), SpO2 98 %, not currently breastfeeding.  Wt Readings from Last 2 Encounters:   11/13/17 79.3 kg (174 lb 13.2 oz)   10/22/17 83.4 kg (183 lb 12.8 oz)     Vital Signs with Ranges  Temp:  [97.4  F (36.3  C)-99  F (37.2  C)] 98  F (36.7  C)  Pulse:  [74] 74  Heart Rate:  [] 92  Resp:  [8-51] 32  BP: ()/(53-83) 97/60  FiO2 (%):  [30 %-55 %] 55 %  SpO2:  [81 %-100 %] 98 %    Constitutional: Awake, alert, cooperative, ongoing resp apparent distress   Lungs: Clear to auscultation bilaterally, no crackles or wheezing on high flow O2   Cardiovascular: Regular rate and rhythm, normal S1 and S2, and no murmur noted   Abdomen: Normal bowel sounds, soft, non-distended, non-tender   Skin: No rashes, " no cyanosis, no edema   Other:           Data:   All microbiology laboratory data reviewed.  Recent Labs   Lab Test  11/13/17   0539  11/13/17   0056  11/12/17   1550  11/12/17   0537   11/11/17   0515   WBC  6.4   --    --   4.0   --   5.2   HGB  7.2*  7.7*  6.7*  5.8*   < >  6.7*   HCT  21.6*   --    --   17.5*   --   20.9*   MCV  89   --    --   92   --   93   PLT  135*   --    --   113*   --   103*    < > = values in this interval not displayed.     Recent Labs   Lab Test  11/13/17   0539  11/12/17   0537  11/11/17   0515   CR  1.13*  1.12*  1.04     Recent Labs   Lab Test  10/19/17   0503   SED  23*     Recent Labs   Lab Test  11/04/17   0343  11/04/17   0107  11/04/17   0050  10/20/17   0950  10/19/17   0517  10/19/17   0054  10/17/17   2143  10/17/17   2020  10/17/17   2015   CULT  No growth  No growth  No growth  Moderate growth  Coagulase negative Staphylococcus  Susceptibility testing not routinely done  *  Moderate growth  Candida albicans / dubliniensis  Candida albicans and Candida dubliniensis are not routinely speciated  Susceptibility testing not routinely done  *  Canceled, Test credited  >10 Squamous epithelial cells/low power field indicates oral contamination. Please   recollect.  *  Notification of test cancellation was given to  Elena Araujo RN from [facility]. 10.19.17 at 0753. GR.    Canceled, Test credited  >10 Squamous epithelial cells/low power field indicates oral contamination. Please   recollect.  *  Notification of test cancellation was given to  Nelsy Tesfaye RN [facility] @0246. 10/19/17. SCG    No growth  No growth  No growth

## 2017-11-13 NOTE — PROGRESS NOTES
ICU End of Shift Summary.  For vital signs and complete assessments, please see documentation flowsheets.     Pertinent assessments: Alert to person and place this shift. LS crackles and diminished. Pain 10/10 given morphine X 1  Major Shift Events: On/Off BIPAP this shift, refuses repositioning but will adjust slightly on her own.  Plan (Upcoming Events): continue to monitor HBG and other labs, Continue monitoring respiratory status  Discharge/Transfer Needs: Palliative care consult, plan to see tomorrow   Bedside Shift Report Completed : Yes  Bedside Safety Check Completed: Yes      \

## 2017-11-13 NOTE — PLAN OF CARE
Problem: Sepsis/Septic Shock (Adult)  Goal: Signs and Symptoms of Listed Potential Problems Will be Absent, Minimized or Managed (Sepsis/Septic Shock)  Signs and symptoms of listed potential problems will be absent, minimized or managed by discharge/transition of care (reference Sepsis/Septic Shock (Adult) CPG).   Outcome: No Change  Patient cont on hi isabella then changed to bipap approx 12:30 due to dyspnea.  Received 1 unit pc this am.  Cont with cares.

## 2017-11-13 NOTE — PROGRESS NOTES
Respiratory Therapy    Patient seen resting in bed on BiPAP settings 14/8, rate 12, and FiO2 35% until 1030 and was placed on HFNC 30 LPM and 40-55%. Respiratory rate 18-24, breath sounds clear/diminished, and SpO2 98%. Patient will continue to receive Duoneb QID. RT to follow.    Sofi Robles  November 13, 2017, 4:43 PM

## 2017-11-13 NOTE — PROGRESS NOTES
Critical Care Progress Note      11/13/2017    Name: Sabina Figueroa MRN#: [id number]   Age: 42 year old YOB: 1975     Hsptl Day# 9  ICU DAY #9                 Problem List:   Active Problems:    Sepsis (H)  1. Acute respiratory failure, hypoxemic, with evidence of slow improvement over time but still requiring some degree of BIPAP support. Her FIO2 is 35% on 14/8. We will continue to titrate down/off antibiotics, high dose steroids, and diuretics, and titrate down her oxygen support.   2. Interstitial lung disease- continue high dose steroids.  3. Possible pneumonia- she has been on Zosyn for 9 days and Vanco for about 4; we will continue for a (likely) course of 2 weeks. Her cultures are negative and at this point, it would seem infection is less likely.   4. Volume overload- she just completed 6 doses of IV Lasix (20 mgmgs) and BUN/CR are quite elevated consistent with pre-renal state. We will hold off diuretics at present.   5. History of alcoholism  6. History of cirrhosis  7. Pre-renal azotemia- as above  8. Hyperglycemia   9. Nutrition- receiving most of nutrition via enteral nutrition  Overall, she remains acute and critical. 45 minutes of critical care time spent at bedside          Summary/Hospital Course:   She has been here for 11 days with acute respiratory failure, now slowly improving         Assessment and plan :     Sabina Figueroa IS a 42 year old female admitted on 11/4/2017 for acute respiratory failure .   I have personally reviewed the daily labs, imaging studies, cultures and discussed the case with referring physician and consulting physicians.   My assessment and plan by system for this patient is as follows:    Neurology/Psychiatry:   1. Compensated     Cardiovascular:   1.compensated     Pulmonary/Ventilator Management:   1. On BIPAP as discussed above     GI and Nutrition :   1. Enteral nutrition    Renal/Fluids/Electrolytes:   1. She appears pre-renal and will monitor  closely, as above    Infectious Disease:   1. She remains on antibiotics and will complete a 2 week s    Endocrine:   1. Insulin as needed     Hematology/Oncology:   1. She is receiving insulin to counterbalance the effects of nutrition and steroids     IV/Access:   1. TKO    ICU Prophylaxis:   1. DVT: Hep Subq/ LMWH/mechanical  2. VAP: HOB 30 degrees, chlorhexidine rinse  3. Stress Ulcer: PPI/H2 blocker  4. Restraints: Nonviolent soft two point restraints required and necessary for patient safety and continued cares and good effect as patient continues to pull at necessary lines, tubes despite education and distraction. Will readdress daily.   5. IV Access - central access required and necessary for continued patient cares  6. Feeding - enteral nutrition  7. Family Update: discussed with patient at bed side   8. Disposition - continue ICU care at present         Key goals for next 24 hours:   1. Titrate down BIPAP and oxygen as tolerated  2. Continue all other ICU care at present.   3.               Interim History:              Key Medications:       insulin glargine  35 Units Subcutaneous QAM     insulin aspart  8 Units Subcutaneous Q4H     protein modular  1 packet Per Feeding Tube Daily     methylPREDNISolone sodium succinate  20 mg Intravenous Q6H     vancomycin (VANCOCIN) IV  1,250 mg Intravenous Q12H     insulin aspart  1-10 Units Subcutaneous Q4H     atorvastatin (LIPITOR) tablet 20 mg  20 mg Oral or Feeding Tube At Bedtime     rifaximin  550 mg Oral or Feeding Tube BID     folic acid  1 mg Oral or Feeding Tube Daily     aspirin  81 mg Oral or Feeding Tube Daily     mirtazapine (REMERON) tablet 15 mg  15 mg Oral or Feeding Tube At Bedtime     pantoprazole  40 mg Per Feeding Tube Daily     lactulose  20 g Per Feeding Tube Daily     gabapentin  600 mg Per Feeding Tube TID     methocarbamol  500 mg Oral or Feeding Tube TID     FLUoxetine  40 mg Oral or Feeding Tube Daily     loratadine  10 mg Oral or Feeding  Tube Daily     sulfamethoxazole-trimethoprim  50 mL Oral 4x Daily     ipratropium - albuterol 0.5 mg/2.5 mg/3 mL  3 mL Nebulization 4x daily     buprenorphine HCl-naloxone HCl  3 Film Sublingual Daily     nicotine  1 patch Transdermal Daily     piperacillin-tazobactam  4.5 g Intravenous Q6H     nicotine   Transdermal Daily     nicotine   Transdermal Q8H     enoxaparin  40 mg Subcutaneous Q24H     sodium chloride (PF)  3 mL Intracatheter Q8H       NaCl 10 mL/hr at 11/13/17 0600     IV fluid REPLACEMENT ONLY                 Physical Examination:   Temp:  [97.4  F (36.3  C)-99  F (37.2  C)] 98.1  F (36.7  C)  Pulse:  [74] 74  Heart Rate:  [] 74  Resp:  [8-58] 14  BP: ()/(54-79) 83/57  FiO2 (%):  [30 %-50 %] 40 %  SpO2:  [82 %-100 %] 96 %    Intake/Output Summary (Last 24 hours) at 11/13/17 1155  Last data filed at 11/13/17 0800   Gross per 24 hour   Intake           2708.5 ml   Output             5360 ml   Net          -2651.5 ml     Wt Readings from Last 4 Encounters:   11/13/17 79.3 kg (174 lb 13.2 oz)   10/22/17 83.4 kg (183 lb 12.8 oz)   09/16/17 84.6 kg (186 lb 8.2 oz)   09/14/17 89.8 kg (197 lb 15.6 oz)     BP - Mean:  [62-86] 71  FiO2 (%): 40 %  Resp: 14    Recent Labs  Lab 11/10/17  0920 11/09/17  0800   PH 7.40  --    PCO2 60*  --    PO2 102  --    HCO3 37*  --    O2PER 80% 80%       GEN: no acute distress   HEENT: head ncat, sclera anicteric, OP patent, trachea midline   PULM: fine rales but otherwise mostly clear with good excursions   CV/COR: RRR S1S2 no gallop,  No rub, no murmur  ABD: soft nontender, mild obese  EXT:  Trace Edema   warm  NEURO: grossly intact; follows commands and moves all extremities   SKIN: no obvious rash; no cyanosis or mottling            Data:   All data and imaging reviewed     ROUTINE ICU LABS (Last four results)  CMP  Recent Labs  Lab 11/13/17  0539 11/12/17  0537 11/11/17  1040 11/11/17  0515 11/10/17  0520  11/08/17  0545   * 131*  --  131* 131*  < > 135    POTASSIUM 4.7 5.1 5.0 5.4* 4.7  < > 4.6   CHLORIDE 90* 91*  --  92* 92*  < > 103   CO2 34* 35*  --  34* 35*  < > 26   ANIONGAP 6 5  --  5 4  < >  --    * 221*  --  216* 218*  < > 161*   BUN 39* 41*  --  39* 29  < > 13   CR 1.13* 1.12*  --  1.04 0.93  < > 0.84   GFRESTIMATED 53* 53*  --  58* 66  < > 74   GFRESTBLACK 64 64  --  70 80  < > 90   VISHNU 8.1* 8.3*  --  8.1* 8.2*  < > 8.2*   MAG 2.2  --   --   --   --   --  2.3   PHOS 4.6*  --   --   --   --   --  4.0   PROTTOTAL 6.3* 6.0*  --   --  6.1*  --   --    ALBUMIN 2.6* 2.5*  --   --  2.7*  --   --    BILITOTAL 0.8 0.2  --   --  0.4  --   --    ALKPHOS 124 120  --   --  139  --   --    * 98*  --   --  69*  --   --    ALT 33 28  --   --  36  --   --    < > = values in this interval not displayed.  CBC  Recent Labs  Lab 11/13/17  0539 11/13/17  0056 11/12/17  1550 11/12/17  0537  11/11/17  0515 11/10/17  0520   WBC 6.4  --   --  4.0  --  5.2 3.9*   RBC 2.43*  --   --  1.90*  --  2.24* 2.63*   HGB 7.2* 7.7* 6.7* 5.8*  < > 6.7* 8.0*   HCT 21.6*  --   --  17.5*  --  20.9* 24.2*   MCV 89  --   --  92  --  93 92   MCH 29.6  --   --  30.5  --  29.9 30.4   MCHC 33.3  --   --  33.1  --  32.1 33.1   RDW 14.9  --   --  13.8  --  14.0 14.3   *  --   --  113*  --  103* 91*   < > = values in this interval not displayed.  INRNo lab results found in last 7 days.  Arterial Blood Gas  Recent Labs  Lab 11/10/17  0920 11/09/17  0800   PH 7.40  --    PCO2 60*  --    PO2 102  --    HCO3 37*  --    O2PER 80% 80%       All cultures:  No results for input(s): CULT in the last 168 hours.  Recent Results (from the past 24 hour(s))   XR Chest Port 1 View    Narrative    CHEST PORTABLE ONE VIEW November 13, 2017 6:19 AM     HISTORY: Hypoxia.    COMPARISON: 11/11/2017.      Impression    IMPRESSION: Diffuse micronodular and interstitial pulmonary opacities  do not appear significantly changed since prior. No significant  pleural effusion. No pneumothorax identified. Surgical  staple line in  the right lung again noted. Enteric tube remains in place.    MD Reshma CHAPIN MD    Billing: This patient is critically ill: Yes. Total critical care time today 45 min.

## 2017-11-13 NOTE — PROGRESS NOTES
RT Note:    Patient wore HFNC FiO2 50%, Flow 40LPM until bedtime when she was switched to BiPAP 14/8 and 40%. SpO2 has been in mid 90s.  Breath sounds clear and diminished. Patient will continue to receive Duoneb QID as ordered. RT will continue to monitor.    Micheline Shearer  November 13, 2017.5:40 AM

## 2017-11-13 NOTE — PROGRESS NOTES
[facility]  Palliative Care Progress Note  Text Page     Assessment & Plan   Recommendations:  1.Chronic low back pain/neuropathy-Currently well managed, continue suboxone, gabapentin.  -She has been on morphine here with concern now of sedation, this is unlikely given tolerance to opioids and current use of Suboxone.  Patient is alert and conversant for me and notes she sleeps when she is bored. However patient does shave some myoclonic twitching which she state is chronic, as morphine could be contributing, will DC Morphine.  No opioids for DC, but will order 1mg dilaudid solution PRN per feeding tube for comfort.  -Patient has been on Toradol PRN x 7 days.  Will DC. Start Ibuprofen PRN as she notes use at home.  2. Depression-Flares recently.  Prozac has been helpful.  Would like to re-establish care with mental health counselor and may need assistance with this.  3. Disease of Addiction- Given stated goals, further inquiry into chemical dependnacy treatment again, would be helpful.  4. Dry mouth- Patient asks if she can drink water.  We had discussed ICU policy of NPO if unable to remain off BiPap for 2 hours at a time due to risk of aspiration.  She is able to make her own choice, but the recommendation to be NPO is based on her desire to improve.  Biotene spray ordered PRN. Nursing reports she is choosing to drink despite noted risk.  Goal of Care:Full, Restorative.  If possible, patient would benefit from further discussion about her underlying disease and what it may mean for her life.  While she has been receiving care for some time it appears as if she was lost to follow-up after last hospitalization with biopsy. Plan for further serious illness conversation and advance care planning as able, she has not otherwise given much thought to this but would benefit from clarification of illness prior to further discussion.     Disease Process/es & Symptoms:  Sabina Figueroa is a 42 year old patient  admitted with symptoms of shortness of breath and fevers. She has been treated for sepsis thought most likely from a respiratory source with Hx of ALI superimposed on desquamative (smoking) pneumonitis and non-specific pneumonitis dgn by lucía bx completed in 9/2017, planning on further follow-up in clinic next month. She was discharged recently from [facility] with suspected PJP pna on steroids and tmp-sulfa, noted taking incorrectly and missing due to dui with [facility] time on 10/31.  She is currently requiring Bipap support when sleeping and High flow oxygen while awake.     This is in the setting of recently diagnosed chronic lung disease DIP and NSIP by wedge bx completed in 9/2017 .  This is her 9th admission to the hospital in the past year, most related to organizing pneumonia.  Her condition is complicated with the disease of addiction to alcohol.  She has had periods of sobriety after treatment but has had episodes of non-compliance related to drinking. She has a history of cirrhosis related to this. She is on suboxone for opioid addiction which she notes is well controlled.  In addition she smoke cigarettes and uses ENDS, lives with depression and anxiety. When she is not hospitalized and ill she notes high level of function and has weight loss or decline in function.     The following symptoms are noted by patient as concerning to her quality of life.  Pain - chronic low back pain, and neuropathy, well managed at current activity level, worse with higher activity.  Dyspnea - Denies, chronically, tolerating Bipap well.  Depressive symptoms - notes unexplained days of feeling blue, takes prozac which is helpful.  HAs not followed up with mental health practitioner recently.  xerostomia -Biggest complaint today while on Bipap.  We discussed the risk of aspiration with oral intake if unable to tolerate of Bipap > 2 hours as ICU policy.       Psychosocial/Spiritual Needs:  Patient reports she is not a Cheondoism or  spiritual person.  She finds alma in doing things to keep active like beading, spending time on-line and time with her daughter.  She has been looking forward to starting a new job as a  at [employer] and feels mildly anxious she may have missed on that opportunity due to hospitalization.       Decision-Making & Goals of Care:  Discussion/counseling today about goals of care/decisions:   11/10/2017:Met with Sabina at bedside who was able to communicate well despite being on Bipap.  She reports a relatively good understanding of her lung disease as incurable with risk of recurrent illness.  She is unable to tell me more details though and reports having to miss her last appointment with Dr. Mane who she has not seen since hospitalization with lung biopsy.  Her goals are very clear ly to improve enough to be able to start her new job at [employer], I am unclear if this is realistic or not.  We discussed her concerns as/if her disease continued to get worse and she had not given it much thought, nor had she considered if her lungs declined enough to require intubation and could not tolerate coming off if this would be a trade off she would want to make.  She does note she has shared the information that she does know with her daughter.     Patient has decision-making capacity Intact  Patient has no known legal document designating a decision maker. Per policy next of kin is the designated decision maker. See System Informed Consent policy for guidance. Spouse, Adult Children, Parent, Adult Siblings as she is not committed.     Patient has a completed health care directive available in the chart (Y/N): N  There is no POLST (Physician orders for life-sustaining treatment) form on file for this patient  Code Status:        Full code      Findings & plan of care discussed with: Nursing  Follow-up plan from palliative team: Will continue to follow for support in care planning and decision making.  Thank you  for involving us in the patient's care.     Grace WHYTE, CNS  Pain Management and Palliative Care  [facility]  Pgr: [phone]    Time Spent on this Encounter   I spent  15 minutes in assessment of the patient and discussion with the patient and family. Another 15 minutes in review of chart, documentation and discussion with the health care team.    Interval History   Patient notes she is comfortable.  Happy she has been able to be off Bipap on High flow oxygen while awake.    Review of Systems     Palliative Symptom Review (0=no symptom/no concern, 1=mild, 2=moderate, 3=severe):      Pain: 0-none      Fatigue: 1-mild      Nausea: 0-none      Constipation: 0-none      Diarrhea: 0-none      Depressive Symptoms: 0-none      Anxiety: 1-mild      Drowsiness: 0-none      Poor Appetite: 0-none, wishes to eat.      Shortness of Breath: 0-none      Insomnia: 0-none        Physical Exam   Temp:  [97.4  F (36.3  C)-99  F (37.2  C)] 98.1  F (36.7  C)  Pulse:  [74] 74  Heart Rate:  [] 74  Resp:  [8-58] 14  BP: ()/(51-79) 83/57  FiO2 (%):  [30 %-50 %] 50 %  SpO2:  [82 %-100 %] 98 %  174 lbs 13.2 oz  GEN:  Alert, oriented x 3, appears comfortable, NAD.  HEENT:  Normocephalic/atraumatic, no scleral icterus, no nasal discharge, mucous membranes dry.  CV:  RRR, S1, S2; no murmurs or other irregularities noted.  +3 DP/PT pulses bilatererally; no edema BLE.  RESP:  Diminished to auscultation bilaterally with wheezes at B bases.  Symmetric chest rise on inhalation noted.  Normal respiratory effort.  ABD:  Rounded, soft, non-tender/non-distended.  +BS  EXT:  Edema & pulses as noted above.  CMS intact x 4.     SKIN:  Dry to touch, no exanthems noted in the visualized areas.    NEURO:DAY. Sensation to touch intact all extremities.     PAIN BEHAVIOR: Cooperative  Psych:  Normal affect.  Calm, cooperative, conversant appropriately.    Medications     NaCl 10 mL/hr at 11/13/17 0600     IV fluid  REPLACEMENT ONLY         insulin glargine  35 Units Subcutaneous QAM     insulin aspart  8 Units Subcutaneous Q4H     methylPREDNISolone sodium succinate  20 mg Intravenous Q6H     vancomycin (VANCOCIN) IV  1,250 mg Intravenous Q12H     insulin aspart  1-10 Units Subcutaneous Q4H     atorvastatin (LIPITOR) tablet 20 mg  20 mg Oral or Feeding Tube At Bedtime     rifaximin  550 mg Oral or Feeding Tube BID     folic acid  1 mg Oral or Feeding Tube Daily     aspirin  81 mg Oral or Feeding Tube Daily     mirtazapine (REMERON) tablet 15 mg  15 mg Oral or Feeding Tube At Bedtime     pantoprazole  40 mg Per Feeding Tube Daily     lactulose  20 g Per Feeding Tube Daily     gabapentin  600 mg Per Feeding Tube TID     methocarbamol  500 mg Oral or Feeding Tube TID     FLUoxetine  40 mg Oral or Feeding Tube Daily     loratadine  10 mg Oral or Feeding Tube Daily     sulfamethoxazole-trimethoprim  50 mL Oral 4x Daily     ipratropium - albuterol 0.5 mg/2.5 mg/3 mL  3 mL Nebulization 4x daily     buprenorphine HCl-naloxone HCl  3 Film Sublingual Daily     nicotine  1 patch Transdermal Daily     piperacillin-tazobactam  4.5 g Intravenous Q6H     nicotine   Transdermal Daily     nicotine   Transdermal Q8H     enoxaparin  40 mg Subcutaneous Q24H     sodium chloride (PF)  3 mL Intracatheter Q8H       Data   Results for orders placed or performed during the hospital encounter of 11/04/17 (from the past 24 hour(s))   Glucose by meter   Result Value Ref Range    Glucose 222 (H) 70 - 99 mg/dL   Vancomycin level   Result Value Ref Range    Vancomycin Level 18.8 mg/L   Hemoglobin   Result Value Ref Range    Hemoglobin 6.7 (LL) 11.7 - 15.7 g/dL   Glucose by meter   Result Value Ref Range    Glucose 196 (H) 70 - 99 mg/dL   Glucose by meter   Result Value Ref Range    Glucose 286 (H) 70 - 99 mg/dL   Glucose by meter   Result Value Ref Range    Glucose 274 (H) 70 - 99 mg/dL   Hemoglobin   Result Value Ref Range    Hemoglobin 7.7 (L) 11.7 - 15.7  g/dL   Glucose by meter   Result Value Ref Range    Glucose 252 (H) 70 - 99 mg/dL   Magnesium   Result Value Ref Range    Magnesium 2.2 1.6 - 2.3 mg/dL   Phosphorus   Result Value Ref Range    Phosphorus 4.6 (H) 2.5 - 4.5 mg/dL   CBC with platelets differential   Result Value Ref Range    WBC 6.4 4.0 - 11.0 10e9/L    RBC Count 2.43 (L) 3.8 - 5.2 10e12/L    Hemoglobin 7.2 (L) 11.7 - 15.7 g/dL    Hematocrit 21.6 (L) 35.0 - 47.0 %    MCV 89 78 - 100 fl    MCH 29.6 26.5 - 33.0 pg    MCHC 33.3 31.5 - 36.5 g/dL    RDW 14.9 10.0 - 15.0 %    Platelet Count 135 (L) 150 - 450 10e9/L    Diff Method Manual Differential     % Neutrophils 77.0 %    % Lymphocytes 15.0 %    % Monocytes 1.0 %    % Eosinophils 0.0 %    % Basophils 0.0 %    % Metamyelocytes 4.0 %    % Myelocytes 3.0 %    Nucleated RBCs 1 (H) 0 /100    Absolute Neutrophil 4.9 1.6 - 8.3 10e9/L    Absolute Lymphocytes 1.0 0.8 - 5.3 10e9/L    Absolute Monocytes 0.1 0.0 - 1.3 10e9/L    Absolute Eosinophils 0.0 0.0 - 0.7 10e9/L    Absolute Basophils 0.0 0.0 - 0.2 10e9/L    Absolute Metamyelocytes 0.3 (H) 0 10e9/L    Absolute Myelocytes 0.2 (H) 0 10e9/L    Absolute Nucleated RBC 0.1     Polychromasia Slight     Hypochromasia Present     Platelet Estimate Decreased    Comprehensive metabolic panel   Result Value Ref Range    Sodium 130 (L) 133 - 144 mmol/L    Potassium 4.7 3.4 - 5.3 mmol/L    Chloride 90 (L) 94 - 109 mmol/L    Carbon Dioxide 34 (H) 20 - 32 mmol/L    Anion Gap 6 3 - 14 mmol/L    Glucose 232 (H) 70 - 99 mg/dL    Urea Nitrogen 39 (H) 7 - 30 mg/dL    Creatinine 1.13 (H) 0.52 - 1.04 mg/dL    GFR Estimate 53 (L) >60 mL/min/1.7m2    GFR Estimate If Black 64 >60 mL/min/1.7m2    Calcium 8.1 (L) 8.5 - 10.1 mg/dL    Bilirubin Total 0.8 0.2 - 1.3 mg/dL    Albumin 2.6 (L) 3.4 - 5.0 g/dL    Protein Total 6.3 (L) 6.8 - 8.8 g/dL    Alkaline Phosphatase 124 40 - 150 U/L    ALT 33 0 - 50 U/L     (H) 0 - 45 U/L   XR Chest Port 1 View    Narrative    CHEST PORTABLE ONE  VIEW November 13, 2017 6:19 AM     HISTORY: Hypoxia.    COMPARISON: 11/11/2017.      Impression    IMPRESSION: Diffuse micronodular and interstitial pulmonary opacities  do not appear significantly changed since prior. No significant  pleural effusion. No pneumothorax identified. Surgical staple line in  the right lung again noted. Enteric tube remains in place.    MAKENZIE TO MD   Glucose by meter   Result Value Ref Range    Glucose 215 (H) 70 - 99 mg/dL

## 2017-11-13 NOTE — PROGRESS NOTES
CLINICAL NUTRITION SERVICES - REASSESSMENT NOTE      Recommendations Ordered by Registered Dietitian (RD):   Isosource at 55 mL/hr x 24 hours  Fluid flush 60 mL q4 hours   Future/Additional Recommendations:   Cyclic EN pending goals of care     EVALUATION OF PROGRESS TOWARD GOALS/NEW FINDINGS   Progress towards goals will be monitored and evaluated per protocol and Practice Guidelines    Patient remains on TF as follows:  - NGT (coiled, 11/7)  - Isosource 1.5 @ goal rate of 50 mL/hr x 24 hours   - 1200 mLs provides 1800 kcal (27 kcal/kg), 82 g pro (1.2 g/kg), 316 g CHO, 27 g fiber, 912 mL free H2O.   - Meets 100% DRI  - Standard 60 ml water flushes q 4 hrs  11/10 --> 1200 mL  11/11 --> 850 mL  11/12 --> 1200 mL  3 day average: 1083 mL infused, >75% of estimated needs      Meds: folic acid 1 mg, Lantus 35 units qAM + 8 units q4 hours; Lactulose and Solumedrol    Labs: Na 130 (L), Cr 1.13 (H), K and Mg WNL, P04 4.6 (H); BG elevated >200  I/O last 3 completed shifts:  In: 3268.33 [P.O.:35; I.V.:570; NG/GT:870]    Out: 5315 [Urine:4315; Stool:1000]    Weight: 79.3 kg, down from peak of 11/8    Palliative: resume restorative cares    ASSESSED NUTRITION NEEDS (PER APPROVED PRACTICE GUIDELINES; DW: 66.1 kg AdjBW):  Estimated Energy Needs: 5251-3559 kcals (30-35 Kcal/Kg)  Justification: maintenance, increased need with acute illness  Estimated Protein Needs: 79-99+ grams protein (1.2-1.5 g pro/Kg)  Justification: preservation of LBM  Estimated Fluid Needs: >1 mL/Kcal  Justification: maintenance    Previous Goals:   EN to meet % needs daily.   Evaluation: Met    Previous Nutrition Diagnosis:   Inadequate enteral nutrition infusion related to frequent interruptions to enteral regimen as evidenced by patient meeting <75% goal total volume based on 2-day average intake.   Evaluation: Improving     CURRENT NUTRITION DIAGNOSIS  No nutrition diagnosis identified at this time as EN meets 100% of estimated  needs    INTERVENTIONS  Recommendations / Nutrition Prescription  Updated EN regimen:    Type of Feeding Tube: NG    Enteral Frequency:  Continuous    Enteral Regimen: Isosource at 55 mL/hr    Total Enteral Provisions: 1320 mL provides 1980 kcal (25 kcal per kg), 90 g protein (1.1 g per kg), 232 g CHO, 20 g fiber and 1003 mL H20.    Meets > 100% of DRI's. Additional 1 packet Prosource daily for an additional 40 kcal and 11 grams protein (101 grams, 1.3 g per kg)    Free Water Flush: 60 mL q4 hours or per MD      Implementation  EN Composition, EN Schedule and Feeding Tube Flush: Entered orders to reflect regimen outlined above  Collaboration and Referral of care: Discussed patient during interdisciplinary care rounds this morning    Goals  TF at goal rate to meet % of estimated needs while NPO      MONITORING AND EVALUATION:  Enteral intake - Monitor for adequacy, tolerance, stooling, labs  Nutrition focused physical findings - oxygen needs, POC  Progress towards goals will be monitored and evaluated per protocol and Practice Guidelines      Edyta Nobles RDN, LD, CNSC  3rd floor/ICU: [phone]  All other floors: [phone]  Weekend/holiday: [phone]  Office: [phone]

## 2017-11-14 NOTE — PLAN OF CARE
Problem: Sepsis/Septic Shock (Adult)  Goal: Signs and Symptoms of Listed Potential Problems Will be Absent, Minimized or Managed (Sepsis/Septic Shock)  Signs and symptoms of listed potential problems will be absent, minimized or managed by discharge/transition of care (reference Sepsis/Septic Shock (Adult) CPG).   Outcome: Declining  ICU End of Shift Summary.  For vital signs and complete assessments, please see documentation flowsheets.       Pertinent assessments: A&Ox4. VSS. Afebrile. desats with any minor activity, Bipap needs increased from 35% to 50% FIO2 at HS. On HFNC during the day. LS: dim/ Clear. KOROMA. +BS. Watters WDL in place good UOP. Skin bruised throughout. IV TKO to left arm.   Major Shift Events: rested throughout the night, received Dilaudid PRN for back pain with relief, Also Maalox given for heart burn  Plan (Upcoming Events): continue Pulmonary toileting, Steroids, Lasix & IV Abx, Follow up labs and Cx, wean off Bipap as able, SW consult at D/C  Discharge/Transfer Needs: continue ICU cares for now    Addendum: hgb this am 6.2. One unit or PRBC ordered. Awaiting blood bank release to be given. Continue to monitor pt closely.   Addendum: Blood transfusion started at 0651 this am

## 2017-11-14 NOTE — PROGRESS NOTES
Patient wore HFNC 50% and 35LPM until 10pm when she was switched to BiPAP 14/8 and 45% for the night. Patient will continue to receive Duoneb QID as ordered.  RT will continue to monitor.    Micheline Shearer  November 14, 2017.6:05 AM

## 2017-11-14 NOTE — PROGRESS NOTES
[facility]  Infectious Disease Progress Note          Assessment and Plan:   IMPRESSION:   1.  A 42-year-old female with biopsy-proven interstitial lung disease and several months of worsened respiratory status, on high-dose steroids further worsening respiratory status in the last month with increased infiltrates, question still primary interstitial lung disease versus new opportunistic infection versus new hospital-acquired infection at present.   2.  Recent respiratory failure at [facility], no clear new diagnosis made, improved with steroids increased, was also being treated as Pneumocystis pneumonia although data did not really support that diagnosis, did not continue proper treatment as an outpatient; if Pneumocystis not adequately treated to date.    3.  Interstitial lung disease, question other systemic component.   4.  History of alcoholic cirrhosis.   5.  Chronic tobacco abuse including E-cigarettes, some concern this might be some type of lung damage related to that.       RECOMMENDATIONS:   1.  Continue current broad spectrum antibiotics and also high dose Septra. Likely Dc vanco soon, day 11, prob stop at 14  2.  So far unremarkable serologies and noninvasive diagnostic tests to try to prove some infectious diagnosis.   3.  May eventually, if not improving, require further diagnostic intervention, obviously if she requires intubation  we should do a bronchoscopy.  4 LDH very elevated despite neg PCP 10/20 and other evidence against, continue to tx possible PJP, fugitel neg further evidence against this dx , still complete full 3 weeks        Interval History:   no new complaints but still very hypoxic, LDH 1042, procal 1.1  No + cxs no sputum productivity resp virus neg, procal 1.1               Medications:       insulin glargine  40 Units Subcutaneous QAM     protein modular  1 packet Per Feeding Tube Daily     insulin aspart  10 Units Subcutaneous Q4H      "methylPREDNISolone sodium succinate  20 mg Intravenous Q6H     vancomycin (VANCOCIN) IV  1,250 mg Intravenous Q12H     insulin aspart  1-10 Units Subcutaneous Q4H     atorvastatin (LIPITOR) tablet 20 mg  20 mg Oral or Feeding Tube At Bedtime     rifaximin  550 mg Oral or Feeding Tube BID     folic acid  1 mg Oral or Feeding Tube Daily     aspirin  81 mg Oral or Feeding Tube Daily     mirtazapine (REMERON) tablet 15 mg  15 mg Oral or Feeding Tube At Bedtime     pantoprazole  40 mg Per Feeding Tube Daily     lactulose  20 g Per Feeding Tube Daily     gabapentin  600 mg Per Feeding Tube TID     methocarbamol  500 mg Oral or Feeding Tube TID     FLUoxetine  40 mg Oral or Feeding Tube Daily     loratadine  10 mg Oral or Feeding Tube Daily     sulfamethoxazole-trimethoprim  50 mL Oral 4x Daily     ipratropium - albuterol 0.5 mg/2.5 mg/3 mL  3 mL Nebulization 4x daily     buprenorphine HCl-naloxone HCl  3 Film Sublingual Daily     nicotine  1 patch Transdermal Daily     piperacillin-tazobactam  4.5 g Intravenous Q6H     nicotine   Transdermal Daily     nicotine   Transdermal Q8H     sodium chloride (PF)  3 mL Intracatheter Q8H                  Physical Exam:   Blood pressure 99/71, pulse 74, temperature 98.1  F (36.7  C), temperature source Axillary, resp. rate 9, height 1.689 m (5' 6.5\"), weight 79.1 kg (174 lb 6.9 oz), SpO2 99 %, not currently breastfeeding.  Wt Readings from Last 2 Encounters:   11/14/17 79.1 kg (174 lb 6.9 oz)   10/22/17 83.4 kg (183 lb 12.8 oz)     Vital Signs with Ranges  Temp:  [98  F (36.7  C)-98.6  F (37  C)] 98.1  F (36.7  C)  Heart Rate:  [] 78  Resp:  [9-64] 9  BP: ()/(53-83) 99/71  FiO2 (%):  [35 %-55 %] 50 %  SpO2:  [81 %-100 %] 99 %    Constitutional: Awake, alert, cooperative, ongoing resp apparent distress   Lungs: Clear to auscultation bilaterally, no crackles or wheezing on high flow O2   Cardiovascular: Regular rate and rhythm, normal S1 and S2, and no murmur noted "   Abdomen: Normal bowel sounds, soft, non-distended, non-tender   Skin: No rashes, no cyanosis, no edema   Other:           Data:   All microbiology laboratory data reviewed.  Recent Labs   Lab Test  11/14/17   0525 11/13/17   0539  11/13/17   0056   11/12/17   0537   WBC  5.9  6.4   --    --   4.0   HGB  6.2*  7.2*  7.7*   < >  5.8*   HCT  18.6*  21.6*   --    --   17.5*   MCV  89  89   --    --   92   PLT  129*  135*   --    --   113*    < > = values in this interval not displayed.     Recent Labs   Lab Test  11/14/17   0525 11/13/17   0539  11/12/17 0537   CR  1.02  1.13*  1.12*     Recent Labs   Lab Test  10/19/17   0503   SED  23*     Recent Labs   Lab Test  11/04/17   0343  11/04/17   0107  11/04/17   0050  10/20/17   0950  10/19/17   0517  10/19/17   0054  10/17/17   2143  10/17/17   2020  10/17/17   2015   CULT  No growth  No growth  No growth  Moderate growth  Coagulase negative Staphylococcus  Susceptibility testing not routinely done  *  Moderate growth  Candida albicans / dubliniensis  Candida albicans and Candida dubliniensis are not routinely speciated  Susceptibility testing not routinely done  *  Canceled, Test credited  >10 Squamous epithelial cells/low power field indicates oral contamination. Please   recollect.  *  Notification of test cancellation was given to  Elena Araujo RN from [facility]. 10.19.17 at 0753. GR.    Canceled, Test credited  >10 Squamous epithelial cells/low power field indicates oral contamination. Please   recollect.  *  Notification of test cancellation was given to  Nelsy Tesfaye RN [facility] @0246. 10/19/17. SCG    No growth  No growth  No growth

## 2017-11-14 NOTE — PROGRESS NOTES
Patient referred for Healing Touch for pain and anxiety.   Healing Touch explained to patient and patient consented to HT session.  Pain pre session 9/10 anxiety pre session 8/10.  Performed Healing Touch with music patient went into deep relaxing sleep.  Unable to assess pain and anxiety as patient is sleeping.  Time spent with patient 45 minutes.  Ayush Back RN, BSN, BC

## 2017-11-14 NOTE — PROGRESS NOTES
[facility]    Hospitalist Progress Note  Name: Sabina Figueroa    MRN: [id number]  Provider:  Anatoliy Dos Santos DO, MPH  Date of Service: 11/14/2017    Summary of Stay: Sabina Figueroa is a 42 year old female with a history of ILD (diagnosed by wedge lung bx in 9/17 at [facility]-acute lung injury thought 2/2 e-cigarettes in setting of DIP and NSIP by surg path)-at that hospitalization she was discharged on high dose steroids with PCP ppx, she then presented to the [facility] and hospitalized 10/18-10/23 for recurrent respiratory failure at which time it was felt due to PCP pna as she had not been taking her tmp-sulfa ppx.  She was discharged on high dose steroids taper and therapeutic tmp-sulfa dosing for PCP with sats 97 % range on RA and had arranged f/u with Dr. Mane ( to f/u vasculitic w/u).  She returns to our ER and admitted on 11/4/2017 with e/o recurrent hypoxic respiratory failure again/fever/KAITLIN to 102/tachycardia/leukocytosis and lactic acidosis to 6.3 consistent with sepsis in the setting of non-compliance/confusion with medications (only taking ppx dosing of tmp-sulfa), and missing of all medications due to being picked up for DUI (drank on 2 separate occassions but prior to that was sober since 5/11/17) and being held in [facility].     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  Overnight into 11/5 had increasing O2 requirements, worsening tachypnea - CT PE protocol completed and shows no PE but significant worsening of bilateral pulmonary infiltrates     Problem List:   1.  Sepsis: Presumably from respiratory source. CXR with basilar infiltrates. CT showed diffuse infltrates. Respiratory panel negative. Lactic acidosis resolved with IVF. She is afebrile with negative cultures.  -- Continue broad spectrum abx with vancomycin, zosyn, and Bactrim per ID    2.  Acute hypoxic  respiratory failure: Complicated pulmonary situation/history. 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 Had prolonged hospitalization at [facility] for suspected PCP PNA, was discharged on steroid/therapeutic Bactrim and was non-compliant with medications due to recent DUI. She was in custodial for 2 days and misunderstanding of Bactrim dosing. Patient was given broad spectrum abx with azith/zosyn/vanco in ER. Has continued now on zosyn/vanco and high dose Bactrim for suspected ongoing/inadequately treated PCP and high risk for alternative respiratory infection due to immunosuppression with steroids. She has been nearly BiPAP dependent since admission, only coming off to HFNC for short periods.  -- Continue abx  -- On IV steroid Solu-Medrol to 20 mg q6h and scheduled DuoNeb's  -- Stop IV lasix today  -- Monitor daily weight and intake and out put monitor      3.  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  -- Case discussed with intensivist (Dr Charles)   -- Not lung transplant candidate.       4.  Non-compliance with medications: Will require case-worker at discharge to help mitigate suspected social issues/med compliance.    5.  Fever:  Suspected respiratory source:  UA negative, BCx NGTD. At risk for opportunistic infections in setting of steroids.  Antibiotics as above. No further recent fevers.     6.  KAITLIN:  Baseline creatinine normal. Renal function normalized after IVF. Renal function largely stable.    7.  Cirrhosis:  Chronically on spironolactone/furosemide/lactulose/rifaximin. LFTs currently with minimally elevated AST, bili normal. NH3 normal and on review of chart not significantly elevated except 2 prior occasions. PO Lasix and spironolactone has been placed on hold and was started IV Lasix. No significant ascites on exam.  -- IV lasix on hold today  -- Continue lactulose and rifaximin  -- Consider restarting PTA  lasix/spironolactone today or tomorrow     8.  Steroid induced DM: Uncontrolled blood glucose for many days. Patient becoming more hyperglycemic with tube feeds and stress dose IV steroids.   -- Increase glargine to 40 U daily and keep novolog 8 units q4h   -- High intensity SSI  -- Humilin insulin prescribed at last hospitalization not covered by insurance     9.  Narcotic dependence:  Continue suboxone as at home.  -- Pain team following  -- Continue suboxone and low dose PRN dilaudid     10.  Suspected  Underlying vasculitic/autoimmune process at play:  AMISHA by IFA borderline positive, and cytoplasmic IgG negative.  Outpatient pulmonary/rheumatology follow-up at discharge.    11.  Nicotine dependence with suspected ALI 2/2 e cigarettes: Continues to smoke despite nicotine replacement. ATQ this admission     12.  Anemia: Progressive decline in Hb, no bleeding noted. Was 13.8 on adm then down to 5.8 before receiving 2 U PRBC 11/12. More specifically, had 2.5 g drop in Hb over 2 day period. Now again down to 6.7. Etiology unclear. She denies black/red/tarry stools. Only mild bruising at site of lovenox injections.  -- Daily CBC  -- Monitor for bleeding   -- Stopped Lovenox  -- 1 U PRBC today  -- Will check Fe studies, B12/folate, haptoglobin, blood smear, reticulocyte count  -- Smear and Fe studies likely inaccurate due to recent transfusion  -- If further unexplained drop may need CT CAP and/or Heme consult  -- Transfuse for Hb <7     13.  Severe protein calorie malnutrition:  Had TF placed and RD managing TF.  -- Could try some limited PO today if tolerates HFNC    14.  Prerenal azotemia: Slight improvement in creatinine.  -- No NSAIDs or nephrotoxins    DVT Prophylaxis: Enoxaparin (Lovenox) SQ stopped for now, PCDs  Code Status: Full Code  Disposition: Expected discharge in 4+ days to TBD. Goals prior to discharge include continue aggressive pulmonary cares as outlined above. Keep in ICU today and likely several  more days.   Family updated today: No     Interval History   Assumed care from previous hospitalist. The history was fully reviewed.  The patient reports doing well. No chest pain or shortness of breath. No nausea, vomiting, diarrhea, constipation. No fevers. No other specific complaints identified.     -Data reviewed today: I reviewed all new labs and imaging results over the last 24 hours.     Physical Exam   Temp: 98.1  F (36.7  C) Temp src: Axillary BP: 99/71   Heart Rate: 78 Resp: 9 SpO2: 99 % O2 Device: High Flow Nasal Cannula (HFNC) Oxygen Delivery: Other (Comments) (30lpm)  Vitals:    11/12/17 0600 11/13/17 0400 11/14/17 0400   Weight: 80.1 kg (176 lb 10.1 oz) 79.3 kg (174 lb 13.2 oz) 79.1 kg (174 lb 6.9 oz)     Vital Signs with Ranges  Temp:  [98  F (36.7  C)-98.6  F (37  C)] 98.1  F (36.7  C)  Heart Rate:  [] 78  Resp:  [9-64] 9  BP: ()/(53-83) 99/71  FiO2 (%):  [35 %-55 %] 50 %  SpO2:  [81 %-100 %] 99 %  I/O last 3 completed shifts:  In: 2605 [I.V.:650; NG/GT:680]  Out: 3125 [Urine:3125]    GENERAL: No apparent distress. Awake, alert, and fully oriented.  HEENT: Normocephalic, atraumatic. Extraocular movements intact.  CARDIOVASCULAR: Regular rate and rhythm without murmurs or rubs. No S3.  PULMONARY: Coarse bilaterally.  GASTROINTESTINAL: Soft, non-tender, non-distended. Bowel sounds normoactive.   EXTREMITIES: No cyanosis or clubbing. Trace BL LE edema.  NEUROLOGICAL: CN 2-12 grossly intact, no focal neurological deficits.  DERMATOLOGICAL: No rash, ulcer, bruising, nor jaundice.     Medications     NaCl 10 mL/hr at 11/14/17 0700     IV fluid REPLACEMENT ONLY         insulin glargine  40 Units Subcutaneous QAM     protein modular  1 packet Per Feeding Tube Daily     insulin aspart  10 Units Subcutaneous Q4H     methylPREDNISolone sodium succinate  20 mg Intravenous Q6H     vancomycin (VANCOCIN) IV  1,250 mg Intravenous Q12H     insulin aspart  1-10 Units Subcutaneous Q4H     atorvastatin  (LIPITOR) tablet 20 mg  20 mg Oral or Feeding Tube At Bedtime     rifaximin  550 mg Oral or Feeding Tube BID     folic acid  1 mg Oral or Feeding Tube Daily     aspirin  81 mg Oral or Feeding Tube Daily     mirtazapine (REMERON) tablet 15 mg  15 mg Oral or Feeding Tube At Bedtime     pantoprazole  40 mg Per Feeding Tube Daily     lactulose  20 g Per Feeding Tube Daily     gabapentin  600 mg Per Feeding Tube TID     methocarbamol  500 mg Oral or Feeding Tube TID     FLUoxetine  40 mg Oral or Feeding Tube Daily     loratadine  10 mg Oral or Feeding Tube Daily     sulfamethoxazole-trimethoprim  50 mL Oral 4x Daily     ipratropium - albuterol 0.5 mg/2.5 mg/3 mL  3 mL Nebulization 4x daily     buprenorphine HCl-naloxone HCl  3 Film Sublingual Daily     nicotine  1 patch Transdermal Daily     piperacillin-tazobactam  4.5 g Intravenous Q6H     nicotine   Transdermal Daily     nicotine   Transdermal Q8H     sodium chloride (PF)  3 mL Intracatheter Q8H     Data     Laboratory:    Recent Labs  Lab 11/14/17  0525 11/13/17  0539 11/13/17  0056  11/12/17  0537   WBC 5.9 6.4  --   --  4.0   HGB 6.2* 7.2* 7.7*  < > 5.8*   HCT 18.6* 21.6*  --   --  17.5*   MCV 89 89  --   --  92   * 135*  --   --  113*   < > = values in this interval not displayed.    Recent Labs  Lab 11/14/17  0525 11/13/17  0539 11/12/17  0537   * 130* 131*   POTASSIUM 4.6 4.7 5.1   CHLORIDE 93* 90* 91*   CO2 32 34* 35*   ANIONGAP 6 6 5   * 232* 221*   BUN 32* 39* 41*   CR 1.02 1.13* 1.12*   GFRESTIMATED 59* 53* 53*   GFRESTBLACK 72 64 64   VISHNU 8.2* 8.1* 8.3*     No results for input(s): CULT in the last 168 hours.    Imaging:  No results found for this or any previous visit (from the past 24 hour(s)).      Anatoliy Dos Santos DO MPH  [facility] Hospitalist  [address] [place] [address]  Pager: [phone]  11/14/2017

## 2017-11-15 NOTE — PLAN OF CARE
Problem: Patient Care Overview  Goal: Plan of Care/Patient Progress Review  ICU End of Shift Summary.  For vital signs and complete assessments, please see documentation flowsheets.      Pertinent assessments: AOX4, frequently drowsy, Tele SR, LS dim on HFNC, KOROMA with minimal positioning and with activity,GI/-had 1 liquid BM brown in color, Watters 100 ml/hr , pt educated to maintain strict NPO due to a possible need for BIPAP but pt is non-complaint asking for ice chips. Pain well controled with prn dilaudid Q 3 hrs.  Major Shift Events: 1 unit PRBC completed  Plan (Upcoming Events): monitor resp status, trend labs, monitor for stool color and for obvious bleeding.  Discharge/Transfer Needs: TBD     Bedside Shift Report Completed : yes  Bedside Safety Check Completed: yes

## 2017-11-15 NOTE — PLAN OF CARE
Problem: Patient Care Overview  Goal: Plan of Care/Patient Progress Review  PT: Received orders for eval and treat.  Patient not medically appropriate for physical therapy; Hgb 6.3 and patient getting transfused, patient on BiPAP.  Will reschedule for tomorrow and reassess appropriateness at that time.

## 2017-11-15 NOTE — PROGRESS NOTES
Patient continue to remain on the Bipap throughout the day tolerating it fairly well. sats remained mid to high 90s on 45%. BS diminished. Inline neb given. Will continue to monitor patient's progress and assess patient's respiratory status.     Matthew Cueva  November 15, 2017

## 2017-11-15 NOTE — PROGRESS NOTES
[facility]    Hospitalist Progress Note  Name: Sabina Figueroa    MRN: [id number]  Provider:  Anatoliy Dos Santos DO, MPH  Date of Service: 11/15/2017    Summary of Stay: Sabina Figueroa is a 42 year old female with a history of ILD (diagnosed by wedge lung bx in 9/17 at [facility]-acute lung injury thought 2/2 e-cigarettes in setting of DIP and NSIP by surg path)-at that hospitalization she was discharged on high dose steroids with PCP ppx, she then presented to the [facility] and hospitalized 10/18-10/23 for recurrent respiratory failure at which time it was felt due to PCP pna as she had not been taking her tmp-sulfa ppx.  She was discharged on high dose steroids taper and therapeutic tmp-sulfa dosing for PCP with sats 97 % range on RA and had arranged f/u with Dr. Mane ( to f/u vasculitic w/u).  She returns to our ER and admitted on 11/4/2017 with e/o recurrent hypoxic respiratory failure again/fever/KAITLIN to 102/tachycardia/leukocytosis and lactic acidosis to 6.3 consistent with sepsis in the setting of non-compliance/confusion with medications (only taking ppx dosing of tmp-sulfa), and missing of all medications due to being picked up for DUI (drank on 2 separate occassions but prior to that was sober since 5/11/17) and being held in senior living.     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  Overnight into 11/5 had increasing O2 requirements, worsening tachypnea - CT PE protocol completed and shows no PE but significant worsening of bilateral pulmonary infiltrates     Problem List:   1.  Sepsis: Presumably from respiratory source. CXR with basilar infiltrates. CT showed diffuse infltrates. Respiratory panel negative. Lactic acidosis resolved with IVF. She is afebrile with negative cultures.  -- Continue broad spectrum abx with vancomycin, zosyn, and Bactrim per ID    2.  Acute hypoxic  respiratory failure: Complicated pulmonary situation/history. 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 Had prolonged hospitalization at [facility] for suspected PCP PNA, was discharged on steroid/therapeutic Bactrim and was non-compliant with medications due to recent DUI. She was in [facility] for 2 days and misunderstanding of Bactrim dosing. Patient was given broad spectrum abx with azith/zosyn/vanco in ER. Has continued now on zosyn/vanco and high dose Bactrim for suspected ongoing/inadequately treated PCP and high risk for alternative respiratory infection due to immunosuppression with steroids. She has been nearly BiPAP dependent since admission, only coming off to [facility] for short periods.  -- Discussed with Dr Patel, he will determine if abx need to be continued due to concern for med related anemia  -- On IV steroid Solu-Medrol 20 mg q6h and scheduled DuoNeb's  -- Off IV lasix now  -- Monitor daily weight and intake/output      3.  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  -- Case discussed with intensivist (Dr Charles)   -- Not lung transplant candidate.       4.  Non-compliance with medications: Will require case-worker at discharge to help mitigate suspected social issues/med compliance.    5.  Fever:  Suspected respiratory source:  UA negative, BCx NGTD. At risk for opportunistic infections in setting of steroids.  Antibiotics as above. No further recent fevers.     6.  KAITLIN:  Baseline creatinine normal. Renal function normalized after IVF. Renal function largely stable.    7.  Cirrhosis:  Chronically on spironolactone/furosemide/lactulose/rifaximin. LFTs currently with minimally elevated AST, bili normal. NH3 normal and on review of chart not significantly elevated except 2 prior occasions. PO Lasix and spironolactone has been placed on hold and was started IV Lasix. No significant ascites on exam.  -- IV lasix on hold  currently  -- Continue lactulose and rifaximin  -- Restart PTA lasix/spironolactone     8.  Steroid induced DM: Uncontrolled blood glucose for many days. Patient becoming more hyperglycemic with tube feeds and stress dose IV steroids.   -- Keep novolog 10 units q4h   -- High intensity SSI  -- Changing to cyclic tube feeds so stop lantus and start NPH 20 U qAM and 25 U qPM  -- Humilin insulin prescribed at last hospitalization not covered by insurance     9.  Narcotic dependence:  Continue suboxone as at home.  -- Pain team following  -- Continue suboxone and low dose PRN dilaudid     10.  Suspected  Underlying vasculitic/autoimmune process at play:  AMISHA by IFA borderline positive, and cytoplasmic IgG negative.  Outpatient pulmonary/rheumatology follow-up at discharge.    11.  Nicotine dependence with suspected ALI 2/2 e cigarettes: Continues to smoke despite nicotine replacement. ATQ this admission     12.  Anemia: Progressive decline in Hb, no bleeding noted. Was 13.8 on adm then down to 5.8 before receiving 2 U PRBC 11/12. Has now required transfusions past 2 days. Hb this AM 6.3. She denies black/red/tarry stools. Only mild bruising at site of lovenox injections. Etiology unclear. Discussed with hematology and ddx is bleeding, hemolysis, medication, BM process. Mostly likely explanation is meds (specifically Bactrim, zosyn, rifaximin).  -- Daily CBC  -- Monitor for bleeding   -- Stopped Lovenox  -- 2 U PRBC today  -- Ordered Fe studies, B12/folate, haptoglobin, blood smear, reticulocyte count  -- Smear and Fe studies likely somewhat inaccurate due to recent transfusion  -- Appreciate hematology consult  -- Discussed with Dr Patel who will adjust abx based on potential cause of anemia  -- Transfuse for Hb <7     13.  Severe protein calorie malnutrition:  Had TF placed and RD managing TF.  -- Unable to tolerate PO due to respiratory issues  -- RD to cycle TF starting tomorrow night, needs insulin adjustment as a  result    14.  Prerenal azotemia: Slight improvement in creatinine.  -- No NSAIDs or nephrotoxins    DVT Prophylaxis: Enoxaparin (Lovenox) SQ stopped for now, PCDs  Code Status: Full Code  Disposition: Expected discharge in 4+ days to D. Goals prior to discharge include continue aggressive pulmonary cares as outlined above. Keep in ICU today and likely several more days.   Family updated today: No     Interval History   The patient reports doing well. No chest pain or shortness of breath. No nausea, vomiting, diarrhea, constipation. No fevers. No other specific complaints identified.     -Data reviewed today: I reviewed all new labs and imaging results over the last 24 hours.     Physical Exam   Temp: 97.9  F (36.6  C) Temp src: Axillary BP: 108/67   Heart Rate: 80 Resp: 14 SpO2: 93 % O2 Device: BiPAP/CPAP Oxygen Delivery: Other (Comments) (30 LPM)  Vitals:    11/13/17 0400 11/14/17 0400 11/15/17 0400   Weight: 79.3 kg (174 lb 13.2 oz) 79.1 kg (174 lb 6.9 oz) 79 kg (174 lb 3.3 oz)     Vital Signs with Ranges  Temp:  [97.9  F (36.6  C)-98.6  F (37  C)] 97.9  F (36.6  C)  Heart Rate:  [] 80  Resp:  [8-54] 14  BP: ()/(41-87) 108/67  FiO2 (%):  [40 %-50 %] 45 %  SpO2:  [70 %-100 %] 93 %  I/O last 3 completed shifts:  In: 3250 [P.O.:240; I.V.:890; NG/GT:800]  Out: 2600 [Urine:2600]    GENERAL: No apparent distress. Awake, alert, and fully oriented.  HEENT: Normocephalic, atraumatic. Extraocular movements intact.  CARDIOVASCULAR: Regular rate and rhythm without murmurs or rubs. No S3.  PULMONARY: Coarse bilaterally.  GASTROINTESTINAL: Soft, non-tender, non-distended. Bowel sounds normoactive.   EXTREMITIES: No cyanosis or clubbing. Trace BL LE edema.  NEUROLOGICAL: CN 2-12 grossly intact, no focal neurological deficits.  DERMATOLOGICAL: No rash, ulcer, bruising, nor jaundice.     Medications     NaCl 10 mL/hr at 11/15/17 0700     IV fluid REPLACEMENT ONLY         vancomycin (VANCOCIN) IV  1,250 mg  Intravenous Q12H     insulin glargine  45 Units Subcutaneous QAM     protein modular  1 packet Per Feeding Tube Daily     insulin aspart  10 Units Subcutaneous Q4H     methylPREDNISolone sodium succinate  20 mg Intravenous Q6H     insulin aspart  1-10 Units Subcutaneous Q4H     atorvastatin (LIPITOR) tablet 20 mg  20 mg Oral or Feeding Tube At Bedtime     rifaximin  550 mg Oral or Feeding Tube BID     folic acid  1 mg Oral or Feeding Tube Daily     aspirin  81 mg Oral or Feeding Tube Daily     mirtazapine (REMERON) tablet 15 mg  15 mg Oral or Feeding Tube At Bedtime     pantoprazole  40 mg Per Feeding Tube Daily     lactulose  20 g Per Feeding Tube Daily     gabapentin  600 mg Per Feeding Tube TID     methocarbamol  500 mg Oral or Feeding Tube TID     FLUoxetine  40 mg Oral or Feeding Tube Daily     loratadine  10 mg Oral or Feeding Tube Daily     sulfamethoxazole-trimethoprim  50 mL Oral 4x Daily     ipratropium - albuterol 0.5 mg/2.5 mg/3 mL  3 mL Nebulization 4x daily     buprenorphine HCl-naloxone HCl  3 Film Sublingual Daily     nicotine  1 patch Transdermal Daily     piperacillin-tazobactam  4.5 g Intravenous Q6H     nicotine   Transdermal Daily     nicotine   Transdermal Q8H     sodium chloride (PF)  3 mL Intracatheter Q8H     Data     Laboratory:    Recent Labs  Lab 11/15/17  0540 11/14/17  0525 11/13/17  0539   WBC 6.4 5.9 6.4   HGB 6.3* 6.2* 7.2*   HCT 19.2* 18.6* 21.6*   MCV 91 89 89   * 129* 135*       Recent Labs  Lab 11/15/17  0540 11/14/17  0525 11/13/17  0539   * 131* 130*   POTASSIUM 4.3 4.6 4.7   CHLORIDE 97 93* 90*   CO2 30 32 34*   ANIONGAP 5 6 6   * 171* 232*   BUN 20 32* 39*   CR 0.92 1.02 1.13*   GFRESTIMATED 67 59* 53*   GFRESTBLACK 81 72 64   VISHNU 8.0* 8.2* 8.1*     No results for input(s): CULT in the last 168 hours.    Imaging:  No results found for this or any previous visit (from the past 24 hour(s)).      Anatoliy Dos Santos DO MPH  Critical access hospital Hospitalist  [address] [place] [address]  Pager: [phone]  11/15/2017

## 2017-11-15 NOTE — PHARMACY-VANCOMYCIN DOSING SERVICE
Pharmacy Vancomycin Note  Date of Service November 15, 2017  Patient's  1975   42 year old, female    Indication: Sepsis  Goal Trough Level: 15-20 mg/L  Day of Therapy: 12  Current Vancomycin regimen:  1250 mg IV q12h    Current estimated CrCl = Estimated Creatinine Clearance: 76.2 mL/min (based on Cr of 1.02).    Creatinine for last 3 days  2017:  5:37 AM Creatinine 1.12 mg/dL  2017:  5:39 AM Creatinine 1.13 mg/dL  2017:  5:25 AM Creatinine 1.02 mg/dL    Recent Vancomycin Levels (past 3 days)  2017:  1:23 PM Vancomycin Level 18.8 mg/L  11/15/2017: 12:16 AM Vancomycin Level 21.6 mg/L    Vancomycin IV Administrations (past 72 hours)                   vancomycin 1250 mg in 0.9% NaCl 250 mL PREMIX (mg) 1,250 mg New Bag 17 1339     1,250 mg New Bag  0201     1,250 mg New Bag 17 1324     1,250 mg New Bag  0045     1,250 mg New Bag 17 1344     1,250 mg New Bag  0157                Nephrotoxins and other renal medications (Future)    Start     Dose/Rate Route Frequency Ordered Stop    11/15/17 0400  vancomycin 1250 mg in 0.9% NaCl 250 mL PREMIX      1,250 mg  166.7 mL/hr over 90 Minutes Intravenous EVERY 12 HOURS 11/15/17 0132      17 0800  piperacillin-tazobactam (ZOSYN) intermittent infusion 4.5 g     Comments:  Pharmacy can adjust dose based on renal function.    4.5 g  200 mL/hr over 30 Minutes Intravenous EVERY 6 HOURS 17 0356               Contrast Orders - past 72 hours     None          Interpretation of levels and current regimen:  Trough level is slight supratherpeutic ( will time next dose out a few hours to lower trough to 15-20)    Has serum creatinine changed > 50% in last 72 hours: No      Renal Function: Stable    Plan:  1.  Continue Current Dose, will time next dose out few hours out to let trough drop into 15-20 range  2.  Pharmacy will check trough levels as appropriate in 1-3 Days.    3. Serum creatinine levels will be ordered daily for the  first week of therapy and at least twice weekly for subsequent weeks.      Anatoliy Holt        .

## 2017-11-15 NOTE — CONSULTS
Hematology / Oncology Consultation      Sabina Figueroa MRN# [id number]   YOB: 1975 Age: 42 year old   Date of Admission: 11/4/2017     Reason for consult: I was asked by Dr. Dos Santos to evaluate this patient for worsening anemia.           Assessment and Plan:   #1 Acute onset of anemia  #2 Mild anemia of chronic disease at baseline  #3 Mild thrombocytopenia, stable  #4 ILD exacerbation    It was my pleasure to see the patient in the hospital today.  Her Hgb was normal as recently as 10 days ago, when she was admitted to the hospital.  Since then, it has quickly decreased to the 5-6 range.  Her Hgb was 6.3 this morning, for which she is receiving 2 units of pRBCs.    Iron studies were unremarkable, except for an elevated ferritin, which is an inflammatory marker, and is also consistent with her mild anemia of chronic disease at baseline.  LDH is elevated to 1042, but reticulocyte count is low, and there is no evidence of hemolysis on the peripheral smear.    For the hemoglobin to decrease this rapidly, it must be due to blood loss, hemolysis, an acute bone marrow disorder such as acute leukemia, or a medication.  The patient currently reports no bleeding.  The low reticulocyte count and the lack of polychromasia, spherocytes, and schistocytes on the peripheral smear are not consistent with hemolysis, but we will complete the hemolysis workup with additional studies to rule it out.  Although the peripheral blood smear did show a leukoerythroblastic picture, my suspicion for an acute bone marrow disorder such as acute leukemia is very low, given the lack of dysplasia or blasts.  I do not think a bone marrow biopsy is warranted.    Therefore, I think it is very likely that the patient's rapid decrease in hemoglobin is medication induced.  Since admission to the hospital, she has been on multiple antibiotics including vancomycin, Zosyn, and high-dose Bactrim for presumed PCP pneumonia.  Both Zosyn and  Bactrim can potentially cause anemia.  The patient also takes rifaximin for her cirrhosis.  This is a medication that can also cause anemia, but she has been on this medication for the last 2 years, without any issues.    Therefore, I suspect her anemia will improve once she is off some of the antibiotics.  I discussed my recommendations with Dr. Dos Santos.    Carlos Truong M.D.  [facility]  [phone]             Chief Complaint:   Worsening anemia in patient currently hospitalized for ILD exacerbation         History of Present Illness:   This is a 42-year-old lady who is currently hospitalized for an exacerbation of her interstitial lung disease.  She also has a history of alcoholic cirrhosis.  I was asked by the primary team to see the patient for worsening anemia during her current hospital stay.  Of note, when the patient was initially admitted to the hospital on November 3, 2017, her hemoglobin was within normal limits.  She has a long history of mild thrombocytopenia likely related to her alcoholic cirrhosis and ILD.    Since admission to the hospital, she has been started on multiple antibiotics, including vancomycin, Zosyn, and high-dose Bactrim for presumed PCP, even though previous testing was negative.  Her hemoglobin quickly decreased to the 5-6 range from a normal baseline.  Iron studies were unremarkable, except for an elevated ferritin.  Peripheral smear showed a leukoerythroblastic picture, with no evidence of hemolysis.  LDH was elevated at 1042.  Her white blood cell count and platelet count are stable.  She does not report any bleeding or bruising.            Past Medical History:     Past Medical History:   Diagnosis Date     ALC (alcoholic liver cirrhosis) (H)      Alcohol abuse      Anxiety      Arthritis      Ascites      Asthma      Bunion, left foot      Chronic low back pain     Narcotic agreement signed in [facility] system     Depression      Hypertension     current     ILD (interstitial  lung disease) (H)      Ovarian cyst, left              Past Surgical History:     Past Surgical History:   Procedure Laterality Date     BRONCHOSCOPY FLEXIBLE N/A 1/10/2017    Procedure: BRONCHOSCOPY FLEXIBLE;  Surgeon: Jennifer Mane MD;  Location:  GI     BRONCHOSCOPY FLEXIBLE N/A 2017    Procedure: BRONCHOSCOPY FLEXIBLE;  Surgeon: Jennifer Mane MD;  Location:  OR      SECTION       ESOPHAGOSCOPY, GASTROSCOPY, DUODENOSCOPY (EGD), COMBINED  2014    Procedure: COMBINED ESOPHAGOSCOPY, GASTROSCOPY, DUODENOSCOPY (EGD);  Surgeon: Brittany Lowe MD;  Location:  GI     ESOPHAGOSCOPY, GASTROSCOPY, DUODENOSCOPY (EGD), COMBINED N/A 2015    Procedure: COMBINED ESOPHAGOSCOPY, GASTROSCOPY, DUODENOSCOPY (EGD);  Surgeon: London Lenz MD;  Location:  GI     ESOPHAGOSCOPY, GASTROSCOPY, DUODENOSCOPY (EGD), COMBINED N/A 2015    Procedure: COMBINED ESOPHAGOSCOPY, GASTROSCOPY, DUODENOSCOPY (EGD);  Surgeon: Barry Chavez MD;  Location: [facility]     KNEE SURGERY      x3     THORACOSCOPIC BIOPSY LUNG Right 2017    Procedure: THORACOSCOPIC BIOPSY LUNG;  RIGHT VIDEO ASSISTED THORACOSCOPY; WEDGE RESECTIONS RIGHT UPPER LOBE LUNG, RIGHT MIDDLE LOBE LUNG, AND RIGHT LOWER LOBE LUNG;  Surgeon: Alonzo Baird MD;  Location:  OR               Social History:     Social History   Substance Use Topics     Smoking status: Current Every Day Smoker     Packs/day: 1.50     Years: 18.00     Types: Cigarettes     Smokeless tobacco: Former User     Quit date: 2016     Alcohol use 0.0 oz/week     0 Standard drinks or equivalent per week      Comment: 8 drinks or more each day, abstinent since 4/2/15. History of CD treatment in past x1             Family History:     Family History   Problem Relation Age of Onset     Depression Mother      Anxiety Disorder Mother      MENTAL ILLNESS Mother      Substance Abuse Mother      Depression Father      Anxiety Disorder Father       Substance Abuse Father      MENTAL ILLNESS Father      Depression Daughter      Depression Brother      Schizophrenia Brother      Depression Brother      Anxiety Disorder Brother      Substance Abuse Brother              Allergies:     Allergies   Allergen Reactions     Blood Transfusion Related (Informational Only) Other (See Comments)     Patient has a history of a clinically significant antibody against RBC antigens.  A delay in compatible RBCs may occur.     No Clinical Screening - See Comments Rash     Bupronide patch allergy             Medications:     Prescriptions Prior to Admission   Medication Sig Dispense Refill Last Dose     insulin aspart (NOVOLOG FLEXPEN) 100 UNIT/ML injection Inject 6 Units Subcutaneous 3 times daily (with meals)   10/31/2017     insulin aspart (NOVOLOG FLEXPEN) 100 UNIT/ML injection Inject 5 Units Subcutaneous With can of Dr Pepper   Past Week at Unknown time     insulin aspart (NOVOLOG FLEXPEN) 100 UNIT/ML injection Inject 1-4 Units Subcutaneous 3 times daily (with meals) Per Sliding scale   Past Week at Unknown time     insulin aspart (NOVOLOG FLEXPEN) 100 UNIT/ML injection Inject 1-4 Units Subcutaneous At Bedtime Per Sliding Scale   Past Week at Unknown time     BASAGLAR 100 UNIT/ML injection Inject 20 Units Subcutaneous every morning   10/31/2017 at am     lactulose 20 GM/30ML SOLN Take 20 g by mouth daily   10/31/2017     sulfamethoxazole-trimethoprim (BACTRIM DS/SEPTRA DS) 800-160 MG per tablet Take 1 tablet by mouth three times a week Monday, Wednesday, Friday   11/3/2017     fluticasone (FLONASE) 50 MCG/ACT spray Spray 2 sprays into both nostrils daily as needed for rhinitis or allergies   no recent usage     fluticasone-vilanterol (BREO ELLIPTA) 100-25 MCG/INH oral inhaler Inhale 1 puff into the lungs daily as needed        thiamine 100 MG tablet Take 100 mg by mouth daily   11/3/2017 at Unknown time     ipratropium - albuterol 0.5 mg/2.5 mg/3 mL (DUONEB) 0.5-2.5 (3)  MG/3ML neb solution Take 1 vial by nebulization every 6 hours as needed for shortness of breath / dyspnea or wheezing   no recent usage     buprenorphine-naloxone (SUBOXONE) 8-2 MG SUBL sublingual tablet Place 3 tablets under the tongue daily 60 each 0 11/3/2017 at am-also received take home doses for 11/4 and 11/5     alum & mag hydroxide-simethicone (MYLANTA ES/MAALOX  ES) 400-400-40 MG/5ML SUSP Take 30 mLs by mouth every 4 hours as needed for indigestion Reported on 2/23/2017   Past Week at Unknown time     nicotine (NICODERM CQ) 21 MG/24HR 24 hr patch Place 1 patch onto the skin daily 30 patch 1 has not started yet     nicotine (NICOTROL) 10 MG Inhaler Inhale 1 Cartridge into the lungs every hour as needed for smoking cessation 180 each 1 has not started yet     nicotine polacrilex (NICORETTE) 2 MG gum Place 1 each (2 mg) inside cheek every hour as needed for smoking cessation 30 tablet 1 has not started yet     predniSONE (DELTASONE) 20 MG tablet Take 2 tablets (40 mg) by mouth daily 8 tablet 0 11/3/2017     predniSONE (DELTASONE) 10 MG tablet Take 3 tablets (30 mg) by mouth daily 15 tablet 0 has not started yet     predniSONE (DELTASONE) 20 MG tablet Take 1 tablet (20 mg) by mouth daily 30 tablet 1 has not started yet     Atorvastatin Calcium (LIPITOR PO) Take 20 mg by mouth At Bedtime   10/31/2017     Pantoprazole Sodium (PROTONIX PO) Take 40 mg by mouth every morning (before breakfast)   11/3/2017     loratadine (CLARITIN) 10 MG tablet Take 10 mg by mouth daily   11/3/2017     MIRTAZAPINE PO Take 30 mg by mouth At Bedtime   11/3/2017     MELATONIN PO Take 5 mg by mouth At Bedtime   11/3/2017     furosemide (LASIX) 20 MG tablet Take 1 tablet (20 mg) by mouth daily 30 tablet 3 11/3/2017     spironolactone (ALDACTONE) 50 MG tablet Take 1 tablet (50 mg) by mouth daily 3 tablet 0 11/3/2017     gabapentin (NEURONTIN) 600 MG tablet Take 1 tablet (600 mg) by mouth 3 times daily 9 tablet 0 11/3/2017     ascorbic acid  (VITAMIN C) 500 MG tablet Take 500 mg by mouth daily   11/3/2017     FLUoxetine (PROZAC) 40 MG capsule Take 40 mg by mouth daily   11/3/2017     hydrOXYzine (ATARAX) 10 MG tablet Take 10 mg by mouth every 8 hours as needed for anxiety    11/3/2017     traZODone (DESYREL) 100 MG tablet Take 100 mg by mouth At Bedtime   11/3/2017     methocarbamol (ROBAXIN) 500 MG tablet Take 1,000 mg by mouth 4 times daily   11/3/2017     aspirin EC 81 MG EC tablet Take 1 tablet (81 mg) by mouth daily 30 tablet 1 11/3/2017     rifaximin (XIFAXAN) 550 MG TABS tablet Take 1 tablet (550 mg) by mouth 2 times daily 60 tablet 0 has not started yet, just got insurance approval for this medication     ferrous sulfate (IRON) 325 (65 FE) MG tablet Take 325 mg by mouth daily (with breakfast)   11/3/2017     albuterol (PROAIR HFA, PROVENTIL HFA, VENTOLIN HFA) 108 (90 BASE) MCG/ACT inhaler Inhale 2 puffs into the lungs every 4 hours as needed for shortness of breath / dyspnea or wheezing Reported on 2/23/2017   no recent usage     MULTIPLE VITAMINS PO Take 1 tablet by mouth daily   11/3/2017     folic acid (FOLVITE) 1 MG tablet Take 1 tablet (1 mg) by mouth daily 30 tablet  11/3/2017             Review of Systems:   The 10 point Review of Systems is negative other than noted in the HPI            Physical Exam:   Vitals were reviewed  Temp: 97.9  F (36.6  C) Temp src: Axillary BP: 108/67   Heart Rate: 80 Resp: 14 SpO2: 93 % O2 Device: BiPAP/CPAP Oxygen Delivery: Other (Comments) (30 LPM)    General: Awake, alert, and oriented.  High flow oxygen facemask in place.  Skin:  No bruising or skin rash noted.  Eyes:   Sclera anicteric.  Lymphatics: No palpable lymphadenopathy  Abdomen:  Soft, nontender, nondistended.  No organomegaly.  Extremities:  Well perfused, no edema.         Data:     Lab Results   Component Value Date    WBC 6.4 11/15/2017    HGB 6.3 (LL) 11/15/2017    HCT 19.2 (L) 11/15/2017     (L) 11/15/2017     (L) 11/15/2017     POTASSIUM 4.3 11/15/2017    CHLORIDE 97 11/15/2017    CO2 30 11/15/2017    BUN 20 11/15/2017    CR 0.92 11/15/2017     (H) 11/15/2017    SED 23 (H) 10/19/2017    DD 1.3 (H) 10/17/2017    NTBNPI 152 10/17/2017    TROPONIN 0.01 10/17/2017    TROPI <0.015 10/17/2017     (H) 11/13/2017    ALT 33 11/13/2017    ALKPHOS 124 11/13/2017    BILITOTAL 0.8 11/13/2017    DAVID 39 11/04/2017    INR 1.11 11/05/2017

## 2017-11-15 NOTE — PROGRESS NOTES
Respiratory Therapy    Patient seen resting in bed on HFNC 30-40 LPM and FiO2 50% and placed on BiPAP at night, current settings 14/8, rate 12, and FiO2 45%. Respiratory rate 18-24, breath sounds diminished, and SpO2 93%. Patient will continue to receive Duoneb QID. RT to follow.    Sofi Robles  November 15, 2017, 1:25 AM

## 2017-11-15 NOTE — PROGRESS NOTES
[facility]  Infectious Disease Progress Note          Assessment and Plan:   IMPRESSION:   1.  A 42-year-old female with biopsy-proven interstitial lung disease and several months of worsened respiratory status, on high-dose steroids further worsening respiratory status in the last month with increased infiltrates, question still primary interstitial lung disease versus new opportunistic infection versus new hospital-acquired infection at present.   2.  Recent respiratory failure at [facility], no clear new diagnosis made, improved with steroids increased, was also being treated as Pneumocystis pneumonia although data did not really support that diagnosis, did not continue proper treatment as an outpatient; if Pneumocystis not adequately treated to date.    3.  Interstitial lung disease, question other systemic component.   4.  History of alcoholic cirrhosis.   5.  Chronic tobacco abuse including E-cigarettes, some concern this might be some type of lung damage related to that.   6 Cytopenias, ? meds      RECOMMENDATIONS:   1.  12 days broad coverage by any usual measure adequate, cytopenias, so stop vanco/zosyn  2.  So far unremarkable serologies and noninvasive diagnostic tests to try to prove some infectious diagnosis.   3.  May eventually, if not improving, require further diagnostic intervention, obviously if she requires intubation  we should do a bronchoscopy.  4 LDH very elevated despite neg PCP 10/20 and other evidence against, continue to tx possible PJP, fugitel neg further evidence against this dx , still complete full 3 weeks of tx, septra may be cytopenia issue but try to get to 14 days then maybe to mepron        Interval History:   no new complaints but still very hypoxic, LDH 1042, procal 1.1  No + cxs no sputum productivity resp virus neg, procal 1.1  Hgb 6.3, FROR426 K, WBc OK              Medications:       furosemide  20 mg Oral or Feeding Tube Daily  "    spironolactone  50 mg Oral or Feeding Tube Daily     [START ON 11/16/2017] insulin isophane human  10 Units Subcutaneous QAM AC     [START ON 11/16/2017] insulin aspart  6 Units Subcutaneous Q4H     [START ON 11/16/2017] insulin isophane human  30 Units Subcutaneous Q24H     protein modular  1 packet Per Feeding Tube Daily     insulin aspart  10 Units Subcutaneous Q4H     methylPREDNISolone sodium succinate  20 mg Intravenous Q6H     insulin aspart  1-10 Units Subcutaneous Q4H     atorvastatin (LIPITOR) tablet 20 mg  20 mg Oral or Feeding Tube At Bedtime     rifaximin  550 mg Oral or Feeding Tube BID     folic acid  1 mg Oral or Feeding Tube Daily     aspirin  81 mg Oral or Feeding Tube Daily     mirtazapine (REMERON) tablet 15 mg  15 mg Oral or Feeding Tube At Bedtime     pantoprazole  40 mg Per Feeding Tube Daily     lactulose  20 g Per Feeding Tube Daily     gabapentin  600 mg Per Feeding Tube TID     methocarbamol  500 mg Oral or Feeding Tube TID     FLUoxetine  40 mg Oral or Feeding Tube Daily     loratadine  10 mg Oral or Feeding Tube Daily     sulfamethoxazole-trimethoprim  50 mL Oral 4x Daily     ipratropium - albuterol 0.5 mg/2.5 mg/3 mL  3 mL Nebulization 4x daily     buprenorphine HCl-naloxone HCl  3 Film Sublingual Daily     nicotine  1 patch Transdermal Daily     nicotine   Transdermal Daily     nicotine   Transdermal Q8H     sodium chloride (PF)  3 mL Intracatheter Q8H                  Physical Exam:   Blood pressure 122/78, pulse 74, temperature 97.9  F (36.6  C), temperature source Axillary, resp. rate 9, height 1.689 m (5' 6.5\"), weight 79 kg (174 lb 3.3 oz), SpO2 100 %, not currently breastfeeding.  Wt Readings from Last 2 Encounters:   11/15/17 79 kg (174 lb 3.3 oz)   10/22/17 83.4 kg (183 lb 12.8 oz)     Vital Signs with Ranges  Temp:  [97.6  F (36.4  C)-98.6  F (37  C)] 97.9  F (36.6  C)  Heart Rate:  [] 84  Resp:  [8-64] 9  BP: ()/(41-87) 122/78  FiO2 (%):  [40 %-50 %] 45 " %  SpO2:  [70 %-100 %] 100 %    Constitutional: Awake, alert, cooperative, ongoing resp apparent distress   Lungs: Clear to auscultation bilaterally, no crackles or wheezing on high flow O2   Cardiovascular: Regular rate and rhythm, normal S1 and S2, and no murmur noted   Abdomen: Normal bowel sounds, soft, non-distended, non-tender   Skin: No rashes, no cyanosis, no edema   Other:           Data:   All microbiology laboratory data reviewed.  Recent Labs   Lab Test  11/15/17   0540  11/14/17   0525  11/13/17   0539   WBC  6.4  5.9  6.4   HGB  6.3*  6.2*  7.2*   HCT  19.2*  18.6*  21.6*   MCV  91  89  89   PLT  125*  129*  135*     Recent Labs   Lab Test  11/15/17   0540  11/14/17   0525  11/13/17   0539   CR  0.92  1.02  1.13*     Recent Labs   Lab Test  10/19/17   0503   SED  23*     Recent Labs   Lab Test  11/04/17   0343  11/04/17   0107  11/04/17   0050  10/20/17   0950  10/19/17   0517  10/19/17   0054  10/17/17   2143  10/17/17   2020  10/17/17   2015   CULT  No growth  No growth  No growth  Moderate growth  Coagulase negative Staphylococcus  Susceptibility testing not routinely done  *  Moderate growth  Candida albicans / dubliniensis  Candida albicans and Candida dubliniensis are not routinely speciated  Susceptibility testing not routinely done  *  Canceled, Test credited  >10 Squamous epithelial cells/low power field indicates oral contamination. Please   recollect.  *  Notification of test cancellation was given to  Elena Araujo RN from [facility]. 10.19.17 at 0753. GR.    Canceled, Test credited  >10 Squamous epithelial cells/low power field indicates oral contamination. Please   recollect.  *  Notification of test cancellation was given to  Nelsy Tesfaye RN [facility] @0246. 10/19/17. SCG    No growth  No growth  No growth

## 2017-11-15 NOTE — PROGRESS NOTES
RT note: pt switched from bipap to HFNC this moring. Was able to wean the settings, currently on 25 lpm 45%. BS diminished.     Gilda Littlejohn, RRT

## 2017-11-15 NOTE — PLAN OF CARE
Problem: Patient Care Overview  Goal: Plan of Care/Patient Progress Review  Outcome: No Change  ICU End of Shift Summary.  For vital signs and complete assessments, please see documentation flowsheets.     Pertinent assessments: AOX4, Tele SR, LS dim on/off BIPAP/HFNC, KOROMA with minimal activity, GI/-1 liquid BM-brown in color, callaway 125 ml/hr, pain controled with prn dilaudid.  Major Shift Events: Transfused 2 units PRBC, Hem/onco consult, iv abx d/c'd    Plan (Upcoming Events): monitor CBC, cyclic feeding tomorrow, insulin changes noted, monitor resp status, PT pending.  Discharge/Transfer Needs: continue ICU cares    Bedside Shift Report Completed :   Bedside Safety Check Completed:

## 2017-11-15 NOTE — PROGRESS NOTES
Tele-ICU cross-cover  DOS: 11/15/2017     Called regarding AM labs. Hb is 6.3. Will transfuse 1 unit PRBC.    Denton Maxwell MD, PhD  Surgical critical care  November 15, 2017, 6:25 AM

## 2017-11-15 NOTE — PROGRESS NOTES
"Critical Care Progress Note      11/15/2017    Name: Sabina Figueroa MRN#: [id number]   Age: 42 year old YOB: 1975     Hsptl Day# 11  ICU DAY #    MV DAY #             Problem List:   Active Problems:    Sepsis (H)  1. Acute respiratory failure, hypoxemic, and having a difficult day today not well tolerating high flow NC oxygen. ID note's appreciate and patient is now off of antibiotics (except Bactrim prophylaxis), and support as she has completed a reasonable course. We will continue to titrate down/off oxygen as able; continue high dose steroids, and Lasix added back but at only 20 mgms per day.   2. Interstitial lung disease- continue high dose steroids, as noted.  3. Possible pneumonia- as noted, she is off antibiotics (except prophylaxis)  4. Volume overload- she seems to on \"dry\" side of euvolemia; agree with current Lasix dose  5. History of alcoholism  6. History of cirrhosis  7. Pre-renal azotemia- as discussed   8. Hyperglycemia   9. Nutrition- receiving most of nutrition via enteral nutrition  10. Pancytopenia with Hb the primary line affected. Most likely anemia of acute/chronic disease and will support with RBC's to keep Hb above 7.  I appreciate input from Heme, and she is now off of (most) antibiotics, and will follow Hb. She received 2 units RBC's today.          Summary/Hospital Course:   Overall, she is off Zosyn and Vanco today, received 2 units RBC's, and had input from Heme today regarding anemia.      IV/Access:   1. Venous access -   2. Arterial access -   3.  Plan  - central access required and necessary      ICU Prophylaxis:   1. DVT: Hep Subq/ LMWH/mechanical  2. VAP: HOB 30 degrees, chlorhexidine rinse  3. Stress Ulcer: PPI/H2 blocker  4. Restraints: Nonviolent soft two point restraints required and necessary for patient safety and continued cares and good effect as patient continues to pull at necessary lines, tubes despite education and distraction. Will readdress daily. "   5. IV Access - central access required and necessary for continued patient cares  6. Feeding - enteral   7. Family Update: not at bedside today  8. Disposition - continue ICU care     Key goals for next 24 hours:   1. 2 units RBC's  2. Off (most) antibiotics today  3. She continues on intermittent BIPAP with little change at present, and will continue to support and monitor for progress.         Interim History:              Key Medications:       furosemide  20 mg Oral or Feeding Tube Daily     spironolactone  50 mg Oral or Feeding Tube Daily     [START ON 11/16/2017] insulin isophane human  10 Units Subcutaneous QAM AC     [START ON 11/16/2017] insulin aspart  6 Units Subcutaneous Q4H     [START ON 11/16/2017] insulin isophane human  30 Units Subcutaneous Q24H     protein modular  1 packet Per Feeding Tube Daily     insulin aspart  10 Units Subcutaneous Q4H     methylPREDNISolone sodium succinate  20 mg Intravenous Q6H     insulin aspart  1-10 Units Subcutaneous Q4H     atorvastatin (LIPITOR) tablet 20 mg  20 mg Oral or Feeding Tube At Bedtime     rifaximin  550 mg Oral or Feeding Tube BID     folic acid  1 mg Oral or Feeding Tube Daily     aspirin  81 mg Oral or Feeding Tube Daily     mirtazapine (REMERON) tablet 15 mg  15 mg Oral or Feeding Tube At Bedtime     pantoprazole  40 mg Per Feeding Tube Daily     lactulose  20 g Per Feeding Tube Daily     gabapentin  600 mg Per Feeding Tube TID     methocarbamol  500 mg Oral or Feeding Tube TID     FLUoxetine  40 mg Oral or Feeding Tube Daily     loratadine  10 mg Oral or Feeding Tube Daily     sulfamethoxazole-trimethoprim  50 mL Oral 4x Daily     ipratropium - albuterol 0.5 mg/2.5 mg/3 mL  3 mL Nebulization 4x daily     buprenorphine HCl-naloxone HCl  3 Film Sublingual Daily     nicotine  1 patch Transdermal Daily     nicotine   Transdermal Daily     nicotine   Transdermal Q8H     sodium chloride (PF)  3 mL Intracatheter Q8H       NaCl 10 mL/hr at 11/15/17 0700      IV fluid REPLACEMENT ONLY                 Physical Examination:   Temp:  [97.6  F (36.4  C)-98.6  F (37  C)] 97.9  F (36.6  C)  Heart Rate:  [] 92  Resp:  [8-64] 13  BP: ()/(41-87) 110/83  FiO2 (%):  [40 %-50 %] 45 %  SpO2:  [70 %-100 %] 100 %    Intake/Output Summary (Last 24 hours) at 11/15/17 1635  Last data filed at 11/15/17 1600   Gross per 24 hour   Intake             3480 ml   Output             3150 ml   Net              330 ml     Wt Readings from Last 4 Encounters:   11/15/17 79 kg (174 lb 3.3 oz)   10/22/17 83.4 kg (183 lb 12.8 oz)   09/16/17 84.6 kg (186 lb 8.2 oz)   09/14/17 89.8 kg (197 lb 15.6 oz)     BP - Mean:  [] 89  FiO2 (%): 45 %  Resp: 13    Recent Labs  Lab 11/10/17  0920 11/09/17  0800   PH 7.40  --    PCO2 60*  --    PO2 102  --    HCO3 37*  --    O2PER 80% 80%       GEN: no acute distress   HEENT: supple   PULM: mild rales and rhonchi but good excursions     CV/COR: RRR S1S2 no gallop,  No rub, no murmur  ABD: soft nontender  EXT:  minimal edema   warm  NEURO: grossly intact; moves all extremities to command   SKIN: no obvious rash; no cyanosis or mottling          Data:   All data and imaging reviewed     ROUTINE ICU LABS (Last four results)  CMP  Recent Labs  Lab 11/15/17  0540 11/14/17  0525 11/13/17  0539 11/12/17  0537  11/10/17  0520   * 131* 130* 131*  < > 131*   POTASSIUM 4.3 4.6 4.7 5.1  < > 4.7   CHLORIDE 97 93* 90* 91*  < > 92*   CO2 30 32 34* 35*  < > 35*   ANIONGAP 5 6 6 5  < > 4   * 171* 232* 221*  < > 218*   BUN 20 32* 39* 41*  < > 29   CR 0.92 1.02 1.13* 1.12*  < > 0.93   GFRESTIMATED 67 59* 53* 53*  < > 66   GFRESTBLACK 81 72 64 64  < > 80   VISHNU 8.0* 8.2* 8.1* 8.3*  < > 8.2*   MAG  --   --  2.2  --   --   --    PHOS  --   --  4.6*  --   --   --    PROTTOTAL  --   --  6.3* 6.0*  --  6.1*   ALBUMIN  --   --  2.6* 2.5*  --  2.7*   BILITOTAL  --   --  0.8 0.2  --  0.4   ALKPHOS  --   --  124 120  --  139   AST  --   --  124* 98*  --  69*   ALT  --    --  33 28  --  36   < > = values in this interval not displayed.  CBC  Recent Labs  Lab 11/15/17  0540 11/14/17  0525 11/13/17  0539 11/13/17  0056  11/12/17  0537   WBC 6.4 5.9 6.4  --   --  4.0   RBC 2.10* 2.10* 2.43*  --   --  1.90*   HGB 6.3* 6.2* 7.2* 7.7*  < > 5.8*   HCT 19.2* 18.6* 21.6*  --   --  17.5*   MCV 91 89 89  --   --  92   MCH 30.0 29.5 29.6  --   --  30.5   MCHC 32.8 33.3 33.3  --   --  33.1   RDW 14.3 14.4 14.9  --   --  13.8   * 129* 135*  --   --  113*   < > = values in this interval not displayed.  INRNo lab results found in last 7 days.  Arterial Blood Gas  Recent Labs  Lab 11/10/17  0920 11/09/17  0800   PH 7.40  --    PCO2 60*  --    PO2 102  --    HCO3 37*  --    O2PER 80% 80%       All cultures:  No results for input(s): CULT in the last 168 hours.  No results found for this or any previous visit (from the past 24 hour(s)).    MD Reshma    Billing: This patient is critically ill: YES. Total critical care time today 30 min.

## 2017-11-15 NOTE — PROGRESS NOTES
NUTRITION BRIEF NOTE    Plan to cycle EN 11/16 as discussed during IDT rounds today.   Turn off EN at 10 am on 11/16 and will transition to Isosource 1.5 from 8 pm to 8 am on 11/16 PM - 110 mL/hr x 12 hours. Will receive same amount of CHO and provisions: 1320 mL provides 1980 kcal (25 kcal per kg), 90 g protein (1.1 g per kg), 232 g CHO, 20 g fiber and 1003 mL H20.   ADDENDUM: orders updated. Free water flush 60 mL q4 hours and before/after cyclic feeding      Edyta Nobles RDN, LD, CNSC  3rd floor/ICU: [phone]  All other floors: [phone]  Weekend/holiday: [phone]  Office: [phone]

## 2017-11-15 NOTE — PROGRESS NOTES
Critical Care Progress Note      11/14/2017    Name: Sabina Figueroa MRN#: [id number]   Age: 42 year old YOB: 1975     Hsptl Day# 10  ICU DAY #10               Problem List:   Active Problems:    Sepsis (H)  1. Acute respiratory failure, hypoxemic, with evidence of slow improvement over time. She has been able to tolerate high flow NC oxygen much of the day which is encouraging (still requiring some degree of BIPAP support). We will continue to titrate down/off oxygen as able; antibiotics, high dose steroids, and diuretics later if need be.   2. Interstitial lung disease- continue high dose steroids.  3. Possible pneumonia- she has been on Zosyn for 10 days and Vanco for about 5; we will continue for a (likely) course of 2 weeks. Her cultures are negative and at this point, it would seem infection is less likely. I have noted ID's suggestions and greatly appreciate input.   4. Volume overload- she still seems to be in a pre-renal state. We will hold off diuretics at present.   5. History of alcoholism  6. History of cirrhosis  7. Pre-renal azotemia- as above  8. Hyperglycemia   9. Nutrition- receiving most of nutrition via enteral nutrition  10. Pancytopenia with Hb the primary line affected. Most likely anemia of acute/chronic disease and will support with RBC's to keep Hb above 7.  Overall, critical but with some improvement over time. 40 minutes of critical care time spent at bedside.          Summary/Hospital Course:      IV/Access:   1. Venous access -   2. Arterial access -   3.  Plan  - central access required and necessary      ICU Prophylaxis:   1. DVT: Hep Subq/ LMWH/mechanical  2. VAP: HOB 30 degrees, chlorhexidine rinse  3. Stress Ulcer: PPI/H2 blocker  4. Restraints: Nonviolent soft two point restraints required and necessary for patient safety and continued cares and good effect as patient continues to pull at necessary lines, tubes despite education and distraction. Will readdress  daily.   5. IV Access - central access required and necessary for continued patient cares  6. Feeding - enteral or PO's as tolerated   7. Family Update: not at bedside today   8. Disposition - continue ICU care         Key goals for next 24 hours:   1. Titrate down bipap and oxygen as able.   2.  3.               Interim History:              Key Medications:       insulin glargine  40 Units Subcutaneous QAM     protein modular  1 packet Per Feeding Tube Daily     insulin aspart  10 Units Subcutaneous Q4H     methylPREDNISolone sodium succinate  20 mg Intravenous Q6H     vancomycin (VANCOCIN) IV  1,250 mg Intravenous Q12H     insulin aspart  1-10 Units Subcutaneous Q4H     atorvastatin (LIPITOR) tablet 20 mg  20 mg Oral or Feeding Tube At Bedtime     rifaximin  550 mg Oral or Feeding Tube BID     folic acid  1 mg Oral or Feeding Tube Daily     aspirin  81 mg Oral or Feeding Tube Daily     mirtazapine (REMERON) tablet 15 mg  15 mg Oral or Feeding Tube At Bedtime     pantoprazole  40 mg Per Feeding Tube Daily     lactulose  20 g Per Feeding Tube Daily     gabapentin  600 mg Per Feeding Tube TID     methocarbamol  500 mg Oral or Feeding Tube TID     FLUoxetine  40 mg Oral or Feeding Tube Daily     loratadine  10 mg Oral or Feeding Tube Daily     sulfamethoxazole-trimethoprim  50 mL Oral 4x Daily     ipratropium - albuterol 0.5 mg/2.5 mg/3 mL  3 mL Nebulization 4x daily     buprenorphine HCl-naloxone HCl  3 Film Sublingual Daily     nicotine  1 patch Transdermal Daily     piperacillin-tazobactam  4.5 g Intravenous Q6H     nicotine   Transdermal Daily     nicotine   Transdermal Q8H     sodium chloride (PF)  3 mL Intracatheter Q8H       NaCl 10 mL/hr at 11/14/17 1600     IV fluid REPLACEMENT ONLY                 Physical Examination:   Temp:  [98.1  F (36.7  C)-98.6  F (37  C)] 98.5  F (36.9  C)  Heart Rate:  [73-96] 79  Resp:  [9-54] 15  BP: ()/(45-87) 109/62  FiO2 (%):  [35 %-55 %] 45 %  SpO2:  [83 %-100 %] 84  %    Intake/Output Summary (Last 24 hours) at 11/14/17 1823  Last data filed at 11/14/17 1800   Gross per 24 hour   Intake             3230 ml   Output             2800 ml   Net              430 ml     Wt Readings from Last 4 Encounters:   11/14/17 79.1 kg (174 lb 6.9 oz)   10/22/17 83.4 kg (183 lb 12.8 oz)   09/16/17 84.6 kg (186 lb 8.2 oz)   09/14/17 89.8 kg (197 lb 15.6 oz)     BP - Mean:  [71-98] 78  FiO2 (%): 45 %  Resp: 15    Recent Labs  Lab 11/10/17  0920 11/09/17  0800   PH 7.40  --    PCO2 60*  --    PO2 102  --    HCO3 37*  --    O2PER 80% 80%       GEN: no acute distress   HEENT: head ncat, sclera anicteric, OP patent, trachea midline   PULM: unlabored synchronous with vent, clear anteriorly    CV/COR: RRR S1S2 no gallop,  No rub, no murmur  ABD: soft nontender, hypoactive bowel sounds, no mass  EXT:  Edema   warm  NEURO: grossly intact  SKIN: no obvious rash  LINES: clean, dry intact         Data:   All data and imaging reviewed     ROUTINE ICU LABS (Last four results)  CMP  Recent Labs  Lab 11/14/17  0525 11/13/17  0539 11/12/17  0537 11/11/17  1040 11/11/17  0515 11/10/17  0520  11/08/17  0545   * 130* 131*  --  131* 131*  < > 135   POTASSIUM 4.6 4.7 5.1 5.0 5.4* 4.7  < > 4.6   CHLORIDE 93* 90* 91*  --  92* 92*  < > 103   CO2 32 34* 35*  --  34* 35*  < > 26   ANIONGAP 6 6 5  --  5 4  < >  --    * 232* 221*  --  216* 218*  < > 161*   BUN 32* 39* 41*  --  39* 29  < > 13   CR 1.02 1.13* 1.12*  --  1.04 0.93  < > 0.84   GFRESTIMATED 59* 53* 53*  --  58* 66  < > 74   GFRESTBLACK 72 64 64  --  70 80  < > 90   VISHNU 8.2* 8.1* 8.3*  --  8.1* 8.2*  < > 8.2*   MAG  --  2.2  --   --   --   --   --  2.3   PHOS  --  4.6*  --   --   --   --   --  4.0   PROTTOTAL  --  6.3* 6.0*  --   --  6.1*  --   --    ALBUMIN  --  2.6* 2.5*  --   --  2.7*  --   --    BILITOTAL  --  0.8 0.2  --   --  0.4  --   --    ALKPHOS  --  124 120  --   --  139  --   --    AST  --  124* 98*  --   --  69*  --   --    ALT  --  33 28   --   --  36  --   --    < > = values in this interval not displayed.  CBC  Recent Labs  Lab 11/14/17  0525 11/13/17  0539 11/13/17  0056 11/12/17  1550 11/12/17  0537  11/11/17  0515   WBC 5.9 6.4  --   --  4.0  --  5.2   RBC 2.10* 2.43*  --   --  1.90*  --  2.24*   HGB 6.2* 7.2* 7.7* 6.7* 5.8*  < > 6.7*   HCT 18.6* 21.6*  --   --  17.5*  --  20.9*   MCV 89 89  --   --  92  --  93   MCH 29.5 29.6  --   --  30.5  --  29.9   MCHC 33.3 33.3  --   --  33.1  --  32.1   RDW 14.4 14.9  --   --  13.8  --  14.0   * 135*  --   --  113*  --  103*   < > = values in this interval not displayed.  INRNo lab results found in last 7 days.  Arterial Blood Gas  Recent Labs  Lab 11/10/17  0920 11/09/17  0800   PH 7.40  --    PCO2 60*  --    PO2 102  --    HCO3 37*  --    O2PER 80% 80%       All cultures:  No results for input(s): CULT in the last 168 hours.  No results found for this or any previous visit (from the past 24 hour(s)).    MD Reshma    Billing: This patient is critically ill:  Yes. Total critical care time today 40 min.

## 2017-11-15 NOTE — PLAN OF CARE
Problem: Sepsis/Septic Shock (Adult)  Goal: Signs and Symptoms of Listed Potential Problems Will be Absent, Minimized or Managed (Sepsis/Septic Shock)  Signs and symptoms of listed potential problems will be absent, minimized or managed by discharge/transition of care (reference Sepsis/Septic Shock (Adult) CPG).   Outcome: No Change  Problem: Sepsis/Septic Shock (Adult)  Goal: Signs and Symptoms of Listed Potential Problems Will be Absent, Minimized or Managed (Sepsis/Septic Shock)  Signs and symptoms of listed potential problems will be absent, minimized or managed by discharge/transition of care (reference Sepsis/Septic Shock (Adult) CPG).   Outcome: No change  ICU End of Shift Summary.  For vital signs and complete assessments, please see documentation flowsheets.       Pertinent assessments: A&Ox4. VSS. Afebrile. desats with any minor activity, Tolearted HFNC during the evening  50% FIO2 with 40 LPM, switched to BIPAP at HS 45% FIO2. . LS: dim, desats with minor acftivity. KOROMA. +BS. Had one stool, no signs of GI Bleed,  Watters WDL in place good UOP. Skin bruised throughout. IV TKO to left arm.   Major Shift Events: rested throughout the night, received Dilaudid PRN for back pain with relief.  Plan (Upcoming Events): continue Pulmonary toileting, Steroids, & IV Abx, Follow up labs and Cx, wean off Bipap as able, SW consult at D/C, possible restart her PTA meds today ( Lasix, Spironolactone).   Discharge/Transfer Needs: continue ICU cares for now  Addendum: Hgb 6.3 this am, tele Hub got called, Awaiting type and screen for new order of blood transfusion,

## 2017-11-16 NOTE — PROGRESS NOTES
[facility]  Infectious Disease Progress Note          Assessment and Plan:   IMPRESSION:   1.  A 42-year-old female with biopsy-proven interstitial lung disease and several months of worsened respiratory status, on high-dose steroids further worsening respiratory status in the last month with increased infiltrates, question still primary interstitial lung disease versus new opportunistic infection versus new hospital-acquired infection at present.   2.  Recent respiratory failure at [facility], no clear new diagnosis made, improved with steroids increased, was also being treated as Pneumocystis pneumonia although data did not really support that diagnosis, did not continue proper treatment as an outpatient; if Pneumocystis not adequately treated to date.    3.  Interstitial lung disease, question other systemic component.   4.  History of alcoholic cirrhosis.   5.  Chronic tobacco abuse including E-cigarettes, some concern this might be some type of lung damage related to that.   6 Cytopenias, ? meds      RECOMMENDATIONS:   1.  12 days broad conventional coverage by any usual measure adequate, cytopenias, so off vanco/zosyn  2.  So far unremarkable serologies and noninvasive diagnostic tests to try to prove some infectious diagnosis.   3.  May eventually, if not improving, require further diagnostic intervention, obviously if she requires intubation  we should do a bronchoscopy.  4 LDH very elevated despite neg PCP 10/20 and other evidence against, continue to tx possible PJP, fugitel neg further evidence against this dx , still complete full 3 weeks of tx, septra may be cytopenia issue but try to get to 14 days then maybe to mepron, WBc PLTs and HgB stable        Interval History:   no new complaints but still very hypoxic, LDH 1042, procal 1.1  No + cxs no sputum productivity resp virus neg, procal 1.1  Hgb 6.3, [id number] K, WBc OK              Medications:        "furosemide  20 mg Oral or Feeding Tube Daily     spironolactone  50 mg Oral or Feeding Tube Daily     insulin isophane human  10 Units Subcutaneous QAM AC     insulin aspart  6 Units Subcutaneous Q4H     insulin isophane human  30 Units Subcutaneous Q24H     protein modular  1 packet Per Feeding Tube Daily     methylPREDNISolone sodium succinate  20 mg Intravenous Q6H     insulin aspart  1-10 Units Subcutaneous Q4H     atorvastatin (LIPITOR) tablet 20 mg  20 mg Oral or Feeding Tube At Bedtime     rifaximin  550 mg Oral or Feeding Tube BID     folic acid  1 mg Oral or Feeding Tube Daily     aspirin  81 mg Oral or Feeding Tube Daily     mirtazapine (REMERON) tablet 15 mg  15 mg Oral or Feeding Tube At Bedtime     pantoprazole  40 mg Per Feeding Tube Daily     lactulose  20 g Per Feeding Tube Daily     gabapentin  600 mg Per Feeding Tube TID     methocarbamol  500 mg Oral or Feeding Tube TID     FLUoxetine  40 mg Oral or Feeding Tube Daily     loratadine  10 mg Oral or Feeding Tube Daily     sulfamethoxazole-trimethoprim  50 mL Oral 4x Daily     ipratropium - albuterol 0.5 mg/2.5 mg/3 mL  3 mL Nebulization 4x daily     buprenorphine HCl-naloxone HCl  3 Film Sublingual Daily     nicotine  1 patch Transdermal Daily     nicotine   Transdermal Daily     nicotine   Transdermal Q8H     sodium chloride (PF)  3 mL Intracatheter Q8H                  Physical Exam:   Blood pressure 101/64, pulse 74, temperature 97.9  F (36.6  C), temperature source Axillary, resp. rate 10, height 1.689 m (5' 6.5\"), weight 78 kg (172 lb 0.1 oz), SpO2 98 %, not currently breastfeeding.  Wt Readings from Last 2 Encounters:   11/16/17 78 kg (172 lb 0.1 oz)   10/22/17 83.4 kg (183 lb 12.8 oz)     Vital Signs with Ranges  Temp:  [97.9  F (36.6  C)-98.6  F (37  C)] 97.9  F (36.6  C)  Heart Rate:  [] 74  Resp:  [8-64] 10  BP: ()/(53-83) 101/64  FiO2 (%):  [45 %-80 %] 60 %  SpO2:  [88 %-100 %] 98 %    Constitutional: Awake, alert, " cooperative, ongoing resp apparent distress   Lungs: Clear to auscultation bilaterally, no crackles or wheezing on high flow O2   Cardiovascular: Regular rate and rhythm, normal S1 and S2, and no murmur noted   Abdomen: Normal bowel sounds, soft, non-distended, non-tender   Skin: No rashes, no cyanosis, no edema   Other:           Data:   All microbiology laboratory data reviewed.  Recent Labs   Lab Test  11/16/17   0520  11/15/17   2045  11/15/17   0540  11/14/17   0525   WBC  7.2   --   6.4  5.9   HGB  8.7*  9.0*  6.3*  6.2*   HCT  27.0*   --   19.2*  18.6*   MCV  92   --   91  89   PLT  143*   --   125*  129*     Recent Labs   Lab Test  11/16/17   0520  11/15/17   0540  11/14/17   0525   CR  0.99  0.92  1.02     Recent Labs   Lab Test  10/19/17   0503   SED  23*     Recent Labs   Lab Test  11/04/17   0343  11/04/17   0107  11/04/17   0050  10/20/17   0950  10/19/17   0517  10/19/17   0054  10/17/17   2143  10/17/17   2020  10/17/17   2015   CULT  No growth  No growth  No growth  Moderate growth  Coagulase negative Staphylococcus  Susceptibility testing not routinely done  *  Moderate growth  Candida albicans / dubliniensis  Candida albicans and Candida dubliniensis are not routinely speciated  Susceptibility testing not routinely done  *  Canceled, Test credited  >10 Squamous epithelial cells/low power field indicates oral contamination. Please   recollect.  *  Notification of test cancellation was given to  Elena Araujo RN from [facility]. 10.19.17 at 0753. GR.    Canceled, Test credited  >10 Squamous epithelial cells/low power field indicates oral contamination. Please   recollect.  *  Notification of test cancellation was given to  Nelsy Tesfaye RN [facility] @0246. 10/19/17. SCG    No growth  No growth  No growth

## 2017-11-16 NOTE — PROGRESS NOTES
"Critical Care Progress Note      11/16/2017    Name: Sabina Figueroa MRN#: [id number]   Age: 42 year old YOB: 1975     Hsptl Day# 12  ICU DAY #    MV DAY #             Problem List:   Active Problems:  1. Acute hypoxemic respiratory failure and underlying IS lung disease- she had a difficulty day yesterday but feels better today and will try on high flow NC oxygen as tolerated. She is off antibiotics at present (except Bactrim) and will continue to monitor. She appears optimally \"dry\" and will continue to support and follow. ID's input appreciated.   2. Interstitial lung disease- continue high dose steroids, as noted.  3. Possible pneumonia- as noted, she is off antibiotics (except Bactrim)  4. Volume overload- she seems to on \"dry\" side of euvolemia; continue current Lasix dose  5. History of alcoholism  6. History of cirrhosis  7. Pre-renal azotemia- as discussed   8. Hyperglycemia   9. Nutrition- receiving most of nutrition via enteral nutrition; she will try PO's today as she feels hungry.   10. Pancytopenia with Hb the primary line affected. Most likely anemia of acute/chronic disease and will support with RBC's to keep Hb above 7 (8.7 today).  I appreciate input from Heme, and she is now off of (most) antibiotics, though Bactrim can be very depressing to to bone marrow.          Summary/Hospital Course:   Continue current care and titrate down support as tolerated. Though she is on an intermediate support of oxygen (45%), she has not had significant improvement in several days, which is worrisome.     IV/Access:   1. Venous access -   2. Arterial access -   3.  Plan  - central access required and necessary      ICU Prophylaxis:   1. DVT: Hep Subq/ LMWH/mechanical  2. VAP: HOB 30 degrees, chlorhexidine rinse  3. Stress Ulcer: PPI/H2 blocker  4. Restraints: Nonviolent soft two point restraints required and necessary for patient safety and continued cares and good effect as patient continues to " pull at necessary lines, tubes despite education and distraction. Will readdress daily.   5. IV Access - central access required and necessary for continued patient cares  6. Feeding - enteral nutrition and PO's as tolerated   7. Family Update: I did not see family today   8. Disposition - continue ICU care.          Key Medications:       furosemide  20 mg Oral or Feeding Tube Daily     spironolactone  50 mg Oral or Feeding Tube Daily     insulin isophane human  10 Units Subcutaneous QAM AC     insulin aspart  6 Units Subcutaneous Q4H     insulin isophane human  30 Units Subcutaneous Q24H     protein modular  1 packet Per Feeding Tube Daily     methylPREDNISolone sodium succinate  20 mg Intravenous Q6H     insulin aspart  1-10 Units Subcutaneous Q4H     atorvastatin (LIPITOR) tablet 20 mg  20 mg Oral or Feeding Tube At Bedtime     rifaximin  550 mg Oral or Feeding Tube BID     folic acid  1 mg Oral or Feeding Tube Daily     aspirin  81 mg Oral or Feeding Tube Daily     mirtazapine (REMERON) tablet 15 mg  15 mg Oral or Feeding Tube At Bedtime     pantoprazole  40 mg Per Feeding Tube Daily     lactulose  20 g Per Feeding Tube Daily     gabapentin  600 mg Per Feeding Tube TID     methocarbamol  500 mg Oral or Feeding Tube TID     FLUoxetine  40 mg Oral or Feeding Tube Daily     loratadine  10 mg Oral or Feeding Tube Daily     sulfamethoxazole-trimethoprim  50 mL Oral 4x Daily     ipratropium - albuterol 0.5 mg/2.5 mg/3 mL  3 mL Nebulization 4x daily     buprenorphine HCl-naloxone HCl  3 Film Sublingual Daily     nicotine  1 patch Transdermal Daily     nicotine   Transdermal Daily     nicotine   Transdermal Q8H     sodium chloride (PF)  3 mL Intracatheter Q8H       NaCl 10 mL/hr at 11/16/17 1000     IV fluid REPLACEMENT ONLY                 Physical Examination:   Temp:  [97.9  F (36.6  C)-98.6  F (37  C)] 97.9  F (36.6  C)  Heart Rate:  [] 74  Resp:  [8-64] 10  BP: ()/(53-83) 101/64  FiO2 (%):  [45 %-80 %]  60 %  SpO2:  [88 %-100 %] 98 %    Intake/Output Summary (Last 24 hours) at 11/16/17 1310  Last data filed at 11/16/17 1000   Gross per 24 hour   Intake             2335 ml   Output             3650 ml   Net            -1315 ml     Wt Readings from Last 4 Encounters:   11/16/17 78 kg (172 lb 0.1 oz)   10/22/17 83.4 kg (183 lb 12.8 oz)   09/16/17 84.6 kg (186 lb 8.2 oz)   09/14/17 89.8 kg (197 lb 15.6 oz)     BP - Mean:  [63-89] 71  FiO2 (%): 60 %  Resp: 10    Recent Labs  Lab 11/10/17  0920   PH 7.40   PCO2 60*   PO2 102   HCO3 37*   O2PER 80%       GEN: no acute distress   HEENT: head ncat, sclera anicteric, OP patent, trachea midline   PULM: trace rales only today with reasonable excursions    CV/COR: RRR S1S2 no gallop,  No rub, no murmur  ABD: soft nontender,   EXT:  Mild edema   warm  NEURO: grossly intact; moves all extremities well to command   SKIN: no obvious rash; no cyanosis or mottling          Data:   All data and imaging reviewed     ROUTINE ICU LABS (Last four results)  CMP  Recent Labs  Lab 11/16/17  0520 11/15/17  0540 11/14/17  0525 11/13/17  0539 11/12/17  0537  11/10/17  0520    132* 131* 130* 131*  < > 131*   POTASSIUM 5.1 4.3 4.6 4.7 5.1  < > 4.7   CHLORIDE 98 97 93* 90* 91*  < > 92*   CO2 31 30 32 34* 35*  < > 35*   ANIONGAP 4 5 6 6 5  < > 4   * 166* 171* 232* 221*  < > 218*   BUN 18 20 32* 39* 41*  < > 29   CR 0.99 0.92 1.02 1.13* 1.12*  < > 0.93   GFRESTIMATED 61 67 59* 53* 53*  < > 66   GFRESTBLACK 74 81 72 64 64  < > 80   VISHNU 8.5 8.0* 8.2* 8.1* 8.3*  < > 8.2*   MAG  --   --   --  2.2  --   --   --    PHOS  --   --   --  4.6*  --   --   --    PROTTOTAL  --   --   --  6.3* 6.0*  --  6.1*   ALBUMIN  --   --   --  2.6* 2.5*  --  2.7*   BILITOTAL  --   --   --  0.8 0.2  --  0.4   ALKPHOS  --   --   --  124 120  --  139   AST  --   --   --  124* 98*  --  69*   ALT  --   --   --  33 28  --  36   < > = values in this interval not displayed.  CBC  Recent Labs  Lab 11/16/17  8220  11/15/17  2045 11/15/17  0540 11/14/17  0525 11/13/17  0539   WBC 7.2  --  6.4 5.9 6.4   RBC 2.93*  --  2.10* 2.10* 2.43*   HGB 8.7* 9.0* 6.3* 6.2* 7.2*   HCT 27.0*  --  19.2* 18.6* 21.6*   MCV 92  --  91 89 89   MCH 29.7  --  30.0 29.5 29.6   MCHC 32.2  --  32.8 33.3 33.3   RDW 14.6  --  14.3 14.4 14.9   *  --  125* 129* 135*     INRNo lab results found in last 7 days.  Arterial Blood Gas  Recent Labs  Lab 11/10/17  0920   PH 7.40   PCO2 60*   PO2 102   HCO3 37*   O2PER 80%       All cultures:  No results for input(s): CULT in the last 168 hours.  No results found for this or any previous visit (from the past 24 hour(s)).    MD Reshma    Billing: This patient is critically ill: YES Total critical care time today 30 min.

## 2017-11-16 NOTE — PROGRESS NOTES
[facility]    Hospitalist Progress Note  Name: Sabina Figueroa    MRN: [id number]  Provider:  Jonathan Black MD  Date of Service: 11/16/2017    Summary of Stay: Sabina Figueroa is a 42 year old female with a history of ILD (diagnosed by wedge lung bx in 9/17 at [facility]-acute lung injury thought 2/2 e-cigarettes in setting of DIP and NSIP by surg path)-at that hospitalization she was discharged on high dose steroids with PCP ppx, she then presented to the [facility] and hospitalized 10/18-10/23 for recurrent respiratory failure at which time it was felt due to PCP pna as she had not been taking her tmp-sulfa ppx.  She was discharged on high dose steroids taper and therapeutic tmp-sulfa dosing for PCP with sats 97 % range on RA and had arranged f/u with Dr. Mane ( to f/u vasculitic w/u).  She returns to our ER and admitted on 11/4/2017 with e/o recurrent hypoxic respiratory failure again/fever/KAITLIN to 102/tachycardia/leukocytosis and lactic acidosis to 6.3 consistent with sepsis in the setting of non-compliance/confusion with medications (only taking ppx dosing of tmp-sulfa), and missing of all medications due to being picked up for DUI (drank on 2 separate occassions but prior to that was sober since 5/11/17) and being held in senior care.     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  In the night of 11/5 she had increasing O2 requirements, worsening tachypnea - CT PE protocol completed and shows no PE but significant worsening of bilateral pulmonary infiltrates     Problem List:   1.  Sepsis: Presumably from respiratory source. CXR with basilar infiltrates. CT showed diffuse infltrates. Respiratory panel negative. Lactic acidosis resolved with IVF. She is afebrile with negative cultures.  - She had been on broad spectrum abx with vancomycin, zosyn- stopped due to cytopenia on 11/15   -  Continue Bactrim per ID    2.  Acute hypoxic respiratory failure: Complicated pulmonary situation/history. 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 Had prolonged hospitalization at [facility] for suspected PCP PNA, was discharged on steroid/therapeutic Bactrim and was non-compliant with medications due to recent DUI. She was in [facility] for 2 days and misunderstanding of Bactrim dosing. Patient was given broad spectrum abx with azith/zosyn/vanco in ER. Has continued now on zosyn/vanco and high dose Bactrim for suspected ongoing/inadequately treated PCP and high risk for alternative respiratory infection due to immunosuppression with steroids. She has been nearly BiPAP dependent since admission, only coming off to [facility] for short periods.  - On IV steroid Solu-Medrol 20 mg q6h and scheduled DuoNeb's  -- Off IV lasix now, started Lasix 20 mg PTF daily.  -- Monitor daily weight and intake/output.  - She requires BIPAP at night and HiFlo day time     3.  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  - Case discussed with intensivist (Dr Charles) on round.   - Not lung transplant candidate.       4.  Non-compliance with medications:   - Social service consult as she may require case-worker at discharge to help mitigate suspected social issues/med compliance.    5.  Fever:  Suspected respiratory source:  UA negative, BCx NGTD. At risk for opportunistic infections in setting of steroids. Done with antibiotics, only on Bactrim. Monitor for fevers.     6.  KAITLIN:  Baseline creatinine normal. Renal function normalized after IVF. Renal function largely stable.    7.  Cirrhosis:  Chronically on spironolactone/furosemide/lactulose/rifaximin. LFTs currently with minimally elevated AST, bili normal. NH3 normal and on review of chart not significantly elevated except 2 prior occasions. PO Lasix and spironolactone has been placed on hold and was started IV  Lasix. No significant ascites on exam.  - IV lasix on stopped, on oral lasix.  - Continue lactulose and rifaximin  - Restart PTA lasix/spironolactone     8.  Steroid induced DM: Uncontrolled blood glucose for many days. Patient becoming more hyperglycemic with tube feeds and stress dose IV steroids.   -  Continue novolog 10 units q4h   -  High intensity SSI  - Changing to cyclic tube feeds so stop lantus and start NPH 20 U qAM and 25 U qPM  - Humilin insulin prescribed at last hospitalization not covered by insurance     9.  Narcotic dependence:  Continue suboxone as at home.  -- Pain team following  -- Continue suboxone and low dose PRN dilaudid     10.  Suspected  Underlying vasculitic/autoimmune process at play:  AMISHA by IFA borderline positive, and cytoplasmic IgG negative.  Outpatient pulmonary/rheumatology follow-up at discharge.    11.  Nicotine dependence with suspected ALI 2/2 e cigarettes: Continues to smoke despite nicotine replacement. ATQ this admission     12.  Anemia: Progressive decline in Hb, no bleeding noted. Was 13.8 on adm then down to 5.8 before receiving 2 U PRBC 11/12. Has now required transfusions past 2 days. Hb this AM 6.3. She denies black/red/tarry stools. Only mild bruising at site of lovenox injections. Etiology unclear. Discussed with hematology and ddx is bleeding, hemolysis, medication, BM process. Mostly likely explanation is meds (specifically Bactrim, zosyn, rifaximin in the ordred).  -- Daily CBC  -- Monitor for bleeding   -- Stopped Lovenox  -- 2 U PRBC today  -- Fe studies, normal, B12/folate normal.   -- Haptoglobin pending.  -- Blood smear and reticulocyte count normal.  -- Hematology consulted, input appreciated. Discussed with Dr. Truong. He thinks this is due to medication and there is no sign of real hemolysis, though LDH is elevated for many other reasons.  -- Transfuse for Hb <7     13.  Severe protein calorie malnutrition:  Had TF placed and RD managing TF.  -- Unable  to tolerate PO due to respiratory issues  -- RD to cycle TF starting 11/16, will adjust insulin as needed.    14.  Prerenal azotemia: Slight improvement in creatinine.  -- No NSAIDs or nephrotoxins    DVT Prophylaxis: Enoxaparin (Lovenox) SQ stopped for now, due to anemia, PCDs  Code Status: Full Code  Disposition: Continue ICU care for few more days.   Family updated today: No     Interval History   The patient reports doing well. No chest pain or shortness of breath. No nausea, vomiting, diarrhea, constipation. No fevers. No other specific complaints identified.     -Data reviewed today: I reviewed all new labs and imaging results over the last 24 hours.     Physical Exam   Temp: 97.9  F (36.6  C) Temp src: Axillary BP: 101/64   Heart Rate: 74 Resp: 10 SpO2: 98 % O2 Device: High Flow Nasal Cannula (HFNC) Oxygen Delivery: Other (Comments) (30)  Vitals:    11/14/17 0400 11/15/17 0400 11/16/17 0400   Weight: 79.1 kg (174 lb 6.9 oz) 79 kg (174 lb 3.3 oz) 78 kg (172 lb 0.1 oz)     Vital Signs with Ranges  Temp:  [97.9  F (36.6  C)-98.6  F (37  C)] 97.9  F (36.6  C)  Heart Rate:  [] 74  Resp:  [8-64] 10  BP: ()/(53-83) 101/64  FiO2 (%):  [45 %-80 %] 60 %  SpO2:  [88 %-100 %] 98 %  I/O last 3 completed shifts:  In: 3400 [P.O.:240; I.V.:260; NG/GT:680]  Out: 3700 [Urine:3700]    GENERAL: No apparent distress. Awake, alert, and fully oriented.  HEENT: Normocephalic, atraumatic. Extraocular movements intact.  CVS: Regular rate and rhythm without murmurs or rubs. No S3.  CHEST: Coarse breaths bilaterally, + wheezing.  GASTROINTESTINAL: Soft, non-tender, non-distended. Bowel sounds normoactive.   EXTREMITIES: No cyanosis or clubbing. Trace BL LE edema.  NEUROLOGICAL: CN 2-12 grossly intact, no focal neurological deficits.  DERMATOLOGICAL: No rash, ulcer, bruising, nor jaundice.     Medications     NaCl 10 mL/hr at 11/16/17 1000     IV fluid REPLACEMENT ONLY         furosemide  20 mg Oral or Feeding Tube Daily      spironolactone  50 mg Oral or Feeding Tube Daily     insulin isophane human  10 Units Subcutaneous QAM AC     insulin aspart  6 Units Subcutaneous Q4H     insulin isophane human  30 Units Subcutaneous Q24H     protein modular  1 packet Per Feeding Tube Daily     methylPREDNISolone sodium succinate  20 mg Intravenous Q6H     insulin aspart  1-10 Units Subcutaneous Q4H     atorvastatin (LIPITOR) tablet 20 mg  20 mg Oral or Feeding Tube At Bedtime     rifaximin  550 mg Oral or Feeding Tube BID     folic acid  1 mg Oral or Feeding Tube Daily     aspirin  81 mg Oral or Feeding Tube Daily     mirtazapine (REMERON) tablet 15 mg  15 mg Oral or Feeding Tube At Bedtime     pantoprazole  40 mg Per Feeding Tube Daily     lactulose  20 g Per Feeding Tube Daily     gabapentin  600 mg Per Feeding Tube TID     methocarbamol  500 mg Oral or Feeding Tube TID     FLUoxetine  40 mg Oral or Feeding Tube Daily     loratadine  10 mg Oral or Feeding Tube Daily     sulfamethoxazole-trimethoprim  50 mL Oral 4x Daily     ipratropium - albuterol 0.5 mg/2.5 mg/3 mL  3 mL Nebulization 4x daily     buprenorphine HCl-naloxone HCl  3 Film Sublingual Daily     nicotine  1 patch Transdermal Daily     nicotine   Transdermal Daily     nicotine   Transdermal Q8H     sodium chloride (PF)  3 mL Intracatheter Q8H     Data     Laboratory:    Recent Labs  Lab 11/16/17  0520 11/15/17  2045 11/15/17  0540 11/14/17  0525   WBC 7.2  --  6.4 5.9   HGB 8.7* 9.0* 6.3* 6.2*   HCT 27.0*  --  19.2* 18.6*   MCV 92  --  91 89   *  --  125* 129*       Recent Labs  Lab 11/16/17 0520 11/15/17  0540 11/14/17  0525    132* 131*   POTASSIUM 5.1 4.3 4.6   CHLORIDE 98 97 93*   CO2 31 30 32   ANIONGAP 4 5 6   * 166* 171*   BUN 18 20 32*   CR 0.99 0.92 1.02   GFRESTIMATED 61 67 59*   GFRESTBLACK 74 81 72   VISHNU 8.5 8.0* 8.2*     No results for input(s): CULT in the last 168 hours.    Imaging:  No results found for this or any previous visit (from the past 24  hour(s)).    11/16/2017

## 2017-11-16 NOTE — PLAN OF CARE
Problem: Patient Care Overview  Goal: Plan of Care/Patient Progress Review  PT: Attempted to see patient for a PT evaluation but patient reports being too fatigued currently and requesting an am appointment tomorrow instead. Patient rescheduled.

## 2017-11-16 NOTE — PLAN OF CARE
Problem: Patient Care Overview  Goal: Plan of Care/Patient Progress Review  ICU End of Shift Summary.  For vital signs and complete assessments, please see documentation flowsheets.     Pertinent assessments: AOX4, Tele SR, LS clear/dim on HFNC 60/30LPM, Tolerated HFNC since this am, callaway 100 ml/hr. Tolerating CL diet. Pain controled with PRN Dilaudid. Pt refused to work with PT this shift.  Major Shift Events: CL diet started  Plan (Upcoming Events): Cyclic TF to start at 8 pm tonight, PT eval, monitor Hgb, resp status, complete bactrim course.  Discharge/Transfer Needs: TBD    Bedside Shift Report Completed : yes  Bedside Safety Check Completed: yes

## 2017-11-16 NOTE — PLAN OF CARE
Problem: Sepsis/Septic Shock (Adult)  Goal: Signs and Symptoms of Listed Potential Problems Will be Absent, Minimized or Managed (Sepsis/Septic Shock)  Signs and symptoms of listed potential problems will be absent, minimized or managed by discharge/transition of care (reference Sepsis/Septic Shock (Adult) CPG).   Outcome: No Change  ICU End of Shift Summary.  For vital signs and complete assessments, please see documentation flowsheets.       Pertinent assessments: A&Ox4. VSS. Afebrile.  Tolearted  BIPAP at HS 45% FIO2. . LS: dim, desats with minor acttivity. KOROMA. +BS.   Watters WDL in place good UOP. Skin bruised throughout. IV TKO to left arm.   Major Shift Events: rested throughout the night, received Dilaudid PRN for back pain with relief. HGB recheck this am 8.7  Plan (Upcoming Events): continue Pulmonary toileting, Steroids,  Follow up labs and Cx, wean off Bipap as able, SW consult at D/C, start cyclic feeding today  Discharge/Transfer Needs: continue ICU cares for now

## 2017-11-16 NOTE — PROGRESS NOTES
RT Note:    Patient remained on BIPAP 14/8 45% overnight. SPO2 95-99%. BS clear/diminished. RT will continue to follow.     Clover Haddad, RT 11/16/2017, 5:33 AM

## 2017-11-16 NOTE — PROGRESS NOTES
Oncology/Hematology Follow Up Note:    Assessment and Plan:  #1 Acute onset of anemia  #2 Mild anemia of chronic disease at baseline  #3 Mild thrombocytopenia, stable  #4 ILD exacerbation     The patient's Hgb was normal as recently as 10 days ago, when she was admitted to the hospital.  Since then, it has quickly decreased to the 5-6 range.  For the hemoglobin to decrease this rapidly, it must be due to blood loss, hemolysis, an acute bone marrow disorder such as acute leukemia, or a medication.  The patient has had no bleeding.  The low reticulocyte count and the lack of polychromasia, spherocytes, and schistocytes on the peripheral smear are not consistent with hemolysis.  Although the peripheral blood smear did show a leukoerythroblastic picture, my suspicion for an acute bone marrow disorder such as acute leukemia is very low, given the lack of dysplasia or blasts.  I do not think a bone marrow biopsy is warranted.     Therefore, I think it is very likely that the patient's rapid decrease in hemoglobin is medication induced.  Since admission to the hospital, she has been on multiple antibiotics including vancomycin, Zosyn, and high-dose Bactrim for presumed PCP pneumonia.  Both Zosyn and Bactrim can potentially cause anemia.  The patient also takes rifaximin for her cirrhosis.  This is a medication that can also cause anemia, but she has been on this medication for the last 2 years, without any issues.    Hgb has improved to 8.7 today after receiving 2 units of pRBCs.  Zosyn and Vancomycin have been discontinued, but the primary team is still planning on continuing high dose Bactrim (for presumed PCP, PCR negative) for 6 more days.  In addition, LEONARDO showed a warm autoantibody (3+).     PLAN:  1. Continue off Zosyn and Vancomycin.  The patient continues on high dose Bactrim per ID recommendations.  2. Monitor CBC daily  3. LEONARDO showed a warm autoantibody (3+), but there is no evidence of hemolysis (low retic  count, no polychromasia/spherocytes on smear, normal haptoglobin) except for an elevated LDH.  I suspect the positive LEONARDO is drug induced.  The patient is already on 80 mg of methylprednisolone daily for her ILD.  In the absence of hemolysis and ongoing steroid use, we will simply repeat a LEONARDO in 3-4 days.    Carlos Truong M.D.  [facility]     Carlos Truong M.D.  [facility]  [phone]            Subjective:    I visited the patient this afternoon.  Her shortness of breath was much improved after receiving 2 units of pRBCs.  Vancomycin and Zosyn have been discontinued.      Labs:  All labs reviewed    CBC  Recent Labs  Lab 11/16/17  0520 11/15/17  2045 11/15/17  0540 11/14/17  0525   WBC 7.2  --  6.4 5.9   HGB 8.7* 9.0* 6.3* 6.2*   MCV 92  --  91 89   *  --  125* 129*       CMP  Recent Labs  Lab 11/16/17  0520 11/15/17  0540 11/14/17  0525 11/13/17  0539 11/12/17  0537  11/10/17  0520    132* 131* 130* 131*  < > 131*   POTASSIUM 5.1 4.3 4.6 4.7 5.1  < > 4.7   CHLORIDE 98 97 93* 90* 91*  < > 92*   CO2 31 30 32 34* 35*  < > 35*   ANIONGAP 4 5 6 6 5  < > 4   * 166* 171* 232* 221*  < > 218*   BUN 18 20 32* 39* 41*  < > 29   CR 0.99 0.92 1.02 1.13* 1.12*  < > 0.93   GFRESTIMATED 61 67 59* 53* 53*  < > 66   GFRESTBLACK 74 81 72 64 64  < > 80   VISHNU 8.5 8.0* 8.2* 8.1* 8.3*  < > 8.2*   MAG  --   --   --  2.2  --   --   --    PHOS  --   --   --  4.6*  --   --   --    PROTTOTAL  --   --   --  6.3* 6.0*  --  6.1*   ALBUMIN  --   --   --  2.6* 2.5*  --  2.7*   BILITOTAL  --   --   --  0.8 0.2  --  0.4   ALKPHOS  --   --   --  124 120  --  139   AST  --   --   --  124* 98*  --  69*   ALT  --   --   --  33 28  --  36   < > = values in this interval not displayed.    INRNo lab results found in last 7 days.    Blood CultureNo results for input(s): CULT in the last 168 hours.      Carlos Truong MD  [facility]  11/16/2017 5:00 PM

## 2017-11-17 NOTE — PROGRESS NOTES
RT Note:    Patient wore BIPAP 14/8 40-45% throughout night. BS diminished, SPO2 92-98%. Patient weaning to HFNC 30 LPM 50-60% while awake. Patient receiving scheduled nebulizers QID. RT will continue to follow.    Clover Haddad, RT 11/17/2017, 5:58 AM

## 2017-11-17 NOTE — PROGRESS NOTES
[facility]    Hospitalist Progress Note  Name: Sabina Figueroa    MRN: [id number]  Provider:  Jonathan Black MD    Date of Service: 11/17/2017    Summary of Stay: Sabina Figueroa is a 42 year old female with a history of ILD (diagnosed by wedge lung bx in 9/17 at [facility]-acute lung injury thought 2/2 e-cigarettes in setting of DIP and NSIP by surg path)-at that hospitalization she was discharged on high dose steroids with PCP ppx, she then presented to the [facility] and hospitalized 10/18-10/23 for recurrent respiratory failure at which time it was felt due to PCP pna as she had not been taking her tmp-sulfa ppx.  She was discharged on high dose steroids taper and therapeutic tmp-sulfa dosing for PCP with sats 97 % range on RA and had arranged f/u with Dr. Mane ( to f/u vasculitic w/u).  She returns to our ER and admitted on 11/4/2017 with e/o recurrent hypoxic respiratory failure again/fever/KAITLIN to 102/tachycardia/leukocytosis and lactic acidosis to 6.3 consistent with sepsis in the setting of non-compliance/confusion with medications (only taking ppx dosing of tmp-sulfa), and missing of all medications due to being picked up for DUI (drank on 2 separate occassions but prior to that was sober since 5/11/17) and being held in long term.     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  In the night of 11/5 she had increasing O2 requirements, worsening tachypnea - CT PE protocol completed and shows no PE but significant worsening of bilateral pulmonary infiltrates     Problem List:     1.  Sepsis: Presumably from respiratory source. CXR with basilar infiltrates. CT showed diffuse infltrates. Respiratory panel negative. Lactic acidosis resolved with IVF. She is afebrile with negative cultures.  - She had been on broad spectrum abx with vancomycin, zosyn- stopped due to cytopenia on  11/15  - Continue Bactrim per ID    2.  Acute hypoxic respiratory failure: Complicated pulmonary situation/history. 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 Had prolonged hospitalization at [facility] for suspected PCP PNA, was discharged on steroid/therapeutic Bactrim and was non-compliant with medications due to recent DUI. She was in snf for 2 days and misunderstanding of Bactrim dosing. Patient was given broad spectrum abx with azith/zosyn/vanco in ER. Has continued now on zosyn/vanco and high dose Bactrim for suspected ongoing/inadequately treated PCP and high risk for alternative respiratory infection due to immunosuppression with steroids. She has been nearly BiPAP dependent since admission, only coming off to HFNC for short periods.  - On IV steroid Solu-Medrol 20 mg q6h and scheduled DuoNeb's  - Off IV lasix now, started Lasix 20 mg daily.  - Monitor daily weight and intake/output.  - She requires BIPAP at night and HiFNC day time- Try to wean her of oxygen at night.     3.  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  - Case discussed with intensivist (Dr Charles) on round.   - Not lung transplant candidate.       4.  Non-compliance with medications:   - Social service consult as she may require case-worker at discharge to help mitigate suspected social issues/med compliance.    5.  Fever:  Suspected respiratory source:  UA negative, BCx NGTD. At risk for opportunistic infections in setting of steroids. Done with antibiotics, only on Bactrim. Monitor for fevers.     6.  KAITLIN:  Baseline creatinine normal. Renal function normalized after IVF. Renal function largely stable.    7.  Cirrhosis:  Chronically on spironolactone/furosemide/lactulose/rifaximin. LFTs currently with minimally elevated AST, bili normal. NH3 normal and on review of chart not significantly elevated except 2 prior occasions. PO Lasix and spironolactone has been  placed on hold and was started IV Lasix. No significant ascites on exam.  - IV lasix on stopped, on oral lasix.  - Continue lactulose and rifaximin  - Restart PTA lasix/spironolactone     8.  Steroid induced DM: Uncontrolled blood glucose for many days. Patient becoming more hyperglycemic with tube feeds and stress dose IV steroids.   - Continue novolog 10 units q4h   - High intensity SSI  - Changing to cyclic tube feeds so stop lantus and start NPH 20 U qAM and 25 U qPM  - Humilin insulin prescribed at last hospitalization not covered by insurance     9.  Narcotic dependence:  Continue suboxone as at home.  - Pain team following  - Continue suboxone and low dose PRN dilaudid     10.  Suspected underlying vasculitic/autoimmune:  AMISHA by IFA borderline positive, and cytoplasmic IgG negative.  Outpatient pulmonary/rheumatology follow-up at discharge.    11.  Nicotine dependence with suspected ALI 2/2 e cigarettes: Continues to smoke despite nicotine replacement.      12.  Anemia: Progressive decline in Hb, no bleeding noted. Was 13.8 on adm then down to 5.8 before receiving 2 U PRBC 11/12. Has now required transfusions past 2 days. Hb this AM 6.3. She denies black/red/tarry stools. Only mild bruising at site of lovenox injections. Etiology unclear. Discussed with hematology and ddx is bleeding, hemolysis, medication, BM process. Mostly likely explanation is meds (specifically Bactrim, zosyn, rifaximin in the ordred).  - Daily CBC, monitor for bleeding   - Stopped Lovenox  - Fe studies, normal, B12/folate normal.   - Haptoglobin pending.  - Blood smear and reticulocyte count normal.  - Hematology consulted, input appreciated. Discussed with Dr. Truong yesterday. He thinks this is due to medication and there is no sign of real hemolysis, though LDH is elevated for many other reasons.  - Transfuse for Hb <7.    13.  Severe protein calorie malnutrition:  Had TF placed and RD managing TF.  - Unable to tolerate PO due to  respiratory issues  - TF to cycle TF starting 11/16, will adjust insulin as needed.    14.  Prerenal azotemia: Slight improvement in creatinine.  - No NSAIDs or nephrotoxins    DVT Prophylaxis: Enoxaparin (Lovenox) SQ stopped for now, due to anemia, PCDs  Code Status: Full Code  Disposition: Continue ICU care for few more days.   Family updated today: No     Interval History   The patient seen and examined, feels better, no nausea or vomiting, SOB better. No chest pain. No fevers. No other specific complaints identified.     -Data reviewed today: I reviewed all new labs and imaging results over the last 24 hours.     Physical Exam   Temp: 98.6  F (37  C) Temp src: Oral BP: 108/59   Heart Rate: 62 Resp: 10 SpO2: 96 % O2 Device: High Flow Nasal Cannula (HFNC) Oxygen Delivery: Other (Comments) (20)  Vitals:    11/15/17 0400 11/16/17 0400 11/17/17 0500   Weight: 79 kg (174 lb 3.3 oz) 78 kg (172 lb 0.1 oz) 80.6 kg (177 lb 11.1 oz)     Vital Signs with Ranges  Temp:  [96.8  F (36  C)-98.6  F (37  C)] 98.6  F (37  C)  Heart Rate:  [59-88] 62  Resp:  [8-47] 10  BP: ()/(57-77) 108/59  FiO2 (%):  [40 %-60 %] 50 %  SpO2:  [92 %-100 %] 96 %  I/O last 3 completed shifts:  In: 2412.5 [P.O.:750; I.V.:240; NG/GT:480]  Out: 3100 [Urine:3100]    GENERAL: No apparent distress. Awake, alert, and fully oriented.  HEENT: Normocephalic, atraumatic. Extraocular movements intact.  CVS: Regular rate and rhythm without murmurs or rubs. No S3.  CHEST: Coarse breaths bilaterally, + wheezing.  ABD: Soft, non-tender, non-distended. Bowel sounds normoactive.   EXTR: No cyanosis or clubbing. Trace BL LE edema.  NEUR: CN 2-12 grossly intact, no focal neurological deficits.  DERM: No rash, ulcer, bruising, nor jaundice.     Medications     NaCl Stopped (11/17/17 1000)     IV fluid REPLACEMENT ONLY         furosemide  20 mg Oral or Feeding Tube Daily     spironolactone  50 mg Oral or Feeding Tube Daily     insulin isophane human  10 Units  Subcutaneous QAM AC     insulin aspart  6 Units Subcutaneous Q4H     insulin isophane human  30 Units Subcutaneous Q24H     protein modular  1 packet Per Feeding Tube Daily     methylPREDNISolone sodium succinate  20 mg Intravenous Q6H     insulin aspart  1-10 Units Subcutaneous Q4H     atorvastatin (LIPITOR) tablet 20 mg  20 mg Oral or Feeding Tube At Bedtime     rifaximin  550 mg Oral or Feeding Tube BID     folic acid  1 mg Oral or Feeding Tube Daily     aspirin  81 mg Oral or Feeding Tube Daily     mirtazapine (REMERON) tablet 15 mg  15 mg Oral or Feeding Tube At Bedtime     pantoprazole  40 mg Per Feeding Tube Daily     lactulose  20 g Per Feeding Tube Daily     gabapentin  600 mg Per Feeding Tube TID     methocarbamol  500 mg Oral or Feeding Tube TID     FLUoxetine  40 mg Oral or Feeding Tube Daily     loratadine  10 mg Oral or Feeding Tube Daily     sulfamethoxazole-trimethoprim  50 mL Oral 4x Daily     ipratropium - albuterol 0.5 mg/2.5 mg/3 mL  3 mL Nebulization 4x daily     buprenorphine HCl-naloxone HCl  3 Film Sublingual Daily     nicotine  1 patch Transdermal Daily     nicotine   Transdermal Daily     nicotine   Transdermal Q8H     sodium chloride (PF)  3 mL Intracatheter Q8H     Data     Laboratory:    Recent Labs  Lab 11/17/17  0559 11/16/17  0520 11/15/17  2045 11/15/17  0540   WBC 5.0 7.2  --  6.4   HGB 8.6* 8.7* 9.0* 6.3*   HCT 27.2* 27.0*  --  19.2*   MCV 96 92  --  91   * 143*  --  125*       Recent Labs  Lab 11/17/17  0559 11/16/17  0520 11/15/17  0540   * 133 132*   POTASSIUM 4.9 5.1 4.3   CHLORIDE 98 98 97   CO2 29 31 30   ANIONGAP 4 4 5   * 164* 166*   BUN 14 18 20   CR 0.84 0.99 0.92   GFRESTIMATED 74 61 67   GFRESTBLACK >90 74 81   VISHNU 8.1* 8.5 8.0*     No results for input(s): CULT in the last 168 hours.    Imaging:  No results found for this or any previous visit (from the past 24 hour(s)).    11/17/2017

## 2017-11-17 NOTE — PROGRESS NOTES
Oncology/Hematology Follow Up Note:    Assessment and Plan:  #1 Acute onset of anemia  #2 Mild anemia of chronic disease at baseline  #3 Mild thrombocytopenia, stable  #4 ILD exacerbation      The patient's Hgb was normal as recently as 10 days ago, when she was admitted to the hospital.  Since then, it has quickly decreased to the 5-6 range.  For the hemoglobin to decrease this rapidly, it must be due to blood loss, hemolysis, an acute bone marrow disorder such as acute leukemia, or a medication.  The patient has had no bleeding.  The low reticulocyte count and the lack of polychromasia, spherocytes, and schistocytes on the peripheral smear are not consistent with hemolysis.  Although the peripheral blood smear did show a leukoerythroblastic picture, my suspicion for an acute bone marrow disorder such as acute leukemia is very low, given the lack of dysplasia or blasts.  I do not think a bone marrow biopsy is warranted.      Therefore, I think it is very likely that the patient's rapid decrease in hemoglobin is medication induced.  Since admission to the hospital, she has been on multiple antibiotics including vancomycin, Zosyn, and high-dose Bactrim for presumed PCP pneumonia.  Both Zosyn and Bactrim can potentially cause anemia.  The patient also takes rifaximin for her cirrhosis.  This is a medication that can also cause anemia, but she has been on this medication for the last 2 years, without any issues.     Hgb is stable at 8.6 today.  Zosyn and Vancomycin have been discontinued, but the primary team is still planning on continuing high dose Bactrim (for presumed PCP, PCR negative) for 5 more days.  In addition, LEONARDO showed a warm autoantibody (3+).      PLAN:  1. Continue off Zosyn and Vancomycin.  The patient continues on high dose Bactrim per ID recommendations.  2. Monitor CBC daily  3. LEONARDO showed a warm autoantibody (3+), but there is no evidence of hemolysis (low retic count, no polychromasia/spherocytes  on smear, normal haptoglobin) except for an elevated LDH.  I suspect the positive LEONARDO is drug induced.  The patient is already on 80 mg of methylprednisolone daily for her ILD.  In the absence of hemolysis and ongoing steroid use, we will simply repeat a LEONARDO early next week.      Carlos Truong M.D.  [facility]  [phone]    Subjective:    No new events overnight. The patient feels better today, but her oxygen requirement has not come down significantly.       Labs:  All labs reviewed    CBC  Recent Labs  Lab 11/17/17  0559 11/16/17  0520 11/15/17  2045 11/15/17  0540   WBC 5.0 7.2  --  6.4   HGB 8.6* 8.7* 9.0* 6.3*   MCV 96 92  --  91   * 143*  --  125*       CMP  Recent Labs  Lab 11/17/17  0559 11/16/17  0520 11/15/17  0540 11/14/17  0525 11/13/17  0539 11/12/17  0537   * 133 132* 131* 130* 131*   POTASSIUM 4.9 5.1 4.3 4.6 4.7 5.1   CHLORIDE 98 98 97 93* 90* 91*   CO2 29 31 30 32 34* 35*   ANIONGAP 4 4 5 6 6 5   * 164* 166* 171* 232* 221*   BUN 14 18 20 32* 39* 41*   CR 0.84 0.99 0.92 1.02 1.13* 1.12*   GFRESTIMATED 74 61 67 59* 53* 53*   GFRESTBLACK >90 74 81 72 64 64   VISHNU 8.1* 8.5 8.0* 8.2* 8.1* 8.3*   MAG  --   --   --   --  2.2  --    PHOS  --   --   --   --  4.6*  --    PROTTOTAL  --   --   --   --  6.3* 6.0*   ALBUMIN  --   --   --   --  2.6* 2.5*   BILITOTAL  --   --   --   --  0.8 0.2   ALKPHOS  --   --   --   --  124 120   AST  --   --   --   --  124* 98*   ALT  --   --   --   --  33 28       INRNo lab results found in last 7 days.    Blood CultureNo results for input(s): CULT in the last 168 hours.      Carlos Truong MD  [facility]  11/17/2017 2:34 PM

## 2017-11-17 NOTE — PROGRESS NOTES
CLINICAL NUTRITION SERVICES - REASSESSMENT NOTE      Recommendations Ordered by Registered Dietitian (RD):   Continue EN as ordered until able to consistently meet >65% of estimated needs orally     EVALUATION OF PROGRESS TOWARD GOALS/NEW FINDINGS   Enteral intake - Monitor for adequacy, tolerance, stooling, labs  Nutrition focused physical findings - oxygen needs, POC  Progress towards goals will be monitored and evaluated per protocol and Practice Guidelines    Diet order: advanced to full liquids this AM with high consistent CHO restrictions. HFNC 30 L while awake this AM    Patient remains on TF since 11/7, cycled with good tolerance 11/16 PM:   Isosource 1.5 from 8 pm to 8 am - 110 mL/hr x 12 hours. 1320 mL provides 1980 kcal (25 kcal per kg), 90 g protein (1.1 g per kg), 232 g CHO, 20 g fiber and 1003 mL H20.       Meds: Remeron 15 mg, Solumedrol q6 hours    Labs: Na 131 (L), BG remains variable with overnight ready >300    BM: 11/17, 600 mL stool in last 24 hours (Lactulose)    Fluid: -7.2 L    Weight: 80.6 kg, up ~3 kg since admit    ASSESSED NUTRITION NEEDS (PER APPROVED PRACTICE GUIDELINES; DW: 66.1 kg AdjBW):  Estimated Energy Needs: 4546-2016 kcals (30-35 Kcal/Kg)  Justification: maintenance, increased need with acute illness  Estimated Protein Needs: 79-99+ grams protein (1.2-1.5 g pro/Kg)  Justification: preservation of LBM  Estimated Fluid Needs: >1 mL/Kcal  Justification: maintenance     Previous Goals:   TF at goal rate to meet % of estimated needs while NPO  Evaluation: Met    Previous Nutrition Diagnosis:   No nutrition diagnosis identified at this time as EN meets 100% of estimated needs  Evaluation: Ongoing    CURRENT NUTRITION DIAGNOSIS  Inadequate oral intake related to increased oxygen neeeds as evidenced by EN reliance x 10 days    INTERVENTIONS  Recommendations / Nutrition Prescription  Diet advancement per MD/RN discretion    Justify d/c of nutrition support when able to consistently  meet >65% of estimated needs orally.    BG control <180 - insulin per MD/PharmD    Implementation  PN Composition and PN schedule:   EN Composition, EN Schedule and Feeding Tube Flush: continue as ordered  Collaboration and Referral of care: Discussed patient during interdisciplinary care rounds this morning    Goals  TF at goal rate + PO intake to meet % of estimated needs      MONITORING AND EVALUATION:  Progress towards goals will be monitored and evaluated per protocol and Practice Guidelines      Edyta Nobles RDN, LD, CNSC  3rd floor/ICU: [phone]  All other floors: [phone]  Weekend/holiday: [phone]  Office: [phone]

## 2017-11-17 NOTE — PROGRESS NOTES
[facility]  Infectious Disease Progress Note          Assessment and Plan:   IMPRESSION:   1.  A 42-year-old female with biopsy-proven interstitial lung disease and several months of worsened respiratory status, on high-dose steroids further worsening respiratory status in the last month with increased infiltrates, question still primary interstitial lung disease versus new opportunistic infection versus new hospital-acquired infection at present.   2.  Recent respiratory failure at [facility], no clear new diagnosis made, improved with steroids increased, was also being treated as Pneumocystis pneumonia although data did not really support that diagnosis, did not continue proper treatment as an outpatient; if Pneumocystis not adequately treated to date.    3.  Interstitial lung disease, question other systemic component.   4.  History of alcoholic cirrhosis.   5.  Chronic tobacco abuse including E-cigarettes, some concern this might be some type of lung damage related to that.   6 Cytopenias, ? meds      RECOMMENDATIONS:   1.  12 days broad conventional coverage by any usual measure adequate, cytopenias, so off vanco/zosyn  2.  So far unremarkable serologies and noninvasive diagnostic tests to try to prove some infectious diagnosis.   3.  May eventually, if not improving, require further diagnostic intervention, obviously if she requires intubation  we should do a bronchoscopy.  4 LDH very elevated despite neg PCP 10/20 and other evidence against, continue to tx possible PJP, fugitel neg further evidence against this dx , still complete full 3 weeks of tx, septra may be cytopenia issue but try to get to 14 days then if cts an issue consider change, WBc PLTs and HgB stable        Interval History:   no new complaints but still very hypoxic, LDH 1042, procal 1.1  No + cxs no sputum productivity resp virus neg, procal 1.1  Hgb 8.6, TECE750 K, WBc OK              Medications:  "      furosemide  20 mg Oral or Feeding Tube Daily     spironolactone  50 mg Oral or Feeding Tube Daily     insulin isophane human  10 Units Subcutaneous QAM AC     insulin aspart  6 Units Subcutaneous Q4H     insulin isophane human  30 Units Subcutaneous Q24H     protein modular  1 packet Per Feeding Tube Daily     methylPREDNISolone sodium succinate  20 mg Intravenous Q6H     insulin aspart  1-10 Units Subcutaneous Q4H     atorvastatin (LIPITOR) tablet 20 mg  20 mg Oral or Feeding Tube At Bedtime     rifaximin  550 mg Oral or Feeding Tube BID     folic acid  1 mg Oral or Feeding Tube Daily     aspirin  81 mg Oral or Feeding Tube Daily     mirtazapine (REMERON) tablet 15 mg  15 mg Oral or Feeding Tube At Bedtime     pantoprazole  40 mg Per Feeding Tube Daily     lactulose  20 g Per Feeding Tube Daily     gabapentin  600 mg Per Feeding Tube TID     methocarbamol  500 mg Oral or Feeding Tube TID     FLUoxetine  40 mg Oral or Feeding Tube Daily     loratadine  10 mg Oral or Feeding Tube Daily     sulfamethoxazole-trimethoprim  50 mL Oral 4x Daily     ipratropium - albuterol 0.5 mg/2.5 mg/3 mL  3 mL Nebulization 4x daily     buprenorphine HCl-naloxone HCl  3 Film Sublingual Daily     nicotine  1 patch Transdermal Daily     nicotine   Transdermal Daily     nicotine   Transdermal Q8H     sodium chloride (PF)  3 mL Intracatheter Q8H                  Physical Exam:   Blood pressure 108/66, pulse 74, temperature 98.6  F (37  C), temperature source Oral, resp. rate 19, height 1.689 m (5' 6.5\"), weight 80.6 kg (177 lb 11.1 oz), SpO2 96 %, not currently breastfeeding.  Wt Readings from Last 2 Encounters:   11/17/17 80.6 kg (177 lb 11.1 oz)   10/22/17 83.4 kg (183 lb 12.8 oz)     Vital Signs with Ranges  Temp:  [96.8  F (36  C)-98.6  F (37  C)] 98.6  F (37  C)  Heart Rate:  [59-90] 71  Resp:  [8-47] 19  BP: ()/(57-77) 108/66  FiO2 (%):  [40 %-60 %] 50 %  SpO2:  [92 %-100 %] 96 %    Constitutional: Awake, alert, " cooperative, ongoing resp apparent distress   Lungs: Clear to auscultation bilaterally, no crackles or wheezing on high flow O2   Cardiovascular: Regular rate and rhythm, normal S1 and S2, and no murmur noted   Abdomen: Normal bowel sounds, soft, non-distended, non-tender   Skin: No rashes, no cyanosis, no edema   Other:           Data:   All microbiology laboratory data reviewed.  Recent Labs   Lab Test  11/17/17   0559  11/16/17   0520  11/15/17   2045  11/15/17   0540   WBC  5.0  7.2   --   6.4   HGB  8.6*  8.7*  9.0*  6.3*   HCT  27.2*  27.0*   --   19.2*   MCV  96  92   --   91   PLT  139*  143*   --   125*     Recent Labs   Lab Test  11/17/17   0559  11/16/17   0520  11/15/17   0540   CR  0.84  0.99  0.92     Recent Labs   Lab Test  10/19/17   0503   SED  23*     Recent Labs   Lab Test  11/04/17   0343  11/04/17   0107  11/04/17   0050  10/20/17   0950  10/19/17   0517  10/19/17   0054  10/17/17   2143  10/17/17   2020  10/17/17   2015   CULT  No growth  No growth  No growth  Moderate growth  Coagulase negative Staphylococcus  Susceptibility testing not routinely done  *  Moderate growth  Candida albicans / dubliniensis  Candida albicans and Candida dubliniensis are not routinely speciated  Susceptibility testing not routinely done  *  Canceled, Test credited  >10 Squamous epithelial cells/low power field indicates oral contamination. Please   recollect.  *  Notification of test cancellation was given to  Elena Araujo RN from [facility]. 10.19.17 at 0753. GR.    Canceled, Test credited  >10 Squamous epithelial cells/low power field indicates oral contamination. Please   recollect.  *  Notification of test cancellation was given to  Nelsy Tesfaye RN [facility] @0246. 10/19/17. SCG    No growth  No growth  No growth

## 2017-11-17 NOTE — PROGRESS NOTES
[facility]    Palliative Care Chart Check    This patient's most recent hospitalist note, most recent consultant notes, medication profile and labs from the past 24 hours have been reviewed at the request of Dr. Jonathan Jain.  [facility] database review:  Noted for Oxycodone 5 mg #30 on 09.20.17 and Gabapentin 300 mg    0 mg Daily Morphine Equivalent based on amount dispensed.  Opioids used 24 hrs 8 mg Dilaudid ~ 30 mg MME    Recommendation:   It has been determined that no change is necessary to the current plan of care at this time.   The chart will be reviewed regularly and the patient will be seen if necessary.   If you would like the patient to be seen, please contact the service at [phone] and ask to have the patient seen.  Time Spent 10 minutes, none in direct contact with patient or family.    Thank you!    Gabrielle Patterson   Pain Management and Palliative Care  [facility]  Pgr: [phone]

## 2017-11-17 NOTE — PLAN OF CARE
Problem: Patient Care Overview  Goal: Plan of Care/Patient Progress Review  PT: Patient seen by physical therapy for evaluation and treatment.  Patient with PMH significant for ILD, narcotic/nicotine dependence recently admitted for respiratory failure at the [facility] 10/18-10/23 secondary to PCP now admitted for recurrent respiratory failure, sepsis, anemia in the setting of non-compliance/confusion with medications; medication also unavailable secondary to arrest for DUI.  At baseline, patient lives with her significant other in an apartment and reports being independent with all mobility.  Discharge Planner PT   Patient plan for discharge: home  Current status: Patient supine upon arrival, on HiFlow NC FiO2 50%.  Patient required assist to don socks, independently transferred to sitting at EOB.  Required CGA for amb within room at bedside and line/tube management.  Patient transferred to bedside chair with min A, verbal cueing and line/tube management.  Patient's O2 sats decreased to 81-82% following standing; improved to above 92% within 90 sec with verbal cueing for pursed lip breathing.  RN aware.  Barriers to return to prior living situation: limited activity tolerance  Recommendations for discharge: TCU vs home pending progress with therapy  Rationale for recommendations: Patient currently presents with severely limited activity tolerance following second hospital admission for acute respiratory failure in 1 month.  Would benefit from ongoing therapy in order to return to increase activity tolerance, increase independence with mobility and return to previous level of function.       Entered by: Arelis Razo 11/17/2017 10:30 AM

## 2017-11-17 NOTE — PROGRESS NOTES
Patient was placed on  HFNC from overnight Bipap during mid morning hours. Patient since then continue to remain on HFNC 60% and 30LPM. Patient is tolerating it fairly well. sats higher 90s, BS clear, diminished, inline neb given. Will continue to monitor patient's progress and assess patient's respiratory status.     Matthew Cueva  November 16, 2017

## 2017-11-17 NOTE — PLAN OF CARE
Problem: Sepsis/Septic Shock (Adult)  Goal: Signs and Symptoms of Listed Potential Problems Will be Absent, Minimized or Managed (Sepsis/Septic Shock)  Signs and symptoms of listed potential problems will be absent, minimized or managed by discharge/transition of care (reference Sepsis/Septic Shock (Adult) CPG).   Outcome: No Change  ICU End of Shift Summary.  For vital signs and complete assessments, please see documentation flowsheets.     Pertinent assessments: Patient resting comfortably this shift. Alert/oriented although slightly drowsy. Lung sounds diminished with crackles in bilateral bases, became more clear after hours on BiPAP. Tele is NSR. Watters with good output. Abdomen is soft, nontender with positive bowel sounds and flatus. Bowel movement this shift. Complaints of pain 9/10 in back, treated with PRN dilaudid.   Major Shift Events: TF started at 2030, and at goal now.   Plan (Upcoming Events): Continue bactrim for PNA, moniro weights, continue solumedrol, blood glucose checks, monitor hemoglobins.   Discharge/Transfer Needs: N/A    Bedside Shift Report Completed :   Bedside Safety Check Completed:

## 2017-11-17 NOTE — PROGRESS NOTES
ICU End of Shift Summary.  For vital signs and complete assessments, please see documentation flowsheets.     Pertinent assessments: Encourage increase activity. Pt up to chair x3-4hrs today. Watters dc'd to promote increase activity. Pt does desat with activity but rebounds quickly on HIghflow NC. Bipap at noc. Nocturnal TF-Insulin to accommodate nocturnal feedings. Pt needs lots of encouragement to be compliant. Advanced diet to low residue, carb control. Pt eating very well and hoping to get NG tube Dc'd soon-states it is making her throat sore. Pt requesting PO dilaudid Q3hrs on the dot for chronic back pain.   Major Shift Events: See above  Plan (Upcoming Events): Monitor Carb intake. Monitor sats. Bipap at noc. Cont to increase activity to help maintain pt's long term independence.   Discharge/Transfer Needs: TBD    Bedside Shift Report Completed : yes  Bedside Safety Check Completed: yes

## 2017-11-17 NOTE — PROGRESS NOTES
SPIRITUAL HEALTH SERVICES Progress Note  [facility] ICU    Visited pt Sabina per her length of stay.  I am familiar with Sabina through previous admissions.  She requested that I see her on Monday.    I will see pt at the start of next week to assess emotional/spiritual needs and resources.    Dereck Dorsey M.Div., [facility]  Staff   Pager [phone]

## 2017-11-18 NOTE — PROGRESS NOTES
"Care Transition Initial Assessment -   Reason For Consult: discharge planning  Met with: Patient    Active Problems:    Sepsis (H)         DATA  Lives With: significant other  Living Arrangements: apartment  Description of Support System: Supportive, Involved  Who is your support system?: Significant Other, Children  Support Assessment: Adequate family and caregiver support, Adequate social supports.  Identified issues/concerns regarding health management: Pt stated that she was recently in the hospital due to respiratory failure in October 2017.    Quality Of Family Relationships: supportive, helpful, involved  Transportation Available: family or friend will provide    ASSESSMENT  Cognitive Status:  awake, alert and oriented  Concerns to be addressed: Per chart review, pt has been through inpatient treatment through [facility] in the past. Pt has been at [facility] ([facility]) for TCU placement in 2015. Pt stated that she would like to be able to return to home with the support of her significant other and family members. Pt stated that she has been independent with personal cares, transportation, ambulation, meal preparation, medication management, shopping, and household chores.  SW was alerted via chart review (sticky note) that LTACH facilities are following for potential placement.      PLAN  Financial costs for the patient includes: Pt stated that she is not working at this time. Pt did not elaborate on her financial situation.  Patient given options and choices for discharge: Yes - Pt stated that she feels that when her breathing is \"under control\" that she should be able to return to home.  Patient/family is agreeable to the plan?  SW stated that pt's care team will continue to monitor pt and indicated that pt may need LTACH or TCU placement.   Patient Goals and Preferences: Return to home.  Patient anticipates discharging to:  Home - pt stated that she has support from her significant " other and that he would be the one to provide transportation. SW will continue to follow.    Weekend SW: BRIDGET Simon, [facility] Hospital[facility]

## 2017-11-18 NOTE — PROGRESS NOTES
Hematology  Chart/labs reviewed at length today  Hemoglobin stable at 8.9 today platelets also acceptable at 125,000.  See Dr. Truong's note from yesterday.  Doubt significant hemolysis given other laboratory tests (smear, LDH, reticulocyte, etc.) ; patient already on high-dose cortical surface for interstitial lung disease/ILD.    Thus, suspect her anemia is both acute and chronic; PRBC support to keep her hemoglobin above seven is planned.  Her mild thrombocytopenia may be due to acute on chronic illness as well.    -follow CBC  -Continue present management for other conditions including active pulmonary infection.    Allen Alanis MD, MS  Medical Oncologist-Hematologist  [facility]

## 2017-11-18 NOTE — PROGRESS NOTES
11/17/17 1019   Quick Adds   Type of Visit Initial PT Evaluation   Living Environment   Lives With significant other   Living Arrangements apartment   Home Accessibility no concerns   Number of Stairs to Enter Home 0   Number of Stairs Within Home 0   Self-Care   Dominant Hand right   Usual Activity Tolerance good   Regular Exercise no   Activity/Exercise/Self-Care Comment Reports no exercise, does not work. States she is independent at baseline   Functional Level Prior   Ambulation 0-->independent   Transferring 0-->independent   Toileting 0-->independent   Bathing 0-->independent   Dressing 0-->independent   Eating 0-->independent   Communication 0-->understands/communicates without difficulty   Swallowing 0-->swallows foods/liquids without difficulty   Cognition 0 - no cognition issues reported   Fall history within last six months no   Which of the above functional risks had a recent onset or change? none   Prior Functional Level Comment Patient reports being independent with all mobility prior to admission   General Information   Onset of Illness/Injury or Date of Surgery - Date 11/04/17   Referring Physician Rocco Charles MD   Patient/Family Goals Statement return to home   Pertinent History of Current Problem (include personal factors and/or comorbidities that impact the POC) Patient with PMH significant for ILD, narcotic/nicotine dependence recently admitted for respiratory failure at the [facility] 10/18-10/23 secondary to PCP now admitted for recurrent respiratory failure, sepsis, anemia in the setting of non-compliance/confusion with medications; medication also unavailable secondary to arrest for DUI.    Precautions/Limitations fall precautions;oxygen therapy device and L/min  (HiFlo NC 50%)   Cognitive Status Examination   Orientation orientation to person, place and time   Personal Safety and Judgment impulsive   Pain Assessment   Patient Currently in Pain (back pain reported in chart, pt  "did not report during sessio)   Integumentary/Edema   Integumentary/Edema no deficits were identifed   Posture    Posture Forward head position;Protracted shoulders   Range of Motion (ROM)   ROM Comment WFL   Strength   Strength Comments mild strength deficits, poor activity tolerance   Bed Mobility   Bed Mobility Comments independent with assist for line/tube management   Transfer Skills   Transfer Comments CGA   Gait   Gait Comments CGA, amb 5 feet within room   Balance   Balance Comments CGA for safety   Coordination   Coordination no deficits were identified   Muscle Tone   Muscle Tone no deficits were identified   General Therapy Interventions   Planned Therapy Interventions balance training;gait training;strengthening;stretching;transfer training;home program guidelines;progressive activity/exercise   Clinical Impression   Criteria for Skilled Therapeutic Intervention yes, treatment indicated   PT Diagnosis decreased activity tolerance, decreased independence with mobility   Clinical Presentation Evolving/Changing   Clinical Presentation Rationale significant PMH, evolving O2 needs, limited social support   Clinical Decision Making (Complexity) Moderate complexity   Therapy Frequency` 5 times/week   Predicted Duration of Therapy Intervention (days/wks) 7 days   Anticipated Discharge Disposition Home;Transitional Care Facility  (pending porgess with therapy)   Risk & Benefits of therapy have been explained Yes   Patient, Family & other staff in agreement with plan of care Yes   [facility] [employer] TM \"6 Clicks\"   2016, Trustees of [facility], under license to [employer].  All rights reserved.   6 Clicks Short Forms Basic Mobility Inpatient Short Form   [facility]  \"6 Clicks\" V.2 Basic Mobility Inpatient Short Form   1. Turning from your back to your side while in a flat bed without using bedrails? 4 - None   2. Moving from lying on your back to sitting on the side of a flat bed " without using bedrails? 4 - None   3. Moving to and from a bed to a chair (including a wheelchair)? 3 - A Little   4. Standing up from a chair using your arms (e.g., wheelchair, or bedside chair)? 3 - A Little   5. To walk in hospital room? 3 - A Little   6. Climbing 3-5 steps with a railing? 2 - A Lot   Basic Mobility Raw Score (Score out of 24.Lower scores equate to lower levels of function) 19   Total Evaluation Time   Total Evaluation Time (Minutes) 5

## 2017-11-18 NOTE — PROGRESS NOTES
Respiratory Therapy    Patient seen resting in bed on BiPAP this morning and was weaned to 2 LPM nasal cannula throughout shift, SpO2 94%. Respiratory rate 16-18 and breath sounds clear/diminished with occasional fine crackles in LLL. Patient will continue to receive Duoneb QID. BiPAP and HFNC on standby. RT to follow.     Sofi Robles  November 18, 2017, 4:34 PM

## 2017-11-18 NOTE — PLAN OF CARE
Problem: Patient Care Overview  Goal: Plan of Care/Patient Progress Review  Discharge Planner PT   Patient plan for discharge: home  Current status: supervision to SBA transfer supine to sit, min assist/CGA sit to stand from EOB and commode,   Short distance gait with assist with lines and CGA, no LOB.  On 3 liters oxygen via oxymizer.  Sats 96-99 % at rest, decreased to 87% with activity with increase back to 90% or greater within 30 seconds.   Barriers to return to prior living situation: decreased tolerance to activity.   Recommendations for discharge: home vs TCU pending progress  Rationale for recommendations: decreased activity tolerance compared to baseline.        Entered by: Rebeka Zapata 11/18/2017 11:12 AM

## 2017-11-18 NOTE — PROGRESS NOTES
"Critical Care Progress Note      11/18/2017    Name: Sabina Figueroa MRN#: [id number]   Age: 42 year old YOB: 1975     Hsptl Day# 14  ICU DAY #    MV DAY #             Problem List:     1. Acute hypoxemic respiratory failure and underlying IS lung disease- she was off bipap today on 50% oxygen and is now down to NC oxygen. We will continue to titrate down oxygen and support as able. We will continue to support and titrate down support as able. She is off antibiotics at present (except Bactrim); she continues to be optimally \"dry\" and will continue to support and follow.   2. Interstitial lung disease- continue high dose steroids, unchanged.   3. Possible pneumonia- as noted, she is off antibiotics (except Bactrim)  4. Volume overload- she continues to be on \"dry\" side of euvolemia  5. History of alcoholism  6. History of cirrhosis  7. Pre-renal azotemia  8. Hyperglycemia   9. Nutrition- receiving  nutrition via enteral route but tolerating some reasonable amount of PO's today, encouraging.   10. Pancytopenia with Hb the primary line affected. Most likely anemia of acute/chronic disease and will support with RBC's to keep Hb above 7 (8.9 today).  Her Hb has not changed significantly over past 2 days       ICU Prophylaxis:   1. DVT: Hep Subq/ LMWH/mechanical  2. VAP: HOB 30 degrees, chlorhexidine rinse  3. Stress Ulcer: PPI/H2 blocker  4. Restraints: Nonviolent soft two point restraints required and necessary for patient safety and continued cares and good effect as patient continues to pull at necessary lines, tubes despite education and distraction. Will readdress daily.   5. IV Access - central access required and necessary for continued patient cares  6. Feeding - tolerating PO's   7. Family Update: no family at bedside today  8. Disposition - continue ICU care    Key goals for next 24 hours:   1. Continue to titrate down oxygen support as able  2. Increase activity as able.           [facility] " Medications:       insulin aspart  1-10 Units Subcutaneous TID AC     insulin aspart  1-7 Units Subcutaneous At Bedtime     insulin glargine  20 Units Subcutaneous At Bedtime     insulin aspart  5 Units Subcutaneous TID w/meals     furosemide  20 mg Oral or Feeding Tube Daily     spironolactone  50 mg Oral or Feeding Tube Daily     protein modular  1 packet Per Feeding Tube Daily     methylPREDNISolone sodium succinate  20 mg Intravenous Q6H     atorvastatin (LIPITOR) tablet 20 mg  20 mg Oral or Feeding Tube At Bedtime     rifaximin  550 mg Oral or Feeding Tube BID     folic acid  1 mg Oral or Feeding Tube Daily     aspirin  81 mg Oral or Feeding Tube Daily     mirtazapine (REMERON) tablet 15 mg  15 mg Oral or Feeding Tube At Bedtime     pantoprazole  40 mg Per Feeding Tube Daily     lactulose  20 g Per Feeding Tube Daily     gabapentin  600 mg Per Feeding Tube TID     methocarbamol  500 mg Oral or Feeding Tube TID     FLUoxetine  40 mg Oral or Feeding Tube Daily     loratadine  10 mg Oral or Feeding Tube Daily     sulfamethoxazole-trimethoprim  50 mL Oral 4x Daily     ipratropium - albuterol 0.5 mg/2.5 mg/3 mL  3 mL Nebulization 4x daily     buprenorphine HCl-naloxone HCl  3 Film Sublingual Daily     nicotine  1 patch Transdermal Daily     nicotine   Transdermal Daily     nicotine   Transdermal Q8H     sodium chloride (PF)  3 mL Intracatheter Q8H       NaCl Stopped (11/17/17 1000)     IV fluid REPLACEMENT ONLY                 Physical Examination:   Temp:  [96.9  F (36.1  C)-98.4  F (36.9  C)] 97.8  F (36.6  C)  Heart Rate:  [60-93] 77  Resp:  [8-27] 20  BP: (101-119)/(55-81) 115/67  FiO2 (%):  [30 %-50 %] 30 %  SpO2:  [82 %-99 %] 94 %    Intake/Output Summary (Last 24 hours) at 11/18/17 1647  Last data filed at 11/18/17 1200   Gross per 24 hour   Intake             2160 ml   Output             2250 ml   Net              -90 ml     Wt Readings from Last 4 Encounters:   11/18/17 81.6 kg (179 lb 14.3 oz)   10/22/17  83.4 kg (183 lb 12.8 oz)   09/16/17 84.6 kg (186 lb 8.2 oz)   09/14/17 89.8 kg (197 lb 15.6 oz)     BP - Mean:  [] 82  FiO2 (%): 30 %  Resp: 20  No lab results found in last 7 days.    GEN: no acute distress; comfortable appearing on exam today  HEENT: neck supple    PULM: trace rales only and otherwise clear with reasonably good excursions     CV/COR: RRR S1S2 no gallop,  No rub, no murmur  ABD: soft nontender  EXT:  Trace edema   warm  NEURO: grossly intact  SKIN: no obvious rash; no cyanosis or mottling          Data:   All data and imaging reviewed     ROUTINE ICU LABS (Last four results)  CMP  Recent Labs  Lab 11/18/17  0520 11/17/17  0559 11/16/17  0520 11/15/17  0540  11/13/17  0539 11/12/17  0537    131* 133 132*  < > 130* 131*   POTASSIUM 4.7 4.9 5.1 4.3  < > 4.7 5.1   CHLORIDE 98 98 98 97  < > 90* 91*   CO2 29 29 31 30  < > 34* 35*   ANIONGAP 7 4 4 5  < > 6 5   * 177* 164* 166*  < > 232* 221*   BUN 17 14 18 20  < > 39* 41*   CR 0.82 0.84 0.99 0.92  < > 1.13* 1.12*   GFRESTIMATED 76 74 61 67  < > 53* 53*   GFRESTBLACK >90 >90 74 81  < > 64 64   VISHNU 8.1* 8.1* 8.5 8.0*  < > 8.1* 8.3*   MAG  --   --   --   --   --  2.2  --    PHOS  --   --   --   --   --  4.6*  --    PROTTOTAL  --   --   --   --   --  6.3* 6.0*   ALBUMIN  --   --   --   --   --  2.6* 2.5*   BILITOTAL  --   --   --   --   --  0.8 0.2   ALKPHOS  --   --   --   --   --  124 120   AST  --   --   --   --   --  124* 98*   ALT  --   --   --   --   --  33 28   < > = values in this interval not displayed.  CBC  Recent Labs  Lab 11/18/17  0520 11/17/17  0559 11/16/17  0520 11/15/17  2045 11/15/17  0540   WBC 4.7 5.0 7.2  --  6.4   RBC 2.91* 2.84* 2.93*  --  2.10*   HGB 8.9* 8.6* 8.7* 9.0* 6.3*   HCT 28.3* 27.2* 27.0*  --  19.2*   MCV 97 96 92  --  91   MCH 30.6 30.3 29.7  --  30.0   MCHC 31.4* 31.6 32.2  --  32.8   RDW 17.4* 15.9* 14.6  --  14.3   * 139* 143*  --  125*     INRNo lab results found in last 7 days.  Arterial Blood  GasNo lab results found in last 7 days.    All cultures:  No results for input(s): CULT in the last 168 hours.  No results found for this or any previous visit (from the past 24 hour(s)).    MD Reshma    Billing: This patient is critically ill: Yes Total critical care time today 30 min.

## 2017-11-18 NOTE — PROGRESS NOTES
CLINICAL NUTRITION SERVICES - BRIEF NOTE    - Refer to reassessment note completed yesterday.  - Enteral regimen now cycled.  - Remains on low-fiber/high CHO diet order though oral intakes may be impacted by respiratory status.  - Initiate calorie counts to better determine oral intakes and ability to d/c enteral regimen if able to consistently meet >/=60% needs orally.  Calorie count folder hung.    - Will continue following.        Gabrielle Israel RD, LD  Clinical Dietitian  3rd floor/ICU: [phone]  All other floors: [phone]  Weekend/holiday: [phone]    Addendum:  - FT pulled per MD (patient requesting).  - Discussed with RN.  D/c'ed enteral orders.  - Will still continue calorie counts and continue following.  - Discussed with PharmD given patient on insulin.  Insulin per MD/PharmD.      Gabrielle Israel RD, LD  Clinical Dietitian  3rd floor/ICU: [phone]  All other floors: [phone]  Weekend/holiday: [phone]

## 2017-11-18 NOTE — PROGRESS NOTES
"Critical Care Progress Note      11/17/2017    Name: Sabina Figueroa MRN#: [id number]   Age: 42 year old YOB: 1975     Hsptl Day# 13  ICU DAY #    MV DAY #             Problem List:     1. Acute hypoxemic respiratory failure and underlying IS lung disease- she was off bipap today on 50% oxygen. Her oxygenation remains severely impaired but symptomatically she was improved today. We will continue to support and titrate down support as able. She is off antibiotics at present (except Bactrim); she continues to be optimally \"dry\" and will continue to support and follow.   2. Interstitial lung disease- continue high dose steroids, unchanged.   3. Possible pneumonia- as noted, she is off antibiotics (except Bactrim)  4. Volume overload- she seems to on \"dry\" side of euvolemia, as noted   5. History of alcoholism  6. History of cirrhosis  7. Pre-renal azotemia  8. Hyperglycemia   9. Nutrition- receiving  nutrition via enteral route but tolerating some reasonable amount of PO's today, encouraging.   10. Pancytopenia with Hb the primary line affected. Most likely anemia of acute/chronic disease and will support with RBC's to keep Hb above 7 (8.6 today).  Her Hb has not changed significantly since yesterday, encouraging.             Summary/Hospital Course:   She was having a \"good\" day and has tolerated being off bipap today.     ICU Prophylaxis:   1. DVT: Hep Subq/ LMWH/mechanical  2. VAP: HOB 30 degrees, chlorhexidine rinse  3. Stress Ulcer: PPI/H2 blocker  4. Restraints: Nonviolent soft two point restraints required and necessary for patient safety and continued cares and good effect as patient continues to pull at necessary lines, tubes despite education and distraction. Will readdress daily.   5. IV Access - central access required and necessary for continued patient cares  6. Feeding - enteral and PO's  7. Family Update: she has a significant other who visits occasionally only.   8. Disposition - " continue ICU stay.     Key goals for next 24 hours:   1. Continue to titrate down FIO2 as tolerated  2. Increased PO intake which is encouraging.             Key Medications:       furosemide  20 mg Oral or Feeding Tube Daily     spironolactone  50 mg Oral or Feeding Tube Daily     insulin isophane human  10 Units Subcutaneous QAM AC     insulin aspart  6 Units Subcutaneous Q4H     insulin isophane human  30 Units Subcutaneous Q24H     protein modular  1 packet Per Feeding Tube Daily     methylPREDNISolone sodium succinate  20 mg Intravenous Q6H     insulin aspart  1-10 Units Subcutaneous Q4H     atorvastatin (LIPITOR) tablet 20 mg  20 mg Oral or Feeding Tube At Bedtime     rifaximin  550 mg Oral or Feeding Tube BID     folic acid  1 mg Oral or Feeding Tube Daily     aspirin  81 mg Oral or Feeding Tube Daily     mirtazapine (REMERON) tablet 15 mg  15 mg Oral or Feeding Tube At Bedtime     pantoprazole  40 mg Per Feeding Tube Daily     lactulose  20 g Per Feeding Tube Daily     gabapentin  600 mg Per Feeding Tube TID     methocarbamol  500 mg Oral or Feeding Tube TID     FLUoxetine  40 mg Oral or Feeding Tube Daily     loratadine  10 mg Oral or Feeding Tube Daily     sulfamethoxazole-trimethoprim  50 mL Oral 4x Daily     ipratropium - albuterol 0.5 mg/2.5 mg/3 mL  3 mL Nebulization 4x daily     buprenorphine HCl-naloxone HCl  3 Film Sublingual Daily     nicotine  1 patch Transdermal Daily     nicotine   Transdermal Daily     nicotine   Transdermal Q8H     sodium chloride (PF)  3 mL Intracatheter Q8H       NaCl Stopped (11/17/17 1000)     IV fluid REPLACEMENT ONLY                 Physical Examination:   Temp:  [96.8  F (36  C)-98.6  F (37  C)] 98.6  F (37  C)  Heart Rate:  [59-90] 71  Resp:  [8-47] 19  BP: ()/(59-77) 108/66  FiO2 (%):  [40 %-60 %] 50 %  SpO2:  [92 %-100 %] 100 %    Intake/Output Summary (Last 24 hours) at 11/17/17 1844  Last data filed at 11/17/17 1700   Gross per 24 hour   Intake              3402 ml   Output             3500 ml   Net              -98 ml     Wt Readings from Last 4 Encounters:   11/17/17 80.6 kg (177 lb 11.1 oz)   10/22/17 83.4 kg (183 lb 12.8 oz)   09/16/17 84.6 kg (186 lb 8.2 oz)   09/14/17 89.8 kg (197 lb 15.6 oz)     BP - Mean:  [70-84] 79  FiO2 (%): 50 %  Resp: 19  No lab results found in last 7 days.    GEN: no acute distress   HEENT: neck supple   PULM: her lungs show trace rales at bases; good excursions    CV/COR: RRR S1S2 no gallop,  No rub, no murmur  ABD: soft nontender  EXT:  Mild edema, warm  NEURO: grossly intact; moves all extremities to command   SKIN: no obvious rash; no cyanosis or mottling          Data:   All data and imaging reviewed     ROUTINE ICU LABS (Last four results)  CMP  Recent Labs  Lab 11/17/17  0559 11/16/17  0520 11/15/17  0540 11/14/17  0525 11/13/17  0539 11/12/17  0537   * 133 132* 131* 130* 131*   POTASSIUM 4.9 5.1 4.3 4.6 4.7 5.1   CHLORIDE 98 98 97 93* 90* 91*   CO2 29 31 30 32 34* 35*   ANIONGAP 4 4 5 6 6 5   * 164* 166* 171* 232* 221*   BUN 14 18 20 32* 39* 41*   CR 0.84 0.99 0.92 1.02 1.13* 1.12*   GFRESTIMATED 74 61 67 59* 53* 53*   GFRESTBLACK >90 74 81 72 64 64   VISHNU 8.1* 8.5 8.0* 8.2* 8.1* 8.3*   MAG  --   --   --   --  2.2  --    PHOS  --   --   --   --  4.6*  --    PROTTOTAL  --   --   --   --  6.3* 6.0*   ALBUMIN  --   --   --   --  2.6* 2.5*   BILITOTAL  --   --   --   --  0.8 0.2   ALKPHOS  --   --   --   --  124 120   AST  --   --   --   --  124* 98*   ALT  --   --   --   --  33 28     CBC  Recent Labs  Lab 11/17/17  0559 11/16/17  0520 11/15/17  2045 11/15/17  0540 11/14/17  0525   WBC 5.0 7.2  --  6.4 5.9   RBC 2.84* 2.93*  --  2.10* 2.10*   HGB 8.6* 8.7* 9.0* 6.3* 6.2*   HCT 27.2* 27.0*  --  19.2* 18.6*   MCV 96 92  --  91 89   MCH 30.3 29.7  --  30.0 29.5   MCHC 31.6 32.2  --  32.8 33.3   RDW 15.9* 14.6  --  14.3 14.4   * 143*  --  125* 129*     INRNo lab results found in last 7 days.  Arterial Blood GasNo lab  results found in last 7 days.    All cultures:  No results for input(s): CULT in the last 168 hours.  No results found for this or any previous visit (from the past 24 hour(s)).    MD Reshma    Billing: This patient is critically ill: Yes. Total critical care time today 30 min.

## 2017-11-18 NOTE — PLAN OF CARE
Problem: Patient Care Overview  Goal: Plan of Care/Patient Progress Review  Outcome: No Change  .ICU End of Shift Summary.  For vital signs and complete assessments, please see documentation flowsheets.     Pertinent assessments: A&O; drowsy at times.  Up with A1 to BSC.  Up to commode x 2; voiding good amounts.  Patient desats with activities.  Patient was on HFNC; on Bipap at night.  Bipap at 40% FiO2.  PRN Dilaudid given for back pain; patient asks for pain medications the moments she wakes up from sleep.  Cyclic tube feeding started at 8pm; currently at goal of 110 ml/hr.  Receiving oral Abx and IV steroids.    Major Shift Events: TF started.  Sats down to 70's when up to the commode while patient was on HFNC.  Hgb stable 8.9 this morning.  Plan (Upcoming Events): continue current cares  Discharge/Transfer Needs: TBD    Bedside Shift Report Completed :   Bedside Safety Check Completed:

## 2017-11-18 NOTE — PROGRESS NOTES
Patient wore BiPAP 14/8 40% overnight tolerated well, breath sounds diminished bilaterally, when not on BiPAP patient is on HFNC 20L 40%, received scheduled Duoneb.    Brenda Evangelista  November 18, 2017.5:37 AM

## 2017-11-18 NOTE — PROGRESS NOTES
[facility]  Infectious Disease Progress Note          Assessment and Plan:   IMPRESSION:   1.  A 42-year-old female with biopsy-proven interstitial lung disease and several months of worsened respiratory status, on high-dose steroids further worsening respiratory status in the last month with increased infiltrates, question still primary interstitial lung disease versus new opportunistic infection versus new hospital-acquired infection at present.   2.  Recent respiratory failure at [facility], no clear new diagnosis made, improved with steroids increased, was also being treated as Pneumocystis pneumonia although data did not really support that diagnosis, did not continue proper treatment as an outpatient; if Pneumocystis not adequately treated to date.    3.  Interstitial lung disease, question other systemic component.   4.  History of alcoholic cirrhosis.   5.  Chronic tobacco abuse including E-cigarettes, some concern this might be some type of lung damage related to that.   6 Cytopenias, ? meds      RECOMMENDATIONS:   1.  12 days broad conventional coverage by any usual measure adequate, cytopenias, so off vanco/zosyn  2.  So far unremarkable serologies and noninvasive diagnostic tests to try to prove some infectious diagnosis.   3.  May eventually, if not improving, require further diagnostic intervention, obviously if she requires intubation  we should do a bronchoscopy.  4 LDH very elevated despite neg PCP 10/20 and other evidence against, continue to tx possible PJP, fugitel neg further evidence against this dx , still complete full 3 weeks of tx, septra may be cytopenia issue but try to get to 14 days then if cts an issue consider change, WBc PLTs and HgB stable so continue to 3 weeks then proph dosing        Interval History:   no new complaints improved hypoxic, LDH 1042, procal 1.1  No + cxs no sputum productivity resp virus neg, procal 1.1  Hgb 8.6, [id number]  "K, WBc OK              Medications:       insulin aspart  1-10 Units Subcutaneous TID AC     insulin aspart  1-7 Units Subcutaneous At Bedtime     furosemide  20 mg Oral or Feeding Tube Daily     spironolactone  50 mg Oral or Feeding Tube Daily     insulin isophane human  10 Units Subcutaneous QAM AC     insulin isophane human  30 Units Subcutaneous Q24H     protein modular  1 packet Per Feeding Tube Daily     methylPREDNISolone sodium succinate  20 mg Intravenous Q6H     atorvastatin (LIPITOR) tablet 20 mg  20 mg Oral or Feeding Tube At Bedtime     rifaximin  550 mg Oral or Feeding Tube BID     folic acid  1 mg Oral or Feeding Tube Daily     aspirin  81 mg Oral or Feeding Tube Daily     mirtazapine (REMERON) tablet 15 mg  15 mg Oral or Feeding Tube At Bedtime     pantoprazole  40 mg Per Feeding Tube Daily     lactulose  20 g Per Feeding Tube Daily     gabapentin  600 mg Per Feeding Tube TID     methocarbamol  500 mg Oral or Feeding Tube TID     FLUoxetine  40 mg Oral or Feeding Tube Daily     loratadine  10 mg Oral or Feeding Tube Daily     sulfamethoxazole-trimethoprim  50 mL Oral 4x Daily     ipratropium - albuterol 0.5 mg/2.5 mg/3 mL  3 mL Nebulization 4x daily     buprenorphine HCl-naloxone HCl  3 Film Sublingual Daily     nicotine  1 patch Transdermal Daily     nicotine   Transdermal Daily     nicotine   Transdermal Q8H     sodium chloride (PF)  3 mL Intracatheter Q8H                  Physical Exam:   Blood pressure 116/69, pulse 74, temperature 97.8  F (36.6  C), temperature source Axillary, resp. rate 11, height 1.689 m (5' 6.5\"), weight 81.6 kg (179 lb 14.3 oz), SpO2 96 %, not currently breastfeeding.  Wt Readings from Last 2 Encounters:   11/18/17 81.6 kg (179 lb 14.3 oz)   10/22/17 83.4 kg (183 lb 12.8 oz)     Vital Signs with Ranges  Temp:  [96.9  F (36.1  C)-98.4  F (36.9  C)] 97.8  F (36.6  C)  Heart Rate:  [60-93] 75  Resp:  [8-27] 11  BP: (101-119)/(55-81) 116/69  FiO2 (%):  [30 %-50 %] 30 %  SpO2:  " [82 %-100 %] 96 %    Constitutional: Awake, alert, cooperative, ongoing resp apparent distress   Lungs: Clear to auscultation bilaterally, no crackles or wheezing on high flow O2   Cardiovascular: Regular rate and rhythm, normal S1 and S2, and no murmur noted   Abdomen: Normal bowel sounds, soft, non-distended, non-tender   Skin: No rashes, no cyanosis, no edema   Other:           Data:   All microbiology laboratory data reviewed.  Recent Labs   Lab Test  11/18/17   0520 11/17/17   0559  11/16/17   0520   WBC  4.7  5.0  7.2   HGB  8.9*  8.6*  8.7*   HCT  28.3*  27.2*  27.0*   MCV  97  96  92   PLT  125*  139*  143*     Recent Labs   Lab Test  11/18/17   0520  11/17/17   0559  11/16/17   0520   CR  0.82  0.84  0.99     Recent Labs   Lab Test  10/19/17   0503   SED  23*     Recent Labs   Lab Test  11/04/17   0343  11/04/17   0107  11/04/17   0050  10/20/17   0950  10/19/17   0517  10/19/17   0054  10/17/17   2143  10/17/17   2020  10/17/17   2015   CULT  No growth  No growth  No growth  Moderate growth  Coagulase negative Staphylococcus  Susceptibility testing not routinely done  *  Moderate growth  Candida albicans / dubliniensis  Candida albicans and Candida dubliniensis are not routinely speciated  Susceptibility testing not routinely done  *  Canceled, Test credited  >10 Squamous epithelial cells/low power field indicates oral contamination. Please   recollect.  *  Notification of test cancellation was given to  Elena Araujo RN from [facility]. 10.19.17 at 0753. GR.    Canceled, Test credited  >10 Squamous epithelial cells/low power field indicates oral contamination. Please   recollect.  *  Notification of test cancellation was given to  Nelsy Tesfaye RN [facility] @0246. 10/19/17. SCG    No growth  No growth  No growth

## 2017-11-18 NOTE — PROGRESS NOTES
[facility]    Hospitalist Progress Note  Name: Sabina Figueroa    MRN: [id number]  Provider:  Jonathan Black MD    Date of Service: 11/18/2017    Summary of Stay: Sabina Figueroa is a 42 year old female with a history of ILD (diagnosed by wedge lung bx in 9/17 at [facility]-acute lung injury thought 2/2 e-cigarettes in setting of DIP and NSIP by surg path)-at that hospitalization she was discharged on high dose steroids with PCP ppx, she then presented to the [facility] and hospitalized 10/18-10/23 for recurrent respiratory failure at which time it was felt due to PCP pna as she had not been taking her tmp-sulfa ppx.  She was discharged on high dose steroids taper and therapeutic tmp-sulfa dosing for PCP with sats 97 % range on RA and had arranged f/u with Dr. Mane ( to f/u vasculitic w/u).  She returns to our ER and admitted on 11/4/2017 with e/o recurrent hypoxic respiratory failure again/fever/KAITLIN to 102/tachycardia/leukocytosis and lactic acidosis to 6.3 consistent with sepsis in the setting of non-compliance/confusion with medications (only taking ppx dosing of tmp-sulfa), and missing of all medications due to being picked up for DUI (drank on 2 separate occassions but prior to that was sober since 5/11/17) and being held in halfway.     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  In the night of 11/5 she had increasing O2 requirements, worsening tachypnea - CT PE protocol completed and shows no PE but significant worsening of bilateral pulmonary infiltrates, Patient remained on BIPAP at night and requiring HFNC during day time.     Problem List:     1.  Sepsis: Presumably from respiratory source. CXR with basilar infiltrates. CT showed diffuse infltrates. Respiratory panel negative. Lactic acidosis resolved with IVF. She is afebrile with negative cultures.  - She had been on  broad spectrum abx with vancomycin, zosyn- stopped due to cytopenia on 11/15  - Continue Bactrim per ID  - So far serologic work up remain negative.    2.  Acute hypoxic respiratory failure - Complicated pulmonary situation/history. 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 Had prolonged hospitalization at [facility] for suspected PCP PNA, was discharged on steroid/therapeutic Bactrim and was non-compliant with medications due to recent DUI. She was in group home for 2 days and misunderstanding of Bactrim dosing. Patient was given broad spectrum abx with azith/zosyn/vanco in ER. Has continued now on zosyn/vanco and high dose Bactrim for suspected ongoing/inadequately treated PCP and high risk for alternative respiratory infection due to immunosuppression with steroids. She has been nearly BiPAP dependent since admission, only coming off to HFNC for short periods.  - On IV steroid Solu-Medrol 20 mg q6h and scheduled DuoNeb's  - Continue Lasix 20 mg daily.  - Monitor daily weight and intake/output.  - She requires BIPAP at night and HiFNC day time- Try to wean her of BIPAP as tolerated.     3.  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  - Case discussed with intensivist (Dr Charles).  - Continue same managent.   - Not lung transplant candidate.       4.  Non-compliance with medications:   - Social service consult as she may require case-worker at discharge to help mitigate suspected social issues/med compliance.    5.  KAITLIN:  Baseline creatinine normal. Renal function normalized after IVF. Renal function largely stable.    6.  Cirrhosis:  Chronically on spironolactone/furosemide/lactulose/rifaximin. LFTs currently with minimally elevated AST, bili normal. NH3 normal and on review of chart not significantly elevated except 2 prior occasions. PO Lasix and spironolactone has been placed on hold and was started IV Lasix. No significant ascites on  exam.  - IV lasix on stopped, on oral lasix.  - Continue lactulose and rifaximin  - Restart PTA lasix/spironolactone     7.  Steroid induced DM: Uncontrolled blood glucose for many days. Patient becoming more hyperglycemic with tube feeds and stress dose IV steroids. Expect to improve as she is off TF today.  - High intensity SSI  - Stopped cyclic tube feeds 11/18, TF removed.  - Stop NPH.  - Lantus 20 units at bedtime and Novolog 5 units TID with meals.    8.  Narcotic dependence.  - Pain team following  - Continue suboxone and low dose PRN dilaudid.  - Change dilaudid to every 4 hours for today, will consider stopping it tomorrow.     9.  Suspected underlying vasculitic/autoimmune:  AMISHA by IFA borderline positive, and cytoplasmic IgG negative.  Outpatient pulmonary/rheumatology follow-up at discharge.    10.  Nicotine dependence with suspected ALI 2/2 e cigarettes: Continues to smoke despite nicotine replacement.      11.  Anemia: Progressive decline in Hb, no bleeding noted. Was 13.8 on adm then down to 5.8 before receiving 2 U PRBC 11/12. Has now required transfusions past 2 days. Hb this AM 6.3. She denies black/red/tarry stools. Only mild bruising at site of lovenox injections. Etiology unclear. Discussed with hematology and ddx is bleeding, hemolysis, medication, BM process. Mostly likely explanation is meds (specifically Bactrim, zosyn, rifaximin in the ordred).  - Daily CBC, monitor for bleeding   - Stopped Lovenox  - Fe studies, normal, B12/folate normal.   - Haptoglobin pending.  - Blood smear and reticulocyte count normal.  - Hematology consulted, input appreciated. Discussed with Dr. Truong yesterday. He thinks this is due to medication and there is no sign of real hemolysis, though LDH is elevated for many other reasons.  - Transfuse for Hb <7.    12.  Severe protein calorie malnutrition:  Had TF placed and RD managing TF.  - She is tolerating oral intake.  - TF stopped on 11/18.    13.  Prerenal  azotemia: Slight improvement in creatinine.  - No NSAIDs or nephrotoxins    DVT Prophylaxis: Enoxaparin (Lovenox) SQ stopped for now, due to anemia, PCDs  Code Status: Full Code  Disposition: Continue ICU care for 2 more days, as she needed HFNC and BIPAP.   Family updated today: No     I discussed with patient at length the plan of care. Discussed with nursing staff.    Interval History   The patient seen and examined, feels better, no nausea or vomiting, SOB better. No chest pain. No fevers. No other specific complaints identified.     -Data reviewed today: I reviewed all new labs and imaging results over the last 24 hours.     Physical Exam   Temp: 97.8  F (36.6  C) Temp src: Axillary BP: 116/69   Heart Rate: 75 Resp: 11 SpO2: 96 % O2 Device: Oxymizer cannula Oxygen Delivery: 2 LPM  Vitals:    11/16/17 0400 11/17/17 0500 11/18/17 0340   Weight: 78 kg (172 lb 0.1 oz) 80.6 kg (177 lb 11.1 oz) 81.6 kg (179 lb 14.3 oz)     Vital Signs with Ranges  Temp:  [96.9  F (36.1  C)-98.4  F (36.9  C)] 97.8  F (36.6  C)  Heart Rate:  [60-93] 75  Resp:  [8-27] 11  BP: (101-119)/(55-81) 116/69  FiO2 (%):  [30 %-50 %] 30 %  SpO2:  [82 %-100 %] 96 %  I/O last 3 completed shifts:  In: 2910 [P.O.:1320; I.V.:40; NG/GT:390]  Out: 2900 [Urine:2300; Stool:600]    GENERAL: No apparent distress. Awake, alert, and fully oriented.  HEENT: Normocephalic, atraumatic. Extraocular movements intact.  CVS: Regular rate and rhythm without murmurs or rubs. No S3.  CHEST: Coarse breaths bilaterally, + wheezing.  ABD: Soft, non-tender, non-distended. Bowel sounds normoactive.   EXTR: No cyanosis or clubbing. Trace BL LE edema.  NEUR: CN 2-12 grossly intact, no focal neurological deficits.  DERM: No rash, ulcer, bruising, nor jaundice.     Medications     NaCl Stopped (11/17/17 1000)     IV fluid REPLACEMENT ONLY         insulin aspart  1-10 Units Subcutaneous TID AC     insulin aspart  1-7 Units Subcutaneous At Bedtime     furosemide  20 mg Oral or  Feeding Tube Daily     spironolactone  50 mg Oral or Feeding Tube Daily     insulin isophane human  10 Units Subcutaneous QAM AC     insulin isophane human  30 Units Subcutaneous Q24H     protein modular  1 packet Per Feeding Tube Daily     methylPREDNISolone sodium succinate  20 mg Intravenous Q6H     atorvastatin (LIPITOR) tablet 20 mg  20 mg Oral or Feeding Tube At Bedtime     rifaximin  550 mg Oral or Feeding Tube BID     folic acid  1 mg Oral or Feeding Tube Daily     aspirin  81 mg Oral or Feeding Tube Daily     mirtazapine (REMERON) tablet 15 mg  15 mg Oral or Feeding Tube At Bedtime     pantoprazole  40 mg Per Feeding Tube Daily     lactulose  20 g Per Feeding Tube Daily     gabapentin  600 mg Per Feeding Tube TID     methocarbamol  500 mg Oral or Feeding Tube TID     FLUoxetine  40 mg Oral or Feeding Tube Daily     loratadine  10 mg Oral or Feeding Tube Daily     sulfamethoxazole-trimethoprim  50 mL Oral 4x Daily     ipratropium - albuterol 0.5 mg/2.5 mg/3 mL  3 mL Nebulization 4x daily     buprenorphine HCl-naloxone HCl  3 Film Sublingual Daily     nicotine  1 patch Transdermal Daily     nicotine   Transdermal Daily     nicotine   Transdermal Q8H     sodium chloride (PF)  3 mL Intracatheter Q8H     Data     Laboratory:    Recent Labs  Lab 11/18/17  0520 11/17/17  0559 11/16/17  0520   WBC 4.7 5.0 7.2   HGB 8.9* 8.6* 8.7*   HCT 28.3* 27.2* 27.0*   MCV 97 96 92   * 139* 143*       Recent Labs  Lab 11/18/17  0520 11/17/17  0559 11/16/17  0520    131* 133   POTASSIUM 4.7 4.9 5.1   CHLORIDE 98 98 98   CO2 29 29 31   ANIONGAP 7 4 4   * 177* 164*   BUN 17 14 18   CR 0.82 0.84 0.99   GFRESTIMATED 76 74 61   GFRESTBLACK >90 >90 74   VISHNU 8.1* 8.1* 8.5     No results for input(s): CULT in the last 168 hours.    Imaging:  No results found for this or any previous visit (from the past 24 hour(s)).    11/18/2017

## 2017-11-18 NOTE — PROGRESS NOTES
Patient was placed on  HFNC from overnight Bipap during mid morning hours. Patient since then continue to remain on HFNC 50% and 20LPM. Patient is tolerating it fairly well. sats higher 90s, BS clear, diminished, inline neb given. Will continue to monitor patient's progress and assess patient's respiratory status.      Matthew Cueva  November 17, 2017

## 2017-11-19 NOTE — PLAN OF CARE
Problem: Patient Care Overview  Goal: Plan of Care/Patient Progress Review  Outcome: Improving  .ICU End of Shift Summary.  For vital signs and complete assessments, please see documentation flowsheets.     Pertinent assessments: A&O.  Up with SBA to BSC.  Seems to be tolerating activities better.  AFebrile.  Bradycardic overnight; heart rate as low as high 40s.  4 L O2 NC; Bipap on at night at 30% FiO2.  LS diminished.  Continues on oral antibiotics, IV steroids, and scheduled nebs.  PRN Dilaudid for pain 9/10.  Patient slept well overnight.    Major Shift Events: Bipap at night.  Tolerated dinner; appetite good.    Plan (Upcoming Events): continue current cares  Discharge/Transfer Needs: TBD    Bedside Shift Report Completed :   Bedside Safety Check Completed:

## 2017-11-19 NOTE — PLAN OF CARE
Problem: Patient Care Overview  Goal: Plan of Care/Patient Progress Review  Discharge Planner PT   Patient plan for discharge: [place]  Current status: Pt completing sit to stand with SBA, ambulating in room with CGA with occasional unsteadiness noted.  spO2 dropping below 90% unless on 6L supplemental O2  Barriers to return to prior living situation: Decreased activity tolerance  Recommendations for discharge: Home vs TCU pending progress  Rationale for recommendations: Decreased overall activity tolerance and ongoing assistance required for mobility       Entered by: Vladislav Jones 11/19/2017 12:04 PM

## 2017-11-19 NOTE — PLAN OF CARE
Problem: Patient Care Overview  Goal: Plan of Care/Patient Progress Review  Outcome: Improving  ICU End of Shift Summary.  For vital signs and complete assessments, please see documentation flowsheets.     Pertinent assessments: Weaned to 2-3 LPM NC, KOROMA with desaturation into 80's with transfers.  Lungs diminished. Intermittent, non productive cough remains. IS highly encouraged, though patient only completed x2 this shift. Up to chair x1 with SBA. Continues to state 6-8/10 pain with q3 hour dilaudid, napping throughout the day. MD changed dilaudid to q4 hours with plans to d/c tomorrow. Pt. Aware of POC.   Major Shift Events: TF removed. Meals encouraged, with calorie counts noted.   Plan (Upcoming Events): Continue to wean O2. Encourage ambulation as tolerated along with IS.   Discharge/Transfer Needs: TBD    Bedside Shift Report Completed : yes  Bedside Safety Check Completed: yes

## 2017-11-19 NOTE — PROGRESS NOTES
Hematology.  Chart check. Hgb stable/if not improved at 9.5 today.    Allen Alanis MD, MS  Medical Oncologist-Hematologist  [facility]

## 2017-11-19 NOTE — PROGRESS NOTES
[facility]    Hospitalist Progress Note  Name: Sabina Figueroa    MRN: [id number]  Provider:  Jonathan Black MD    Date of Service: 11/19/2017    Summary of Stay: Sabina Figueroa is a 42 year old female with a history of ILD (diagnosed by wedge lung bx in 9/17 at [facility]-acute lung injury thought 2/2 e-cigarettes in setting of DIP and NSIP by surg path)-at that hospitalization she was discharged on high dose steroids with PCP ppx, she then presented to the [facility] and hospitalized 10/18-10/23 for recurrent respiratory failure at which time it was felt due to PCP pna as she had not been taking her tmp-sulfa ppx.  She was discharged on high dose steroids taper and therapeutic tmp-sulfa dosing for PCP with sats 97 % range on RA and had arranged f/u with Dr. Mane ( to f/u vasculitic w/u).  She returns to our ER and admitted on 11/4/2017 with e/o recurrent hypoxic respiratory failure again/fever/KAITLIN to 102/tachycardia/leukocytosis and lactic acidosis to 6.3 consistent with sepsis in the setting of non-compliance/confusion with medications (only taking ppx dosing of tmp-sulfa), and missing of all medications due to being picked up for DUI (drank on 2 separate occassions but prior to that was sober since 5/11/17) and [facility].     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  In the night of 11/5 she had increasing O2 requirements, worsening tachypnea - CT PE protocol completed and shows no PE but significant worsening of bilateral pulmonary infiltrates, Patient remained on BIPAP at night and requiring HFNC during day time the last few days, but today she is on NC..     Problem List:     1.  Sepsis: Presumably from respiratory source. CXR with basilar infiltrates. CT showed diffuse infltrates. Respiratory panel negative. Lactic acidosis resolved with IVF. She is afebrile with  negative cultures.  - She had been on broad spectrum abx with vancomycin, zosyn- stopped due to cytopenia on 11/15  - Continue Bactrim per ID  - So far serologic work up remain negative.    2.  Acute hypoxic respiratory failure - Complicated pulmonary situation/history. 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 Had prolonged hospitalization at [facility] for suspected PCP PNA, was discharged on steroid/therapeutic Bactrim and was non-compliant with medications due to recent DUI. She was in [facility] for 2 days and misunderstanding of Bactrim dosing. Patient was given broad spectrum abx with azith/zosyn/vanco in ER. Has continued now on zosyn/vanco and high dose Bactrim for suspected ongoing/inadequately treated PCP and high risk for alternative respiratory infection due to immunosuppression with steroids. She has been nearly BiPAP dependent since admission, only coming off to [facility] for short periods.  - Change IV steroid Solu-Medrol 20 mg q6h to Prednisone 60 mg starting 11/20  - Continue DuoNeb's  - Continue Lasix 20 mg daily.  - Monitor daily weight and intake/output.  - She requires BIPAP at night and currently on NC- Try to wean her of BIPAP as tolerated.     3.  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  - Case discussed with intensivist (Dr Charles).  - Continue same managent.   - Not lung transplant candidate.       4.  Non-compliance with medications:   - Social service consult as she may require case-worker at discharge to help mitigate suspected social issues/med compliance.    5.  KAITLIN:  Baseline creatinine normal. Renal function normalized after IVF. Renal function normalized.    6.  Cirrhosis:  Chronically on spironolactone/furosemide/lactulose/rifaximin. LFTs currently with minimally elevated AST, bili normal. NH3 normal and on review of chart not significantly elevated except 2 prior occasions. PO Lasix and spironolactone has  been placed on hold and was started IV Lasix. No significant ascites on exam.  - IV lasix on stopped, on oral lasix.  - Continue lactulose and rifaximin  - Restarted PTA lasix/spironolactone     7.  Steroid induced DM: Uncontrolled blood glucose for many days. Patient becoming more hyperglycemic with tube feeds and stress dose IV steroids. Expect to improve as she is off TF today.  - High intensity SSI  - Stopped cyclic tube feeds 11/18, TF removed.  - Stop NPH.  - Lantus 20 units at bedtime and Novolog 5 units TID with meals.    8.  Narcotic dependence.  - Pain team following  - Continue suboxone   - stopped dilaudid.     9.  Suspected underlying vasculitic/autoimmune:  AMISHA by IFA borderline positive, and cytoplasmic IgG negative.  Outpatient pulmonary/rheumatology follow-up at discharge.    10.  Nicotine dependence with suspected ALI 2/2 e cigarettes: Continues to smoke despite nicotine replacement.      11.  Anemia: Progressive decline in Hb, no bleeding noted. She was 13.8 on adm then down to 5.8 before receiving 2 U PRBC 11/12.  She denies black/red/tarry stools. Only mild bruising at site of lovenox injections. Etiology unclear. Discussed with hematology and ddx is bleeding, hemolysis, medication, BM process. Mostly likely explanation is meds (specifically Bactrim, zosyn, rifaximin in the ordred).  - Hgb 9.5 and stable.   - Daily CBC, monitor for bleeding   - Stopped Lovenox, PCD's for now.  - Fe studies, normal, B12/folate normal.   - Haptoglobin repeat normal..  - Blood smear and reticulocyte count normal.  - Hematology consulted, input appreciated. Discussed with Dr. Truong yesterday. He thinks this is due to medication and there is no sign of real hemolysis, though LDH is elevated for many other reasons.  - Transfuse for Hb <7.    12.  Severe protein calorie malnutrition:  Had TF placed and RD managing TF.  - She is tolerating oral intake.  - TF stopped on 11/18.    DVT Prophylaxis: Enoxaparin (Lovenox) SQ  stopped for now, due to anemia, PCDs  Code Status: Full Code    Disposition: May transfer to floor this afternoon, needs BIPAP at night and NC day time and can be safely done on the floor.     Family updated today: No     I discussed with patient at length the plan of care. Discussed with nursing staff.    Interval History   The patient seen and examined, feels better, no nausea or vomiting, SOB better, on NC, desaturates when getting up.. No chest pain. No fevers. No other specific complaints identified.     -Data reviewed today: I reviewed all new labs and imaging results over the last 24 hours.     Physical Exam   Temp: 97.6  F (36.4  C) Temp src: Axillary BP: 102/68   Heart Rate: 67 Resp: 12 SpO2: 97 % O2 Device: BiPAP/CPAP Oxygen Delivery: 4 LPM  Vitals:    11/17/17 0500 11/18/17 0340 11/19/17 0010   Weight: 80.6 kg (177 lb 11.1 oz) 81.6 kg (179 lb 14.3 oz) 81.8 kg (180 lb 5.4 oz)     Vital Signs with Ranges  Temp:  [97.6  F (36.4  C)-98.6  F (37  C)] 97.6  F (36.4  C)  Heart Rate:  [] 67  Resp:  [9-38] 12  BP: (102-122)/(66-82) 102/68  FiO2 (%):  [30 %] 30 %  SpO2:  [76 %-100 %] 97 %  I/O last 3 completed shifts:  In: 1620 [P.O.:1200; NG/GT:200]  Out: 1950 [Urine:1200; Emesis/NG output:750]    GENERAL: No apparent distress. Awake, alert, and fully oriented.  HEENT: Normocephalic, atraumatic. Extraocular movements intact.  CVS: Regular rate and rhythm without murmurs or rubs. No S3.  CHEST: Coarse breaths bilaterally, + wheezing.  ABD: Soft, non-tender, non-distended. Bowel sounds normoactive.   EXTR: No cyanosis or clubbing. Trace BL LE edema.  NEUR: CN 2-12 grossly intact, no focal neurological deficits.  DERM: No rash, ulcer, bruising, nor jaundice.     Medications     NaCl Stopped (11/17/17 1000)     IV fluid REPLACEMENT ONLY         insulin aspart  1-10 Units Subcutaneous TID AC     insulin aspart  1-7 Units Subcutaneous At Bedtime     insulin glargine  20 Units Subcutaneous At Bedtime     insulin  aspart  5 Units Subcutaneous TID w/meals     gabapentin  600 mg Oral TID     aspirin  81 mg Oral Daily     atorvastatin (LIPITOR) tablet 20 mg  20 mg Oral At Bedtime     FLUoxetine  40 mg Oral Daily     folic acid  1 mg Oral Daily     furosemide  20 mg Oral Daily     loratadine  10 mg Oral Daily     lactulose  20 g Oral Daily     methocarbamol  500 mg Oral TID     mirtazapine (REMERON) tablet 15 mg  15 mg Oral At Bedtime     pantoprazole  40 mg Oral Daily     rifaximin  550 mg Oral BID     spironolactone  50 mg Oral Daily     sulfamethoxazole-trimethoprim  2 tablet Oral 4x Daily    And     sulfamethoxazole-trimethoprim  1 tablet Oral 4x Daily     protein modular  1 packet Per Feeding Tube Daily     methylPREDNISolone sodium succinate  20 mg Intravenous Q6H     ipratropium - albuterol 0.5 mg/2.5 mg/3 mL  3 mL Nebulization 4x daily     buprenorphine HCl-naloxone HCl  3 Film Sublingual Daily     nicotine  1 patch Transdermal Daily     nicotine   Transdermal Daily     nicotine   Transdermal Q8H     sodium chloride (PF)  3 mL Intracatheter Q8H     Data     Laboratory:    Recent Labs  Lab 11/19/17 0520 11/18/17  0520 11/17/17  0559   WBC 4.4 4.7 5.0   HGB 9.5* 8.9* 8.6*   HCT 29.5* 28.3* 27.2*   MCV 96 97 96   * 125* 139*       Recent Labs  Lab 11/19/17 0520 11/18/17  0520 11/17/17  0559    134 131*   POTASSIUM 4.5 4.7 4.9   CHLORIDE 98 98 98   CO2 29 29 29   ANIONGAP 7 7 4   * 197* 177*   BUN 16 17 14   CR 0.90 0.82 0.84   GFRESTIMATED 68 76 74   GFRESTBLACK 83 >90 >90   VISHNU 8.6 8.1* 8.1*     No results for input(s): CULT in the last 168 hours.    Imaging:  No results found for this or any previous visit (from the past 24 hour(s)).    11/19/2017

## 2017-11-19 NOTE — PROGRESS NOTES
[facility]  Infectious Disease Progress Note          Assessment and Plan:   IMPRESSION:   1.  A 42-year-old female with biopsy-proven interstitial lung disease and several months of worsened respiratory status, on high-dose steroids further worsening respiratory status in the last month with increased infiltrates, question still primary interstitial lung disease versus new opportunistic infection versus new hospital-acquired infection at present.   2.  Recent respiratory failure at [facility], no clear new diagnosis made, improved with steroids increased, was also being treated as Pneumocystis pneumonia although data did not really support that diagnosis, did not continue proper treatment as an outpatient; if Pneumocystis not adequately treated to date.    3.  Interstitial lung disease, question other systemic component.   4.  History of alcoholic cirrhosis.   5.  Chronic tobacco abuse including E-cigarettes, some concern this might be some type of lung damage related to that.   6 Cytopenias, ? meds      RECOMMENDATIONS:   1.  12 days broad conventional coverage by any usual measure adequate, cytopenias, so off vanco/zosyn  2.  So far unremarkable serologies and noninvasive diagnostic tests to try to prove some infectious diagnosis.   3.  May eventually, if not improving, require further diagnostic intervention, obviously if she requires intubation  we should do a bronchoscopy.  4 LDH very elevated despite neg PCP 10/20 and other evidence against, continue to tx possible PJP, fugitel neg further evidence against this dx , still complete full 3 weeks of tx, septra may be cytopenia issue but try to get to 14 days then if cts an issue consider change, WBc PLTs and HgB stable so continue to 3 weeks then proph dosing, day 16        Interval History:   no new complaints improved hypoxia now only Nc 3 l and moving around, LDH 1042, procal 1.1  No + cxs no sputum productivity resp virus  "neg, procal 1.1  Hgb 8.6, [id number] K, WBc OK              Medications:       [START ON 11/20/2017] predniSONE  60 mg Oral Daily     methylPREDNISolone sodium succinate  20 mg Intravenous Q6H     insulin aspart  1-10 Units Subcutaneous TID AC     insulin aspart  1-7 Units Subcutaneous At Bedtime     insulin glargine  20 Units Subcutaneous At Bedtime     insulin aspart  5 Units Subcutaneous TID w/meals     gabapentin  600 mg Oral TID     aspirin  81 mg Oral Daily     atorvastatin (LIPITOR) tablet 20 mg  20 mg Oral At Bedtime     FLUoxetine  40 mg Oral Daily     folic acid  1 mg Oral Daily     furosemide  20 mg Oral Daily     loratadine  10 mg Oral Daily     lactulose  20 g Oral Daily     methocarbamol  500 mg Oral TID     mirtazapine (REMERON) tablet 15 mg  15 mg Oral At Bedtime     pantoprazole  40 mg Oral Daily     rifaximin  550 mg Oral BID     spironolactone  50 mg Oral Daily     sulfamethoxazole-trimethoprim  2 tablet Oral 4x Daily    And     sulfamethoxazole-trimethoprim  1 tablet Oral 4x Daily     protein modular  1 packet Per Feeding Tube Daily     ipratropium - albuterol 0.5 mg/2.5 mg/3 mL  3 mL Nebulization 4x daily     buprenorphine HCl-naloxone HCl  3 Film Sublingual Daily     nicotine  1 patch Transdermal Daily     nicotine   Transdermal Daily     nicotine   Transdermal Q8H     sodium chloride (PF)  3 mL Intracatheter Q8H                  Physical Exam:   Blood pressure 124/81, pulse 74, temperature 98.1  F (36.7  C), temperature source Axillary, resp. rate 18, height 1.689 m (5' 6.5\"), weight 81.8 kg (180 lb 5.4 oz), SpO2 92 %, not currently breastfeeding.  Wt Readings from Last 2 Encounters:   11/19/17 81.8 kg (180 lb 5.4 oz)   10/22/17 83.4 kg (183 lb 12.8 oz)     Vital Signs with Ranges  Temp:  [97.6  F (36.4  C)-98.6  F (37  C)] 98.1  F (36.7  C)  Heart Rate:  [] 108  Resp:  [11-39] 18  BP: (102-126)/(66-89) 124/81  FiO2 (%):  [30 %] 30 %  SpO2:  [76 %-100 %] 92 %    Constitutional: " Awake, alert, cooperative, ongoing resp apparent distress   Lungs: Clear to auscultation bilaterally, no crackles or wheezing on high flow O2   Cardiovascular: Regular rate and rhythm, normal S1 and S2, and no murmur noted   Abdomen: Normal bowel sounds, soft, non-distended, non-tender   Skin: No rashes, no cyanosis, no edema   Other:           Data:   All microbiology laboratory data reviewed.  Recent Labs   Lab Test  11/19/17   0520  11/18/17   0520  11/17/17   0559   WBC  4.4  4.7  5.0   HGB  9.5*  8.9*  8.6*   HCT  29.5*  28.3*  27.2*   MCV  96  97  96   PLT  132*  125*  139*     Recent Labs   Lab Test  11/19/17   0520  11/18/17   0520  11/17/17   0559   CR  0.90  0.82  0.84     Recent Labs   Lab Test  10/19/17   0503   SED  23*     Recent Labs   Lab Test  11/04/17   0343  11/04/17   0107  11/04/17   0050  10/20/17   0950  10/19/17   0517  10/19/17   0054  10/17/17   2143  10/17/17   2020  10/17/17   2015   CULT  No growth  No growth  No growth  Moderate growth  Coagulase negative Staphylococcus  Susceptibility testing not routinely done  *  Moderate growth  Candida albicans / dubliniensis  Candida albicans and Candida dubliniensis are not routinely speciated  Susceptibility testing not routinely done  *  Canceled, Test credited  >10 Squamous epithelial cells/low power field indicates oral contamination. Please   recollect.  *  Notification of test cancellation was given to  Elena Araujo RN from [facility]. 10.19.17 at 0753. GR.    Canceled, Test credited  >10 Squamous epithelial cells/low power field indicates oral contamination. Please   recollect.  *  Notification of test cancellation was given to  Nelsy Tesfaye RN [facility] @0246. 10/19/17. SCG    No growth  No growth  No growth

## 2017-11-19 NOTE — PROGRESS NOTES
Patient wore BiPAP 14/8 30% overnight tolerated well, breath sounds diminshed, received scheduled neb, when off on 3-4L Nasal Cannula.    Brenda Evangelista  November 19, 2017.5:55 AM

## 2017-11-20 NOTE — PLAN OF CARE
Problem: Patient Care Overview  Goal: Plan of Care/Patient Progress Review  Outcome: No Change  .ICU End of Shift Summary.  For vital signs and complete assessments, please see documentation flowsheets.     Pertinent assessments: VSS on 3 L of O2 while awake.  Patient desats when falling asleep.  Continues on Bipap at night.  Ambulated to bathroom x 1; still pretty short of breath.  Required increase O2 with ambulation.  Starting oral prednisone today; continues on scheduled nebs and oral antibiotics.  Slept well overnight.    Major Shift Events: BG elevated 301 at bedtime; SSI and scheduled Lantus given.  BG check at 0200 improved.  Plan (Upcoming Events): IV steroids changed to oral prednisone starting today.  Continue current care.    Discharge/Transfer Needs: MOF    Bedside Shift Report Completed :   Bedside Safety Check Completed:

## 2017-11-20 NOTE — PROGRESS NOTES
Oncology/Hematology Follow Up Note:    Assessment and Plan:  Sabina Figueroa is a 42 year old female patient.  1. Chronic baseline anemia with acute drop to 6s range.  - Likely etiology is from antibiotics.  - Hb improving.  - LEONARDO was positive, clinically no other e/o hemolysis( except high LDH - which could be from other causes).    PLAN-  - Will recheck LEONARDO with next labs.  - Steroids are ongoing for her ILD.  - follow CBC serially.      2. Thrombocytopenia  - stable.  -no intervention.      Subjective:     Feeling much better, anxious to go to the floor, happy Hb is better.      Labs:  All labs reviewed    CBC  Recent Labs  Lab 11/20/17 0540 11/19/17  0520 11/18/17  0520   WBC 5.5 4.4 4.7   HGB 9.9* 9.5* 8.9*   MCV 97 96 97   * 132* 125*       CMP  Recent Labs  Lab 11/20/17  0540 11/19/17  0520 11/18/17  0520 11/17/17  0559    134 134 131*   POTASSIUM 4.3 4.5 4.7 4.9   CHLORIDE 98 98 98 98   CO2 28 29 29 29   ANIONGAP 7 7 7 4   * 133* 197* 177*   BUN 20 16 17 14   CR 1.07* 0.90 0.82 0.84   GFRESTIMATED 56* 68 76 74   GFRESTBLACK 68 83 >90 >90   VISHNU 8.2* 8.6 8.1* 8.1*   MAG 1.9 1.9  --   --    PHOS 3.8 4.7*  --   --        INRNo lab results found in last 7 days.    Blood CultureNo results for input(s): CULT in the last 168 hours.      Madelin Kinney MD  [facility]  11/20/2017 11:22 AM

## 2017-11-20 NOTE — PROGRESS NOTES
[facility]  Infectious Disease Progress Note          Assessment and Plan:   IMPRESSION:   1.  A 42-year-old female with biopsy-proven interstitial lung disease and several months of worsened respiratory status, on high-dose steroids further worsening respiratory status in the last month with increased infiltrates, question still primary interstitial lung disease versus new opportunistic infection versus new hospital-acquired infection at present.   2.  Recent respiratory failure at [facility], no clear new diagnosis made, improved with steroids increased, was also being treated as Pneumocystis pneumonia although data did not really support that diagnosis, did not continue proper treatment as an outpatient; if Pneumocystis not adequately treated to date.    3.  Interstitial lung disease, question other systemic component.   4.  History of alcoholic cirrhosis.   5.  Chronic tobacco abuse including E-cigarettes, some concern this might be some type of lung damage related to that.   6 Cytopenias, ? meds      RECOMMENDATIONS:   1.  12 days broad conventional coverage by any usual measure adequate, cytopenias, so off vanco/zosyn  2.  So far unremarkable serologies and noninvasive diagnostic tests to try to prove some infectious diagnosis.   3.  May eventually, if not improving, require further diagnostic intervention, obviously if she requires intubation  we should do a bronchoscopy.  4 LDH very elevated despite neg PCP 10/20 and other evidence against, continue to tx possible PJP, fugitel neg further evidence against this dx , still complete full 3 weeks of tx, septra may be cytopenia issue but try to get to 14 days then if cts an issue consider change, WBc PLTs and HgB stable so continue to 3 weeks then proph dosing, day 17 high dose,, at that point 1 ss daily long term proph        Interval History:   no new complaints improved hypoxia now only Nc 3 l and moving around, LDH 1042,  "procal 1.1  No + cxs no sputum productivity resp virus neg, procal 1.1  Hgb 8.6, [id number] K, WBc OK              Medications:       predniSONE  60 mg Oral Daily     insulin aspart  1-10 Units Subcutaneous TID AC     insulin aspart  1-7 Units Subcutaneous At Bedtime     insulin glargine  20 Units Subcutaneous At Bedtime     insulin aspart  5 Units Subcutaneous TID w/meals     gabapentin  600 mg Oral TID     aspirin  81 mg Oral Daily     atorvastatin (LIPITOR) tablet 20 mg  20 mg Oral At Bedtime     FLUoxetine  40 mg Oral Daily     folic acid  1 mg Oral Daily     furosemide  20 mg Oral Daily     loratadine  10 mg Oral Daily     lactulose  20 g Oral Daily     methocarbamol  500 mg Oral TID     mirtazapine (REMERON) tablet 15 mg  15 mg Oral At Bedtime     pantoprazole  40 mg Oral Daily     rifaximin  550 mg Oral BID     spironolactone  50 mg Oral Daily     sulfamethoxazole-trimethoprim  2 tablet Oral 4x Daily    And     sulfamethoxazole-trimethoprim  1 tablet Oral 4x Daily     protein modular  1 packet Per Feeding Tube Daily     ipratropium - albuterol 0.5 mg/2.5 mg/3 mL  3 mL Nebulization 4x daily     buprenorphine HCl-naloxone HCl  3 Film Sublingual Daily     nicotine  1 patch Transdermal Daily     nicotine   Transdermal Daily     nicotine   Transdermal Q8H     sodium chloride (PF)  3 mL Intracatheter Q8H                  Physical Exam:   Blood pressure 110/66, pulse 74, temperature 98  F (36.7  C), temperature source Oral, resp. rate 19, height 1.689 m (5' 6.5\"), weight 82.6 kg (182 lb 1.6 oz), SpO2 97 %, not currently breastfeeding.  Wt Readings from Last 2 Encounters:   11/20/17 82.6 kg (182 lb 1.6 oz)   10/22/17 83.4 kg (183 lb 12.8 oz)     Vital Signs with Ranges  Temp:  [97.8  F (36.6  C)-98.5  F (36.9  C)] 98  F (36.7  C)  Heart Rate:  [] 87  Resp:  [11-31] 19  BP: (101-128)/(65-84) 110/66  FiO2 (%):  [30 %] 30 %  SpO2:  [87 %-99 %] 97 %    Constitutional: Awake, alert, cooperative, ongoing resp " apparent distress   Lungs: Clear to auscultation bilaterally, no crackles or wheezing on high flow O2   Cardiovascular: Regular rate and rhythm, normal S1 and S2, and no murmur noted   Abdomen: Normal bowel sounds, soft, non-distended, non-tender   Skin: No rashes, no cyanosis, no edema   Other:           Data:   All microbiology laboratory data reviewed.  Recent Labs   Lab Test  11/20/17   0540  11/19/17   0520  11/18/17   0520   WBC  5.5  4.4  4.7   HGB  9.9*  9.5*  8.9*   HCT  30.8*  29.5*  28.3*   MCV  97  96  97   PLT  123*  132*  125*     Recent Labs   Lab Test  11/20/17   0540  11/19/17   0520  11/18/17   0520   CR  1.07*  0.90  0.82     Recent Labs   Lab Test  10/19/17   0503   SED  23*     Recent Labs   Lab Test  11/04/17   0343  11/04/17   0107  11/04/17   0050  10/20/17   0950  10/19/17   0517  10/19/17   0054  10/17/17   2143  10/17/17   2020  10/17/17   2015   CULT  No growth  No growth  No growth  Moderate growth  Coagulase negative Staphylococcus  Susceptibility testing not routinely done  *  Moderate growth  Candida albicans / dubliniensis  Candida albicans and Candida dubliniensis are not routinely speciated  Susceptibility testing not routinely done  *  Canceled, Test credited  >10 Squamous epithelial cells/low power field indicates oral contamination. Please   recollect.  *  Notification of test cancellation was given to  Elena Araujo RN from [facility]. 10.19.17 at 0753. GR.    Canceled, Test credited  >10 Squamous epithelial cells/low power field indicates oral contamination. Please   recollect.  *  Notification of test cancellation was given to  Nelsy Tesfaye RN [facility] @0246. 10/19/17. SCG    No growth  No growth  No growth

## 2017-11-20 NOTE — PLAN OF CARE
Problem: Patient Care Overview  Goal: Plan of Care/Patient Progress Review  Discharge Planner PT   Patient plan for discharge: [place]  Current status: Pt is indep with sit to/from stand. Pt amb 30' x 1 without AD with 3L of O2 with sats @ beginning 96% and following amb sats decreased to 88% and 30' with 4L of O2 with sats @ beginnng 96% and decreased to 91%. Pt amb 60' with 4L of O2 with sats decreasing to 88% with quick recovery to 94% with 4L and PLB. Pt placed back onto 3L of O2 and increased to 4L with activity. Pt amb with supervision to indep.   Barriers to return to prior living situation: Decreased activity tolerance  Recommendations for discharge: Home vs TCU pending progress  Rationale for recommendations: Decreased overall activity tolerance and ongoing assistance required for mobility       Entered by: Benita Mcfadden 11/20/2017 11:40 AM

## 2017-11-20 NOTE — PROGRESS NOTES
CLINICAL NUTRITION SERVICES - REASSESSMENT NOTE      EVALUATION OF PROGRESS TOWARD GOALS   Diet:  High CHO  Intake:  Diet advanced to combination low-fiber/high CHO 11/17, diet as above since 11/18.  With diet advancement calorie counts were originally initiated and indicated patient meeting >100% needs orally for at least 2 days (11/18-11/19).  Consuming >75% of meals and often snacking.  Good appetite overall.      Nutrition Support:  FT pulled per MD 11/18.      ASSESSED NUTRITION NEEDS (PER APPROVED PRACTICE GUIDELINES; DW: 66.1 kg AdjBW):  Estimated Energy Needs: 2707-4651 kcals (30-35 Kcal/Kg)  Justification: maintenance, increased need with acute illness  Estimated Protein Needs: 79-99+ grams protein (1.2-1.5 g pro/Kg)  Justification: preservation of LBM  Estimated Fluid Needs: >1 mL/Kcal  Justification: maintenance      NEW FINDINGS:   - Medications reviewed:   Lasix, Aldactone   SSI, 5 units Novolog with meals, 20 units Lantus at HS   Prednisone   15 mg Remeron at HS  - Labs reviewed:    BG trended up with diet advancement, >250  - Current wt somewhat consistent with admit though hard to determine trending overall given use of diuretics/fuid shifts:  Vitals:    11/16/17 0400 11/17/17 0500 11/18/17 0340 11/19/17 0010   Weight: 78 kg (172 lb 0.1 oz) 80.6 kg (177 lb 11.1 oz) 81.6 kg (179 lb 14.3 oz) 81.8 kg (180 lb 5.4 oz)    11/20/17 0200   Weight: 82.6 kg (182 lb 1.6 oz)   - Daily BMs.    Previous Goals:   TF at goal rate + PO intake to meet % of estimated needs  Evaluation: Met via PO intake only     Previous Nutrition Diagnosis:   Inadequate oral intake related to increased oxygen neeeds as evidenced by EN reliance x 10 days  Evaluation: Completed      CURRENT NUTRITION DIAGNOSIS  No nutrition diagnosis at this time.    INTERVENTIONS  Recommendations / Nutrition Prescription  Continue diet as ordered per MD above.      Insulin per MD/PharmD.     Implementation  Collaboration and Referral of Nutrition  care: Discussed POC with team during rounds.        MONITORING AND EVALUATION:  Progress towards goals will be monitored and evaluated per protocol and Practice Guidelines      Gabrielle Israel RD, LD  Clinical Dietitian  3rd floor/ICU: [phone]  All other floors: [phone]  Weekend/holiday: [phone]

## 2017-11-20 NOTE — PROGRESS NOTES
SPIRITUAL HEALTH SERVICES Progress Note  [facility] Med. Surg. 3    Visited pt Sabina per her request for a  visit on Monday.  Sabina expressed excitement about be able to discharge home before Thanksgiving, which she will spend with her S.O.  She is also looking forward to returning to her [employer] making at home.  In addition to her S.O, Sabina named her 21-year old daughter among those within her support network.  She shared that she was offered a job after not working for five years, but she does not know whether she still has the job after her hospitalization.  Sabina pleasantly responded to my inquiries and acknowledged the availability of  support but expressed no specific needs at this time.    No further plans; I and other chaplains remain available per pt/family request.    Dereck Dorsey M.Div., [facility]  Staff   Pager [phone]

## 2017-11-20 NOTE — PROGRESS NOTES
Patient wore BiPAP 14/8 30% overnight when off on 3L NC, received scheduled nebulizer, breath sounds diminshed.    Brenda Evangelista  November 20, 2017.5:06 AM

## 2017-11-20 NOTE — PROGRESS NOTES
[facility]  Hospitalist Progress Note  Emerald Goodwin MD 11/20/17  Text Page  Pager: [phone] (7am-6pm)    Reason for Stay (Diagnosis): Respiratory Failure         Assessment and Plan:      Summary of Stay: Sabina Figueroa is a 42 year old female with a history of ILD (diagnosed by wedge lung bx in 9/17 at [facility]-acute lung injury thought 2/2 e-cigarettes in setting of DIP and NSIP by surg path)-at that hospitalization she was discharged on high dose steroids with PCP ppx.  She then presented to the [facility] and hospitalized 10/18-10/23 for recurrent respiratory failure at which time it was felt due to PCP pna as she had not been taking her tmp-sulfa ppx.  She was discharged on high dose steroids taper and therapeutic tmp-sulfa dosing for PCP with sats 97 % range on RA and had arranged f/u with Dr. Mane (to f/u vasculitic w/u).  She returned to our ER and was admitted on 11/4/2017 with e/o recurrent hypoxic respiratory failure again in addition sepsis with KAITLIN in the setting of non-compliance/confusion with medications (only taking ppx dosing of tmp-sulfa), and missing of all medications due to being picked up for DUI (drank on 2 separate occassions but prior to that was sober since 5/11/17) and was held in snf.      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  On the night of 11/5 she had increasing O2 requirements, worsening tachypnea - CT PE protocol completed and shows no PE but significant worsening of bilateral pulmonary infiltrates. Patient remained on BIPAP at night and requiring HFNC during day time but over the past few days has been able to transition to NC during the day.    Problem List/Assessment and Plan:     1.  Sepsis: Presumably from respiratory source. CXR with basilar infiltrates. CT showed diffuse infiltrates. Respiratory panel negative. Lactic  acidosis resolved with IVF. She is afebrile with negative cultures.  She did receive 12 days of broad conventional coverage (now off Vancomycin/Zosyn).  She is being treated for possible PJP and will need to complete a full 3 weeks of treatment.  She did have cytopenia but this is improving so will continue with Septra.    - She had been on broad spectrum abx with vancomycin, zosyn- stopped due to cytopenia on 11/15  - Continue Bactrim per ID (as above will require 3 weeks total duration, today day 17)  - ID following, appreciate recommendations     2.  Acute hypoxic respiratory failure: Complicated pulmonary situation/history. 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 Had prolonged hospitalization at [facility] for suspected PJP PNA, was discharged on steroid/therapeutic Bactrim and was non-compliant with medications due to recent DUI. She was in group home for 2 days and misunderstanding of Bactrim dosing. Patient was given broad spectrum abx with azith/zosyn/vanco in ER. Completed therapy with Vancomycin/Zosyn as above and remains on high dose Bactrim for suspected ongoing/inadequately treated PJP and high risk for alternative respiratory infection due to immunosuppression with steroids. She had initially required BiPAP and eventually transitioned to HFNC during the day, now only BiPAP and night with NC during the day.  - Today changed to Prednisone 60 mg QD (had been on Solu-Medrol 20 mg q6h)  - Continue DuoNeb's  - Continue Lasix 20 mg daily.  - Monitor daily weight and intake/output.  - She requires BIPAP at night and currently on NC- Try to wean her of BIPAP as tolerated.      3.  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 Not lung transplant candidate.  Will need to follow up with Dr. French at discharge.      4.  Non-compliance with medications: Social service consult as she may require case-worker at discharge to help mitigate  suspected social issues/med compliance.     5.  KAITLIN - Resolved:  Baseline creatinine normal. Renal function normalized after IVF. Renal function normalized.     6.  Cirrhosis:  Chronically on spironolactone/furosemide/lactulose/rifaximin. LFTs currently with minimally elevated AST, bili normal. NH3 normal and on review of chart not significantly elevated except 2 prior occasions. PO Lasix and spironolactone had been placed on hold and was started IV Lasix. No significant ascites on exam.  Now back to PTA medications.  - Continue lactulose and rifaximin, Lasix and Spironolactone      7.  Steroid induced DM: Uncontrolled blood glucose for many days. She is now improving as she is no longer on tube feeds.  Continue current regimen.  - High intensity SSI  - Lantus 20 units at bedtime and Novolog 5 units TID with meals.     8.  Narcotic dependence: Pain team following, continue suboxone.  Dilaudid stopped earlier in the admission.       9.  Suspected underlying vasculitic/autoimmune:  AMISHA by IFA borderline positive, and cytoplasmic IgG negative.  Outpatient pulmonary/rheumatology follow-up at discharge.     10.  Nicotine dependence with suspected ALI 2/2 e cigarettes: Continues to smoke despite nicotine replacement.       11.  Anemia: Progressive decline in Hb, no bleeding noted. She was 13.8 on adm then down to 5.8 before receiving 2 U PRBC 11/12.  She denies black/red/tarry stools. Only mild bruising at site of Lovenox injections. Etiology unclear. Discussed with hematology and ddx is bleeding, hemolysis, medication, BM process. Her iron studies, B12 and folate were WNL.  Haptoglobin and peripheral smear also normal.  No sign of hemolysis.  Mostly likely explanation is meds (specifically Bactrim, zosyn, rifaximin in the ordered).  Hgb has remained stable at this time, in the 9.5 range.  Lovenox was stopped due to unknown cause of anemia and she remains on PCDs.  - Daily CBC, monitor for bleeding   - Transfuse for Hb  "<7     12.  Severe protein calorie malnutrition:  Had been on TF which were stopped on 11/18 and she is now doing well with PO intake.    DVT Prophylaxis: Pneumatic Compression Devices  Code Status: Full Code  Discharge Dispo: TBD based on oxygen requirements and mobility  Estimated Disch Date / # of Days until Disch: Suspect several more days to wean BiPAP and improve strength        Interval History (Subjective):      Patient was seen with her bedside nurse this morning.  She states she is feeling fine today.  Breathing feels much better than admission.  Denies cough or CP.  Eating well without nausea or emesis.  All questions answered.                  Physical Exam:      Last Vital Signs:  /66 (BP Location: Left arm)  Pulse 74  Temp 97.8  F (36.6  C) (Axillary)  Resp 18  Ht 1.689 m (5' 6.5\")  Wt 82.6 kg (182 lb 1.6 oz)  SpO2 98%  Breastfeeding? No  BMI 28.95 kg/m2    General: Alert, awake, no acute distress.  HEENT: Normocephalic and atraumatic, eyes anicteric and without scleral injection, EOMI, face symmetric, MMM.  Cardiac: RRR, normal S1, S2. No m/g/r, no LE edema.  Pulmonary: Normal chest rise, normal work of breathing.  Decreased breath sounds but no crackles or wheezing.  Abdomen: soft, non-tender, non-distended.  Normoactive bowel sounds, no guarding or rebound tenderness.  Extremities: no deformities.  Warm, well perfused.  Skin: no rashes or lesions.  Warm and Dry.  Neuro: No focal deficits.  Speech clear.  Coordination and strength grossly normal.  Psych: Alert and oriented x3. Appropriate affect.         Medications:      All current medications were reviewed with changes reflected in problem list.         Data:      All new lab and imaging data was reviewed.   Labs:    Recent Labs  Lab 11/20/17  0540 11/19/17  0520 11/18/17  0520    134 134   POTASSIUM 4.3 4.5 4.7   CHLORIDE 98 98 98   CO2 28 29 29   ANIONGAP 7 7 7   * 133* 197*   BUN 20 16 17   CR 1.07* 0.90 0.82 "   GFRESTIMATED 56* 68 76   GFRESTBLACK 68 83 >90   VISHNU 8.2* 8.6 8.1*       Recent Labs  Lab 11/20/17  0540 11/19/17  0520 11/18/17  0520   WBC 5.5 4.4 4.7   HGB 9.9* 9.5* 8.9*   HCT 30.8* 29.5* 28.3*   MCV 97 96 97   * 132* 125*      Imaging:   No results found for this or any previous visit (from the past 24 hour(s)).    Emerald Goodwin MD.

## 2017-11-20 NOTE — PLAN OF CARE
Problem: Patient Care Overview  Goal: Plan of Care/Patient Progress Review  Outcome: Improving  ICU End of Shift Summary.  For vital signs and complete assessments, please see documentation flowsheets.     Pertinent assessments: A&Ox4. VSS except O2. Pt. Requiring 2-3LPM NC at rest, BIPAP when asleep, and 5-6 LPM NC with ambulation. Now ambulating to bathroom instead of bedside commode with SBA. Eating 100% of meals, multiple rice krispy bars and desserts. Increased BGs noted with increase sugar intake. Multiple requests throughout the day. Up in chair all day, IS x1 otherwise refused IS use. POC reviewed with pt.   Major Shift Events: Discontinued PO Dilaudid today. Improved enough to transfer to floor.   Plan (Upcoming Events): transfer to floor  Discharge/Transfer Needs: Transfer to floor.    Bedside Shift Report Completed : yes  Bedside Safety Check Completed: yes

## 2017-11-20 NOTE — PROGRESS NOTES
0956 Pt arrived from ICU as a transfer to the floor to room 322. Oriented to room and call light. Plan to order breakfast. 02 via nc at 3L in place.    1400 Shift update: Pt A&O, calls appropriately. Up independently in room. 3L o2 via nc, continue to monitor oxygen needs. KOROMA. Cpap prn at noc. RT following and providing neb txs as ordered. Nicotine patch present and prn nicorette gum. FSBS achs with ss insulin and meal coverage.

## 2017-11-20 NOTE — PROGRESS NOTES
A&Ox4. Needs 3LPM NC to keeps sats > 92%. Lungs diminished in bases but clear. Slept well overnight. Tele SR. SBA to ambulate with 6LPM. POC reviewed with patient. Report given to MS3 nurse, will transfer to floor at this time.

## 2017-11-21 NOTE — PROGRESS NOTES
"[facility] RCAT     Date: 11/21/17    Admission Dx: sepsis    Pulmonary History: ILD    Home Nebulizer/MDI Use: Duoneb prn     Home Oxygen: None    Acuity Level (RCAT flow sheet): Acuity level four which indicates prn nebulizers    Aerosol Therapy initiated: Duoneb Q4 prn    Pulmonary Hygiene initiated: Cough and deep breathing TID    Volume Expansion initiated: IS TID    Current Oxygen Requirements: 2 LPM    Current SpO2: 92%    Re-evaluation date: 11/24/17    Patient Education: Education performed with patient in regards to indications/benefits and dosing of bronchodilators. Will continue to do education with patient.    See \"RT Assessments\" flow sheet for patient assessment scoring and Acuity Level Details.    Oksana Lawton, RT  11/21/2017 3:15 PM                 "

## 2017-11-21 NOTE — PROGRESS NOTES
Call received from Joel BRYAN From [facility] [phone] This is where the patient gets her suboxone. Estimated LOS provided. Joel left ms for pt as well to follow-up & plan for discharge with continued suboxone treatment.

## 2017-11-21 NOTE — PLAN OF CARE
Problem: Patient Care Overview  Goal: Plan of Care/Patient Progress Review  Outcome: No Change  Pt A&Ox4.  Anxious at times, requesting nicorette gum, see MAR.  VSS.  Remains on 3L NC. SR on telemetry Denies pain.  , 116. Tolerating high cho diet, passing flatus, +4 bs, last BM 11/20/17.  Voiding yellow urine.  Up independently to the bathroom.  Plan: Pain, ID and PT following.  Continue with PO antibiotics for 3 weeks.  Per RT, wean bipap use.  Will be DC to home vs TCV when stable.  Will continue to monitor.      11/21/17 4:44 AM  SR/SB in the 50s on telemetry when sleeping.

## 2017-11-21 NOTE — PROGRESS NOTES
Patient has been assessed for Home Oxygen needs.  Oxygen readings:   *   RA - at rest  Pulse oximetry SPO2 84 %  *   RA - during activity/with exercise SPO2-  not tested %  *   O2 at  4 liters/minute (at rest) ...SPO2 93 %  *   O2 at 4 liters/minute (during activity/with exercise) ...SPO2 86%    Ambulated entire MS3 hallway - two breaks needed to recover on 4L to bring SPO2 over 88%.

## 2017-11-21 NOTE — PROGRESS NOTES
[facility]  Infectious Disease Progress Note          Assessment and Plan:   IMPRESSION:   1.  A 42-year-old female with biopsy-proven interstitial lung disease and several months of worsened respiratory status, on high-dose steroids further worsening respiratory status in the last month with increased infiltrates, question still primary interstitial lung disease versus new opportunistic infection versus new hospital-acquired infection at present.   2.  Recent respiratory failure at [facility], no clear new diagnosis made, improved with steroids increased, was also being treated as Pneumocystis pneumonia although data did not really support that diagnosis, did not continue proper treatment as an outpatient; if Pneumocystis not adequately treated to date.    3.  Interstitial lung disease, question other systemic component.   4.  History of alcoholic cirrhosis.   5.  Chronic tobacco abuse including E-cigarettes, some concern this might be some type of lung damage related to that.   6 Cytopenias, ? meds      RECOMMENDATIONS:   1.  12 days broad conventional coverage by any usual measure adequate, cytopenias, so off vanco/zosyn  2.  So far unremarkable serologies and noninvasive diagnostic tests to try to prove some infectious diagnosis.   3.  May eventually, if not improving, require further diagnostic intervention, obviously if she requires intubation  we should do a bronchoscopy.  4 LDH very elevated despite neg PCP 10/20 and other evidence against, continue to tx possible PJP, fugitel neg further evidence against this dx , still complete full 3 weeks of tx, septra may be cytopenia issue but , WBc PLTs and HgB stable so continue to 3 weeks then proph dosing, day 18 high dose oral,, at that point 1 ss daily long term proph        Interval History:   no new complaints improved hypoxia now only Nc 2 l and moving around, LDH 1042, procal 1.1  No + cxs no sputum productivity             "  Medications:       predniSONE  60 mg Oral Daily     insulin aspart  1-10 Units Subcutaneous TID AC     insulin aspart  1-7 Units Subcutaneous At Bedtime     insulin glargine  20 Units Subcutaneous At Bedtime     insulin aspart  5 Units Subcutaneous TID w/meals     gabapentin  600 mg Oral TID     aspirin  81 mg Oral Daily     atorvastatin (LIPITOR) tablet 20 mg  20 mg Oral At Bedtime     FLUoxetine  40 mg Oral Daily     folic acid  1 mg Oral Daily     furosemide  20 mg Oral Daily     loratadine  10 mg Oral Daily     lactulose  20 g Oral Daily     methocarbamol  500 mg Oral TID     mirtazapine (REMERON) tablet 15 mg  15 mg Oral At Bedtime     pantoprazole  40 mg Oral Daily     rifaximin  550 mg Oral BID     spironolactone  50 mg Oral Daily     sulfamethoxazole-trimethoprim  2 tablet Oral 4x Daily    And     sulfamethoxazole-trimethoprim  1 tablet Oral 4x Daily     protein modular  1 packet Per Feeding Tube Daily     ipratropium - albuterol 0.5 mg/2.5 mg/3 mL  3 mL Nebulization 4x daily     buprenorphine HCl-naloxone HCl  3 Film Sublingual Daily     nicotine  1 patch Transdermal Daily     nicotine   Transdermal Daily     nicotine   Transdermal Q8H     sodium chloride (PF)  3 mL Intracatheter Q8H                  Physical Exam:   Blood pressure 110/73, pulse 62, temperature 97.7  F (36.5  C), temperature source Oral, resp. rate 18, height 1.689 m (5' 6.5\"), weight 78.2 kg (172 lb 8 oz), SpO2 92 %, not currently breastfeeding.  Wt Readings from Last 2 Encounters:   11/21/17 78.2 kg (172 lb 8 oz)   10/22/17 83.4 kg (183 lb 12.8 oz)     Vital Signs with Ranges  Temp:  [97  F (36.1  C)-97.7  F (36.5  C)] 97.7  F (36.5  C)  Pulse:  [61-62] 62  Heart Rate:  [63-90] 63  Resp:  [18] 18  BP: (102-111)/(46-73) 110/73  SpO2:  [84 %-97 %] 92 %    Constitutional: Awake, alert, cooperative, ongoing resp apparent distress   Lungs: Clear to auscultation bilaterally, no crackles or wheezing on high flow O2   Cardiovascular: Regular " rate and rhythm, normal S1 and S2, and no murmur noted   Abdomen: Normal bowel sounds, soft, non-distended, non-tender   Skin: No rashes, no cyanosis, no edema   Other:           Data:   All microbiology laboratory data reviewed.  Recent Labs   Lab Test  11/21/17   0608  11/20/17   0540  11/19/17   0520   WBC  6.5  5.5  4.4   HGB  10.9*  9.9*  9.5*   HCT  34.5*  30.8*  29.5*   MCV  98  97  96   PLT  126*  123*  132*     Recent Labs   Lab Test  11/21/17   0608  11/20/17   0540  11/19/17   0520   CR  1.12*  1.07*  0.90     Recent Labs   Lab Test  10/19/17   0503   SED  23*     Recent Labs   Lab Test  11/04/17   0343  11/04/17   0107  11/04/17   0050  10/20/17   0950  10/19/17   0517  10/19/17   0054  10/17/17   2143  10/17/17   2020  10/17/17   2015   CULT  No growth  No growth  No growth  Moderate growth  Coagulase negative Staphylococcus  Susceptibility testing not routinely done  *  Moderate growth  Candida albicans / dubliniensis  Candida albicans and Candida dubliniensis are not routinely speciated  Susceptibility testing not routinely done  *  Canceled, Test credited  >10 Squamous epithelial cells/low power field indicates oral contamination. Please   recollect.  *  Notification of test cancellation was given to  Elena Araujo RN from [facility]. 10.19.17 at 0753. GR.    Canceled, Test credited  >10 Squamous epithelial cells/low power field indicates oral contamination. Please   recollect.  *  Notification of test cancellation was given to  Nelsy Tesfaye RN [facility] @0246. 10/19/17. SCG    No growth  No growth  No growth

## 2017-11-21 NOTE — PLAN OF CARE
"Problem: Patient Care Overview  Goal: Plan of Care/Patient Progress Review  Outcome: Improving  Trial on room air at rest in bed w/MD present - spo2 92-93%.  After 10 minutes, pt up to chair. On room air sitting, her spo2 did drop on room air to 84-85%. Pt stated \"Yeah, my body can tell that I am low on oxygen\".  Lungs decreased. Needing 4L Oxygen when up ambulating in halls. KOROMA. Nicorette gum per request. Tele discontinued. Glucoses 73 & 192, SSI. Plan: increased mobilization w/oxygen. Pt may need home oxygen at discharge.           "

## 2017-11-21 NOTE — PROGRESS NOTES
Oncology/Hematology Follow Up Note:    Assessment and Plan:  Assessment and Plan:  #1 Acute onset of anemia  #2 Mild anemia of chronic disease at baseline  #3 Mild thrombocytopenia, stable  #4 ILD exacerbation      The patient's Hgb was normal when she was admitted to the hospital.  Then it quickly decreased to the 5-6 range.  For the hemoglobin to decrease this rapidly, it must be due to blood loss, hemolysis, an acute bone marrow disorder such as acute leukemia, or a medication.  The patient has had no bleeding.  The low reticulocyte count and the lack of polychromasia, spherocytes, and schistocytes on the peripheral smear are not consistent with hemolysis.  Although the peripheral blood smear did show a leukoerythroblastic picture, my suspicion for an acute bone marrow disorder such as acute leukemia is very low, given the lack of dysplasia or blasts.  I do not think a bone marrow biopsy is warranted.      Therefore, it is very likely that the patient's rapid decrease in hemoglobin is medication induced.  Since admission to the hospital, she has been on multiple antibiotics including vancomycin, Zosyn, and high-dose Bactrim for presumed PCP pneumonia.  Both Zosyn and Bactrim can potentially cause anemia.  The patient also takes rifaximin for her cirrhosis.  This is a medication that can also cause anemia, but she has been on this medication for the last 2 years, without any issues.      Zosyn and Vancomycin have been discontinued, but the primary team is still planning on continuing high dose Bactrim (for presumed PCP, PCR negative) for a few more days.  In addition, LEONARDO showed a warm autoantibody (3+), but the patient had no evidence of hemolysis.  Repeat LEONARDO showed 1+ warm autoantibody.  Hgb has improved to 10.9.      PLAN:  1. Continue off Zosyn and Vancomycin.  The patient continues on high dose Bactrim per ID recommendations.  2. Monitor CBC daily  3. LEONARDO initially showed a warm autoantibody (3+), but there  was no evidence of hemolysis (low retic count, no polychromasia/spherocytes on smear, normal haptoglobin) except for an elevated LDH.  I suspect the positive LEONARDO was drug induced.  Repeat LEONARDO today showed decreased titer of warm autoantibody (1+).  The patient is already on 80 mg of methylprednisolone daily for her ILD.  In the absence of hemolysis and ongoing steroid use, we will simply repeat a LEONARDO in 1-2 weeks.      Carlos Truong M.D.  [facility]  [phone]    Subjective:    Respiratory status has improved significantly since I last saw the patient.  She is currently on 3 L O2 by nasal canula.  Still reports significant shortness of breath ambulating to and from the bathroom.      Labs:  All labs reviewed    CBC  Recent Labs  Lab 11/21/17  0608 11/20/17  0540 11/19/17  0520   WBC 6.5 5.5 4.4   HGB 10.9* 9.9* 9.5*   MCV 98 97 96   * 123* 132*       CMP  Recent Labs  Lab 11/21/17  0608 11/20/17  0540 11/19/17  0520 11/18/17  0520   * 133 134 134   POTASSIUM 4.4 4.3 4.5 4.7   CHLORIDE 98 98 98 98   CO2 27 28 29 29   ANIONGAP 7 7 7 7   GLC 71 134* 133* 197*   BUN 22 20 16 17   CR 1.12* 1.07* 0.90 0.82   GFRESTIMATED 53* 56* 68 76   GFRESTBLACK 64 68 83 >90   VISHNU 8.9 8.2* 8.6 8.1*   MAG  --  1.9 1.9  --    PHOS  --  3.8 4.7*  --        INRNo lab results found in last 7 days.    Blood CultureNo results for input(s): CULT in the last 168 hours.      Carlos Truong MD  [facility]  11/21/2017 12:55 PM

## 2017-11-21 NOTE — PLAN OF CARE
Problem: Patient Care Overview  Goal: Plan of Care/Patient Progress Review  Discharge Planner PT   Patient plan for discharge: home by Thursday  Current status: Ind basic transfer and gait with no AD ( should use her cane) to 125 feet on 2 l this AM was SOB but not horrible and c/o some dizziness  Barriers to return to prior living situation: stairs not done but do anticipate this as a barrier  Recommendations for discharge: PT- Per plan established by the Physical Therapist, according to functional mobility the discharge recommendation is home spoke with MD and agree to Pulm Rehab as OP.    Rationale for recommendations: mobility in community is limited by tolerance to activity from O2 needs        Entered by: Grace Mcclain 11/21/2017 9:20 AM

## 2017-11-21 NOTE — PROGRESS NOTES
[facility]  Hospitalist Progress Note  Rossana Warner MD 11/20/17       Reason for Stay (Diagnosis): Respiratory Failure    Summary of Stay: Sabina Figueroa is a 42 year old female with a history of ILD (diagnosed by wedge lung bx in 9/17 at [facility]-acute lung injury thought 2/2 e-cigarettes in setting of DIP and NSIP by surg path)-at that hospitalization she was discharged on high dose steroids with PCP ppx.  She then presented to the [facility] and hospitalized 10/18-10/23 for recurrent respiratory failure at which time it was felt due to PCP pna as she had not been taking her tmp-sulfa ppx.  She was discharged on high dose steroids taper and therapeutic tmp-sulfa dosing for PCP with sats 97 % range on RA and had arranged f/u with Dr. Mane (to f/u vasculitic w/u).  She returned to our ER and was admitted on 11/4/2017 with e/o recurrent hypoxic respiratory failure again in addition sepsis with KAITLIN in the setting of non-compliance/confusion with medications (only taking ppx dosing of tmp-sulfa), and missing of all medications due to being picked up for DUI (drank on 2 separate occassions but prior to that was sober since 5/11/17) and was held in long term.      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  On the night of 11/5 she had increasing O2 requirements, worsening tachypnea - CT PE protocol completed and shows no PE but significant worsening of bilateral pulmonary infiltrates. Patient remained on BIPAP at night and requiring HFNC during day time but over the past few days has been able to transition to NC during the day. She was transferred out of ICU on 11/20         Assessment and Plan:      Sepsis: Presumably from respiratory source. CXR with basilar infiltrates. CT showed diffuse infiltrates. Respiratory panel negative. Lactic acidosis resolved with IVF. She is  afebrile with negative cultures.  She did receive 12 days of broad conventional coverage (now off Vancomycin/Zosyn).  She is being treated for possible PJP (despite -ve fungitell) and will need to complete a full 3 weeks of treatment.  She did have cytopenia but this is improving so will continue with Septra.    - She had been on broad spectrum abx with vancomycin, zosyn- stopped due to cytopenia on 11/15  - Continue Bactrim per ID (as above will require 3 weeks total duration, today day 17)  - ID following, appreciate recommendations     Acute hypoxic respiratory failure: Complicated pulmonary situation/history. 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 Had prolonged hospitalization at [facility] for suspected PJP PNA, was discharged on steroid/therapeutic Bactrim and was non-compliant with medications due to recent DUI. She was in shelter for 2 days and misunderstanding of Bactrim dosing. Patient was given broad spectrum abx with azith/zosyn/vanco in ER. Completed therapy with Vancomycin/Zosyn as above and remains on high dose Bactrim for suspected ongoing/inadequately treated PJP and high risk for alternative respiratory infection due to immunosuppression with steroids. She had initially required BiPAP and eventually transitioned to HFNC during the day, now only BiPAP and night with NC during the day.  - On Prednisone 60 mg QD (had been on Solu-Medrol 20 mg q6h until 11/20)  - Continue DuoNeb's  - Continue Lasix 20 mg daily.  - Monitor daily weight and intake/output.  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 Not lung transplant candidate.  Will need to follow up with Dr. French at discharge.      Non-compliance with medications: Social service consult as she may require case-worker at discharge to help mitigate suspected social issues/med  compliance.     KAITLIN - Resolved:  Baseline creatinine normal. Renal function normalized after IVF.      Alcoholic Cirrhosis:  Chronically on spironolactone/furosemide/lactulose/rifaximin. LFTs currently with minimally elevated AST, bili normal. NH3 normal and on review of chart not significantly elevated except 2 prior occasions. PO Lasix and spironolactone had been placed on hold and was started IV Lasix. No significant ascites on exam.  Now back to PTA medications.  - Continue lactulose and rifaximin, Lasix and Spironolactone      Steroid induced DM: Uncontrolled blood glucose for many days. She is now improving as she is no longer on tube feeds.  Continue current regimen.  - High intensity SSI  - Lantus 20 units at bedtime and Novolog 5 units TID with meals.     Narcotic dependence: Pain team following, continue suboxone.  Dilaudid stopped earlier in the admission.       Suspected underlying vasculitic/autoimmune:  AMISHA by IFA borderline positive, and cytoplasmic IgG negative.  Outpatient pulmonary/rheumatology follow-up at discharge.     Nicotine dependence with suspected ALI 2/2 e cigarettes: Continues to smoke despite nicotine replacement.       Anemia: Progressive decline in Hb, no bleeding noted. She was 13.8 on adm then down to 5.8 before receiving 2 U PRBC 11/12.  She denies black/red/tarry stools. Only mild bruising at site of Lovenox injections. Etiology unclear. Discussed with hematology and ddx is bleeding, hemolysis, medication, BM process. Her iron studies, B12 and folate were WNL.  Haptoglobin and peripheral smear also normal.  No sign of hemolysis.  Mostly likely explanation is meds (specifically Bactrim, zosyn, rifaximin in the ordered).  Hgb has remained stable at this time, in the 9.5 range.  Lovenox was stopped due to unknown cause of anemia and she remains on PCDs.  - Daily CBC, monitor for bleeding   - Transfuse for Hb <7     Severe protein calorie malnutrition:  Had been on TF which were  "stopped on 11/18 and she is now doing well with PO intake.    DVT Prophylaxis: Pneumatic Compression Devices  Code Status: Full Code  Discharge Dispo: TBD based on oxygen requirements and mobility-most likely home with pulm rehab   Estimated Disch Date / # of Days until Disch: 1-2 more days once on stable oxygen requirement         Interval History (Subjective):        I took over patients care today  She was transferred out of ICU yesterday and remained on 2 L NC, did not require BIPAP overnight  Walked with PT this AM and felt significantly short of breath with increased O2 requirement   C/o chest tightness   No fevers or chills                   Physical Exam:      Last Vital Signs:  /73 (BP Location: Left arm)  Pulse 62  Temp 97.7  F (36.5  C) (Oral)  Resp 18  Ht 1.689 m (5' 6.5\")  Wt 78.2 kg (172 lb 8 oz)  SpO2 92%  Breastfeeding? No  BMI 27.43 kg/m2    General: Alert, awake, no acute distress.  HEENT: Normocephalic and atraumatic, eyes anicteric and without scleral injection, EOMI, face symmetric, MMM.  Cardiac: RRR, normal S1, S2. No m/g/r, no LE edema.  Pulmonary: Normal chest rise, normal work of breathing.  Decreased breath sounds but no crackles or wheezing.  Abdomen: soft, non-tender, non-distended.  Normoactive bowel sounds, no guarding or rebound tenderness.  Extremities: no deformities.  Warm, well perfused.  Skin: no rashes or lesions.  Warm and Dry.  Neuro: No focal deficits.  Speech clear.  Coordination and strength grossly normal.  Psych: Alert and oriented x3. Appropriate affect.         Medications:      All current medications were reviewed with changes reflected in problem list.         Data:      All new lab and imaging data was reviewed.   Labs:    Recent Labs  Lab 11/21/17  0608 11/20/17  0540 11/19/17  0520   * 133 134   POTASSIUM 4.4 4.3 4.5   CHLORIDE 98 98 98   CO2 27 28 29   ANIONGAP 7 7 7   GLC 71 134* 133*   BUN 22 20 16   CR 1.12* 1.07* 0.90   GFRESTIMATED 53* " 56* 68   GFRESTBLACK 64 68 83   VISHNU 8.9 8.2* 8.6       Recent Labs  Lab 11/21/17  0608 11/20/17  0540 11/19/17  0520   WBC 6.5 5.5 4.4   HGB 10.9* 9.9* 9.5*   HCT 34.5* 30.8* 29.5*   MCV 98 97 96   * 123* 132*      Imaging:   No results found for this or any previous visit (from the past 24 hour(s)).

## 2017-11-22 NOTE — PLAN OF CARE
Problem: Patient Care Overview  Goal: Plan of Care/Patient Progress Review  Outcome: Improving  Ambulated several times in [place] on 3L oxygen to maintain sats over 90%. Home oxygen to be set up for dc home potentially tomorrow. 2L NC at rest. Lungs diminished. Rare non-productive cough. IS to [id number]. Independent in room.

## 2017-11-22 NOTE — PROGRESS NOTES
Oncology/Hematology Follow Up Note:    Assessment and Plan:    1. Chronic baseline anemia likely related to ILD (anemia of chronic disease) with acute drop to 5-6 range.  - Likely etiology is from antibiotics.  - Hgb improving.  - LEONARDO was positive, clinically no other e/o hemolysis( except high LDH - which could be from other causes).    PLAN:  - Would recommend primary care provider to recheck Hgb in 1-2 weeks after the patient finishes high dose Bactrim  - Recheck LEONARDO in 1-2 weeks to ensure clearance of warm autoantibody.        2. Thrombocytopenia  - stable.  -no intervention.    Subjective:    No new events overnight.  The patient is currently on 2L of O2 by nasal canula, and will tentatively be discharged home tomorrow with supplemental oxygen.      Labs:  All labs reviewed    CBC  Recent Labs  Lab 11/22/17  0720 11/21/17  0608 11/20/17  0540   WBC 4.9 6.5 5.5   HGB 10.9* 10.9* 9.9*   MCV 97 98 97   * 126* 123*       CMP  Recent Labs  Lab 11/22/17  0720 11/21/17  0608 11/20/17  0540 11/19/17  0520    132* 133 134   POTASSIUM 4.2 4.4 4.3 4.5   CHLORIDE 100 98 98 98   CO2 26 27 28 29   ANIONGAP 7 7 7 7   GLC 98 71 134* 133*   BUN 22 22 20 16   CR 1.01 1.12* 1.07* 0.90   GFRESTIMATED 60* 53* 56* 68   GFRESTBLACK 72 64 68 83   VISHNU 8.9 8.9 8.2* 8.6   MAG  --   --  1.9 1.9   PHOS  --   --  3.8 4.7*       INRNo lab results found in last 7 days.    Blood CultureNo results for input(s): CULT in the last 168 hours.      Carlos Truong MD  [facility]  11/22/2017 4:43 PM

## 2017-11-22 NOTE — PROGRESS NOTES
Received home O2 request into [facility]. Offered DME choices and [facility]. Pt will d/c to home tomorrow but staff will deliver portable oxygen concentrator to bedside today. ETA to bedside between 3-4:30 pm.

## 2017-11-22 NOTE — PLAN OF CARE
Problem: Sepsis/Septic Shock (Adult)  Goal: Signs and Symptoms of Listed Potential Problems Will be Absent, Minimized or Managed (Sepsis/Septic Shock)  Signs and symptoms of listed potential problems will be absent, minimized or managed by discharge/transition of care (reference Sepsis/Septic Shock (Adult) CPG).   Outcome: No Change   11/21/17 2307   Sepsis/Septic Shock   Problems Assessed (Sepsis) all   Problems Present (Sepsis) hypoxia/hypoxemia;glycemic control impaired   Assumed care of pt @ 5911-2962. Non-tele, face flushed, HS BG of 84, given 34 gms CHO. PRN Trazadone given @ HS. Continue w/current POC.  Cherry Ortiz, MAGNON, RN  Medical/Telemetry - 3

## 2017-11-22 NOTE — PROGRESS NOTES
Oxygen was delivered to patient at bedside at 3:30pm. Patient was the only one present for education. She is aware of how to reach us in case any new questions arise between education and discharge.

## 2017-11-22 NOTE — PROGRESS NOTES
[facility]  Infectious Disease Progress Note          Assessment and Plan:   IMPRESSION:   1.  A 42-year-old female with biopsy-proven interstitial lung disease and several months of worsened respiratory status, on high-dose steroids further worsening respiratory status in the last month with increased infiltrates, question still primary interstitial lung disease versus new opportunistic infection versus new hospital-acquired infection at present.   2.  Recent respiratory failure at [facility], no clear new diagnosis made, improved with steroids increased, was also being treated as Pneumocystis pneumonia although data did not really support that diagnosis, did not continue proper treatment as an outpatient; if Pneumocystis not adequately treated to date.    3.  Interstitial lung disease, question other systemic component.   4.  History of alcoholic cirrhosis.   5.  Chronic tobacco abuse including E-cigarettes, some concern this might be some type of lung damage related to that.   6 Cytopenias, ? meds      RECOMMENDATIONS:   1.  12 days broad conventional coverage by any usual measure adequate, cytopenias, so off vanco/zosyn  2.  So far unremarkable serologies and noninvasive diagnostic tests to try to prove some infectious diagnosis.   3.  May eventually, if not improving, require further diagnostic intervention, obviously if she requires intubation  we should do a bronchoscopy.  4 LDH very elevated despite neg PCP 10/20 and other evidence against, continue to tx possible PJP, fugitel neg further evidence against this dx , still complete full 3 weeks of tx, septra may be cytopenia issue but , WBc PLTs and HgB stable so continue to 3 weeks then proph dosing, day 18 high dose oral,, at that point 1 ss daily long term proph  5 Assuming home high dose septra thru Fri doses then ss daily proph        Interval History:   no new complaints improved hypoxia now only Nc 2 l and moving  "around, LDH 1042, procal 1.1  No + cxs no sputum productivity              Medications:       [START ON 11/23/2017] predniSONE  50 mg Oral Daily     insulin aspart  1-10 Units Subcutaneous TID AC     insulin aspart  1-7 Units Subcutaneous At Bedtime     insulin glargine  20 Units Subcutaneous At Bedtime     insulin aspart  5 Units Subcutaneous TID w/meals     gabapentin  600 mg Oral TID     aspirin  81 mg Oral Daily     atorvastatin (LIPITOR) tablet 20 mg  20 mg Oral At Bedtime     FLUoxetine  40 mg Oral Daily     folic acid  1 mg Oral Daily     furosemide  20 mg Oral Daily     loratadine  10 mg Oral Daily     lactulose  20 g Oral Daily     methocarbamol  500 mg Oral TID     mirtazapine (REMERON) tablet 15 mg  15 mg Oral At Bedtime     pantoprazole  40 mg Oral Daily     rifaximin  550 mg Oral BID     spironolactone  50 mg Oral Daily     sulfamethoxazole-trimethoprim  2 tablet Oral 4x Daily    And     sulfamethoxazole-trimethoprim  1 tablet Oral 4x Daily     protein modular  1 packet Per Feeding Tube Daily     buprenorphine HCl-naloxone HCl  3 Film Sublingual Daily     nicotine  1 patch Transdermal Daily     nicotine   Transdermal Daily     nicotine   Transdermal Q8H     sodium chloride (PF)  3 mL Intracatheter Q8H                  Physical Exam:   Blood pressure 98/65, pulse 74, temperature 97.5  F (36.4  C), temperature source Oral, resp. rate 22, height 1.689 m (5' 6.5\"), weight 78.2 kg (172 lb 4.8 oz), SpO2 96 %, not currently breastfeeding.  Wt Readings from Last 2 Encounters:   11/22/17 78.2 kg (172 lb 4.8 oz)   10/22/17 83.4 kg (183 lb 12.8 oz)     Vital Signs with Ranges  Temp:  [96.2  F (35.7  C)-97.6  F (36.4  C)] 97.5  F (36.4  C)  Pulse:  [62-74] 74  Heart Rate:  [76] 76  Resp:  [16-22] 22  BP: ()/(65-69) 98/65  SpO2:  [92 %-97 %] 96 %    Constitutional: Awake, alert, cooperative, ongoing resp apparent distress   Lungs: Clear to auscultation bilaterally, no crackles or wheezing on high flow O2 "   Cardiovascular: Regular rate and rhythm, normal S1 and S2, and no murmur noted   Abdomen: Normal bowel sounds, soft, non-distended, non-tender   Skin: No rashes, no cyanosis, no edema   Other:           Data:   All microbiology laboratory data reviewed.  Recent Labs   Lab Test  11/22/17   0720  11/21/17   0608  11/20/17   0540   WBC  4.9  6.5  5.5   HGB  10.9*  10.9*  9.9*   HCT  34.2*  34.5*  30.8*   MCV  97  98  97   PLT  118*  126*  123*     Recent Labs   Lab Test  11/22/17   0720  11/21/17   0608  11/20/17   0540   CR  1.01  1.12*  1.07*     Recent Labs   Lab Test  10/19/17   0503   SED  23*     Recent Labs   Lab Test  11/04/17   0343  11/04/17   0107  11/04/17   0050  10/20/17   0950  10/19/17   0517  10/19/17   0054  10/17/17   2143  10/17/17   2020  10/17/17   2015   CULT  No growth  No growth  No growth  Moderate growth  Coagulase negative Staphylococcus  Susceptibility testing not routinely done  *  Moderate growth  Candida albicans / dubliniensis  Candida albicans and Candida dubliniensis are not routinely speciated  Susceptibility testing not routinely done  *  Canceled, Test credited  >10 Squamous epithelial cells/low power field indicates oral contamination. Please   recollect.  *  Notification of test cancellation was given to  Elena Araujo RN from [facility]. 10.19.17 at 0753. GR.    Canceled, Test credited  >10 Squamous epithelial cells/low power field indicates oral contamination. Please   recollect.  *  Notification of test cancellation was given to  Nelsy Tesfaye RN [facility] @0246. 10/19/17. SCG    No growth  No growth  No growth

## 2017-11-22 NOTE — PLAN OF CARE
Problem: Patient Care Overview  Goal: Plan of Care/Patient Progress Review  Discharge Planner PT   Patient plan for discharge: home  Current status: all goals are met currently is Ind with basic transfers and gait with no AD to 500 feet with 2L of O2 sats were to 90% after with testing at ear. Denies need for stairs although is a goal.  Barriers to return to prior living situation: none  Recommendations for discharge: PT- Per plan established by the Physical Therapist, according to functional mobility the discharge recommendation is home may benefit from OP Pulm rehab    Rationale for recommendations: all goals are met will sign off from skilled PT in this setting.       Entered by: Grace Mcclain 11/22/2017 10:17 AM

## 2017-11-22 NOTE — DISCHARGE INSTRUCTIONS
Oxygen Provider:  Arranged through [employer] [facility], contact number [phone]. If you have any questions or concerns please call the oxygen company directly.

## 2017-11-22 NOTE — PROGRESS NOTES
[facility]  Hospitalist Progress Note  Allen Shah MD 11/22/2017       Reason for Stay (Diagnosis): Respiratory Failure    Summary of Stay: Sabina Figueroa is a 42 year old female with a history of ILD (diagnosed by wedge lung bx in 9/17 at [facility]-acute lung injury thought 2/2 e-cigarettes in setting of DIP and NSIP by surg path)-at that hospitalization she was discharged on high dose steroids with PCP ppx.  She then presented to the [facility] and hospitalized 10/18-10/23 for recurrent respiratory failure at which time it was felt due to PCP pna as she had not been taking her tmp-sulfa ppx.  She was discharged on high dose steroids taper and therapeutic tmp-sulfa dosing for PCP with sats 97 % range on RA and had arranged f/u with Dr. Mane (to f/u vasculitic w/u).  She returned to our ER and was admitted on 11/4/2017 with e/o recurrent hypoxic respiratory failure again in addition sepsis with KAITLIN in the setting of non-compliance/confusion with medications (only taking ppx dosing of tmp-sulfa), and missing of all medications due to being picked up for DUI (drank on 2 separate occassions but prior to that was sober since 5/11/17) and was held in long-term.      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  On the night of 11/5 she had increasing O2 requirements, worsening tachypnea - CT PE protocol completed and shows no PE but significant worsening of bilateral pulmonary infiltrates. Patient remained on BIPAP at night and requiring HFNC during day time but over the past few days has been able to transition to NC during the day. She was transferred out of ICU on 11/20 and now is stable on ~3L O2.  She is nearing discharge with plans for a steroid taper.         Assessment and Plan:      Sepsis: Presumably from respiratory source. CXR with basilar infiltrates. CT showed  diffuse infiltrates. Respiratory panel negative. Lactic acidosis resolved with IVF. She is afebrile with negative cultures.  She did receive 12 days of broad conventional coverage (now off Vancomycin/Zosyn).  She is being treated for possible PJP (despite -ve fungitell) and will need to complete a full 3 weeks of treatment.  She did have cytopenia but this is improving so will continue with Septra.    - She had been on broad spectrum abx with vancomycin, zosyn- stopped due to cytopenia on 11/15  - Continue Bactrim per ID (as above will require 3 weeks total duration, today day 18)  - ID following, appreciate recommendations     Acute hypoxic respiratory failure: Complicated pulmonary situation/history. 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 Had prolonged hospitalization at [facility] for suspected PJP PNA, was discharged on steroid/therapeutic Bactrim and was non-compliant with medications due to recent DUI. She was in assisted for 2 days and misunderstanding of Bactrim dosing. Patient was given broad spectrum abx with azith/zosyn/vanco in ER. Completed therapy with Vancomycin/Zosyn as above and remains on high dose Bactrim for suspected ongoing/inadequately treated PJP and high risk for alternative respiratory infection due to immunosuppression with steroids. She had initially required BiPAP and eventually transitioned to HFNC during the day, now only BiPAP and night with NC during the day.  - On Prednisone 60 mg QD (had been on Solu-Medrol 20 mg q6h until 11/20), will taper to 50 mg  - Continue DuoNeb's  - Continue Lasix 20 mg daily.  - Monitor daily weight and intake/output.       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 Not lung transplant candidate.  Will need to follow up with Dr. French at discharge.      Non-compliance with medications: Social service consult as she may require case-worker at discharge to help mitigate suspected  social issues/med compliance.     KAITLIN - Resolved:  Baseline creatinine normal. Renal function normalized after IVF.      Alcoholic Cirrhosis:  Chronically on spironolactone/furosemide/lactulose/rifaximin. LFTs currently with minimally elevated AST, bili normal. NH3 normal and on review of chart not significantly elevated except 2 prior occasions. PO Lasix and spironolactone had been placed on hold and was started IV Lasix. No significant ascites on exam.  Now back to PTA medications.  - Continue lactulose and rifaximin, Lasix and Spironolactone      Steroid induced DM: Uncontrolled blood glucose for many days. She is now improving as she is no longer on tube feeds.  Continue current regimen.  - High intensity SSI  - Lantus 20 units at bedtime and Novolog 5 units TID with meals.  Will need to taper as steroids are reduced.     Narcotic dependence: Pain team following, continue suboxone.  Dilaudid stopped earlier in the admission.       Suspected underlying vasculitic/autoimmune:  AMISHA by IFA borderline positive, and cytoplasmic IgG negative.  Outpatient pulmonary/rheumatology follow-up at discharge.     Nicotine dependence with suspected ALI 2/2 e cigarettes: Continues to smoke despite nicotine replacement.       Anemia: Progressive decline in Hb, no bleeding noted. She was 13.8 on adm then down to 5.8 before received 2 U PRBC 11/12.  She denies black/red/tarry stools. Only mild bruising at site of Lovenox injections. Etiology unclear. Discussed with hematology and ddx is bleeding, hemolysis, medication, BM process. Her iron studies, B12 and folate were WNL.  Haptoglobin and peripheral smear also normal.  No sign of hemolysis.  Mostly likely explanation is meds (specifically Bactrim, zosyn, rifaximin in the ordered).  Hgb has remained stable at this time, in the 9.5 range.  Lovenox was stopped due to unknown cause of anemia and she remains on PCDs.  - Daily CBC, monitor for bleeding   - Transfuse for Hb  "<7  --outpatient heme/onc follow up     Severe protein calorie malnutrition:  Had been on TF which were stopped on 11/18 and she is now doing well with PO intake.    DVT Prophylaxis: Pneumatic Compression Devices  Code Status: Full Code  Discharge Dispo: TBD based on oxygen requirements and mobility-most likely home with pulm rehab   Estimated Disch Date / # of Days until Disch: 1-2 more days once on stable oxygen requirement         Interval History (Subjective):        I took over patients care today  Respiratory status stable - more mobile, requiring 2-3 L O2  No chest tightness   No fevers or chills   working on discharge planning                  Physical Exam:      Last Vital Signs:  /68 (BP Location: Left arm)  Pulse 74  Temp 97.6  F (36.4  C) (Oral)  Resp 22  Ht 1.689 m (5' 6.5\")  Wt 78.2 kg (172 lb 4.8 oz)  SpO2 97%  Breastfeeding? No  BMI 27.4 kg/m2    General: Alert, awake, no acute distress.  HEENT: Normocephalic and atraumatic, eyes anicteric and without scleral injection, EOMI, face symmetric, MMM.  Cardiac: RRR, normal S1, S2. No m/g/r, no LE edema.  Pulmonary: Normal chest rise, normal work of breathing.  Decreased breath sounds but no crackles or wheezing.  Abdomen: soft, non-tender, non-distended.  Normoactive bowel sounds, no guarding or rebound tenderness.  Extremities: no deformities.  Warm, well perfused.  Skin: no rashes or lesions.  Warm and Dry.  Neuro: No focal deficits.  Speech clear.  Coordination and strength grossly normal.  Psych: Alert and oriented x3. Appropriate affect.         Medications:      All current medications were reviewed with changes reflected in problem list.         Data:      All new lab and imaging data was reviewed.   Labs:    Recent Labs  Lab 11/22/17  0720 11/21/17  0608 11/20/17  0540    132* 133   POTASSIUM 4.2 4.4 4.3   CHLORIDE 100 98 98   CO2 26 27 28   ANIONGAP 7 7 7   GLC 98 71 134*   BUN 22 22 20   CR 1.01 1.12* 1.07*   GFRESTIMATED 60* " 53* 56*   GFRESTBLACK 72 64 68   VISHNU 8.9 8.9 8.2*       Recent Labs  Lab 11/22/17  0720 11/21/17  0608 11/20/17  0540   WBC 4.9 6.5 5.5   HGB 10.9* 10.9* 9.9*   HCT 34.2* 34.5* 30.8*   MCV 97 98 97   * 126* 123*      Imaging:   No results found for this or any previous visit (from the past 24 hour(s)).

## 2017-11-22 NOTE — PROGRESS NOTES
Patient has been assessed for Home Oxygen needs.  Oxygen readings:   *   RA - at rest  Pulse oximetry SPO2 86%  *   RA - during activity/with exercise SPO2 84 %  *   O2 at  2 liters/minute (at rest) ...SPO2 97 %  *   O2 at 2 liters/minute (during activity/with exercise) ...SPO2 90 %

## 2017-11-22 NOTE — PROGRESS NOTES
CM left message with [facility] ([phone], #2, #2) for new oxygen set up. Provided them with contact information and patient's specifics. Will f/u later today if no return call.     CM will continue to follow patient until discharge for any additional needs.     Patricia Madera RN, BSN, [facility]  [phone]    Addendum: [facility] returned CM's call. They have everything set up for patient except have been unable to contact patient in her room. CM went into pt room with portable phone; Pt was agreeable to talking to [facility]. Arrangements finalized. [facility] will deliver an oxygen tank between 3pm and 430pm today. Patient will discharge tomorrow.                 kevin

## 2017-11-22 NOTE — PLAN OF CARE
Problem: Patient Care Overview  Goal: Plan of Care/Patient Progress Review  Outcome: No Change    Dx: Sepsis  Hx: Interstitial Lung Disease, HTN, Smoking, Anxiety, Ascites, Depression, and Chemical Dependency.   Labs: B  Tele: NA  Assessment: VSS. A/O x 4 w/ forgetfulness. Saline Locked. SBA. O2 @ 2 LPM via NC. Nicotine PIP. KOROMA. Denies SOB/CP/Pain. Droplet Isolation Maintained for PNA w/ cough.   Plan: Continue POC. D/C home 1-2 days pending once stable Oxygen Requirement.

## 2017-11-23 NOTE — DISCHARGE SUMMARY
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  She then presented to the [facility] and hospitalized 10/18-10/23 for recurrent respiratory failure at which time it was felt due to PCP pna as she had not been taking her tmp-sulfa ppx.  She was discharged on high dose steroids taper and therapeutic tmp-sulfa dosing for PCP with sats 97 % range on RA and had arranged f/u with Dr. Mane (to f/u vasculitic w/u).      She returned to our ER and was admitted to the ICU on 11/4/2017 with evidence of recurrent hypoxic respiratory failure (O2 sats in the 50s) again in addition sepsis with KAITLIN in the setting of non-compliance/confusion with medications (only taking prophylactic dosing of tmp-sulfa), and missing all medications due to being picked up for a DUI (drank on 2 separate occassions but prior to that was sober since 5/11/17) and was held in retirement.    Initially she was treated with broad spectrum antibiotics including vancomycin, zosyn and bactrim and required high flow O2 and bipap in the ICU.  She also was placed on oral steroids (chronically has been on >20 mg per day for the past six months per her report).  On the night of 11/5 (night after admission) she had  increasing O2 requirements, worsening tachypnea and CT PE protocol was completed and showed no PE but did reveal significant worsening of her bilateral pulmonary infiltrates.  She was followed by pulmonary/critical care in the ICU and ultimately treated with higher dose IV steroids.  She did not require intubation but improvement was very slow.      Due to cytopenias she was seen by heme/onc and ultimately vancomycin and zosyn were stopped, though she had completed about 12 days prior to that anyway.  There was some concern for autoimmune cytopenias based on a positive LEONARDO though recommendations are currently to repeat this in a couple of weeks and monitor her clinically as counts have been stable.    She remained on BIPAP at night and requiring HFNC during day time for much of her stay but over the past 4-5 days has been able to transition to NC during the day. She was transferred out of ICU on 11/20 and now is stable on ~2-3L O2.  We have continued to taper steroids and have treated for PCP pneumonia with good effect (high dose bactrim).  She is nearing discharge with plans for a steroid taper with home O2.  She has completed 20 days of treatment doses of bactrim and will finish this tomorrow at home followed by SS daily prophylactic dose bactrim.    IF she returns with another respiratory decompensation she should be admitted to a facility with the ability to perform bronchoscopy/Pulmonary consult as treatment here has been empiric and she would require specialty care.          Assessment and Plan:      Sepsis: Presumably from respiratory source. CXR with basilar infiltrates. CT showed diffuse infiltrates. Respiratory panel negative. Lactic acidosis resolved with IVF. She is afebrile with negative cultures.  She did receive 12 days of broad conventional coverage (now off Vancomycin/Zosyn).  She is being treated for possible PJP (despite -ve fungitell) and will need to complete a full 3 weeks of treatment.  She  did have cytopenia but this is improving so will continue with Septra.    - She had been on broad spectrum abx with vancomycin, zosyn- stopped due to cytopenia on 11/15  - Continue Bactrim per ID (as above will require 3 weeks total duration, today day 20, complete tomorrow at home)  - ID following, appreciate recommendations      Acute hypoxic respiratory failure: Complicated pulmonary situation/history. 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 Had prolonged hospitalization at [facility] for suspected PJP PNA, was discharged on steroid/therapeutic Bactrim and was non-compliant with medications due to recent DUI. She was in senior living for 2 days and misunderstanding of Bactrim dosing. Patient was given broad spectrum abx with azith/zosyn/vanco in ER. Completed therapy with Vancomycin/Zosyn as above and remains on high dose Bactrim for suspected ongoing/inadequately treated PJP and high risk for alternative respiratory infection due to immunosuppression with steroids. She had initially required BiPAP and eventually transitioned to HFNC during the day, now stable on 2L-3L during the day.  - On Prednisone, tapering now to 40 mg then 10 mg per week down to 20 mg with further steroids to be directed by Pulmonary clinic as she has follow up early December.  - Continue DuoNebs  - Continue Lasix 20 mg daily.       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 Not lung transplant candidate.  follow up with Dr. French at discharge.      Non-compliance with medications: Social service consulted as she may require case-worker at discharge to help mitigate suspected social issues/med compliance.      KAITLIN - Resolved:  Baseline creatinine normal. Renal function normalized after IVF.       Alcoholic Cirrhosis:  Chronically on spironolactone/furosemide/lactulose/rifaximin.   Now back to PTA medications.  - Continue lactulose and rifaximin, Lasix and  Spironolactone      Steroid induced DM: Uncontrolled blood glucose for many days. She is now improving as she is no longer on tube feeds.  Continue prior home regimen upon DC.  -May need to taper as steroids are reduced.      Narcotic dependence: Pain team following, continue suboxone.  Dilaudid stopped earlier in the admission.       Suspected underlying vasculitic/autoimmune:  AMISHA by IFA borderline positive, and cytoplasmic IgG negative.  Outpatient pulmonary/?rheumatology follow-up at discharge given ILD hx.      Nicotine dependence with suspected ALI 2/2 e cigarettes: Continues to smoke despite nicotine replacement.       Anemia: Progressive decline in Hb, no bleeding noted. She was 13.8 on adm then down to 5.8 before received 2 U PRBC 11/12.  She denies black/red/tarry stools. Only mild bruising at site of Lovenox injections. Etiology unclear. Discussed with hematology and ddx is bleeding, hemolysis, medication, BM process. Her iron studies, B12 and folate were WNL.  Haptoglobin and peripheral smear also normal.  No sign of hemolysis other than LEONARDO.  Mostly likely explanation is meds (specifically Bactrim, zosyn, rifaximin in the ordered).  Hgb has remained stable at this time, in the 9.5 range.  Lovenox was stopped due to unknown cause of anemia and she remains on PCDs.  - recheck cbc in clinic  --outpatient heme/onc follow up to repeat LEONARDO      Severe protein calorie malnutrition:  Had been on TF which were stopped on 11/18 and she is now doing well with PO intake.        Discharge Disposition:  Discharged to home     Allergies:  Allergies   Allergen Reactions     Blood Transfusion Related (Informational Only) Other (See Comments)     Patient has a history of a clinically significant antibody against RBC antigens.  A delay in compatible RBCs may occur.     No Clinical Screening - See Comments Rash     Bupronide patch allergy        Discharge Medications:      Review of your medicines      START taking        Dose / Directions    sulfamethoxazole-trimethoprim 400-80 MG per tablet   Commonly known as:  BACTRIM/SEPTRA   Indication:  PCP   Used for:  Pneumonia of both lower lobes due to infectious organism   Replaces:  sulfamethoxazole-trimethoprim 800-160 MG per tablet        You have double strength tabs at home from your last admission, these are single strength tabs.  Please take 4 single strength tabs 4x daily on Thursday 11/23 and Friday 11/24 then after this, take one single strength tab of bactrim daily for prophylaxis/prevention.   Quantity:  60 tablet   Refills:  0         CONTINUE these medicines which may have CHANGED, or have new prescriptions. If we are uncertain of the size of tablets/capsules you have at home, strength may be listed as something that might have changed.       Dose / Directions    BASAGLAR 100 UNIT/ML injection   This may have changed:  when to take this   Used for:  Steroid-induced diabetes mellitus (H)        Dose:  20 Units   Inject 20 Units Subcutaneous At Bedtime   Refills:  0       predniSONE 10 MG tablet   Commonly known as:  DELTASONE   This may have changed:    - how much to take  - how to take this  - when to take this  - additional instructions  - Another medication with the same name was removed. Continue taking this medication, and follow the directions you see here.   Used for:  Pneumonia of both lungs due to infectious organism, unspecified part of lung        Take 40 mg prednisone daily x 7 days, then reduce to 30 mg x 7 days then reduce to 20 mg daily.  If you have any increase in respiratory symptoms please contact your primary care doctor or pulmonologist.  Please discuss further steroid plans with Dr. Mane (Pulmonary) in December at your follow up visit.   Quantity:  15 tablet   Refills:  0         CONTINUE these medicines which have NOT CHANGED       Dose / Directions    albuterol 108 (90 BASE) MCG/ACT Inhaler   Commonly known as:  PROAIR HFA/PROVENTIL HFA/VENTOLIN HFA         Dose:  2 puff   Inhale 2 puffs into the lungs every 4 hours as needed for shortness of breath / dyspnea or wheezing Reported on 2/23/2017   Refills:  0       alum & mag hydroxide-simethicone 400-400-40 MG/5ML Susp suspension   Commonly known as:  MYLANTA ES/MAALOX  ES        Dose:  30 mL   Take 30 mLs by mouth every 4 hours as needed for indigestion Reported on 2/23/2017   Refills:  0       ascorbic acid 500 MG tablet   Commonly known as:  VITAMIN C        Dose:  500 mg   Take 500 mg by mouth daily   Refills:  0       aspirin 81 MG EC tablet   Used for:  Elevated troponin I level        Dose:  81 mg   Take 1 tablet (81 mg) by mouth daily   Quantity:  30 tablet   Refills:  1       BREO ELLIPTA 100-25 MCG/INH oral inhaler   Generic drug:  fluticasone-vilanterol        Dose:  1 puff   Inhale 1 puff into the lungs daily as needed   Refills:  0       buprenorphine-naloxone 8-2 MG Subl sublingual tablet   Commonly known as:  SUBOXONE   Used for:  Other chronic pain        Dose:  3 tablet   Place 3 tablets under the tongue daily   Quantity:  60 each   Refills:  0       ferrous sulfate 325 (65 FE) MG tablet   Commonly known as:  IRON        Dose:  325 mg   Take 325 mg by mouth daily (with breakfast)   Refills:  0       FLUoxetine 40 MG capsule   Commonly known as:  PROzac        Dose:  40 mg   Take 40 mg by mouth daily   Refills:  0       fluticasone 50 MCG/ACT spray   Commonly known as:  FLONASE        Dose:  2 spray   Spray 2 sprays into both nostrils daily as needed for rhinitis or allergies   Refills:  0       folic acid 1 MG tablet   Commonly known as:  FOLVITE   Used for:  Alcoholic hepatitis, Alcohol dependence (H)        Dose:  1 mg   Take 1 tablet (1 mg) by mouth daily   Quantity:  30 tablet   Refills:  0       furosemide 20 MG tablet   Commonly known as:  LASIX   Used for:  Organizing pneumonia (H)        Dose:  20 mg   Take 1 tablet (20 mg) by mouth daily   Quantity:  30 tablet   Refills:  3        gabapentin 600 MG tablet   Commonly known as:  NEURONTIN        Dose:  600 mg   Take 1 tablet (600 mg) by mouth 3 times daily   Quantity:  9 tablet   Refills:  0       hydrOXYzine 10 MG tablet   Commonly known as:  ATARAX        Dose:  10 mg   Take 10 mg by mouth every 8 hours as needed for anxiety   Refills:  0       ipratropium - albuterol 0.5 mg/2.5 mg/3 mL 0.5-2.5 (3) MG/3ML neb solution   Commonly known as:  DUONEB        Dose:  1 vial   Take 1 vial by nebulization every 6 hours as needed for shortness of breath / dyspnea or wheezing   Refills:  0       lactulose 20 GM/30ML Soln        Dose:  20 g   Take 20 g by mouth daily   Refills:  0       LIPITOR PO        Dose:  20 mg   Take 20 mg by mouth At Bedtime   Refills:  0       loratadine 10 MG tablet   Commonly known as:  CLARITIN        Dose:  10 mg   Take 10 mg by mouth daily   Refills:  0       MELATONIN PO        Dose:  5 mg   Take 5 mg by mouth At Bedtime   Refills:  0       MIRTAZAPINE PO        Dose:  30 mg   Take 30 mg by mouth At Bedtime   Refills:  0       MULTIPLE VITAMINS PO   Used for:  Alcoholic hepatitis        Dose:  1 tablet   Take 1 tablet by mouth daily   Refills:  0       * nicotine 21 MG/24HR 24 hr patch   Commonly known as:  NICODERM CQ   Used for:  Cigarette nicotine dependence with other nicotine-induced disorder        Dose:  1 patch   Place 1 patch onto the skin daily   Quantity:  30 patch   Refills:  1       * nicotine 10 MG Inhaler   Commonly known as:  NICOTROL   Used for:  Cigarette nicotine dependence with other nicotine-induced disorder        Dose:  1 Cartridge   Inhale 1 Cartridge into the lungs every hour as needed for smoking cessation   Quantity:  180 each   Refills:  1       nicotine polacrilex 2 MG gum   Commonly known as:  NICORETTE   Used for:  Cigarette nicotine dependence with other nicotine-induced disorder        Dose:  2 mg   Place 1 each (2 mg) inside cheek every hour as needed for smoking cessation   Quantity:  30  tablet   Refills:  1       * NovoLOG FLEXPEN 100 UNIT/ML injection   Generic drug:  insulin aspart        Dose:  6 Units   Inject 6 Units Subcutaneous 3 times daily (with meals)   Refills:  0       * NovoLOG FLEXPEN 100 UNIT/ML injection   Generic drug:  insulin aspart        Dose:  5 Units   Inject 5 Units Subcutaneous With can of Dr Pepper   Refills:  0       * NovoLOG FLEXPEN 100 UNIT/ML injection   Generic drug:  insulin aspart        Dose:  1-4 Units   Inject 1-4 Units Subcutaneous 3 times daily (with meals) Per Sliding scale   Refills:  0       * NovoLOG FLEXPEN 100 UNIT/ML injection   Generic drug:  insulin aspart        Dose:  1-4 Units   Inject 1-4 Units Subcutaneous At Bedtime Per Sliding Scale   Refills:  0       PROTONIX PO        Dose:  40 mg   Take 40 mg by mouth every morning (before breakfast)   Refills:  0       rifaximin 550 MG Tabs tablet   Commonly known as:  XIFAXAN   Used for:  Other ascites        Dose:  550 mg   Take 1 tablet (550 mg) by mouth 2 times daily   Quantity:  60 tablet   Refills:  0       ROBAXIN 500 MG tablet   Generic drug:  methocarbamol        Dose:  1000 mg   Take 1,000 mg by mouth 4 times daily   Refills:  0       spironolactone 50 MG tablet   Commonly known as:  ALDACTONE        Dose:  50 mg   Take 1 tablet (50 mg) by mouth daily   Quantity:  3 tablet   Refills:  0       thiamine 100 MG tablet        Dose:  100 mg   Take 100 mg by mouth daily   Refills:  0       traZODone 100 MG tablet   Commonly known as:  DESYREL        Dose:  100 mg   Take 100 mg by mouth At Bedtime   Refills:  0       * Notice:  This list has 6 medication(s) that are the same as other medications prescribed for you. Read the directions carefully, and ask your doctor or other care provider to review them with you.      STOP taking          sulfamethoxazole-trimethoprim 800-160 MG per tablet   Commonly known as:  BACTRIM DS/SEPTRA DS   Replaced by:  sulfamethoxazole-trimethoprim 400-80 MG per tablet         "        Where to get your medicines      Some of these will need a paper prescription and others can be bought over the counter. Ask your nurse if you have questions.     Bring a paper prescription for each of these medications      sulfamethoxazole-trimethoprim 400-80 MG per tablet       You don't need a prescription for these medications      BASAGLAR 100 UNIT/ML injection     predniSONE 10 MG tablet             Condition on Discharge:  Discharge condition: Stable   Discharge vitals: Blood pressure 111/69, pulse 74, temperature 96.5  F (35.8  C), temperature source Oral, resp. rate 20, height 1.689 m (5' 6.5\"), weight 78.3 kg (172 lb 11.2 oz), SpO2 96 %, not currently breastfeeding.   Code status on discharge: Full Code     History of Illness:  See detailed admission note for full details.    Physical Exam:  Blood pressure 111/69, pulse 74, temperature 96.5  F (35.8  C), temperature source Oral, resp. rate 20, height 1.689 m (5' 6.5\"), weight 78.3 kg (172 lb 11.2 oz), SpO2 96 %, not currently breastfeeding.  Wt Readings from Last 1 Encounters:   11/23/17 78.3 kg (172 lb 11.2 oz)     General: Alert, awake, no acute distress.  HEENT: NC/AT, eyes anicteric, external occular movements intact, face symmetric.  Dentition WNL, MM moist.  Cardiac: RRR, S1, S2.  No murmurs appreciated.  Pulmonary: Normal chest rise, normal work of breathing.  Lungs w/ bilateral dry crackles.  Abdomen: soft, non-tender, non-distended.  Bowel Sounds Present.  No guarding.  Extremities: no deformities.  Warm, well perfused.  Skin: no rashes or lesions noted.  Warm and Dry.  Neuro: No focal deficits noted.  Speech clear.  Coordination and strength grossly normal.  Psych: Appropriate affect.    Procedures other than Imaging:  NG feeding tube  bipap     Imaging:  Results for orders placed or performed during the hospital encounter of 11/04/17   XR Chest Port 1 View    Narrative    XR CHEST PORT 1 VW 11/4/2017 1:07 AM    HISTORY: " Fever.    COMPARISON: 10/17/2017, 9/20/2017    FINDINGS: Hazy basilar airspace opacity, similar to recent previous.  Upper lungs are clear. No dani pleural effusion or pneumothorax.  Stable heart size.      Impression    IMPRESSION: Basilar pneumonia versus interstitial edema.    KWAN ULRICH MD   CT Chest Pulmonary Embolism w Contrast    Narrative    CT CHEST PULMONARY EMBOLISM WITH CONTRAST  11/5/2017 11:10 AM     HISTORY: Complicated pulmonary history, more hypoxic, at risk for VTE.  Evaluate for PE.    CONTRAST DOSE:  76 mL [id number]    TECHNIQUE:  Radiation dose for this scan was reduced using automated  exposure control, adjustment of the mA and/or kV according to patient  size, or iterative reconstruction technique.    FINDINGS:  Contrast bolus within the pulmonary artery is suboptimal.  No large central pulmonary arterial filling defects are demonstrated.  There is no evidence of aortic dissection. Mildly enlarged  aorticopulmonary window, pretracheal, and superior mediastinal lymph  nodes are noted. Sample aorticopulmonary window lymph node which  measured 1.1 cm on 10/18/2017 has increased in size to 1.5 cm.  Nonenlarged right hilar and mildly enlarged left hilar lymph nodes are  also noted. There is extensive pulmonary fibrosis with groundglass  opacity and honeycombing which appears to have progressed since  10/18/2017. Linear radiodensity, likely surgical sutures, sutures are  noted along the right major fissure. Small pleural effusions are  noted, right greater than left.      Impression    IMPRESSION:  1. Suboptimal pulmonary artery contrast bolus. No pulmonary emboli are  visible.  2. Pulmonary bilateral diffuse progressive disease, dramatically  progressed since 10/18/2017.  3. Small pleural effusions, right greater than left.  4. Mediastinal and left hilar adenopathy, increased since 10/18/2017.    BALDEV WANG MD   XR Abdomen Port 1 View    Narrative    XR ABDOMEN PORT F1 VW 11/7/2017 12:34  PM    HISTORY: Feeding tube placement.    COMPARISON: January 10, 2017.      Impression    IMPRESSION: A feeding tube is coiled within the stomach and terminates  in the gastric body. There are dilated loops of small bowel with  air-fluid levels suggestive of obstruction.    EVE BALLESTEROS MD   XR Chest Port 1 View    Narrative    XR CHEST PORT 1 VW  11/9/2017 6:40 AM    HISTORY:  Persistent acute hypoxic respiratory failure;     COMPARISON:  11/4/2017      Impression    IMPRESSION:  Nasogastric tube tip not seen. Diffuse bilateral alveolar  opacities, significantly worsened since the prior exam.     LUKE PATEL MD   XR Chest Port 1 View    Narrative    CHEST ONE VIEW PORTABLE  11/10/2017 6:31 AM     HISTORY: Hypoxia.     COMPARISON: 11/9/2017.      Impression    IMPRESSION: Enteric tube remains in place. Diffuse micronodular  opacities throughout the lungs are not significantly changed since  prior. No pneumothorax. Cardiac silhouette remains enlarged.    MAKENZIE TO MD   XR Chest Port 1 View    Narrative    CHEST ONE VIEW PORTABLE  11/11/2017 6:32 AM     HISTORY: Hypoxia      COMPARISON: Chest x-ray 11/10/2017.      Impression    IMPRESSION: Portable view of the chest is again noted. Feeding tube  remains present. Diffuse pulmonary opacities are unchanged compared to  prior study. Findings are concerning for ARDS in the correct clinical  setting. Postsurgical changes are noted in the mid lateral right lung.  No evidence of pneumothorax or obvious pleural effusions. Heart size  remains mildly prominent.    DEBBI ROLDAN MD   XR Chest Port 1 View    Narrative    CHEST PORTABLE ONE VIEW November 13, 2017 6:19 AM     HISTORY: Hypoxia.    COMPARISON: 11/11/2017.      Impression    IMPRESSION: Diffuse micronodular and interstitial pulmonary opacities  do not appear significantly changed since prior. No significant  pleural effusion. No pneumothorax identified. Surgical staple line in  the right lung again noted.  Enteric tube remains in place.    MAKENZIE TO MD        Consultations:  Pulmonary/ICU  Heme/Onc  ID.       Recent Lab Results:    Recent Labs  Lab 11/22/17  0720 11/21/17  0608 11/20/17  0540   WBC 4.9 6.5 5.5   HGB 10.9* 10.9* 9.9*   HCT 34.2* 34.5* 30.8*   MCV 97 98 97   * 126* 123*          Lab Results   Component Value Date     11/22/2017     11/21/2017     11/20/2017    Lab Results   Component Value Date    CHLORIDE 100 11/22/2017    CHLORIDE 98 11/21/2017    CHLORIDE 98 11/20/2017    Lab Results   Component Value Date    BUN 22 11/22/2017    BUN 22 11/21/2017    BUN 20 11/20/2017      Lab Results   Component Value Date    POTASSIUM 4.2 11/22/2017    POTASSIUM 4.4 11/21/2017    POTASSIUM 4.3 11/20/2017    Lab Results   Component Value Date    CO2 26 11/22/2017    CO2 27 11/21/2017    CO2 28 11/20/2017    Lab Results   Component Value Date    CR 1.01 11/22/2017    CR 1.12 11/21/2017    CR 1.07 11/20/2017        No results for input(s): CULT in the last 168 hours.         Pending Results:  Unresulted Labs Ordered in the Past 30 Days of this Admission     No orders found from 9/5/2017 to 11/5/2017.           Discharge Instructions and Follow-Up:     Discharge Procedure Orders  Reason for your hospital stay   Order Comments: You were admitted for respiratory failure due to infectious pneumonia.  You have been treated with antibiotics and also an increase in your steroids.  You now will continue treatment with bactrim at home as discussed with close follow up with pulmonary medicine.     Follow-up and recommended labs and tests    Order Comments: Follow up with Dr. Deepa Mane (Pulmonary Clinic) as planned in December for ongoing management of your lung disease.  Also, please follow up withyour primary care doctor, Aidee Hemphill, within 7 days for hospital follow- up.  The following labs/tests are recommended: please review blood sugar control and also re-evaluate your oxygen  requirement.  You may also discuss referral to pulmonary rehab at that time.    Finally, follow up with [facility] in 1-2 weeks as well for re-evaluation of your cytopenias (low cell counts) and please recheck a CBC (complete blood count) at that time as well as any additional testing recommended by your hematologist.     Activity   Order Comments: Your activity upon discharge: gradually resume light activity as tolerated   Order Specific Question Answer Comments   Is discharge order? Yes      Full Code     Oxygen Adult   Order Comments: New Home Oxygen Order 2 liter(s) by nasal cannula continuously with use of portable tank. Expected treatment length is indefinite (99 months).. Test on conserving device as applicable.    Patients who qualify for home O2 coverage under the CMS guidelines require ABG tests or O2 sat readings obtained closest to, but no earlier than 2 days prior to the discharge, as evidence of the need for home oxygen therapy. Testing must be performed while patient is in the chronic stable state. See notes for O2 sats.    I certify that this patient, Sabina Figueroa has been under my care and that I, or a nurse practitioner or physician's assistant working with me, had a face-to-face encounter that meets the face-to-face encounter requirements with this patient on 11/22/2017. The patient, Sabina Figueroa was evaluated or treated in whole, or in part, for the following medical condition, which necessitates the use of the ordered oxygen. Treatment Diagnosis: interstitial lung disease    Attending Provider: Allen Shah  Physician signature: See electronic signature associated with these discharge orders  Date of Order: November 22, 2017     Diet   Order Comments: Follow this diet upon discharge: Orders Placed This Encounter       High Consistent CHO Diet   Order Specific Question Answer Comments   Is discharge order? Yes          Total time spent in face to face contact with  the patient and coordinating discharge was:  60 Minutes.

## 2017-11-23 NOTE — PLAN OF CARE
Problem: Patient Care Overview  Goal: Plan of Care/Patient Progress Review  Pt A/O, VSS, and denied pain overnight. Up independently in room. O2 2L via nasal cannula, will d/c on home oxygen. Slept between cares. Possible d/c today?

## 2017-11-23 NOTE — PROGRESS NOTES
Oncology/Hematology Follow Up Note:    Assessment and Plan:  1. Chronic baseline anemia likely related to ILD (anemia of chronic disease) with acute drop to 5-6 range.  - Likely etiology is from antibiotics.  - Hgb improving.  - LEONARDO was positive, clinically no other e/o hemolysis( except high LDH - which could be from other causes).     PLAN:  - Would recommend primary care provider to recheck Hgb in 1-2 weeks after the patient finishes high dose Bactrim  - Recheck LEONARDO in 1-2 weeks to ensure clearance of warm autoantibody.      2. Thrombocytopenia  - stable.  -no intervention.    Subjective:    No events overnight.  Respiratory status is stable.  Will be discharged       Labs:  All labs reviewed    CBC  Recent Labs  Lab 11/22/17  0720 11/21/17  0608 11/20/17  0540   WBC 4.9 6.5 5.5   HGB 10.9* 10.9* 9.9*   MCV 97 98 97   * 126* 123*       CMP  Recent Labs  Lab 11/22/17  0720 11/21/17  0608 11/20/17  0540 11/19/17  0520    132* 133 134   POTASSIUM 4.2 4.4 4.3 4.5   CHLORIDE 100 98 98 98   CO2 26 27 28 29   ANIONGAP 7 7 7 7   GLC 98 71 134* 133*   BUN 22 22 20 16   CR 1.01 1.12* 1.07* 0.90   GFRESTIMATED 60* 53* 56* 68   GFRESTBLACK 72 64 68 83   VISHNU 8.9 8.9 8.2* 8.6   MAG  --   --  1.9 1.9   PHOS  --   --  3.8 4.7*       INRNo lab results found in last 7 days.    Blood CultureNo results for input(s): CULT in the last 168 hours.      Carlos Truong MD  [facility]  11/23/2017 12:46 PM

## 2017-11-23 NOTE — PROGRESS NOTES
[facility]  Infectious Disease Progress Note          Assessment and Plan:   IMPRESSION:   1.  A 42-year-old female with biopsy-proven interstitial lung disease and several months of worsened respiratory status, on high-dose steroids further worsening respiratory status in the last month with increased infiltrates, question still primary interstitial lung disease versus new opportunistic infection versus new hospital-acquired infection at present.   2.  Recent respiratory failure at [facility], no clear new diagnosis made, improved with steroids increased, was also being treated as Pneumocystis pneumonia although data did not really support that diagnosis, did not continue proper treatment as an outpatient; if Pneumocystis not adequately treated to date.    3.  Interstitial lung disease, question other systemic component.   4.  History of alcoholic cirrhosis.   5.  Chronic tobacco abuse including E-cigarettes, some concern this might be some type of lung damage related to that.   6 Cytopenias, ? meds      RECOMMENDATIONS:   1.  12 days broad conventional coverage by any usual measure adequate, cytopenias, so off vanco/zosyn  2.  So far unremarkable serologies and noninvasive diagnostic tests to try to prove some infectious diagnosis.   3.  May eventually, if not improving, require further diagnostic intervention, obviously if she requires intubation  we should do a bronchoscopy.  4 LDH very elevated despite neg PCP 10/20 and other evidence against, continue to tx possible PJP, fugitel neg further evidence against this dx , still complete full 3 weeks of tx, septra may be cytopenia issue but , WBc PLTs and HgB stable so continue to 3 weeks then proph dosing, day 18 high dose oral,, at that point 1 ss daily long term proph  5 Assuming home high dose septra thru Fri doses then ss daily proph following that as long as prednisone 20mg or more        Interval History:   no new  "complaints improved hypoxia now only [place] 2 l and moving around, LDH 1042 now 398, procal was 1.1  No + cxs no sputum productivity              Medications:       predniSONE  50 mg Oral Daily     insulin aspart  1-10 Units Subcutaneous TID AC     insulin aspart  1-7 Units Subcutaneous At Bedtime     insulin glargine  20 Units Subcutaneous At Bedtime     insulin aspart  5 Units Subcutaneous TID w/meals     gabapentin  600 mg Oral TID     aspirin  81 mg Oral Daily     atorvastatin (LIPITOR) tablet 20 mg  20 mg Oral At Bedtime     FLUoxetine  40 mg Oral Daily     folic acid  1 mg Oral Daily     furosemide  20 mg Oral Daily     loratadine  10 mg Oral Daily     lactulose  20 g Oral Daily     methocarbamol  500 mg Oral TID     mirtazapine (REMERON) tablet 15 mg  15 mg Oral At Bedtime     pantoprazole  40 mg Oral Daily     rifaximin  550 mg Oral BID     spironolactone  50 mg Oral Daily     sulfamethoxazole-trimethoprim  2 tablet Oral 4x Daily    And     sulfamethoxazole-trimethoprim  1 tablet Oral 4x Daily     protein modular  1 packet Per Feeding Tube Daily     buprenorphine HCl-naloxone HCl  3 Film Sublingual Daily     nicotine  1 patch Transdermal Daily     nicotine   Transdermal Daily     nicotine   Transdermal Q8H     sodium chloride (PF)  3 mL Intracatheter Q8H                  Physical Exam:   Blood pressure 111/69, pulse 74, temperature 96.5  F (35.8  C), temperature source Oral, resp. rate 20, height 1.689 m (5' 6.5\"), weight 78.3 kg (172 lb 11.2 oz), SpO2 96 %, not currently breastfeeding.  Wt Readings from Last 2 Encounters:   11/23/17 78.3 kg (172 lb 11.2 oz)   10/22/17 83.4 kg (183 lb 12.8 oz)     Vital Signs with Ranges  Temp:  [96.1  F (35.6  C)-97.5  F (36.4  C)] 96.5  F (35.8  C)  Pulse:  [74] 74  Heart Rate:  [62-65] 62  Resp:  [20-22] 20  BP: ()/(63-69) 111/69  SpO2:  [95 %-96 %] 96 %    Constitutional: Awake, alert, cooperative, ongoing resp apparent distress   Lungs: Clear to auscultation " bilaterally, no crackles or wheezing on high flow O2   Cardiovascular: Regular rate and rhythm, normal S1 and S2, and no murmur noted   Abdomen: Normal bowel sounds, soft, non-distended, non-tender   Skin: No rashes, no cyanosis, no edema   Other:           Data:   All microbiology laboratory data reviewed.  Recent Labs   Lab Test  11/22/17   0720  11/21/17   0608  11/20/17   0540   WBC  4.9  6.5  5.5   HGB  10.9*  10.9*  9.9*   HCT  34.2*  34.5*  30.8*   MCV  97  98  97   PLT  118*  126*  123*     Recent Labs   Lab Test  11/22/17   0720  11/21/17   0608  11/20/17   0540   CR  1.01  1.12*  1.07*     Recent Labs   Lab Test  10/19/17   0503   SED  23*     Recent Labs   Lab Test  11/04/17   0343  11/04/17   0107  11/04/17   0050  10/20/17   0950  10/19/17   0517  10/19/17   0054  10/17/17   2143  10/17/17   2020  10/17/17   2015   CULT  No growth  No growth  No growth  Moderate growth  Coagulase negative Staphylococcus  Susceptibility testing not routinely done  *  Moderate growth  Candida albicans / dubliniensis  Candida albicans and Candida dubliniensis are not routinely speciated  Susceptibility testing not routinely done  *  Canceled, Test credited  >10 Squamous epithelial cells/low power field indicates oral contamination. Please   recollect.  *  Notification of test cancellation was given to  Elena Araujo RN from [facility]. 10.19.17 at 0753. GR.    Canceled, Test credited  >10 Squamous epithelial cells/low power field indicates oral contamination. Please   recollect.  *  Notification of test cancellation was given to  Nelsy Tesfaye RN [facility] @0246. 10/19/17. SCG    No growth  No growth  No growth

## 2017-11-23 NOTE — PROGRESS NOTES
Discharge instructions reviewed w/pt. RX filled & provided. Pt stated understanding of all information. DC w/home oxygen. Pt caling cab for ride home.

## 2017-11-23 NOTE — PLAN OF CARE
Problem: Patient Care Overview  Goal: Plan of Care/Patient Progress Review  Outcome: No Change  Pt A&Ox4, VSS ex 2L O2 sats 96%, LS clear, BS audible - BM 11/20. Up independently, high CHO diet. , 140 - managed with SSI. Cont PO bactrim. Non tele. Possible DC in 1-2 days.

## 2017-11-24 NOTE — PROGRESS NOTES
Transition Communication Hand-off for Care Transitions to Next Level of Care Provider    Name: Sabina Figueroa  MRN #: [id number]  Primary Care Provider: Aidee Hemphill  Primary Care MD Name: Dr. Aidee Hemphill  Primary Clinic: [facility] [address] [place] [address]  Primary Care Clinic Name: [facility]  Reason for Hospitalization:  Acute respiratory failure with hypoxia (H) [J96.01]  Pneumonia of both lower lobes due to infectious organism [J18.9]  Admit Date/Time: 11/4/2017 12:42 AM  Discharge Date: 11/23/17  Payor Source: Payor: MEDICARE / Plan: MEDICARE / Product Type: Medicare /     Readmission Assessment Measure (KENNEDY) Risk Score/category: HIGH           Reason for Communication Hand-off Referral: Fragility  Multiple providers/specialties  Non-adherence to plan of care related to:  Other She needs to go to a hospital that can provide bronchoscopy    Discharge Plan:       Concern for non-adherence with plan of care:   YES  Discharge Needs Assessment:  Needs       Most Recent Value    Transportation Available family or friend will provide          Already enrolled in Tele-monitoring program and name of program:  na  Follow-up specialty is recommended: Yes    Follow-up plan:  Future Appointments  Date Time Provider Department Center   12/11/2017 6:30 PM [place] [facility] [place] outstanding tests or procedures:    Procedures     Future Labs/Procedures    Oxygen Adult     Comments:    New Home Oxygen Order 2 liter(s) by nasal cannula continuously with use of portable tank. Expected treatment length is indefinite (99 months).. Test on conserving device as applicable.    Patients who qualify for home O2 coverage under the CMS guidelines require ABG tests or O2 sat readings obtained closest to, but no earlier than 2 days prior to the discharge, as evidence of the need for home oxygen therapy. Testing must be performed while patient is in the chronic stable state. See notes for O2  tika.    I certify that this patient, Sabina Figueroa has been under my care and that I, or a nurse practitioner or physician's assistant working with me, had a face-to-face encounter that meets the face-to-face encounter requirements with this patient on 11/22/2017. The patient, Sabina Figueroa was evaluated or treated in whole, or in part, for the following medical condition, which necessitates the use of the ordered oxygen. Treatment Diagnosis: interstitial lung disease    Attending Provider: Allen Shah  Physician signature: See electronic signature associated with these discharge orders  Date of Order: November 22, 2017               Follow-up and recommended labs and tests        Follow up with Dr. Deepa Mane (Pulmonary Clinic) as planned in December for ongoing management of your lung disease.  Also, please follow up withyour primary care doctor, Aidee Hemphill, within 7 days for hospital follow- up.  The following labs/tests are recommended: please review blood sugar control and also re-evaluate your oxygen requirement.  You may also discuss referral to pulmonary rehab at that time.     Finally, follow up with [facility] in 1-2 weeks as well for re-evaluation of your cytopenias (low cell counts) and please recheck a CBC (complete blood count) at that time as well as any additional testing recommended by your hematologist               Key Recommendations:  Interstiital lung disease with frequent admissions.  IF she returns with another respiratory decompensation she should be admitted to a facility with the ability to perform bronchoscopy/Pulmonary consult as treatment here has been empiric and she would require specialty care.   Relapse of ETOH use after sobriety.  Follow up with pulmonary Dr Mane.  [address].      Sandra Walls    AVS/Discharge Summary is the source of truth; this is a helpful guide for improved communication of patient story

## 2017-11-28 NOTE — ED AVS SNAPSHOT
[facility] Emergency Department    [address]    [facility] [address]    Phone:  [phone]    Fax:  [phone]                                       Sabina Figueroa   MRN: [id number]    Department:  [facility] Emergency Department   Date of Visit:  11/28/2017           Patient Information     Date Of Birth          1975        Your diagnoses for this visit were:     Hypoxia improved       You were seen by Josemanuel Damon MD.      Follow-up Information     Follow up with Aidee Hemphill MD. Schedule an appointment as soon as possible for a visit in 2 days.    Specialty:  Family Practice    Contact information:    [facility]  [address]  [place] [address] [phone]          Follow up with [facility] Emergency Department.    Specialty:  EMERGENCY MEDICINE    Why:  As needed, If symptoms worsen    Contact information:    [address]  [facility] [address] [phone]      Discharge References/Attachments     INTERSTITIAL LUNG DISEASE, DIAGNOSING (ENGLISH)      Future Appointments        Provider Department Dept Phone Center    12/11/2017 6:30 PM Jennifer Mane MD [facility] [phone] [facility]      24 Hour Appointment Hotline       To make an appointment at any [facility], call [phone] ([phone]). If you don't have a family doctor or clinic, we will help you find one. [facility] are conveniently located to serve the needs of you and your family.             Review of your medicines      Our records show that you are taking the medicines listed below. If these are incorrect, please call your family doctor or clinic.        Dose / Directions Last dose taken    albuterol 108 (90 BASE) MCG/ACT Inhaler   Commonly known as:  PROAIR HFA/PROVENTIL HFA/VENTOLIN HFA   Dose:  2 puff        Inhale 2 puffs into the lungs every 4 hours as needed for shortness of  breath / dyspnea or wheezing Reported on 2/23/2017   Refills:  0        alum & mag hydroxide-simethicone 400-400-40 MG/5ML Susp suspension   Commonly known as:  MYLANTA ES/MAALOX  ES   Dose:  30 mL        Take 30 mLs by mouth every 4 hours as needed for indigestion Reported on 2/23/2017   Refills:  0        ascorbic acid 500 MG tablet   Commonly known as:  VITAMIN C   Dose:  500 mg        Take 500 mg by mouth daily   Refills:  0        aspirin 81 MG EC tablet   Dose:  81 mg   Quantity:  30 tablet        Take 1 tablet (81 mg) by mouth daily   Refills:  1        BASAGLAR 100 UNIT/ML injection   Dose:  20 Units        Inject 20 Units Subcutaneous At Bedtime   Refills:  0        BREO ELLIPTA 100-25 MCG/INH oral inhaler   Dose:  1 puff   Generic drug:  fluticasone-vilanterol        Inhale 1 puff into the lungs daily as needed   Refills:  0        buprenorphine-naloxone 8-2 MG Subl sublingual tablet   Commonly known as:  SUBOXONE   Dose:  3 tablet   Quantity:  60 each        Place 3 tablets under the tongue daily   Refills:  0        ferrous sulfate 325 (65 FE) MG tablet   Commonly known as:  IRON   Dose:  325 mg        Take 325 mg by mouth daily (with breakfast)   Refills:  0        FLUoxetine 40 MG capsule   Commonly known as:  PROzac   Dose:  40 mg        Take 40 mg by mouth daily   Refills:  0        fluticasone 50 MCG/ACT spray   Commonly known as:  FLONASE   Dose:  2 spray        Spray 2 sprays into both nostrils daily as needed for rhinitis or allergies   Refills:  0        folic acid 1 MG tablet   Commonly known as:  FOLVITE   Dose:  1 mg   Quantity:  30 tablet        Take 1 tablet (1 mg) by mouth daily   Refills:  0        furosemide 20 MG tablet   Commonly known as:  LASIX   Dose:  20 mg   Quantity:  30 tablet        Take 1 tablet (20 mg) by mouth daily   Refills:  3        gabapentin 600 MG tablet   Commonly known as:  NEURONTIN   Dose:  600 mg   Quantity:  9 tablet        Take 1 tablet (600 mg) by mouth 3  times daily   Refills:  0        hydrOXYzine 10 MG tablet   Commonly known as:  ATARAX   Dose:  10 mg        Take 10 mg by mouth every 8 hours as needed for anxiety   Refills:  0        ipratropium - albuterol 0.5 mg/2.5 mg/3 mL 0.5-2.5 (3) MG/3ML neb solution   Commonly known as:  DUONEB   Dose:  1 vial        Take 1 vial by nebulization every 6 hours as needed for shortness of breath / dyspnea or wheezing   Refills:  0        lactulose 20 GM/30ML Soln   Dose:  20 g        Take 20 g by mouth daily   Refills:  0        LIPITOR PO   Dose:  20 mg        Take 20 mg by mouth At Bedtime   Refills:  0        loratadine 10 MG tablet   Commonly known as:  CLARITIN   Dose:  10 mg        Take 10 mg by mouth daily   Refills:  0        MELATONIN PO   Dose:  5 mg        Take 5 mg by mouth At Bedtime   Refills:  0        MIRTAZAPINE PO   Dose:  30 mg        Take 30 mg by mouth At Bedtime   Refills:  0        MULTIPLE VITAMINS PO   Dose:  1 tablet        Take 1 tablet by mouth daily   Refills:  0        * nicotine 21 MG/24HR 24 hr patch   Commonly known as:  NICODERM CQ   Dose:  1 patch   Quantity:  30 patch        Place 1 patch onto the skin daily   Refills:  1        * nicotine 10 MG Inhaler   Commonly known as:  NICOTROL   Dose:  1 Cartridge   Quantity:  180 each        Inhale 1 Cartridge into the lungs every hour as needed for smoking cessation   Refills:  1        nicotine polacrilex 2 MG gum   Commonly known as:  NICORETTE   Dose:  2 mg   Quantity:  30 tablet        Place 1 each (2 mg) inside cheek every hour as needed for smoking cessation   Refills:  1        * NovoLOG FLEXPEN 100 UNIT/ML injection   Dose:  6 Units   Generic drug:  insulin aspart        Inject 6 Units Subcutaneous 3 times daily (with meals)   Refills:  0        * NovoLOG FLEXPEN 100 UNIT/ML injection   Dose:  5 Units   Generic drug:  insulin aspart        Inject 5 Units Subcutaneous With can of Dr Pepper   Refills:  0        * NovoLOG FLEXPEN 100 UNIT/ML  injection   Dose:  1-4 Units   Generic drug:  insulin aspart        Inject 1-4 Units Subcutaneous 3 times daily (with meals) Per Sliding scale   Refills:  0        * NovoLOG FLEXPEN 100 UNIT/ML injection   Dose:  1-4 Units   Generic drug:  insulin aspart        Inject 1-4 Units Subcutaneous At Bedtime Per Sliding Scale   Refills:  0        predniSONE 10 MG tablet   Commonly known as:  DELTASONE   Quantity:  15 tablet        Take 40 mg prednisone daily x 7 days, then reduce to 30 mg x 7 days then reduce to 20 mg daily.  If you have any increase in respiratory symptoms please contact your primary care doctor or pulmonologist.  Please discuss further steroid plans with Dr. Mane (Pulmonary) in December at your follow up visit.   Refills:  0        PROTONIX PO   Dose:  40 mg        Take 40 mg by mouth every morning (before breakfast)   Refills:  0        rifaximin 550 MG Tabs tablet   Commonly known as:  XIFAXAN   Dose:  550 mg   Quantity:  60 tablet        Take 1 tablet (550 mg) by mouth 2 times daily   Refills:  0        ROBAXIN 500 MG tablet   Dose:  1000 mg   Generic drug:  methocarbamol        Take 1,000 mg by mouth 4 times daily   Refills:  0        spironolactone 50 MG tablet   Commonly known as:  ALDACTONE   Dose:  50 mg   Quantity:  3 tablet        Take 1 tablet (50 mg) by mouth daily   Refills:  0        sulfamethoxazole-trimethoprim 400-80 MG per tablet   Commonly known as:  BACTRIM/SEPTRA   Indication:  PCP   Quantity:  60 tablet        You have double strength tabs at home from your last admission, these are single strength tabs.  Please take 4 single strength tabs 4x daily on Thursday 11/23 and Friday 11/24 then after this, take one single strength tab of bactrim daily for prophylaxis/prevention.   Refills:  0        thiamine 100 MG tablet   Dose:  100 mg        Take 100 mg by mouth daily   Refills:  0        traZODone 100 MG tablet   Commonly known as:  DESYREL   Dose:  100 mg        Take 100 mg by  mouth At Bedtime   Refills:  0        * Notice:  This list has 6 medication(s) that are the same as other medications prescribed for you. Read the directions carefully, and ask your doctor or other care provider to review them with you.            Procedures and tests performed during your visit     EKG 12 lead    Peripheral IV catheter      Orders Needing Specimen Collection     None      Pending Results     No orders found from 11/26/2017 to 11/29/2017.            Pending Culture Results     No orders found from 11/26/2017 to 11/29/2017.            Pending Results Instructions     If you had any lab results that were not finalized at the time of your Discharge, you can call the ED Lab Result RN at [phone]. You will be contacted by this team for any positive Lab results or changes in treatment. The nurses are available 7 days a week from 10A to 6:30P.  You can leave a message 24 hours per day and they will return your call.        Test Results From Your Hospital Stay               Clinical Quality Measure: Blood Pressure Screening     Your blood pressure was checked while you were in the emergency department today. The last reading we obtained was  BP: 116/75 . Please read the guidelines below about what these numbers mean and what you should do about them.  If your systolic blood pressure (the top number) is less than 120 and your diastolic blood pressure (the bottom number) is less than 80, then your blood pressure is normal. There is nothing more that you need to do about it.  If your systolic blood pressure (the top number) is 120-139 or your diastolic blood pressure (the bottom number) is 80-89, your blood pressure may be higher than it should be. You should have your blood pressure rechecked within a year by a primary care provider.  If your systolic blood pressure (the top number) is 140 or greater or your diastolic blood pressure (the bottom number) is 90 or greater, you may have high blood pressure.  "High blood pressure is treatable, but if left untreated over time it can put you at risk for heart attack, stroke, or kidney failure. You should have your blood pressure rechecked by a primary care provider within the next 4 weeks.  If your provider in the emergency department today gave you specific instructions to follow-up with your doctor or provider even sooner than that, you should follow that instruction and not wait for up to 4 weeks for your follow-up visit.        Thank you for choosing [facility]       Thank you for choosing [facility] for your care. Our goal is always to provide you with excellent care. Hearing back from our patients is one way we can continue to improve our services. Please take a few minutes to complete the written survey that you may receive in the mail after you visit with us. Thank you!        [facility] Information     [facility] lets you send messages to your doctor, view your test results, renew your prescriptions, schedule appointments and more. To sign up, go to [email or web address] . Click on \"Log in\" on the left side of the screen, which will take you to the Welcome page. Then click on \"Sign up Now\" on the right side of the page.     You will be asked to enter the access code listed below, as well as some personal information. Please follow the directions to create your username and password.     Your access code is: [email or web address]  Expires: 2018  5:30 AM     Your access code will  in 90 days. If you need help or a new code, please call your [facility] clinic or [phone].        Care EveryWhere ID     This is your Care EveryWhere ID. This could be used by other organizations to access your [facility] medical records  [id number]        Equal Access to Services     TIFFANY VELASCO : becky Greene, juan gomez. [facility] [phone].    ATENCIÓN: Si habla español, tiene a louie disposición " servicios gratuitos de asistencia lingüística. Willard carpenter [phone].    We comply with applicable federal civil rights laws and [place] laws. We do not discriminate on the basis of race, color, national origin, age, disability, sex, sexual orientation, or gender identity.            After Visit Summary       This is your record. Keep this with you and show to your community pharmacist(s) and doctor(s) at your next visit.

## 2017-11-28 NOTE — IP AVS SNAPSHOT
[facility] [address] [facility]    [address]    [facility] [address]    Phone:  [phone]    Fax:  [phone]                                       After Visit Summary   2/16/2017    Sabina Figueroa    MRN: [id number]           After Visit Summary Signature Page     I have received my discharge instructions, and my questions have been answered. I have discussed any challenges I see with this plan with the nurse or doctor.    ..........................................................................................................................................  Patient/Patient Representative Signature      ..........................................................................................................................................  Patient Representative Print Name and Relationship to Patient    ..................................................               ................................................  Date                                            Time    ..........................................................................................................................................  Reviewed by Signature/Title    ...................................................              ..............................................  Date                                                            Time           present

## 2017-11-28 NOTE — ED NOTES
Bed: ED16  Expected date: 11/28/17  Expected time: 2:15 PM  Means of arrival: Ambulance  Comments:  HE  41yo fall/hypoxic

## 2017-11-28 NOTE — ED AVS SNAPSHOT
[facility] Emergency Department    [address]    [facility] [address]    Phone:  [phone]    Fax:  [phone]                                       Sabina Figueroa   MRN: [id number]    Department:  [facility] Emergency Department   Date of Visit:  11/28/2017           After Visit Summary Signature Page     I have received my discharge instructions, and my questions have been answered. I have discussed any challenges I see with this plan with the nurse or doctor.    ..........................................................................................................................................  Patient/Patient Representative Signature      ..........................................................................................................................................  Patient Representative Print Name and Relationship to Patient    ..................................................               ................................................  Date                                            Time    ..........................................................................................................................................  Reviewed by Signature/Title    ...................................................              ..............................................  Date                                                            Time

## 2017-11-28 NOTE — ED PROVIDER NOTES
History     Chief Complaint:  Altered Mental Status    HPI   Sabina Figueroa is a 42 year old female who presents to the emergency department today for evaluation of altered mental status. The patient was on her way to her pain clinic today when her oxygen tank ran out and she was having difficulty breathing. She is normally on 3 L of oxygen. She reports that her legs were shaking and she could no longer hold herself up when she stopped at a gas station, therefore a bystander had to help her into the gas station until EMS arrived. There was some report of a possible syncopal episode per the nursing note, but the patient states that she became weak and fell onto her buttocks. She denies any injury from this fall. She denies passing out. EMS found her in an altered mental state with O2 saturation at 72%.     Allergies:  No Known Drug Allergies     Medications:    BASAGLAR 100 UNIT/ML injection  predniSONE (DELTASONE) 10 MG tablet  sulfamethoxazole-trimethoprim (BACTRIM/SEPTRA) 400-80 MG per tablet  insulin aspart (NOVOLOG FLEXPEN) 100 UNIT/ML injection  lactulose 20 GM/30ML SOLN  fluticasone (FLONASE) 50 MCG/ACT spray  fluticasone-vilanterol (BREO ELLIPTA) 100-25 MCG/INH oral inhaler  thiamine 100 MG tablet  ipratropium - albuterol 0.5 mg/2.5 mg/3 mL (DUONEB) 0.5-2.5 (3) MG/3ML neb solution  Atorvastatin Calcium (LIPITOR PO)  buprenorphine-naloxone (SUBOXONE) 8-2 MG SUBL sublingual tablet  Pantoprazole Sodium (PROTONIX PO)  MIRTAZAPINE PO  furosemide (LASIX) 20 MG tablet  FLUoxetine (PROZAC) 40 MG capsule  hydrOXYzine (ATARAX) 10 MG tablet  traZODone (DESYREL) 100 MG tablet  methocarbamol (ROBAXIN) 500 MG tablet  aspirin EC 81 MG EC tablet  rifaximin (XIFAXAN) 550 MG TABS tablet  alum & mag hydroxide-simethicone (MYLANTA ES/MAALOX  ES) 400-400-40 MG/5ML SUSP  albuterol (PROAIR HFA, PROVENTIL HFA, VENTOLIN HFA) 108 (90 BASE) MCG/ACT inhaler    Past Medical History:    ALC (alcoholic liver cirrhosis) (H)                    Alcohol abuse             Anxiety                        Arthritis                        Ascites             Asthma                        Bunion, left foot                       Chronic low back pain             Depression                  Hypertension               ILD (interstitial lung disease) (H)                    Ovarian cyst, left                        Past Surgical History:    BRONCHOSCOPY FLEXIBLE                        SECTION                                             ESOPHAGOSCOPY, GASTROSCOPY, DUODENOSCOPY (EGD), COMBINED  KNEE SURGERY x3  THORACOSCOPIC BIOPSY LUNG - Right (17)    Family History:    Depression   Anxiety   Substance abuse  Schizophrenia     Social History:  The patient was accompanied to the ED by EMS.  Smoking Status: current, 18 years  Alcohol Use: No   Marital Status:  Single [1]     Review of Systems   Constitutional: Positive for fatigue.   Respiratory: Positive for shortness of breath.    Neurological: Positive for syncope.   All other systems reviewed and are negative.    Physical Exam     Patient Vitals for the past 24 hrs:   BP Temp Temp src Pulse Heart Rate Resp SpO2   17 1700 - - - 71 71 9 95 %   17 1645 - - - - 75 10 94 %   17 1630 - - - - 77 12 92 %   17 1615 - - - - 86 16 90 %   17 1600 - - - - 82 29 97 %   17 1545 - - - 84 84 12 95 %   17 1530 - - - - 92 18 95 %   17 1515 - - - - 98 16 96 %   17 1500 - - - - 101 28 92 %   17 1445 - - - - 105 21 95 %   17 1433 116/75 99.3  F (37.4  C) Oral 110 110 16 96 %   17 1430 116/75 - - - - - 96 %      Physical Exam  Nursing note and vitals reviewed.  Constitutional: Cooperative.   HENT:   Mouth/Throat: Moist mucous membranes.   Eyes: EOMI, nonicteric sclera  Cardiovascular: Normal rate, regular rhythm, no murmurs, rubs, or gallops  Pulmonary/Chest: Effort normal. Coarse bilateral breath sounds.  No respiratory distress. No wheezes. No  rales.   Abdominal: Soft. Nontender, nondistended, no guarding or rigidity. BS present.   Musculoskeletal: Normal range of motion.   Neurological: Alert. Moves all extremities spontaneously.   Skin: Skin is warm and dry. No rash noted.   Psychiatric: Normal mood and affect.     Emergency Department Course     ECG:  ECG taken at 1436, ECG read at 1442  Sinus tachycardia  Otherwise normal ECG  Rate 106 bpm. CA interval 130. QRS duration 82. QT/QTc 330/438. P-R-T axes 54,7,34.     Interventions:  1501 NS, 1 L, IV      Emergency Department Course:  Nursing notes and vitals reviewed.  1446 I entered the room.  1450 I performed an exam of the patient as documented above.   EKG obtained in the ED, see results above.    The patient received the above intervention(s).   1730 the patient was rechecked and she was updated.  I discussed the treatment plan with the patient. They expressed understanding of this plan and consented to discharge. They will be discharged home with instructions for care and follow up. In addition, the patient will return to the emergency department if their symptoms persist, worsen, if new symptoms arise or if there is any concern.  All questions were answered.     Impression & Plan      Medical Decision Making:  Sabina Figueroa is a 42 year old female who presents to the emergency department today for evaluation after an episode of hypoxia. The patient actually ran out of her oxygen and needed to have EMS called for her. When they arrived she was quite hypoxic with SATs in the 70's. Following oxygen administration and being here in the ED she appears improved. The patient admits to taking some Suboxone right before the episode as well raising concern for narcotic episode, but she has a normal respiratory rate here and is maintaining her oxygen saturations on her home dose of oxygen even while sleeping. The patient reports that she otherwise feels fine and does not feel that she has any worsened  shortness of breath. For this reason, I did not obtain any labs. She was monitored here for 2 hours and she maintained her saturations and her mental status remained the same. While I do suspect that there may have been some drug use involved, the patient does not appear to be having any emergent pathology or toxidrome. Discussed discharge and the patient was amenable and feeling improved after some fluids. Tachycardia improved after IVF. She is in stable condition at the time of discharge, indications for return to the ED were discussed as well as follow up. All questions were answered and she is in agreement with the plan.     Diagnosis:    ICD-10-CM    1. Hypoxia R09.02     improved     Disposition:   The patient was discharged to home.     CMS Diagnoses: None     Scribe Disclosure:  I, Kevan Quick, am serving as a scribe at 2:46 PM on 11/28/2017 to document services personally performed by Josemanuel aDmon MD, based on my observations and the provider's statements to me.   [facility] EMERGENCY DEPARTMENT       Josemanuel Damon MD  11/30/17 0252

## 2017-11-28 NOTE — ED NOTES
Pt was at gas station and had a syncopal event. EMS was called. EMS found patient to have AMS and O2 saturation at 76%. Pt is baseline home O2 3L,.

## 2017-11-29 NOTE — ED NOTES
Pt has not oxygen with her and does not have a ride home. [facility] [place] transport home.

## 2018-02-01 NOTE — DISCHARGE SUMMARY
Hospitalist Discharge Summary    Sabina Figueroa MRN# [id number]   YOB: 1975 Age: 41 year old     Date of Admission:  3/22/2017  Date of Discharge:  4/1/2017  9:30 AM  Admitting Physician:  Liv Harley MD  Discharge Physician:  Anatoliy Dos Santos  Discharging Service:  Hospitalist     Primary Provider: Aidee Hemphill          Discharge Diagnosis:   Acute, recurrent hypoxic respiratory failure due to organizing pneumonia  Septic shock 2/2 above  Steroid induced hyperglycemia  Alcohol abuse vs dep   Etoh liver disease  Chronic pain syndrome  Depression  Deconditioning   Lactic acidosis             Discharge Disposition:   Discharged to home           Allergies:   No Known Allergies           Discharge Medications:   Current Discharge Medication List      START taking these medications    Details   insulin glargine (LANTUS) 100 UNIT/ML injection Inject 20 Units Subcutaneous every morning (before breakfast)  Qty: 50 mL, Refills: 0    Associated Diagnoses: Hyperglycemia      !! insulin aspart (NOVOLOG PEN) 100 UNIT/ML injection 1 U per 15 g carbs  Qty: 50 mL, Refills: 0    Associated Diagnoses: Hyperglycemia      !! insulin aspart (NOVOLOG PEN) 100 UNIT/ML injection For Pre-Meal  - 189 give 1 unit.   For Pre-Meal  - 239 give 2 units.   For Pre-Meal  - 289 give 3 units.   For Pre-Meal  - 339 give 4 units.   For Pre-Meal -399 give 5 units.  For Pre-Meal -449 give 6 units.  For Pre-Meal BG = or > 450 give 7 units.  Qty: 50 mL, Refills: 0    Associated Diagnoses: Hyperglycemia      !! insulin aspart (NOVOLOG PEN) 100 UNIT/ML injection Bedtime Blood Glucose (BG)  For  - 249 give 1 units.   For  - 299 give 2 units.   For  - 349 give 3 units.  For - 399 give 4 units.   For BG = or > 400 give 5 units.  Qty: 50 mL, Refills: 0    Associated Diagnoses: Hyperglycemia       !! - Potential duplicate medications found. Please discuss with provider.  Problem: Patient Care Overview  Goal: Plan of Care/Patient Progress Review  Outcome: Improving  A&Ox4. VSS on RA. Regular diet. Saline locked. SBA. CMS intact. Pain managed w/ dilaudid, percocet, atarax, scheduled toradol, declined ice pack. Voiding. Pt declined PCDs for part of shift. 4 sites steri strips and band aids on, scant amt dried drainage, umbilicus site covered w/ gauze. Ambulated in neil. Continue to monitor, plan to d/c today.            CONTINUE these medications which have CHANGED    Details   predniSONE (DELTASONE) 60 MG tablet Take 60 mg by mouth daily  Qty: 60 tablet, Refills: 0    Associated Diagnoses: HCAP (healthcare-associated pneumonia)         CONTINUE these medications which have NOT CHANGED    Details   tiotropium (SPIRIVA) 18 MCG capsule Inhale 18 mcg into the lungs daily      spironolactone (ALDACTONE) 50 MG tablet Take 1 tablet (50 mg) by mouth daily  Qty: 3 tablet, Refills: 0      gabapentin (NEURONTIN) 600 MG tablet Take 1 tablet (600 mg) by mouth 3 times daily  Qty: 9 tablet, Refills: 0      sulfamethoxazole-trimethoprim (BACTRIM DS/SEPTRA DS) 800-160 MG per tablet Take 1 tablet by mouth three times a week  Qty: 30 tablet, Refills: 0    Associated Diagnoses: Bronchiolitis      ascorbic acid (VITAMIN C) 500 MG tablet Take 500 mg by mouth daily      FLUoxetine (PROZAC) 40 MG capsule Take 40 mg by mouth daily      furosemide (LASIX) 20 MG tablet Take 20 mg by mouth daily      hydrOXYzine (ATARAX) 10 MG tablet Take 10 mg by mouth every 8 hours as needed for itching      lactulose (CHRONULAC) 10 GM/15ML solution Take 20 g by mouth 3 times daily      omeprazole (PRILOSEC) 20 MG CR capsule Take 20 mg by mouth daily      thiamine 100 MG tablet Take 100 mg by mouth daily      traZODone (DESYREL) 100 MG tablet Take 100 mg by mouth At Bedtime      Omega-3 Fatty Acids (FISH OIL) 1200 MG CAPS Take 1 capsule by mouth daily      methocarbamol (ROBAXIN) 500 MG tablet Take 1,000 mg by mouth 4 times daily      aspirin EC 81 MG EC tablet Take 1 tablet (81 mg) by mouth daily  Qty: 30 tablet, Refills: 1    Associated Diagnoses: Elevated troponin I level      atorvastatin (LIPITOR) 20 MG tablet Take 1 tablet (20 mg) by mouth daily  Qty: 30 tablet, Refills: 1    Associated Diagnoses: Elevated troponin I level      rifaximin (XIFAXAN) 550 MG TABS tablet Take 1 tablet (550 mg) by mouth 2 times daily  Qty: 60 tablet, Refills: 0    Associated  "Diagnoses: Other ascites      ferrous sulfate (IRON) 325 (65 FE) MG tablet Take 325 mg by mouth daily (with breakfast)      albuterol (2.5 MG/3ML) 0.083% neb solution Take 1 vial by nebulization every 6 hours as needed for shortness of breath / dyspnea or wheezing      buprenorphine-naloxone (SUBOXONE) 8-2 MG SUBL sublingual tablet Place 3 tablets under the tongue daily      mirtazapine (REMERON) 30 MG tablet Take 1 tablet (30 mg) by mouth At Bedtime  Qty: 30 tablet, Refills: 0    Associated Diagnoses: Alcohol withdrawal, uncomplicated (H)      albuterol (PROAIR HFA, PROVENTIL HFA, VENTOLIN HFA) 108 (90 BASE) MCG/ACT inhaler Inhale 2 puffs into the lungs every 4 hours as needed for shortness of breath / dyspnea or wheezing Reported on 2/23/2017      MULTIPLE VITAMINS PO Take 1 tablet by mouth daily    Associated Diagnoses: Alcoholic hepatitis      folic acid (FOLVITE) 1 MG tablet Take 1 tablet (1 mg) by mouth daily  Qty: 30 tablet    Associated Diagnoses: Alcoholic hepatitis; Alcohol dependence (H)      blood glucose monitoring (ACCU-CHEK TONIA PLUS) meter device kit Use to test blood sugars BID times daily or as directed.  Qty: 1 kit, Refills: 0    Associated Diagnoses: Hyperglycemia      alum & mag hydroxide-simethicone (MYLANTA ES/MAALOX  ES) 400-400-40 MG/5ML SUSP Take 30 mLs by mouth every 4 hours as needed for indigestion Reported on 2/23/2017         STOP taking these medications       insulin isophane human (HUMULIN N PEN) 100 UNIT/ML injection Comments:   Reason for Stopping:                      Condition on Discharge:   Discharge condition: Stable   Discharge vitals: Blood pressure 100/57, pulse 91, temperature 97.7  F (36.5  C), temperature source Axillary, resp. rate 18, height 1.702 m (5' 7\"), weight 80.5 kg (177 lb 8 oz), SpO2 97 %, not currently breastfeeding.   Code status on discharge: Full Code      BASIC PHYSICAL EXAMINATION:  GENERAL: No apparent distress.  CARDIOVASCULAR: Regular rate and " rhythm without murmurs.  PULMONARY: Clear to auscultation bilaterally.   GASTROINTESTINAL: Abdomen soft, non-tender.  EXTREMITIES: No edema, pulses intact.  NEUROLOGIC: No focal deficits.          History of Illness:   See detailed admission note for full details.               Procedures excluding imaging which is summarized below:   See EMR           Consultations:   PHARMACY TO DOSE VANCO  PHARMACY IP CONSULT  PHARMACY IP CONSULT  PHARMACY TO DOSE VANCO  CHEMICAL DEPENDENCY IP CONSULT  NUTRITION SERVICES ADULT IP CONSULT  SOCIAL WORK IP CONSULT  PHARMACY IP CONSULT  PAIN MANAGEMENT IP CONSULT  PHYSICAL THERAPY ADULT IP CONSULT  RESPIRATORY CARE IP CONSULT  CARE COORDINATOR IP CONSULT          Significant Results:   Results for orders placed or performed during the hospital encounter of 03/22/17   Chest  XR, 1 view PORTABLE    Narrative    XR CHEST PORT 1 VW  3/22/2017 9:07 PM     HISTORY:  SOB    COMPARISON: Film dated 2/16/2017    FINDINGS:  The cardiac silhouette is mildly prominent. There are  extensive interstitial and alveolar infiltrates in both lungs. The  patient had a similar pattern on the previous film. This could be due  to bone marrow edema. Pneumonitis cannot be excluded.      Impression    IMPRESSION: Extensive bilateral interstitial and alveolar infiltrate.  Patient had similar findings on a plain film dated 2/16/2017.    DAYSI LORENZO MD                Pending Results:   Unresulted Labs Ordered in the Past 30 Days of this Admission     No orders found from 1/21/2017 to 3/23/2017.                        Discharge Instructions and Follow-Up:   Discharge instructions and follow-up:   Discharge Procedure Orders  Follow-up and recommended labs and tests    Order Comments: Follow up with primary care provider, Aidee Hemphill, within 7 days for hospital follow- up.  The following labs/tests are recommended: BMP, CBC. Evaluate insulin and diabetic management.  Follow up with Dr Mane in pulmonary clinic  as scheduled on 4/10.  Abstain from alcohol and tobacco use.  Continue prednisone 60 mg daily with Bactrim as ordered until follow up with Dr Mane.     Activity   Order Comments: Your activity upon discharge: activity as tolerated and no driving while on analgesics (or suboxone).   Order Specific Question Answer Comments   Is discharge order? Yes      Full Code     Oxygen Adult   Order Comments: New Home Oxygen Order 2 liters by nasal cannula at rest and 4 liters with activity with use of portable tank. Expected treatment length is 1 months.. Test on conserving device as applicable.    Patients who qualify for home O2 coverage under the CMS guidelines require ABG tests or O2 sat readings obtained closest to, but no earlier than 2 days prior to the discharge, as evidence of the need for home oxygen therapy. Testing must be performed while patient is in the chronic stable state. See notes for O2 sats.    I certify that this patient, Sabina Figueroa has been under my care and that I, or a nurse practitioner or physician's assistant working with me, had a face-to-face encounter that meets the face-to-face encounter requirements with this patient on 3/31/2017. The patient, Sabina Figueroa was evaluated or treated in whole, or in part, for the following medical condition, which necessitates the use of the ordered oxygen. Treatment Diagnosis:     Attending Provider: Anatoliy Dos Santos  Physician signature: See electronic signature associated with these discharge orders  Date of Order: March 31, 2017     Diet   Order Comments: Follow this diet upon discharge: Orders Placed This Encounter     Room Service     Snacks/Supplements Adult: Other - Please comment; Cheese + meat plate @ 2 pm; With Meals     Consistent Carbohydrate Diet 5380-6214 Calories: Moderate Consistent CHO (4-6 CHO units/meal)   Order Specific Question Answer Comments   Is discharge order? Yes                Hospital Course:   Summary of Stay: Sabina ROMANO  Henry is a 41 year old female with past medical history of alcohol abuse and associated liver disease, recurrent admissions for respiratory failure, alveolar hemorrhage and organizing PNA (detected on bronchoscopy in 1/17 while intubated) who was admitted on 3/22/2017 with cough, fever and worsening SOB and was found to have acute hypoxic respiratory failure and sepsis. Was treated with broad spectrum antibiotics and steroids with gradual improvement. Now doing well and completed a course of levofloxacin and is on minimal supplemental oxygen. She will remain on systemic steroids until follow up with pulmonology in 10 days.      1. Acute Recurrent Hypoxic Respiratory Failure: Patient has had recurrent admissions for respiratory failure, alveolar hemorrhage due to organizing PNA (possibly cryptogenic). She presented with fever and worsening dyspnea. This is in the context of not taking her Prednisone for several days due to alcohol use. She initially required BiPAP therapy, was able to transition to high flow oxygen but continued to have high oxygen needs that are now finally improving. She had fever, leukocytosis, elevated lactate and extensive bilateral interstitial and alveolar infiltrates. Procalcitonin was elevated to 1.01. She was started on Vancomycin, Levofloxacin and Zosyn for underlying PNA. Sputum culture now showing staph aureus (sensitive).  - Was on high dose IV solumedrol but changed to prednisone 60 mg daily until pulmonary f/u on 4/10.  - Completed 10 days of levofloxocin  - Continue Bactrim for prophylaxis  - Completed nocturnal and exertional oximetry - see report from Dr Crisostomo  - 2L O2 at rest and sleep, increase to 4L with activity  - Follow up with sleep clinic recommended  - Recommendation from Dr. Mane: Prednisone 60mg until next clinic visit with Dr. Mane (scheduled 4/10).  Continue PCP prophylaxis as long as she is on more than 30mg daily prednisone      2. Steroid Induced  Hyperglycemia: HgbA1C this admission 6.2 (up from 5.6 in 1/2017, likely from steroids). Glucose has been erratic, likely due in part to steroids. Appears better now off IV steroids. Now off insulin drip. BG well controlled past few days. Insulin teaching done.  - Continue lantus 20 daily  - High SSI  - 2 U per carb U TID WM      3. Alcohol abuse vs dep with etoh liver disease: Started drinking again recently. At this time no withdrawal.   - Continue vitamins  - Lactulose TID  - Continue Rifaximin and Spironolactone  - CD consulted, provided with CD resources (declined immediate IP tx but intends to pursue tx program and abstain from etoh)      4. Lactic Acidosis: Lactate elevated to 3, subsequently normalized. Likely due to sepsis, volume depletion, nebs, liver disease.      5. Chronic Pain: On Suboxone, resumed. Morphine discontinued by the pain management team. No opioids recommended for this patient in the hospital or on discharge.      6. Depression: Continue Prozac, Remeron, Trazodone.     7. Deconditioning: Worked with PT while here. Conditioning improving.      Discharge Dispo: Discussed with SW. Only discharge options are to stay with boyfriend or shelter. She tells me she will be [place] to stay with boyfriend. She will discharge today and immediately go to [place] to  her suboxone supply for the weekend.     The patient was seen, examined, and counseled on this day. The hospitalization and plan of care was reviewed with the patient extensively. All questions were addressed and the patient agreed to follow-up as noted above.      Total time spent in face to face contact with the patient and coordinating discharge was:  45 Minutes    Anatoliy Dos Santos DO MPH  [facility] Hospitalist  [address] [place] [address]  Pager: [phone]  04/01/2017

## 2022-09-15 NOTE — PROGRESS NOTES
Starting discharge planning process, have placed home O2 orders in anticipation of hopeful DC home tomorrow.  Full note to follow.    Allen Shah MD     Negative

## 2023-05-02 NOTE — PROVIDER NOTIFICATION
Page sent to Dr. Spear regarding pt low potassium this morning at 3.  No protocol ordered.  Will await new orders.   Detail Level: Detailed Detail Level: Generalized

## 2023-05-03 NOTE — PHARMACY-VANCOMYCIN DOSING SERVICE
LVL 1 ED Trauma alert paged [phone]   Pharmacy Vancomycin Initial Note  Date of Service May 2, 2017  Patient's  1975  41 year old, female    Indication: Healthcare-Associated Pneumonia    Current estimated CrCl = Estimated Creatinine Clearance: 80.2 mL/min (based on Cr of 1.03).    Creatinine for last 3 days  2017:  6:54 PM Creatinine 1.03 mg/dL    Recent Vancomycin Level(s) for last 3 days  No results found for requested labs within last 72 hours.      Vancomycin IV Administrations (past 72 hours)                   vancomycin (VANCOCIN) 1500 mg in 0.9% NaCl 250 mL PREMIX (mg) 1,500 mg New Bag 17 211                Nephrotoxins and other renal medications (Future)    Start     Dose/Rate Route Frequency Ordered Stop    17 0200  piperacillin-tazobactam (ZOSYN) intermittent infusion 4.5 g      4.5 g  200 mL/hr over 30 Minutes Intravenous EVERY 6 HOURS 17 2202      17 2216  ketorolac (TORADOL) injection 15 mg      15 mg Intravenous EVERY 8 HOURS PRN 17 2216 17 2215          Contrast Orders - past 72 hours     None                Plan:  1.  Start vancomycin  1250 mg IV q8h.   2.  Goal Trough Level: 15-20 mg/L   3.  Pharmacy will check trough levels as appropriate in 1-3 Days.    4. Serum creatinine levels will be ordered daily for the first week of therapy and at least twice weekly for subsequent weeks.    5. Whitman method utilized to dose vancomycin therapy: Method 2    Leo V[facility]quez [facility]

## (undated) DEVICE — SYR 10ML SLIP TIP W/O NDL

## (undated) DEVICE — ENDO TROCAR THORACIC 10/12MM TT012

## (undated) DEVICE — TAPE DRSG UNIVERSAL CLOTH 3" WHITE LATEX 881-3

## (undated) DEVICE — ESU ELEC BLADE 6" COATED/INSULATED E1455-6

## (undated) DEVICE — DRSG STERI STRIP 1/2X4" R1547

## (undated) DEVICE — SYR BULB IRRIG 50ML LATEX FREE 0035280

## (undated) DEVICE — BLADE KNIFE SURG 10 371110

## (undated) DEVICE — SUCTION DRY CHEST DRAIN OASIS 3600-100

## (undated) DEVICE — DRAIN CHEST TUBE 28FR STR 8028

## (undated) DEVICE — ENDO NDL ASPIRATION EXCELON TRANSBRONCH 21GA 15MM

## (undated) DEVICE — SU VICRYL 2-0 CT-2 27" J333H

## (undated) DEVICE — TUBING SUCTION MEDI-VAC SOFT 3/16"X20' N520A

## (undated) DEVICE — SU NDL CUT REV MED 3/8 209014

## (undated) DEVICE — ESU GROUND PAD UNIVERSAL W/O CORD

## (undated) DEVICE — DRSG KERLIX 4 1/2"X4YDS ROLL 6715

## (undated) DEVICE — LINEN FULL SHEET 5511

## (undated) DEVICE — SPECIMEN CONTAINER 3OZ

## (undated) DEVICE — LINEN TOWEL PACK X5 5464

## (undated) DEVICE — SUCTION CANISTER STRAW 65652-008

## (undated) DEVICE — PREP CHLORAPREP 26ML TINTED ORANGE  260815

## (undated) DEVICE — NDL BLUNT 18GA 1.5" FILTER 305211

## (undated) DEVICE — STPL ENDO ARTICULATING 60MM EC60A

## (undated) DEVICE — PACK SET-UP STD 9102

## (undated) DEVICE — SPECIMEN TRAP MUCOUS SIMILAC NTRL CARE GRAD 80ML 0045860

## (undated) DEVICE — SU SILK 2 REEL 60" SA8H

## (undated) DEVICE — SUCTION CANISTER MEDIVAC LINER 3000ML W/LID 65651-530

## (undated) DEVICE — ENDO FORCEP ALLIGATOR JAW BIOPSY 2MMX100CM FB-211D

## (undated) DEVICE — TUBING SUCTION 12"X1/4" N612

## (undated) DEVICE — BAG CLEAR TRASH 1.3M 39X33" P4040C

## (undated) DEVICE — PACK MINOR SBA15MIFSE

## (undated) DEVICE — DRSG TELFA 3X8" 1238

## (undated) DEVICE — TUBING OXYGEN CANNULA NASAL 7FT

## (undated) DEVICE — DRAPE POUCH INSTRUMENT 1018

## (undated) DEVICE — SU VICRYL 1 CT-2 27" J335H

## (undated) DEVICE — GLOVE BIOGEL 6.5 LATEX

## (undated) DEVICE — STPL RELOAD REG/THK TISSUE ECHELON 60 X 3.8MM GOLD GST60D

## (undated) DEVICE — SU VICRYL 4-0 PS-2 18" UND J496H

## (undated) DEVICE — DRAPE IOBAN INCISE 23X17" 6650EZ

## (undated) DEVICE — SYR 50ML SLIP TIP W/O NDL 309654

## (undated) DEVICE — NDL 22GA 1.5"

## (undated) DEVICE — GLOVE PROTEXIS W/NEU-THERA 6.5  2D73TE65

## (undated) DEVICE — ATOMIZER MUCOSAL MEMBRANE MAD100

## (undated) DEVICE — GLOVE PROTEXIS W/NEU-THERA 7.5  2D73TE75

## (undated) DEVICE — ANTIFOG SOLUTION W/FOAM PAD 31142527

## (undated) DEVICE — SOL NACL 0.9% IRRIG 1000ML BOTTLE 2F7124

## (undated) DEVICE — DRSG BANDAID 1X3" FABRIC CURITY LATEX FREE KC44101

## (undated) DEVICE — GOWN IMPERVIOUS ZONED LG

## (undated) DEVICE — DRAPE BREAST/CHEST 29420

## (undated) DEVICE — APPLICATOR COTTON TIP 6"X2 STERILE LF 6012

## (undated) DEVICE — SOL NACL 0.9% IRRIG 1000ML BOTTLE 07138-09

## (undated) DEVICE — LINEN HALF SHEET 5512

## (undated) RX ORDER — CEFAZOLIN SODIUM 1 G/3ML
INJECTION, POWDER, FOR SOLUTION INTRAMUSCULAR; INTRAVENOUS
Status: DISPENSED
Start: 2017-01-01

## (undated) RX ORDER — LIDOCAINE HYDROCHLORIDE AND EPINEPHRINE 10; 10 MG/ML; UG/ML
INJECTION, SOLUTION INFILTRATION; PERINEURAL
Status: DISPENSED
Start: 2017-01-01

## (undated) RX ORDER — LIDOCAINE HYDROCHLORIDE 10 MG/ML
INJECTION, SOLUTION INFILTRATION; PERINEURAL
Status: DISPENSED
Start: 2017-01-01

## (undated) RX ORDER — PROPOFOL 10 MG/ML
INJECTION, EMULSION INTRAVENOUS
Status: DISPENSED
Start: 2017-01-01

## (undated) RX ORDER — LIDOCAINE HYDROCHLORIDE 20 MG/ML
INJECTION, SOLUTION EPIDURAL; INFILTRATION; INTRACAUDAL; PERINEURAL
Status: DISPENSED
Start: 2017-01-01

## (undated) RX ORDER — ALBUTEROL SULFATE 0.83 MG/ML
SOLUTION RESPIRATORY (INHALATION)
Status: DISPENSED
Start: 2017-01-01

## (undated) RX ORDER — FENTANYL CITRATE 50 UG/ML
INJECTION, SOLUTION INTRAMUSCULAR; INTRAVENOUS
Status: DISPENSED
Start: 2017-01-01

## (undated) RX ORDER — DEXAMETHASONE SODIUM PHOSPHATE 4 MG/ML
INJECTION, SOLUTION INTRA-ARTICULAR; INTRALESIONAL; INTRAMUSCULAR; INTRAVENOUS; SOFT TISSUE
Status: DISPENSED
Start: 2017-01-01

## (undated) RX ORDER — LIDOCAINE HYDROCHLORIDE 40 MG/ML
INJECTION, SOLUTION RETROBULBAR
Status: DISPENSED
Start: 2017-01-01

## (undated) RX ORDER — IPRATROPIUM BROMIDE AND ALBUTEROL SULFATE 2.5; .5 MG/3ML; MG/3ML
SOLUTION RESPIRATORY (INHALATION)
Status: DISPENSED
Start: 2017-01-01